# Patient Record
Sex: FEMALE | Race: WHITE | HISPANIC OR LATINO | Employment: OTHER | URBAN - METROPOLITAN AREA
[De-identification: names, ages, dates, MRNs, and addresses within clinical notes are randomized per-mention and may not be internally consistent; named-entity substitution may affect disease eponyms.]

---

## 2017-02-02 ENCOUNTER — TELEPHONE (OUTPATIENT)
Dept: GASTROENTEROLOGY | Facility: CLINIC | Age: 72
End: 2017-02-02

## 2017-02-02 NOTE — TELEPHONE ENCOUNTER
----- Message from Sarika Michelle sent at 2/2/2017  3:27 PM CST -----  Contact: Self- [phone]  Sofia- pt called to reschedule her upcoming appt with Dr. Black originally for tomorrow 2/3/17 at 4pm- pt is unable to make it- please call pt back at [phone]

## 2017-02-09 RX ORDER — IMIPRAMINE HYDROCHLORIDE 25 MG/1
TABLET, FILM COATED ORAL
Qty: 90 TABLET | Refills: 0 | Status: SHIPPED | OUTPATIENT
Start: 2017-02-09 | End: 2017-02-20 | Stop reason: SDUPTHER

## 2017-02-20 ENCOUNTER — OFFICE VISIT (OUTPATIENT)
Dept: FAMILY MEDICINE | Facility: CLINIC | Age: 72
End: 2017-02-20
Payer: MEDICARE

## 2017-02-20 VITALS
BODY MASS INDEX: 24.92 KG/M2 | WEIGHT: 140.63 LBS | SYSTOLIC BLOOD PRESSURE: 124 MMHG | DIASTOLIC BLOOD PRESSURE: 82 MMHG | HEIGHT: 63 IN | TEMPERATURE: 98 F | HEART RATE: 84 BPM

## 2017-02-20 DIAGNOSIS — Z23 IMMUNIZATION DUE: ICD-10-CM

## 2017-02-20 DIAGNOSIS — I73.9 PVD (PERIPHERAL VASCULAR DISEASE): Primary | ICD-10-CM

## 2017-02-20 DIAGNOSIS — E55.9 VITAMIN D DEFICIENCY: ICD-10-CM

## 2017-02-20 DIAGNOSIS — N39.41 URGE INCONTINENCE OF URINE: ICD-10-CM

## 2017-02-20 PROCEDURE — G0009 ADMIN PNEUMOCOCCAL VACCINE: HCPCS | Mod: S$GLB,,, | Performed by: FAMILY MEDICINE

## 2017-02-20 PROCEDURE — 1157F ADVNC CARE PLAN IN RCRD: CPT | Mod: S$GLB,,, | Performed by: FAMILY MEDICINE

## 2017-02-20 PROCEDURE — 3079F DIAST BP 80-89 MM HG: CPT | Mod: S$GLB,,, | Performed by: FAMILY MEDICINE

## 2017-02-20 PROCEDURE — 99999 PR PBB SHADOW E&M-EST. PATIENT-LVL III: CPT | Mod: PBBFAC,,, | Performed by: FAMILY MEDICINE

## 2017-02-20 PROCEDURE — 99214 OFFICE O/P EST MOD 30 MIN: CPT | Mod: 25,S$GLB,, | Performed by: FAMILY MEDICINE

## 2017-02-20 PROCEDURE — 1159F MED LIST DOCD IN RCRD: CPT | Mod: S$GLB,,, | Performed by: FAMILY MEDICINE

## 2017-02-20 PROCEDURE — 4040F PNEUMOC VAC/ADMIN/RCVD: CPT | Mod: S$GLB,,, | Performed by: FAMILY MEDICINE

## 2017-02-20 PROCEDURE — 99499 UNLISTED E&M SERVICE: CPT | Mod: S$GLB,,, | Performed by: FAMILY MEDICINE

## 2017-02-20 PROCEDURE — 3074F SYST BP LT 130 MM HG: CPT | Mod: S$GLB,,, | Performed by: FAMILY MEDICINE

## 2017-02-20 PROCEDURE — 90670 PCV13 VACCINE IM: CPT | Mod: S$GLB,,, | Performed by: FAMILY MEDICINE

## 2017-02-20 PROCEDURE — 1126F AMNT PAIN NOTED NONE PRSNT: CPT | Mod: S$GLB,,, | Performed by: FAMILY MEDICINE

## 2017-02-20 RX ORDER — IMIPRAMINE HYDROCHLORIDE 25 MG/1
25 TABLET, FILM COATED ORAL NIGHTLY
Qty: 90 TABLET | Refills: 3 | Status: SHIPPED | OUTPATIENT
Start: 2017-02-20 | End: 2017-08-22 | Stop reason: SDUPTHER

## 2017-02-20 NOTE — PROGRESS NOTES
Prevnar administered to right deltoid as per MD orders, pt tolerated well, advise patient to wait 15min after shot is given for any adverse reaction.

## 2017-02-20 NOTE — MR AVS SNAPSHOT
[facility]  [address] [facility], [address]  [facility] [address]  Phone: [phone]  Fax: [phone]                  Shakira Pearl   2017 1:40 PM   Office Visit    Description:  Female : 1945   Provider:  Ariana Astudillo MD   Department:  [facility]           Reason for Visit     Follow-up           Diagnoses this Visit        Comments    PVD (peripheral vascular disease)    -  Primary     Urge incontinence of urine         Immunization due         Vitamin D deficiency                To Do List           Future Appointments        Provider Department Dept Phone    2017 3:00 PM Yfn Black MD [facility] - Gastroenterology [phone]      Goals (5 Years of Data)     None       These Medications        Disp Refills Start End    imipramine (TOFRANIL) 25 MG tablet 90 tablet 3 2017     Take 1 tablet (25 mg total) by mouth every evening. - Oral    Pharmacy: [facility] - [place] [id number] [facility] #: [phone]         [facility]     [facility] Care [facility] -  Assistance  Registered nurses in the [facility] provide clinical advisement, health education, appointment booking, and other advisory services.  Call for this free service at [phone].             Medications           Message regarding Medications     Verify the changes and/or additions to your medication regime listed below are the same as discussed with your clinician today.  If any of these changes or additions are incorrect, please notify your healthcare provider.        CHANGE how you are taking these medications     Start Taking Instead of    imipramine (TOFRANIL) 25 MG tablet imipramine (TOFRANIL) 25 MG tablet    Dosage:  Take 1 tablet (25 mg total) by mouth every evening. Dosage:  TAKE 1 TABLET EVERY EVENING    Reason for Change:  Reorder            Verify that the below list of medications is an  "accurate representation of the medications you are currently taking.  If none reported, the list may be blank. If incorrect, please contact your healthcare provider. Carry this list with you in case of emergency.           Current Medications     ergocalciferol (ERGOCALCIFEROL) 50,000 unit Cap Take 1 capsule (50,000 Units total) by mouth every 7 days.    estradiol valerate (DELESTROGEN) 40 mg/mL injection Inject 0.5 mLs (20 mg total) into the muscle every 28 days. Inject into the muscle.    imipramine (TOFRANIL) 25 MG tablet Take 1 tablet (25 mg total) by mouth every evening.           Clinical Reference Information           Your Vitals Were     BP Pulse Temp Height Weight BMI    124/82 (BP Location: Right arm) 84 98.4 °F (36.9 °C) 5' 3" (1.6 m) 63.8 kg (140 lb 10.5 oz) 24.92 kg/m2      Blood Pressure          Most Recent Value    BP  124/82      Allergies as of 2/20/2017     Papaya    Sulfa (Sulfonamide Antibiotics)    Sulfur      Immunizations Administered on Date of Encounter - 2/20/2017     Name Date Dose VIS Date Route    Pneumococcal Conjugate - 13 Valent  Incomplete 0.5 mL 11/5/2015 Intramuscular      Orders Placed During Today's Visit      Normal Orders This Visit    Ambulatory consult to Vascular Medicine     Pneumococcal Conjugate Vaccine (13 Valent) (IM)     Future Labs/Procedures Expected by Expires    Calcitriol  2/20/2017 4/21/2018    Cardiology Lab Segmental Pressures Low Ext W Stress  As directed 2/20/2018      Language Assistance Services     ATTENTION: Language assistance services are available, free of charge. Please call [phone].      ATENCIÓN: Si habla español, tiene a solis disposición servicios gratuitos de asistencia lingüística. Llame al [phone].     CHÚ Ý: N?u b?n nói Ti?ng Vi?t, có các d?ch v? h? tr? ngôn ng? mi?n phí dành cho b?n. G?i s? [phone].         [facility] complies with applicable Federal civil rights laws and does not discriminate on the basis " of race, color, national origin, age, disability, or sex.

## 2017-02-21 NOTE — PROGRESS NOTES
Subjective:       Patient ID: Shakira Pearl is a 71 y.o. female.    Chief Complaint: Follow-up (6 month)  pt needs immunization update, refill of incontinence, pt due for vitamin D level.  HPI see above  Review of Systems   Constitutional: Negative.    HENT: Negative.    Eyes: Negative.    Respiratory: Negative.    Cardiovascular: Positive for leg swelling.        Numbness and coldness to lower extremities periodically   Gastrointestinal: Negative.    Endocrine: Negative.    Genitourinary: Negative.    Musculoskeletal: Negative.    Skin: Positive for color change.   Allergic/Immunologic: Negative.    Neurological: Negative.    Hematological: Negative.    Psychiatric/Behavioral: Negative.        Objective:      Physical Exam   Constitutional: She is oriented to person, place, and time. She appears well-developed and well-nourished. No distress.   HENT:   Head: Normocephalic and atraumatic.   Right Ear: External ear normal.   Left Ear: External ear normal.   Nose: Nose normal.   Mouth/Throat: Oropharynx is clear and moist.   Eyes: Conjunctivae and EOM are normal. Pupils are equal, round, and reactive to light. Right eye exhibits no discharge. Left eye exhibits no discharge. No scleral icterus.   Neck: Normal range of motion. Neck supple. No JVD present. No tracheal deviation present. No thyromegaly present.   Cardiovascular: Normal rate, regular rhythm, normal heart sounds and intact distal pulses.    No murmur heard.  Pulmonary/Chest: Effort normal and breath sounds normal. No respiratory distress. She has no wheezes. She has no rales. She exhibits no tenderness.   Abdominal: Soft. Bowel sounds are normal. She exhibits no distension. There is no tenderness.   Lymphadenopathy:     She has no cervical adenopathy.   Neurological: She is alert and oriented to person, place, and time. She has normal reflexes. She displays normal reflexes. No cranial nerve deficit. She exhibits normal muscle tone. Coordination normal.    Skin: Skin is warm and dry. No rash noted. She is not diaphoretic. No erythema. No pallor.   Psychiatric: She has a normal mood and affect. Her behavior is normal. Judgment and thought content normal.   Nursing note and vitals reviewed.      Assessment:       1. PVD (peripheral vascular disease)    2. Urge incontinence of urine    3. Immunization due    4. Vitamin D deficiency        Plan:     will obtain calcitriol level today, patient will receive Prevnar immunization ×1 today  We'll obtain ABIs with exercise and refer patient to vascular medicine.  Refer to the med card dated 2/20/2017 for refill medications will contact the patient results of testing available.

## 2017-03-07 ENCOUNTER — TELEPHONE (OUTPATIENT)
Dept: FAMILY MEDICINE | Facility: CLINIC | Age: 72
End: 2017-03-07

## 2017-03-07 DIAGNOSIS — E55.9 VITAMIN D DEFICIENCY: ICD-10-CM

## 2017-03-07 DIAGNOSIS — E78.5 HYPERLIPIDEMIA, UNSPECIFIED HYPERLIPIDEMIA TYPE: ICD-10-CM

## 2017-03-07 DIAGNOSIS — Z00.00 ROUTINE GENERAL MEDICAL EXAMINATION AT A HEALTH CARE FACILITY: Primary | ICD-10-CM

## 2017-03-07 DIAGNOSIS — E78.5 ELEVATED LIPIDS: ICD-10-CM

## 2017-03-07 NOTE — TELEPHONE ENCOUNTER
----- Message from Alen Davis sent at 3/7/2017  9:42 AM CST -----  Contact: Self [phone]  Doctor appointment and lab have been scheduled.  Please link lab orders to the lab appointment.  Date of doctor appointment:    Physical or EP: 07/ 12  Date of lab appointment:07/06    Comments:

## 2017-03-07 NOTE — TELEPHONE ENCOUNTER
Pre patient insurance they will not cover a routine TSH and lipid panel patient has medicare do you have a DX's that can cover the non covered labs

## 2017-03-08 PROBLEM — E78.5 ELEVATED LIPIDS: Status: ACTIVE | Noted: 2017-03-08

## 2017-03-14 ENCOUNTER — CLINICAL SUPPORT (OUTPATIENT)
Dept: CARDIOLOGY | Facility: CLINIC | Age: 72
End: 2017-03-14
Payer: MEDICARE

## 2017-03-14 ENCOUNTER — LAB VISIT (OUTPATIENT)
Dept: LAB | Facility: HOSPITAL | Age: 72
End: 2017-03-14
Attending: FAMILY MEDICINE
Payer: MEDICARE

## 2017-03-14 DIAGNOSIS — I73.9 PVD (PERIPHERAL VASCULAR DISEASE): ICD-10-CM

## 2017-03-14 DIAGNOSIS — E55.9 VITAMIN D DEFICIENCY: ICD-10-CM

## 2017-03-14 LAB — VASCULAR ANKLE BRACHIAL INDEX (ABI) RIGHT: 1.02 (ref 0.9–1.2)

## 2017-03-14 PROCEDURE — 93924 LWR XTR VASC STDY BILAT: CPT | Mod: S$GLB,,, | Performed by: INTERNAL MEDICINE

## 2017-03-16 ENCOUNTER — TELEPHONE (OUTPATIENT)
Dept: FAMILY MEDICINE | Facility: CLINIC | Age: 72
End: 2017-03-16

## 2017-03-16 NOTE — TELEPHONE ENCOUNTER
Spoke with patient requesting results for the following Cardiology Lab Segmental Pressures Low Ext W Stress, and Calcitriol completed on 3/14/17.

## 2017-03-16 NOTE — TELEPHONE ENCOUNTER
----- Message from Romelia Tucker MA sent at 3/16/2017  9:34 AM CDT -----  Contact: Puvb-[phone]  Type: Test Results    What test was performed? NON FASTING LAB and SEG PRESSURE EXERCISE     Who ordered the test?    When and where were the test performed? 3/14/17    Comments: Please advise and call. Thanks!

## 2017-03-17 ENCOUNTER — CLINICAL SUPPORT (OUTPATIENT)
Dept: CARDIOLOGY | Facility: CLINIC | Age: 72
End: 2017-03-17
Payer: MEDICARE

## 2017-03-17 ENCOUNTER — TELEPHONE (OUTPATIENT)
Dept: FAMILY MEDICINE | Facility: CLINIC | Age: 72
End: 2017-03-17

## 2017-03-17 DIAGNOSIS — I73.9 PAD (PERIPHERAL ARTERY DISEASE): ICD-10-CM

## 2017-03-17 DIAGNOSIS — I73.9 PAD (PERIPHERAL ARTERY DISEASE): Primary | ICD-10-CM

## 2017-03-17 LAB — 1,25(OH)2D3 SERPL-MCNC: 128 PG/ML

## 2017-03-17 PROCEDURE — 93925 LOWER EXTREMITY STUDY: CPT | Mod: S$GLB,,, | Performed by: INTERNAL MEDICINE

## 2017-03-17 NOTE — TELEPHONE ENCOUNTER
----- Message from Zaria Jain sent at 3/17/2017  8:55 AM CDT -----  Contact: [phone]  Patient is returning a phone call.  Who left a message for the patient: laura  Does patient know what this is regarding:  no  Comments:

## 2017-03-17 NOTE — TELEPHONE ENCOUNTER
"Spoke with patient reviewed results of the Cardiology Lab Segmental Pressures Low Ext W Stress per [facility],  noted in the report it states "significant right and left femoral disease."  Patient would like to know what this means since you stated the results are normal.   "

## 2017-03-17 NOTE — TELEPHONE ENCOUNTER
Spoke with patient advised her of Dr. Manzanares's recommendation.  Patient informed that the referral coordinator will contact her to set up appt with vascular med.  Patient verbalizes understanding.

## 2017-03-17 NOTE — TELEPHONE ENCOUNTER
"I looked over pt's report and I don't know how to interpret because the values for her blood flow look normal but this study may be using different cut offs for what is "normal" vs "abnormal".  I would recommend seeing vascular medicine, Dr. August put in a referral.  "

## 2017-03-20 ENCOUNTER — TELEPHONE (OUTPATIENT)
Dept: PHYSICAL MEDICINE AND REHAB | Facility: CLINIC | Age: 72
End: 2017-03-20

## 2017-03-20 ENCOUNTER — TELEPHONE (OUTPATIENT)
Dept: CARDIOLOGY | Facility: CLINIC | Age: 72
End: 2017-03-20

## 2017-03-20 NOTE — TELEPHONE ENCOUNTER
----- Message from Obed Harvey MD sent at 3/20/2017  3:58 PM CDT -----  Arteria doppler is normal.

## 2017-03-21 NOTE — TELEPHONE ENCOUNTER
----- Message from Kristie Dietz MA sent at 3/17/2017  2:16 PM CDT -----  Contact: self @ [phone]      ----- Message -----     From: Liyah Prather     Sent: 3/17/2017   2:09 PM       To: [facility] Staff    Pt says she had a vascular test for PAD she was told she has moral significant disease.  She was advised to contact her pain management dr to discuss it.  pls call asap.  Pt does not want to go the night without speaking with someone.     Called patient about test for PAD, that was normal.

## 2017-04-03 ENCOUNTER — HOSPITAL ENCOUNTER (EMERGENCY)
Facility: OTHER | Age: 72
Discharge: HOME | End: 2017-04-03
Attending: EMERGENCY MEDICINE
Payer: MEDICARE

## 2017-04-03 VITALS
OXYGEN SATURATION: 98 % | HEART RATE: 77 BPM | SYSTOLIC BLOOD PRESSURE: 156 MMHG | WEIGHT: 134 LBS | RESPIRATION RATE: 18 BRPM | DIASTOLIC BLOOD PRESSURE: 81 MMHG | TEMPERATURE: 98 F | BODY MASS INDEX: 22.88 KG/M2 | HEIGHT: 64 IN

## 2017-04-03 DIAGNOSIS — S60.221A CONTUSION OF RIGHT HAND, INITIAL ENCOUNTER: Primary | ICD-10-CM

## 2017-04-03 DIAGNOSIS — S40.011A CONTUSION OF RIGHT SHOULDER, INITIAL ENCOUNTER: ICD-10-CM
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
## 2017-04-03 PROCEDURE — 29125 APPL SHORT ARM SPLINT STATIC: CPT | Mod: RT

## 2017-04-03 PROCEDURE — 99283 EMERGENCY DEPT VISIT LOW MDM: CPT | Mod: 25

## 2017-04-03 RX ORDER — IBUPROFEN 600 MG/1
600 TABLET ORAL EVERY 6 HOURS PRN
Qty: 20 TABLET | Refills: 0 | Status: SHIPPED | OUTPATIENT
Start: 2017-04-03 | End: 2017-07-29

## 2017-04-03 NOTE — DISCHARGE INSTRUCTIONS
Shoulder Contusion  You have a shoulder injury called a contusion. This causes pain, swelling, and sometimes bruising on the skin. You dont have any broken bones. This injury will take from a few days to several weeks to heal, depending on how severe it is. Moderate to severe shoulder contusions are treated with a sling or shoulder immobilizer. Minor contusions can be treated without any special support.  Home care  Follow these tips when caring for yourself at home:  · If you were given a sling to use, leave it in place for the time advised by your healthcare provider. If you arent sure how long to wear it, ask for advice. If the sling becomes loose, adjust it so that your forearm is level with the ground. Your shoulder should feel well supported.  · Put an ice pack on the injured area for 20 minutes every 1 to 2 hours the first day. You can make your own ice pack by putting ice cubes in a plastic bag. Wrap the bag in a thin towel. Continue with ice packs 3 to 4 times a day for the next 2 days. Then use the pack as needed to ease pain and swelling.  · You may use acetaminophen or ibuprofen to control pain, unless another pain medicine was prescribed. If you have chronic liver or kidney disease, talk with your healthcare provider before using these medicines. Also talk with your provider if youve ever had a stomach ulcer or GI bleeding.  · Shoulder and elbow joints become stiff if left in a sling for too long. You should start range of motion exercises about 7 to 10 days after the injury. Talk with your provider to find out what type of exercises to do and how soon to start.  · Unless your provider told you otherwise, you can take the sling off to shower or bathe.  Follow-up care  Follow up with your healthcare provider if you dont start getting better in the next 5 days.  When to seek medical advice  Call your healthcare provider right away if any of these occur:  · Pain or swelling gets worse or continues  for more than a few days  · Large amount of bruising on your shoulder or upper arm  · Your hand or fingers become cold, blue, numb, or tingly  · Difficulty moving your hand or fingers  · Weakness in your hand or fingers  · Your shoulder becomes stiff  · Your shoulder feels like it is popping out  · You arent able to do your daily activities  Date Last Reviewed: 10/1/2016  © 0781-0230 [employer]. [address] [place] [address], [place] [address]. All rights reserved. This information is not intended as a substitute for professional medical care. Always follow your healthcare professional's instructions.          Hand Contusion  You have a contusion. This is also called a bruise. There is swelling and some bleeding under the skin, but no broken bones. This injury generally takes a few days to a few weeks to heal.  During that time, the bruise will typically change in color from reddish, to purple-blue, to greenish-yellow, then to yellow-brown.  Home care  · Elevate the hand to reduce pain and swelling. As much as possible, sit or lie down with the hand raised about the level of your heart. This is especially important during the first 48 hours.  · Ice the hand to help reduce pain and swelling. Wrap a cold source (ice pack or ice cubes in a plastic bag) in a thin towel. Apply to the bruised area for 20 minutes every 1 to 2 hours the first day. Continue this 3 to 4 times a day until the pain and swelling goes away.  · Unless another medication was prescribed, you can take acetaminophen, ibuprofen, or naproxen to control pain. (If you have chronic liver or kidney disease or ever had a stomach ulcer or GI bleeding, talk with your doctor before using these medicines.)  Follow up  Follow up with your health care provider or our staff as advised. Call if you are not improving within 1 to 2 weeks.  When to seek medical advice   Call your health care provider right away if you have any of the following:  · Increased  pain or swelling  · Arm becomes cold, blue, numb or tingly  · Signs of infection: Warmth, drainage, or increased redness or pain around the bruise  · Inability to move the injured hand   · Frequent bruising for unknown reasons  Date Last Reviewed: 4/29/2015  © 1473-4952 [employer]. [address] [place] [address]. All rights reserved. This information is not intended as a substitute for professional medical care. Always follow your healthcare professional's instructions.

## 2017-04-03 NOTE — ED NOTES
Pt reports being at the fitness center and slipped on the mat, landed on right cheek and right outer hand, denies LOC, no bruising noted to right cheek, swelling with ecchymosis noted to right dorsal region, located between 4th and 5th fingers, limited ROM with fingers

## 2017-04-03 NOTE — ED PROVIDER NOTES
Encounter Date: 4/3/2017       History     Chief Complaint   Patient presents with    Hand Injury     pt reports haqving a fall while exercising on workout mat; pt reports slipping falling onto right side hurting right hand; ROM decreased d/t pain     Review of patient's allergies indicates:   Allergen Reactions    Papaya      Illness vomiting    Sulfa (sulfonamide antibiotics) Rash    Sulfur Rash     HPI Comments: Patient is 71 year old female who presents with complaints of right hand and shoulder pain second to mechanical fall x 3 hours PTA. She reports while working out at the gym with her  she attempted to get up from the ground and her mat slipped from under her. She reports immediate onset of right hand pain and shoulder pain. She also reports hitting her right cheek bone on the ground but has not pain to her face. She denies LOC, AMS, dizziness, confusion, other extremity pain. She does report difficulties using her right hand when steering her car and changing gears. She has not taken any medications PTA. She is currently unaccompanied in the ER.     The history is provided by the patient.     Past Medical History:   Diagnosis Date    Allergy sinus    Arthritis back    Degenerative disc disease back    Neuromuscular disorder     Vitamin D deficiency      Past Surgical History:   Procedure Laterality Date    APPENDECTOMY  age 19    HYSTERECTOMY  38     Family History   Problem Relation Age of Onset    Heart disease Mother 67     heart attack    Cancer Father 65     abdominal    Stomach cancer Father     Celiac disease Neg Hx     Colon cancer Neg Hx     Crohn's disease Neg Hx     Esophageal cancer Neg Hx     Inflammatory bowel disease Neg Hx     Liver cancer Neg Hx     Rectal cancer Neg Hx     Ulcerative colitis Neg Hx      Social History   Substance Use Topics    Smoking status: Never Smoker    Smokeless tobacco: Never Used    Alcohol use No     Review of Systems    Constitutional: Negative for chills and fever.   HENT: Negative for sore throat and trouble swallowing.    Eyes: Negative for visual disturbance.   Respiratory: Negative for cough and shortness of breath.    Cardiovascular: Negative for chest pain.   Gastrointestinal: Negative for abdominal pain, constipation, diarrhea, nausea and vomiting.   Genitourinary: Negative for dysuria and flank pain.   Musculoskeletal: Negative for back pain, neck pain and neck stiffness.        Right hand pain  Right shoulder pain     Skin: Negative for rash.   Neurological: Negative for dizziness, syncope, weakness and headaches.   Psychiatric/Behavioral: Negative for confusion.       Physical Exam   Initial Vitals   BP Pulse Resp Temp SpO2   04/03/17 1540 04/03/17 1540 04/03/17 1540 04/03/17 1540 04/03/17 1540   156/81 77 18 98.1 °F (36.7 °C) 98 %     Physical Exam    Nursing note and vitals reviewed.  Constitutional: She appears well-developed and well-nourished. She is not diaphoretic. No distress.   Healthy appearing female who appears younger than stated age who is guarding her right hand and shoulder during interview and exam. She makes good eye contact and ambulates with ease.    HENT:   Head: Normocephalic and atraumatic.   No facial edema, erythema, ecchymosis or asymmetry.   Eyes: Conjunctivae and EOM are normal. Pupils are equal, round, and reactive to light. Right eye exhibits no discharge. Left eye exhibits no discharge. No scleral icterus.   Neck: Normal range of motion.   Cardiovascular: Normal rate, regular rhythm and normal heart sounds. Exam reveals no gallop and no friction rub.    No murmur heard.  Pulmonary/Chest: Breath sounds normal. She has no wheezes. She has no rhonchi. She has no rales.   Abdominal: Soft. Bowel sounds are normal. There is no tenderness. There is no rebound and no guarding.   Musculoskeletal: Normal range of motion. She exhibits edema and tenderness.   Edema ecchymosis and TTP to the medial  aspect of the right (dominant) hand over the dorsum. There is no skin breakdown or bleeding. There is normal sensation to light touch. There is normal strength against resistance. Patient reports decreased ROM second to weakness and pain.     Small area of ecchymosis over the deltoid region of the right shoulder of the size of a quarter.  No range of motion deficits in bony landmark tenderness to the right shoulder.   Lymphadenopathy:     She has no cervical adenopathy.   Neurological: She is alert and oriented to person, place, and time. She has normal strength. No cranial nerve deficit or sensory deficit.   Skin: Skin is warm and dry. No rash and no abscess noted. No erythema.   Psychiatric: She has a normal mood and affect. Her behavior is normal. Thought content normal.         ED Course   Orthopedic Injury  Date/Time: 4/3/2017 5:48 PM  Authorized by: JESSICA MARCANO II   Performed by: BECCA SWANN  Injury location: hand  Location details: right hand  Injury type: soft tissue  Pre-procedure distal perfusion: normal  Pre-procedure neurological function: normal  Pre-procedure neurovascular assessment: neurovascularly intact  Pre-procedure range of motion: normal  Local anesthesia used: yes    Anesthesia:  Local anesthesia used: yes  Anesthesia: see MAR for details  Anesthesia: see MAR for details  Sedation:  Patient sedated: no    Splint type: ulnar gutter  Complications: No  Specimens: No  Implants: No  Post-procedure neurovascular assessment: post-procedure neurovascularly intact  Post-procedure distal perfusion: normal  Post-procedure neurological function: normal  Post-procedure range of motion: normal  Patient tolerance: Patient tolerated the procedure well with no immediate complications        Labs Reviewed - No data to display     Imaging Results         X-Ray Hand 3 view Right (Final result) Result time:  04/03/17 17:39:16    Final result by Delta Pennington MD (04/03/17 17:39:16)    Impression:      No acute fracture identified.      Electronically signed by: Dr. Delta Pennington MD  Date:     04/03/17  Time:    17:39     Narrative:    Comparison: None.    Findings:3 view examination. The alignment is within normal limits.  No displaced fractures identified.  No evidence of lytic or blastic lesions.Soft tissues are unremarkable. Joint spaces are unremarkable.            X-Ray Shoulder Trauma Right (Final result) Result time:  04/03/17 17:36:43    Final result by Delta Pennington MD (04/03/17 17:36:43)    Impression:     No acute fracture identified.      Electronically signed by: Dr. Delat Pennington MD  Date:     04/03/17  Time:    17:36     Narrative:    Comparison: None.    Findings:3 view examination. The alignment is within normal limits.  No displaced fractures identified.  No evidence of lytic or blastic lesions.Soft tissues are unremarkable. Joint spaces are unremarkable.                 Medical Decision Making:   ED Management:  Urgent evaluation of 71-year-old female who presents with complaints concerning for fracture to right hand second to mechanical slip and fall.  Patient is afebrile, nontoxic appearing, hemodynamically stable.  Physical exam outlined above and reveals concern for possible fracture to fifth metacarpal.  X-ray reveals no evidence of fracture however given bruising and pain with movement will splint for support and comfort and have patient follow-up with Dr. Pagan in 1-2 days for symptom re-check. She is amenable to plan. Case discussed with attending MD who agrees with plan.                    ED Course     Clinical Impression:   The primary encounter diagnosis was Contusion of right hand, initial encounter. Diagnoses of Fall and Contusion of right shoulder, initial encounter were also pertinent to this visit.          Haritha Alexander PA-C  04/03/17 [id number]

## 2017-04-03 NOTE — ED AVS SNAPSHOT
[facility]  [address]  [facility] [address]               Shakira Pearl   4/3/2017  4:01 PM   ED    Description:  Female : 1945   Department:  [facility]           Your Care was Coordinated By:     Provider Role From To    Chilango Roach II, MD Attending Provider 17 1034 --    Haritha Alexander PA-C Physician Assistant 17 3337 --      Reason for Visit     Hand Injury           Diagnoses this Visit        Comments    Contusion of right hand, initial encounter    -  Primary     Fall         Contusion of right shoulder, initial encounter           ED Disposition     None           To Do List           Follow-up Information     Follow up with Claude S. Williams Iv, MD In 1 day.    Specialty:  Orthopedic Surgery    Why:  For symptom re-check.     Contact information:    [address]  [place] [address]  [phone]         These Medications        Disp Refills Start End    ibuprofen (ADVIL,MOTRIN) 600 MG tablet 20 tablet 0 4/3/2017     Take 1 tablet (600 mg total) by mouth every 6 (six) hours as needed for Pain. - Oral    Pharmacy: [facility] - [address] Ph #: [phone]         [facility] On Call     [facility] Care [facility] - 24/ Assistance  Unless otherwise directed by your provider, please contact [facility] On-[facility], our nurse care line that is available for 24/7 assistance.     Registered nurses in the [facility] provide: appointment scheduling, clinical advisement, health education, and other advisory services.  Call: [phone] (toll free)               Medications           Message regarding Medications     Verify the changes and/or additions to your medication regime listed below are the same as discussed with your clinician today.  If any of these changes or additions are incorrect, please notify your healthcare provider.        START taking these  "NEW medications        Refills    ibuprofen (ADVIL,MOTRIN) 600 MG tablet 0    Sig: Take 1 tablet (600 mg total) by mouth every 6 (six) hours as needed for Pain.    Class: Print    Route: Oral           Verify that the below list of medications is an accurate representation of the medications you are currently taking.  If none reported, the list may be blank. If incorrect, please contact your healthcare provider. Carry this list with you in case of emergency.           Current Medications     ergocalciferol (ERGOCALCIFEROL) 50,000 unit Cap Take 1 capsule (50,000 Units total) by mouth every 7 days.    estradiol valerate (DELESTROGEN) 40 mg/mL injection Inject 0.5 mLs (20 mg total) into the muscle every 28 days. Inject into the muscle.    imipramine (TOFRANIL) 25 MG tablet Take 1 tablet (25 mg total) by mouth every evening.    ibuprofen (ADVIL,MOTRIN) 600 MG tablet Take 1 tablet (600 mg total) by mouth every 6 (six) hours as needed for Pain.           Clinical Reference Information           Your Vitals Were     BP Pulse Temp Resp Height Weight    156/81 (BP Location: Left arm, Patient Position: Sitting) 77 98.1 °F (36.7 °C) (Oral) 18 5' 3.5" (1.613 m) 60.8 kg (134 lb)    SpO2 BMI             98% 23.36 kg/m2         Allergies as of 4/3/2017        Reactions    Papaya     Illness vomiting    Sulfa (Sulfonamide Antibiotics) Rash    Sulfur Rash      Immunizations Administered on Date of Encounter - 4/3/2017     None      ED Micro, Lab, POCT     None      ED Imaging Orders     Start Ordered       Status Ordering Provider    04/03/17 1620 04/03/17 1619  X-Ray Shoulder Trauma Right  1 time imaging      Final result     04/03/17 1619 04/03/17 1619  X-Ray Hand 3 view Right  1 time imaging      Final result         Discharge Instructions         Shoulder Contusion  You have a shoulder injury called a contusion. This causes pain, swelling, and sometimes bruising on the skin. You dont have any broken bones. This injury will take " from a few days to several weeks to heal, depending on how severe it is. Moderate to severe shoulder contusions are treated with a sling or shoulder immobilizer. Minor contusions can be treated without any special support.  Home care  Follow these tips when caring for yourself at home:  · If you were given a sling to use, leave it in place for the time advised by your healthcare provider. If you arent sure how long to wear it, ask for advice. If the sling becomes loose, adjust it so that your forearm is level with the ground. Your shoulder should feel well supported.  · Put an ice pack on the injured area for 20 minutes every 1 to 2 hours the first day. You can make your own ice pack by putting ice cubes in a plastic bag. Wrap the bag in a thin towel. Continue with ice packs 3 to 4 times a day for the next 2 days. Then use the pack as needed to ease pain and swelling.  · You may use acetaminophen or ibuprofen to control pain, unless another pain medicine was prescribed. If you have chronic liver or kidney disease, talk with your healthcare provider before using these medicines. Also talk with your provider if youve ever had a stomach ulcer or GI bleeding.  · Shoulder and elbow joints become stiff if left in a sling for too long. You should start range of motion exercises about 7 to 10 days after the injury. Talk with your provider to find out what type of exercises to do and how soon to start.  · Unless your provider told you otherwise, you can take the sling off to shower or bathe.  Follow-up care  Follow up with your healthcare provider if you dont start getting better in the next 5 days.  When to seek medical advice  Call your healthcare provider right away if any of these occur:  · Pain or swelling gets worse or continues for more than a few days  · Large amount of bruising on your shoulder or upper arm  · Your hand or fingers become cold, blue, numb, or tingly  · Difficulty moving your hand or  fingers  · Weakness in your hand or fingers  · Your shoulder becomes stiff  · Your shoulder feels like it is popping out  · You arent able to do your daily activities  Date Last Reviewed: 10/1/2016  © 7112-2251 [employer]. [address] [place] [address], [place] [address]. All rights reserved. This information is not intended as a substitute for professional medical care. Always follow your healthcare professional's instructions.          Hand Contusion  You have a contusion. This is also called a bruise. There is swelling and some bleeding under the skin, but no broken bones. This injury generally takes a few days to a few weeks to heal.  During that time, the bruise will typically change in color from reddish, to purple-blue, to greenish-yellow, then to yellow-brown.  Home care  · Elevate the hand to reduce pain and swelling. As much as possible, sit or lie down with the hand raised about the level of your heart. This is especially important during the first 48 hours.  · Ice the hand to help reduce pain and swelling. Wrap a cold source (ice pack or ice cubes in a plastic bag) in a thin towel. Apply to the bruised area for 20 minutes every 1 to 2 hours the first day. Continue this 3 to 4 times a day until the pain and swelling goes away.  · Unless another medication was prescribed, you can take acetaminophen, ibuprofen, or naproxen to control pain. (If you have chronic liver or kidney disease or ever had a stomach ulcer or GI bleeding, talk with your doctor before using these medicines.)  Follow up  Follow up with your health care provider or our staff as advised. Call if you are not improving within 1 to 2 weeks.  When to seek medical advice   Call your health care provider right away if you have any of the following:  · Increased pain or swelling  · Arm becomes cold, blue, numb or tingly  · Signs of infection: Warmth, drainage, or increased redness or pain around the bruise  · Inability to move the  injured hand   · Frequent bruising for unknown reasons  Date Last Reviewed: 4/29/2015  © 1773-4668 [employer]. [address] [place] [address], [place] [address]. All rights reserved. This information is not intended as a substitute for professional medical care. Always follow your healthcare professional's instructions.          Your Scheduled Appointments     Apr 17, 2017  3:00 PM CDT   GASTROENTEROLOGY ESTABLISHED PATIENT with MD Will Gaytan mayra - Gastroenterology ([facility] )    [address]  [place] [id number] [phone]            Jul 06, 2017  9:00 AM CDT   Fasting Lab with LAB, STANLEY Ace - Laboratory ([facility])    [address] [facility] [address] [phone]            Jul 12, 2017  2:00 PM CDT   Physical with Ariana Astudillo MD   [facility] ([facility])    [address] [facility], [address]  [facility] [address] [phone]               [facility] complies with applicable Federal civil rights laws and does not discriminate on the basis of race, color, national origin, age, disability, or sex.        Language Assistance Services     ATTENTION: Language assistance services are available, free of charge. Please call [phone].      ATENCIÓN: Si habla español, tiene a solis disposición servicios gratuitos de asistencia lingüística. Llame al [phone].     CHÚ Ý: N?u b?n nói Ti?ng Vi?t, có các d?ch v? h? tr? ngôn ng? mi?n phí dành cho b?n. G?i s? [phone].

## 2017-04-05 ENCOUNTER — TELEPHONE (OUTPATIENT)
Dept: GASTROENTEROLOGY | Facility: CLINIC | Age: 72
End: 2017-04-05
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
## 2017-05-10 ENCOUNTER — TELEPHONE (OUTPATIENT)
Dept: GASTROENTEROLOGY | Facility: HOSPITAL | Age: 72
End: 2017-05-10

## 2017-05-10 NOTE — TELEPHONE ENCOUNTER
GI on-call Note      Called by patient.  She had food poisoning couple days ago and forced herself to throwup.   Now she is having stomach pains after eating. She is feeling weak and tired.  Denies CP or SOB. No abdominal distention or rigidity.     She is asking what to take to soothe her stomach.  Advised pepto bismol, alk seltzer or tums can be taken for imeediate relief.  She can restart her PPI pantoprazole if she desires.  Tylenol is preferred over ibuprofen.    Advised that if she cannot keep food down, should go to local ER if symptoms persist / worsen

## 2017-06-29 ENCOUNTER — TELEPHONE (OUTPATIENT)
Dept: GASTROENTEROLOGY | Facility: CLINIC | Age: 72
End: 2017-06-29

## 2017-06-29 NOTE — TELEPHONE ENCOUNTER
----- Message from Camila England MA sent at 6/29/2017 12:22 PM CDT -----  Contact: self  [phone]  States she needs to reschedule the appointment on Friday, 6-30-17.  States she is in a meeting and if you miss her she will call you back.  States her meeting starts in 5 minutes.

## 2017-07-29 ENCOUNTER — HOSPITAL ENCOUNTER (EMERGENCY)
Facility: OTHER | Age: 72
Discharge: HOME | End: 2017-07-29
Attending: EMERGENCY MEDICINE
Payer: MEDICARE

## 2017-07-29 VITALS
OXYGEN SATURATION: 98 % | DIASTOLIC BLOOD PRESSURE: 66 MMHG | TEMPERATURE: 98 F | WEIGHT: 135 LBS | SYSTOLIC BLOOD PRESSURE: 136 MMHG | HEIGHT: 63 IN | RESPIRATION RATE: 18 BRPM | BODY MASS INDEX: 23.92 KG/M2 | HEART RATE: 67 BPM
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
## 2017-07-29 PROCEDURE — 99283 EMERGENCY DEPT VISIT LOW MDM: CPT | Mod: 25

## 2017-07-29 PROCEDURE — 63600175 PHARM REV CODE 636 W HCPCS: Performed by: PHYSICIAN ASSISTANT

## 2017-07-29 PROCEDURE — 96372 THER/PROPH/DIAG INJ SC/IM: CPT

## 2017-07-29 RX ORDER — ORPHENADRINE CITRATE 30 MG/ML
30 INJECTION INTRAMUSCULAR; INTRAVENOUS
Status: COMPLETED | OUTPATIENT
Start: 2017-07-29 | End: 2017-07-29

## 2017-07-29 RX ORDER — METHOCARBAMOL 500 MG/1
1000 TABLET, FILM COATED ORAL 3 TIMES DAILY
Qty: 30 TABLET | Refills: 0 | Status: SHIPPED | OUTPATIENT
Start: 2017-07-29 | End: 2017-08-03

## 2017-07-29 RX ORDER — KETOROLAC TROMETHAMINE 30 MG/ML
15 INJECTION, SOLUTION INTRAMUSCULAR; INTRAVENOUS
Status: COMPLETED | OUTPATIENT
Start: 2017-07-29 | End: 2017-07-29

## 2017-07-29 RX ADMIN — KETOROLAC TROMETHAMINE 15 MG: 30 INJECTION, SOLUTION INTRAMUSCULAR at 05:07

## 2017-07-29 RX ADMIN — ORPHENADRINE CITRATE 30 MG: 30 INJECTION INTRAMUSCULAR; INTRAVENOUS at 05:07
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
## 2017-07-29 NOTE — PROVIDER PROGRESS NOTES - EMERGENCY DEPT.
Encounter Date: 7/29/2017    ED Physician Progress Notes        Physician Note:   I evaluated the patient in triage and performed medical screening exam. Patient stable at this time and awaiting bed. Chief complaint and vital signs reviewed.

## 2017-08-22 ENCOUNTER — OFFICE VISIT (OUTPATIENT)
Dept: FAMILY MEDICINE | Facility: CLINIC | Age: 72
End: 2017-08-22
Payer: MEDICARE

## 2017-08-22 VITALS
HEIGHT: 63 IN | TEMPERATURE: 98 F | SYSTOLIC BLOOD PRESSURE: 122 MMHG | DIASTOLIC BLOOD PRESSURE: 84 MMHG | WEIGHT: 143.31 LBS | BODY MASS INDEX: 25.39 KG/M2

## 2017-08-22 DIAGNOSIS — Z86.39 HISTORY OF VITAMIN D DEFICIENCY: ICD-10-CM

## 2017-08-22 DIAGNOSIS — Z78.9 OTHER SPECIFIED HEALTH STATUS: ICD-10-CM

## 2017-08-22 DIAGNOSIS — E08.3213 DIABETES MELLITUS DUE TO UNDERLYING CONDITION WITH BOTH EYES AFFECTED BY MILD NONPROLIFERATIVE RETINOPATHY AND MACULAR EDEMA: ICD-10-CM

## 2017-08-22 DIAGNOSIS — E11.9 TYPE 2 DIABETES MELLITUS WITHOUT COMPLICATION, WITHOUT LONG-TERM CURRENT USE OF INSULIN: ICD-10-CM

## 2017-08-22 DIAGNOSIS — N39.41 URGE INCONTINENCE OF URINE: ICD-10-CM

## 2017-08-22 DIAGNOSIS — R07.9 CHEST PAIN, UNSPECIFIED TYPE: Primary | ICD-10-CM

## 2017-08-22 PROCEDURE — 99999 PR PBB SHADOW E&M-EST. PATIENT-LVL III: CPT | Mod: PBBFAC,,, | Performed by: FAMILY MEDICINE

## 2017-08-22 PROCEDURE — 99499 UNLISTED E&M SERVICE: CPT | Mod: S$GLB,,, | Performed by: FAMILY MEDICINE

## 2017-08-22 PROCEDURE — 99397 PER PM REEVAL EST PAT 65+ YR: CPT | Mod: S$GLB,,, | Performed by: FAMILY MEDICINE

## 2017-08-22 RX ORDER — ERGOCALCIFEROL 1.25 MG/1
50000 CAPSULE ORAL
Qty: 12 CAPSULE | Refills: 3 | Status: SHIPPED | OUTPATIENT
Start: 2017-08-22 | End: 2017-08-22 | Stop reason: SDUPTHER

## 2017-08-22 RX ORDER — IMIPRAMINE HYDROCHLORIDE 25 MG/1
25 TABLET, FILM COATED ORAL NIGHTLY
Qty: 90 TABLET | Refills: 3 | Status: SHIPPED | OUTPATIENT
Start: 2017-08-22 | End: 2018-04-05 | Stop reason: SDUPTHER

## 2017-08-22 RX ORDER — ERGOCALCIFEROL 1.25 MG/1
50000 CAPSULE ORAL
Qty: 12 CAPSULE | Refills: 3 | Status: SHIPPED | OUTPATIENT
Start: 2017-08-22 | End: 2018-05-15

## 2017-08-22 RX ORDER — IMIPRAMINE HYDROCHLORIDE 25 MG/1
25 TABLET, FILM COATED ORAL NIGHTLY
Qty: 90 TABLET | Refills: 3 | Status: SHIPPED | OUTPATIENT
Start: 2017-08-22 | End: 2017-08-22 | Stop reason: SDUPTHER

## 2017-08-22 NOTE — PROGRESS NOTES
Shakira Pearl is a 72 y.o. female   Routine physical  Source of history: Patient  Past Medical History:   Diagnosis Date    Allergy sinus    Arthritis back    Degenerative disc disease back    Neuromuscular disorder     Vitamin D deficiency      Patient  reports that she has never smoked. She has never used smokeless tobacco. She reports that she does not drink alcohol or use drugs.  Family History   Problem Relation Age of Onset    Heart disease Mother 67     heart attack    Cancer Father 65     abdominal    Stomach cancer Father     Celiac disease Neg Hx     Colon cancer Neg Hx     Crohn's disease Neg Hx     Esophageal cancer Neg Hx     Inflammatory bowel disease Neg Hx     Liver cancer Neg Hx     Rectal cancer Neg Hx     Ulcerative colitis Neg Hx      ROS:  GENERAL: No fever, chills, fatigability or weight loss.  SKIN: No rashes, itching or changes in color or texture of skin.  HEAD: No headaches or recent head trauma.  EYES: Visual acuity fine. No photophobia, ocular pain or diplopia.  EARS: Denies ear pain, discharge or vertigo.  NOSE: No loss of smell, no epistaxis or postnasal drip.  MOUTH & THROAT: No hoarseness or change in voice. No excessive gum bleeding.  NODES: Denies swollen glands.  CHEST: Denies SHETTY, cyanosis, wheezing, cough and sputum production.  CARDIOVASCULAR: Denies chest pain, PND, orthopnea or reduced exercise tolerance.  ABDOMEN: Appetite fine. No weight loss. Denies diarrhea, abdominal pain, hematemesis or blood in stool.  URINARY: No flank pain, dysuria or hematuria.  PERIPHERAL VASCULAR: No claudication or cyanosis.  MUSCULOSKELETAL: No joint stiffness or swelling. Denies back pain.  NEUROLOGIC: No history of seizures, paralysis, alteration of gait or coordination.    OBJECTIVE:  APPEARANCE: normal  Vitals:    08/22/17 1359   BP: 122/84   Temp: 98 °F (36.7 °C)     SKIN: Normal skin turgor, no lesions.  HEENT: Both external auditory canals clear. Both tympanic membranes  intact. PERRL. EOMI.   Disk margins sharp. No tonsillar enlargement. No pharyngeal erythema or exudate. No stridor.  NECK: No bruits. No cervical spine tenderness. No cervical lymphadenopathy. No thyromegaly.  CHEST: Breath sounds clear bilaterally. Lungs clear to auscultation & percussion. Good air movement. No rales. No retractions. No rhonchi. No stridor. No wheezes.  CARDIOVASCULAR: Normal S1, S2. No murmurs. No edema.  BREASTS: no masses palpated in either breast or axillary area, symmetry noted.  ABDOMEN: Bowel sounds normal. No palpable aortic enlargement. No CVA tenderness. No pulsatile mass. No rebound tenderne  PERIPHERAL VASCULAR: Femoral pulses present and symmetrical. No edema.  MUSCULOSKELETAL: Degenerative changes of both ankles, foot, knee, wrist and hand.  BACK: No CVA tenderness. There is no spasm, tenderness or radiculopathy noted with palpation and there is full range of motion.   NEUROLOGIC:   Cranial Nerves: II-XII grossly intact.  Motor: 5/5 strength major flexors/extensors. No tremor.  DTR's: Knees, Ankles 2+ and equal bilaterally; downgoing toes.  Sensory: Intact to light touch distally.  Gait & Posture: Normal gait and fine motion. No cerebellar signs.  MENTAL STATUS: Alert. Oriented x 3. Language skills normal. Memory intact. Well kept appearance.    ASSESSMENT/PLAN:   Shakira was seen today for annual exam.    Diagnoses and all orders for this visit:    Chest pain, unspecified type  -     Hemoglobin A1c; Future    History of vitamin D deficiency  -     Discontinue: ergocalciferol (ERGOCALCIFEROL) 50,000 unit Cap; Take 1 capsule (50,000 Units total) by mouth every 7 days.  -     ergocalciferol (ERGOCALCIFEROL) 50,000 unit Cap; Take 1 capsule (50,000 Units total) by mouth every 7 days.    Urge incontinence of urine  -     Discontinue: imipramine (TOFRANIL) 25 MG tablet; Take 1 tablet (25 mg total) by mouth every evening.  -     imipramine (TOFRANIL) 25 MG tablet; Take 1 tablet (25 mg total)  by mouth every evening.    Type 2 diabetes mellitus without complication, without long-term current use of insulin  -     High sensitivity CRP (Cardiac CRP); Future    Other specified health status   -     High sensitivity CRP (Cardiac CRP); Future  Reviewed pt's outside labs from her outside endocrinologist  F/u in one year with labs

## 2017-08-24 ENCOUNTER — OFFICE VISIT (OUTPATIENT)
Dept: GASTROENTEROLOGY | Facility: CLINIC | Age: 72
End: 2017-08-24
Payer: MEDICARE

## 2017-08-24 VITALS
BODY MASS INDEX: 25.35 KG/M2 | HEIGHT: 63 IN | DIASTOLIC BLOOD PRESSURE: 76 MMHG | SYSTOLIC BLOOD PRESSURE: 132 MMHG | HEART RATE: 80 BPM | WEIGHT: 143.06 LBS

## 2017-08-24 DIAGNOSIS — R10.13 EPIGASTRIC PAIN: Primary | ICD-10-CM

## 2017-08-24 PROCEDURE — 1159F MED LIST DOCD IN RCRD: CPT | Mod: GC,S$GLB,, | Performed by: INTERNAL MEDICINE

## 2017-08-24 PROCEDURE — 3078F DIAST BP <80 MM HG: CPT | Mod: GC,S$GLB,, | Performed by: INTERNAL MEDICINE

## 2017-08-24 PROCEDURE — 99999 PR PBB SHADOW E&M-EST. PATIENT-LVL III: CPT | Mod: PBBFAC,GC,, | Performed by: STUDENT IN AN ORGANIZED HEALTH CARE EDUCATION/TRAINING PROGRAM

## 2017-08-24 PROCEDURE — 99213 OFFICE O/P EST LOW 20 MIN: CPT | Mod: GC,S$GLB,, | Performed by: INTERNAL MEDICINE

## 2017-08-24 PROCEDURE — 1126F AMNT PAIN NOTED NONE PRSNT: CPT | Mod: GC,S$GLB,, | Performed by: INTERNAL MEDICINE

## 2017-08-24 PROCEDURE — 3075F SYST BP GE 130 - 139MM HG: CPT | Mod: GC,S$GLB,, | Performed by: INTERNAL MEDICINE

## 2017-08-24 PROCEDURE — 3008F BODY MASS INDEX DOCD: CPT | Mod: GC,S$GLB,, | Performed by: INTERNAL MEDICINE

## 2017-08-24 NOTE — PROGRESS NOTES
I was present with Natanael Hawley MD the fellow during the above evaluation, including history and exam.  I discussed the case with the fellow and agree with the findings and plan as documented in the fellow's note.

## 2017-08-24 NOTE — PATIENT INSTRUCTIONS
-Please obtain labs on 9/1/17 (lipase level)    -Obtain a CT of your abdomen    -Obtain an EGD     -Follow up in 1 year or sooner if needed    Natanael Hawley M.D.  Gastroenterology Fellow, PGY-IV  Pager: [phone]  [facility]

## 2017-08-24 NOTE — PROGRESS NOTES
Gastroenterology Clinic    Reason for visit: The encounter diagnosis was Epigastric pain.  Referring provider/PCP: Ariana Astudillo MD    HPI:  70-year-old white female with multiple abdominal surgeries/adhesion with a history of multiple colon polyps some being advance adenomatous polyps. She presents to clinic today for a follow up.    Patient has been doing relatively well but does complain of 3 episodes of epigastric pain after eating which subsides after about 2 hours. The pain is not associated with nausea, emesis, or changes in bowel habits. She also denies any melena or weight change. She is concerned though as her father passed away from stomach cancer.     Outside labs done on 8/10/17 as below:  Cholesterol 217  TG-327  LDL-104  HLD-48    WBC-5.2  Hgb-12.5  Hct-39  Platelets-233    BUN/Cr-15/.8  ALT-21  AST-18  ALK Phos-52    PEndoHx:  EGD 4/2015  - Normal examined duodenum.  - Erythematous mucosa in the gastric body, antrum and prepyloric region of the stomach. Biopsied.  - 1 cm hiatus hernia. C0M1. Biopsied.    Colonoscopy 4/2015  - Non-bleeding internal hemorrhoids.  - Diverticulosis in the recto-sigmoid colon, in the sigmoid colon, in the descending colon, in the transverse colon and in the ascending colon.  - One 1 mm polyp in the cecum. Resected and  retrieved.    Review of Systems:  Constitutional: no fever, chills or change in weight   Eyes: no visual changes   ENT: no sore throat or dysphagia  Respiratory: no cough or shortness of breath   Cardiovascular: no chest pain or palpitations   Gastrointestinal: as per HPI  Hematologic/Lymphatic: no easy bruising or lymphadenopathy   Musculoskeletal: no arthralgias or myalgias   Neurological: no change in mental status  Behavioral/Psych: no change in mood      Past Medical History:   Diagnosis Date    Allergy sinus    Arthritis back    Degenerative disc disease back    Neuromuscular disorder     Vitamin D deficiency        Past  Surgical History:   Procedure Laterality Date    APPENDECTOMY  age 19    HYSTERECTOMY  38       Family History   Problem Relation Age of Onset    Heart disease Mother 67     heart attack    Cancer Father 65     abdominal    Stomach cancer Father     Celiac disease Neg Hx     Colon cancer Neg Hx     Crohn's disease Neg Hx     Esophageal cancer Neg Hx     Inflammatory bowel disease Neg Hx     Liver cancer Neg Hx     Rectal cancer Neg Hx     Ulcerative colitis Neg Hx        Social History     Social History    Marital status:      Spouse name: N/A    Number of children: N/A    Years of education: N/A     Occupational History    Not on file.     Social History Main Topics    Smoking status: Never Smoker    Smokeless tobacco: Never Used    Alcohol use No    Drug use: No    Sexual activity: Not Currently     Other Topics Concern    Not on file     Social History Narrative    No narrative on file       Current Outpatient Prescriptions on File Prior to Visit   Medication Sig Dispense Refill    ergocalciferol (ERGOCALCIFEROL) 50,000 unit Cap Take 1 capsule (50,000 Units total) by mouth every 7 days. 12 capsule 3    estradiol valerate (DELESTROGEN) 40 mg/mL injection Inject 0.5 mLs (20 mg total) into the muscle every 28 days. Inject into the muscle. 5 mL 12    imipramine (TOFRANIL) 25 MG tablet Take 1 tablet (25 mg total) by mouth every evening. 90 tablet 3     No current facility-administered medications on file prior to visit.        Review of patient's allergies indicates:   Allergen Reactions    Papaya      Illness vomiting    Sulfa (sulfonamide antibiotics) Rash    Sulfur Rash       Physical Exam:  General appearance: alert, appears stated age and cooperative  Head: Normocephalic, without obvious abnormality, atraumatic  Eyes: conjunctivae/corneas clear. PERRL, EOM's intact. Fundi benign.  Ears: normal TM's and external ear canals both ears  Nose: Nares normal. Septum midline. Mucosa  normal. No drainage or sinus tenderness.  Throat: lips, mucosa, and tongue normal; teeth and gums normal  Lungs: clear to auscultation bilaterally  Chest wall: no tenderness  Heart: regular rate and rhythm, S1, S2 normal, no murmur, click, rub or gallop  Abdomen: BS present, abdomen soft but mildly tender to palpation  Extremities: extremities normal, atraumatic, no cyanosis or edema  Pulses: 2+ and symmetric    Laboratory:  Lab Results   Component Value Date    WBC 6.17 06/25/2016    HGB 12.6 06/25/2016    HCT 38.7 06/25/2016    MCV 90 06/25/2016     06/25/2016     CMP  Sodium   Date Value Ref Range Status   06/25/2016 139 136 - 145 mmol/L Final     Potassium   Date Value Ref Range Status   06/25/2016 4.1 3.5 - 5.1 mmol/L Final     Chloride   Date Value Ref Range Status   06/25/2016 104 95 - 110 mmol/L Final     CO2   Date Value Ref Range Status   06/25/2016 25 23 - 29 mmol/L Final     Glucose   Date Value Ref Range Status   06/25/2016 101 70 - 110 mg/dL Final     BUN, Bld   Date Value Ref Range Status   06/25/2016 15 8 - 23 mg/dL Final     Creatinine   Date Value Ref Range Status   06/25/2016 0.8 0.5 - 1.4 mg/dL Final     Calcium   Date Value Ref Range Status   06/25/2016 8.7 8.7 - 10.5 mg/dL Final     Total Protein   Date Value Ref Range Status   06/25/2016 6.6 6.0 - 8.4 g/dL Final     Albumin   Date Value Ref Range Status   06/25/2016 3.4 (L) 3.5 - 5.2 g/dL Final     Total Bilirubin   Date Value Ref Range Status   06/25/2016 0.3 0.1 - 1.0 mg/dL Final     Comment:     For infants and newborns, interpretation of results should be based  on gestational age, weight and in agreement with clinical  observations.  Premature Infant recommended reference ranges:  Up to 24 hours.............<8.0 mg/dL  Up to 48 hours............<12.0 mg/dL  3-5 days..................<15.0 mg/dL  6-29 days.................<15.0 mg/dL       Alkaline Phosphatase   Date Value Ref Range Status   06/25/2016 49 (L) 55 - 135 U/L Final      AST   Date Value Ref Range Status   06/25/2016 19 10 - 40 U/L Final     ALT   Date Value Ref Range Status   06/25/2016 14 10 - 44 U/L Final     Anion Gap   Date Value Ref Range Status   06/25/2016 10 8 - 16 mmol/L Final     eGFR if    Date Value Ref Range Status   06/25/2016 >60.0 >60 mL/min/1.73 m^2 Final     eGFR if non    Date Value Ref Range Status   06/25/2016 >60.0 >60 mL/min/1.73 m^2 Final     Comment:     Calculation used to obtain the estimated glomerular filtration  rate (eGFR) is the CKD-EPI equation. Since race is unknown   in our information system, the eGFR values for   -American and Non--American patients are given   for each creatinine result.       Assessment:  70-year-old white female with multiple abdominal surgeries/adhesion with a history of multiple colon polyps some being advance adenomatous polyps. She was seen in clinic today for a follow up and complained of 3 episodes of epigastric pain. Although no alarm symptoms he expressed concern as her father passed away from gastric cancer. In proceed with a CT as well as EGD but will also add a lipase and Celiac Serology. Labs added to rule out any pancreatitis or celiac disease.     Plan:  -Obtain Lipase and Celiac Serology  -Obtain CT abdomen/pelvis with pancreas protocol  -Obtain EGD  -Follow up in 1 year or sooner if needed    Orders Placed This Encounter   Procedures    CT Abdomen Pelvis W Wo Contrast    Lipase    TISSUE TRANSGLUTAMINASE (TTG), IGA    IGA     Natanael Hawley M.D.  Gastroenterology Fellow, PGY-IV  Pager: [phone]  [facility]

## 2017-08-29 ENCOUNTER — TELEPHONE (OUTPATIENT)
Dept: GASTROENTEROLOGY | Facility: CLINIC | Age: 72
End: 2017-08-29

## 2017-08-29 NOTE — TELEPHONE ENCOUNTER
08/29/2017  9:53 AM    Called patient in order to see how she was doing in regards to her abdominal pain.     In addition wanted to make her aware that we wanted to see her back in 8 weeks (appointment for 10/19) as well as follow up with her labs/ct/egd.    Unable to speak to patient but did leave a message with my contact information.    Natanael Hawley M.D.  Gastroenterology Fellow, PGY-IV  Pager: [phone]  [facility]

## 2017-08-30 ENCOUNTER — LAB VISIT (OUTPATIENT)
Dept: LAB | Facility: HOSPITAL | Age: 72
End: 2017-08-30
Attending: FAMILY MEDICINE
Payer: MEDICARE

## 2017-08-30 DIAGNOSIS — R10.13 EPIGASTRIC PAIN: ICD-10-CM

## 2017-08-30 DIAGNOSIS — E08.3213 DIABETES MELLITUS DUE TO UNDERLYING CONDITION WITH BOTH EYES AFFECTED BY MILD NONPROLIFERATIVE RETINOPATHY AND MACULAR EDEMA: ICD-10-CM

## 2017-08-30 DIAGNOSIS — Z78.9 OTHER SPECIFIED HEALTH STATUS: ICD-10-CM

## 2017-08-30 DIAGNOSIS — E11.9 TYPE 2 DIABETES MELLITUS WITHOUT COMPLICATION, WITHOUT LONG-TERM CURRENT USE OF INSULIN: ICD-10-CM

## 2017-08-30 DIAGNOSIS — R07.9 CHEST PAIN, UNSPECIFIED TYPE: ICD-10-CM

## 2017-08-30 DIAGNOSIS — E78.5 HYPERLIPIDEMIA, UNSPECIFIED HYPERLIPIDEMIA TYPE: ICD-10-CM

## 2017-08-30 DIAGNOSIS — Z00.00 ROUTINE GENERAL MEDICAL EXAMINATION AT A HEALTH CARE FACILITY: ICD-10-CM

## 2017-08-30 DIAGNOSIS — E55.9 VITAMIN D DEFICIENCY: ICD-10-CM

## 2017-08-30 LAB
BASOPHILS # BLD AUTO: 0.01 K/UL
BASOPHILS NFR BLD: 0.1 %
DIFFERENTIAL METHOD: NORMAL
EOSINOPHIL # BLD AUTO: 0.1 K/UL
EOSINOPHIL NFR BLD: 1 %
ERYTHROCYTE [DISTWIDTH] IN BLOOD BY AUTOMATED COUNT: 13.1 %
HCT VFR BLD AUTO: 37.4 %
HGB BLD-MCNC: 12.9 G/DL
LYMPHOCYTES # BLD AUTO: 2.1 K/UL
LYMPHOCYTES NFR BLD: 29.4 %
MCH RBC QN AUTO: 29.1 PG
MCHC RBC AUTO-ENTMCNC: 34.5 G/DL
MCV RBC AUTO: 84 FL
MONOCYTES # BLD AUTO: 0.4 K/UL
MONOCYTES NFR BLD: 5.3 %
NEUTROPHILS # BLD AUTO: 4.6 K/UL
NEUTROPHILS NFR BLD: 64.1 %
PLATELET # BLD AUTO: 242 K/UL
PMV BLD AUTO: 11.2 FL
RBC # BLD AUTO: 4.43 M/UL
WBC # BLD AUTO: 7.11 K/UL

## 2017-08-30 PROCEDURE — 83690 ASSAY OF LIPASE: CPT

## 2017-08-30 PROCEDURE — 83036 HEMOGLOBIN GLYCOSYLATED A1C: CPT

## 2017-08-30 PROCEDURE — 83516 IMMUNOASSAY NONANTIBODY: CPT

## 2017-08-30 PROCEDURE — 86141 C-REACTIVE PROTEIN HS: CPT

## 2017-08-30 PROCEDURE — 82784 ASSAY IGA/IGD/IGG/IGM EACH: CPT

## 2017-08-30 PROCEDURE — 80053 COMPREHEN METABOLIC PANEL: CPT

## 2017-08-30 PROCEDURE — 36415 COLL VENOUS BLD VENIPUNCTURE: CPT | Mod: PO

## 2017-08-30 PROCEDURE — 80061 LIPID PANEL: CPT

## 2017-08-30 PROCEDURE — 84443 ASSAY THYROID STIM HORMONE: CPT

## 2017-08-30 PROCEDURE — 85025 COMPLETE CBC W/AUTO DIFF WBC: CPT

## 2017-08-31 LAB
ALBUMIN SERPL BCP-MCNC: 3.4 G/DL
ALP SERPL-CCNC: 57 U/L
ALT SERPL W/O P-5'-P-CCNC: 17 U/L
ANION GAP SERPL CALC-SCNC: 8 MMOL/L
AST SERPL-CCNC: 22 U/L
BILIRUB SERPL-MCNC: 0.4 MG/DL
BUN SERPL-MCNC: 13 MG/DL
CALCIUM SERPL-MCNC: 8.8 MG/DL
CHLORIDE SERPL-SCNC: 103 MMOL/L
CHOLEST SERPL-MCNC: 191 MG/DL
CHOLEST/HDLC SERPL: 3.5 {RATIO}
CO2 SERPL-SCNC: 26 MMOL/L
CREAT SERPL-MCNC: 0.9 MG/DL
CRP SERPL-MCNC: 3.82 MG/L
EST. GFR  (AFRICAN AMERICAN): >60 ML/MIN/1.73 M^2
EST. GFR  (NON AFRICAN AMERICAN): >60 ML/MIN/1.73 M^2
ESTIMATED AVG GLUCOSE: 103 MG/DL
GLUCOSE SERPL-MCNC: 94 MG/DL
HBA1C MFR BLD HPLC: 5.2 %
HDLC SERPL-MCNC: 54 MG/DL
HDLC SERPL: 28.3 %
IGA SERPL-MCNC: 277 MG/DL
LDLC SERPL CALC-MCNC: 96.8 MG/DL
LIPASE SERPL-CCNC: 16 U/L
NONHDLC SERPL-MCNC: 137 MG/DL
POTASSIUM SERPL-SCNC: 4.2 MMOL/L
PROT SERPL-MCNC: 6.9 G/DL
SODIUM SERPL-SCNC: 137 MMOL/L
TRIGL SERPL-MCNC: 201 MG/DL
TSH SERPL DL<=0.005 MIU/L-ACNC: 1.52 UIU/ML

## 2017-09-05 LAB — TTG IGA SER IA-ACNC: 6 UNITS

## 2017-09-12 ENCOUNTER — TELEPHONE (OUTPATIENT)
Dept: GASTROENTEROLOGY | Facility: CLINIC | Age: 72
End: 2017-09-12

## 2017-09-12 NOTE — TELEPHONE ENCOUNTER
----- Message from Camila England MA sent at 9/12/2017  4:29 PM CDT -----  Contact: self  [phone]  States she needs to change an appointment and is calling for her blood test results.

## 2017-10-24 ENCOUNTER — IMMUNIZATION (OUTPATIENT)
Dept: INTERNAL MEDICINE | Facility: CLINIC | Age: 72
End: 2017-10-24
Payer: MEDICARE

## 2017-10-24 PROCEDURE — G0008 ADMIN INFLUENZA VIRUS VAC: HCPCS | Mod: S$GLB,,, | Performed by: INTERNAL MEDICINE

## 2017-10-24 PROCEDURE — 90662 IIV NO PRSV INCREASED AG IM: CPT | Mod: S$GLB,,, | Performed by: INTERNAL MEDICINE

## 2017-10-26 ENCOUNTER — TELEPHONE (OUTPATIENT)
Dept: GASTROENTEROLOGY | Facility: CLINIC | Age: 72
End: 2017-10-26

## 2017-10-26 NOTE — TELEPHONE ENCOUNTER
----- Message from Gloria Victor sent at 10/26/2017  8:01 AM CDT -----  Contact: self  Patient states want to reschedule doctor appointment  Need to see Dr Black.   Please call pt at [phone]

## 2017-11-14 ENCOUNTER — HOSPITAL ENCOUNTER (OUTPATIENT)
Dept: RADIOLOGY | Facility: HOSPITAL | Age: 72
Discharge: HOME | End: 2017-11-14
Attending: STUDENT IN AN ORGANIZED HEALTH CARE EDUCATION/TRAINING PROGRAM
Payer: MEDICARE

## 2017-11-14 DIAGNOSIS — R10.13 EPIGASTRIC PAIN: ICD-10-CM

## 2017-11-14 LAB
CREAT SERPL-MCNC: 0.7 MG/DL (ref 0.5–1.4)
SAMPLE: NORMAL

## 2017-11-14 PROCEDURE — 74177 CT ABD & PELVIS W/CONTRAST: CPT | Mod: TC

## 2017-11-14 PROCEDURE — 74177 CT ABD & PELVIS W/CONTRAST: CPT | Mod: 26,,, | Performed by: RADIOLOGY

## 2017-11-14 PROCEDURE — 25500020 PHARM REV CODE 255: Performed by: STUDENT IN AN ORGANIZED HEALTH CARE EDUCATION/TRAINING PROGRAM

## 2017-11-14 RX ADMIN — IOHEXOL 75 ML: 350 INJECTION, SOLUTION INTRAVENOUS at 01:11

## 2017-11-22 DIAGNOSIS — D35.00 ADRENAL ADENOMA, UNSPECIFIED LATERALITY: Primary | ICD-10-CM

## 2017-11-24 ENCOUNTER — TELEPHONE (OUTPATIENT)
Dept: GASTROENTEROLOGY | Facility: CLINIC | Age: 72
End: 2017-11-24

## 2017-11-24 NOTE — TELEPHONE ENCOUNTER
----- Message from Yfn Black MD sent at 11/22/2017  7:03 PM CST -----  Michelle - I spoke with irma on the phone about her CT scan results below that was order by Dr. Hawley and please refer to Endocrinology for evaluation of her adrenal adenoma.  Please remind her to keep her follow up GI clinic apt to further discuss her CT scan results.    No pancreatic mass or peripancreatic stranding.    Stable flash filling hepatic hemangiomas.    Stable 1.4 cm left adrenal nodule, previously described as an adenoma.    Small hiatal hernia.    Left renal cyst.

## 2017-11-30 ENCOUNTER — OFFICE VISIT (OUTPATIENT)
Dept: GASTROENTEROLOGY | Facility: CLINIC | Age: 72
End: 2017-11-30
Payer: MEDICARE

## 2017-11-30 VITALS
HEART RATE: 83 BPM | BODY MASS INDEX: 24.96 KG/M2 | WEIGHT: 146.19 LBS | SYSTOLIC BLOOD PRESSURE: 135 MMHG | DIASTOLIC BLOOD PRESSURE: 76 MMHG | HEIGHT: 64 IN

## 2017-11-30 DIAGNOSIS — D18.03 HEPATIC HEMANGIOMA: ICD-10-CM

## 2017-11-30 DIAGNOSIS — Z86.010 HISTORY OF ADENOMATOUS POLYP OF COLON: ICD-10-CM

## 2017-11-30 DIAGNOSIS — D35.02 ADENOMA OF LEFT ADRENAL GLAND: ICD-10-CM

## 2017-11-30 DIAGNOSIS — R10.13 EPIGASTRIC PAIN: Primary | ICD-10-CM

## 2017-11-30 PROCEDURE — 99999 PR PBB SHADOW E&M-EST. PATIENT-LVL III: CPT | Mod: PBBFAC,GC,, | Performed by: STUDENT IN AN ORGANIZED HEALTH CARE EDUCATION/TRAINING PROGRAM

## 2017-11-30 PROCEDURE — 99213 OFFICE O/P EST LOW 20 MIN: CPT | Mod: GC,S$GLB,, | Performed by: STUDENT IN AN ORGANIZED HEALTH CARE EDUCATION/TRAINING PROGRAM

## 2017-11-30 NOTE — PROGRESS NOTES
Gastroenterology Clinic    Reason for visit: There were no encounter diagnoses.  Referring provider/PCP: Ariana Astudillo MD    HPI:  72-year-old white female with multiple abdominal surgeries/adhesion with a history of multiple colon polyps some being advance adenomatous polyps. She presents to clinic today for a follow up.     Patient was seen in August of this year as a follow up at which point she complained of intermittent epigastric pain. A CT scan was obtained which was remarkable for a left adrenal adenoma (stable, present in 2011). She has had additional episodes but she says they are infrequent and now that her CT scan didn't show any true abdominal pathology she doesn't even worry about it.  She denies any chest pain, shortness of breath, nausea, emesis, abdominal pain, or bladder/bowel discomfort.    PEndoHx:  EGD 4/2015  - Normal examined duodenum.  - Erythematous mucosa in the gastric body, antrum and prepyloric region of the stomach. Biopsied.  - 1 cm hiatus hernia. C0M1. Biopsied.     Colonoscopy 4/2015  - Non-bleeding internal hemorrhoids.  - Diverticulosis in the recto-sigmoid colon, in the sigmoid colon, in the descending colon, in the transverse colon and in the ascending colon.  - One 1 mm polyp in the cecum. Resected and  retrieved.    Review of Systems:  Constitutional: no fever, chills or change in weight   Eyes: no visual changes   ENT: no sore throat or dysphagia  Respiratory: no cough or shortness of breath   Cardiovascular: no chest pain or palpitations   Gastrointestinal: as per HPI  Hematologic/Lymphatic: no easy bruising or lymphadenopathy   Musculoskeletal: no arthralgias or myalgias   Neurological: no change in mental status  Behavioral/Psych: no change in mood      Past Medical History:   Diagnosis Date    Allergy sinus    Arthritis back    Degenerative disc disease back    Neuromuscular disorder     Vitamin D deficiency        Past Surgical History:   Procedure  "Laterality Date    APPENDECTOMY  age 19    HYSTERECTOMY  38       Family History   Problem Relation Age of Onset    Heart disease Mother 67     heart attack    Cancer Father 65     abdominal    Stomach cancer Father     Celiac disease Neg Hx     Colon cancer Neg Hx     Crohn's disease Neg Hx     Esophageal cancer Neg Hx     Inflammatory bowel disease Neg Hx     Liver cancer Neg Hx     Rectal cancer Neg Hx     Ulcerative colitis Neg Hx        Social History     Social History    Marital status:      Spouse name: N/A    Number of children: N/A    Years of education: N/A     Occupational History    Not on file.     Social History Main Topics    Smoking status: Never Smoker    Smokeless tobacco: Never Used    Alcohol use No    Drug use: No    Sexual activity: Not Currently     Other Topics Concern    Not on file     Social History Narrative    No narrative on file       Current Outpatient Prescriptions on File Prior to Visit   Medication Sig Dispense Refill    ergocalciferol (ERGOCALCIFEROL) 50,000 unit Cap Take 1 capsule (50,000 Units total) by mouth every 7 days. 12 capsule 3    estradiol valerate (DELESTROGEN) 40 mg/mL injection Inject 0.5 mLs (20 mg total) into the muscle every 28 days. Inject into the muscle. 5 mL 12    imipramine (TOFRANIL) 25 MG tablet Take 1 tablet (25 mg total) by mouth every evening. 90 tablet 3     No current facility-administered medications on file prior to visit.        Review of patient's allergies indicates:   Allergen Reactions    Papaya      Illness vomiting    Sulfa (sulfonamide antibiotics) Rash    Sulfur Rash       Physical Exam:  /76   Pulse 83   Ht 5' 3.5" (1.613 m)   Wt 66.3 kg (146 lb 2.6 oz)   BMI 25.49 kg/m²   General appearance: alert, appears stated age and cooperative  Lungs: clear to auscultation bilaterally  Heart: regular rate and rhythm, S1, S2 normal, no murmur, click, rub or gallop  Abdomen: soft, non-tender; bowel sounds " normal; no masses,  no organomegaly  Extremities: extremities normal, atraumatic, no cyanosis or edema  Pulses: 2+ and symmetric    Laboratory:  Lab Results   Component Value Date    WBC 7.11 08/30/2017    HGB 12.9 08/30/2017    HCT 37.4 08/30/2017    MCV 84 08/30/2017     08/30/2017     CMP  Sodium   Date Value Ref Range Status   08/30/2017 137 136 - 145 mmol/L Final     Potassium   Date Value Ref Range Status   08/30/2017 4.2 3.5 - 5.1 mmol/L Final     Chloride   Date Value Ref Range Status   08/30/2017 103 95 - 110 mmol/L Final     CO2   Date Value Ref Range Status   08/30/2017 26 23 - 29 mmol/L Final     Glucose   Date Value Ref Range Status   08/30/2017 94 70 - 110 mg/dL Final     BUN, Bld   Date Value Ref Range Status   08/30/2017 13 8 - 23 mg/dL Final     Creatinine   Date Value Ref Range Status   08/30/2017 0.9 0.5 - 1.4 mg/dL Final     Calcium   Date Value Ref Range Status   08/30/2017 8.8 8.7 - 10.5 mg/dL Final     Total Protein   Date Value Ref Range Status   08/30/2017 6.9 6.0 - 8.4 g/dL Final     Albumin   Date Value Ref Range Status   08/30/2017 3.4 (L) 3.5 - 5.2 g/dL Final     Total Bilirubin   Date Value Ref Range Status   08/30/2017 0.4 0.1 - 1.0 mg/dL Final     Comment:     For infants and newborns, interpretation of results should be based  on gestational age, weight and in agreement with clinical  observations.  Premature Infant recommended reference ranges:  Up to 24 hours.............<8.0 mg/dL  Up to 48 hours............<12.0 mg/dL  3-5 days..................<15.0 mg/dL  6-29 days.................<15.0 mg/dL       Alkaline Phosphatase   Date Value Ref Range Status   08/30/2017 57 55 - 135 U/L Final     AST   Date Value Ref Range Status   08/30/2017 22 10 - 40 U/L Final     ALT   Date Value Ref Range Status   08/30/2017 17 10 - 44 U/L Final     Anion Gap   Date Value Ref Range Status   08/30/2017 8 8 - 16 mmol/L Final     eGFR if    Date Value Ref Range Status    08/30/2017 >60.0 >60 mL/min/1.73 m^2 Final     eGFR if non    Date Value Ref Range Status   08/30/2017 >60.0 >60 mL/min/1.73 m^2 Final     Comment:     Calculation used to obtain the estimated glomerular filtration  rate (eGFR) is the CKD-EPI equation. Since race is unknown   in our information system, the eGFR values for   -American and Non--American patients are given   for each creatinine result.         Imaging:  CT abdomen (11/2017):  No pancreatic mass or peripancreatic stranding.  Stable flash filling hepatic hemangiomas.  Stable 1.4 cm left adrenal nodule, previously described as an adenoma.  Small hiatal hernia.  Left renal cyst.    Assessment/Plan:  72-year-old white female with multiple abdominal surgeries/adhesion with a history of multiple colon polyps some being advance adenomatous polyps. She presents to clinic today for a follow up.    Intermittent epigastric pain which is pretty non-specific in the setting of a CT scan unremarkable for acute abdominal pathology  -EGD to evaluate upper GI tract and make sure there isn't a component of esophagitis/gastritis/PUD which is contributing to intermittent epigastric pain  -No need for US as patient is s/p cholecystitis    History of adenomatous polyps  -Repeat colonoscopy in 2020    Stable hepatic hemangioma compared with images from 2011 and stable  -Continue to observe at this time    Stable left adrenal adenoma which appears stable when compared to CT in 2011  -Patient follows with outpatient Endocrinologist Dr. Crouch, she was given a copy of the CT result and states that she will follow up with him in regards to the left adrenal adenoma    Return to clinic in 1 year    No orders of the defined types were placed in this encounter.    Natanael Hawley M.D.  Gastroenterology Fellow, PGY-IV  Pager: [phone]  [facility]

## 2017-12-11 ENCOUNTER — TELEPHONE (OUTPATIENT)
Dept: PODIATRY | Facility: CLINIC | Age: 72
End: 2017-12-11

## 2017-12-11 NOTE — TELEPHONE ENCOUNTER
Called pat. Back and left message, explained to her while Dr. Antoine don't want to see new patients on his last day at [facility], because he can't go on with treatment. Told her if she has any problems to call me back

## 2017-12-11 NOTE — TELEPHONE ENCOUNTER
----- Message from Payal Castro sent at 12/11/2017 11:33 AM CST -----  Contact: self@home number  Pt called in stating that she got a call advising that Dr. Antoine is leaving and is confused about whether she can still see him on Friday, 12/15. If not, she is asking if she can see him before he leaves. Please call Pt back and advise     Thank you

## 2017-12-13 ENCOUNTER — NURSE TRIAGE (OUTPATIENT)
Dept: ADMINISTRATIVE | Facility: CLINIC | Age: 72
End: 2017-12-13

## 2017-12-13 ENCOUNTER — TELEPHONE (OUTPATIENT)
Dept: FAMILY MEDICINE | Facility: CLINIC | Age: 72
End: 2017-12-13

## 2017-12-13 NOTE — TELEPHONE ENCOUNTER
----- Message from Lilliam Conway sent at 12/13/2017  8:30 AM CST -----  Contact: call pt at [phone]  Patient would like to get medical advice.  Symptoms (please be specific):  Sore throat, tired and weak  How long has patient had these symptoms:  Yesterday   Pharmacy name and phone #:  [facility]   Any drug allergies:    Comments:

## 2017-12-13 NOTE — TELEPHONE ENCOUNTER
Reason for Disposition   Caller has medication question about med not prescribed by PCP and triager unable to answer question (e.g., compatibility with other med, storage)    Protocols used: ST MEDICATION QUESTION CALL-A-    Requesting recommendation for OTC medication for sore throat.  Will speak with her pharmacist.  Please contact caller directly with any further care advice.

## 2017-12-13 NOTE — TELEPHONE ENCOUNTER
Spoke with patient reports the following symptoms cough, sore throat, fatigue.   Advised patient that she would need to schedule an appt.  Patient is aware that you're out the clinic until Monday 12/18 would like to see you only.  Patient states she will try something otc first and contact our office back to schedule with Dr. August.

## 2017-12-15 ENCOUNTER — OFFICE VISIT (OUTPATIENT)
Dept: PODIATRY | Facility: CLINIC | Age: 72
End: 2017-12-15
Payer: MEDICARE

## 2017-12-15 VITALS
HEIGHT: 64 IN | SYSTOLIC BLOOD PRESSURE: 142 MMHG | HEART RATE: 88 BPM | DIASTOLIC BLOOD PRESSURE: 82 MMHG | BODY MASS INDEX: 25.2 KG/M2 | WEIGHT: 147.63 LBS

## 2017-12-15 DIAGNOSIS — M20.11 HALLUX VALGUS OF RIGHT FOOT: ICD-10-CM

## 2017-12-15 DIAGNOSIS — M79.671 RIGHT FOOT PAIN: Primary | ICD-10-CM

## 2017-12-15 DIAGNOSIS — M77.8 EXTENSOR TENDONITIS OF FOOT: ICD-10-CM

## 2017-12-15 PROCEDURE — 99999 PR PBB SHADOW E&M-EST. PATIENT-LVL III: CPT | Mod: PBBFAC,,, | Performed by: PODIATRIST

## 2017-12-15 PROCEDURE — 99203 OFFICE O/P NEW LOW 30 MIN: CPT | Mod: S$GLB,,, | Performed by: PODIATRIST

## 2017-12-15 RX ORDER — METHYLPREDNISOLONE 4 MG/1
TABLET ORAL
Qty: 1 PACKAGE | Refills: 0 | Status: SHIPPED | OUTPATIENT
Start: 2017-12-15 | End: 2018-05-15

## 2017-12-15 RX ORDER — DICLOFENAC SODIUM 10 MG/G
2 GEL TOPICAL 2 TIMES DAILY
Qty: 100 G | Refills: 1 | Status: SHIPPED | OUTPATIENT
Start: 2017-12-15 | End: 2018-05-31

## 2017-12-15 NOTE — PATIENT INSTRUCTIONS
Supportive shoes with stiff or rocker sole, arch support, wide or soft toe box as needed (JONATAN CAMACHO)    ([employer], [employer], [place] [employer], [employer]))    ICE after exercise    Consider custom orthotics, rest     Consider Shoe  (example: ProCare Evenup)   [email or web address]

## 2017-12-15 NOTE — PROGRESS NOTES
Subjective:      Patient ID: Shakira Pearl is a 72 y.o. female.    Chief Complaint: Foot Problem (right ft./ pain on top) and Foot Pain    Shakira is a 72 y.o. female who presents to the podiatry clinic  with complaint of  right foot pain. Onset of the symptoms was several months ago. Precipitating event: increased activity. Current symptoms include: dorsal foot pain, ability to bear weight, but with some pain and worsening symptoms after a period of activity. Aggravating factors: walking on treadmill. She is very motivated tread hernandez?, likes interval training, changing elevation workouts. Symptoms have progressed to a point and plateaued. Patient has had no prior foot problems. Evaluation to date: plain films: normal according to patient. She was told no fracture or arthritis. Treatment to date: corticosteroid injection which was effective, OTC analgesics which are not very effective and rest. Patients rates pain 8/10 on pain scale.        Review of Systems   Constitution: Negative for chills, fever, weakness, malaise/fatigue and night sweats.   Cardiovascular: Negative for chest pain, leg swelling, orthopnea and palpitations.   Respiratory: Negative for cough, shortness of breath and wheezing.    Skin: Negative for color change, itching, poor wound healing and rash.   Musculoskeletal: Positive for joint pain and myalgias. Negative for arthritis, gout, joint swelling and muscle weakness.   Gastrointestinal: Negative for abdominal pain, constipation and nausea.   Neurological: Negative for disturbances in coordination, dizziness, focal weakness, numbness and tremors.           Objective:      Physical Exam   Constitutional: She is oriented to person, place, and time. Vital signs are normal. She appears well-developed. She is cooperative. No distress.   Cardiovascular: Intact distal pulses.    Pulses:       Dorsalis pedis pulses are 2+ on the right side, and 2+ on the left side.        Posterior tibial pulses are 2+  on the right side, and 2+ on the left side.   Musculoskeletal:        Right ankle: Normal.        Left ankle: Normal.        Right foot: There is tenderness. There is normal range of motion, no bony tenderness, normal capillary refill, no crepitus and no deformity.        Left foot: There is normal range of motion, no bony tenderness, normal capillary refill, no crepitus and no deformity.   Mild tenderness at distal TA tendon and 1st TMT joint. No pain or instability with TMT ROM.  Nonpainful, mild hallux valgus, flexible.  No tenderness with active or passive ankle, STJ, MTP ROM.  High arch in stance.       Neurological: She is alert and oriented to person, place, and time. She has normal strength. No sensory deficit. She exhibits normal muscle tone. Gait normal.   Reflex Scores:       Achilles reflexes are 2+ on the right side and 2+ on the left side.  Negative Tinels sign and Radha's click, bilat.   Skin: Skin is intact. Capillary refill takes 2 to 3 seconds. No abrasion, no ecchymosis, no lesion and no rash noted. No erythema. Nails show no clubbing.                  Assessment:       Encounter Diagnoses   Name Primary?    Right foot pain Yes    Extensor tendonitis of foot     Hallux valgus of right foot          Plan:       Shakira was seen today for foot problem and foot pain.    Diagnoses and all orders for this visit:    Right foot pain    Extensor tendonitis of foot  -     diclofenac sodium (VOLTAREN) 1 % Gel; Apply 2 g topically 2 (two) times daily.  -     methylPREDNISolone (MEDROL DOSEPACK) 4 mg tablet; Use as instructed on dose pack    Hallux valgus of right foot      I counseled the patient on her conditions, their implications and medical management.    Immobilize ankle with CAM boot for 2 weeks.    Transition into supportive shoe gear. Consider HOKA or similar rocker shoe with lots of arch support.    RICE.    Avoid treadmill for 1 month. Cross train with bike or swimming.     .

## 2018-01-22 ENCOUNTER — OFFICE VISIT (OUTPATIENT)
Dept: OPHTHALMOLOGY | Facility: CLINIC | Age: 73
End: 2018-01-22
Payer: MEDICARE

## 2018-01-22 DIAGNOSIS — H25.12 NUCLEAR SCLEROTIC CATARACT OF LEFT EYE: ICD-10-CM

## 2018-01-22 DIAGNOSIS — H25.11 NUCLEAR SCLEROTIC CATARACT OF RIGHT EYE: Primary | ICD-10-CM

## 2018-01-22 DIAGNOSIS — H52.223 REGULAR ASTIGMATISM OF BOTH EYES: ICD-10-CM

## 2018-01-22 PROCEDURE — 92136 OPHTHALMIC BIOMETRY: CPT | Mod: RT,S$GLB,, | Performed by: OPHTHALMOLOGY

## 2018-01-22 PROCEDURE — 92004 COMPRE OPH EXAM NEW PT 1/>: CPT | Mod: S$GLB,,, | Performed by: OPHTHALMOLOGY

## 2018-01-22 PROCEDURE — 99999 PR PBB SHADOW E&M-EST. PATIENT-LVL II: CPT | Mod: PBBFAC,,, | Performed by: OPHTHALMOLOGY

## 2018-01-22 PROCEDURE — 92025 CPTRIZED CORNEAL TOPOGRAPHY: CPT | Mod: S$GLB,,, | Performed by: OPHTHALMOLOGY

## 2018-01-22 RX ORDER — FLUTICASONE PROPIONATE 50 MCG
1 SPRAY, SUSPENSION (ML) NASAL
COMMUNITY
Start: 2018-01-18 | End: 2019-01-31

## 2018-01-22 RX ORDER — PREDNISONE 20 MG/1
TABLET ORAL
COMMUNITY
Start: 2018-01-18 | End: 2018-01-22

## 2018-01-22 NOTE — Clinical Note
Sammy - ernestine call to schedule cat sx, thanks MICHELLE ROBERTSON. Thanks. I did not call in drops yet cause pt still florentin iffy -- adonisme know when she wants me to call in drops. Thanks.

## 2018-01-22 NOTE — PROGRESS NOTES
HPI     New pt/Self referral     is a new patient to the Ophthalmology dept. Here at [facility]. Pt   reports cataracts from 10 yrs ago from her optometrist. At the time   patient was not interested in having the surgery. Pt last eye exam was 2   yrs ago by another optometrist who report that she should have them   removed. Pt complaints of sight decrease in vision and occasional RUL   pain. No flashes or floaters. Not on any gtts.     Last edited by Ivana oMore MD on 1/22/2018  3:31 PM. (History)            Assessment /Plan     For exam results, see Encounter Report.    Nuclear sclerotic cataract of right eye  -     IOL Master - OD - Right Eye  -     Computerized corneal topography    Nuclear sclerotic cataract of left eye    Regular astigmatism of both eyes  -     IOL Master - OD - Right Eye  -     Computerized corneal topography      Visually significant nuclear sclerotic cataract   - Interfering with activities of daily living.  Pt desires cataract surgery for [facility] rehabilitation.   - R/B/A discussed and pt agrees to proceed with surgery.   - IOL options discussed according to patient's goals and concomitant ocular pathology; and pt content with monofocal lens.    - Target: plano.    OD first    TORIC: [id number] 18.5 @ 6' OD    (REPEAT STACEY OS)    Extensive discussion about monofocal with Rx post sx  Vs. TORIC with readers vs. Symphony -- pt wants TORIC OD first.    Also wants to consider Rx --> glasses dispensed for distance and 2nd pair for near. NO drops called in yet.

## 2018-01-31 ENCOUNTER — TELEPHONE (OUTPATIENT)
Dept: OPHTHALMOLOGY | Facility: CLINIC | Age: 73
End: 2018-01-31

## 2018-01-31 NOTE — TELEPHONE ENCOUNTER
Called pt to schedule cataract surgery.  Pt would like to wait for about another year before scheduling.  Pt will call back when she is ready to schedule an appt

## 2018-04-05 DIAGNOSIS — N39.41 URGE INCONTINENCE OF URINE: ICD-10-CM

## 2018-04-05 RX ORDER — IMIPRAMINE HYDROCHLORIDE 25 MG/1
25 TABLET, FILM COATED ORAL NIGHTLY
Qty: 90 TABLET | Refills: 3 | Status: SHIPPED | OUTPATIENT
Start: 2018-04-05 | End: 2019-02-20 | Stop reason: SDUPTHER

## 2018-04-05 NOTE — TELEPHONE ENCOUNTER
"----- Message from Haritha Warren sent at 4/5/2018  8:15 AM CDT -----  Contact: Pt [phone]  RX request - refill or new RX.  Is this a refill or new RX:  Refill  RX name and strength: imipramine (TOFRANIL) 25 MG tablet  Directions:   Is this a 30 day or 90 day RX:  90 day  Pharmacy name and phone # (DON'T enter "on file" or "in chart"): [facility] ([place]) - [address] [phone] (Phone)  [phone] (Fax)  Comments:        "

## 2018-04-24 ENCOUNTER — TELEPHONE (OUTPATIENT)
Dept: PAIN MEDICINE | Facility: CLINIC | Age: 73
End: 2018-04-24

## 2018-04-24 ENCOUNTER — TELEPHONE (OUTPATIENT)
Dept: FAMILY MEDICINE | Facility: CLINIC | Age: 73
End: 2018-04-24

## 2018-04-24 DIAGNOSIS — E78.5 HYPERLIPIDEMIA, UNSPECIFIED HYPERLIPIDEMIA TYPE: ICD-10-CM

## 2018-04-24 DIAGNOSIS — Z00.00 ROUTINE GENERAL MEDICAL EXAMINATION AT A HEALTH CARE FACILITY: Primary | ICD-10-CM

## 2018-04-24 DIAGNOSIS — Z86.39 HISTORY OF VITAMIN D DEFICIENCY: ICD-10-CM

## 2018-04-24 NOTE — TELEPHONE ENCOUNTER
Staff contacted Ms. Pearl regarding her message received. No answer, left voicemail asking for a return call to discuss further.

## 2018-04-24 NOTE — TELEPHONE ENCOUNTER
----- Message from Leodan BAL Jayy sent at 4/24/2018  8:29 AM CDT -----  Contact: Self @ [phone]  Pt states she spoke w/ someone in Dr. Coates's clinic a while ago about scheduling an epidural, per an order from Dr. Paris, however I do not see the order in the system. Pt insisted it was there and is asking to speak w/ someone on Dr. Coates's staff. Pls call.

## 2018-04-24 NOTE — TELEPHONE ENCOUNTER
----- Message from Lilliam Conway sent at 4/24/2018  8:24 AM CDT -----  Doctor appointment and lab have been scheduled.  Please link lab orders to the lab appointment.  Date of doctor appointment:  08/28/18  Physical or EP:  epp  Date of lab appointment:  08/14/2018  Comments:

## 2018-04-25 ENCOUNTER — TELEPHONE (OUTPATIENT)
Dept: PAIN MEDICINE | Facility: CLINIC | Age: 73
End: 2018-04-25

## 2018-04-25 NOTE — TELEPHONE ENCOUNTER
----- Message from Navarro Kinney sent at 4/24/2018  1:12 PM CDT -----  Contact: Patient            Name of Who is Calling: Shakira      What is the request in detail: Pt missed your call is requesting a call back      Can the clinic reply by [facility]: no      What Number to Call Back if not in [facility]: [phone]

## 2018-04-25 NOTE — TELEPHONE ENCOUNTER
Contacted and spoke with pt regarding injection. Pt stated the injection order was from one month ago. Pt was informed it would be in her best interest to be re-evaluated before scheduling an injection. Pt stated she would rather see a pain management physician instead of being a direct referral for an injection. Pt was offered an in office visit with Dr. Coates.     Pt verbalized understanding

## 2018-05-15 ENCOUNTER — OFFICE VISIT (OUTPATIENT)
Dept: INTERNAL MEDICINE | Facility: CLINIC | Age: 73
End: 2018-05-15
Payer: MEDICARE

## 2018-05-15 VITALS
HEART RATE: 80 BPM | WEIGHT: 143.31 LBS | HEIGHT: 63 IN | RESPIRATION RATE: 16 BRPM | TEMPERATURE: 98 F | DIASTOLIC BLOOD PRESSURE: 80 MMHG | SYSTOLIC BLOOD PRESSURE: 145 MMHG | BODY MASS INDEX: 25.39 KG/M2

## 2018-05-15 DIAGNOSIS — I10 ESSENTIAL HYPERTENSION: Primary | ICD-10-CM

## 2018-05-15 DIAGNOSIS — M47.816 LUMBAR FACET ARTHROPATHY: ICD-10-CM

## 2018-05-15 PROCEDURE — 99499 UNLISTED E&M SERVICE: CPT | Mod: S$PBB,,, | Performed by: INTERNAL MEDICINE

## 2018-05-15 PROCEDURE — 3079F DIAST BP 80-89 MM HG: CPT | Mod: CPTII,S$GLB,, | Performed by: INTERNAL MEDICINE

## 2018-05-15 PROCEDURE — 3077F SYST BP >= 140 MM HG: CPT | Mod: CPTII,S$GLB,, | Performed by: INTERNAL MEDICINE

## 2018-05-15 PROCEDURE — 99214 OFFICE O/P EST MOD 30 MIN: CPT | Mod: S$GLB,,, | Performed by: INTERNAL MEDICINE

## 2018-05-15 PROCEDURE — 99999 PR PBB SHADOW E&M-EST. PATIENT-LVL III: CPT | Mod: PBBFAC,,, | Performed by: INTERNAL MEDICINE

## 2018-05-15 RX ORDER — LOSARTAN POTASSIUM 50 MG/1
50 TABLET ORAL DAILY
Qty: 30 TABLET | Refills: 0 | Status: SHIPPED | OUTPATIENT
Start: 2018-05-15 | End: 2018-05-31 | Stop reason: SDUPTHER

## 2018-05-15 RX ORDER — ACETAMINOPHEN 500 MG
1 TABLET ORAL DAILY
COMMUNITY
End: 2019-02-11 | Stop reason: SDUPTHER

## 2018-05-15 NOTE — PROGRESS NOTES
Subjective:       Patient ID: Shakira Pearl is a 72 y.o. female.    Chief Complaint: Hypertension (elev pressure at endocrine dr.  he wanted to send to er.  190/88, 166/88 when leaving.  after exercise yesterday 157/83. ) and head pressure    HPI   Pt with Lumbar arthropathy is here for evaluation of elevated BP readings over the last 10 days. She denies any hx of HTN. Her BP is currently mildly elevated.   Review of Systems   Constitutional: Negative for activity change, appetite change, chills, diaphoresis, fatigue, fever and unexpected weight change.   HENT: Negative for postnasal drip, rhinorrhea, sinus pressure, sneezing, sore throat, trouble swallowing and voice change.    Respiratory: Negative for cough, shortness of breath and wheezing.    Cardiovascular: Negative for chest pain, palpitations and leg swelling.   Gastrointestinal: Negative for abdominal pain, blood in stool, constipation, diarrhea, nausea and vomiting.   Genitourinary: Negative for dysuria.   Musculoskeletal: Negative for arthralgias and myalgias.   Skin: Negative for rash and wound.   Allergic/Immunologic: Negative for environmental allergies and food allergies.   Hematological: Negative for adenopathy. Does not bruise/bleed easily.       Objective:      Physical Exam   Constitutional: She is oriented to person, place, and time. She appears well-developed and well-nourished. No distress.   HENT:   Head: Normocephalic and atraumatic.   Eyes: Conjunctivae and EOM are normal. Pupils are equal, round, and reactive to light. Right eye exhibits no discharge. Left eye exhibits no discharge. No scleral icterus.   Neck: Neck supple. No JVD present.   Cardiovascular: Normal rate, regular rhythm, normal heart sounds and intact distal pulses.    Pulmonary/Chest: Effort normal and breath sounds normal. No respiratory distress. She has no wheezes. She has no rales.   Musculoskeletal: She exhibits no edema.   Lymphadenopathy:     She has no cervical  adenopathy.   Neurological: She is alert and oriented to person, place, and time.   Skin: Skin is warm and dry. No rash noted. She is not diaphoretic. No pallor.       Assessment:       1. Essential hypertension    2. Lumbar facet arthropathy        Plan:    1. Rx Losartan 50 mg daily       Document BP BID   2. Stable, continue to monitor    3. F/u in 2 weeks for HTN

## 2018-05-21 ENCOUNTER — PES CALL (OUTPATIENT)
Dept: ADMINISTRATIVE | Facility: CLINIC | Age: 73
End: 2018-05-21

## 2018-05-31 ENCOUNTER — OFFICE VISIT (OUTPATIENT)
Dept: INTERNAL MEDICINE | Facility: CLINIC | Age: 73
End: 2018-05-31
Payer: MEDICARE

## 2018-05-31 VITALS
TEMPERATURE: 99 F | WEIGHT: 145.5 LBS | BODY MASS INDEX: 25.78 KG/M2 | SYSTOLIC BLOOD PRESSURE: 134 MMHG | RESPIRATION RATE: 16 BRPM | HEIGHT: 63 IN | DIASTOLIC BLOOD PRESSURE: 86 MMHG | HEART RATE: 76 BPM

## 2018-05-31 DIAGNOSIS — I10 ESSENTIAL HYPERTENSION: Primary | ICD-10-CM

## 2018-05-31 PROCEDURE — 3079F DIAST BP 80-89 MM HG: CPT | Mod: CPTII,S$GLB,, | Performed by: INTERNAL MEDICINE

## 2018-05-31 PROCEDURE — 99499 UNLISTED E&M SERVICE: CPT | Mod: S$PBB,,, | Performed by: INTERNAL MEDICINE

## 2018-05-31 PROCEDURE — 3075F SYST BP GE 130 - 139MM HG: CPT | Mod: CPTII,S$GLB,, | Performed by: INTERNAL MEDICINE

## 2018-05-31 PROCEDURE — 99213 OFFICE O/P EST LOW 20 MIN: CPT | Mod: S$GLB,,, | Performed by: INTERNAL MEDICINE

## 2018-05-31 PROCEDURE — 99999 PR PBB SHADOW E&M-EST. PATIENT-LVL III: CPT | Mod: PBBFAC,,, | Performed by: INTERNAL MEDICINE

## 2018-05-31 RX ORDER — LOSARTAN POTASSIUM 50 MG/1
50 TABLET ORAL DAILY
Qty: 90 TABLET | Refills: 1 | Status: SHIPPED | OUTPATIENT
Start: 2018-05-31 | End: 2019-01-08 | Stop reason: SDUPTHER

## 2018-05-31 NOTE — PROGRESS NOTES
Subjective:       Patient ID: Shakira Pearl is a 72 y.o. female.    Chief Complaint: Hypertension (2 wk fol up/ has readings.  )    HPI   Pt here for 2 week f/u regarding HTN. She was started on Losartan at last visit. Her BP readings at home have been running close to normal.   Review of Systems   Constitutional: Negative for activity change, appetite change, chills, diaphoresis, fatigue, fever and unexpected weight change.   HENT: Negative for postnasal drip, rhinorrhea, sinus pressure, sneezing, sore throat, trouble swallowing and voice change.    Respiratory: Negative for cough, shortness of breath and wheezing.    Cardiovascular: Negative for chest pain, palpitations and leg swelling.   Gastrointestinal: Negative for abdominal pain, blood in stool, constipation, diarrhea, nausea and vomiting.   Genitourinary: Negative for dysuria.   Musculoskeletal: Negative for arthralgias and myalgias.   Skin: Negative for rash and wound.   Allergic/Immunologic: Negative for environmental allergies and food allergies.   Hematological: Negative for adenopathy. Does not bruise/bleed easily.       Objective:      Physical Exam   Constitutional: She is oriented to person, place, and time. She appears well-developed and well-nourished. No distress.   HENT:   Head: Normocephalic and atraumatic.   Eyes: Conjunctivae and EOM are normal. Pupils are equal, round, and reactive to light. Right eye exhibits no discharge. Left eye exhibits no discharge. No scleral icterus.   Neck: Neck supple. No JVD present.   Cardiovascular: Normal rate, regular rhythm, normal heart sounds and intact distal pulses.    Pulmonary/Chest: Effort normal and breath sounds normal. No respiratory distress. She has no wheezes. She has no rales.   Musculoskeletal: She exhibits no edema.   Lymphadenopathy:     She has no cervical adenopathy.   Neurological: She is alert and oriented to person, place, and time.   Skin: Skin is warm and dry. No rash noted. She is not  diaphoretic. No pallor.       Assessment:       1. Essential hypertension        Plan:    1. Stable, refill Losartan 50 mg daily

## 2018-07-19 ENCOUNTER — TELEPHONE (OUTPATIENT)
Dept: GASTROENTEROLOGY | Facility: CLINIC | Age: 73
End: 2018-07-19

## 2018-07-19 NOTE — TELEPHONE ENCOUNTER
----- Message from Sarika Michelle sent at 7/19/2018  3:37 PM CDT -----  Contact: Self- [phone]  Sofia- pt called to schedule her f/u- received a notice in the mail- please contact pt at [phone]

## 2018-07-31 ENCOUNTER — TELEPHONE (OUTPATIENT)
Dept: SPINE | Facility: CLINIC | Age: 73
End: 2018-07-31

## 2018-07-31 DIAGNOSIS — M54.50 LUMBAR PAIN: Primary | ICD-10-CM

## 2018-09-17 ENCOUNTER — PES CALL (OUTPATIENT)
Dept: ADMINISTRATIVE | Facility: CLINIC | Age: 73
End: 2018-09-17

## 2018-09-24 ENCOUNTER — LAB VISIT (OUTPATIENT)
Dept: LAB | Facility: HOSPITAL | Age: 73
End: 2018-09-24
Attending: FAMILY MEDICINE
Payer: MEDICARE

## 2018-09-24 DIAGNOSIS — E78.5 HYPERLIPIDEMIA, UNSPECIFIED HYPERLIPIDEMIA TYPE: ICD-10-CM

## 2018-09-24 DIAGNOSIS — Z86.39 HISTORY OF VITAMIN D DEFICIENCY: ICD-10-CM

## 2018-09-24 DIAGNOSIS — Z00.00 ROUTINE GENERAL MEDICAL EXAMINATION AT A HEALTH CARE FACILITY: ICD-10-CM

## 2018-09-24 LAB
ALBUMIN SERPL BCP-MCNC: 3.5 G/DL
ALP SERPL-CCNC: 61 U/L
ALT SERPL W/O P-5'-P-CCNC: 21 U/L
ANION GAP SERPL CALC-SCNC: 6 MMOL/L
AST SERPL-CCNC: 25 U/L
BASOPHILS # BLD AUTO: 0.02 K/UL
BASOPHILS NFR BLD: 0.3 %
BILIRUB SERPL-MCNC: 0.4 MG/DL
BUN SERPL-MCNC: 12 MG/DL
CALCIUM SERPL-MCNC: 8.6 MG/DL
CHLORIDE SERPL-SCNC: 103 MMOL/L
CHOLEST SERPL-MCNC: 184 MG/DL
CHOLEST/HDLC SERPL: 3.3 {RATIO}
CO2 SERPL-SCNC: 27 MMOL/L
CREAT SERPL-MCNC: 0.8 MG/DL
DIFFERENTIAL METHOD: NORMAL
EOSINOPHIL # BLD AUTO: 0.1 K/UL
EOSINOPHIL NFR BLD: 1.7 %
ERYTHROCYTE [DISTWIDTH] IN BLOOD BY AUTOMATED COUNT: 12.9 %
EST. GFR  (AFRICAN AMERICAN): >60 ML/MIN/1.73 M^2
EST. GFR  (NON AFRICAN AMERICAN): >60 ML/MIN/1.73 M^2
GLUCOSE SERPL-MCNC: 104 MG/DL
HCT VFR BLD AUTO: 37.8 %
HDLC SERPL-MCNC: 56 MG/DL
HDLC SERPL: 30.4 %
HGB BLD-MCNC: 12.3 G/DL
IMM GRANULOCYTES # BLD AUTO: 0.02 K/UL
IMM GRANULOCYTES NFR BLD AUTO: 0.3 %
LDLC SERPL CALC-MCNC: 86.2 MG/DL
LYMPHOCYTES # BLD AUTO: 1.5 K/UL
LYMPHOCYTES NFR BLD: 22.8 %
MCH RBC QN AUTO: 29.4 PG
MCHC RBC AUTO-ENTMCNC: 32.5 G/DL
MCV RBC AUTO: 90 FL
MONOCYTES # BLD AUTO: 0.4 K/UL
MONOCYTES NFR BLD: 6.6 %
NEUTROPHILS # BLD AUTO: 4.4 K/UL
NEUTROPHILS NFR BLD: 68.3 %
NONHDLC SERPL-MCNC: 128 MG/DL
NRBC BLD-RTO: 0 /100 WBC
PLATELET # BLD AUTO: 260 K/UL
PMV BLD AUTO: 11.6 FL
POTASSIUM SERPL-SCNC: 4.1 MMOL/L
PROT SERPL-MCNC: 6.8 G/DL
RBC # BLD AUTO: 4.19 M/UL
SODIUM SERPL-SCNC: 136 MMOL/L
TRIGL SERPL-MCNC: 209 MG/DL
TSH SERPL DL<=0.005 MIU/L-ACNC: 1.51 UIU/ML
WBC # BLD AUTO: 6.39 K/UL

## 2018-09-24 PROCEDURE — 36415 COLL VENOUS BLD VENIPUNCTURE: CPT | Mod: PO

## 2018-09-24 PROCEDURE — 84443 ASSAY THYROID STIM HORMONE: CPT

## 2018-09-24 PROCEDURE — 80061 LIPID PANEL: CPT

## 2018-09-24 PROCEDURE — 85025 COMPLETE CBC W/AUTO DIFF WBC: CPT

## 2018-09-24 PROCEDURE — 80053 COMPREHEN METABOLIC PANEL: CPT

## 2018-10-08 ENCOUNTER — OFFICE VISIT (OUTPATIENT)
Dept: INTERNAL MEDICINE | Facility: CLINIC | Age: 73
End: 2018-10-08
Payer: MEDICARE

## 2018-10-08 VITALS
HEIGHT: 63 IN | TEMPERATURE: 98 F | BODY MASS INDEX: 25.5 KG/M2 | WEIGHT: 143.94 LBS | HEART RATE: 81 BPM | SYSTOLIC BLOOD PRESSURE: 134 MMHG | OXYGEN SATURATION: 98 % | DIASTOLIC BLOOD PRESSURE: 78 MMHG

## 2018-10-08 DIAGNOSIS — Z78.0 ASYMPTOMATIC MENOPAUSAL STATE: ICD-10-CM

## 2018-10-08 DIAGNOSIS — Z23 NEED FOR INFLUENZA VACCINATION: ICD-10-CM

## 2018-10-08 DIAGNOSIS — I10 HYPERTENSION, UNSPECIFIED TYPE: ICD-10-CM

## 2018-10-08 DIAGNOSIS — Z00.00 ROUTINE GENERAL MEDICAL EXAMINATION AT A HEALTH CARE FACILITY: Primary | ICD-10-CM

## 2018-10-08 PROCEDURE — 99213 OFFICE O/P EST LOW 20 MIN: CPT | Mod: PBBFAC,PO | Performed by: INTERNAL MEDICINE

## 2018-10-08 PROCEDURE — 3078F DIAST BP <80 MM HG: CPT | Mod: CPTII,,, | Performed by: INTERNAL MEDICINE

## 2018-10-08 PROCEDURE — 3075F SYST BP GE 130 - 139MM HG: CPT | Mod: CPTII,,, | Performed by: INTERNAL MEDICINE

## 2018-10-08 PROCEDURE — 90662 IIV NO PRSV INCREASED AG IM: CPT | Mod: PBBFAC,PO

## 2018-10-08 PROCEDURE — 99999 PR PBB SHADOW E&M-EST. PATIENT-LVL III: CPT | Mod: PBBFAC,,, | Performed by: INTERNAL MEDICINE

## 2018-10-08 PROCEDURE — 99397 PER PM REEVAL EST PAT 65+ YR: CPT | Mod: 25,S$PBB,, | Performed by: INTERNAL MEDICINE

## 2018-10-08 NOTE — PROGRESS NOTES
"Subjective:      Patient ID: Shakira Pearl is a 73 y.o. female.    Chief Complaint: Annual Exam    HPI   72 yo with   Patient Active Problem List   Diagnosis    Chronic back pain    Lumbar radiculopathy    Back muscle spasm    Lumbar facet arthropathy    Shoulder pain, left    Pain in thoracic spine    History of adenomatous polyp of colon    Esophageal reflux    Dyspepsia    Family history of stomach cancer    Cervical radiculopathy    Chest pain    Gastroesophageal reflux disease    Hiatal hernia    Memory loss or impairment    Liver lesion    Fatigue    Poor sleep hygiene    Immunization due    Elevated lipids     Past Medical History:   Diagnosis Date    Allergy sinus    Arthritis back    Degenerative disc disease back    Neuromuscular disorder     Vitamin D deficiency        Here today for annual prev exam.  Compliant with meds without significant side effects. Energy and appetite are good. Feeling well and in his usual state of health. Never smoked  Review of Systems   Constitutional: Negative for chills and fever.   HENT: Negative for ear pain and sore throat.    Respiratory: Negative for cough.    Cardiovascular: Negative for chest pain.   Gastrointestinal: Negative for abdominal pain and blood in stool.   Genitourinary: Negative for dysuria and hematuria.   Neurological: Negative for seizures and syncope.     Objective:   /78 (BP Location: Right arm, Patient Position: Sitting)   Pulse 81   Temp 98 °F (36.7 °C) (Tympanic)   Ht 5' 3" (1.6 m)   Wt 65.3 kg (143 lb 15.4 oz)   SpO2 98%   BMI 25.50 kg/m²     Physical Exam   Constitutional: She is oriented to person, place, and time. She appears well-developed and well-nourished. No distress.   HENT:   Head: Normocephalic and atraumatic.   Mouth/Throat: Oropharynx is clear and moist.   Eyes: EOM are normal. Pupils are equal, round, and reactive to light.   Neck: Neck supple. No thyromegaly present.   Cardiovascular: Normal rate " and regular rhythm.   Pulmonary/Chest: Breath sounds normal. She has no wheezes. She has no rales.   Abdominal: Soft. Bowel sounds are normal. There is no tenderness.   Lymphadenopathy:     She has no cervical adenopathy.   Neurological: She is alert and oriented to person, place, and time.   Skin: Skin is warm and dry.   Psychiatric: She has a normal mood and affect. Her behavior is normal.       Assessment:     1. Routine general medical examination at a health care facility    2. Hypertension, unspecified type    3. Need for influenza vaccination    4. Asymptomatic menopausal state      Plan:   Routine general medical examination at a health care facility  Heart healthy diet and reg exercise   reviewed    Hypertension, unspecified type  Comments:  controlled    Need for influenza vaccination  -     Influenza - High Dose (65+) (PF) (IM)    Asymptomatic menopausal state  -     DXA Bone Density Spine And Hip; Future; Expected date: 10/08/2018      Check with your pharmacy regarding new shingles vaccine.     Lab Frequency Next Occurrence   X-Ray Lumbar Spine Ap Lateral w/Flex Ext Once 07/31/2018   DXA Bone Density Spine And Hip Once 10/08/2018       Problem List Items Addressed This Visit     None      Visit Diagnoses     Routine general medical examination at a health care facility    -  Primary    Hypertension, unspecified type        controlled    Need for influenza vaccination        Relevant Orders    Influenza - High Dose (65+) (PF) (IM) (Completed)    Asymptomatic menopausal state        Relevant Orders    DXA Bone Density Spine And Hip          Follow-up if symptoms worsen or fail to improve.

## 2018-10-17 ENCOUNTER — TELEPHONE (OUTPATIENT)
Dept: GASTROENTEROLOGY | Facility: CLINIC | Age: 73
End: 2018-10-17

## 2018-10-17 NOTE — TELEPHONE ENCOUNTER
----- Message from Sarika Michelle sent at 10/17/2018  9:05 AM CDT -----  Contact: Self- [phone]  Sofia- pt called to speak with Michelle in regards to rescheduling her appt- originally for 10/18 at 11am- pt states she spoke with Michelle last week in regards to rescheduling this appt and she's still being notified- please contact pt at [phone]

## 2018-10-17 NOTE — TELEPHONE ENCOUNTER
----- Message from Sarika Michelle sent at 10/17/2018  9:05 AM CDT -----  Contact: Self- [phone]  Sofia- pt called to speak with Michelle in regards to rescheduling her appt- originally for 10/18 at 11am- pt states she spoke with Michelle last week in regards to rescheduling this appt and she's still being notified- please contact pt at [phone]

## 2018-11-06 ENCOUNTER — TELEPHONE (OUTPATIENT)
Dept: PAIN MEDICINE | Facility: CLINIC | Age: 73
End: 2018-11-06

## 2018-11-06 NOTE — TELEPHONE ENCOUNTER
----- Message from Flora Jam sent at 11/6/2018 11:51 AM CST -----  Contact: CHELSY GONZALEZ [[id number]]            Name of Who is Calling: CHELSY GONZALEZ [[id number]]      What is the request in detail: Patient stated that someone from the office called her and she would like a call back.     Can the clinic reply by [facility]: no      What Number to Call Back if not in [facility]: [phone]

## 2018-11-06 NOTE — TELEPHONE ENCOUNTER
No staff member from Pain management contacted patient. There is no message received from her other than this one and no email or telephone liane documented.

## 2019-01-08 RX ORDER — LOSARTAN POTASSIUM 50 MG/1
TABLET ORAL
Qty: 90 TABLET | Refills: 2 | Status: SHIPPED | OUTPATIENT
Start: 2019-01-08 | End: 2019-11-13 | Stop reason: SDUPTHER

## 2019-01-16 ENCOUNTER — TELEPHONE (OUTPATIENT)
Dept: PAIN MEDICINE | Facility: CLINIC | Age: 74
End: 2019-01-16

## 2019-01-16 NOTE — TELEPHONE ENCOUNTER
"Staff contacted the patient to offer her a sooner appointment date and time with another provider whom has sooner available appointments.    Patient stating, " Can I call you back I am on the other line?"    Staff stated, "Yes you can contact our pain management office at [phone]."    Patient verbalized understanding and expressed thanks.   "

## 2019-01-31 ENCOUNTER — TELEPHONE (OUTPATIENT)
Dept: ENDOSCOPY | Facility: HOSPITAL | Age: 74
End: 2019-01-31

## 2019-01-31 ENCOUNTER — OFFICE VISIT (OUTPATIENT)
Dept: GASTROENTEROLOGY | Facility: CLINIC | Age: 74
End: 2019-01-31
Payer: MEDICARE

## 2019-01-31 ENCOUNTER — LAB VISIT (OUTPATIENT)
Dept: LAB | Facility: HOSPITAL | Age: 74
End: 2019-01-31
Attending: INTERNAL MEDICINE
Payer: MEDICARE

## 2019-01-31 VITALS
WEIGHT: 148.81 LBS | HEART RATE: 75 BPM | DIASTOLIC BLOOD PRESSURE: 76 MMHG | HEIGHT: 63 IN | BODY MASS INDEX: 26.37 KG/M2 | SYSTOLIC BLOOD PRESSURE: 145 MMHG

## 2019-01-31 DIAGNOSIS — K21.9 GASTROESOPHAGEAL REFLUX DISEASE, ESOPHAGITIS PRESENCE NOT SPECIFIED: ICD-10-CM

## 2019-01-31 DIAGNOSIS — Z51.81 ENCOUNTER FOR MONITORING LONG-TERM PROTON PUMP INHIBITOR THERAPY: ICD-10-CM

## 2019-01-31 DIAGNOSIS — Z86.018 HISTORY OF ADRENAL ADENOMA: Primary | ICD-10-CM

## 2019-01-31 DIAGNOSIS — Z79.899 ENCOUNTER FOR MONITORING LONG-TERM PROTON PUMP INHIBITOR THERAPY: ICD-10-CM

## 2019-01-31 DIAGNOSIS — Z86.018 HISTORY OF ADRENAL ADENOMA: ICD-10-CM

## 2019-01-31 LAB
25(OH)D3+25(OH)D2 SERPL-MCNC: 29 NG/ML
MAGNESIUM SERPL-MCNC: 1.9 MG/DL
VIT B12 SERPL-MCNC: 240 PG/ML

## 2019-01-31 PROCEDURE — 1101F PT FALLS ASSESS-DOCD LE1/YR: CPT | Mod: HCNC,CPTII,S$GLB, | Performed by: INTERNAL MEDICINE

## 2019-01-31 PROCEDURE — 99999 PR PBB SHADOW E&M-EST. PATIENT-LVL III: ICD-10-PCS | Mod: PBBFAC,HCNC,, | Performed by: INTERNAL MEDICINE

## 2019-01-31 PROCEDURE — 3078F DIAST BP <80 MM HG: CPT | Mod: HCNC,CPTII,S$GLB, | Performed by: INTERNAL MEDICINE

## 2019-01-31 PROCEDURE — 1101F PR PT FALLS ASSESS DOC 0-1 FALLS W/OUT INJ PAST YR: ICD-10-PCS | Mod: HCNC,CPTII,S$GLB, | Performed by: INTERNAL MEDICINE

## 2019-01-31 PROCEDURE — 99214 OFFICE O/P EST MOD 30 MIN: CPT | Mod: HCNC,S$GLB,, | Performed by: INTERNAL MEDICINE

## 2019-01-31 PROCEDURE — 3078F PR MOST RECENT DIASTOLIC BLOOD PRESSURE < 80 MM HG: ICD-10-PCS | Mod: HCNC,CPTII,S$GLB, | Performed by: INTERNAL MEDICINE

## 2019-01-31 PROCEDURE — 99214 PR OFFICE/OUTPT VISIT, EST, LEVL IV, 30-39 MIN: ICD-10-PCS | Mod: HCNC,S$GLB,, | Performed by: INTERNAL MEDICINE

## 2019-01-31 PROCEDURE — 3077F SYST BP >= 140 MM HG: CPT | Mod: HCNC,CPTII,S$GLB, | Performed by: INTERNAL MEDICINE

## 2019-01-31 PROCEDURE — 82306 VITAMIN D 25 HYDROXY: CPT | Mod: HCNC

## 2019-01-31 PROCEDURE — 99999 PR PBB SHADOW E&M-EST. PATIENT-LVL III: CPT | Mod: PBBFAC,HCNC,, | Performed by: INTERNAL MEDICINE

## 2019-01-31 PROCEDURE — 82607 VITAMIN B-12: CPT | Mod: HCNC

## 2019-01-31 PROCEDURE — 36415 COLL VENOUS BLD VENIPUNCTURE: CPT | Mod: HCNC

## 2019-01-31 PROCEDURE — 83735 ASSAY OF MAGNESIUM: CPT | Mod: HCNC

## 2019-01-31 PROCEDURE — 3077F PR MOST RECENT SYSTOLIC BLOOD PRESSURE >= 140 MM HG: ICD-10-PCS | Mod: HCNC,CPTII,S$GLB, | Performed by: INTERNAL MEDICINE

## 2019-01-31 RX ORDER — PANTOPRAZOLE SODIUM 40 MG/1
40 TABLET, DELAYED RELEASE ORAL
Qty: 90 TABLET | Refills: 3 | Status: SHIPPED | OUTPATIENT
Start: 2019-01-31 | End: 2019-02-01 | Stop reason: SDUPTHER
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
## 2019-02-01 ENCOUNTER — TELEPHONE (OUTPATIENT)
Dept: GASTROENTEROLOGY | Facility: CLINIC | Age: 74
End: 2019-02-01

## 2019-02-01 DIAGNOSIS — K21.9 GASTROESOPHAGEAL REFLUX DISEASE, ESOPHAGITIS PRESENCE NOT SPECIFIED: Primary | ICD-10-CM

## 2019-02-01 RX ORDER — PANTOPRAZOLE SODIUM 40 MG/1
40 TABLET, DELAYED RELEASE ORAL
Qty: 90 TABLET | Refills: 3 | Status: SHIPPED | OUTPATIENT
Start: 2019-02-01 | End: 2019-11-13 | Stop reason: SDUPTHER

## 2019-02-01 NOTE — TELEPHONE ENCOUNTER
----- Message from Sarika Miguel Angel sent at 2/1/2019  4:07 PM CST -----  Contact: Self- [phone]  Sofia- pt called to have rx pantoprazole (PROTONIX) 40 MG tablet called into the [facility] listed on file instead of [facility]- please contact pt at [phone]

## 2019-02-03 DIAGNOSIS — E55.9 VITAMIN D INSUFFICIENCY: Primary | ICD-10-CM

## 2019-02-03 RX ORDER — ERGOCALCIFEROL 1.25 MG/1
50000 CAPSULE ORAL
Qty: 8 CAPSULE | Refills: 0 | Status: SHIPPED | OUTPATIENT
Start: 2019-02-03 | End: 2019-03-25

## 2019-02-03 RX ORDER — ACETAMINOPHEN 500 MG
1 TABLET ORAL DAILY
Qty: 90 CAPSULE | Refills: 3 | COMMUNITY
Start: 2019-02-03 | End: 2023-02-28 | Stop reason: SDUPTHER

## 2019-02-04 ENCOUNTER — TELEPHONE (OUTPATIENT)
Dept: GASTROENTEROLOGY | Facility: CLINIC | Age: 74
End: 2019-02-04

## 2019-02-04 NOTE — TELEPHONE ENCOUNTER
----- Message from Yfn Black MD sent at 2/3/2019  2:17 PM CST -----  Michelle - Please tell patient that their Vitamin D level is low and recommend that they take Vitamin D 50,000 units once a week for 8 weeks and then start Vitamin D3 (2,000 units) over-the-counter once daily.     Michelle - please recheck their vitamin D level in 6 months - Orders placed.

## 2019-02-05 ENCOUNTER — PATIENT MESSAGE (OUTPATIENT)
Dept: GASTROENTEROLOGY | Facility: CLINIC | Age: 74
End: 2019-02-05

## 2019-02-06 ENCOUNTER — OFFICE VISIT (OUTPATIENT)
Dept: PAIN MEDICINE | Facility: CLINIC | Age: 74
End: 2019-02-06
Attending: ANESTHESIOLOGY
Payer: MEDICARE

## 2019-02-06 VITALS
WEIGHT: 145.5 LBS | SYSTOLIC BLOOD PRESSURE: 136 MMHG | TEMPERATURE: 98 F | BODY MASS INDEX: 25.78 KG/M2 | HEIGHT: 63 IN | HEART RATE: 82 BPM | RESPIRATION RATE: 18 BRPM | DIASTOLIC BLOOD PRESSURE: 75 MMHG

## 2019-02-06 DIAGNOSIS — M53.3 SACROILIAC DYSFUNCTION: Primary | ICD-10-CM

## 2019-02-06 DIAGNOSIS — M43.10 SPONDYLOLISTHESIS, UNSPECIFIED SPINAL REGION: ICD-10-CM

## 2019-02-06 DIAGNOSIS — M47.816 SPONDYLOSIS OF LUMBAR REGION WITHOUT MYELOPATHY OR RADICULOPATHY: ICD-10-CM

## 2019-02-06 DIAGNOSIS — M47.816 LUMBAR FACET ARTHROPATHY: ICD-10-CM

## 2019-02-06 PROCEDURE — 3075F SYST BP GE 130 - 139MM HG: CPT | Mod: HCNC,CPTII,S$GLB, | Performed by: ANESTHESIOLOGY

## 2019-02-06 PROCEDURE — 3075F PR MOST RECENT SYSTOLIC BLOOD PRESS GE 130-139MM HG: ICD-10-PCS | Mod: HCNC,CPTII,S$GLB, | Performed by: ANESTHESIOLOGY

## 2019-02-06 PROCEDURE — 1101F PT FALLS ASSESS-DOCD LE1/YR: CPT | Mod: HCNC,CPTII,S$GLB, | Performed by: ANESTHESIOLOGY

## 2019-02-06 PROCEDURE — 3078F DIAST BP <80 MM HG: CPT | Mod: HCNC,CPTII,S$GLB, | Performed by: ANESTHESIOLOGY

## 2019-02-06 PROCEDURE — 3078F PR MOST RECENT DIASTOLIC BLOOD PRESSURE < 80 MM HG: ICD-10-PCS | Mod: HCNC,CPTII,S$GLB, | Performed by: ANESTHESIOLOGY

## 2019-02-06 PROCEDURE — 99999 PR PBB SHADOW E&M-EST. PATIENT-LVL III: ICD-10-PCS | Mod: PBBFAC,HCNC,, | Performed by: ANESTHESIOLOGY

## 2019-02-06 PROCEDURE — 99205 PR OFFICE/OUTPT VISIT, NEW, LEVL V, 60-74 MIN: ICD-10-PCS | Mod: HCNC,S$GLB,, | Performed by: ANESTHESIOLOGY

## 2019-02-06 PROCEDURE — 99999 PR PBB SHADOW E&M-EST. PATIENT-LVL III: CPT | Mod: PBBFAC,HCNC,, | Performed by: ANESTHESIOLOGY

## 2019-02-06 PROCEDURE — 1101F PR PT FALLS ASSESS DOC 0-1 FALLS W/OUT INJ PAST YR: ICD-10-PCS | Mod: HCNC,CPTII,S$GLB, | Performed by: ANESTHESIOLOGY

## 2019-02-06 PROCEDURE — 99205 OFFICE O/P NEW HI 60 MIN: CPT | Mod: HCNC,S$GLB,, | Performed by: ANESTHESIOLOGY

## 2019-02-06 NOTE — PROGRESS NOTES
Subjective:      Patient ID: Shakira Pearl is a 73 y.o. female.    Chief Complaint: No chief complaint on file.    Referred by: Self, Aaareferral     Pain Scales  Best: 7/10  Worst: 7/10  Usually: 7/10  Today: 7/10    Low-back Pain   Pertinent negatives include no chest pain, fever, headaches or weight loss.     HPI  73yF here today self referral for LBP. Seen in the past for similar complaints with successful treatment with interventional therapy. Has chronic LBP- achy, worse with position changes and activity. States pain will flare up at unknown times and be sharp as well with activity. Sitting and lying down make it better. Takes Aleve which helps but had to stop d/t stomach pain. Today pt denies recent fevers/illness/infection, unintentional weight loss, abx use, B/B changes, CP, SOB, N/V/D, abd pain, HA, dizziness, weakness, gait changes, tripping, decreased coordination.    Interventional Pain History  2010 R L4 and L5 TFESI  2010 BL SIJ    Past Medical History:   Diagnosis Date    Allergy sinus    Arthritis back    Degenerative disc disease back    Neuromuscular disorder     Vitamin D deficiency        Past Surgical History:   Procedure Laterality Date    APPENDECTOMY  age 19    CHOLECYSTECTOMY      age of 25    COLONOSCOPY N/A 4/29/2015    Performed by [facility] (2ND FLR)    ESOPHAGOGASTRODUODENOSCOPY (EGD) N/A 4/29/2015    [facility] at [facility] ([facility])    HYSTERECTOMY  38       Review of patient's allergies indicates:   Allergen Reactions    Demerol [meperidine] Nausea And Vomiting     Other reaction(s): Nausea    Papaya      Illness vomiting    Sulfa (sulfonamide antibiotics) Rash    Sulfur Rash       Current Outpatient Medications   Medication Sig Dispense Refill    cholecalciferol, vitamin D3, (VITAMIN D3) 2,000 unit Cap Take 1 capsule by mouth once daily.      estradiol valerate (DELESTROGEN) 40 mg/mL injection Inject 0.5 mLs (20 mg  total) into the muscle every 28 days. Inject into the muscle. 5 mL 12    imipramine (TOFRANIL) 25 MG tablet Take 1 tablet (25 mg total) by mouth every evening. 90 tablet 3    losartan (COZAAR) 50 MG tablet TAKE ONE DAILY 90 tablet 2    cholecalciferol, vitamin D3, (VITAMIN D3) 2,000 unit Cap Take 1 capsule (2,000 Units total) by mouth once daily. 90 capsule 3    ergocalciferol (ERGOCALCIFEROL) 50,000 unit Cap Take 1 capsule (50,000 Units total) by mouth every 7 days. for 8 doses 8 capsule 0    pantoprazole (PROTONIX) 40 MG tablet Take 1 tablet (40 mg total) by mouth before breakfast. Take one pill every morning 45 minutes before breakfast in the morning. 90 tablet 3    varicella-zoster gE-AS01B, PF, (SHINGRIX, PF,) 50 mcg/0.5 mL injection Inject into the muscle. 0.5 mL 1     No current facility-administered medications for this visit.        Family History   Problem Relation Age of Onset    Heart disease Mother 67        heart attack    Cancer Father 65        abdominal    Stomach cancer Father     No Known Problems Sister     No Known Problems Brother     No Known Problems Son     No Known Problems Maternal Grandmother     No Known Problems Maternal Grandfather     No Known Problems Paternal Grandmother     No Known Problems Paternal Grandfather     No Known Problems Maternal Aunt     No Known Problems Maternal Uncle     No Known Problems Paternal Aunt     No Known Problems Paternal Uncle     Celiac disease Neg Hx     Colon cancer Neg Hx     Crohn's disease Neg Hx     Esophageal cancer Neg Hx     Inflammatory bowel disease Neg Hx     Liver cancer Neg Hx     Rectal cancer Neg Hx     Ulcerative colitis Neg Hx     Amblyopia Neg Hx     Blindness Neg Hx     Cataracts Neg Hx     Diabetes Neg Hx     Glaucoma Neg Hx     Hypertension Neg Hx     Macular degeneration Neg Hx     Retinal detachment Neg Hx     Strabismus Neg Hx     Stroke Neg Hx     Thyroid disease Neg Hx        Social  History     Socioeconomic History    Marital status:      Spouse name: Not on file    Number of children: Not on file    Years of education: Not on file    Highest education level: Not on file   Social Needs    Financial resource strain: Not on file    Food insecurity - worry: Not on file    Food insecurity - inability: Not on file    Transportation needs - medical: Not on file    Transportation needs - non-medical: Not on file   Occupational History    Not on file   Tobacco Use    Smoking status: Never Smoker    Smokeless tobacco: Never Used   Substance and Sexual Activity    Alcohol use: No    Drug use: No    Sexual activity: Not Currently   Other Topics Concern    Not on file   Social History Narrative    Not on file           Review of Systems   Constitution: Negative for chills, fever, malaise/fatigue, weight gain and weight loss.   HENT: Negative for ear pain and hoarse voice.    Eyes: Negative for blurred vision, pain and visual disturbance.   Cardiovascular: Negative for chest pain, dyspnea on exertion and irregular heartbeat.   Respiratory: Negative for cough, shortness of breath and wheezing.    Endocrine: Negative for cold intolerance and heat intolerance.   Hematologic/Lymphatic: Negative for adenopathy and bleeding problem. Does not bruise/bleed easily.   Skin: Negative for color change, itching and rash.   Musculoskeletal: Positive for back pain. Negative for neck pain.   Gastrointestinal: Negative for change in bowel habit, diarrhea, hematemesis, hematochezia, melena and vomiting.   Genitourinary: Negative for flank pain, frequency, hematuria and urgency.   Neurological: Negative for difficulty with concentration, dizziness, headaches, loss of balance and seizures.   Psychiatric/Behavioral: Negative for altered mental status, depression and suicidal ideas. The patient is not nervous/anxious.    Allergic/Immunologic: Negative for HIV exposure.           Objective:   /75    "Pulse 82   Temp 98.1 °F (36.7 °C) (Oral)   Resp 18   Ht 5' 3" (1.6 m)   Wt 66 kg (145 lb 8 oz)   BMI 25.77 kg/m²   Pain Disability Index Review:  Last 3 PDI Scores 2/6/2019   Pain Disability Index (PDI) 17     Normocephalic.  Atraumatic.  Affect appropriate.  Breathing unlabored.  Extra ocular muscles intact.    /75   Pulse 82   Temp 98.1 °F (36.7 °C) (Oral)   Resp 18   Ht 5' 3" (1.6 m)   Wt 66 kg (145 lb 8 oz)   BMI 25.77 kg/m²     PHYSICAL EXAMINATION:  GEN:  Well developed, well nourished.  No acute distress. Normal pain behavior.  HEENT:  No trauma.  Mucous membranes moist.  Nares patent bilaterally.  PSYCH: Normal affect. Thought content appropriate.  CHEST:  Breathing symmetric.  No audible wheezing.  ABD: Soft, non-tender, non-distended.  SKIN:  Warm, pink, dry.  No rash on exposed areas.    EXT:  No cyanosis, clubbing, or edema.  No color change or changes in nail or hair growth.    NEURO/MUSCULOSKELETAL:  Fully alert, oriented, and appropriate. Speech normal renetta. No cranial nerve deficits.   Gait: Normal.   Strength: 5/5 motor strength throughout bilateral lower extremities.   Sensory: No sensory deficit noted.   Reflexes:  2+ and symmetric throughout.  Negative Babinski's bilaterally.  No clonus or spasticity.    L-Spine:  Normal ROM without pain. Minimal + facet loading L>R.  - SLR bilaterally.  - femoral stretch bilaterally. - Milgram's test. - Well-SLR. - Slump test.  Minimal TTP over lower lumbar paraspinals    SI Joint/Hip: + ELI bilaterally. - Gaenslen's bilaterally.  - FADIR bilaterally.  Minimal TTP over bilateral SI joints. No TTP of hips, piriformis muscles, or GTB.        Ortho/SPM Exam      Assessment:       Encounter Diagnoses   Name Primary?    Sacroiliac dysfunction Yes    Spondylosis of lumbar region without myelopathy or radiculopathy     Lumbar facet arthropathy     Spondylolisthesis, unspecified spinal region          Plan:   We discussed with the patient the " assessment and recommendations. The following is the plan we agreed on:        Diagnoses and all orders for this visit:    Sacroiliac dysfunction    Spondylosis of lumbar region without myelopathy or radiculopathy    Lumbar facet arthropathy    Spondylolisthesis, unspecified spinal region       We discussed with the patient the assessment and recommendations. The following is the plan we agreed on:    - Schedule for BL SIJ injections as she last received great relief from this years ago for similar pain.   - Continue diet, regular exercise  - RTC 3 weeks. Consider repeat imaging including flex/ext/side beding films with prior scoliosis and lateral-listhesis. May need formal PT but would like to continue HEP.     Napoleon Guerrero, DO  [employer] [facility] PGYIII    I have personally taken the history and examined this patient and agree with the resident's note as stated above.

## 2019-02-11 ENCOUNTER — LAB VISIT (OUTPATIENT)
Dept: LAB | Facility: HOSPITAL | Age: 74
End: 2019-02-11
Attending: INTERNAL MEDICINE
Payer: MEDICARE

## 2019-02-11 ENCOUNTER — OFFICE VISIT (OUTPATIENT)
Dept: INTERNAL MEDICINE | Facility: CLINIC | Age: 74
End: 2019-02-11
Payer: MEDICARE

## 2019-02-11 VITALS
RESPIRATION RATE: 18 BRPM | BODY MASS INDEX: 26.57 KG/M2 | HEART RATE: 75 BPM | DIASTOLIC BLOOD PRESSURE: 74 MMHG | TEMPERATURE: 98 F | HEIGHT: 63 IN | WEIGHT: 149.94 LBS | SYSTOLIC BLOOD PRESSURE: 124 MMHG

## 2019-02-11 DIAGNOSIS — Z01.818 PRE-OP EXAM: Primary | ICD-10-CM

## 2019-02-11 DIAGNOSIS — H26.9 CATARACT, UNSPECIFIED CATARACT TYPE, UNSPECIFIED LATERALITY: ICD-10-CM

## 2019-02-11 DIAGNOSIS — Z01.818 PRE-OP EXAM: ICD-10-CM

## 2019-02-11 LAB
ANION GAP SERPL CALC-SCNC: 6 MMOL/L
BASOPHILS # BLD AUTO: 0.03 K/UL
BASOPHILS NFR BLD: 0.4 %
BUN SERPL-MCNC: 14 MG/DL
CALCIUM SERPL-MCNC: 9 MG/DL
CHLORIDE SERPL-SCNC: 102 MMOL/L
CO2 SERPL-SCNC: 31 MMOL/L
CREAT SERPL-MCNC: 0.8 MG/DL
DIFFERENTIAL METHOD: ABNORMAL
EOSINOPHIL # BLD AUTO: 0.1 K/UL
EOSINOPHIL NFR BLD: 2 %
ERYTHROCYTE [DISTWIDTH] IN BLOOD BY AUTOMATED COUNT: 13 %
EST. GFR  (AFRICAN AMERICAN): >60 ML/MIN/1.73 M^2
EST. GFR  (NON AFRICAN AMERICAN): >60 ML/MIN/1.73 M^2
GLUCOSE SERPL-MCNC: 89 MG/DL
HCT VFR BLD AUTO: 35.5 %
HGB BLD-MCNC: 11.4 G/DL
IMM GRANULOCYTES # BLD AUTO: 0.02 K/UL
IMM GRANULOCYTES NFR BLD AUTO: 0.3 %
LYMPHOCYTES # BLD AUTO: 2.1 K/UL
LYMPHOCYTES NFR BLD: 30 %
MCH RBC QN AUTO: 29.1 PG
MCHC RBC AUTO-ENTMCNC: 32.1 G/DL
MCV RBC AUTO: 91 FL
MONOCYTES # BLD AUTO: 0.5 K/UL
MONOCYTES NFR BLD: 6.7 %
NEUTROPHILS # BLD AUTO: 4.3 K/UL
NEUTROPHILS NFR BLD: 60.6 %
NRBC BLD-RTO: 0 /100 WBC
PLATELET # BLD AUTO: 254 K/UL
PMV BLD AUTO: 12.2 FL
POTASSIUM SERPL-SCNC: 4.2 MMOL/L
RBC # BLD AUTO: 3.92 M/UL
SODIUM SERPL-SCNC: 139 MMOL/L
WBC # BLD AUTO: 7.01 K/UL

## 2019-02-11 PROCEDURE — 80048 BASIC METABOLIC PNL TOTAL CA: CPT | Mod: HCNC

## 2019-02-11 PROCEDURE — 3078F DIAST BP <80 MM HG: CPT | Mod: HCNC,CPTII,S$GLB, | Performed by: INTERNAL MEDICINE

## 2019-02-11 PROCEDURE — 1101F PR PT FALLS ASSESS DOC 0-1 FALLS W/OUT INJ PAST YR: ICD-10-PCS | Mod: HCNC,CPTII,S$GLB, | Performed by: INTERNAL MEDICINE

## 2019-02-11 PROCEDURE — 1101F PT FALLS ASSESS-DOCD LE1/YR: CPT | Mod: HCNC,CPTII,S$GLB, | Performed by: INTERNAL MEDICINE

## 2019-02-11 PROCEDURE — 93000 EKG 12-LEAD: ICD-10-PCS | Mod: HCNC,S$GLB,, | Performed by: INTERNAL MEDICINE

## 2019-02-11 PROCEDURE — 36415 COLL VENOUS BLD VENIPUNCTURE: CPT | Mod: HCNC,PO

## 2019-02-11 PROCEDURE — 99213 OFFICE O/P EST LOW 20 MIN: CPT | Mod: HCNC,S$GLB,, | Performed by: INTERNAL MEDICINE

## 2019-02-11 PROCEDURE — 3078F PR MOST RECENT DIASTOLIC BLOOD PRESSURE < 80 MM HG: ICD-10-PCS | Mod: HCNC,CPTII,S$GLB, | Performed by: INTERNAL MEDICINE

## 2019-02-11 PROCEDURE — 93000 ELECTROCARDIOGRAM COMPLETE: CPT | Mod: HCNC,S$GLB,, | Performed by: INTERNAL MEDICINE

## 2019-02-11 PROCEDURE — 3074F PR MOST RECENT SYSTOLIC BLOOD PRESSURE < 130 MM HG: ICD-10-PCS | Mod: HCNC,CPTII,S$GLB, | Performed by: INTERNAL MEDICINE

## 2019-02-11 PROCEDURE — 99213 PR OFFICE/OUTPT VISIT, EST, LEVL III, 20-29 MIN: ICD-10-PCS | Mod: HCNC,S$GLB,, | Performed by: INTERNAL MEDICINE

## 2019-02-11 PROCEDURE — 85025 COMPLETE CBC W/AUTO DIFF WBC: CPT | Mod: HCNC

## 2019-02-11 PROCEDURE — 3074F SYST BP LT 130 MM HG: CPT | Mod: HCNC,CPTII,S$GLB, | Performed by: INTERNAL MEDICINE

## 2019-02-11 PROCEDURE — 99999 PR PBB SHADOW E&M-EST. PATIENT-LVL III: ICD-10-PCS | Mod: PBBFAC,HCNC,, | Performed by: INTERNAL MEDICINE

## 2019-02-11 PROCEDURE — 99999 PR PBB SHADOW E&M-EST. PATIENT-LVL III: CPT | Mod: PBBFAC,HCNC,, | Performed by: INTERNAL MEDICINE

## 2019-02-11 NOTE — PROGRESS NOTES
cbcSubjective:       Patient ID: Shakira Pearl is a 73 y.o. female.    Chief Complaint: Blood Pressure Check    HPI   Pt here for pre-op evaluation for cataract surgery scheduled for 3/13/19. Pt currently denies any cardiopulmonary complaints and is doing well.   Review of Systems   Constitutional: Negative for activity change, appetite change, chills, diaphoresis, fatigue, fever and unexpected weight change.   HENT: Negative for congestion, mouth sores, postnasal drip, rhinorrhea, sinus pressure, sneezing, sore throat, trouble swallowing and voice change.    Eyes: Negative for pain, discharge and visual disturbance.   Respiratory: Negative for cough, shortness of breath and wheezing.    Cardiovascular: Negative for chest pain, palpitations and leg swelling.   Gastrointestinal: Negative for abdominal pain, blood in stool, constipation, diarrhea, nausea and vomiting.   Endocrine: Negative for cold intolerance and heat intolerance.   Genitourinary: Negative for difficulty urinating, dysuria, frequency, hematuria and urgency.   Musculoskeletal: Negative for arthralgias and myalgias.   Skin: Negative for rash and wound.   Allergic/Immunologic: Negative for environmental allergies and food allergies.   Neurological: Negative for dizziness, tremors, seizures, syncope, weakness, light-headedness and headaches.   Hematological: Negative for adenopathy. Does not bruise/bleed easily.   Psychiatric/Behavioral: Negative for confusion and sleep disturbance. The patient is not nervous/anxious.        Objective:      Physical Exam   Constitutional: She is oriented to person, place, and time. She appears well-developed and well-nourished. No distress.   HENT:   Head: Normocephalic and atraumatic.   Right Ear: External ear normal.   Left Ear: External ear normal.   Nose: Nose normal.   Mouth/Throat: Oropharynx is clear and moist. No oropharyngeal exudate.   Eyes: Conjunctivae and EOM are normal. Pupils are equal, round, and reactive  to light. Right eye exhibits no discharge. Left eye exhibits no discharge. No scleral icterus.   Neck: Neck supple. No JVD present. No thyromegaly present.   Cardiovascular: Normal rate, regular rhythm, normal heart sounds and intact distal pulses.   No murmur heard.  Pulmonary/Chest: Effort normal and breath sounds normal. No respiratory distress. She has no wheezes. She has no rales. She exhibits no tenderness.   Abdominal: Soft. Bowel sounds are normal. She exhibits no distension. There is no tenderness. There is no guarding.   Musculoskeletal: She exhibits no edema.   Lymphadenopathy:     She has no cervical adenopathy.   Neurological: She is alert and oriented to person, place, and time. No cranial nerve deficit. Coordination normal.   Skin: Skin is warm and dry. No rash noted. She is not diaphoretic. No pallor.   Psychiatric: She has a normal mood and affect. Judgment normal.   Nursing note and vitals reviewed.      Assessment:       1. Pre-op exam    2. Cataract, unspecified cataract type, unspecified laterality        Plan:    1. CBC/BMP/EKG   2. Surgery scheduled for 3/13/19      Pt has no clinical predictors. She is going for a low risk procedure. Has good   functional Capacity at > 4 mets. At this time there are no contraindications to cataract surgery.

## 2019-02-15 ENCOUNTER — TELEPHONE (OUTPATIENT)
Dept: GASTROENTEROLOGY | Facility: CLINIC | Age: 74
End: 2019-02-15

## 2019-02-15 NOTE — TELEPHONE ENCOUNTER
----- Message from Leigh Engel sent at 2/15/2019 12:14 PM CST -----  Contact: cameron Pham [phone]  fran    Needs Advice    Reason for call: received the rx for protonix - states patient says there is supposed to be a second rx        Communication Preference: cameron Pham [phone]    Additional Information:

## 2019-02-18 ENCOUNTER — HOSPITAL ENCOUNTER (OUTPATIENT)
Facility: OTHER | Age: 74
Discharge: HOME | End: 2019-02-18
Attending: ANESTHESIOLOGY | Admitting: ANESTHESIOLOGY
Payer: MEDICARE

## 2019-02-18 VITALS
HEART RATE: 72 BPM | SYSTOLIC BLOOD PRESSURE: 179 MMHG | DIASTOLIC BLOOD PRESSURE: 82 MMHG | OXYGEN SATURATION: 97 % | RESPIRATION RATE: 18 BRPM

## 2019-02-18 DIAGNOSIS — M47.816 LUMBAR FACET ARTHROPATHY: Primary | ICD-10-CM

## 2019-02-18 DIAGNOSIS — M53.3 SACROILIAC JOINT DYSFUNCTION: ICD-10-CM

## 2019-02-18 PROCEDURE — 27096 PR INJECTION,SACROILIAC JOINT: ICD-10-PCS | Mod: 50,HCNC,, | Performed by: ANESTHESIOLOGY

## 2019-02-18 PROCEDURE — 25000003 PHARM REV CODE 250: Mod: HCNC | Performed by: ANESTHESIOLOGY

## 2019-02-18 PROCEDURE — 27096 INJECT SACROILIAC JOINT: CPT | Mod: 50,HCNC,, | Performed by: ANESTHESIOLOGY

## 2019-02-18 PROCEDURE — 27096 INJECT SACROILIAC JOINT: CPT | Mod: 50,HCNC | Performed by: ANESTHESIOLOGY

## 2019-02-18 PROCEDURE — 63600175 PHARM REV CODE 636 W HCPCS: Mod: HCNC | Performed by: ANESTHESIOLOGY

## 2019-02-18 PROCEDURE — 25500020 PHARM REV CODE 255: Mod: HCNC | Performed by: ANESTHESIOLOGY

## 2019-02-18 RX ORDER — BUPIVACAINE HYDROCHLORIDE 2.5 MG/ML
INJECTION, SOLUTION EPIDURAL; INFILTRATION; INTRACAUDAL
Status: DISCONTINUED | OUTPATIENT
Start: 2019-02-18 | End: 2019-02-18 | Stop reason: HOSPADM

## 2019-02-18 RX ORDER — SODIUM CHLORIDE 9 MG/ML
500 INJECTION, SOLUTION INTRAVENOUS CONTINUOUS
Status: DISCONTINUED | OUTPATIENT
Start: 2019-02-18 | End: 2019-02-18 | Stop reason: HOSPADM

## 2019-02-18 RX ORDER — TRIAMCINOLONE ACETONIDE 40 MG/ML
INJECTION, SUSPENSION INTRA-ARTICULAR; INTRAMUSCULAR
Status: DISCONTINUED | OUTPATIENT
Start: 2019-02-18 | End: 2019-02-18 | Stop reason: HOSPADM

## 2019-02-18 RX ORDER — LIDOCAINE HYDROCHLORIDE 10 MG/ML
INJECTION INFILTRATION; PERINEURAL
Status: DISCONTINUED | OUTPATIENT
Start: 2019-02-18 | End: 2019-02-18 | Stop reason: HOSPADM

## 2019-02-18 NOTE — DISCHARGE INSTRUCTIONS
Thank you for allowing us to care for you today. You may receive a survey about the care we provided. Your feedback is valuable and helps us provide excellent care throughout the community.     Home Care Instructions for Pain Management:    1. DIET:   You may resume your normal diet today.   2. BATHING:   You may shower with luke warm water. No tub baths or anything that will soak injection sites under water for the next 24 hours.  3. DRESSING:   You may remove your bandage today.   4. ACTIVITY LEVEL:   You may resume your normal activities 24 hrs after your procedure. Nothing strenuous today.  5. MEDICATIONS:   You may resume your normal medications today. To restart blood thinners, ask your doctor.  6. DRIVING    If you have received any sedatives by mouth today, you may not drive for 12 hours.    If you have received any sedation through your IV, you may not drive for 24 hrs.   7. SPECIAL INSTRUCTIONS:   No heat to the injection site for 24 hrs including, hot bath or shower, heating pad, moist heat, or hot tubs.    Use ice pack to injection site for any pain or discomfort.  Apply ice packs for 20 minute intervals as needed.    IF you have diabetes, be sure to monitor your blood sugar more closely. IF your injection contained steroids your blood sugar levels may become higher than normal.    If you are still having pain upon discharge:  Your pain may improve over the next 48 hours. The anesthetic (numbing medication) works immediately to 48 hours. IF your injection contained a steroid (anti-inflammatory medication), it takes approximately 3 days to start feeling relief and 7-10 days to see your greatest results from the medication. It is possible you may need subsequent injections. This would be discussed at your follow up appointment with pain management or your referring doctor.      PLEASE CALL YOUR DOCTOR IF:  1. Redness or swelling around the injection site.  2. Fever of 101 degrees or more  3. Drainage  (pus) from the injection site.  4. For any continuous bleeding (some dried blood over the incision is normal.)    FOR EMERGENCIES:   If any unusual problems or difficulties occur during clinic hours, call [phone] or [phone].

## 2019-02-18 NOTE — OP NOTE
Sacroiliac Joint Injection under Fluoroscopy  Time-out taken to identify patient and procedure side prior to starting the procedure.   I attest that I have reviewed the patient's home medications prior to the procedure and no contraindication have been identified. I  re-evaluated the patient after the patient was positioned for the procedure in the procedure room immediately before the procedural time-out. The vital signs are current and represent the current state of the patient which has not significantly changed since the preprocedure assessment.               Date of Service: 02/18/2019    PCP: Ariana Astudillo MD    Referring Physician:                                                   PROCEDURE:  bilateralsacroiliac joint injection under fluoroscopy.    REASON FOR PROCEDURE: bilateral sacroiliac joint Bilateral sacroiliitis [M46.1]  1. Lumbar facet arthropathy    2. Sacroiliac joint dysfunction      POSTPROCEDURE DIAGNOSIS:   Bilateral sacroiliitis [M46.1]    1. Lumbar facet arthropathy    2. Sacroiliac joint dysfunction           PHYSICIAN: Mert Coates MD  ASSISTANTS: Napoleon Guerrero DO  Resident    Niall Estevez MD  fellow    MEDICATIONS INJECTED:  Kenalog 20mg and Bupivacaine 0.25% (1.5ml of bupivicaine per side).    LOCAL ANESTHETIC USED: Xylocaine 1% 9ml with Sodium Bicarbonate 1ml. 3ml per site.  SEDATION MEDICATIONS: None    ESTIMATED BLOOD LOSS:  None.    COMPLICATIONS:  None.    TECHNIQUE:   Laying in the prone position, the patient was prepped and draped in the usual sterile fashion using ChloraPrep and fenestrated drape.  The area was determined under fluoroscopy.  Local Xylocaine was injected by raising a wheel and going down to the periosteum using a 27-gauge hypodermic needle.  The 3.5 inch 22-gauge spinal needle was introduce into the bilateral sacroiliac joint.  Negative pressure applied to confirm no intravascular placement.  Omnipaque was injected to confirm placement and to  confirm that there was no vascular runoff.  The medication was then injected slowly.  The patient tolerated the procedure well.    PAIN BEFORE THE PROCEDURE:  0/10.    PAIN AFTER THE PROCEDURE: 0/10.    The patient was monitored for approximately 30 minutes after the procedure.  Patient was given post procedure and discharge instructions to follow at home.  We will see the patient back in two weeks or the patient may call to inform of status. The patient was discharged in a stable condition

## 2019-02-19 ENCOUNTER — TELEPHONE (OUTPATIENT)
Dept: FAMILY MEDICINE | Facility: CLINIC | Age: 74
End: 2019-02-19

## 2019-02-19 ENCOUNTER — TELEPHONE (OUTPATIENT)
Dept: INTERNAL MEDICINE | Facility: CLINIC | Age: 74
End: 2019-02-19

## 2019-02-19 DIAGNOSIS — R53.83 FATIGUE, UNSPECIFIED TYPE: ICD-10-CM

## 2019-02-19 DIAGNOSIS — R79.9 ABNORMAL FINDING OF BLOOD CHEMISTRY: ICD-10-CM

## 2019-02-19 DIAGNOSIS — E55.9 VITAMIN D DEFICIENCY: ICD-10-CM

## 2019-02-19 DIAGNOSIS — Z00.00 ANNUAL PHYSICAL EXAM: Primary | ICD-10-CM

## 2019-02-19 DIAGNOSIS — N39.41 URGE INCONTINENCE OF URINE: ICD-10-CM

## 2019-02-19 DIAGNOSIS — Z79.899 HIGH RISK MEDICATION USE: ICD-10-CM

## 2019-02-19 NOTE — TELEPHONE ENCOUNTER
----- Message from Warren Hargrove sent at 2/19/2019 11:08 AM CST -----  Contact: Patient [phone]  Patient would like to get test results.  Name of test (lab, mammo, etc.):  Non Fasting [facility]  Date of test:  02/11/19  Ordering provider: Dr Bello  Where was the test performed:  [facility]    Comments: Patient also call regarding the Pre Op exam for Cataracts being taking out, would like a call back for update on the forms being sent out to the facility.     Please call an advise  Thank you

## 2019-02-19 NOTE — TELEPHONE ENCOUNTER
This pt has not been seen since 8-2017 she needs epp and labs schedule with a vitamin D level. See orders.

## 2019-02-20 RX ORDER — IMIPRAMINE HYDROCHLORIDE 25 MG/1
25 TABLET, FILM COATED ORAL NIGHTLY
Qty: 90 TABLET | Refills: 0 | Status: SHIPPED | OUTPATIENT
Start: 2019-02-20 | End: 2019-04-10 | Stop reason: SDUPTHER

## 2019-02-28 ENCOUNTER — TELEPHONE (OUTPATIENT)
Dept: GASTROENTEROLOGY | Facility: CLINIC | Age: 74
End: 2019-02-28

## 2019-02-28 NOTE — TELEPHONE ENCOUNTER
----- Message from Candy Lo sent at 2/28/2019  9:54 AM CST -----  Contact: pt#[phone]  Needs Advice    Reason for call:Pt states that she wants to speak with you about her vitamin D      Communication Preference:call    Additional Information:

## 2019-03-12 ENCOUNTER — TELEPHONE (OUTPATIENT)
Dept: INTERNAL MEDICINE | Facility: CLINIC | Age: 74
End: 2019-03-12

## 2019-03-12 NOTE — TELEPHONE ENCOUNTER
Surgery tomorrow at eye [facility].  I assured her we faxed form on 2/13 and I found in cc's faxed box.  I am refaxing clearance now and confirmed fax number.    Put copy back in cc's faxed box.

## 2019-04-10 ENCOUNTER — PES CALL (OUTPATIENT)
Dept: ADMINISTRATIVE | Facility: CLINIC | Age: 74
End: 2019-04-10

## 2019-04-10 ENCOUNTER — TELEPHONE (OUTPATIENT)
Dept: INTERNAL MEDICINE | Facility: CLINIC | Age: 74
End: 2019-04-10

## 2019-04-10 DIAGNOSIS — N39.41 URGE INCONTINENCE OF URINE: ICD-10-CM

## 2019-04-10 RX ORDER — IMIPRAMINE HYDROCHLORIDE 25 MG/1
25 TABLET, FILM COATED ORAL NIGHTLY
Qty: 90 TABLET | Refills: 3 | Status: SHIPPED | OUTPATIENT
Start: 2019-04-10 | End: 2020-05-05 | Stop reason: CLARIF

## 2019-04-10 NOTE — TELEPHONE ENCOUNTER
----- Message from Lexie Pink sent at 4/10/2019  8:31 AM CDT -----  Contact: Self [phone]  Pt was calling to follow up on imipramine (TOFRANIL) 25 MG tablet, pt states that the pharmacy never  it script.

## 2019-05-21 ENCOUNTER — TELEPHONE (OUTPATIENT)
Dept: GASTROENTEROLOGY | Facility: CLINIC | Age: 74
End: 2019-05-21

## 2019-05-21 NOTE — TELEPHONE ENCOUNTER
Returned patient's call. Patient informed next colonoscopy is due April 2020.    She verbalized understanding.

## 2019-05-21 NOTE — TELEPHONE ENCOUNTER
----- Message from Sarika Michelle sent at 5/21/2019 11:17 AM CDT -----  Contact: Self- [phone]  Sofia- called to determine when c-scope will be- already has EGD scheduled 7/1- please contact pt at [phone]

## 2019-05-22 ENCOUNTER — TELEPHONE (OUTPATIENT)
Dept: INTERNAL MEDICINE | Facility: CLINIC | Age: 74
End: 2019-05-22

## 2019-05-22 NOTE — TELEPHONE ENCOUNTER
----- Message from Trish Kinney sent at 5/22/2019  3:12 PM CDT -----  Contact: self/ [phone]  Please call patient back again.

## 2019-05-22 NOTE — TELEPHONE ENCOUNTER
----- Message from Lexie Pink sent at 5/22/2019 11:40 AM CDT -----  Contact: Self [phone]  Patient is calling to speak the nurse in regards to wrong code on the bill, pt states she was seen Dr. Bello 2/11/19, not Dr. Lilli Silvestre and was charge for the visit

## 2019-05-22 NOTE — TELEPHONE ENCOUNTER
Unable to help with billing she was charged for EKG reading 35.00 and reports that she has never had to pay before. She gave the number to [facility] billing to [facility] and they keep referring this problem to Dr. Bello

## 2019-08-05 ENCOUNTER — LAB VISIT (OUTPATIENT)
Dept: LAB | Facility: HOSPITAL | Age: 74
End: 2019-08-05
Attending: INTERNAL MEDICINE
Payer: MEDICARE

## 2019-08-05 DIAGNOSIS — E55.9 VITAMIN D INSUFFICIENCY: ICD-10-CM

## 2019-08-05 PROCEDURE — 82306 VITAMIN D 25 HYDROXY: CPT | Mod: HCNC

## 2019-08-05 PROCEDURE — 36415 COLL VENOUS BLD VENIPUNCTURE: CPT | Mod: HCNC,PO

## 2019-08-06 LAB — 25(OH)D3+25(OH)D2 SERPL-MCNC: 30 NG/ML (ref 30–96)

## 2019-08-12 ENCOUNTER — TELEPHONE (OUTPATIENT)
Dept: GASTROENTEROLOGY | Facility: CLINIC | Age: 74
End: 2019-08-12

## 2019-08-12 DIAGNOSIS — Z86.010 HISTORY OF COLON POLYPS: ICD-10-CM

## 2019-08-12 DIAGNOSIS — K21.9 GASTROESOPHAGEAL REFLUX DISEASE, ESOPHAGITIS PRESENCE NOT SPECIFIED: Primary | ICD-10-CM

## 2019-08-12 NOTE — TELEPHONE ENCOUNTER
----- Message from Chana Valle MA sent at 8/12/2019 10:40 AM CDT -----  Contact: [phone]  Test Results    Type of Test: Lab work    Date of Test: 8/5/19    Communication Preference: [phone]    Additional Information: please call

## 2019-08-12 NOTE — TELEPHONE ENCOUNTER
Notified of lab results per Dr Black. Patient would like to know if she can have egd when she has colonoscopy in April. She reports she was supposed to have egd but she decided to wait and do both at one time in April. Will notify Dr Black.

## 2019-08-13 NOTE — TELEPHONE ENCOUNTER
August 13, 2019      8:32 AM   You routed this conversation to Yfn Black MD   Me           8:18 AM   Note      Dr Black-  After reviewing chart, I see no reason why the patient cannot be done on the 4th floor.  I do not know the reason she was done on the 2nd floor in the past.  We only schedule procedure 3 months out. The patient can call after January, or she will be contacted closer to April to schedule both procedures.     Stacey               August 12, 2019   Yfn Balck MD   to Me            4:55 PM   Stacey - she was done on the 2nd floor last time but I can't figure out why I placed the orders for the 4th floor but there may be something you can discover that she needs to be on 2nd she like an EGD and colonoscopy at the same time thank you   Janis Bain, RN   to Yfn Black MD          4:50 PM   Patient due for colonoscopy in April, she would like to do egd at same time.

## 2019-08-13 NOTE — TELEPHONE ENCOUNTER
Dr Black-  After reviewing chart, I see no reason why the patient cannot be done on the 4th floor.  I do not know the reason she was done on the 2nd floor in the past.  We only schedule procedure 3 months out. The patient can call after January, or she will be contacted closer to April to schedule both procedures.    Stacey

## 2019-08-16 ENCOUNTER — APPOINTMENT (OUTPATIENT)
Dept: RADIOLOGY | Facility: CLINIC | Age: 74
End: 2019-08-16
Attending: INTERNAL MEDICINE
Payer: MEDICARE

## 2019-08-16 DIAGNOSIS — Z78.0 ASYMPTOMATIC MENOPAUSAL STATE: ICD-10-CM

## 2019-08-16 PROCEDURE — 77080 DXA BONE DENSITY AXIAL: CPT | Mod: 26,HCNC,, | Performed by: INTERNAL MEDICINE

## 2019-08-16 PROCEDURE — 77080 DEXA BONE DENSITY SPINE HIP: ICD-10-PCS | Mod: 26,HCNC,, | Performed by: INTERNAL MEDICINE

## 2019-08-16 PROCEDURE — 77080 DXA BONE DENSITY AXIAL: CPT | Mod: TC,HCNC,PO

## 2019-08-22 ENCOUNTER — PES CALL (OUTPATIENT)
Dept: ADMINISTRATIVE | Facility: CLINIC | Age: 74
End: 2019-08-22

## 2019-09-05 ENCOUNTER — TELEPHONE (OUTPATIENT)
Dept: PHYSICAL MEDICINE AND REHAB | Facility: CLINIC | Age: 74
End: 2019-09-05

## 2019-09-05 NOTE — TELEPHONE ENCOUNTER
----- Message from Liyah Prather sent at 9/5/2019  2:19 PM CDT -----  Contact: self @ [phone]  Pt is calling to schedule an appt with Dr Paris for her knee.  Pt has not been seen since 6-16-16 and is now considered a new pt.  I was advised that she is not taking any new pts at this time.  Pt insists she is not a new pt and wants to see Dr Paris.  Pls call.

## 2019-09-05 NOTE — TELEPHONE ENCOUNTER
LM on patient's Vm letting her that she is considered as a new patient because she has not been seen for three years.  I told the patient that someone will give her a call to make an appointment

## 2019-09-10 ENCOUNTER — PATIENT OUTREACH (OUTPATIENT)
Dept: ADMINISTRATIVE | Facility: OTHER | Age: 74
End: 2019-09-10

## 2019-09-11 ENCOUNTER — OFFICE VISIT (OUTPATIENT)
Dept: PAIN MEDICINE | Facility: CLINIC | Age: 74
End: 2019-09-11
Attending: ANESTHESIOLOGY
Payer: MEDICARE

## 2019-09-11 ENCOUNTER — HOSPITAL ENCOUNTER (OUTPATIENT)
Dept: RADIOLOGY | Facility: OTHER | Age: 74
Discharge: HOME | End: 2019-09-11
Attending: STUDENT IN AN ORGANIZED HEALTH CARE EDUCATION/TRAINING PROGRAM
Payer: MEDICARE

## 2019-09-11 VITALS
DIASTOLIC BLOOD PRESSURE: 89 MMHG | WEIGHT: 149.25 LBS | HEIGHT: 63 IN | TEMPERATURE: 98 F | SYSTOLIC BLOOD PRESSURE: 134 MMHG | HEART RATE: 88 BPM | BODY MASS INDEX: 26.45 KG/M2

## 2019-09-11 DIAGNOSIS — M43.06 SPONDYLOLYSIS OF LUMBAR REGION: ICD-10-CM

## 2019-09-11 DIAGNOSIS — M25.562 ACUTE PAIN OF LEFT KNEE: ICD-10-CM

## 2019-09-11 DIAGNOSIS — M43.10 SPONDYLOLISTHESIS, UNSPECIFIED SPINAL REGION: ICD-10-CM

## 2019-09-11 DIAGNOSIS — M53.3 SACROILIAC JOINT PAIN: ICD-10-CM

## 2019-09-11 DIAGNOSIS — M53.3 SACROILIAC JOINT PAIN: Primary | ICD-10-CM

## 2019-09-11 PROCEDURE — 1101F PT FALLS ASSESS-DOCD LE1/YR: CPT | Mod: HCNC,CPTII,S$GLB, | Performed by: ANESTHESIOLOGY

## 2019-09-11 PROCEDURE — 99214 OFFICE O/P EST MOD 30 MIN: CPT | Mod: HCNC,S$GLB,, | Performed by: ANESTHESIOLOGY

## 2019-09-11 PROCEDURE — 72110 X-RAY EXAM L-2 SPINE 4/>VWS: CPT | Mod: 26,HCNC,, | Performed by: RADIOLOGY

## 2019-09-11 PROCEDURE — 73562 XR KNEE 3 VIEW LEFT: ICD-10-PCS | Mod: 26,HCNC,LT, | Performed by: RADIOLOGY

## 2019-09-11 PROCEDURE — 73562 X-RAY EXAM OF KNEE 3: CPT | Mod: 26,HCNC,LT, | Performed by: RADIOLOGY

## 2019-09-11 PROCEDURE — 99999 PR PBB SHADOW E&M-EST. PATIENT-LVL III: ICD-10-PCS | Mod: PBBFAC,HCNC,, | Performed by: ANESTHESIOLOGY

## 2019-09-11 PROCEDURE — 73562 X-RAY EXAM OF KNEE 3: CPT | Mod: TC,HCNC,FY,LT

## 2019-09-11 PROCEDURE — 3079F PR MOST RECENT DIASTOLIC BLOOD PRESSURE 80-89 MM HG: ICD-10-PCS | Mod: HCNC,CPTII,S$GLB, | Performed by: ANESTHESIOLOGY

## 2019-09-11 PROCEDURE — 3075F SYST BP GE 130 - 139MM HG: CPT | Mod: HCNC,CPTII,S$GLB, | Performed by: ANESTHESIOLOGY

## 2019-09-11 PROCEDURE — 3079F DIAST BP 80-89 MM HG: CPT | Mod: HCNC,CPTII,S$GLB, | Performed by: ANESTHESIOLOGY

## 2019-09-11 PROCEDURE — 72110 X-RAY EXAM L-2 SPINE 4/>VWS: CPT | Mod: TC,HCNC,FY

## 2019-09-11 PROCEDURE — 72110 XR LUMBAR SPINE 5 VIEW WITH FLEX AND EXT: ICD-10-PCS | Mod: 26,HCNC,, | Performed by: RADIOLOGY

## 2019-09-11 PROCEDURE — 1101F PR PT FALLS ASSESS DOC 0-1 FALLS W/OUT INJ PAST YR: ICD-10-PCS | Mod: HCNC,CPTII,S$GLB, | Performed by: ANESTHESIOLOGY

## 2019-09-11 PROCEDURE — 99999 PR PBB SHADOW E&M-EST. PATIENT-LVL III: CPT | Mod: PBBFAC,HCNC,, | Performed by: ANESTHESIOLOGY

## 2019-09-11 PROCEDURE — 99214 PR OFFICE/OUTPT VISIT, EST, LEVL IV, 30-39 MIN: ICD-10-PCS | Mod: HCNC,S$GLB,, | Performed by: ANESTHESIOLOGY

## 2019-09-11 PROCEDURE — 3075F PR MOST RECENT SYSTOLIC BLOOD PRESS GE 130-139MM HG: ICD-10-PCS | Mod: HCNC,CPTII,S$GLB, | Performed by: ANESTHESIOLOGY

## 2019-09-11 NOTE — PROGRESS NOTES
Subjective:      Patient ID: Shakira Pearl is a 74 y.o. female.    Chief Complaint: Back Pain (had it for years ) and Knee Pain (swelling. )    Referred by: No ref. provider found     Low-back Pain   Patient presents for follow-up of LBP. She is s/p SI Joint injections from 2/18/19. She had good pain relief for about 2 months following the procedure. Pain remains located bilaterally across lower back and into the upper buttock area. Pain is dull and aching. She takes alleve sometimes but tries not to due to stomach problems.        Interventional Pain History  2/18/19 BL SI Joint  2010 R L4 and L5 TFESI  2010 BL SIJ    Pertinent negatives include no chest pain, fever, headaches or weight loss.      HPI  73yF here today self referral for LBP. Seen in the past for similar complaints with successful treatment with interventional therapy. Has chronic LBP- achy, worse with position changes and activity. States pain will flare up at unknown times and be sharp as well with activity. Sitting and lying down make it better. Takes Aleve which helps but had to stop d/t stomach pain. Today pt denies recent fevers/illness/infection, unintentional weight loss, abx use, B/B changes, CP, SOB, N/V/D, abd pain, HA, dizziness, weakness, gait changes, tripping, decreased coordination.     Interventional Pain History  2010 R L4 and L5 TFESI  2010 BL SIJ  Past Medical History:   Diagnosis Date    Allergy sinus    Arthritis back    Degenerative disc disease back    Neuromuscular disorder     Vitamin D deficiency        Past Surgical History:   Procedure Laterality Date    APPENDECTOMY  age 19    CHOLECYSTECTOMY      age of 25    COLONOSCOPY N/A 4/29/2015    [facility] ([facility]ND FLR)    ESOPHAGOGASTRODUODENOSCOPY (EGD) N/A 4/29/2015    Performed by [facility] at [facility] (2ND FLR)    HYSTERECTOMY  40    INJECTION, JOINT BILATERAL SI Bilateral 2/18/2019    Performed by Mert Coates MD at  [facility]       Review of patient's allergies indicates:   Allergen Reactions    Demerol [meperidine] Nausea And Vomiting     Other reaction(s): Nausea    Papaya      Illness vomiting    Sulfa (sulfonamide antibiotics) Rash    Sulfur Rash       Current Outpatient Medications   Medication Sig Dispense Refill    cholecalciferol, vitamin D3, (VITAMIN D3) 2,000 unit Cap Take 1 capsule (2,000 Units total) by mouth once daily. 90 capsule 3    estradiol valerate (DELESTROGEN) 40 mg/mL injection Inject 0.5 mLs (20 mg total) into the muscle every 28 days. Inject into the muscle. 5 mL 12    imipramine (TOFRANIL) 25 MG tablet Take 1 tablet (25 mg total) by mouth every evening. 90 tablet 3    losartan (COZAAR) 50 MG tablet TAKE ONE DAILY 90 tablet 2    pantoprazole (PROTONIX) 40 MG tablet Take 1 tablet (40 mg total) by mouth before breakfast. Take one pill every morning 45 minutes before breakfast in the morning. 90 tablet 3    varicella-zoster gE-AS01B, PF, (SHINGRIX, PF,) 50 mcg/0.5 mL injection Inject into the muscle. 0.5 mL 1     No current facility-administered medications for this visit.        Family History   Problem Relation Age of Onset    Heart disease Mother 67        heart attack    Cancer Father 65        abdominal    Stomach cancer Father     No Known Problems Sister     No Known Problems Brother     No Known Problems Son     No Known Problems Maternal Grandmother     No Known Problems Maternal Grandfather     No Known Problems Paternal Grandmother     No Known Problems Paternal Grandfather     No Known Problems Maternal Aunt     No Known Problems Maternal Uncle     No Known Problems Paternal Aunt     No Known Problems Paternal Uncle     Celiac disease Neg Hx     Colon cancer Neg Hx     Crohn's disease Neg Hx     Esophageal cancer Neg Hx     Inflammatory bowel disease Neg Hx     Liver cancer Neg Hx     Rectal cancer Neg Hx     Ulcerative colitis Neg Hx     Amblyopia Neg Hx      Blindness Neg Hx     Cataracts Neg Hx     Diabetes Neg Hx     Glaucoma Neg Hx     Hypertension Neg Hx     Macular degeneration Neg Hx     Retinal detachment Neg Hx     Strabismus Neg Hx     Stroke Neg Hx     Thyroid disease Neg Hx        Social History     Socioeconomic History    Marital status:      Spouse name: Not on file    Number of children: Not on file    Years of education: Not on file    Highest education level: Not on file   Occupational History    Not on file   Social Needs    Financial resource strain: Not on file    Food insecurity:     Worry: Not on file     Inability: Not on file    Transportation needs:     Medical: Not on file     Non-medical: Not on file   Tobacco Use    Smoking status: Never Smoker    Smokeless tobacco: Never Used   Substance and Sexual Activity    Alcohol use: No    Drug use: No    Sexual activity: Not Currently   Lifestyle    Physical activity:     Days per week: Not on file     Minutes per session: Not on file    Stress: Not on file   Relationships    Social connections:     Talks on phone: Not on file     Gets together: Not on file     Attends Hindu service: Not on file     Active member of club or organization: Not on file     Attends meetings of clubs or organizations: Not on file     Relationship status: Not on file   Other Topics Concern    Not on file   Social History Narrative    Not on file           Review of Systems   Constitution: Negative for chills and fever.   HENT: Negative for sore throat.    Eyes: Negative for blurred vision.   Cardiovascular: Negative for chest pain.   Respiratory: Negative for shortness of breath.    Hematologic/Lymphatic: Does not bruise/bleed easily.   Skin: Negative for rash.   Gastrointestinal: Negative for abdominal pain, diarrhea and nausea.   Genitourinary: Negative for bladder incontinence.   Neurological: Negative for light-headedness and tremors.   Psychiatric/Behavioral: Negative for  "hallucinations.           Objective:   /89 (BP Location: Right arm, Patient Position: Sitting)   Pulse 88   Temp 97.7 °F (36.5 °C)   Ht 5' 3" (1.6 m)   Wt 67.7 kg (149 lb 4 oz)   BMI 26.44 kg/m²   Pain Disability Index Review:  Last 3 PDI Scores 2019   Pain Disability Index (PDI) 17     Normocephalic.  Atraumatic.  Affect appropriate.  Breathing unlabored.  Extra ocular muscles intact.               General Musculoskeletal Exam   Gait: normal     Right Ankle/Foot Exam     Tests   Heel Walk: able to perform  Tiptoe Walk: able to perform    Left Ankle/Foot Exam     Tests   Heel Walk: able to perform  Tiptoe Walk: able to perform    Right Knee Exam   Right knee exam is normal.    Left Knee Exam     Inspection   Swelling: present    Tenderness   The patient tender to palpation of the medial joint line.    Tests   Meniscus   Lore:  Medial - positive   Stability Lachman: normal (-1 to 2mm) PCL-Posterior Drawer: normal (0 to 2mm)  MCL - Valgus: normal (0 to 2mm)    Right Hip Exam     Tenderness   The patient tender to palpation of the SI joint.    Range of Motion   External rotation: normal   Internal rotation: normal     Tests   Pain w/ forced internal rotation (ELI): absent  Joie: negative    Other   Sensation: normal  Left Hip Exam     Tenderness   The patient tender to palpation of the SI joint.    Range of Motion   External rotation: normal   Internal rotation: normal     Tests   Pain w/ forced internal rotation (ELI): absent  Joie: negative    Other   Sensation: normal      Back (L-Spine & T-Spine) / Neck (C-Spine) Exam     Back (L-Spine & T-Spine) Range of Motion   Extension: normal   Flexion: normal     Back (L-Spine & T-Spine) Tests   Right Side Tests  Femoral Stretch: negative  Left Side Tests  Femoral Stretch: negative    Comments:  Knee exam + Divya's        Muscle Strength   Right Lower Extremity   Hip Flexion:    Quadriceps:     Hamstrin/5   Gastrocsoleus:    EHL:  " 5/5  Left Lower Extremity   Hip Flexion: 5/5   Quadriceps:  5/5   Hamstrin/5   Gastrocsoleus:  5/5/5  EHL:  5/5    Reflexes     Left Side  Quadriceps:  2+  Achilles:  2+  Babinski Sign:  absent  Ankle Clonus:  absent    Right Side   Quadriceps:  2+  Achilles:  2+  Babinski Sign:  absent  Ankle Clonus:  absent    Vascular Exam     Right Pulses  Dorsalis Pedis:      2+          Left Pulses  Dorsalis Pedis:      2+          Capillary Refill  Right Hand: normal capillary refill  Left Hand: normal capillary refill    Edema  Right Upper Leg: absent  Left Upper Leg: absent        Assessment:       Encounter Diagnoses   Name Primary?    Sacroiliac joint pain Yes    Spondylolisthesis, unspecified spinal region     Spondylolysis of lumbar region     Acute pain of left knee          Plan:   We discussed with the patient the assessment and recommendations. The following is the plan we agreed on:    - Schedule for BL SI Joint Injections    - Recommend patient start Glucosamine 1500mg and Chondroitin 1200mg    - Referral to Physical Therapy    - Referral to Orthopedics for suspected left meniscal     - Ordered XR Knee, Left    - Ordered XR Lumbar Spine      - RTC 2 Weeks post-procedure    Anjel Lancaster MD  [facility]    Shakira was seen today for back pain and knee pain.    Diagnoses and all orders for this visit:    Sacroiliac joint pain    Spondylolisthesis, unspecified spinal region    Spondylolysis of lumbar region    Acute pain of left knee         I have personally taken the history and examined this patient and agree with the fellow's note as stated above.

## 2019-09-16 ENCOUNTER — HOSPITAL ENCOUNTER (OUTPATIENT)
Dept: RADIOLOGY | Facility: HOSPITAL | Age: 74
Discharge: HOME | End: 2019-09-16
Attending: ORTHOPAEDIC SURGERY
Payer: MEDICARE

## 2019-09-16 ENCOUNTER — OFFICE VISIT (OUTPATIENT)
Dept: SPORTS MEDICINE | Facility: CLINIC | Age: 74
End: 2019-09-16
Payer: MEDICARE

## 2019-09-16 VITALS — HEART RATE: 81 BPM | SYSTOLIC BLOOD PRESSURE: 130 MMHG | DIASTOLIC BLOOD PRESSURE: 81 MMHG

## 2019-09-16 DIAGNOSIS — M25.562 PAIN IN BOTH KNEES, UNSPECIFIED CHRONICITY: Primary | ICD-10-CM

## 2019-09-16 DIAGNOSIS — M25.561 PAIN IN BOTH KNEES, UNSPECIFIED CHRONICITY: ICD-10-CM

## 2019-09-16 DIAGNOSIS — M23.92 INTERNAL DERANGEMENT OF LEFT KNEE: ICD-10-CM

## 2019-09-16 DIAGNOSIS — M25.561 PAIN IN BOTH KNEES, UNSPECIFIED CHRONICITY: Primary | ICD-10-CM

## 2019-09-16 DIAGNOSIS — M25.562 PAIN IN BOTH KNEES, UNSPECIFIED CHRONICITY: ICD-10-CM

## 2019-09-16 DIAGNOSIS — M25.562 ACUTE PAIN OF LEFT KNEE: ICD-10-CM

## 2019-09-16 PROCEDURE — 73564 X-RAY EXAM KNEE 4 OR MORE: CPT | Mod: 26,50,HCNC, | Performed by: RADIOLOGY

## 2019-09-16 PROCEDURE — 3075F PR MOST RECENT SYSTOLIC BLOOD PRESS GE 130-139MM HG: ICD-10-PCS | Mod: HCNC,CPTII,S$GLB, | Performed by: ORTHOPAEDIC SURGERY

## 2019-09-16 PROCEDURE — 1101F PR PT FALLS ASSESS DOC 0-1 FALLS W/OUT INJ PAST YR: ICD-10-PCS | Mod: HCNC,CPTII,S$GLB, | Performed by: ORTHOPAEDIC SURGERY

## 2019-09-16 PROCEDURE — 99203 OFFICE O/P NEW LOW 30 MIN: CPT | Mod: HCNC,S$GLB,, | Performed by: ORTHOPAEDIC SURGERY

## 2019-09-16 PROCEDURE — 73564 XR KNEE ORTHO BILAT WITH FLEXION: ICD-10-PCS | Mod: 26,50,HCNC, | Performed by: RADIOLOGY

## 2019-09-16 PROCEDURE — 3079F PR MOST RECENT DIASTOLIC BLOOD PRESSURE 80-89 MM HG: ICD-10-PCS | Mod: HCNC,CPTII,S$GLB, | Performed by: ORTHOPAEDIC SURGERY

## 2019-09-16 PROCEDURE — 1101F PT FALLS ASSESS-DOCD LE1/YR: CPT | Mod: HCNC,CPTII,S$GLB, | Performed by: ORTHOPAEDIC SURGERY

## 2019-09-16 PROCEDURE — 73564 X-RAY EXAM KNEE 4 OR MORE: CPT | Mod: TC,50,HCNC,FY,PO

## 2019-09-16 PROCEDURE — 99999 PR PBB SHADOW E&M-EST. PATIENT-LVL III: CPT | Mod: PBBFAC,HCNC,, | Performed by: ORTHOPAEDIC SURGERY

## 2019-09-16 PROCEDURE — 99999 PR PBB SHADOW E&M-EST. PATIENT-LVL III: ICD-10-PCS | Mod: PBBFAC,HCNC,, | Performed by: ORTHOPAEDIC SURGERY

## 2019-09-16 PROCEDURE — 3075F SYST BP GE 130 - 139MM HG: CPT | Mod: HCNC,CPTII,S$GLB, | Performed by: ORTHOPAEDIC SURGERY

## 2019-09-16 PROCEDURE — 3079F DIAST BP 80-89 MM HG: CPT | Mod: HCNC,CPTII,S$GLB, | Performed by: ORTHOPAEDIC SURGERY

## 2019-09-16 PROCEDURE — 99203 PR OFFICE/OUTPT VISIT, NEW, LEVL III, 30-44 MIN: ICD-10-PCS | Mod: HCNC,S$GLB,, | Performed by: ORTHOPAEDIC SURGERY

## 2019-09-16 NOTE — PROGRESS NOTES
CC: Left knee pain    74 y.o. Female with a history of Left pain of approximately 2 weeks with no known inciting event or trauma.  States that she woke up and had acute pain in the medial aspect of her knee, described as sharp and localized to the medial joint line.  Unable to deep squat.  Complains of start up pain in addition to mechanical symptoms.  No previous treatment prior to presentation.     who She states that the pain is severe and not responding to any conservative care.      + mechanical symptoms, no instability    Is affecting ADLs.      Review of Systems   Constitution: Negative. Negative for chills, fever and night sweats.   HENT: Negative for congestion and headaches.    Eyes: Negative for blurred vision, left vision loss and right vision loss.   Cardiovascular: Negative for chest pain and syncope.   Respiratory: Negative for cough and shortness of breath.    Endocrine: Negative for polydipsia, polyphagia and polyuria.   Hematologic/Lymphatic: Negative for bleeding problem. Does not bruise/bleed easily.   Skin: Negative for dry skin, itching and rash.   Musculoskeletal: Negative for falls. Positive for knee pain and muscle weakness.   Gastrointestinal: Negative for abdominal pain and bowel incontinence.   Genitourinary: Negative for bladder incontinence and nocturia.   Neurological: Negative for disturbances in coordination, loss of balance and seizures.   Psychiatric/Behavioral: Negative for depression. The patient does not have insomnia.    Allergic/Immunologic: Negative for hives and persistent infections.     PAST MEDICAL HISTORY:   Past Medical History:   Diagnosis Date    Allergy sinus    Arthritis back    Degenerative disc disease back    Neuromuscular disorder     Vitamin D deficiency      PAST SURGICAL HISTORY:   Past Surgical History:   Procedure Laterality Date    APPENDECTOMY  age 19    CHOLECYSTECTOMY      age of 25    COLONOSCOPY N/A 4/29/2015    Performed by Yfn Black,  MD at [facility] ENDO (2ND FLR)    ESOPHAGOGASTRODUODENOSCOPY (EGD) N/A 4/29/2015    Performed by [facility]fn [facility] MD at [facility] ENDO (2ND FLR)    HYSTERECTOMY  38    INJECTION, JOINT BILATERAL SI Bilateral 2/18/2019    [facility]     FAMILY HISTORY:   Family History   Problem Relation Age of Onset    Heart disease Mother 67        heart attack    Cancer Father 65        abdominal    Stomach cancer Father     No Known Problems Sister     No Known Problems Brother     No Known Problems Son     No Known Problems Maternal Grandmother     No Known Problems Maternal Grandfather     No Known Problems Paternal Grandmother     No Known Problems Paternal Grandfather     No Known Problems Maternal Aunt     No Known Problems Maternal Uncle     No Known Problems Paternal Aunt     No Known Problems Paternal Uncle     Celiac disease Neg Hx     Colon cancer Neg Hx     Crohn's disease Neg Hx     Esophageal cancer Neg Hx     Inflammatory bowel disease Neg Hx     Liver cancer Neg Hx     Rectal cancer Neg Hx     Ulcerative colitis Neg Hx     Amblyopia Neg Hx     Blindness Neg Hx     Cataracts Neg Hx     Diabetes Neg Hx     Glaucoma Neg Hx     Hypertension Neg Hx     Macular degeneration Neg Hx     Retinal detachment Neg Hx     Strabismus Neg Hx     Stroke Neg Hx     Thyroid disease Neg Hx      SOCIAL HISTORY:   Social History     Socioeconomic History    Marital status:      Spouse name: Not on file    Number of children: Not on file    Years of education: Not on file    Highest education level: Not on file   Occupational History    Not on file   Social Needs    Financial resource strain: Not on file    Food insecurity:     Worry: Not on file     Inability: Not on file    Transportation needs:     Medical: Not on file     Non-medical: Not on file   Tobacco Use    Smoking status: Never Smoker    Smokeless tobacco: Never Used   Substance and Sexual Activity     Alcohol use: No    Drug use: No    Sexual activity: Not Currently   Lifestyle    Physical activity:     Days per week: Not on file     Minutes per session: Not on file    Stress: Not on file   Relationships    Social connections:     Talks on phone: Not on file     Gets together: Not on file     Attends Tenriism service: Not on file     Active member of club or organization: Not on file     Attends meetings of clubs or organizations: Not on file     Relationship status: Not on file   Other Topics Concern    Not on file   Social History Narrative    Not on file       MEDICATIONS:   Current Outpatient Medications:     cholecalciferol, vitamin D3, (VITAMIN D3) 2,000 unit Cap, Take 1 capsule (2,000 Units total) by mouth once daily., Disp: 90 capsule, Rfl: 3    estradiol valerate (DELESTROGEN) 40 mg/mL injection, Inject 0.5 mLs (20 mg total) into the muscle every 28 days. Inject into the muscle., Disp: 5 mL, Rfl: 12    imipramine (TOFRANIL) 25 MG tablet, Take 1 tablet (25 mg total) by mouth every evening., Disp: 90 tablet, Rfl: 3    losartan (COZAAR) 50 MG tablet, TAKE ONE DAILY, Disp: 90 tablet, Rfl: 2    pantoprazole (PROTONIX) 40 MG tablet, Take 1 tablet (40 mg total) by mouth before breakfast. Take one pill every morning 45 minutes before breakfast in the morning., Disp: 90 tablet, Rfl: 3    varicella-zoster gE-AS01B, PF, (SHINGRIX, PF,) 50 mcg/0.5 mL injection, Inject into the muscle., Disp: 0.5 mL, Rfl: 1  ALLERGIES:   Review of patient's allergies indicates:   Allergen Reactions    Demerol [meperidine] Nausea And Vomiting     Other reaction(s): Nausea    Papaya      Illness vomiting    Sulfa (sulfonamide antibiotics) Rash    Sulfur Rash       VITAL SIGNS: /81   Pulse 81      PHYSICAL EXAMINATION  VITAL SIGNS: /81   Pulse 81    General:  The patient is alert and oriented x 3.  Mood is pleasant.  Observation of ears, eyes and nose reveal no gross abnormalities.  HEENT: NCAT,  sclera nonicteric  Lungs: Respirations are equal and unlabored.    Left KNEE EXAMINATION     OBSERVATION / INSPECTION   Gait:   Antalgic   Alignment:  Neutral   Scars:   None   Muscle atrophy: Mild  Effusion:  Mild  Warmth:  None   Discoloration:   none     TENDERNESS / CREPITUS (T / C):          T / C      T / C   Patella   - / +  Lateral joint line   - / -    Peripatellar medial  -  Medial joint line    + / -    Peripatellar lateral -  Medial plica   - / -    Patellar tendon -   Popliteal fossa  - / -    Quad tendon   -   Gastrocnemius   -   Prepatellar Bursa - / -   Quadricep   -   Tibial tubercle  -  Thigh/hamstring  -   Pes anserine/HS -  Fibula    -   ITB   - / -  Tibia     -   Tib/fib joint  - / -  LCL    -     MFC   - / -   MCL: Proximal  -    LFC   - / -    Distal   -          ROM: (* = pain)  PASSIVE   ACTIVE    Left :   0-130*    0-100*   Right :    5 / 0 / 145   5 / 0 / 145    PATELLOFEMORAL EXAMINATION:  See above noted areas of tenderness.   Patella position    Subluxation / dislocation: Centered           Sup. / Inf;   Normal   Crepitus (PF):    Present   Patellar Mobility:       Medial-lateral:   Normal    Superior-inferior:  Normal    Inferior tilt   Normal    Patellar tendon:  Normal   Lateral tilt:    Normal   J-sign:     None   Patellofemoral grind:   No pain       MENISCAL SIGNS:     Pain on terminal extension:  +  Pain on terminal flexion:  +  Lores maneuver:  + for pain  Squat     + posterior joint pain    LIGAMENT EXAMINATION:  ACL / Lachman:  normal (-1 to 2mm)    PCL-Post.  drawer: normal 0 to 2mm  MCL- Valgus:  normal 0 to 2mm  LCL- Varus:  normal 0 to 2mm  Pivot shift: normal (Equal)   Dial Test: difference c/w other side   At 30° flexion: normal (< 5°)    At 90° flexion: normal (< 5°)   Reverse Pivot Shift:   normal (Equal)     STRENGTH: (* = with pain) PAINFUL SIDE   Quadricep   5/5   Hamstrin/5    EXTREMITY NEURO-VASCULAR EXAMINATION:   Sensation:  Grossly intact to  light touch all dermatomal regions.   Motor Function:  Fully intact motor function at hip, knee, foot and ankle    DTRs;  quadriceps and  achilles 2+.  No clonus and downgoing Babinski.    Vascular status:  DP and PT pulses 2+, brisk capillary refill, symmetric.     Other Findings:       X-rays:  including standing, weight bearing AP and flexion bilateral knees, lateral and merchant views ordered and images reviewed by me show:  No fracture, dislocation.  Well maintained joint space and alignment     ASSESSMENT:    Left Knee:  Probable Meniscus tear  medial       PLAN:   MRI Left knee  Activity modification; may continue home exercise program as knee tolerates without pain  All questions were answered, pt will contact us for questions or concerns in the interim.

## 2019-09-16 NOTE — LETTER
September 16, 2019      Anjel Lancaster MD  [address]  [facility] [address]           [facility]  [address]  [place] [address]  Phone: [phone]          Patient: Shakira Pearl   MR Number: [id number]   YOB: 1945   Date of Visit: 9/16/2019       Dear Dr. Anjel Lancaster:    Thank you for referring Shakira Pearl to me for evaluation. Attached you will find relevant portions of my assessment and plan of care.    If you have questions, please do not hesitate to call me. I look forward to following Shakira Pearl along with you.    Sincerely,    Nixon Alvarez MA    Enclosure  CC:  No Recipients    If you would like to receive this communication electronically, please contact [email or web address] or [phone] to request more information on [employer] Link access.    For providers and/or their staff who would like to refer a patient to [facility], please contact us through our one-stop-shop provider referral line, [facility], at [phone].    If you feel you have received this communication in error or would no longer like to receive these types of communications, please e-mail [email or web address]

## 2019-09-17 ENCOUNTER — TELEPHONE (OUTPATIENT)
Dept: PAIN MEDICINE | Facility: CLINIC | Age: 74
End: 2019-09-17

## 2019-09-17 ENCOUNTER — TELEPHONE (OUTPATIENT)
Dept: PRIMARY CARE CLINIC | Facility: CLINIC | Age: 74
End: 2019-09-17

## 2019-09-17 DIAGNOSIS — M54.50 LOW BACK PAIN, NON-SPECIFIC: ICD-10-CM

## 2019-09-17 NOTE — TELEPHONE ENCOUNTER
----- Message from Jessica Hillman sent at 9/17/2019 10:41 AM CDT -----  Contact: [phone]  Caller is requesting a sooner appointment. Caller declined first available appointment listed below. Caller will not accept being placed on the wait list and is requesting a message be sent to the provider.    When is the next available appointment: January    Did you offer to schedule the next available appt and put the patient on the wait list?:yes      What visit type: epp  Symptoms:  Physical   Patient preference of timeframe to be scheduled:asap     What is the reason the patient is requesting a sooner appointment? (insurance terminating, changing jobs):    Would the patient rather a call back or a response via [facility]?:  Call back  Comments:  Please advise, thanks

## 2019-09-17 NOTE — TELEPHONE ENCOUNTER
----- Message from Brenda Rodriguez sent at 9/17/2019 10:57 AM CDT -----  Contact: CHELSY GONZALEZ   Name of Who is Calling: CHELSY GONZALEZ       What is the request in detail: Patient is requesting a call back concerning her mri and also her visit she had on yesterday with the doctor that  referred her to      Can the clinic reply by [facility]: no    What Number to Call Back if not in [facility]: [phone]

## 2019-09-17 NOTE — TELEPHONE ENCOUNTER
Spoke with pt and advised provider next available was in January. Pt declined the appointment and stated she will schedule with another provider.

## 2019-09-17 NOTE — TELEPHONE ENCOUNTER
Staff contacted and spoke with patient in regards to her message.    Patient stated she wanted to know if provider wanted her to still have an MRI of her back and if orders can be place to have done on the same day as her knee Mri 9/19/19

## 2019-09-19 ENCOUNTER — HOSPITAL ENCOUNTER (OUTPATIENT)
Dept: RADIOLOGY | Facility: HOSPITAL | Age: 74
Discharge: HOME | End: 2019-09-19
Attending: ORTHOPAEDIC SURGERY
Payer: MEDICARE

## 2019-09-19 ENCOUNTER — TELEPHONE (OUTPATIENT)
Dept: PAIN MEDICINE | Facility: CLINIC | Age: 74
End: 2019-09-19

## 2019-09-19 ENCOUNTER — TELEPHONE (OUTPATIENT)
Dept: SPORTS MEDICINE | Facility: CLINIC | Age: 74
End: 2019-09-19

## 2019-09-19 ENCOUNTER — HOSPITAL ENCOUNTER (OUTPATIENT)
Dept: RADIOLOGY | Facility: HOSPITAL | Age: 74
Discharge: HOME | End: 2019-09-19
Attending: ANESTHESIOLOGY
Payer: MEDICARE

## 2019-09-19 DIAGNOSIS — M25.562 ACUTE PAIN OF LEFT KNEE: ICD-10-CM

## 2019-09-19 DIAGNOSIS — M54.50 LOW BACK PAIN, NON-SPECIFIC: ICD-10-CM

## 2019-09-19 PROCEDURE — 73721 MRI KNEE WITHOUT CONTRAST LEFT: ICD-10-PCS | Mod: 26,HCNC,LT, | Performed by: RADIOLOGY

## 2019-09-19 PROCEDURE — 73721 MRI JNT OF LWR EXTRE W/O DYE: CPT | Mod: 26,HCNC,LT, | Performed by: RADIOLOGY

## 2019-09-19 PROCEDURE — 73721 MRI JNT OF LWR EXTRE W/O DYE: CPT | Mod: TC,HCNC,LT

## 2019-09-19 PROCEDURE — 72148 MRI LUMBAR SPINE WITHOUT CONTRAST: ICD-10-PCS | Mod: 26,HCNC,, | Performed by: RADIOLOGY

## 2019-09-19 PROCEDURE — 72148 MRI LUMBAR SPINE W/O DYE: CPT | Mod: TC,HCNC

## 2019-09-19 PROCEDURE — 72148 MRI LUMBAR SPINE W/O DYE: CPT | Mod: 26,HCNC,, | Performed by: RADIOLOGY

## 2019-09-19 NOTE — TELEPHONE ENCOUNTER
----- Message from Jaydon Patel, Patient Care Assistant sent at 9/19/2019 11:49 AM CDT -----  Contact: CHELSY GONZALEZ [[id number]]  Name of Who is Calling: CHELSY GONZALEZ [[id number]]    What is the request in detail: Patient requesting a call back in regards to knee surgery.  Please contact to further discuss and advise      Can the clinic reply by [facility]: no    [facility]:   [phone]

## 2019-09-19 NOTE — TELEPHONE ENCOUNTER
----- Message from Kaylen Patiño MD sent at 9/19/2019  9:54 AM CDT -----  Please review and call patient

## 2019-09-19 NOTE — TELEPHONE ENCOUNTER
MRI reviewed personally by me:  Shows Left knee medial meniscal tear, chondromalacia.     ASSESSMENT:    Left Knee Meniscus tear.      she would benefit from knee arthroscopy, possible plica excision, possible chondroplasty with possible meniscectomy given the above.     PLAN: We have discussed the surgery and recovery of arthroscopic knee surgery. she understands that there may be limited weightbearing up to several weeks after surgery depending on procedures that are performed at the time of surgery.    The spectrum of treatment options were discussed with the patient, including nonoperative and operative options.  After thorough discussion, the patient has elected to undergo surgical treatment to include:  left   a. Knee arthroscopic medial meniscectomy      b. Knee arthroscopic possible plica excision   c. Knee arthroscopic possible chondroplasty    The details of the surgical procedure were explained, including the location of probable incisions and a description of likely hardware and/or grafts to be used.  The patient understands the likely convalescence after surgery.  Also, we have thoroughly discussed the risks, benefits and alternatives to surgery, including, but not limited to, the risk of infection, joint stiffness, blood clot (including DVT and/or pulmonary embolus), neurologic and vascular injury.  It was explained that, if tissue has been repaired or reconstructed, there is a chance of failure, which may require further management.        Patient will require PCP clearance

## 2019-09-19 NOTE — TELEPHONE ENCOUNTER
----- Message from Katja Rolle sent at 9/19/2019 11:34 AM CDT -----  Contact: Self   Pt is calling to set up a surgery date.     [phone]

## 2019-09-19 NOTE — TELEPHONE ENCOUNTER
----- Message from Sarika Mcneal sent at 9/19/2019  1:59 PM CDT -----  Contact: CHELSY GONZALEZ [[id number]]  Name of Who is Calling: CHELSY GONZALEZ [[id number]]     What is the request in detail: Patient requesting a call back in regards to knee surgery.  Please contact to further discuss and advise       Can the clinic reply by [facility]: no     [facility]:   [phone]

## 2019-09-20 ENCOUNTER — TELEPHONE (OUTPATIENT)
Dept: PAIN MEDICINE | Facility: CLINIC | Age: 74
End: 2019-09-20

## 2019-09-20 NOTE — TELEPHONE ENCOUNTER
----- Message from Ivone Alexander sent at 9/20/2019  9:03 AM CDT -----  Contact: Pt [phone]  Pt called inquiring the results of MRI of the knee being posted in patient portal. Please call [phone]

## 2019-09-20 NOTE — TELEPHONE ENCOUNTER
Staff contacted and spoke with patient in regards to her message.    Staff informed patient that provider his not in clinic on Friday's and haven't had the chance to review her results from her MRI. Staff also informed patient that the schedulers was presented a message on yesterday to cancel her procedure with provider.    Pt verbalized understanding

## 2019-09-23 ENCOUNTER — OFFICE VISIT (OUTPATIENT)
Dept: INTERNAL MEDICINE | Facility: CLINIC | Age: 74
End: 2019-09-23
Payer: MEDICARE

## 2019-09-23 ENCOUNTER — HOSPITAL ENCOUNTER (OUTPATIENT)
Dept: RADIOLOGY | Facility: HOSPITAL | Age: 74
Discharge: HOME | End: 2019-09-23
Attending: INTERNAL MEDICINE
Payer: MEDICARE

## 2019-09-23 VITALS
HEIGHT: 63 IN | DIASTOLIC BLOOD PRESSURE: 64 MMHG | BODY MASS INDEX: 26.72 KG/M2 | TEMPERATURE: 99 F | WEIGHT: 150.81 LBS | HEART RATE: 76 BPM | RESPIRATION RATE: 18 BRPM | SYSTOLIC BLOOD PRESSURE: 124 MMHG

## 2019-09-23 DIAGNOSIS — S83.242A ACUTE MEDIAL MENISCUS TEAR OF LEFT KNEE, INITIAL ENCOUNTER: ICD-10-CM

## 2019-09-23 DIAGNOSIS — Z23 NEED FOR TETANUS BOOSTER: ICD-10-CM

## 2019-09-23 DIAGNOSIS — Z01.818 PRE-OP EXAM: ICD-10-CM

## 2019-09-23 DIAGNOSIS — Z01.818 PRE-OP EXAM: Primary | ICD-10-CM

## 2019-09-23 PROCEDURE — 93010 ELECTROCARDIOGRAM REPORT: CPT | Mod: HCNC,S$GLB,, | Performed by: INTERNAL MEDICINE

## 2019-09-23 PROCEDURE — 93005 EKG 12-LEAD: ICD-10-PCS | Mod: HCNC,S$GLB,, | Performed by: INTERNAL MEDICINE

## 2019-09-23 PROCEDURE — 3078F PR MOST RECENT DIASTOLIC BLOOD PRESSURE < 80 MM HG: ICD-10-PCS | Mod: HCNC,CPTII,S$GLB, | Performed by: INTERNAL MEDICINE

## 2019-09-23 PROCEDURE — 90471 IMMUNIZATION ADMIN: CPT | Mod: HCNC,S$GLB,, | Performed by: INTERNAL MEDICINE

## 2019-09-23 PROCEDURE — 99214 OFFICE O/P EST MOD 30 MIN: CPT | Mod: 25,HCNC,S$GLB, | Performed by: INTERNAL MEDICINE

## 2019-09-23 PROCEDURE — 99999 PR PBB SHADOW E&M-EST. PATIENT-LVL III: CPT | Mod: PBBFAC,HCNC,, | Performed by: INTERNAL MEDICINE

## 2019-09-23 PROCEDURE — 93005 ELECTROCARDIOGRAM TRACING: CPT | Mod: HCNC,S$GLB,, | Performed by: INTERNAL MEDICINE

## 2019-09-23 PROCEDURE — 71046 X-RAY EXAM CHEST 2 VIEWS: CPT | Mod: 26,HCNC,, | Performed by: RADIOLOGY

## 2019-09-23 PROCEDURE — 3074F PR MOST RECENT SYSTOLIC BLOOD PRESSURE < 130 MM HG: ICD-10-PCS | Mod: HCNC,CPTII,S$GLB, | Performed by: INTERNAL MEDICINE

## 2019-09-23 PROCEDURE — 3074F SYST BP LT 130 MM HG: CPT | Mod: HCNC,CPTII,S$GLB, | Performed by: INTERNAL MEDICINE

## 2019-09-23 PROCEDURE — 90714 TD VACCINE GREATER THAN OR EQUAL TO 7YO PRESERVATIVE FREE IM: ICD-10-PCS | Mod: HCNC,S$GLB,, | Performed by: INTERNAL MEDICINE

## 2019-09-23 PROCEDURE — G0008 FLU VACCINE - HIGH DOSE (65+) PRESERVATIVE FREE IM: ICD-10-PCS | Mod: 59,HCNC,S$GLB, | Performed by: INTERNAL MEDICINE

## 2019-09-23 PROCEDURE — 3078F DIAST BP <80 MM HG: CPT | Mod: HCNC,CPTII,S$GLB, | Performed by: INTERNAL MEDICINE

## 2019-09-23 PROCEDURE — 90662 IIV NO PRSV INCREASED AG IM: CPT | Mod: HCNC,S$GLB,, | Performed by: INTERNAL MEDICINE

## 2019-09-23 PROCEDURE — G0008 ADMIN INFLUENZA VIRUS VAC: HCPCS | Mod: 59,HCNC,S$GLB, | Performed by: INTERNAL MEDICINE

## 2019-09-23 PROCEDURE — 1101F PR PT FALLS ASSESS DOC 0-1 FALLS W/OUT INJ PAST YR: ICD-10-PCS | Mod: HCNC,CPTII,S$GLB, | Performed by: INTERNAL MEDICINE

## 2019-09-23 PROCEDURE — 90662 FLU VACCINE - HIGH DOSE (65+) PRESERVATIVE FREE IM: ICD-10-PCS | Mod: HCNC,S$GLB,, | Performed by: INTERNAL MEDICINE

## 2019-09-23 PROCEDURE — 90714 TD VACC NO PRESV 7 YRS+ IM: CPT | Mod: HCNC,S$GLB,, | Performed by: INTERNAL MEDICINE

## 2019-09-23 PROCEDURE — 90471 TD VACCINE GREATER THAN OR EQUAL TO 7YO PRESERVATIVE FREE IM: ICD-10-PCS | Mod: HCNC,S$GLB,, | Performed by: INTERNAL MEDICINE

## 2019-09-23 PROCEDURE — 71046 X-RAY EXAM CHEST 2 VIEWS: CPT | Mod: TC,HCNC,PO

## 2019-09-23 PROCEDURE — 71046 XR CHEST PA AND LATERAL: ICD-10-PCS | Mod: 26,HCNC,, | Performed by: RADIOLOGY

## 2019-09-23 PROCEDURE — 99214 PR OFFICE/OUTPT VISIT, EST, LEVL IV, 30-39 MIN: ICD-10-PCS | Mod: 25,HCNC,S$GLB, | Performed by: INTERNAL MEDICINE

## 2019-09-23 PROCEDURE — 99999 PR PBB SHADOW E&M-EST. PATIENT-LVL III: ICD-10-PCS | Mod: PBBFAC,HCNC,, | Performed by: INTERNAL MEDICINE

## 2019-09-23 PROCEDURE — 93010 EKG 12-LEAD: ICD-10-PCS | Mod: HCNC,S$GLB,, | Performed by: INTERNAL MEDICINE

## 2019-09-23 PROCEDURE — 1101F PT FALLS ASSESS-DOCD LE1/YR: CPT | Mod: HCNC,CPTII,S$GLB, | Performed by: INTERNAL MEDICINE

## 2019-09-23 NOTE — PROGRESS NOTES
Subjective:       Patient ID: Shakira Pearl is a 74 y.o. female.    Chief Complaint: Knee Pain (left)    HPI   Pt with left medial mensicus tear is here for pre-op evaluation for repair scheduled for 10/3/19 by Dr. Patiño. She denies any hx of CAD/MI/CVA, reactions to general anesthesia, difficulty with intubation or any acute cardiopulmonary complaints.   Review of Systems   Constitutional: Negative for activity change, appetite change, chills, diaphoresis, fatigue, fever and unexpected weight change.   HENT: Negative for congestion, mouth sores, postnasal drip, rhinorrhea, sinus pressure, sneezing, sore throat, trouble swallowing and voice change.    Eyes: Negative for pain, discharge and visual disturbance.   Respiratory: Negative for cough, shortness of breath and wheezing.    Cardiovascular: Negative for chest pain, palpitations and leg swelling.   Gastrointestinal: Negative for abdominal pain, blood in stool, constipation, diarrhea, nausea and vomiting.   Endocrine: Negative for cold intolerance and heat intolerance.   Genitourinary: Negative for difficulty urinating, dysuria, frequency, hematuria and urgency.   Musculoskeletal: Negative for arthralgias and myalgias.   Skin: Negative for rash and wound.   Allergic/Immunologic: Negative for environmental allergies and food allergies.   Neurological: Negative for dizziness, tremors, seizures, syncope, weakness, light-headedness and headaches.   Hematological: Negative for adenopathy. Does not bruise/bleed easily.   Psychiatric/Behavioral: Negative for confusion and sleep disturbance. The patient is not nervous/anxious.        Objective:      Physical Exam   Constitutional: She is oriented to person, place, and time. She appears well-developed and well-nourished. No distress.   HENT:   Head: Normocephalic and atraumatic.   Right Ear: External ear normal.   Left Ear: External ear normal.   Nose: Nose normal.   Mouth/Throat: Oropharynx is clear and moist. No  oropharyngeal exudate.   Eyes: Pupils are equal, round, and reactive to light. Conjunctivae and EOM are normal. Right eye exhibits no discharge. Left eye exhibits no discharge. No scleral icterus.   Neck: Neck supple. No JVD present. No thyromegaly present.   Cardiovascular: Normal rate, regular rhythm, normal heart sounds and intact distal pulses.   No murmur heard.  Pulmonary/Chest: Effort normal and breath sounds normal. No respiratory distress. She has no wheezes. She has no rales. She exhibits no tenderness.   Abdominal: Soft. Bowel sounds are normal. She exhibits no distension. There is no tenderness. There is no guarding.   Musculoskeletal: She exhibits no edema.        Left knee: Tenderness found.   Lymphadenopathy:     She has no cervical adenopathy.   Neurological: She is alert and oriented to person, place, and time. No cranial nerve deficit. Coordination normal.   Skin: Skin is warm and dry. No rash noted. She is not diaphoretic. No pallor.   Psychiatric: She has a normal mood and affect. Judgment normal.   Nursing note and vitals reviewed.      Assessment:       1. Pre-op exam    2. Acute medial meniscus tear of left knee, initial encounter    3. Need for tetanus booster        Plan:    1. CBC/BMP, EKG/CXR   2. Surgery scheduled for 10/3/19 by Dr. Patiño   3. Td given     Pt has no clinical predictors. She is going for a low risk procedure. Has good   functional Capacity at 4 mets. At this time there are no contraindications to knee surgery.

## 2019-09-24 DIAGNOSIS — M94.262 CHONDROMALACIA OF LEFT KNEE: ICD-10-CM

## 2019-09-24 DIAGNOSIS — S83.242A ACUTE MEDIAL MENISCUS TEAR OF LEFT KNEE, INITIAL ENCOUNTER: Primary | ICD-10-CM

## 2019-09-24 DIAGNOSIS — M67.50 PLICA SYNDROME: ICD-10-CM

## 2019-09-27 ENCOUNTER — OFFICE VISIT (OUTPATIENT)
Dept: SPORTS MEDICINE | Facility: CLINIC | Age: 74
End: 2019-09-27
Payer: MEDICARE

## 2019-09-27 VITALS
BODY MASS INDEX: 26.05 KG/M2 | HEIGHT: 63 IN | WEIGHT: 147 LBS | HEART RATE: 81 BPM | DIASTOLIC BLOOD PRESSURE: 86 MMHG | SYSTOLIC BLOOD PRESSURE: 135 MMHG

## 2019-09-27 DIAGNOSIS — S83.242A ACUTE MEDIAL MENISCUS TEAR OF LEFT KNEE, INITIAL ENCOUNTER: Primary | ICD-10-CM

## 2019-09-27 DIAGNOSIS — M67.50 PLICA SYNDROME: ICD-10-CM

## 2019-09-27 DIAGNOSIS — M94.262 CHONDROMALACIA OF LEFT KNEE: ICD-10-CM

## 2019-09-27 PROCEDURE — 99499 UNLISTED E&M SERVICE: CPT | Mod: HCNC,S$GLB,, | Performed by: PHYSICIAN ASSISTANT

## 2019-09-27 PROCEDURE — 99499 NO LOS: ICD-10-PCS | Mod: HCNC,S$GLB,, | Performed by: PHYSICIAN ASSISTANT

## 2019-09-27 PROCEDURE — 99999 PR PBB SHADOW E&M-EST. PATIENT-LVL IV: ICD-10-PCS | Mod: PBBFAC,HCNC,, | Performed by: PHYSICIAN ASSISTANT

## 2019-09-27 PROCEDURE — 99999 PR PBB SHADOW E&M-EST. PATIENT-LVL IV: CPT | Mod: PBBFAC,HCNC,, | Performed by: PHYSICIAN ASSISTANT

## 2019-09-27 RX ORDER — TRAMADOL HYDROCHLORIDE 50 MG/1
50-100 TABLET ORAL EVERY 6 HOURS PRN
Qty: 15 TABLET | Refills: 0 | Status: SHIPPED | OUTPATIENT
Start: 2019-09-27 | End: 2019-11-13

## 2019-09-27 RX ORDER — ONDANSETRON 4 MG/1
4 TABLET, FILM COATED ORAL EVERY 8 HOURS PRN
Qty: 12 TABLET | Refills: 0 | Status: SHIPPED | OUTPATIENT
Start: 2019-09-27 | End: 2019-11-13

## 2019-09-27 RX ORDER — ASPIRIN 81 MG/1
81 TABLET ORAL DAILY
Qty: 28 TABLET | Refills: 0 | COMMUNITY
Start: 2019-09-27 | End: 2019-11-13

## 2019-09-29 RX ORDER — SODIUM CHLORIDE 9 MG/ML
INJECTION, SOLUTION INTRAVENOUS CONTINUOUS
Status: CANCELLED | OUTPATIENT
Start: 2019-09-29

## 2019-09-29 NOTE — H&P
Shakira Pearl  is here for a completion of her perioperative paperwork. she  Is scheduled to undergo     left              a. Knee arthroscopic medial meniscectomy                 b. Knee arthroscopic possible plica excision              c. Knee arthroscopic possible chondroplasty on 10/3/19.      She is a healthy individual but does need clearance for this procedure which she has received from Dr. Bello.     PAST MEDICAL HISTORY:   Past Medical History:   Diagnosis Date    Allergy sinus    Arthritis back    Degenerative disc disease back    Neuromuscular disorder     Vitamin D deficiency      PAST SURGICAL HISTORY:   Past Surgical History:   Procedure Laterality Date    APPENDECTOMY  age 19    CHOLECYSTECTOMY      age of 25    HYSTERECTOMY  38    INJECTION OF JOINT Bilateral 2/18/2019    Procedure: INJECTION, JOINT BILATERAL SI;  Surgeon: Mert Coates MD;  [facility]: [facility];  Service: Pain Management;  Laterality: Bilateral;  BILATERAL SI JOINT INJECTION     FAMILY HISTORY:   Family History   Problem Relation Age of Onset    Heart disease Mother 67        heart attack    Cancer Father 65        abdominal    Stomach cancer Father     No Known Problems Sister     No Known Problems Brother     No Known Problems Son     No Known Problems Maternal Grandmother     No Known Problems Maternal Grandfather     No Known Problems Paternal Grandmother     No Known Problems Paternal Grandfather     No Known Problems Maternal Aunt     No Known Problems Maternal Uncle     No Known Problems Paternal Aunt     No Known Problems Paternal Uncle     Celiac disease Neg Hx     Colon cancer Neg Hx     Crohn's disease Neg Hx     Esophageal cancer Neg Hx     Inflammatory bowel disease Neg Hx     Liver cancer Neg Hx     Rectal cancer Neg Hx     Ulcerative colitis Neg Hx     Amblyopia Neg Hx     Blindness Neg Hx     Cataracts Neg Hx     Diabetes Neg Hx     Glaucoma Neg Hx     Hypertension Neg Hx      Macular degeneration Neg Hx     Retinal detachment Neg Hx     Strabismus Neg Hx     Stroke Neg Hx     Thyroid disease Neg Hx      SOCIAL HISTORY:   Social History     Socioeconomic History    Marital status:      Spouse name: Not on file    Number of children: Not on file    Years of education: Not on file    Highest education level: Not on file   Occupational History    Not on file   Social Needs    Financial resource strain: Not on file    Food insecurity:     Worry: Not on file     Inability: Not on file    Transportation needs:     Medical: Not on file     Non-medical: Not on file   Tobacco Use    Smoking status: Never Smoker    Smokeless tobacco: Never Used   Substance and Sexual Activity    Alcohol use: No    Drug use: No    Sexual activity: Not Currently   Lifestyle    Physical activity:     Days per week: Not on file     Minutes per session: Not on file    Stress: Not on file   Relationships    Social connections:     Talks on phone: Not on file     Gets together: Not on file     Attends Druze service: Not on file     Active member of club or organization: Not on file     Attends meetings of clubs or organizations: Not on file     Relationship status: Not on file   Other Topics Concern    Not on file   Social History Narrative    Not on file       MEDICATIONS:   Current Outpatient Medications:     cholecalciferol, vitamin D3, (VITAMIN D3) 2,000 unit Cap, Take 1 capsule (2,000 Units total) by mouth once daily., Disp: 90 capsule, Rfl: 3    estradiol valerate (DELESTROGEN) 40 mg/mL injection, Inject 0.5 mLs (20 mg total) into the muscle every 28 days. Inject into the muscle., Disp: 5 mL, Rfl: 12    imipramine (TOFRANIL) 25 MG tablet, Take 1 tablet (25 mg total) by mouth every evening., Disp: 90 tablet, Rfl: 3    losartan (COZAAR) 50 MG tablet, TAKE ONE DAILY, Disp: 90 tablet, Rfl: 2    pantoprazole (PROTONIX) 40 MG tablet, Take 1 tablet (40 mg total) by mouth before  "breakfast. Take one pill every morning 45 minutes before breakfast in the morning., Disp: 90 tablet, Rfl: 3    varicella-zoster gE-AS01B, PF, (SHINGRIX, PF,) 50 mcg/0.5 mL injection, Inject into the muscle., Disp: 0.5 mL, Rfl: 1    aspirin (ECOTRIN) 81 MG EC tablet, Take 1 tablet (81 mg total) by mouth once daily. For 4 weeks starting the day after surgery., Disp: 28 tablet, Rfl: 0    ondansetron (ZOFRAN) 4 MG tablet, Take 1 tablet (4 mg total) by mouth every 8 (eight) hours as needed for Nausea., Disp: 12 tablet, Rfl: 0    traMADol (ULTRAM) 50 mg tablet, Take 1-2 tablets ( mg total) by mouth every 6 (six) hours as needed., Disp: 15 tablet, Rfl: 0  ALLERGIES:   Review of patient's allergies indicates:   Allergen Reactions    Demerol [meperidine] Nausea And Vomiting     Other reaction(s): Nausea    Papaya      Illness vomiting    Sulfa (sulfonamide antibiotics) Rash    Sulfur Rash       VITAL SIGNS: /86   Pulse 81   Ht 5' 3" (1.6 m)   Wt 66.7 kg (147 lb)   BMI 26.04 kg/m²      Risks, indications and benefits of the surgical procedure were discussed with the patient. All questions with regard to surgery, rehab, expected return to functional activities, activities of daily living and recreational endeavors were answered to her satisfaction.    It was explained to the patient that there may be an increase in surgical risks if the patient has certain co-morbidities such as but not limited to: Obesity, Cardiovascular issues (CHF, CAD, Arrhythmias), chronic pulmonary issues, previous or current neurovascular/neurological issues, previous strokes, diabetes mellitus, previous wound healing issues, previous wound or skin infections, PVD, clotting disorders, if the patient uses chronic steroids, if the patient takes or has immune compromising medications or diseases, or has previously or currently used tobacco products.     The patient verbalized that he/she does not have any additional clotting, " bleeding, or blood disorders, other than what is list in her chart on today's review.     Then a brief history and physical exam were performed.    Review of Systems   Constitution: Negative. Negative for chills, fever and night sweats.   HENT: Negative for congestion and headaches.    Eyes: Negative for blurred vision, left vision loss and right vision loss.   Cardiovascular: Negative for chest pain and syncope.   Respiratory: Negative for cough and shortness of breath.    Endocrine: Negative for polydipsia, polyphagia and polyuria.   Hematologic/Lymphatic: Negative for bleeding problem. Does not bruise/bleed easily.   Skin: Negative for dry skin, itching and rash.   Musculoskeletal: Negative for falls and muscle weakness.   Gastrointestinal: Negative for abdominal pain and bowel incontinence.   Genitourinary: Negative for bladder incontinence and nocturia.   Neurological: Negative for disturbances in coordination, loss of balance and seizures.   Psychiatric/Behavioral: Negative for depression. The patient does not have insomnia.    Allergic/Immunologic: Negative for hives and persistent infections.     PHYSICAL EXAM:  GEN: A&Ox3, WD WN NAD  HEENT: WNL  CHEST: CTAB, no W/R/R  HEART: RRR, no M/R/G  ABD: Soft, NT ND, BS x4 QUADS  MS; See Epic  NEURO: CN II-XII intact       The surgical consent was then reviewed with the patient, who agreed with all the contents of the consent form and it was signed. she was then given the [facility] surgery packet to bring with her to [facility] for the anesthesia portion of her perioperative paperwork.   For all physicians except for Dr. Cortes, we will email and possibly fax the consent forms and booking sheets to [facility] pre-[facility]mit.    The patient was given the opportunity to ask questions about the surgical plan and consent form, and once no other questions were asked, I proceeded with the pre-op appointment.    PHYSICAL THERAPY:  She was also instructed regarding physical  therapy and will begin on  POD1. She was given a copy of the original prescription to schedule. Another copy of this prescription was also faxed to [facility].    POST OP CARE:instructions were reviewed including care of the wound and dressing after surgery and when she can shower.     PAIN MANAGEMENT: Shakira Pearl was also given her pain management regime, which includes the TENS unit given to her by alisson along with the education required for its use. She was also instructed regarding the Polar ice unit that will be in place after surgery and her postoperative pain medications.     PAIN MEDICATION:  Norco 10/325mg 1 po q 4-6 hours prn pain  Ultram 50 mg Take 1-2 p.o. q.6 hours p.r.n. breakthrough pain,   Zofran 4mg. 1 tablet po q8h prn nausea    DVT prophylaxis was discussed with the patient today including risk factors for developing DVTs and history of DVTs. The patient was asked if any specific recommendations were given from the doctor/s that did pre-operative surgical clearance. The patient was then given an education sheet about DVTs and PE with warning signs and symptoms of both and steps to take if they suspect either of these.    This along with the Modified Caprini risk assessment model for VTE in general surgical patients was used to determine the patient's DVT risk.     From: Roland MK, Constantin DA, Marimar SM, et al. Prevention of VTE in nonorthopedic surgical patients: antithrombotic therapy and prevention of thrombosis, 9th ed: American College of Chest [employer]icians evidence-based clinical practical guidelines. Chest 2012; 141:e227S. Copyright © 2012. Reproduced with permission from the American College of Chest Physicians.    The below listed DVT prophylaxis regimen along with bilateral MAITE compression stockings will be used post-op. Length of treatment has been determined to be 10-42 days post-op by the above noted Caprini assessment model.     The patient was instructed to buy and take:  Aspirin  81mg QD x 4 weeks for DVT prophylaxis starting on the morning after surgery.  Patient will also use bilateral TEDs on lower extremities, SCDs during surgery, and early ambulation post-op. If the patient was previously taking 81mg baby aspirin, they were told to not take it starting 5 days prior to surgery and to restart the 81mg aspirin after surgery.       Patient was also told to buy over the counter Prilosec medication if needed and take it once daily for GI protection as long as they are taking NSAIDs or Aspirin.    Patient denies history of seizures.     The patient was told that narcotic pain medications may make them drowsy and instructions were given to not sign legal documents, drive or operate heavy machinery, cars, or equipment while under the influence of narcotic medications. The patient was told and understands that narcotic pain medications should only be used as needed to control pain and that other options of pain control include TENs unit and ice packs/unit.     As there were no other questions to be asked, she was given my business card along with Kaylen Patiño MD business card if she has any questions or concerns prior to surgery or in the postop period.

## 2019-09-29 NOTE — H&P (VIEW-ONLY)
Shakira Pearl  is here for a completion of her perioperative paperwork. she  Is scheduled to undergo     left              a. Knee arthroscopic medial meniscectomy                 b. Knee arthroscopic possible plica excision              c. Knee arthroscopic possible chondroplasty on 10/3/19.      She is a healthy individual but does need clearance for this procedure which she has received from Dr. Bello.     PAST MEDICAL HISTORY:   Past Medical History:   Diagnosis Date    Allergy sinus    Arthritis back    Degenerative disc disease back    Neuromuscular disorder     Vitamin D deficiency      PAST SURGICAL HISTORY:   Past Surgical History:   Procedure Laterality Date    APPENDECTOMY  age 19    CHOLECYSTECTOMY      age of 25    HYSTERECTOMY  38    INJECTION OF JOINT Bilateral 2/18/2019    Procedure: INJECTION, JOINT BILATERAL SI;  Surgeon: Mert Coates MD;  Location: [facility];  Service: Pain Management;  Laterality: Bilateral;  BILATERAL SI JOINT INJECTION     FAMILY HISTORY:   Family History   Problem Relation Age of Onset    Heart disease Mother 67        heart attack    Cancer Father 65        abdominal    Stomach cancer Father     No Known Problems Sister     No Known Problems Brother     No Known Problems Son     No Known Problems Maternal Grandmother     No Known Problems Maternal Grandfather     No Known Problems Paternal Grandmother     No Known Problems Paternal Grandfather     No Known Problems Maternal Aunt     No Known Problems Maternal Uncle     No Known Problems Paternal Aunt     No Known Problems Paternal Uncle     Celiac disease Neg Hx     Colon cancer Neg Hx     Crohn's disease Neg Hx     Esophageal cancer Neg Hx     Inflammatory bowel disease Neg Hx     Liver cancer Neg Hx     Rectal cancer Neg Hx     Ulcerative colitis Neg Hx     Amblyopia Neg Hx     Blindness Neg Hx     Cataracts Neg Hx     Diabetes Neg Hx     Glaucoma Neg Hx     Hypertension Neg Hx      Macular degeneration Neg Hx     Retinal detachment Neg Hx     Strabismus Neg Hx     Stroke Neg Hx     Thyroid disease Neg Hx      SOCIAL HISTORY:   Social History     Socioeconomic History    Marital status:      Spouse name: Not on file    Number of children: Not on file    Years of education: Not on file    Highest education level: Not on file   Occupational History    Not on file   Social Needs    Financial resource strain: Not on file    Food insecurity:     Worry: Not on file     Inability: Not on file    Transportation needs:     Medical: Not on file     Non-medical: Not on file   Tobacco Use    Smoking status: Never Smoker    Smokeless tobacco: Never Used   Substance and Sexual Activity    Alcohol use: No    Drug use: No    Sexual activity: Not Currently   Lifestyle    Physical activity:     Days per week: Not on file     Minutes per session: Not on file    Stress: Not on file   Relationships    Social connections:     Talks on phone: Not on file     Gets together: Not on file     Attends [employer] service: Not on file     Active member of club or organization: Not on file     Attends meetings of clubs or organizations: Not on file     Relationship status: Not on file   Other Topics Concern    Not on file   Social History Narrative    Not on file       MEDICATIONS:   Current Outpatient Medications:     cholecalciferol, vitamin D3, (VITAMIN D3) 2,000 unit Cap, Take 1 capsule (2,000 Units total) by mouth once daily., Disp: 90 capsule, Rfl: 3    estradiol valerate (DELESTROGEN) 40 mg/mL injection, Inject 0.5 mLs (20 mg total) into the muscle every 28 days. Inject into the muscle., Disp: 5 mL, Rfl: 12    imipramine (TOFRANIL) 25 MG tablet, Take 1 tablet (25 mg total) by mouth every evening., Disp: 90 tablet, Rfl: 3    losartan (COZAAR) 50 MG tablet, TAKE ONE DAILY, Disp: 90 tablet, Rfl: 2    pantoprazole (PROTONIX) 40 MG tablet, Take 1 tablet (40 mg total) by mouth before  "breakfast. Take one pill every morning 45 minutes before breakfast in the morning., Disp: 90 tablet, Rfl: 3    varicella-zoster gE-AS01B, PF, (SHINGRIX, PF,) 50 mcg/0.5 mL injection, Inject into the muscle., Disp: 0.5 mL, Rfl: 1    aspirin (ECOTRIN) 81 MG EC tablet, Take 1 tablet (81 mg total) by mouth once daily. For 4 weeks starting the day after surgery., Disp: 28 tablet, Rfl: 0    ondansetron (ZOFRAN) 4 MG tablet, Take 1 tablet (4 mg total) by mouth every 8 (eight) hours as needed for Nausea., Disp: 12 tablet, Rfl: 0    traMADol (ULTRAM) 50 mg tablet, Take 1-2 tablets ( mg total) by mouth every 6 (six) hours as needed., Disp: 15 tablet, Rfl: 0  ALLERGIES:   Review of patient's allergies indicates:   Allergen Reactions    Demerol [meperidine] Nausea And Vomiting     Other reaction(s): Nausea    Papaya      Illness vomiting    Sulfa (sulfonamide antibiotics) Rash    Sulfur Rash       VITAL SIGNS: /86   Pulse 81   Ht 5' 3" (1.6 m)   Wt 66.7 kg (147 lb)   BMI 26.04 kg/m²      Risks, indications and benefits of the surgical procedure were discussed with the patient. All questions with regard to surgery, rehab, expected return to functional activities, activities of daily living and recreational endeavors were answered to her satisfaction.    It was explained to the patient that there may be an increase in surgical risks if the patient has certain co-morbidities such as but not limited to: Obesity, Cardiovascular issues (CHF, CAD, Arrhythmias), chronic pulmonary issues, previous or current neurovascular/neurological issues, previous strokes, diabetes mellitus, previous wound healing issues, previous wound or skin infections, PVD, clotting disorders, if the patient uses chronic steroids, if the patient takes or has immune compromising medications or diseases, or has previously or currently used tobacco products.     The patient verbalized that he/she does not have any additional clotting, " bleeding, or blood disorders, other than what is list in her chart on today's review.     Then a brief history and physical exam were performed.    Review of Systems   Constitution: Negative. Negative for chills, fever and night sweats.   HENT: Negative for congestion and headaches.    Eyes: Negative for blurred vision, left vision loss and right vision loss.   Cardiovascular: Negative for chest pain and syncope.   Respiratory: Negative for cough and shortness of breath.    Endocrine: Negative for polydipsia, polyphagia and polyuria.   Hematologic/Lymphatic: Negative for bleeding problem. Does not bruise/bleed easily.   Skin: Negative for dry skin, itching and rash.   Musculoskeletal: Negative for falls and muscle weakness.   Gastrointestinal: Negative for abdominal pain and bowel incontinence.   Genitourinary: Negative for bladder incontinence and nocturia.   Neurological: Negative for disturbances in coordination, loss of balance and seizures.   Psychiatric/Behavioral: Negative for depression. The patient does not have insomnia.    Allergic/Immunologic: Negative for hives and persistent infections.     PHYSICAL EXAM:  GEN: A&Ox3, WD WN NAD  HEENT: WNL  CHEST: CTAB, no W/R/R  HEART: RRR, no M/R/G  ABD: Soft, NT ND, BS x4 QUADS  MS; See Epic  NEURO: CN II-XII intact       The surgical consent was then reviewed with the patient, who agreed with all the contents of the consent form and it was signed. she was then given the [facility] surgery packet to bring with her to [facility] for the anesthesia portion of her perioperative paperwork.   For all physicians except for Dr. Cortes, we will email and possibly fax the consent forms and booking sheets to [facility] pre-[facility]mit.    The patient was given the opportunity to ask questions about the surgical plan and consent form, and once no other questions were asked, I proceeded with the pre-op appointment.    PHYSICAL THERAPY:  She was also instructed regarding physical  therapy and will begin on  POD1. She was given a copy of the original prescription to schedule. Another copy of this prescription was also faxed to [facility].    POST OP CARE:instructions were reviewed including care of the wound and dressing after surgery and when she can shower.     PAIN MANAGEMENT: Shakira Pearl was also given her pain management regime, which includes the TENS unit given to her by alisson along with the education required for its use. She was also instructed regarding the Polar ice unit that will be in place after surgery and her postoperative pain medications.     PAIN MEDICATION:  Norco 10/325mg 1 po q 4-6 hours prn pain  Ultram 50 mg Take 1-2 p.o. q.6 hours p.r.n. breakthrough pain,   Zofran 4mg. 1 tablet po q8h prn nausea    DVT prophylaxis was discussed with the patient today including risk factors for developing DVTs and history of DVTs. The patient was asked if any specific recommendations were given from the doctor/s that did pre-operative surgical clearance. The patient was then given an education sheet about DVTs and PE with warning signs and symptoms of both and steps to take if they suspect either of these.    This along with the Modified Caprini risk assessment model for VTE in general surgical patients was used to determine the patient's DVT risk.     From: Roland MK, Constantin DA, Marimar SM, et al. Prevention of VTE in nonorthopedic surgical patients: antithrombotic therapy and prevention of thrombosis, 9th ed: American College of Chest Physicians evidence-based clinical practical guidelines. Chest 2012; 141:e227S. Copyright © 2012. Reproduced with permission from the American College of Chest Physicians.    The below listed DVT prophylaxis regimen along with bilateral MAITE compression stockings will be used post-op. Length of treatment has been determined to be 10-42 days post-op by the above noted Caprini assessment model.     The patient was instructed to buy and take:  Aspirin  81mg QD x 4 weeks for DVT prophylaxis starting on the morning after surgery.  Patient will also use bilateral TEDs on lower extremities, SCDs during surgery, and early ambulation post-op. If the patient was previously taking 81mg baby aspirin, they were told to not take it starting 5 days prior to surgery and to restart the 81mg aspirin after surgery.       Patient was also told to buy over the counter Prilosec medication if needed and take it once daily for GI protection as long as they are taking NSAIDs or Aspirin.    Patient denies history of seizures.     The patient was told that narcotic pain medications may make them drowsy and instructions were given to not sign legal documents, drive or operate heavy machinery, cars, or equipment while under the influence of narcotic medications. The patient was told and understands that narcotic pain medications should only be used as needed to control pain and that other options of pain control include TENs unit and ice packs/unit.     As there were no other questions to be asked, she was given my business card along with Kaylen Patiño MD business card if she has any questions or concerns prior to surgery or in the postop period.

## 2019-10-01 ENCOUNTER — ANESTHESIA EVENT (OUTPATIENT)
Dept: SURGERY | Facility: HOSPITAL | Age: 74
End: 2019-10-01
Payer: MEDICARE

## 2019-10-01 NOTE — PRE ADMISSION SCREENING
Anesthesia Assessment: Preoperative EQUATION    Planned Procedure: Procedure(s) (LRB):  ARTHROSCOPY, KNEE, WITH MENISCECTOMY (Left)  CHONDROPLASTY, KNEE (Left)  SYNOVECTOMY, KNEE (Left)  Requested Anesthesia Type:General  Surgeon: Kaylen Patiño MD  Service: Orthopedics  Known or anticipated Date of Surgery:10/3/2019    Surgeon notes: reviewed    Electronic QUestionnaire Assessment completed via nurse interview with patient.      Triage considerations:     The patient has no apparent active cardiac condition (No unstable coronary Syndrome such as severe unstable angina or recent [<1 month] myocardial infarction, decompensated CHF, severe valvular   disease or significant arrhythmia)    Previous anesthesia records:MAC and No problems     Airway/Jaw/Neck:  Airway Findings: Mouth Opening: Normal Tongue: Normal  General Airway Assessment: Adult  Mallampati: II  TM Distance: Normal, at least 6 cm  Jaw/Neck Findings:  Neck ROM: Normal ROM      Last PCP note: within 1 month , within [facility]   Subspecialty notes: pain management    Other important co-morbidities: HLD,GERD     Tests already available: CBC, CMP EKG 9/23            Instructions given. (See in Nurse's note)    Optimization:  Anesthesia Preop Clinic Assessment   NOT Indicated    Medical Opinion Indicated::                       CLEARED BY IM 9/23             Plan:    Testing:  COMPLETED                  Patient  has previously scheduled Medical Appointment:    Navigation:                            Plans per surgeon and Follow-up per Surgeon

## 2019-10-01 NOTE — PLAN OF CARE
Pt instructions given for surgical and medical guidelines.  NPO status reviewed with patient.  Patient verbalized understanding of both food and fluid intake prior to surgery.  Antibacterial scrub instructions given, per MD recommendations.  All questions answered.  Driving directions given for day of surgery.  Anticoagulants and medications stopped per protocol.  Nurse reviewed history as stated in chart.

## 2019-10-01 NOTE — ANESTHESIA PREPROCEDURE EVALUATION
10/01/2019  Shakira Pearl is a 74 y.o., female.    Anesthesia Evaluation         Review of Systems  Anesthesia Hx:  No problems with previous Anesthesia   Social:  Non-Smoker, No Alcohol Use    Hematology/Oncology:  Hematology Normal   Oncology Normal     EENT/Dental:EENT/Dental Normal   Cardiovascular:   Exercise tolerance: good Hypertension  Functional Capacity good / => 4 METS    Pulmonary:   Denies Shortness of breath.  Denies Recent URI.    Renal/:  Renal/ Normal     Hepatic/GI:   GERD, well controlled  Esophageal / Stomach Disorders Gerd  Hernia, Hiatal Hernia   Musculoskeletal:   Arthritis   Denies Thoracic Spine Disorders  Lumbar Spine Disorders, Lumbar Disc Disease   Neurological:   Neuromuscular Disease,  Denies Neuromuscular Disease   Endocrine:  Endocrine Normal    Dermatological:  Skin Normal    Psych:  Psychiatric Normal           Physical Exam  General:  Well nourished    Airway/Jaw/Neck:  Airway Findings: Mallampati: II TM Distance: Normal, at least 6 cm  Jaw/Neck Findings:  Neck ROM: Normal ROM       Chest/Lungs:  Chest/Lungs Clear    Heart/Vascular:  Heart Findings: Normal       Mental Status:  Mental Status Findings:  Cooperative, Alert and Oriented         Anesthesia Plan  Type of Anesthesia, risks & benefits discussed:  Anesthesia Type:  general  Patient's Preference: GA  Intra-op Monitoring Plan: standard ASA monitors  Intra-op Monitoring Plan Comments:   Post Op Pain Control Plan: multimodal analgesia, IV/PO Opiods PRN and per primary service following discharge from PACU  Post Op Pain Control Plan Comments:   Induction:   IV  Beta Blocker:  Patient is not currently on a Beta-Blocker (No further documentation required).       Informed Consent: Patient understands risks and agrees with Anesthesia plan.  Questions answered. Anesthesia consent signed with patient.  ASA Score: 2     Day  of Surgery Review of History & Physical:    H&P update referred to the surgeon.     Anesthesia Plan Notes: The patient's PMH was reviewed and PE was performed  Plan for GA with LMA        Ready For Surgery From Anesthesia Perspective.

## 2019-10-03 ENCOUNTER — TELEPHONE (OUTPATIENT)
Dept: SPORTS MEDICINE | Facility: CLINIC | Age: 74
End: 2019-10-03

## 2019-10-03 ENCOUNTER — ANESTHESIA (OUTPATIENT)
Dept: SURGERY | Facility: HOSPITAL | Age: 74
End: 2019-10-03
Payer: MEDICARE

## 2019-10-03 ENCOUNTER — HOSPITAL ENCOUNTER (OUTPATIENT)
Facility: HOSPITAL | Age: 74
Discharge: HOME | End: 2019-10-03
Attending: ORTHOPAEDIC SURGERY | Admitting: ORTHOPAEDIC SURGERY
Payer: MEDICARE

## 2019-10-03 ENCOUNTER — TELEPHONE (OUTPATIENT)
Dept: UROLOGY | Facility: CLINIC | Age: 74
End: 2019-10-03

## 2019-10-03 DIAGNOSIS — S83.242D ACUTE MEDIAL MENISCUS TEAR OF LEFT KNEE, SUBSEQUENT ENCOUNTER: Primary | ICD-10-CM

## 2019-10-03 DIAGNOSIS — M67.50 PLICA SYNDROME: ICD-10-CM

## 2019-10-03 DIAGNOSIS — M25.562 ACUTE PAIN OF LEFT KNEE: ICD-10-CM

## 2019-10-03 DIAGNOSIS — S83.242S ACUTE MEDIAL MENISCUS TEAR OF LEFT KNEE, SEQUELA: ICD-10-CM

## 2019-10-03 DIAGNOSIS — M94.262 CHONDROMALACIA OF LEFT KNEE: ICD-10-CM

## 2019-10-03 DIAGNOSIS — S83.242A ACUTE MEDIAL MENISCUS TEAR OF LEFT KNEE, INITIAL ENCOUNTER: ICD-10-CM

## 2019-10-03 DIAGNOSIS — G89.18 POST-OP PAIN: ICD-10-CM

## 2019-10-03 DIAGNOSIS — S83.242A ACUTE MEDIAL MENISCUS TEAR OF LEFT KNEE, INITIAL ENCOUNTER: Primary | ICD-10-CM

## 2019-10-03 PROCEDURE — 36000711: Performed by: ORTHOPAEDIC SURGERY

## 2019-10-03 PROCEDURE — 27201423 OPTIME MED/SURG SUP & DEVICES STERILE SUPPLY: Performed by: ORTHOPAEDIC SURGERY

## 2019-10-03 PROCEDURE — 71000015 HC POSTOP RECOV 1ST HR: Performed by: ORTHOPAEDIC SURGERY

## 2019-10-03 PROCEDURE — 37000009 HC ANESTHESIA EA ADD 15 MINS: Performed by: ORTHOPAEDIC SURGERY

## 2019-10-03 PROCEDURE — 25000003 PHARM REV CODE 250: Performed by: NURSE ANESTHETIST, CERTIFIED REGISTERED

## 2019-10-03 PROCEDURE — 29881 ARTHRS KNE SRG MNISECTMY M/L: CPT | Mod: LT,,, | Performed by: ORTHOPAEDIC SURGERY

## 2019-10-03 PROCEDURE — 29881 PR KNEE SCOPE SINGLE MENISECECTOMY: ICD-10-PCS | Mod: LT,,, | Performed by: ORTHOPAEDIC SURGERY

## 2019-10-03 PROCEDURE — D9220A PRA ANESTHESIA: Mod: CRNA,,, | Performed by: NURSE ANESTHETIST, CERTIFIED REGISTERED

## 2019-10-03 PROCEDURE — 71000016 HC POSTOP RECOV ADDL HR: Performed by: ORTHOPAEDIC SURGERY

## 2019-10-03 PROCEDURE — 71000033 HC RECOVERY, INTIAL HOUR: Performed by: ORTHOPAEDIC SURGERY

## 2019-10-03 PROCEDURE — 63600175 PHARM REV CODE 636 W HCPCS: Performed by: NURSE ANESTHETIST, CERTIFIED REGISTERED

## 2019-10-03 PROCEDURE — 63600175 PHARM REV CODE 636 W HCPCS: Performed by: ORTHOPAEDIC SURGERY

## 2019-10-03 PROCEDURE — 27200651 HC AIRWAY, LMA: Performed by: NURSE ANESTHETIST, CERTIFIED REGISTERED

## 2019-10-03 PROCEDURE — 63600175 PHARM REV CODE 636 W HCPCS: Performed by: ANESTHESIOLOGY

## 2019-10-03 PROCEDURE — 37000008 HC ANESTHESIA 1ST 15 MINUTES: Performed by: ORTHOPAEDIC SURGERY

## 2019-10-03 PROCEDURE — 36000710: Performed by: ORTHOPAEDIC SURGERY

## 2019-10-03 PROCEDURE — 99900035 HC TECH TIME PER 15 MIN (STAT)

## 2019-10-03 PROCEDURE — D9220A PRA ANESTHESIA: ICD-10-PCS | Mod: ANES,,, | Performed by: ANESTHESIOLOGY

## 2019-10-03 PROCEDURE — 25000003 PHARM REV CODE 250: Performed by: PHYSICIAN ASSISTANT

## 2019-10-03 PROCEDURE — D9220A PRA ANESTHESIA: ICD-10-PCS | Mod: CRNA,,, | Performed by: NURSE ANESTHETIST, CERTIFIED REGISTERED

## 2019-10-03 PROCEDURE — 94761 N-INVAS EAR/PLS OXIMETRY MLT: CPT

## 2019-10-03 PROCEDURE — 63600175 PHARM REV CODE 636 W HCPCS: Performed by: PHYSICIAN ASSISTANT

## 2019-10-03 PROCEDURE — D9220A PRA ANESTHESIA: Mod: ANES,,, | Performed by: ANESTHESIOLOGY

## 2019-10-03 RX ORDER — ROPIVACAINE HYDROCHLORIDE 5 MG/ML
INJECTION, SOLUTION EPIDURAL; INFILTRATION; PERINEURAL
Status: DISCONTINUED | OUTPATIENT
Start: 2019-10-03 | End: 2019-10-03 | Stop reason: HOSPADM

## 2019-10-03 RX ORDER — LIDOCAINE HCL/PF 100 MG/5ML
SYRINGE (ML) INTRAVENOUS
Status: DISCONTINUED | OUTPATIENT
Start: 2019-10-03 | End: 2019-10-03

## 2019-10-03 RX ORDER — HYDRALAZINE HYDROCHLORIDE 20 MG/ML
5 INJECTION INTRAMUSCULAR; INTRAVENOUS
Status: DISCONTINUED | OUTPATIENT
Start: 2019-10-03 | End: 2019-10-03 | Stop reason: HOSPADM

## 2019-10-03 RX ORDER — FENTANYL CITRATE 50 UG/ML
INJECTION, SOLUTION INTRAMUSCULAR; INTRAVENOUS
Status: DISCONTINUED | OUTPATIENT
Start: 2019-10-03 | End: 2019-10-03

## 2019-10-03 RX ORDER — MIDAZOLAM HYDROCHLORIDE 1 MG/ML
INJECTION, SOLUTION INTRAMUSCULAR; INTRAVENOUS
Status: DISCONTINUED | OUTPATIENT
Start: 2019-10-03 | End: 2019-10-03

## 2019-10-03 RX ORDER — SODIUM CHLORIDE 0.9 % (FLUSH) 0.9 %
3 SYRINGE (ML) INJECTION EVERY 6 HOURS
Status: DISCONTINUED | OUTPATIENT
Start: 2019-10-03 | End: 2019-10-03 | Stop reason: HOSPADM

## 2019-10-03 RX ORDER — FENTANYL CITRATE 50 UG/ML
25 INJECTION, SOLUTION INTRAMUSCULAR; INTRAVENOUS EVERY 5 MIN PRN
Status: DISCONTINUED | OUTPATIENT
Start: 2019-10-03 | End: 2019-10-03 | Stop reason: HOSPADM

## 2019-10-03 RX ORDER — ONDANSETRON 2 MG/ML
4 INJECTION INTRAMUSCULAR; INTRAVENOUS EVERY 12 HOURS PRN
Status: DISCONTINUED | OUTPATIENT
Start: 2019-10-03 | End: 2019-10-03 | Stop reason: HOSPADM

## 2019-10-03 RX ORDER — PROPOFOL 10 MG/ML
VIAL (ML) INTRAVENOUS
Status: DISCONTINUED | OUTPATIENT
Start: 2019-10-03 | End: 2019-10-03

## 2019-10-03 RX ORDER — CEFAZOLIN SODIUM 1 G/3ML
2 INJECTION, POWDER, FOR SOLUTION INTRAMUSCULAR; INTRAVENOUS
Status: COMPLETED | OUTPATIENT
Start: 2019-10-03 | End: 2019-10-03

## 2019-10-03 RX ORDER — KETAMINE HYDROCHLORIDE 10 MG/ML
INJECTION, SOLUTION INTRAMUSCULAR; INTRAVENOUS
Status: DISCONTINUED | OUTPATIENT
Start: 2019-10-03 | End: 2019-10-03

## 2019-10-03 RX ORDER — SODIUM CHLORIDE 9 MG/ML
INJECTION, SOLUTION INTRAVENOUS CONTINUOUS
Status: DISCONTINUED | OUTPATIENT
Start: 2019-10-03 | End: 2019-10-03 | Stop reason: HOSPADM

## 2019-10-03 RX ORDER — TRAMADOL HYDROCHLORIDE 50 MG/1
100 TABLET ORAL EVERY 6 HOURS PRN
Status: DISCONTINUED | OUTPATIENT
Start: 2019-10-03 | End: 2019-10-03 | Stop reason: HOSPADM

## 2019-10-03 RX ORDER — PROMETHAZINE HYDROCHLORIDE 25 MG/1
25 TABLET ORAL EVERY 6 HOURS PRN
Status: DISCONTINUED | OUTPATIENT
Start: 2019-10-03 | End: 2019-10-03 | Stop reason: HOSPADM

## 2019-10-03 RX ORDER — EPINEPHRINE 1 MG/ML
INJECTION, SOLUTION INTRACARDIAC; INTRAMUSCULAR; INTRAVENOUS; SUBCUTANEOUS
Status: DISCONTINUED | OUTPATIENT
Start: 2019-10-03 | End: 2019-10-03 | Stop reason: HOSPADM

## 2019-10-03 RX ORDER — MIDAZOLAM HYDROCHLORIDE 1 MG/ML
0.5 INJECTION INTRAMUSCULAR; INTRAVENOUS
Status: DISCONTINUED | OUTPATIENT
Start: 2019-10-03 | End: 2019-10-03 | Stop reason: HOSPADM

## 2019-10-03 RX ORDER — HYDROCODONE BITARTRATE AND ACETAMINOPHEN 10; 325 MG/1; MG/1
TABLET ORAL
Qty: 15 TABLET | Refills: 0 | Status: SHIPPED | OUTPATIENT
Start: 2019-10-03 | End: 2019-11-13

## 2019-10-03 RX ORDER — DEXAMETHASONE SODIUM PHOSPHATE 4 MG/ML
INJECTION, SOLUTION INTRA-ARTICULAR; INTRALESIONAL; INTRAMUSCULAR; INTRAVENOUS; SOFT TISSUE
Status: DISCONTINUED | OUTPATIENT
Start: 2019-10-03 | End: 2019-10-03

## 2019-10-03 RX ORDER — OXYCODONE HYDROCHLORIDE 5 MG/1
10 TABLET ORAL EVERY 4 HOURS PRN
Status: DISCONTINUED | OUTPATIENT
Start: 2019-10-03 | End: 2019-10-03 | Stop reason: HOSPADM

## 2019-10-03 RX ORDER — EPHEDRINE SULFATE 50 MG/ML
INJECTION, SOLUTION INTRAVENOUS
Status: DISCONTINUED | OUTPATIENT
Start: 2019-10-03 | End: 2019-10-03

## 2019-10-03 RX ORDER — KETOROLAC TROMETHAMINE 30 MG/ML
INJECTION, SOLUTION INTRAMUSCULAR; INTRAVENOUS
Status: DISCONTINUED | OUTPATIENT
Start: 2019-10-03 | End: 2019-10-03 | Stop reason: HOSPADM

## 2019-10-03 RX ORDER — MORPHINE SULFATE 2 MG/ML
2 INJECTION, SOLUTION INTRAMUSCULAR; INTRAVENOUS EVERY 10 MIN PRN
Status: DISCONTINUED | OUTPATIENT
Start: 2019-10-03 | End: 2019-10-03 | Stop reason: HOSPADM

## 2019-10-03 RX ORDER — KETAMINE HYDROCHLORIDE 50 MG/ML
INJECTION, SOLUTION INTRAMUSCULAR; INTRAVENOUS
Status: DISCONTINUED | OUTPATIENT
Start: 2019-10-03 | End: 2019-10-03 | Stop reason: HOSPADM

## 2019-10-03 RX ADMIN — KETAMINE HYDROCHLORIDE 20 MG: 10 INJECTION, SOLUTION INTRAMUSCULAR; INTRAVENOUS at 07:10

## 2019-10-03 RX ADMIN — PROPOFOL 150 MG: 10 INJECTION, EMULSION INTRAVENOUS at 07:10

## 2019-10-03 RX ADMIN — EPHEDRINE SULFATE 10 MG: 50 INJECTION INTRAVENOUS at 07:10

## 2019-10-03 RX ADMIN — MIDAZOLAM 2 MG: 1 INJECTION INTRAMUSCULAR; INTRAVENOUS at 06:10

## 2019-10-03 RX ADMIN — OXYCODONE HYDROCHLORIDE 10 MG: 5 TABLET ORAL at 08:10

## 2019-10-03 RX ADMIN — DEXAMETHASONE SODIUM PHOSPHATE 8 MG: 4 INJECTION, SOLUTION INTRAMUSCULAR; INTRAVENOUS at 07:10

## 2019-10-03 RX ADMIN — CEFAZOLIN 2 G: 330 INJECTION, POWDER, FOR SOLUTION INTRAMUSCULAR; INTRAVENOUS at 07:10

## 2019-10-03 RX ADMIN — SODIUM CHLORIDE: 0.9 INJECTION, SOLUTION INTRAVENOUS at 06:10

## 2019-10-03 RX ADMIN — FENTANYL CITRATE 100 MCG: 50 INJECTION, SOLUTION INTRAMUSCULAR; INTRAVENOUS at 07:10

## 2019-10-03 RX ADMIN — PROPOFOL 50 MG: 10 INJECTION, EMULSION INTRAVENOUS at 07:10

## 2019-10-03 RX ADMIN — SODIUM CHLORIDE, SODIUM GLUCONATE, SODIUM ACETATE, POTASSIUM CHLORIDE, MAGNESIUM CHLORIDE, SODIUM PHOSPHATE, DIBASIC, AND POTASSIUM PHOSPHATE: .53; .5; .37; .037; .03; .012; .00082 INJECTION, SOLUTION INTRAVENOUS at 08:10

## 2019-10-03 RX ADMIN — LIDOCAINE HYDROCHLORIDE 80 MG: 20 INJECTION, SOLUTION INTRAVENOUS at 07:10

## 2019-10-03 RX ADMIN — HYDRALAZINE HYDROCHLORIDE 5 MG: 20 INJECTION INTRAMUSCULAR; INTRAVENOUS at 09:10

## 2019-10-03 NOTE — TELEPHONE ENCOUNTER
----- Message from Ivone Gastelum MA sent at 10/3/2019  2:28 PM CDT -----  Contact: Patient   Patient has questions about her surgery from today.  ----- Message -----  From: Lilli Mackay  Sent: 10/3/2019   2:19 PM CDT  To: Kaylyn Abdullahi Staff    Need Advice:      Reason For Call: Patient want to go over instructions that was given to her      Communication Preference: [phone]      Additional Information:

## 2019-10-03 NOTE — TRANSFER OF CARE
"Anesthesia Transfer of Care Note    Patient: Shakira Pearl    Procedure(s) Performed: Procedure(s) (LRB):  ARTHROSCOPY, KNEE, WITH MENISCECTOMY (Left)  CHONDROPLASTY, KNEE (Left)  SYNOVECTOMY, KNEE (Left)    Patient location: PACU    Anesthesia Type: general    Transport from OR: Transported from OR on 6-10 L/min O2 by face mask with adequate spontaneous ventilation    Post pain: adequate analgesia    Post assessment: no apparent anesthetic complications and tolerated procedure well    Post vital signs: stable    Level of consciousness: sedated    Nausea/Vomiting: no nausea/vomiting    Complications: none    Transfer of care protocol was followed      Last vitals:   Visit Vitals  BP (!) 176/80   Pulse 71   Temp 36.6 °C (97.9 °F) (Oral)   Resp 18   Ht 5' 3" (1.6 m)   Wt 65.8 kg (145 lb)   SpO2 98%   Breastfeeding? No   BMI 25.69 kg/m²     "

## 2019-10-03 NOTE — TELEPHONE ENCOUNTER
Will call in [place] for patient for post-op pain per request. She did not want me to call this in at her pre-op appointment.

## 2019-10-03 NOTE — DISCHARGE INSTRUCTIONS
Recovery After Procedural Sedation (Adult)  You have been given medicine by vein to make you sleep during your surgery. This may have included both a pain medicine and sleeping medicine. Most of the effects have worn off. But you may still have some drowsiness for the next 6 to 8 hours.  Home care  Follow these guidelines when you get home:  · For the next 8 hours, you should be watched by a responsible adult. This person should make sure your condition is not getting worse.  · Don't drink any alcohol for the next 24 hours.  · Don't drive, operate dangerous machinery, or make important business or personal decisions during the next 24 hours.  Note: Your healthcare provider may tell you not to take any medicine by mouth for pain or sleep in the next 4 hours. These medicines may react with the medicines you were given in the hospital. This could cause a much stronger response than usual.  Follow-up care  Follow up with your healthcare provider if you are not alert and back to your usual level of activity within 12 hours.  When to seek medical advice  Call your healthcare provider right away if any of these occur:  · Drowsiness gets worse  · Weakness or dizziness gets worse  · Repeated vomiting  · You can't be awakened   Date Last Reviewed: 10/18/2016  © 9252-5214 [employer]. [address] [place] [address], [place] [address]. All rights reserved. This information is not intended as a substitute for professional medical care. Always follow your healthcare professional's instructions.

## 2019-10-03 NOTE — TELEPHONE ENCOUNTER
Spoke to pt.  She wanted to make appt for tomorrow and cancel if not needed.  She had knee surgery and had been unable to void.  She will go to ER if unable or see Ivone tomorrow if difficult voiding, or cancel appt if able to void.

## 2019-10-03 NOTE — TELEPHONE ENCOUNTER
----- Message from Db Coulter sent at 10/3/2019  3:47 PM CDT -----  Contact: CHELSY GONZALEZ [[id number]]  Name of Who is Calling:CHELSY GONZALEZ [[id number]]      What is the request in detail:Pt had surgery this mooring and was told if she had problems urinating to call office.Tried to reach office by IM but no one was available.Please contact to further discuss and advise        Can the clinic reply by [facility]:      What Number to Call Back if not in [facility]:[phone]

## 2019-10-03 NOTE — ANESTHESIA POSTPROCEDURE EVALUATION
Anesthesia Post Evaluation    Patient: Shakira Pearl    Procedure(s) Performed: Procedure(s) (LRB):  ARTHROSCOPY, KNEE, WITH MENISCECTOMY (Left)  CHONDROPLASTY, KNEE (Left)  SYNOVECTOMY, KNEE (Left)    Final Anesthesia Type: general  Patient location during evaluation: PACU  Patient participation: Yes- Able to Participate  Level of consciousness: awake and alert  Post-procedure vital signs: reviewed and stable  Pain management: adequate  Airway patency: patent  PONV status at discharge: No PONV  Anesthetic complications: no      Cardiovascular status: blood pressure returned to baseline  Respiratory status: unassisted and spontaneous ventilation  Hydration status: euvolemic  Follow-up not needed.          Vitals Value Taken Time   /79 10/3/2019 10:02 AM   Temp 36.6 °C (97.9 °F) 10/3/2019  8:10 AM   Pulse 80 10/3/2019 10:10 AM   Resp 23 10/3/2019 10:10 AM   SpO2 95 % 10/3/2019 10:09 AM   Vitals shown include unvalidated device data.      Event Time     Out of Recovery 08:40:00          Pain/Martha Score: Pain Rating Prior to Med Admin: 7 (10/3/2019  8:40 AM)  Martha Score: 10 (10/3/2019  8:25 AM)

## 2019-10-03 NOTE — OP NOTE
DATE OF PROCEDURE: 10/03/2019    SURGEON:  Kaylen Patiño M.D    ASSISTANT:  JODIE Jefferson, PGY-6  ASSISTANT: NABOR Davis PA-C      PREOPERATIVE DIAGNOSES:   left  1. knee medial meniscus tear   2. knee plica.   3. knee possible chondromalacia  4. knee synovitis  5. Knee adhesions    POSTOPERATIVE DIAGNOSES:   left  1. knee medial meniscus tear   2. knee plica.   3. knee chondromalacia  4. knee synovitis.   5. Knee adhesions    PROCEDURE PERFORMED:   left  1. knee arthroscopic chondroplasty (CPT 95295)  2. knee arthroscopic medial (CPT 84067) meniscectomy   3. knee arthroscopic partial synovectomy/debridement (CPT 05119).   4. knee arthroscopic plica excision(CPT 67666).    5. Knee arthroscopic lysis of adhesions (CPT 40559)    ANESTHESIA: General with local 0.5% ropivicaine 30ml (5mg/ml), 60 mg ketamine, 60mg toradol (2ml toradol (30mg/ml))    BLOOD LOSS: Minimal.     DRAINS: None.     TOURNIQUET TIME: None.     COMPLICATIONS: None.     CONDITION ON TRANSFER: The patient was extubated and moved to the recovery room in stable condition, with compartments soft and capillary refill less than a   second in all digits.     BRIEF INDICATION OF MEDICAL NECESSITY: The patient is a 74 y.o. year-old female who has history and physical examination findings consistent with the above. Nonoperative versus operative options were discussed. The risks and benefits were discussed with the patient. The patient acknowledged understanding and wished to proceed with operative intervention. Informed consent was obtained prior to the procedure. Details of the surgical procedure were explained, including incisions and probable rehabilitation course. The patient understands the likely length of convalescence after surgery; and we have explained the risks, benefits, and alternatives of surgery. Reasonable expectations and potential complications were discussed and acknowledged, including but not limited to infection, bleeding, blood clots, (DVT  and/or PE), nerve injury, retear, instability, continued pain and stiffness. It was also explained that there was a chance of failure which may require further management. The patient agreed and understood and wished to proceed.     EXAMINATION UNDER ANESTHESIA of the OPERATIVE left KNEE: ROM 0-135 degrees, negative Lachman, negative pivot shift, stable to varus-valgus stress testing, negative effusion.     EXAMINATION UNDER ANESTHESIA of the NON-OPERATED right KNEE: ROM 0-135 degrees, negative Lachman, negative pivot shift, stable to varus-valgus stress testing, negative effusion.     PROCEDURE IN DETAIL: The correct operative site was marked by the operative surgeon.  The patient was then taken to the operating room and placed supine on the operating room table. General anesthesia was administered by the anesthesia team. All pressure points were carefully padded and checked. Preoperative antibiotics were administered. A well-padded tourniquet was placed high on the operative thigh. Examination was begun with the above findings. The non-operative leg was then placed a well-padded well-leg smith, in a comfortable position. The operative leg was placed in an arthroscopic leg smith at the level of the tourniquet. The operative leg was prepped and draped in the usual sterile fashion. After prepping and draping, the appropriate landmarks were noted on the skin.  2cc skin and sub-cutaneous tissue was infilttrated with local anesthetic mixture superolaterally at needle insufflation site. The knee was insufflated supero-laterally with saline. 9cc skin wheal and sub-cutaneous tissue and fat pad was infilttrated with local anesthetic mixture at both portal sites; mid-lateral followed by infero-medial portals were created, and a systematic examination of the joint revealed the following:    There was no evidence of any suprapatellar pouch adhesions or compartmentalization.  There was no evidence of any loose bodies in the  medial or lateral gutters.     In the patellofemoral compartment, there was chondral damage to:  Trochlea 5 x 8 mm grade 2  Chondroplasty was performed using arthroscopic shaver.    There was chondral damage to:  Medial femoral condyle 10 x 20 mm grade 2  Chondroplasty was performed using arthroscopic shaver.    In the medial compartment there was no evidence of meniscal instability.   Posterior horn medial meniscus tear,  complex was debrided with arthroscopic shaver and biter.  66% was debrided over an area of 2 cm.  Root and hoop fibers remained intact.    Attention was then turned to the notch. The ACL and PCL were probed carefully and found to be stable.     There was a hypertrophic infrapatellar plica.  This was debrided using arthroscopic biter and shaver.    There was some scar about the ACL.  This was debrided with thermal device and lysis of adhesions was performed.    In the lateral compartment there was no evidence meniscal or chondral damage or meniscal instability.     Synovitis was debrided in the knee as needed to the areas of concern in medial and lateral compartments.      The knee and incisions were then copiously irrigated and fluid was extravasated using suction.  The arthroscopic portal incisions were closed using inverted 4-0 Monocryl suture.  5cc skin and sub-cutaneous tissue and around portals were infilttrated with local anesthetic mixture at both portal sites.  Steri-Strips were placed with Mastisol. Sterile TENS unit pads were placed which were medically necessary for pain relief. Wounds were dressed with Xeroform, 4x4s, and cast padding. MAITE hose was placed on the operative leg to match that of the MAITE hose placed preoperatively on the non-operative leg. Iceman was secured.  The patient was extubated and moved to the recovery room in stable condition with compartments soft and capillary refill less than a second in all digits.     POSTOPERATIVE PLAN: We will be following the arthroscopic  partial meniscectomy guidelines with emphasis on patellar mobility.  This was discussed this with the patient's family after surgery.

## 2019-10-03 NOTE — DISCHARGE SUMMARY
[facility]  Brief Operative Note     SUMMARY     Surgery Date: 10/3/2019     Surgeon(s) and Role:     * Kaylen Patiño MD - Primary     * Dung Jefferson MD - Fellow    Assisting Surgeon: None    Pre-op Diagnosis:  Acute medial meniscus tear of left knee, initial encounter [S83.242A]  Plica syndrome [M67.50]  Chondromalacia of left knee [M94.262]    Post-op Diagnosis:  Post-Op Diagnosis Codes:     * Acute medial meniscus tear of left knee, initial encounter [S83.242A]     * Plica syndrome [M67.50]     * Chondromalacia of left knee [M94.262]    Procedure(s) (LRB):  ARTHROSCOPY, KNEE, WITH MENISCECTOMY (Left)  CHONDROPLASTY, KNEE (Left)  SYNOVECTOMY, KNEE (Left)    Anesthesia: General    Description of the findings of the procedure: left knee arthroscopy    Findings/Key Components: left knee arthroscopy    Estimated Blood Loss: minimal         Specimens:   Specimen (12h ago, onward)    None          Discharge Note    SUMMARY     Admit Date: 10/3/2019    Discharge Date and Time:  10/03/2019 8:10 AM    Hospital Course (synopsis of major diagnoses, care, treatment, and services provided during the course of the hospital stay): Patient underwent outpatient knee surgery and was transferred to PACU in stable condition.  In PACU, patient received appropriate post-operative care and discharged home with plans for physical therapy and follow-up with the operative surgeon.    Diet: Regular     Final Diagnosis: Post-Op Diagnosis Codes:     * Acute medial meniscus tear of left knee, initial encounter [S83.242A]     * Plica syndrome [M67.50]     * Chondromalacia of left knee [M94.262]    Disposition: Home or Self Care    Follow Up/Patient Instructions:     Medications:  Reconciled Home Medications:      Medication List      CONTINUE taking these medications    aspirin 81 MG EC tablet  Commonly known as:  ECOTRIN  Take 1 tablet (81 mg total) by mouth once daily. For 4 weeks starting the day after surgery.      cholecalciferol (vitamin D3) 2,000 unit Cap  Commonly known as:  VITAMIN D3  Take 1 capsule (2,000 Units total) by mouth once daily.     estradiol valerate 40 mg/mL injection  Commonly known as:  DELESTROGEN  Inject 0.5 mLs (20 mg total) into the muscle every 28 days. Inject into the muscle.     imipramine 25 MG tablet  Commonly known as:  TOFRANIL  Take 1 tablet (25 mg total) by mouth every evening.     losartan 50 MG tablet  Commonly known as:  COZAAR  TAKE ONE DAILY     ondansetron 4 MG tablet  Commonly known as:  ZOFRAN  Take 1 tablet (4 mg total) by mouth every 8 (eight) hours as needed for Nausea.     pantoprazole 40 MG tablet  Commonly known as:  PROTONIX  Take 1 tablet (40 mg total) by mouth before breakfast. Take one pill every morning 45 minutes before breakfast in the morning.     traMADol 50 mg tablet  Commonly known as:  ULTRAM  Take 1-2 tablets ( mg total) by mouth every 6 (six) hours as needed.     varicella-zoster gE-AS01B (PF) 50 mcg/0.5 mL injection  Commonly known as:  SHINGRIX (PF)  Inject into the muscle.          Discharge Procedure Orders   Diet general     Call MD for:  temperature >100.4     Call MD for:  persistent nausea and vomiting     Call MD for:  severe uncontrolled pain     Call MD for:  difficulty breathing, headache or visual disturbances     Call MD for:  redness, tenderness, or signs of infection (pain, swelling, redness, odor or green/yellow discharge around incision site)     Call MD for:  hives     Call MD for:  persistent dizziness or light-headedness     Keep surgical extremity elevated     Ice to affected area     No driving, operating heavy equipment or signing legal documents while taking pain medication     Remove dressing in 72 hours     Shower on day dressing removed (No bath)     Weight bearing as tolerated     Follow-up Information     Kaylen Patiño MD. Go in 2 weeks.    Specialties:  Sports Medicine, Orthopedic Surgery  Why:  For wound re-check  Contact  information:  [address]  [place] [address]  [phone]

## 2019-10-04 ENCOUNTER — TELEPHONE (OUTPATIENT)
Dept: SPORTS MEDICINE | Facility: CLINIC | Age: 74
End: 2019-10-04

## 2019-10-04 VITALS
WEIGHT: 145 LBS | TEMPERATURE: 98 F | DIASTOLIC BLOOD PRESSURE: 79 MMHG | HEIGHT: 63 IN | OXYGEN SATURATION: 95 % | SYSTOLIC BLOOD PRESSURE: 155 MMHG | RESPIRATION RATE: 12 BRPM | BODY MASS INDEX: 25.69 KG/M2 | HEART RATE: 77 BPM

## 2019-10-04 NOTE — TELEPHONE ENCOUNTER
----- Message from Ricky Davis III, PA-C sent at 10/4/2019  1:33 PM CDT -----  Contact: Patient    Please call patient and inform her that this can be normal after surgery due to muscles being tight and weak and due to surgery swelling.     Continue icing the knee and take advil or ibuprofen if needed and let us know if it is still occurring next week.     Serjio    ----- Message -----  From: Thelma Soto MA  Sent: 10/4/2019   1:07 PM CDT  To: Ricky Davis III, PA-C, Kaylyn Abdullahi Staff        ----- Message -----  From: Wendy Bess  Sent: 10/4/2019  12:59 PM CDT  To: Susan García Staff    Patient called in regards to Knee Locking would like to know if that suppose to be happening after surgery        Patient can be reached at [phone]

## 2019-10-07 ENCOUNTER — CLINICAL SUPPORT (OUTPATIENT)
Dept: REHABILITATION | Facility: HOSPITAL | Age: 74
End: 2019-10-07
Payer: MEDICARE

## 2019-10-07 DIAGNOSIS — R26.9 IMPAIRED GAIT: ICD-10-CM

## 2019-10-07 DIAGNOSIS — M25.562 CHRONIC PAIN OF LEFT KNEE: ICD-10-CM

## 2019-10-07 DIAGNOSIS — R60.0 LOCALIZED EDEMA: ICD-10-CM

## 2019-10-07 DIAGNOSIS — G89.29 CHRONIC PAIN OF LEFT KNEE: ICD-10-CM

## 2019-10-07 PROCEDURE — 97162 PT EVAL MOD COMPLEX 30 MIN: CPT | Mod: HCNC | Performed by: PHYSICAL THERAPIST

## 2019-10-07 PROCEDURE — G8978 MOBILITY CURRENT STATUS: HCPCS | Mod: CL,HCNC | Performed by: PHYSICAL THERAPIST

## 2019-10-07 PROCEDURE — G8979 MOBILITY GOAL STATUS: HCPCS | Mod: CK,HCNC | Performed by: PHYSICAL THERAPIST

## 2019-10-07 PROCEDURE — 97110 THERAPEUTIC EXERCISES: CPT | Mod: HCNC | Performed by: PHYSICAL THERAPIST

## 2019-10-07 NOTE — PLAN OF CARE
[facility]  Physical Therapy Initial Evaluation    Name: Shakira Pearl  Clinic Number: [id number]    Therapy Diagnosis:   Encounter Diagnoses   Name Primary?    Chronic pain of left knee     Localized edema     Impaired gait      Physician: Kaylen Patiño MD    Physician Orders: PT Eval and Treat   Medical Diagnosis from Referral:   S83.242A (ICD-10-CM) - Acute medial meniscus tear of left knee, initial encounter   M67.50 (ICD-10-CM) - Plica syndrome   M94.262 (ICD-10-CM) - Chondromalacia of left knee     Evaluation Date: 10/7/2019  Authorization Period Expiration: 12/31/2019  Plan of Care Expiration: 11/11/2019  Visit # / Visits authorized: 1/ 20    Time In: 1400  Time Out: 1500  Total Billable Time: 60 minutes    Precautions: Standard and Fall,     Subjective   Date of onset: 2 1/2 weeks ago  History of current condition - Shakira reports: She reports pain began 2 1/2 weeks ago when she woke up. She reports a catching sensation in the knee when bending. She underwent surgery last Thursday and reports it has been very easy since. Her biggest complain is the medication knocked her out and she did not exercise at all Friday and Saturday. Today, she presents without an assistive device and motivated to return to exercise. She wishes to use the shuttle to strengthen her legs as she previously used it in therapy for her lumbar spine. She also is excited to begin walking on the treadmill.        Past Medical History:   Diagnosis Date    Allergy sinus    Arthritis back    Degenerative disc disease back    Neuromuscular disorder     Vitamin D deficiency      Shakira Pearl  has a past surgical history that includes Appendectomy (age 19); Hysterectomy (38); Cholecystectomy; Injection of joint (Bilateral, 2/18/2019); Knee arthroscopy w/ meniscectomy (Left, 10/3/2019); Chondroplasty of knee (Left, 10/3/2019); and Synovectomy of knee (Left, 10/3/2019).    Shakira has a current medication list which  includes the following prescription(s): aspirin, cholecalciferol (vitamin d3), estradiol valerate, hydrocodone-acetaminophen, imipramine, losartan, ondansetron, pantoprazole, tramadol, and varicella-zoster ge-as01b (pf).    Review of patient's allergies indicates:   Allergen Reactions    Demerol [meperidine] Nausea And Vomiting     Other reaction(s): Nausea    Papaya      Illness vomiting    Sulfa (sulfonamide antibiotics) Rash    Sulfur Rash        Imaging, MRI studies: xray    Prior Therapy: Yes at [facility] for lumbar spine  Social History: She lives alone  Occupation: Retired   Prior Level of Function: Independent- moderate exercise  Current Level of Function: Modified independent    Pain:  Current 0/10, worst 3/10, best 0/10   Location: left knee   Description: Aching and Sharp  Aggravating Factors: Walking  Easing Factors: relaxation, ice and rest    Pts goals: Return to walking on the treadmill    Objective     Observation: Patient presents to clinic with bandage over L knee, bilateral MAITE hose, no assistive device with limited L knee flexion    Posture: Stands with L knee flexed 5-10 degrees weight over the RLE    Functional Tests:  Gait: Limited knee flexion on L in early swing phase, poor heel strike and toe off, increased RLE stance time    Knee Passive Range of Motion:   Right  Left    Flexion 133 125   Extension +5 hyper  +1 hyper     Active flexion: 130 on right,     Quad Set: fair- on L , able to perform SLR without lag but began to lag after 5repetitions     Joint Mobility: Poor inferior patellar glide limited by bandaging     Palpation: Tenderness to medial joint line on L knee    Sensation: Intact    Edema: Minimal    Girth Measurement Joint line 15 cm below 10 cm above   Right 35 cm 33 cm 40 cm   Left 36 cm 33 cm 39.2 cm       CMS Impairment/Limitation/Restriction for FOTO Knee Survey    Therapist reviewed FOTO scores for Shakira Pearl on 10/7/2019.   FOTO documents entered into EPIC - see Media  section.             TREATMENT   Treatment Time In: 1430  Treatment Time Out: 1445  Total Treatment time separate from Evaluation: 15 minutes    Shakira received therapeutic exercises to develop strength for 15 minutes including:  Quad sets with towel 3 second holds  SLR 3 x 10    Home Exercises and Patient Education Provided    Education provided re: Gait mechanics, goals for therapy, role of therapy for care, exercises/HEP    Written Home Exercises Provided: .  Exercises were reviewed and Shakira was able to demonstrate them prior to the end of the session.   Pt received a written copy of exercises to perform at home. Shakira demonstrated good  understanding of the education provided.     See EMR under patient instructions for exercises given.   Assessment   Shakira is a 74 y.o. female referred to outpatient Physical Therapy with a medical diagnosis of L knee meniscectomy. Pt presents with limited knee mobility, strength impairments especially in the quads and gluts, poor ambulation tolerance in community, difficulty completing ADLs and impaired ability to exercise in the gym    Pt prognosis is Good.   Pt will benefit from skilled outpatient Physical Therapy to address the deficits stated above and in the chart below, provide pt/family education, and to maximize pt's level of independence.     Plan of care discussed with patient: Yes  Pt's spiritual, cultural and educational needs considered and patient is agreeable to the plan of care and goals as stated below:     Anticipated Barriers for therapy: Age    Medical Necessity is demonstrated by the following  History  Co-morbidities and personal factors that may impact the plan of care Co-morbidities:   advanced age, education level and level of undertstanding of current condition    Personal Factors:   no deficits     moderate   Examination  Body Structures and Functions, activity limitations and participation restrictions that may impact the plan of care Body Regions:    lower extremities    Body Systems:    ROM  strength  balance  gait  transfers  motor control  motor learning  edema  scar formation    Participation Restrictions:   Transportation    Activity limitations:   Learning and applying knowledge  no deficits    General Tasks and Commands  no deficits    Communication  no deficits    Mobility  walking    Self care  no deficits    Domestic Life  shopping  cooking  doing house work (cleaning house, washing dishes, laundry)    Interactions/Relationships  no deficits    Life Areas  no deficits    Community and Social Life  no deficits         moderate   Clinical Presentation evolving clinical presentation with changing clinical characteristics moderate   Decision Making/ Complexity Score: moderate     GOALS: Short Term Goals:  4 weeks  1.Report decreased L knee pain  < / =  1/10  to increase tolerance for ambulation in community   2. Increase knee ROM to 130 deg in order to be able to perform ADLs without difficulty.  3. Increase strength by 1/3 MMT grade in quads  to increase tolerance for ADL and work activities.  4. Pt to tolerate HEP to improve ROM and independence with ADL's    Long Term Goals: 8 weeks  1.Report decreased L knee pain < / = 0/10  to increase tolerance for return to walking on treadmill  2.Patient goal: Return to the gym without limitation  3.Increase strength to >/= 4+/5 in gluts and quads  to increase tolerance for ADL and work activities.  4. Pt will report at 43% limitation on FOTO knee to demonstrate increase in LE function with every day tasks.     Plan   Plan of care Certification: 10/7/2019 to 11/11/2019.    Outpatient Physical Therapy 2 times weekly for 10 weeks to include the following interventions: Gait Training, Manual Therapy, Moist Heat/ Ice, Neuromuscular Re-ed, Patient Education, Self Care, Therapeutic Activites and Therapeutic Exercise.     Emmanuel Galvez, PT

## 2019-10-09 NOTE — PROGRESS NOTES
"  Physical Therapy Daily Treatment Note     Name: Shakira Pearl  Clinic Number: [id number]  Therapy Diagnosis:        Encounter Diagnoses   Name Primary?   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   M94.262 (ICD-10-CM) - Chondromalacia of left knee      Evaluation Date: 10/7/2019  Authorization Period Expiration: 12/31/2019  Plan of Care Expiration: 11/11/2019  Visit # / Visits authorized: 2/ 20     Time In: 1355  Time Out: 1455  Total Billable Time: 60 minutes     Precautions: Standard and Fall,      Subjective     Pt reports: no pain in L knee  She was compliant with home exercise program.  Response to previous treatment: no problems  Functional change: improved gait no pain    Pain: 0/10  Location: left knee      Objective     Shakira received therapeutic exercises to develop strength, endurance, ROM, flexibility and posture for 40' minutes including:  Stationary bike x 10' for increased ROM, circulation and mm endurance   QS with towel under ankle 3x10/3"  SAQ 3x10/3"  L SLR 3x10/3"  L SL hip abd 3x10/3"  Prone L hip ext   3x10/3"  Prone L knee flexion 3x10/3"    Shakira received the following manual therapy techniques: Joint mobilizations and Soft tissue Mobilization were applied to the: L knee  for 10  minutes, including: joint capsular distractiong, patella mobs, and GSS and HSS     Shakira received cold pack for 10 minutes to to decrease circulation, pain, and swelling.    Home Exercises Provided and Patient Education Provided     Education provided:   Posture awareness     Written Home Exercises Provided: yes.  Exercises were reviewed and Shakira was able to demonstrate them prior to the end of the session.  Shakira demonstrated good  understanding of the " education provided.     Assessment   Pt tolerating tx well with min increased L knee pain along with quad weakness.  min mm fatigue after complete. VC/TC for correcting form/technique with therex. Continue to progress as tolerated.   Shakira is progressing well towards her goals.   Pt prognosis is Good.     Pt will continue to benefit from skilled outpatient physical therapy to address the deficits listed in the problem list box on initial evaluation, provide pt/family education and to maximize pt's level of independence in the home and community environment.     Pt's spiritual, cultural and educational needs considered and pt agreeable to plan of care and goals.    Anticipated barriers to physical therapy: none     Goals: GOALS: Short Term Goals:  4 weeks  1.Report decreased L knee pain  < / =  1/10  to increase tolerance for ambulation in community (not met, progressing)  2. Increase knee ROM to 130 deg in order to be able to perform ADLs without difficulty.(not met, progressing)  3. Increase strength by 1/3 MMT grade in quads  to increase tolerance for ADL and work activities.(not met, progressing)  4. Pt to tolerate HEP to improve ROM and independence with ADL's(not met, progressing)     Long Term Goals: 8 weeks  1.Report decreased L knee pain < / = 0/10  to increase tolerance for return to walking on treadmill(not met, progressing)  2.Patient goal: Return to the gym without limitation(not met, progressing)  3.Increase strength to >/= 4+/5 in gluts and quads  to increase tolerance for ADL and work activities.(not met, progressing)  4. Pt will report at 43% limitation on FOTO knee to demonstrate increase in LE function with every day tasks. (not met, progressing)      Plan     Continue per POC     Jason Canela, PTA, STS

## 2019-10-10 ENCOUNTER — CLINICAL SUPPORT (OUTPATIENT)
Dept: REHABILITATION | Facility: HOSPITAL | Age: 74
End: 2019-10-10
Payer: MEDICARE

## 2019-10-10 DIAGNOSIS — R26.9 IMPAIRED GAIT: ICD-10-CM

## 2019-10-10 DIAGNOSIS — R60.0 LOCALIZED EDEMA: ICD-10-CM

## 2019-10-10 DIAGNOSIS — G89.29 CHRONIC PAIN OF LEFT KNEE: ICD-10-CM

## 2019-10-10 DIAGNOSIS — M25.562 CHRONIC PAIN OF LEFT KNEE: ICD-10-CM

## 2019-10-10 PROCEDURE — 97140 MANUAL THERAPY 1/> REGIONS: CPT | Mod: HCNC

## 2019-10-10 PROCEDURE — 97110 THERAPEUTIC EXERCISES: CPT | Mod: HCNC

## 2019-10-14 ENCOUNTER — TELEPHONE (OUTPATIENT)
Dept: PAIN MEDICINE | Facility: CLINIC | Age: 74
End: 2019-10-14

## 2019-10-14 ENCOUNTER — CLINICAL SUPPORT (OUTPATIENT)
Dept: REHABILITATION | Facility: HOSPITAL | Age: 74
End: 2019-10-14
Payer: MEDICARE

## 2019-10-14 DIAGNOSIS — M25.562 CHRONIC PAIN OF LEFT KNEE: ICD-10-CM

## 2019-10-14 DIAGNOSIS — R26.9 IMPAIRED GAIT: ICD-10-CM

## 2019-10-14 DIAGNOSIS — G89.29 CHRONIC PAIN OF LEFT KNEE: ICD-10-CM

## 2019-10-14 DIAGNOSIS — R60.0 LOCALIZED EDEMA: ICD-10-CM

## 2019-10-14 PROCEDURE — 97140 MANUAL THERAPY 1/> REGIONS: CPT | Mod: HCNC | Performed by: PHYSICAL THERAPIST

## 2019-10-14 PROCEDURE — 97110 THERAPEUTIC EXERCISES: CPT | Mod: HCNC | Performed by: PHYSICAL THERAPIST

## 2019-10-14 NOTE — PROGRESS NOTES
"  Physical Therapy Daily Treatment Note     Name: Shakira Pearl  Clinic Number: [id number]  Therapy Diagnosis:        Encounter Diagnoses   Name Primary?   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 I took the stockings off because they were bothering me, got really tight and making indentations on my legs  She was compliant with home exercise program.  Response to previous treatment: decrease knee pain  Functional change: improved gait no pain    Pain: 0/10  Location: left knee      Objective     Shakira received therapeutic exercises to develop strength, endurance, ROM, flexibility and posture for 40' minutes including:    Stationary bike x 10' Level 4 for increased ROM, circulation and mm endurance   L SLR 3x10/3"  L SL hip abd 3x10/3"  Bridges with low abd cues 3 x 10  Squats behind the mat 3 x 10  Shuttle 50# DL 3 x 15  Shuttle 25# SL 2 x 10    Not today:  QS with towel under ankle 3x10/3"  SAQ 3x10/3"  Prone L hip ext   3x10/3"  Prone L knee flexion 3x10/3"    Shakira received the following manual therapy techniques: Joint mobilizations and Soft tissue Mobilization were applied to the: L knee  for 10  minutes, including: joint capsular distractiong, patella mobs, and GSS and HSS     Shakira received cold pack for 10 minutes to to decrease circulation, pain, and swelling.    Home Exercises " Provided and Patient Education Provided     Education provided:   Posture awareness     Written Home Exercises Provided: yes.  Exercises were reviewed and Shakira was able to demonstrate them prior to the end of the session.  Shakira demonstrated good  understanding of the education provided.     Assessment   Pt tolerating quad and glut strengthening well in clinic. Notes her R quad performing the majority of the work with DL squats but fatigue on L with single leg. She requires cues with bridges for proper muscle activation    Shakira is progressing well towards her goals.   Pt prognosis is Good.     Pt will continue to benefit from skilled outpatient physical therapy to address the deficits listed in the problem list box on initial evaluation, provide pt/family education and to maximize pt's level of independence in the home and community environment.     Pt's spiritual, cultural and educational needs considered and pt agreeable to plan of care and goals.    Anticipated barriers to physical therapy: none     Goals: GOALS: Short Term Goals:  4 weeks  1.Report decreased L knee pain  < / =  1/10  to increase tolerance for ambulation in community (not met, progressing)  2. Increase knee ROM to 130 deg in order to be able to perform ADLs without difficulty.(not met, progressing)  3. Increase strength by 1/3 MMT grade in quads  to increase tolerance for ADL and work activities.(not met, progressing)  4. Pt to tolerate HEP to improve ROM and independence with ADL's(not met, progressing)     Long Term Goals: 8 weeks  1.Report decreased L knee pain < / = 0/10  to increase tolerance for return to walking on treadmill(not met, progressing)  2.Patient goal: Return to the gym without limitation(not met, progressing)  3.Increase strength to >/= 4+/5 in gluts and quads  to increase tolerance for ADL and work activities.(not met, progressing)  4. Pt will report at 43% limitation on FOTO knee to demonstrate increase in LE function  with every day tasks. (not met, progressing)      Plan     Continue with quad and glut strengthening     Emmanuel Galvez, PT, DPT

## 2019-10-16 ENCOUNTER — CLINICAL SUPPORT (OUTPATIENT)
Dept: REHABILITATION | Facility: HOSPITAL | Age: 74
End: 2019-10-16
Payer: MEDICARE

## 2019-10-16 DIAGNOSIS — G89.29 CHRONIC PAIN OF LEFT KNEE: ICD-10-CM

## 2019-10-16 DIAGNOSIS — R26.9 IMPAIRED GAIT: ICD-10-CM

## 2019-10-16 DIAGNOSIS — R60.0 LOCALIZED EDEMA: ICD-10-CM

## 2019-10-16 DIAGNOSIS — M25.562 CHRONIC PAIN OF LEFT KNEE: ICD-10-CM

## 2019-10-16 PROCEDURE — 97140 MANUAL THERAPY 1/> REGIONS: CPT | Mod: HCNC

## 2019-10-16 PROCEDURE — 97110 THERAPEUTIC EXERCISES: CPT | Mod: HCNC

## 2019-10-16 NOTE — PROGRESS NOTES
"  Physical Therapy Daily Treatment Note     Name: Shakira Pearl  Clinic Number: [id number]  Therapy Diagnosis:        Encounter Diagnoses   Name Primary?   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 Patient stating min hip pain "getting up in the morning".  She was compliant with home exercise program.  Response to previous treatment: decrease knee pain  Functional change: improved gait no pain    Pain: 1-2/10  Location: left knee      Objective     Shakira received therapeutic exercises to develop strength, endurance, ROM, flexibility and posture for 40' minutes including:    Stationary bike x 10' Level 4 for increased ROM, circulation and mm endurance  B Leg press  60#  3x15  B SL press 40#  3x10  Mini squats on rocker 3x10  L SLR 3x10/3"  L SL hip abd 3x10/3"  Bridges with low abd cues  10x      Not today:  QS with towel under ankle 3x10/3"  SAQ 3x10/3"  Prone L hip ext   3x10/3"  Prone L knee flexion 3x10/3"    Shakira received the following manual therapy techniques: Joint mobilizations and Soft tissue Mobilization were applied to the: L knee  for 10  minutes, including: joint capsular distractiong, patella mobs, and GSS and HSS     Shakira received cold pack for 10 minutes to to decrease circulation, pain, and swelling.    Home Exercises Provided and Patient Education " Provided     Education provided:   Posture awareness     Written Home Exercises Provided: yes.  Exercises were reviewed and Shakira was able to demonstrate them prior to the end of the session.  Shakira demonstrated good  understanding of the education provided.     Assessment   Pt tolerating tx well with increased quad and glut strengthening. Decreased pain with manual therapy. Mm soreness and fatigue after complete. VC/TC for correcting form/technique with therex.     Shakira is progressing well towards her goals.   Pt prognosis is Good.     Pt will continue to benefit from skilled outpatient physical therapy to address the deficits listed in the problem list box on initial evaluation, provide pt/family education and to maximize pt's level of independence in the home and community environment.     Pt's spiritual, cultural and educational needs considered and pt agreeable to plan of care and goals.    Anticipated barriers to physical therapy: none     Goals: GOALS: Short Term Goals:  4 weeks  1.Report decreased L knee pain  < / =  1/10  to increase tolerance for ambulation in community (not met, progressing)  2. Increase knee ROM to 130 deg in order to be able to perform ADLs without difficulty.(not met, progressing)  3. Increase strength by 1/3 MMT grade in quads  to increase tolerance for ADL and work activities.(not met, progressing)  4. Pt to tolerate HEP to improve ROM and independence with ADL's(not met, progressing)     Long Term Goals: 8 weeks  1.Report decreased L knee pain < / = 0/10  to increase tolerance for return to walking on treadmill(not met, progressing)  2.Patient goal: Return to the gym without limitation(not met, progressing)  3.Increase strength to >/= 4+/5 in gluts and quads  to increase tolerance for ADL and work activities.(not met, progressing)  4. Pt will report at 43% limitation on FOTO knee to demonstrate increase in LE function with every day tasks. (not met, progressing)      Plan      Continue with quad and glut strengthening     Jason Canela, PTA, STS

## 2019-10-18 ENCOUNTER — OFFICE VISIT (OUTPATIENT)
Dept: SPORTS MEDICINE | Facility: CLINIC | Age: 74
End: 2019-10-18
Payer: MEDICARE

## 2019-10-18 VITALS
SYSTOLIC BLOOD PRESSURE: 155 MMHG | DIASTOLIC BLOOD PRESSURE: 91 MMHG | HEART RATE: 75 BPM | WEIGHT: 145 LBS | HEIGHT: 63 IN | BODY MASS INDEX: 25.69 KG/M2

## 2019-10-18 DIAGNOSIS — Z98.890 S/P ARTHROSCOPY OF LEFT KNEE: Primary | ICD-10-CM

## 2019-10-18 PROCEDURE — 99999 PR PBB SHADOW E&M-EST. PATIENT-LVL III: CPT | Mod: PBBFAC,HCNC,, | Performed by: PHYSICIAN ASSISTANT

## 2019-10-18 PROCEDURE — 99024 POSTOP FOLLOW-UP VISIT: CPT | Mod: HCNC,S$GLB,, | Performed by: PHYSICIAN ASSISTANT

## 2019-10-18 PROCEDURE — 99999 PR PBB SHADOW E&M-EST. PATIENT-LVL III: ICD-10-PCS | Mod: PBBFAC,HCNC,, | Performed by: PHYSICIAN ASSISTANT

## 2019-10-18 PROCEDURE — 99024 PR POST-OP FOLLOW-UP VISIT: ICD-10-PCS | Mod: HCNC,S$GLB,, | Performed by: PHYSICIAN ASSISTANT

## 2019-10-18 NOTE — PROGRESS NOTES
HISTORY OF PRESENT ILLNESS:   Pt is here today for first post-operative followup of knee arthroscopy.  she is doing well.  We have reviewed her findings and discussed plan of care and future treatment options.       Patient reports doing great with no knee pain.   She took 1 dose of tramadol which made her not feel well and she never took it again.     She is requesting to do back to normal activities.        DATE OF PROCEDURE: 10/03/2019     SURGEON:  Kaylen Patiño M.D      PROCEDURE PERFORMED:   left  1. knee arthroscopic chondroplasty (CPT 28625)  2. knee arthroscopic medial (CPT 10990) meniscectomy   3. knee arthroscopic partial synovectomy/debridement (CPT 59891).   4. knee arthroscopic plica excision(CPT 03556).    5. Knee arthroscopic lysis of adhesions (CPT 22986)    In the patellofemoral compartment, there was chondral damage to:  Trochlea 5 x 8 mm grade 2  Chondroplasty was performed using arthroscopic shaver.     There was chondral damage to:  Medial femoral condyle 10 x 20 mm grade 2  Chondroplasty was performed using arthroscopic shaver.                                                                              PHYSICAL EXAMINATION:     Incision sites healed well  No evidence of any erythema, infection or induration  Range of motion -2-140 degrees  Minimal effusion  2+ DP pulse  No swelling, no calf tenderness  - Etta's sign  Negative medial joint line tendernes  Moderate quad atrophy                                                                                 ASSESSMENT:                                                                                                                                               1. Status post above, doing well.                                                                                                                               PLAN:                                                                                                                                                      1. Continue with PT. Increase activity as tolerated as discussed on how to do this.   2. Emphasized quad function.  3. I have discussed return to activity in detail.  4.Patient will see us back prn for knee pain                                  5. All questions were answered and patient should contact us if she  has any questions or concerns in the interim.

## 2019-10-21 ENCOUNTER — CLINICAL SUPPORT (OUTPATIENT)
Dept: REHABILITATION | Facility: HOSPITAL | Age: 74
End: 2019-10-21
Payer: MEDICARE

## 2019-10-21 DIAGNOSIS — M25.562 CHRONIC PAIN OF LEFT KNEE: ICD-10-CM

## 2019-10-21 DIAGNOSIS — R60.0 LOCALIZED EDEMA: ICD-10-CM

## 2019-10-21 DIAGNOSIS — G89.29 CHRONIC PAIN OF LEFT KNEE: ICD-10-CM

## 2019-10-21 DIAGNOSIS — R26.9 IMPAIRED GAIT: ICD-10-CM

## 2019-10-21 PROCEDURE — 97140 MANUAL THERAPY 1/> REGIONS: CPT | Mod: HCNC

## 2019-10-21 PROCEDURE — 97110 THERAPEUTIC EXERCISES: CPT | Mod: HCNC

## 2019-10-21 NOTE — PROGRESS NOTES
"  Physical Therapy Daily Treatment Note     Name: Shakira Pearl  Clinic Number: [id number]  Therapy Diagnosis:        Encounter Diagnoses   Name Primary?   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   She was not compliant with home exercise program.- due to upset stomach over the weekend  Response to previous treatment: decrease knee pain  Functional change: improved gait with decreased pain     Pain: 1-2/10  Location: left knee      Objective     Shakira received therapeutic exercises to develop strength, endurance, ROM, flexibility and posture for 40' minutes including:    Stationary bike x 10' Level 4 for increased ROM, circulation and mm endurance  B Leg press  70#  3x15  B SL press 50#  3x10  Mini squats on rocker 3x10  L QS 3x10/3"   L SLR 3x10/3"  L SL hip abd 3x10/3"  Bridges 2x10  Prone L hip ext   3x10/3"    Not today:  QS with towel under ankle 3x10/3"  SAQ 3x10/3"  Prone L knee flexion 3x10/3"    Shakira received the following manual therapy techniques: Joint mobilizations and Soft tissue Mobilization were applied to the: L knee  for 10  minutes, including: joint capsular distractiong, patella mobs, and GSS and HSS     Shakira received cold pack for 10 minutes to to decrease circulation, pain, and swelling.    Home Exercises Provided and Patient Education Provided     Education " provided:   Posture awareness     Written Home Exercises Provided: yes.  Exercises were reviewed and Shakira was able to demonstrate them prior to the end of the session.  Shakira demonstrated good  understanding of the education provided.     Assessment   Pt tolerating tx well with increased wt with several exercise. Continue with quad and glut strengthening. Decreased pain with manual therapy. Mm fatigue after complete. VC/TC for correcting form/technique with therex.     Shakira is progressing well towards her goals.   Pt prognosis is Good.     Pt will continue to benefit from skilled outpatient physical therapy to address the deficits listed in the problem list box on initial evaluation, provide pt/family education and to maximize pt's level of independence in the home and community environment.     Pt's spiritual, cultural and educational needs considered and pt agreeable to plan of care and goals.    Anticipated barriers to physical therapy: none     Goals: GOALS: Short Term Goals:  4 weeks  1.Report decreased L knee pain  < / =  1/10  to increase tolerance for ambulation in community (not met, progressing)  2. Increase knee ROM to 130 deg in order to be able to perform ADLs without difficulty.(not met, progressing)  3. Increase strength by 1/3 MMT grade in quads  to increase tolerance for ADL and work activities.(not met, progressing)  4. Pt to tolerate HEP to improve ROM and independence with ADL's(not met, progressing)     Long Term Goals: 8 weeks  1.Report decreased L knee pain < / = 0/10  to increase tolerance for return to walking on treadmill(not met, progressing)  2.Patient goal: Return to the gym without limitation(not met, progressing)  3.Increase strength to >/= 4+/5 in gluts and quads  to increase tolerance for ADL and work activities.(not met, progressing)  4. Pt will report at 43% limitation on FOTO knee to demonstrate increase in LE function with every day tasks. (not met, progressing)      Plan      Continue with quad and glut strengthening     Jason Canela, PTA, STS

## 2019-10-24 ENCOUNTER — CLINICAL SUPPORT (OUTPATIENT)
Dept: REHABILITATION | Facility: HOSPITAL | Age: 74
End: 2019-10-24
Payer: MEDICARE

## 2019-10-24 DIAGNOSIS — M25.562 CHRONIC PAIN OF LEFT KNEE: ICD-10-CM

## 2019-10-24 DIAGNOSIS — R60.0 LOCALIZED EDEMA: ICD-10-CM

## 2019-10-24 DIAGNOSIS — R26.9 IMPAIRED GAIT: ICD-10-CM

## 2019-10-24 DIAGNOSIS — G89.29 CHRONIC PAIN OF LEFT KNEE: ICD-10-CM

## 2019-10-24 PROCEDURE — 97110 THERAPEUTIC EXERCISES: CPT | Mod: HCNC

## 2019-10-24 PROCEDURE — 97140 MANUAL THERAPY 1/> REGIONS: CPT | Mod: HCNC

## 2019-10-24 NOTE — PROGRESS NOTES
"  Physical Therapy Daily Treatment Note     Name: Shakira Pearl  Clinic Number: [id number]  Therapy Diagnosis:        Encounter Diagnoses   Name Primary?   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   M94.262 (ICD-10-CM) - Chondromalacia of left knee      Evaluation Date: 10/7/2019  Authorization Period Expiration: 12/31/2019  Plan of Care Expiration: 11/11/2019  Visit # / Visits authorized: 6/ 20     Time In: 1400  Time Out: 1500  Total Billable Time: 30 minutes  TX time: 60'     Precautions: Standard and Fall,      Subjective     Pt reports: "its about the same"  She was not compliant with home exercise program.- due to upset stomach over the weekend  Response to previous treatment: decrease knee pain  Functional change: improved gait with decreased pain     Pain: 1-2/10  Location: left knee      Objective     Shakira received therapeutic exercises to develop strength, endurance, ROM, flexibility and posture for 40' minutes including:    Stationary bike x 10' Level 4 for increased ROM, circulation and mm endurance  B Leg press  70#  3x15  B SL press 50#  3x10  B heel raised 50# 3x10  Mini squats on rocker 3x10  L QS 3x10/3"   L SLR 3x10/3"  L SL hip abd 3x10/3"  Bridges 2x10  Prone L hip ext   3x10/3"    Not today:  QS with towel under ankle 3x10/3"  SAQ 3x10/3"  Prone L knee flexion 3x10/3"    Shakira received the following manual therapy techniques: Joint mobilizations and Soft tissue Mobilization were applied to the: L knee  for 10  minutes, including: joint capsular distractiong, patella mobs, and GSS and HSS     Shakira received cold pack for 10 minutes to to decrease circulation, pain, and swelling.    Home Exercises Provided and Patient Education Provided "     Education provided:   Posture awareness     Written Home Exercises Provided: yes.  Exercises were reviewed and Shakira was able to demonstrate them prior to the end of the session.  Shakira demonstrated good  understanding of the education provided.     Assessment   Pt tolerating tx well. Improved quad activation but still weakness with TKE performing SLR. Continue with quad and glut strengthening. Decreased pain with manual therapy. Mm fatigue after complete. VC/TC for correcting form/technique with therex.     Shakira is progressing well towards her goals.   Pt prognosis is Good.     Pt will continue to benefit from skilled outpatient physical therapy to address the deficits listed in the problem list box on initial evaluation, provide pt/family education and to maximize pt's level of independence in the home and community environment.     Pt's spiritual, cultural and educational needs considered and pt agreeable to plan of care and goals.    Anticipated barriers to physical therapy: none     Goals: GOALS: Short Term Goals:  4 weeks  1.Report decreased L knee pain  < / =  1/10  to increase tolerance for ambulation in community (not met, progressing)  2. Increase knee ROM to 130 deg in order to be able to perform ADLs without difficulty.(not met, progressing)  3. Increase strength by 1/3 MMT grade in quads  to increase tolerance for ADL and work activities.(not met, progressing)  4. Pt to tolerate HEP to improve ROM and independence with ADL's(not met, progressing)     Long Term Goals: 8 weeks  1.Report decreased L knee pain < / = 0/10  to increase tolerance for return to walking on treadmill(not met, progressing)  2.Patient goal: Return to the gym without limitation(not met, progressing)  3.Increase strength to >/= 4+/5 in gluts and quads  to increase tolerance for ADL and work activities.(not met, progressing)  4. Pt will report at 43% limitation on FOTO knee to demonstrate increase in LE function with every day  tasks. (not met, progressing)      Plan     Continue with quad and glut strengthening     Jason Canela, PTA, STS

## 2019-10-28 ENCOUNTER — CLINICAL SUPPORT (OUTPATIENT)
Dept: REHABILITATION | Facility: HOSPITAL | Age: 74
End: 2019-10-28
Payer: MEDICARE

## 2019-10-28 DIAGNOSIS — R26.9 IMPAIRED GAIT: ICD-10-CM

## 2019-10-28 DIAGNOSIS — M25.562 CHRONIC PAIN OF LEFT KNEE: ICD-10-CM

## 2019-10-28 DIAGNOSIS — R60.0 LOCALIZED EDEMA: ICD-10-CM

## 2019-10-28 DIAGNOSIS — G89.29 CHRONIC PAIN OF LEFT KNEE: ICD-10-CM

## 2019-10-28 PROCEDURE — 97140 MANUAL THERAPY 1/> REGIONS: CPT | Mod: HCNC | Performed by: PHYSICAL THERAPIST

## 2019-10-28 PROCEDURE — 97110 THERAPEUTIC EXERCISES: CPT | Mod: HCNC | Performed by: PHYSICAL THERAPIST

## 2019-10-28 NOTE — PROGRESS NOTES
"  Physical Therapy Daily Treatment Note     Name: Shakira Pearl  Clinic Number: [id number]  Therapy Diagnosis:        Encounter Diagnoses   Name Primary?   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   She was not compliant with home exercise program.- due to upset stomach over the weekend  Response to previous treatment: decrease knee pain   Functional change: can now squat at home    Pain: 1-2/10  Location: left knee      Objective     Shakira received therapeutic exercises to develop strength, endurance, ROM, flexibility and posture for 45' minutes including:    Recumbent bike x 8' Level 4 for increased ROM, circulation and mm endurance  Single leg bridge 3 x 8 B  B Leg press  80#  3x15  B SL press 60#  3x10  B heel raised 60# 3x10  Kettle bell squats 15# 3 x 12     Not today:  Mini squats on rocker 3x10  L QS 3x10/3"   L SLR 3x10/3"  L SL hip abd 3x10/3"  Bridges 2x10  Prone L hip ext   3x10/3"  QS with towel under ankle 3x10/3"  SAQ 3x10/3"  Prone L knee flexion 3x10/3"    Shakira received the following manual therapy techniques: Joint mobilizations and Soft tissue Mobilization were applied to the: L knee  for 10  minutes, including: joint capsular distractiong, patella mobs, and GSS and HSS     Shakira received cold pack for 10 " minutes to to decrease circulation, pain, and swelling.    Home Exercises Provided and Patient Education Provided     Education provided:   Posture awareness     Written Home Exercises Provided: yes.  Exercises were reviewed and Shakira was able to demonstrate them prior to the end of the session.  Shakira demonstrated good  understanding of the education provided.     Assessment   Pt notes fatigue on recumbent bicycle today, states how tired he quads felt after riding the bike compared to the upright bicycle. SL bridges require cues for proper form. Squats on the leg press with increased knee flexion angle to mimic deep squat off the ground    Shakira is progressing well towards her goals.   Pt prognosis is Good.     Pt will continue to benefit from skilled outpatient physical therapy to address the deficits listed in the problem list box on initial evaluation, provide pt/family education and to maximize pt's level of independence in the home and community environment.     Pt's spiritual, cultural and educational needs considered and pt agreeable to plan of care and goals.    Anticipated barriers to physical therapy: none     Goals: GOALS: Short Term Goals:  4 weeks  1.Report decreased L knee pain  < / =  1/10  to increase tolerance for ambulation in community (not met, progressing)  2. Increase knee ROM to 130 deg in order to be able to perform ADLs without difficulty.(not met, progressing)  3. Increase strength by 1/3 MMT grade in quads  to increase tolerance for ADL and work activities.(not met, progressing)  4. Pt to tolerate HEP to improve ROM and independence with ADL's(not met, progressing)     Long Term Goals: 8 weeks  1.Report decreased L knee pain < / = 0/10  to increase tolerance for return to walking on treadmill(not met, progressing)  2.Patient goal: Return to the gym without limitation(not met, progressing)  3.Increase strength to >/= 4+/5 in gluts and quads  to increase tolerance for ADL and work  activities.(not met, progressing)  4. Pt will report at 43% limitation on FOTO knee to demonstrate increase in LE function with every day tasks. (not met, progressing)      Plan     Continue with quad and glut strengthening, improve upon functional movements     Emmanuel Galvez, PT, DPT

## 2019-10-31 ENCOUNTER — CLINICAL SUPPORT (OUTPATIENT)
Dept: REHABILITATION | Facility: HOSPITAL | Age: 74
End: 2019-10-31
Payer: MEDICARE

## 2019-10-31 DIAGNOSIS — R60.0 LOCALIZED EDEMA: ICD-10-CM

## 2019-10-31 DIAGNOSIS — G89.29 CHRONIC PAIN OF LEFT KNEE: ICD-10-CM

## 2019-10-31 DIAGNOSIS — R26.9 IMPAIRED GAIT: ICD-10-CM

## 2019-10-31 DIAGNOSIS — M25.562 CHRONIC PAIN OF LEFT KNEE: ICD-10-CM

## 2019-10-31 PROCEDURE — 97110 THERAPEUTIC EXERCISES: CPT | Mod: HCNC | Performed by: PHYSICAL THERAPIST

## 2019-10-31 NOTE — PROGRESS NOTES
"  Physical Therapy Daily Treatment Note     Name: Shakira Pearl  Clinic Number: [id number]  Therapy Diagnosis:        Encounter Diagnoses   Name Primary?   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 My knees feel better   She was compliant with home exercise program.-   Response to previous treatment: decrease knee pain   Functional change: hips sore    Pain: 1-2/10  Location: left knee      Objective     Shakira received therapeutic exercises to develop strength, endurance, ROM, flexibility and posture for 40' minutes including:    Recumbent bike x 8' Level 4 for increased ROM, circulation and mm endurance  B SLR 2# 2 x 15  Single leg bridge 3 x 8 B  Kettle bell squats 15# 3 x 12 to low chair  Shuttle 2 1/2 bands 3 x 15  Shuttle 2 bands SL 3 x 15  Shuttle heel raise 2 band 40x  Side step across 4" step 20    Not today:  B Leg press  80#  3x15  B SL press 60#  3x10  B heel raised 60# 3x10  Mini squats on rocker 3x10  L QS 3x10/3"   L SLR 3x10/3"  L SL hip abd 3x10/3"  Bridges 2x10  Prone L hip ext   3x10/3"  QS with towel under ankle 3x10/3"  SAQ 3x10/3"  Prone L knee flexion 3x10/3"    Shakira received the following manual therapy techniques: Joint mobilizations and Soft tissue Mobilization were applied to the: L knee  for 10  minutes, including: joint capsular distractiong, " patella mobs, and GSS and HSS     Shakira received cold pack for 0 minutes to to decrease circulation, pain, and swelling.    Home Exercises Provided and Patient Education Provided     Education provided:   Posture awareness     Written Home Exercises Provided: yes.  Exercises were reviewed and Shakira was able to demonstrate them prior to the end of the session.  Shakira demonstrated good  understanding of the education provided.     Assessment   Pt notes her legs not as tired today after exercises. Low back discomfort in the gluts resolved by end of session. Good tolerance to shuttle without knee valgus present     Shakira is progressing well towards her goals.   Pt prognosis is Good.     Pt will continue to benefit from skilled outpatient physical therapy to address the deficits listed in the problem list box on initial evaluation, provide pt/family education and to maximize pt's level of independence in the home and community environment.     Pt's spiritual, cultural and educational needs considered and pt agreeable to plan of care and goals.    Anticipated barriers to physical therapy: none     Goals: GOALS: Short Term Goals:  4 weeks  1.Report decreased L knee pain  < / =  1/10  to increase tolerance for ambulation in community (not met, progressing)  2. Increase knee ROM to 130 deg in order to be able to perform ADLs without difficulty.(not met, progressing)  3. Increase strength by 1/3 MMT grade in quads  to increase tolerance for ADL and work activities.(not met, progressing)  4. Pt to tolerate HEP to improve ROM and independence with ADL's(not met, progressing)     Long Term Goals: 8 weeks  1.Report decreased L knee pain < / = 0/10  to increase tolerance for return to walking on treadmill(not met, progressing)  2.Patient goal: Return to the gym without limitation(not met, progressing)  3.Increase strength to >/= 4+/5 in gluts and quads  to increase tolerance for ADL and work activities.(not met,  progressing)  4. Pt will report at 43% limitation on FOTO knee to demonstrate increase in LE function with every day tasks. (not met, progressing)      Plan     Continue with quad and glut strengthening, improve upon functional movements     Emmanuel Galvez, PT, DPT

## 2019-11-01 ENCOUNTER — TELEPHONE (OUTPATIENT)
Dept: PAIN MEDICINE | Facility: CLINIC | Age: 74
End: 2019-11-01

## 2019-11-01 NOTE — TELEPHONE ENCOUNTER
----- Message from Jessie Real sent at 11/1/2019  2:04 PM CDT -----  Contact: CHELSY GONZALEZ   Name of Who is Calling: CHELSY GONZALEZ       What is the request in detail: Patient is requesting a call back regarding a procedure she requested to be canceled and the MRI results      Can the clinic reply by [facility]: NO      What Number to Call Back if not in [facility]: [phone]

## 2019-11-01 NOTE — TELEPHONE ENCOUNTER
Patient was contacted as per patient she stated that she would contact the office back she was on the phone with her insurance carrier.

## 2019-11-04 ENCOUNTER — CLINICAL SUPPORT (OUTPATIENT)
Dept: REHABILITATION | Facility: HOSPITAL | Age: 74
End: 2019-11-04
Payer: MEDICARE

## 2019-11-04 DIAGNOSIS — M25.562 CHRONIC PAIN OF LEFT KNEE: ICD-10-CM

## 2019-11-04 DIAGNOSIS — R60.0 LOCALIZED EDEMA: ICD-10-CM

## 2019-11-04 DIAGNOSIS — G89.29 CHRONIC PAIN OF LEFT KNEE: ICD-10-CM

## 2019-11-04 DIAGNOSIS — R26.9 IMPAIRED GAIT: ICD-10-CM

## 2019-11-04 PROCEDURE — 97110 THERAPEUTIC EXERCISES: CPT | Mod: HCNC | Performed by: PHYSICAL THERAPIST

## 2019-11-04 PROCEDURE — 97530 THERAPEUTIC ACTIVITIES: CPT | Mod: HCNC

## 2019-11-04 PROCEDURE — 97110 THERAPEUTIC EXERCISES: CPT | Mod: HCNC

## 2019-11-04 NOTE — PROGRESS NOTES
"  Physical Therapy Daily Treatment Note     Name: Shakira Pearl  Clinic Number: [id number]  Therapy Diagnosis:        Encounter Diagnoses   Name Primary?   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   M94.262 (ICD-10-CM) - Chondromalacia of left knee      Evaluation Date: 10/7/2019  Authorization Period Expiration: 12/31/2019  Plan of Care Expiration: 11/11/2019  Visit # / Visits authorized: 9/ 20     Time In: 0200  Time Out: 0253  Total Billable Time: 53 minutes  TX time: 53     Precautions: Standard and Fall,      Subjective     Pt reports:knee feels better, pain just when I stoop down  She was compliant with home exercise program.-   Response to previous treatment: decrease knee pain   Functional change: hips sore    Pain: 1-2/10  Location: left knee      Objective   Co-tx w/ Emmanuel Falgoust    Shakira received therapeutic exercises to develop strength, endurance, ROM, flexibility and posture for 20 minutes including:    Recumbent bike x 10' Level 4 for increased ROM, circulation and mm endurance  Single leg bridge 3 x 10 B  L Ecc heel raise 3x10  Kettle bell squats 15# 3 x 12 to low chair-NT  Shuttle 2 1/2 bands 3 x 15-NT  Shuttle 2 bands SL 3 x 15 -NT    Therapeutic activity: 33  Fwd/lat step up yellow box 3x10  Lateral lunge 2x10  Lateral walks GTB 1 lap  Monster walks GTB 1 lap    Not today:  B SLR 2# 2 x 15  Shuttle heel raise 2 band 40x  B Leg press  80#  3x15  B SL press 60#  3x10  B heel raised 60# 3x10  Mini squats on rocker 3x10  L QS 3x10/3"   L SLR 3x10/3"  L SL hip abd 3x10/3"  Bridges 2x10  Prone L hip ext   3x10/3"  QS with towel under ankle 3x10/3"  SAQ 3x10/3"  Prone L knee flexion 3x10/3"      Shakira received the following manual therapy " techniques: Joint mobilizations and Soft tissue Mobilization were applied to the: L knee  for 00  minutes, including: joint capsular distractiong, patella mobs, and GSS and HSS     Shakira received cold pack for 0 minutes to to decrease circulation, pain, and swelling.    Home Exercises Provided and Patient Education Provided     Education provided:   Posture awareness     Written Home Exercises Provided: yes.  Exercises were reviewed and Shakira was able to demonstrate them prior to the end of the session.  Shakira demonstrated good  understanding of the education provided.     Assessment   Pt violeta advance ex well w/o adverse reaction. VC and mirror feedback needed for proper form w/ lat lunge. Educated on HEP with lunges, patient pleased to get off the floor with less discomfort     Shakira is progressing well towards her goals.   Pt prognosis is Good.     Pt will continue to benefit from skilled outpatient physical therapy to address the deficits listed in the problem list box on initial evaluation, provide pt/family education and to maximize pt's level of independence in the home and community environment.     Pt's spiritual, cultural and educational needs considered and pt agreeable to plan of care and goals.    Anticipated barriers to physical therapy: none     Goals: GOALS: Short Term Goals:  4 weeks  1.Report decreased L knee pain  < / =  1/10  to increase tolerance for ambulation in community (not met, progressing)  2. Increase knee ROM to 130 deg in order to be able to perform ADLs without difficulty.(not met, progressing)  3. Increase strength by 1/3 MMT grade in quads  to increase tolerance for ADL and work activities.(not met, progressing)  4. Pt to tolerate HEP to improve ROM and independence with ADL's(not met, progressing)     Long Term Goals: 8 weeks  1.Report decreased L knee pain < / = 0/10  to increase tolerance for return to walking on treadmill(not met, progressing)  2.Patient goal: Return to the  gym without limitation(not met, progressing)  3.Increase strength to >/= 4+/5 in gluts and quads  to increase tolerance for ADL and work activities.(not met, progressing)  4. Pt will report at 43% limitation on FOTO knee to demonstrate increase in LE function with every day tasks. (not met, progressing)      Plan     Continue with quad and glut strengthening, improve upon functional movements     Adamaris Arce, PTA,     Emmanuel Galvez, PT DPT

## 2019-11-07 ENCOUNTER — CLINICAL SUPPORT (OUTPATIENT)
Dept: REHABILITATION | Facility: HOSPITAL | Age: 74
End: 2019-11-07
Payer: MEDICARE

## 2019-11-07 DIAGNOSIS — R26.9 IMPAIRED GAIT: ICD-10-CM

## 2019-11-07 DIAGNOSIS — G89.29 CHRONIC PAIN OF LEFT KNEE: ICD-10-CM

## 2019-11-07 DIAGNOSIS — M25.562 CHRONIC PAIN OF LEFT KNEE: ICD-10-CM

## 2019-11-07 DIAGNOSIS — R60.0 LOCALIZED EDEMA: ICD-10-CM

## 2019-11-07 PROCEDURE — 97110 THERAPEUTIC EXERCISES: CPT | Mod: HCNC | Performed by: PHYSICAL THERAPIST

## 2019-11-07 PROCEDURE — 97140 MANUAL THERAPY 1/> REGIONS: CPT | Mod: HCNC | Performed by: PHYSICAL THERAPIST

## 2019-11-07 NOTE — PROGRESS NOTES
"  Physical Therapy Daily Treatment Note     Name: Shakira Pearl  Clinic Number: [id number]  Therapy Diagnosis:        Encounter Diagnoses   Name Primary?   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   M94.262 (ICD-10-CM) - Chondromalacia of left knee      Evaluation Date: 10/7/2019  Authorization Period Expiration: 12/31/2019  Plan of Care Expiration: 11/11/2019  Visit # / Visits authorized: 10/ 20     Time In: 1400  Time Out: 1455  Total Billable Time: 55 minutes  TX time: 55     Precautions: Standard and Fall,      Subjective     Pt reports:my right hip has been aching on the back side  She was compliant with home exercise program.-   Response to previous treatment: decrease knee pain   Functional change: hips sore    Pain: 1-2/10  Location: left knee      Objective       Shakira received therapeutic exercises to develop strength, endurance, ROM, flexibility and posture for 25 minutes including:    SL clams BTB 2 x 20  Sl hip abduction 2 x 15  Bridges with BTB above knees 30x  Recumbent bike x 10' Level 4 for increased ROM, circulation and mm endurance    Single leg bridge 3 x 10 B  L Ecc heel raise 3x10  Kettle bell squats 15# 3 x 12 to low chair-NT  Shuttle 2 1/2 bands 3 x 15-NT  Shuttle 2 bands SL 3 x 15 -NT    Therapeutic activity: 0  Fwd/lat step up yellow box 3x10  Lateral lunge 2x10  Lateral walks GTB 1 lap  Monster walks GTB 1 lap    Not today:  B SLR 2# 2 x 15  Shuttle heel raise 2 band 40x  B Leg press  80#  3x15  B SL press 60#  3x10  B heel raised 60# 3x10  Mini squats on rocker 3x10  L QS 3x10/3"   L SLR 3x10/3"  L SL hip abd 3x10/3"  Bridges 2x10  Prone L hip ext   3x10/3"  QS with towel under ankle 3x10/3"  SAQ 3x10/3"  Prone L knee flexion " "3x10/3"      Shakira received the following manual therapy techniques: Joint mobilizations and Soft tissue Mobilization were applied to the: L knee  for 30  minutes, including: joint capsular distractiong, patella mobs, and GSS and HSS   Long axis distraction to R hip  Scooping mob to R hip inferior and inferior lateral  Sidelying gapping to L4-S1      Shakira received cold pack for 0 minutes to to decrease circulation, pain, and swelling.    Home Exercises Provided and Patient Education Provided     Education provided:   Posture awareness     Written Home Exercises Provided: yes.  Exercises were reviewed and Shakira was able to demonstrate them prior to the end of the session.  Shakira demonstrated good  understanding of the education provided.     Assessment   Patient with decreased hip pain after treatment session. Notes only sleeping 3 hours last night and fatigued so left session a couple minutes early. Given BTB for new HEP with glut strengthening    Shakira is progressing well towards her goals.   Pt prognosis is Good.     Pt will continue to benefit from skilled outpatient physical therapy to address the deficits listed in the problem list box on initial evaluation, provide pt/family education and to maximize pt's level of independence in the home and community environment.     Pt's spiritual, cultural and educational needs considered and pt agreeable to plan of care and goals.    Anticipated barriers to physical therapy: none     Goals: GOALS: Short Term Goals:  4 weeks  1.Report decreased L knee pain  < / =  1/10  to increase tolerance for ambulation in community (not met, progressing)  2. Increase knee ROM to 130 deg in order to be able to perform ADLs without difficulty.(not met, progressing)  3. Increase strength by 1/3 MMT grade in quads  to increase tolerance for ADL and work activities.(not met, progressing)  4. Pt to tolerate HEP to improve ROM and independence with ADL's(not met, progressing)     Long " Term Goals: 8 weeks  1.Report decreased L knee pain < / = 0/10  to increase tolerance for return to walking on treadmill(not met, progressing)  2.Patient goal: Return to the gym without limitation(not met, progressing)  3.Increase strength to >/= 4+/5 in gluts and quads  to increase tolerance for ADL and work activities.(not met, progressing)  4. Pt will report at 43% limitation on FOTO knee to demonstrate increase in LE function with every day tasks. (not met, progressing)      Plan     Continue with quad and glut strengthening, improve upon functional movements     Emmanuel Galvez, PT,

## 2019-11-11 ENCOUNTER — CLINICAL SUPPORT (OUTPATIENT)
Dept: REHABILITATION | Facility: HOSPITAL | Age: 74
End: 2019-11-11
Payer: MEDICARE

## 2019-11-11 DIAGNOSIS — R26.9 IMPAIRED GAIT: ICD-10-CM

## 2019-11-11 DIAGNOSIS — R60.0 LOCALIZED EDEMA: ICD-10-CM

## 2019-11-11 DIAGNOSIS — G89.29 CHRONIC PAIN OF LEFT KNEE: ICD-10-CM

## 2019-11-11 DIAGNOSIS — M25.562 CHRONIC PAIN OF LEFT KNEE: ICD-10-CM

## 2019-11-11 PROCEDURE — 97110 THERAPEUTIC EXERCISES: CPT | Mod: HCNC | Performed by: PHYSICAL THERAPIST

## 2019-11-11 NOTE — PROGRESS NOTES
Physical Therapy Daily Treatment Note     Name: Shakira Pearl  Clinic Number: [id number]  Therapy Diagnosis:        Encounter Diagnoses   Name Primary?   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   M94.262 (ICD-10-CM) - Chondromalacia of left knee      Evaluation Date: 10/7/2019  Authorization Period Expiration: 12/31/2019  Plan of Care Expiration: 11/11/2019  New Plan of Care Expiration: 12/17/2019  Visit # / Visits authorized: 11/ 20     Time In: 1400  Time Out: 1455  Total Billable Time: 55 minutes  TX time: 55     Precautions: Standard and Fall,      Subjective     Pt reports:my hip is doing better but I could not get that band to tie around my knees to do my exercises  She was compliant with home exercise program.-   Response to previous treatment: decrease hip pain  Functional change: hips sore    Pain: 1-2/10  Location: left knee      Objective     11/11/19:  Right knee flexion 137 degrees  Left knee flexion 133 degrees    Strength 4+/5 grossly except glut med 4/5    Shakira received therapeutic exercises to develop strength, endurance, ROM, flexibility and posture for 45 minutes including:    Recumbent bike x 10' Level 4 for increased ROM, circulation and mm endurance  SL clams BTB 2 x 20  Sl hip abduction 2 x 15  Single leg bridge 3 x 10 B  Shuttle 2 1/2 bands 3 x 15  Shuttle 2 bands SL 3 x 15  Palloff press 7# 15x each direction    L Ecc heel raise 3x10-NT  Kettle bell squats 15# 3 x 12 to low chair-NT     Therapeutic activity: 0  Fwd/lat step up yellow box 3x10  Lateral lunge 2x10  Lateral walks GTB 1 lap  Monster walks GTB 1 lap    Not today:  B SLR 2# 2 x 15  Shuttle heel raise 2 band 40x  B Leg press  80#  3x15  B SL press 60#  3x10  B heel raised 60#  "3x10  Mini squats on rocker 3x10  L QS 3x10/3"   L SLR 3x10/3"  L SL hip abd 3x10/3"  Bridges 2x10  Prone L hip ext   3x10/3"  QS with towel under ankle 3x10/3"  SAQ 3x10/3"  Prone L knee flexion 3x10/3"      Shakira received the following manual therapy techniques: Joint mobilizations and Soft tissue Mobilization were applied to the: L knee  for 10  minutes, including: joint capsular distractiong, patella mobs, and GSS and HSS   Long axis distraction to R hip  Scooping mob to R hip inferior and inferior lateral-NT  Sidelying gapping to L4-S1-NT      Shakira received cold pack for 0 minutes to to decrease circulation, pain, and swelling.    Home Exercises Provided and Patient Education Provided     Education provided:   Posture awareness     Written Home Exercises Provided: yes.  Exercises were reviewed and Shakira was able to demonstrate them prior to the end of the session.  Shakira demonstrated good  understanding of the education provided.     Assessment   Patient with decreased hip symptoms today. She has functional ROM in the knee with weakness to the quads causing tightness and some clicking in the A.M. She is progressing with functional exercises such as palloff press to improve her squat form and address low back pain.    Shakira is progressing well towards her goals.   Pt prognosis is Good.     Pt will continue to benefit from skilled outpatient physical therapy to address the deficits listed in the problem list box on initial evaluation, provide pt/family education and to maximize pt's level of independence in the home and community environment.     Pt's spiritual, cultural and educational needs considered and pt agreeable to plan of care and goals.    Anticipated barriers to physical therapy: none     Goals: GOALS: Short Term Goals:  4 weeks  1.Report decreased L knee pain  < / =  1/10  to increase tolerance for ambulation in community (not met, progressing)  2. Increase knee ROM to 130 deg in order to be " able to perform ADLs without difficulty.(met)  3. Increase strength by 1/3 MMT grade in quads  to increase tolerance for ADL and work activities.(met)  4. Pt to tolerate HEP to improve ROM and independence with ADL's(met)     Long Term Goals: 8 weeks  1.Report decreased L knee pain < / = 0/10  to increase tolerance for return to walking on treadmill(not met, progressing)  2.Patient goal: Return to the gym without limitation(not met, progressing)  3.Increase strength to >/= 4+/5 in gluts and quads  to increase tolerance for ADL and work activities.(not met, progressing)  4. Pt will report at 43% limitation on FOTO knee to demonstrate increase in LE function with every day tasks. (met)      Plan     Certification Period: 11/11/19 to 12/17/19  Recommended Treatment Plan: 2 times per week for 5 weeks: Cervical/Lumbar Traction, Gait Training, Manual Therapy, Moist Heat/ Ice, Neuromuscular Re-ed, Patient Education, Self Care, Therapeutic Activites and Therapeutic Exercise  Other Recommendations: modalities prn, ASTYM prn    Therapist: Emmanuel Galvez PT, DPT    I CERTIFY THE NEED FOR THESE SERVICES FURNISHED UNDER THIS PLAN OF TREATMENT AND WHILE UNDER MY CARE    Physician's comments: ________________________________________________________________________________________________________________________________________________      Physician's Name: ___________________________________    Emmanuel Galvez PT,

## 2019-11-11 NOTE — PLAN OF CARE
Physical Therapy Daily Treatment Note     Name: Shakira Pearl  Clinic Number: [id number]  Therapy Diagnosis:        Encounter Diagnoses   Name Primary?   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   M94.262 (ICD-10-CM) - Chondromalacia of left knee      Evaluation Date: 10/7/2019  Authorization Period Expiration: 12/31/2019  Plan of Care Expiration: 11/11/2019  New Plan of Care Expiration: 12/17/2019  Visit # / Visits authorized: 11/ 20     Time In: 1400  Time Out: 1455  Total Billable Time: 55 minutes  TX time: 55     Precautions: Standard and Fall,      Subjective     Pt reports:my hip is doing better but I could not get that band to tie around my knees to do my exercises  She was compliant with home exercise program.-   Response to previous treatment: decrease hip pain  Functional change: hips sore    Pain: 1-2/10  Location: left knee      Objective   11/11/19:  Right knee flexion 137 degrees  Left knee flexion 133 degrees    Strength 4+/5 grossly except glut med 4/5    Shakira received therapeutic exercises to develop strength, endurance, ROM, flexibility and posture for 45 minutes including:    Recumbent bike x 10' Level 4 for increased ROM, circulation and mm endurance  SL clams BTB 2 x 20  Sl hip abduction 2 x 15  Single leg bridge 3 x 10 B  Shuttle 2 1/2 bands 3 x 15  Shuttle 2 bands SL 3 x 15  Palloff press 7# 15x each direction    L Ecc heel raise 3x10-NT  Kettle bell squats 15# 3 x 12 to low chair-NT     Therapeutic activity: 0  Fwd/lat step up yellow box 3x10  Lateral lunge 2x10  Lateral walks GTB 1 lap  Monster walks GTB 1 lap    Not today:  B SLR 2# 2 x 15  Shuttle heel raise 2 band 40x  B Leg press  80#  3x15  B SL press 60#  3x10  B heel raised 60#  "3x10  Mini squats on rocker 3x10  L QS 3x10/3"   L SLR 3x10/3"  L SL hip abd 3x10/3"  Bridges 2x10  Prone L hip ext   3x10/3"  QS with towel under ankle 3x10/3"  SAQ 3x10/3"  Prone L knee flexion 3x10/3"      Shakira received the following manual therapy techniques: Joint mobilizations and Soft tissue Mobilization were applied to the: L knee  for 10  minutes, including: joint capsular distractiong, patella mobs, and GSS and HSS   Long axis distraction to R hip  Scooping mob to R hip inferior and inferior lateral-NT  Sidelying gapping to L4-S1-NT      Shakira received cold pack for 0 minutes to to decrease circulation, pain, and swelling.    Home Exercises Provided and Patient Education Provided     Education provided:   Posture awareness     Written Home Exercises Provided: yes.  Exercises were reviewed and Shakira was able to demonstrate them prior to the end of the session.  Shakira demonstrated good  understanding of the education provided.     Assessment   Patient with decreased hip symptoms today. She has functional ROM in the knee with weakness to the quads causing tightness and some clicking in the A.M. She is progressing with functional exercises such as palloff press to improve her squat form and address low back pain.    Shakira is progressing well towards her goals.   Pt prognosis is Good.     Pt will continue to benefit from skilled outpatient physical therapy to address the deficits listed in the problem list box on initial evaluation, provide pt/family education and to maximize pt's level of independence in the home and community environment.     Pt's spiritual, cultural and educational needs considered and pt agreeable to plan of care and goals.    Anticipated barriers to physical therapy: none     Goals: GOALS: Short Term Goals:  4 weeks  1.Report decreased L knee pain  < / =  1/10  to increase tolerance for ambulation in community (not met, progressing)  2. Increase knee ROM to 130 deg in order to be " able to perform ADLs without difficulty.(met)  3. Increase strength by 1/3 MMT grade in quads  to increase tolerance for ADL and work activities.(met)  4. Pt to tolerate HEP to improve ROM and independence with ADL's(met)     Long Term Goals: 8 weeks  1.Report decreased L knee pain < / = 0/10  to increase tolerance for return to walking on treadmill(not met, progressing)  2.Patient goal: Return to the gym without limitation(not met, progressing)  3.Increase strength to >/= 4+/5 in gluts and quads  to increase tolerance for ADL and work activities.(not met, progressing)  4. Pt will report at 43% limitation on FOTO knee to demonstrate increase in LE function with every day tasks. (met)      Plan     Certification Period: 11/11/19 to 12/17/19  Recommended Treatment Plan: 2 times per week for 5 weeks: Cervical/Lumbar Traction, Gait Training, Manual Therapy, Moist Heat/ Ice, Neuromuscular Re-ed, Patient Education, Self Care, Therapeutic Activites and Therapeutic Exercise  Other Recommendations: modalities prn, ASTYM prn    Therapist: Emmanuel Galvez PT, DPT    I CERTIFY THE NEED FOR THESE SERVICES FURNISHED UNDER THIS PLAN OF TREATMENT AND WHILE UNDER MY CARE    Physician's comments: ________________________________________________________________________________________________________________________________________________      Physician's Name: ___________________________________    Emmanuel Galvez PT,

## 2019-11-13 ENCOUNTER — OFFICE VISIT (OUTPATIENT)
Dept: INTERNAL MEDICINE | Facility: CLINIC | Age: 74
End: 2019-11-13
Payer: MEDICARE

## 2019-11-13 VITALS
HEART RATE: 80 BPM | HEIGHT: 63 IN | WEIGHT: 150.38 LBS | RESPIRATION RATE: 16 BRPM | TEMPERATURE: 99 F | BODY MASS INDEX: 26.64 KG/M2 | SYSTOLIC BLOOD PRESSURE: 138 MMHG | DIASTOLIC BLOOD PRESSURE: 82 MMHG

## 2019-11-13 DIAGNOSIS — K21.9 GASTROESOPHAGEAL REFLUX DISEASE, ESOPHAGITIS PRESENCE NOT SPECIFIED: ICD-10-CM

## 2019-11-13 DIAGNOSIS — M43.06 SPONDYLOLYSIS OF LUMBAR REGION: ICD-10-CM

## 2019-11-13 DIAGNOSIS — M79.673 PAIN OF FOOT, UNSPECIFIED LATERALITY: ICD-10-CM

## 2019-11-13 DIAGNOSIS — I10 ESSENTIAL HYPERTENSION: ICD-10-CM

## 2019-11-13 DIAGNOSIS — Z00.00 ANNUAL PHYSICAL EXAM: Primary | ICD-10-CM

## 2019-11-13 PROBLEM — Z23 IMMUNIZATION DUE: Status: RESOLVED | Noted: 2017-02-20 | Resolved: 2019-11-13

## 2019-11-13 PROCEDURE — 99999 PR PBB SHADOW E&M-EST. PATIENT-LVL IV: ICD-10-PCS | Mod: PBBFAC,HCNC,, | Performed by: INTERNAL MEDICINE

## 2019-11-13 PROCEDURE — 3075F PR MOST RECENT SYSTOLIC BLOOD PRESS GE 130-139MM HG: ICD-10-PCS | Mod: HCNC,CPTII,S$GLB, | Performed by: INTERNAL MEDICINE

## 2019-11-13 PROCEDURE — 99397 PER PM REEVAL EST PAT 65+ YR: CPT | Mod: HCNC,S$GLB,, | Performed by: INTERNAL MEDICINE

## 2019-11-13 PROCEDURE — 99397 PR PREVENTIVE VISIT,EST,65 & OVER: ICD-10-PCS | Mod: HCNC,S$GLB,, | Performed by: INTERNAL MEDICINE

## 2019-11-13 PROCEDURE — 3079F PR MOST RECENT DIASTOLIC BLOOD PRESSURE 80-89 MM HG: ICD-10-PCS | Mod: HCNC,CPTII,S$GLB, | Performed by: INTERNAL MEDICINE

## 2019-11-13 PROCEDURE — 99999 PR PBB SHADOW E&M-EST. PATIENT-LVL IV: CPT | Mod: PBBFAC,HCNC,, | Performed by: INTERNAL MEDICINE

## 2019-11-13 PROCEDURE — 3075F SYST BP GE 130 - 139MM HG: CPT | Mod: HCNC,CPTII,S$GLB, | Performed by: INTERNAL MEDICINE

## 2019-11-13 PROCEDURE — 3079F DIAST BP 80-89 MM HG: CPT | Mod: HCNC,CPTII,S$GLB, | Performed by: INTERNAL MEDICINE

## 2019-11-13 RX ORDER — PANTOPRAZOLE SODIUM 40 MG/1
40 TABLET, DELAYED RELEASE ORAL
Qty: 90 TABLET | Refills: 3 | Status: SHIPPED | OUTPATIENT
Start: 2019-11-13 | End: 2019-11-13 | Stop reason: SDUPTHER

## 2019-11-13 RX ORDER — PANTOPRAZOLE SODIUM 40 MG/1
40 TABLET, DELAYED RELEASE ORAL
Qty: 90 TABLET | Refills: 3 | Status: SHIPPED | OUTPATIENT
Start: 2019-11-13 | End: 2020-12-29

## 2019-11-13 RX ORDER — LOSARTAN POTASSIUM 50 MG/1
50 TABLET ORAL DAILY
Qty: 90 TABLET | Refills: 3 | Status: SHIPPED | OUTPATIENT
Start: 2019-11-13 | End: 2020-10-06

## 2019-11-13 NOTE — PROGRESS NOTES
Subjective:       Patient ID: Shakira Pearl is a 74 y.o. female.    Chief Complaint: Annual Exam; Results; and Medication Refill    HPI   74 y.o. Female here for annual exam.     Vaccines: Influenza (2019); Tetanus (2019); Pneumovax (done); Shingrix (will get)  Eye exam: 2019  Mammogram: 12/18  Gyn exam: declined   Colonoscopy: 4/15- repeat in 5 yrs  DEXA: 8/19(NL)    Exercise: walks  Diet: regular    Past Medical History:  sinus: Allergy  back: Arthritis  back: Degenerative disc disease  No date: Neuromuscular disorder  No date: Vitamin D deficiency  Past Surgical History:  age 19: APPENDECTOMY  No date: CHOLECYSTECTOMY      Comment:  age of 25  10/3/2019: CHONDROPLASTY OF KNEE; Left      Comment:  Procedure: CHONDROPLASTY, KNEE;  Surgeon: Kaylen Patiño MD;  Location: [facility] OR;  Service: Orthopedics;                 Laterality: Left;  38: HYSTERECTOMY  2/18/2019: INJECTION OF JOINT; Bilateral      Comment:  Procedure: INJECTION, JOINT BILATERAL SI;  Surgeon:                Mert Coates MD;  [facility];  Service: Pain               Management;  Laterality: Bilateral;  BILATERAL SI JOINT                INJECTION  10/3/2019: KNEE ARTHROSCOPY W/ MENISCECTOMY; Left      Comment:  Procedure: ARTHROSCOPY, KNEE, WITH MENISCECTOMY;                 Surgeon: Kaylen Patiño MD;  Location: [facility] OR;  Service:                Orthopedics;  Laterality: Left;  10/3/2019: SYNOVECTOMY OF KNEE; Left      Comment:  Procedure: SYNOVECTOMY, KNEE;  Surgeon: Kaylen Patiño MD;                Location: [facility] OR;  Service: Orthopedics;  Laterality:                Left;  Social History    Socioeconomic History      Marital status:       Spouse name: Not on file      Number of children: 1      Years of education: Not on file      Highest education level: Not on file    Occupational History      Occupation: retired    Social Needs      Financial resource strain: Not on file      Food insecurity:        Worry: Not on  file        Inability: Not on file      Transportation needs:        Medical: Not on file        Non-medical: Not on file    Tobacco Use      Smoking status: Never Smoker      Smokeless tobacco: Never Used    Substance and Sexual Activity      Alcohol use: No      Drug use: No      Sexual activity: Not Currently    Lifestyle      Physical activity:        Days per week: Not on file        Minutes per session: Not on file      Stress: Not on file    Relationships      Social connections:        Talks on phone: Not on file        Gets together: Not on file        Attends Anabaptist service: Not on file        Active member of club or organization: Not on file        Attends meetings of clubs or organizations: Not on file        Relationship status: Not on file    Other Topics      Concerns:        Not on file    Social History Narrative      Not on file    Review of patient's allergies indicates:   -- Demerol [meperidine] -- Nausea And Vomiting    --  Other reaction(s): Nausea   -- Papaya     --  Illness vomiting   -- Sulfa (sulfonamide antibiotics) -- Rash   -- Sulfur -- Rash  Shakira Pearl had no medications administered during this visit.    Review of Systems   Constitutional: Negative for activity change, appetite change, chills, diaphoresis, fatigue, fever and unexpected weight change.   HENT: Negative for congestion, mouth sores, postnasal drip, rhinorrhea, sinus pressure, sneezing, sore throat, trouble swallowing and voice change.    Eyes: Negative for pain, discharge and visual disturbance.   Respiratory: Negative for cough, shortness of breath and wheezing.    Cardiovascular: Negative for chest pain, palpitations and leg swelling.   Gastrointestinal: Negative for abdominal pain, blood in stool, constipation, diarrhea, nausea and vomiting.   Endocrine: Negative for cold intolerance and heat intolerance.   Genitourinary: Negative for difficulty urinating, dysuria, frequency, hematuria and urgency.    Musculoskeletal: Positive for back pain. Negative for arthralgias and myalgias.   Skin: Negative for rash and wound.   Allergic/Immunologic: Negative for environmental allergies and food allergies.   Neurological: Negative for dizziness, tremors, seizures, syncope, weakness, light-headedness and headaches.   Hematological: Negative for adenopathy. Does not bruise/bleed easily.   Psychiatric/Behavioral: Negative for confusion and sleep disturbance. The patient is not nervous/anxious.        Objective:      Physical Exam   Constitutional: She is oriented to person, place, and time. She appears well-developed and well-nourished. No distress.   HENT:   Head: Normocephalic and atraumatic.   Right Ear: External ear normal.   Left Ear: External ear normal.   Nose: Nose normal.   Mouth/Throat: Oropharynx is clear and moist. No oropharyngeal exudate.   Eyes: Pupils are equal, round, and reactive to light. Conjunctivae and EOM are normal. Right eye exhibits no discharge. Left eye exhibits no discharge. No scleral icterus.   Neck: Neck supple. No JVD present. No thyromegaly present.   Cardiovascular: Normal rate, regular rhythm, normal heart sounds and intact distal pulses.   No murmur heard.  Pulmonary/Chest: Effort normal and breath sounds normal. No respiratory distress. She has no wheezes. She has no rales. She exhibits no tenderness.   Abdominal: Soft. Bowel sounds are normal. She exhibits no distension. There is no tenderness. There is no guarding.   Musculoskeletal: She exhibits no edema.   Lymphadenopathy:     She has no cervical adenopathy.   Neurological: She is alert and oriented to person, place, and time. No cranial nerve deficit. Coordination normal.   Skin: Skin is warm and dry. No rash noted. She is not diaphoretic. No pallor.   Psychiatric: She has a normal mood and affect. Judgment normal.   Nursing note and vitals reviewed.      Assessment:       1. Annual physical exam    2. Essential hypertension    3.  Gastroesophageal reflux disease, esophagitis presence not specified    4. Pain of foot, unspecified laterality    5. Spondylolysis of lumbar region        Plan:    1. Blood work ordered       Vaccines: Influenza (2019); Tetanus (2019); Pneumovax (done); Shingrix (will get)       Eye exam: 2019       Mammogram: 12/18       Gyn exam: declined        Colonoscopy: 4/15- repeat in 5 yrs       DEXA: 8/19(NL)   2. HTN- stable on Losartan 50 mg daily   3. GERD- stable on Protonix 40 mg daily   4. Referral to Podiatry   5. Lumbar spondylosis- stable    6. F/u in 6 months

## 2019-11-14 ENCOUNTER — PATIENT OUTREACH (OUTPATIENT)
Dept: ADMINISTRATIVE | Facility: HOSPITAL | Age: 74
End: 2019-11-14

## 2019-11-14 NOTE — LETTER
AUTHORIZATION FOR RELEASE OF   CONFIDENTIAL INFORMATION    Dear ASHLEY,    We are seeing Shakira Pearl, date of birth 1945, in the clinic at [facility]. Angel Bello DO is the patient's PCP. Shakira Pearl has an outstanding lab/procedure at the time we reviewed her chart. In order to help keep her health information updated, she has authorized us to request the following medical record(s):        ( x )  MAMMOGRAM                                      (  )  COLONOSCOPY      (  )  PAP SMEAR                                          (  )  OUTSIDE LAB RESULTS     (  )  DEXA SCAN                                          (  )  EYE EXAM            (  )  FOOT EXAM                                          (  )  ENTIRE RECORD     (  )  OUTSIDE IMMUNIZATIONS                 (  )  _______________         Please fax records to [facility], Brian M Helmstetter, DO, [phone]     If you have any questions, please contact DAYANA Joya at [phone]          Patient Name: Shakira Pearl  : 1945  Patient Phone #: [phone]

## 2019-11-15 ENCOUNTER — TELEPHONE (OUTPATIENT)
Dept: PAIN MEDICINE | Facility: CLINIC | Age: 74
End: 2019-11-15

## 2019-11-15 NOTE — TELEPHONE ENCOUNTER
----- Message from Nisha Villegas LPN sent at 11/15/2019  8:24 AM CST -----  Contact: self      ----- Message -----  From: Mert Coates MD  Sent: 11/14/2019   3:02 PM CST  To: Nisha Villegas LPN, Katelyn Ponce, NP    I released the results. Please ask Katelyn if she would call her  ----- Message -----  From: Nisha Villegas LPN  Sent: 11/14/2019   2:49 PM CST  To: Mert Coates MD        ----- Message -----  From: Leonor Mcgrath  Sent: 11/14/2019   2:36 PM CST  To: [employer]    Pt is requesting a call from Dr Coates or his RN only she never received her MRI results. Would like results as soon as possible.

## 2019-11-15 NOTE — TELEPHONE ENCOUNTER
Spoke with patient and discussed MRI results. She has an appointment scheduled for December with Dr. Coates to discuss injection options. She would like to keep this appointment.

## 2019-11-18 ENCOUNTER — CLINICAL SUPPORT (OUTPATIENT)
Dept: REHABILITATION | Facility: HOSPITAL | Age: 74
End: 2019-11-18
Payer: MEDICARE

## 2019-11-18 DIAGNOSIS — G89.29 CHRONIC PAIN OF LEFT KNEE: ICD-10-CM

## 2019-11-18 DIAGNOSIS — R60.0 LOCALIZED EDEMA: ICD-10-CM

## 2019-11-18 DIAGNOSIS — M25.562 CHRONIC PAIN OF LEFT KNEE: ICD-10-CM

## 2019-11-18 DIAGNOSIS — R26.9 IMPAIRED GAIT: ICD-10-CM

## 2019-11-18 PROCEDURE — 97110 THERAPEUTIC EXERCISES: CPT | Mod: HCNC

## 2019-11-18 NOTE — PROGRESS NOTES
"  Physical Therapy Daily Treatment Note     Name: Shakira Pearl  Clinic Number: [id number]  Therapy Diagnosis:        Encounter Diagnoses   Name Primary?   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 Stated "sat around and watched football all weekend".   She was non compliant with home exercise program.  Response to previous treatment: decrease hip pain   Functional change: improved mobility     Pain: 5/10  Location: left knee      Objective     11/11/19:  Right knee flexion 137 degrees  Left knee flexion 133 degrees    Strength 4+/5 grossly except glut med 4/5    Shakira received therapeutic exercises to develop strength, endurance, ROM, flexibility and posture for 45 minutes including:    Recumbent bike x 10' Level 4 for increased ROM, circulation and mm endurance  B SL clams BTB 4x10  B SL  hip abduction 3x10  B Single leg bridge 3x10  Shuttle 2 1/2 bands 3x15  Shuttle 2 bands SL 3x15  Fwd/lat step up yellow box 3x10  Walking lunges 2 laps   Palloff press B BTB - cable being used 3x10 ea     Not today:  B SLR 2# 2 x 15  Shuttle heel raise 2 band 40x  B Leg press  80#  3x15  B SL press 60#  3x10  B heel raised 60# 3x10  Mini squats on rocker 3x10  L QS 3x10/3"   L SLR 3x10/3"  L SL hip abd 3x10/3"  Bridges 2x10  Prone L hip ext   3x10/3"  QS " "with towel under ankle 3x10/3"  SAQ 3x10/3"  Prone L knee flexion 3x10/3"  L Ecc heel raise 3x10-NT  Kettle bell squats 15# 3 x 12 to low chair-NT  Therapeutic activity: 0  Lateral lunge 2x10  Lateral walks GTB 1 lap  Monster walks GTB 1 lap      Shakira received the following manual therapy techniques: Joint mobilizations and Soft tissue Mobilization were applied to the: L knee  for 0  minutes, including: joint capsular distractiong, patella mobs, and GSS and     Shakira received cold pack for 0 minutes to to decrease circulation, pain, and swelling.    Home Exercises Provided and Patient Education Provided     Education provided:   Posture awareness     Written Home Exercises Provided: yes.  Exercises were reviewed and Shakira was able to demonstrate them prior to the end of the session.  Shakira demonstrated good  understanding of the education provided.     Assessment   Patient tolerating tx well with increased glut medius strengthening. VC/TC for correcting form/technique with therex. Continue to progress as tolerated no increased back pain during tx.     Shakira is progressing well towards her goals.   Pt prognosis is Good.     Pt will continue to benefit from skilled outpatient physical therapy to address the deficits listed in the problem list box on initial evaluation, provide pt/family education and to maximize pt's level of independence in the home and community environment.     Pt's spiritual, cultural and educational needs considered and pt agreeable to plan of care and goals.    Anticipated barriers to physical therapy: none     Goals: GOALS: Short Term Goals:  4 weeks  1.Report decreased L knee pain  < / =  1/10  to increase tolerance for ambulation in community (not met, progressing)  2. Increase knee ROM to 130 deg in order to be able to perform ADLs without difficulty.(met)  3. Increase strength by 1/3 MMT grade in quads  to increase tolerance for ADL and work activities.(met)  4. Pt to tolerate HEP to " improve ROM and independence with ADL's(met)     Long Term Goals: 8 weeks  1.Report decreased L knee pain < / = 0/10  to increase tolerance for return to walking on treadmill(not met, progressing)  2.Patient goal: Return to the gym without limitation(not met, progressing)  3.Increase strength to >/= 4+/5 in gluts and quads  to increase tolerance for ADL and work activities.(not met, progressing)  4. Pt will report at 43% limitation on FOTO knee to demonstrate increase in LE function with every day tasks. (met)      Plan     Certification Period: 11/11/19 to 12/17/19  Recommended Treatment Plan: 2 times per week for 5 weeks: Cervical/Lumbar Traction, Gait Training, Manual Therapy, Moist Heat/ Ice, Neuromuscular Re-ed, Patient Education, Self Care, Therapeutic Activites and Therapeutic Exercise  Other Recommendations: modalities prn, ASTYM prn    Therapist: Jason Canela, PTA, STS

## 2019-11-21 ENCOUNTER — CLINICAL SUPPORT (OUTPATIENT)
Dept: REHABILITATION | Facility: HOSPITAL | Age: 74
End: 2019-11-21
Payer: MEDICARE

## 2019-11-21 DIAGNOSIS — R60.0 LOCALIZED EDEMA: ICD-10-CM

## 2019-11-21 DIAGNOSIS — R26.9 IMPAIRED GAIT: ICD-10-CM

## 2019-11-21 DIAGNOSIS — G89.29 CHRONIC PAIN OF LEFT KNEE: ICD-10-CM

## 2019-11-21 DIAGNOSIS — M25.562 CHRONIC PAIN OF LEFT KNEE: ICD-10-CM

## 2019-11-21 PROCEDURE — 97110 THERAPEUTIC EXERCISES: CPT | Mod: HCNC

## 2019-11-21 PROCEDURE — 97140 MANUAL THERAPY 1/> REGIONS: CPT | Mod: HCNC

## 2019-11-21 NOTE — PROGRESS NOTES
"  Physical Therapy Daily Treatment Note     Name: Shakira Pearl  Clinic Number: [id number]  Therapy Diagnosis:        Encounter Diagnoses   Name Primary?   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    She was non compliant with home exercise program.  Response to previous treatment: no adverse effects   Functional change: no change     Pain: 5/10  Location: left knee      Objective       Shakira received therapeutic exercises to develop strength, endurance, ROM, flexibility and posture for 50 minutes (20 min 1 on 1)  including:    Recumbent bike x 10' Level 4 for increased ROM, circulation and mm endurance  GSS strap   Heel slides   Step Ups 6" 30 x   Walking lunges 2 laps   Leg press 80# 3x10 ecc lowering   Leg press 60# SL 3x10   SL  hip abduction 3x10  Single leg bridge 3x10  QS 30 x 5 sec hold     Not today:  SL clams BTB 4x10  Palloff press B BTB - cable being used 3x10 ea   B SLR 2# 2 x 15  Shuttle heel raise 2 band 40x  B Leg press  80#  3x15  B SL press 60#  3x10  B heel raised 60# 3x10  Mini squats on rocker 3x10  L QS 3x10/3"   L SLR 3x10/3"  L SL hip abd 3x10/3"  Bridges 2x10  Prone L hip ext   3x10/3"  QS with towel under ankle 3x10/3"  SAQ 3x10/3"  Prone L knee flexion 3x10/3"  L Ecc heel raise 3x10-NT  Kettle bell squats 15# 3 x " 12 to low chair-NT  Therapeutic activity: 0  Lateral lunge 2x10  Lateral walks GTB 1 lap  Monster walks GTB 1 lap      Shakira received the following manual therapy techniques: Joint mobilizations and Soft tissue Mobilization were applied to the: L knee  for 10  minutes, including: joint capsular distractiong, patella mobs, and GSS and     Shakira received cold pack for 0 minutes to to decrease circulation, pain, and swelling.    Home Exercises Provided and Patient Education Provided     Education provided:   Posture awareness     Written Home Exercises Provided: yes.  Exercises were reviewed and Shakira was able to demonstrate them prior to the end of the session.  Shakira demonstrated good  understanding of the education provided.     Assessment     Pt cont to lack some quad and hip strength.  Pt violeta tx well,  Pn level remained same prior to and after tx session.  Pt showed improved strength and endurance during therex.    Shakira is progressing well towards her goals.   Pt prognosis is Good.     Pt will continue to benefit from skilled outpatient physical therapy to address the deficits listed in the problem list box on initial evaluation, provide pt/family education and to maximize pt's level of independence in the home and community environment.     Pt's spiritual, cultural and educational needs considered and pt agreeable to plan of care and goals.    Anticipated barriers to physical therapy: none     Goals: GOALS: Short Term Goals:  4 weeks  1.Report decreased L knee pain  < / =  1/10  to increase tolerance for ambulation in community (not met, progressing)  2. Increase knee ROM to 130 deg in order to be able to perform ADLs without difficulty.(met)  3. Increase strength by 1/3 MMT grade in quads  to increase tolerance for ADL and work activities.(met)  4. Pt to tolerate HEP to improve ROM and independence with ADL's(met)     Long Term Goals: 8 weeks  1.Report decreased L knee pain < / = 0/10  to increase  tolerance for return to walking on treadmill(not met, progressing)  2.Patient goal: Return to the gym without limitation(not met, progressing)  3.Increase strength to >/= 4+/5 in gluts and quads  to increase tolerance for ADL and work activities.(not met, progressing)  4. Pt will report at 43% limitation on FOTO knee to demonstrate increase in LE function with every day tasks. (met)      Plan     Cont to progress towards goals set by PT.  Work to improve quad and hip strength next visit.        Therapist: Bharat Arndt PTA

## 2019-11-25 ENCOUNTER — CLINICAL SUPPORT (OUTPATIENT)
Dept: REHABILITATION | Facility: HOSPITAL | Age: 74
End: 2019-11-25
Payer: MEDICARE

## 2019-11-25 DIAGNOSIS — R60.0 LOCALIZED EDEMA: ICD-10-CM

## 2019-11-25 DIAGNOSIS — R26.9 IMPAIRED GAIT: ICD-10-CM

## 2019-11-25 DIAGNOSIS — M25.562 CHRONIC PAIN OF LEFT KNEE: ICD-10-CM

## 2019-11-25 DIAGNOSIS — G89.29 CHRONIC PAIN OF LEFT KNEE: ICD-10-CM

## 2019-11-25 PROCEDURE — 97110 THERAPEUTIC EXERCISES: CPT | Mod: HCNC | Performed by: PHYSICAL THERAPIST

## 2019-11-25 PROCEDURE — 97140 MANUAL THERAPY 1/> REGIONS: CPT | Mod: HCNC | Performed by: PHYSICAL THERAPIST

## 2019-11-25 NOTE — PROGRESS NOTES
"  Physical Therapy Daily Treatment Note     Name: Shakira Pearl  Clinic Number: [id number]  Therapy Diagnosis:        Encounter Diagnoses   Name Primary?   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 Her back has been giving her some trouble but its always been that way    She was non compliant with home exercise program.  Response to previous treatment: no adverse effects   Functional change: no change     Pain: 5/10  Location: left knee      Objective       Shakira received therapeutic exercises to develop strength, endurance, ROM, flexibility and posture for 45 minutes including:    Recumbent bike x 10' Level 4 for increased ROM, circulation and mm endurance  GSS at wedge 30 seconds 5x  SLR 2.5# 3 x 10  Leg press 90# 3x10 ecc lowering   Leg press 60# SL 3x10  SLB 10x 10 sec blue pad  Sit to stands red box with airex 3 x 15    Not today:  Heel slides   Step Ups 6" 30 x   Walking lunges 2 laps   SL hip abduction 3x10  Single leg bridge 3x10  SL clams BTB 4x10  Palloff press B BTB - cable being used 3x10 ea   B SLR 2# 2 x 15  Shuttle heel raise 2 band 40x  B Leg press  80#  3x15  B SL press 60#  3x10  B heel raised 60# 3x10  Mini squats on rocker 3x10  L QS 3x10/3"   L SLR 3x10/3"  L SL hip abd 3x10/3"  Bridges 2x10  Prone L hip ext   3x10/3"  QS with towel under ankle 3x10/3"  SAQ " "3x10/3"  Prone L knee flexion 3x10/3"  L Ecc heel raise 3x10-NT  Kettle bell squats 15# 3 x 12 to low chair-NT  Therapeutic activity: 0  Lateral lunge 2x10  Lateral walks GTB 1 lap  Monster walks GTB 1 lap      Shakira received the following manual therapy techniques: Joint mobilizations and Soft tissue Mobilization were applied to the: L knee  for 10  minutes, including:  Patellar mobs all planes  Gr IV extension mobs   Supine hamstring stretch     Shakira received cold pack for 10 minutes to to decrease circulation, pain, and swelling.    Home Exercises Provided and Patient Education Provided     Education provided:   Posture awareness     Written Home Exercises Provided: yes.  Exercises were reviewed and Shakira was able to demonstrate them prior to the end of the session.  Shakira demonstrated good  understanding of the education provided.     Assessment     Pt notes feeling stronger with leg press, Required cues with sit to stands and feels like she strained her neck.  Patient was fatigued by end of session and requested ice. Doing well with progressions and educated on [facility] problem for future back problems    Shakira is progressing well towards her goals.   Pt prognosis is Good.     Pt will continue to benefit from skilled outpatient physical therapy to address the deficits listed in the problem list box on initial evaluation, provide pt/family education and to maximize pt's level of independence in the home and community environment.     Pt's spiritual, cultural and educational needs considered and pt agreeable to plan of care and goals.    Anticipated barriers to physical therapy: none     Goals: GOALS: Short Term Goals:  4 weeks  1.Report decreased L knee pain  < / =  1/10  to increase tolerance for ambulation in community (not met, progressing)  2. Increase knee ROM to 130 deg in order to be able to perform ADLs without difficulty.(met)  3. Increase strength by 1/3 MMT grade in quads  to increase " tolerance for ADL and work activities.(met)  4. Pt to tolerate HEP to improve ROM and independence with ADL's(met)     Long Term Goals: 8 weeks  1.Report decreased L knee pain < / = 0/10  to increase tolerance for return to walking on treadmill(not met, progressing)  2.Patient goal: Return to the gym without limitation(not met, progressing)  3.Increase strength to >/= 4+/5 in gluts and quads  to increase tolerance for ADL and work activities.(not met, progressing)  4. Pt will report at 43% limitation on FOTO knee to demonstrate increase in LE function with every day tasks. (met)      Plan      Work to improve quad and hip strength next visit.        Therapist: Emmanuel Galvez, PT

## 2019-11-27 ENCOUNTER — PATIENT OUTREACH (OUTPATIENT)
Dept: ADMINISTRATIVE | Facility: HOSPITAL | Age: 74
End: 2019-11-27

## 2019-12-02 ENCOUNTER — CLINICAL SUPPORT (OUTPATIENT)
Dept: REHABILITATION | Facility: HOSPITAL | Age: 74
End: 2019-12-02
Payer: MEDICARE

## 2019-12-02 DIAGNOSIS — R60.0 LOCALIZED EDEMA: ICD-10-CM

## 2019-12-02 DIAGNOSIS — R26.9 IMPAIRED GAIT: ICD-10-CM

## 2019-12-02 DIAGNOSIS — G89.29 CHRONIC PAIN OF LEFT KNEE: ICD-10-CM

## 2019-12-02 DIAGNOSIS — M25.562 CHRONIC PAIN OF LEFT KNEE: ICD-10-CM

## 2019-12-02 PROCEDURE — 97110 THERAPEUTIC EXERCISES: CPT | Mod: HCNC | Performed by: PHYSICAL THERAPIST

## 2019-12-02 NOTE — PROGRESS NOTES
"  Physical Therapy Daily Treatment Note     Name: Shakira Pearl  Clinic Number: [id number]  Therapy Diagnosis:        Encounter Diagnoses   Name Primary?   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  Response to previous treatment: no adverse effects   Functional change: no change     Pain: 5/10  Location: left knee      Objective       Shakira received therapeutic exercises to develop strength, endurance, ROM, flexibility and posture for 45 minutes including:    Recumbent bike x 10' Level 4 for increased ROM, circulation and mm endurance  GSS at wedge 30 seconds 5x  LAQ 4# 2 x 20  Shuttle 2 1/2 bands 30x DL  Shuttle 2  bands 30x SL  SLB 10x 10 sec blue pad  Sit to stands red box with airex 3 x 15    Not today:  Heel slides   Step Ups 6" 30 x   Walking lunges 2 laps   SL hip abduction 3x10  Single leg bridge 3x10  SL clams BTB 4x10  Palloff press B BTB - cable being used 3x10 ea   B SLR 2# 2 x 15  Shuttle heel raise 2 band 40x  B Leg press  80#  3x15  B SL press 60#  3x10  B heel raised 60# 3x10  Mini squats on rocker 3x10  L QS 3x10/3"   L SLR 3x10/3"  L SL hip abd 3x10/3"  Bridges 2x10  Prone L hip ext   3x10/3"  QS with towel under ankle 3x10/3"  SAQ 3x10/3"  Prone L knee flexion 3x10/3"  L Ecc heel raise 3x10-NT  Kettle bell squats 15# 3 x 12 to " low chair-NT  Therapeutic activity: 0  Lateral lunge 2x10  Lateral walks GTB 1 lap  Monster walks GTB 1 lap      Shakira received the following manual therapy techniques: Joint mobilizations and Soft tissue Mobilization were applied to the: L knee  for 10  minutes, including:  Patellar mobs all planes  Gr IV extension mobs   Supine hamstring stretch     Shakira received hot pack for 10 minutes to increase circulation and promote tissue healing.    Home Exercises Provided and Patient Education Provided     Education provided:   Posture awareness     Written Home Exercises Provided: yes.  Exercises were reviewed and Shakira was able to demonstrate them prior to the end of the session.  Shakira demonstrated good  understanding of the education provided.     Assessment     Patient progressing with right knee strengthening and notes relief from SL shuttle for hip stabilization. She performed sit to stands without difficulty today and notes her balance is better on affected leg than non affected    Shakira is progressing well towards her goals.   Pt prognosis is Good.     Pt will continue to benefit from skilled outpatient physical therapy to address the deficits listed in the problem list box on initial evaluation, provide pt/family education and to maximize pt's level of independence in the home and community environment.     Pt's spiritual, cultural and educational needs considered and pt agreeable to plan of care and goals.    Anticipated barriers to physical therapy: none     Goals: GOALS: Short Term Goals:  4 weeks  1.Report decreased L knee pain  < / =  1/10  to increase tolerance for ambulation in community (not met, progressing)  2. Increase knee ROM to 130 deg in order to be able to perform ADLs without difficulty.(met)  3. Increase strength by 1/3 MMT grade in quads  to increase tolerance for ADL and work activities.(met)  4. Pt to tolerate HEP to improve ROM and independence with ADL's(met)     Long Term Goals: 8  weeks  1.Report decreased L knee pain < / = 0/10  to increase tolerance for return to walking on treadmill(not met, progressing)  2.Patient goal: Return to the gym without limitation(not met, progressing)  3.Increase strength to >/= 4+/5 in gluts and quads  to increase tolerance for ADL and work activities.(not met, progressing)  4. Pt will report at 43% limitation on FOTO knee to demonstrate increase in LE function with every day tasks. (met)      Plan      Work to improve quad and hip strength next visit.        Therapist: Emmanuel Galvez, PT

## 2019-12-03 ENCOUNTER — LAB VISIT (OUTPATIENT)
Dept: LAB | Facility: HOSPITAL | Age: 74
End: 2019-12-03
Attending: INTERNAL MEDICINE
Payer: MEDICARE

## 2019-12-03 DIAGNOSIS — Z00.00 ANNUAL PHYSICAL EXAM: ICD-10-CM

## 2019-12-03 DIAGNOSIS — I10 ESSENTIAL HYPERTENSION: ICD-10-CM

## 2019-12-03 DIAGNOSIS — K21.9 GASTROESOPHAGEAL REFLUX DISEASE, ESOPHAGITIS PRESENCE NOT SPECIFIED: ICD-10-CM

## 2019-12-03 DIAGNOSIS — M79.673 PAIN OF FOOT, UNSPECIFIED LATERALITY: ICD-10-CM

## 2019-12-03 LAB
ALBUMIN SERPL BCP-MCNC: 3.3 G/DL (ref 3.5–5.2)
ALP SERPL-CCNC: 54 U/L (ref 55–135)
ALT SERPL W/O P-5'-P-CCNC: 14 U/L (ref 10–44)
ANION GAP SERPL CALC-SCNC: 8 MMOL/L (ref 8–16)
AST SERPL-CCNC: 16 U/L (ref 10–40)
BASOPHILS # BLD AUTO: 0.04 K/UL (ref 0–0.2)
BASOPHILS NFR BLD: 0.6 % (ref 0–1.9)
BILIRUB SERPL-MCNC: 0.2 MG/DL (ref 0.1–1)
BUN SERPL-MCNC: 15 MG/DL (ref 8–23)
CALCIUM SERPL-MCNC: 8.5 MG/DL (ref 8.7–10.5)
CHLORIDE SERPL-SCNC: 104 MMOL/L (ref 95–110)
CHOLEST SERPL-MCNC: 177 MG/DL (ref 120–199)
CHOLEST/HDLC SERPL: 4 {RATIO} (ref 2–5)
CO2 SERPL-SCNC: 26 MMOL/L (ref 23–29)
CREAT SERPL-MCNC: 0.7 MG/DL (ref 0.5–1.4)
DIFFERENTIAL METHOD: ABNORMAL
EOSINOPHIL # BLD AUTO: 0.2 K/UL (ref 0–0.5)
EOSINOPHIL NFR BLD: 2.7 % (ref 0–8)
ERYTHROCYTE [DISTWIDTH] IN BLOOD BY AUTOMATED COUNT: 13 % (ref 11.5–14.5)
EST. GFR  (AFRICAN AMERICAN): >60 ML/MIN/1.73 M^2
EST. GFR  (NON AFRICAN AMERICAN): >60 ML/MIN/1.73 M^2
GLUCOSE SERPL-MCNC: 102 MG/DL (ref 70–110)
HCT VFR BLD AUTO: 37.5 % (ref 37–48.5)
HDLC SERPL-MCNC: 44 MG/DL (ref 40–75)
HDLC SERPL: 24.9 % (ref 20–50)
HGB BLD-MCNC: 11.7 G/DL (ref 12–16)
IMM GRANULOCYTES # BLD AUTO: 0.04 K/UL (ref 0–0.04)
IMM GRANULOCYTES NFR BLD AUTO: 0.6 % (ref 0–0.5)
LDLC SERPL CALC-MCNC: 65.2 MG/DL (ref 63–159)
LYMPHOCYTES # BLD AUTO: 1.7 K/UL (ref 1–4.8)
LYMPHOCYTES NFR BLD: 23.7 % (ref 18–48)
MCH RBC QN AUTO: 28.8 PG (ref 27–31)
MCHC RBC AUTO-ENTMCNC: 31.2 G/DL (ref 32–36)
MCV RBC AUTO: 92 FL (ref 82–98)
MONOCYTES # BLD AUTO: 0.3 K/UL (ref 0.3–1)
MONOCYTES NFR BLD: 4.6 % (ref 4–15)
NEUTROPHILS # BLD AUTO: 4.8 K/UL (ref 1.8–7.7)
NEUTROPHILS NFR BLD: 67.8 % (ref 38–73)
NONHDLC SERPL-MCNC: 133 MG/DL
NRBC BLD-RTO: 0 /100 WBC
PLATELET # BLD AUTO: 225 K/UL (ref 150–350)
PMV BLD AUTO: 11.7 FL (ref 9.2–12.9)
POTASSIUM SERPL-SCNC: 4.3 MMOL/L (ref 3.5–5.1)
PROT SERPL-MCNC: 6.7 G/DL (ref 6–8.4)
RBC # BLD AUTO: 4.06 M/UL (ref 4–5.4)
SODIUM SERPL-SCNC: 138 MMOL/L (ref 136–145)
TRIGL SERPL-MCNC: 339 MG/DL (ref 30–150)
TSH SERPL DL<=0.005 MIU/L-ACNC: 2.17 UIU/ML (ref 0.4–4)
WBC # BLD AUTO: 7.1 K/UL (ref 3.9–12.7)

## 2019-12-03 PROCEDURE — 36415 COLL VENOUS BLD VENIPUNCTURE: CPT | Mod: HCNC,PO

## 2019-12-03 PROCEDURE — 84443 ASSAY THYROID STIM HORMONE: CPT | Mod: HCNC

## 2019-12-03 PROCEDURE — 80053 COMPREHEN METABOLIC PANEL: CPT | Mod: HCNC

## 2019-12-03 PROCEDURE — 85025 COMPLETE CBC W/AUTO DIFF WBC: CPT | Mod: HCNC

## 2019-12-03 PROCEDURE — 80061 LIPID PANEL: CPT | Mod: HCNC

## 2019-12-05 ENCOUNTER — TELEPHONE (OUTPATIENT)
Dept: PAIN MEDICINE | Facility: CLINIC | Age: 74
End: 2019-12-05

## 2019-12-05 ENCOUNTER — CLINICAL SUPPORT (OUTPATIENT)
Dept: REHABILITATION | Facility: HOSPITAL | Age: 74
End: 2019-12-05
Payer: MEDICARE

## 2019-12-05 DIAGNOSIS — M25.562 CHRONIC PAIN OF LEFT KNEE: ICD-10-CM

## 2019-12-05 DIAGNOSIS — R60.0 LOCALIZED EDEMA: ICD-10-CM

## 2019-12-05 DIAGNOSIS — R26.9 IMPAIRED GAIT: ICD-10-CM

## 2019-12-05 DIAGNOSIS — G89.29 CHRONIC PAIN OF LEFT KNEE: ICD-10-CM

## 2019-12-05 PROCEDURE — 97140 MANUAL THERAPY 1/> REGIONS: CPT | Mod: HCNC | Performed by: PHYSICAL THERAPIST

## 2019-12-05 PROCEDURE — 97110 THERAPEUTIC EXERCISES: CPT | Mod: HCNC | Performed by: PHYSICAL THERAPIST

## 2019-12-05 NOTE — TELEPHONE ENCOUNTER
Staff contacted and spoke with about getting a sooner appt with provider.    Staff informed patient that provider is only in clinic on Tuesdays and Wednesdays(patient is already schedule to see provider on 12/11/9 at 2:45p)

## 2019-12-05 NOTE — PROGRESS NOTES
"  Physical Therapy Daily Treatment Note     Name: Shakira Pearl  Clinic Number: [id number]  Therapy Diagnosis:        Encounter Diagnoses   Name Primary?   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  Response to previous treatment: no adverse effects   Functional change: no change     Pain: 5/10  Location: left knee      Objective       Shakira received therapeutic exercises to develop strength, endurance, ROM, flexibility and posture for 50 minutes including:    Recumbent bike x 10' Level 4 for increased ROM, circulation and mm endurance  SLR 2.5# 3 x 15  LAQ 4# 2 x 20  SLB 10x 10 sec blue pad  GSS at wedge 30 seconds 5x  Shuttle 2 1/2 bands 30x DL  Shuttle 2  bands 30x SL    Not today:  Sit to stands red box with airex 3 x 15  Heel slides   Step Ups 6" 30 x   Walking lunges 2 laps   SL hip abduction 3x10  Single leg bridge 3x10  SL clams BTB 4x10  Palloff press B BTB - cable being used 3x10 ea   B SLR 2# 2 x 15  Shuttle heel raise 2 band 40x  B Leg press  80#  3x15  B SL press 60#  3x10  B heel raised 60# 3x10  Mini squats on rocker 3x10  L QS 3x10/3"   L SLR 3x10/3"  L SL hip abd 3x10/3"  Bridges 2x10  Prone L hip ext   3x10/3"  QS with towel under ankle 3x10/3"  SAQ " "3x10/3"  Prone L knee flexion 3x10/3"  L Ecc heel raise 3x10-NT  Kettle bell squats 15# 3 x 12 to low chair-NT  Therapeutic activity: 0  Lateral lunge 2x10  Lateral walks GTB 1 lap  Monster walks GTB 1 lap      Shakira received the following manual therapy techniques: Joint mobilizations and Soft tissue Mobilization were applied to the: L knee  for 10  minutes, including:  Patellar mobs all planes  Gr IV extension mobs   Supine hamstring stretch     Shakira received hot pack for 0 minutes to increase circulation and promote tissue healing.    Home Exercises Provided and Patient Education Provided     Education provided:   Posture awareness     Written Home Exercises Provided: yes.  Exercises were reviewed and Shakira was able to demonstrate them prior to the end of the session.  Shakira demonstrated good  understanding of the education provided.     Assessment     Patient complains of radicular symptoms down the right arm. Lacked terminal knee extension restored with mobs. Patient to discharge with all her goals met and no pain in knee. Functional strength and motion bilaterally.     Shakira is progressing well towards her goals.   Pt prognosis is Good.     Pt will continue to benefit from skilled outpatient physical therapy to address the deficits listed in the problem list box on initial evaluation, provide pt/family education and to maximize pt's level of independence in the home and community environment.     Pt's spiritual, cultural and educational needs considered and pt agreeable to plan of care and goals.    Anticipated barriers to physical therapy: none     Goals: GOALS: Short Term Goals:  4 weeks  1.Report decreased L knee pain  < / =  1/10  to increase tolerance for ambulation in community (met, )  2. Increase knee ROM to 130 deg in order to be able to perform ADLs without difficulty.(met)  3. Increase strength by 1/3 MMT grade in quads  to increase tolerance for ADL and work activities.(met)  4. Pt to " tolerate HEP to improve ROM and independence with ADL's(met)     Long Term Goals: 8 weeks  1.Report decreased L knee pain < / = 0/10  to increase tolerance for return to walking on treadmill(met )  2.Patient goal: Return to the gym without limitation(met, )  3.Increase strength to >/= 4+/5 in gluts and quads  to increase tolerance for ADL and work activities.(met)  4. Pt will report at 43% limitation on FOTO knee to demonstrate increase in LE function with every day tasks. (met)      Plan     Discharge with goals met and independent HEP      Therapist: Emmanuel Galvez, PT

## 2019-12-05 NOTE — TELEPHONE ENCOUNTER
----- Message from Adamaris Delaney sent at 12/5/2019  3:06 PM CST -----  Contact: CHELSY GONZALEZ [[id number]]  Name of Who is Calling : CHELSY GONZALEZ [[id number]]    Patient is requesting a call from staff in regards to being seen sooner then what is available  .....Please contact to further discuss and advise.    Can the clinic reply by [facility] : No    What Number to Call Back :  [phone]

## 2019-12-07 ENCOUNTER — TELEPHONE (OUTPATIENT)
Dept: INTERNAL MEDICINE | Facility: CLINIC | Age: 74
End: 2019-12-07

## 2019-12-07 DIAGNOSIS — E78.5 HYPERLIPIDEMIA, UNSPECIFIED HYPERLIPIDEMIA TYPE: Primary | ICD-10-CM

## 2019-12-10 ENCOUNTER — TELEPHONE (OUTPATIENT)
Dept: PAIN MEDICINE | Facility: CLINIC | Age: 74
End: 2019-12-10

## 2019-12-10 NOTE — TELEPHONE ENCOUNTER
I reached her.  She hasn't looked on portal yet    I gave her dr's comments and we set up appts for next year.

## 2019-12-10 NOTE — TELEPHONE ENCOUNTER
My name is Staff, I am contacting you from [facility] regarding your appointment scheduled for 12.11.19, with Provider Dr. Coates, just confirming you will be able to make it.    If you feel you need to reschedule or canceled please give our office a call so we can better assist you.      Staff requesting patient to arrive 15 mins ahead of schedule appointment time.    Staff left a voicemail reminding pt of their schedule appt

## 2019-12-11 ENCOUNTER — OFFICE VISIT (OUTPATIENT)
Dept: PAIN MEDICINE | Facility: CLINIC | Age: 74
End: 2019-12-11
Attending: ANESTHESIOLOGY
Payer: MEDICARE

## 2019-12-11 VITALS
HEART RATE: 85 BPM | SYSTOLIC BLOOD PRESSURE: 125 MMHG | WEIGHT: 149.94 LBS | DIASTOLIC BLOOD PRESSURE: 77 MMHG | HEIGHT: 63 IN | OXYGEN SATURATION: 100 % | RESPIRATION RATE: 18 BRPM | TEMPERATURE: 97 F | BODY MASS INDEX: 26.57 KG/M2

## 2019-12-11 DIAGNOSIS — M47.816 FACET ARTHRITIS, DEGENERATIVE, LUMBAR SPINE: Primary | ICD-10-CM

## 2019-12-11 DIAGNOSIS — M53.3 SACROILIAC DYSFUNCTION: ICD-10-CM

## 2019-12-11 DIAGNOSIS — M54.12 CERVICAL RADICULOPATHY: ICD-10-CM

## 2019-12-11 DIAGNOSIS — M43.06 SPONDYLOLYSIS OF LUMBAR REGION: ICD-10-CM

## 2019-12-11 PROCEDURE — 3074F PR MOST RECENT SYSTOLIC BLOOD PRESSURE < 130 MM HG: ICD-10-PCS | Mod: HCNC,CPTII,S$GLB, | Performed by: ANESTHESIOLOGY

## 2019-12-11 PROCEDURE — 1100F PTFALLS ASSESS-DOCD GE2>/YR: CPT | Mod: HCNC,CPTII,S$GLB, | Performed by: ANESTHESIOLOGY

## 2019-12-11 PROCEDURE — 1100F PR PT FALLS ASSESS DOC 2+ FALLS/FALL W/INJURY/YR: ICD-10-PCS | Mod: HCNC,CPTII,S$GLB, | Performed by: ANESTHESIOLOGY

## 2019-12-11 PROCEDURE — 99999 PR PBB SHADOW E&M-EST. PATIENT-LVL IV: CPT | Mod: PBBFAC,HCNC,, | Performed by: ANESTHESIOLOGY

## 2019-12-11 PROCEDURE — 99213 PR OFFICE/OUTPT VISIT, EST, LEVL III, 20-29 MIN: ICD-10-PCS | Mod: HCNC,S$GLB,, | Performed by: ANESTHESIOLOGY

## 2019-12-11 PROCEDURE — 3288F PR FALLS RISK ASSESSMENT DOCUMENTED: ICD-10-PCS | Mod: HCNC,CPTII,S$GLB, | Performed by: ANESTHESIOLOGY

## 2019-12-11 PROCEDURE — 99999 PR PBB SHADOW E&M-EST. PATIENT-LVL IV: ICD-10-PCS | Mod: PBBFAC,HCNC,, | Performed by: ANESTHESIOLOGY

## 2019-12-11 PROCEDURE — 1125F AMNT PAIN NOTED PAIN PRSNT: CPT | Mod: HCNC,S$GLB,, | Performed by: ANESTHESIOLOGY

## 2019-12-11 PROCEDURE — 3078F DIAST BP <80 MM HG: CPT | Mod: HCNC,CPTII,S$GLB, | Performed by: ANESTHESIOLOGY

## 2019-12-11 PROCEDURE — 1159F MED LIST DOCD IN RCRD: CPT | Mod: HCNC,S$GLB,, | Performed by: ANESTHESIOLOGY

## 2019-12-11 PROCEDURE — 99213 OFFICE O/P EST LOW 20 MIN: CPT | Mod: HCNC,S$GLB,, | Performed by: ANESTHESIOLOGY

## 2019-12-11 PROCEDURE — 3288F FALL RISK ASSESSMENT DOCD: CPT | Mod: HCNC,CPTII,S$GLB, | Performed by: ANESTHESIOLOGY

## 2019-12-11 PROCEDURE — 1125F PR PAIN SEVERITY QUANTIFIED, PAIN PRESENT: ICD-10-PCS | Mod: HCNC,S$GLB,, | Performed by: ANESTHESIOLOGY

## 2019-12-11 PROCEDURE — 1159F PR MEDICATION LIST DOCUMENTED IN MEDICAL RECORD: ICD-10-PCS | Mod: HCNC,S$GLB,, | Performed by: ANESTHESIOLOGY

## 2019-12-11 PROCEDURE — 3078F PR MOST RECENT DIASTOLIC BLOOD PRESSURE < 80 MM HG: ICD-10-PCS | Mod: HCNC,CPTII,S$GLB, | Performed by: ANESTHESIOLOGY

## 2019-12-11 PROCEDURE — 3074F SYST BP LT 130 MM HG: CPT | Mod: HCNC,CPTII,S$GLB, | Performed by: ANESTHESIOLOGY

## 2019-12-11 NOTE — PROGRESS NOTES
Subjective:      Patient ID: Shakira Pearl is a 74 y.o. female.    Chief Complaint: No chief complaint on file.    Referred by: No ref. provider found     HPI  She returns for follow-up.  She is status post min knee meniscectomy and she is doing fine.  No pain in the knee.  She still has the lower back pain.  The pain was manageable with sacroiliac joint injections.  About a week ago, she started having neck pain with pain a shooting in to the right shoulder.  The pain resolved but she still have tingling in the area without numbness or weakness.  No bowel or bladder changes.  No other new symptomatology.  She has not done therapy for the neck as of yet.  She had therapy for her knee recently.      Past Medical History:   Diagnosis Date    Allergy sinus    Arthritis back    Degenerative disc disease back    Neuromuscular disorder     Vitamin D deficiency        Past Surgical History:   Procedure Laterality Date    APPENDECTOMY  age 19    CHOLECYSTECTOMY      age of 25    CHONDROPLASTY OF KNEE Left 10/3/2019    Procedure: CHONDROPLASTY, KNEE;  Surgeon: Kaylen Patiño MD;  Location: [facility] OR;  Service: Orthopedics;  Laterality: Left;    HYSTERECTOMY  38    INJECTION OF JOINT Bilateral 2/18/2019    Procedure: INJECTION, JOINT BILATERAL SI;  Surgeon: Mert Coates MD;  [facility];  Service: Pain Management;  Laterality: Bilateral;  BILATERAL SI JOINT INJECTION    KNEE ARTHROSCOPY W/ MENISCECTOMY Left 10/3/2019    Procedure: ARTHROSCOPY, KNEE, WITH MENISCECTOMY;  Surgeon: Kaylen Patiño MD;  Location: [facility] OR;  Service: Orthopedics;  Laterality: Left;    SYNOVECTOMY OF KNEE Left 10/3/2019    Procedure: SYNOVECTOMY, KNEE;  Surgeon: Kaylen Patiño MD;  Location: [facility] OR;  Service: Orthopedics;  Laterality: Left;       Review of patient's allergies indicates:   Allergen Reactions    Demerol [meperidine] Nausea And Vomiting     Other reaction(s): Nausea    Papaya      Illness vomiting    Sulfa (sulfonamide  antibiotics) Rash    Sulfur Rash       Current Outpatient Medications   Medication Sig Dispense Refill    cholecalciferol, vitamin D3, (VITAMIN D3) 2,000 unit Cap Take 1 capsule (2,000 Units total) by mouth once daily. 90 capsule 3    estradiol valerate (DELESTROGEN) 40 mg/mL injection Inject 0.5 mLs (20 mg total) into the muscle every 28 days. Inject into the muscle. 5 mL 12    imipramine (TOFRANIL) 25 MG tablet Take 1 tablet (25 mg total) by mouth every evening. 90 tablet 3    losartan (COZAAR) 50 MG tablet Take 1 tablet (50 mg total) by mouth once daily. 90 tablet 3    pantoprazole (PROTONIX) 40 MG tablet Take 1 tablet (40 mg total) by mouth before breakfast. Take one pill every morning 45 minutes before breakfast in the morning. 90 tablet 3     No current facility-administered medications for this visit.        Family History   Problem Relation Age of Onset    Heart disease Mother 67        heart attack    Cancer Father 65        abdominal    Stomach cancer Father     No Known Problems Sister     No Known Problems Brother     No Known Problems Son     No Known Problems Maternal Grandmother     No Known Problems Maternal Grandfather     No Known Problems Paternal Grandmother     No Known Problems Paternal Grandfather     No Known Problems Maternal Aunt     No Known Problems Maternal Uncle     No Known Problems Paternal Aunt     No Known Problems Paternal Uncle     Celiac disease Neg Hx     Colon cancer Neg Hx     Crohn's disease Neg Hx     Esophageal cancer Neg Hx     Inflammatory bowel disease Neg Hx     Liver cancer Neg Hx     Rectal cancer Neg Hx     Ulcerative colitis Neg Hx     Amblyopia Neg Hx     Blindness Neg Hx     Cataracts Neg Hx     Diabetes Neg Hx     Glaucoma Neg Hx     Hypertension Neg Hx     Macular degeneration Neg Hx     Retinal detachment Neg Hx     Strabismus Neg Hx     Stroke Neg Hx     Thyroid disease Neg Hx        Social History     Socioeconomic  "History    Marital status:      Spouse name: Not on file    Number of children: 1    Years of education: Not on file    Highest education level: Not on file   Occupational History    Occupation: retired   Social Needs    Financial resource strain: Not on file    Food insecurity:     Worry: Not on file     Inability: Not on file    Transportation needs:     Medical: Not on file     Non-medical: Not on file   Tobacco Use    Smoking status: Never Smoker    Smokeless tobacco: Never Used   Substance and Sexual Activity    Alcohol use: No    Drug use: No    Sexual activity: Not Currently   Lifestyle    Physical activity:     Days per week: Not on file     Minutes per session: Not on file    Stress: Not on file   Relationships    Social connections:     Talks on phone: Not on file     Gets together: Not on file     Attends Protestant service: Not on file     Active member of club or organization: Not on file     Attends meetings of clubs or organizations: Not on file     Relationship status: Not on file   Other Topics Concern    Not on file   Social History Narrative    Not on file           ROS        Objective:   /77   Pulse 85   Temp 97.1 °F (36.2 °C)   Resp 18   Ht 5' 3" (1.6 m)   Wt 68 kg (149 lb 14.6 oz)   SpO2 100%   BMI 26.56 kg/m²   Pain Disability Index Review:  Last 3 PDI Scores 12/11/2019 2/6/2019   Pain Disability Index (PDI) 29 17     Normocephalic.  Atraumatic.  Affect appropriate.  Breathing unlabored.  Extra ocular muscles intact.           Ortho/SPM Exam    positive Spurling's to the right side.  Shoulder exam with range of motion unremarkable.  Muscle strength within functional limits.  No upper tract signs.  Assessment:       Encounter Diagnoses   Name Primary?    Facet arthritis, degenerative, lumbar spine Yes    Spondylolysis of lumbar region     Sacroiliac dysfunction     Cervical radiculopathy          Plan:   We discussed with the patient the assessment and " recommendations. The following is the plan we agreed on:  1.  Physical therapy for the neck and lower back.  2.  Return as needed, otherwise follow up in 6 weeks.  Should her symptomatology persists consider imaging of the cervical spine, injection in the cervical spine and/or injection lower back/sacroiliac joint      Diagnoses and all orders for this visit:    Facet arthritis, degenerative, lumbar spine  -     Ambulatory consult to Physical Therapy    Spondylolysis of lumbar region  -     Ambulatory consult to Physical Therapy    Sacroiliac dysfunction  -     Ambulatory consult to Physical Therapy    Cervical radiculopathy  -     Ambulatory consult to Physical Therapy

## 2019-12-17 ENCOUNTER — TELEPHONE (OUTPATIENT)
Dept: PAIN MEDICINE | Facility: CLINIC | Age: 74
End: 2019-12-17

## 2019-12-17 NOTE — TELEPHONE ENCOUNTER
----- Message from Jaydon Patel, Patient Care Assistant sent at 12/17/2019 12:39 PM CST -----  Contact: CHELSY GONZALEZ [[id number]]  Name of Who is Calling: CHELSY GONZALEZ [[id number]]    What is the request in detail: Requesting a call back in regards of follow up visit..Please contact to further discuss and advise      Can the clinic reply by [facility]: No    [facility]:   [phone]

## 2019-12-17 NOTE — TELEPHONE ENCOUNTER
----- Message from Shelby Shen sent at 12/17/2019  8:41 AM CST -----  Contact: CHELSY GONZALEZ [[id number]]  Contact: CHELSY GONZALEZ [[id number]]    What is the request in detail:   Requesting a call in regards to her referral for [facility] she states she hasn't heard anything and wants to speak with the nurse for scheduling     Can the clinic reply by [facility]:  No      What Number to Call Back if not in [facility]:  [phone]

## 2019-12-17 NOTE — TELEPHONE ENCOUNTER
Staff contacted and spoke with patient in regards to her message.    Patient stated to staff that she is speaking with a staff member on the other line to be schedule with [facility]

## 2019-12-17 NOTE — TELEPHONE ENCOUNTER
Patient was contacted as per patient she stated she spoke with someone in [facility] about the therapy and gym. Patient stated that she had questions that no one had the answers to and would like to speak with someone who knows if the gym on the second floor will be covered by her insurance she stated that she was informed that once she does the 20 visits on the 4th floor then she will go to the gym. Staff informed patient that the office would contact [facility] on this matter. Staff contacted [facility] and spoke with Neida she stated that she spoke with the patient and explained very thoroughly how the [facility] program is operated. Neida explained that the gym on the second floor is a continuation of the therapy with [facility] but its not covered by her insurance she would have to pay out of pocket but if her insurance carrier offers a fel[employer] spending account she would be reimbursed. Staff informed Neida that this information would be relayed to the patient again.

## 2020-01-06 ENCOUNTER — CLINICAL SUPPORT (OUTPATIENT)
Dept: REHABILITATION | Facility: OTHER | Age: 75
End: 2020-01-06
Attending: ANESTHESIOLOGY
Payer: MEDICARE

## 2020-01-06 DIAGNOSIS — M47.816 FACET ARTHRITIS, DEGENERATIVE, LUMBAR SPINE: ICD-10-CM

## 2020-01-06 DIAGNOSIS — R29.898 WEAKNESS OF BACK: ICD-10-CM

## 2020-01-06 DIAGNOSIS — M54.12 CERVICAL RADICULOPATHY: ICD-10-CM

## 2020-01-06 DIAGNOSIS — M53.3 SACROILIAC DYSFUNCTION: ICD-10-CM

## 2020-01-06 DIAGNOSIS — M25.69 DECREASED RANGE OF MOTION OF TRUNK AND BACK: ICD-10-CM

## 2020-01-06 DIAGNOSIS — M43.06 SPONDYLOLYSIS OF LUMBAR REGION: ICD-10-CM

## 2020-01-06 PROCEDURE — 97110 THERAPEUTIC EXERCISES: CPT | Mod: HCNC

## 2020-01-06 PROCEDURE — 97162 PT EVAL MOD COMPLEX 30 MIN: CPT | Mod: HCNC

## 2020-01-06 NOTE — PATIENT INSTRUCTIONS
Top 10 tips for back and neck pain    The spine is the pillar of the body, providing the foundation for the upper and lower extremities to attach.  Our spines withstand significant forces all day long.  There are many ways in which we can take care of our backs.  Here are a couple tips to help you keep your back in action.    1. Watch your posture in sitting.     Sit in chairs with back supports, and use a lumbar roll to maintain the normal curve of your low back. Ensure the height of your chair is such that your feet rest flat on the floor with your knees and hips level.  The average American sits 9 hours a day.  Do not sit longer than 1 hour without getting up to stretch or move.     2. Watch your posture in standing.   Maintain the normal curves of your spine in standing.   When standing tall, you should be able to draw a line down through your ear, shoulder, hip, and ankle.  Wear good shoes and consider using a standing desk mat if you stand a lot during work.  Take breaks from standing.    3. Lift correctly   Lift objects by using the strong muscles in your legs.  Get close to the object, bend your knees and your hips, and maintain the normal curve of your low back. Do not twist when lifting or carrying items. Think about the tasks you perform daily at work or home, and minimize lifting and carrying objects.  Use rolling carts or other strategies to reduce back strain.    4. Exercise regularly  Individuals who exercise regularly generally experience better health, reduced back pain, and less stress.  A good exercise program has a stretching component, a strengthening component, and an aerobic component.   Maintain the mobility of your spine by stretching daily. Strengthen your core and extremities several times a week.   Get regular cardiovascular exercise, 3-5/week.  Choose activities you like such as walking, swimming, dancing, or riding a bike.     5. Quit Smoking  Smoking increases the likelihood of back  pain.  It is thought that smoking reduces the blood supply to the discs between the vertebrae and this may lead to degeneration of these discs.  Talk to your Physician about quitting.  There are many smoking cessation options that may work for you.    6. Keep moving even when you have pain  Motion is lotion.   The majority of back pain is mechanical in nature, and will likely reduce with gentle movements, stretching, and walking.  As tempting as it may be to stay in bed when you are hurting, remember that you will likely feel better by getting up and gently moving and walking.  Limit bed rest.      7. Maintain a healthy diet  Try to maintain a healthy diet and weight.        8. Stay hydrated  The average adult is approximately 60 % water.  Staying hydrated is beneficial for all aspects of health.  In general, an adult should drink half of their body weight in ounces.  For example, if you weight 180 lbs, you should drink 90 ounces of water daily.     9. Get regular sleep   Ensure that you get a good nights rest on a regular basis. The discs in your spine hydrate when you lie down to sleep. Your spine needs the rest too.     10. See Your Physician    Make an appointment to see your Physician for back pain that is progressively worsening, and for back pain that is no better or worse with changing positions and activities.        Z LIE POSITION  Z Lie is a position that you can use to unload your back and assist with pain reduction.  Lie on your back and rest your calves on the seat on a chair or a bench.  Viewed from the side, you should resemble a Z.  Your therapist may suggest sliding the chair closer to you, so your knees are over your stomach.   Your therapist may also suggest a pillow under your buttock if needed.  Follow the directions from your therapist.  The goal of this position is to reduce your symptoms.    Z lie can be done in a variety of ways.  It can be done on a bed resting your legs on a light  and easy to lift chair

## 2020-01-07 PROBLEM — R29.898 WEAKNESS OF BACK: Status: ACTIVE | Noted: 2020-01-07

## 2020-01-07 PROBLEM — M25.69 DECREASED RANGE OF MOTION OF TRUNK AND BACK: Status: ACTIVE | Noted: 2020-01-07
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
## 2020-01-08 ENCOUNTER — CLINICAL SUPPORT (OUTPATIENT)
Dept: REHABILITATION | Facility: OTHER | Age: 75
End: 2020-01-08
Attending: ANESTHESIOLOGY
Payer: MEDICARE

## 2020-01-08 DIAGNOSIS — M25.69 DECREASED RANGE OF MOTION OF TRUNK AND BACK: ICD-10-CM

## 2020-01-08 DIAGNOSIS — R29.898 WEAKNESS OF BACK: ICD-10-CM

## 2020-01-08 PROCEDURE — 97110 THERAPEUTIC EXERCISES: CPT | Mod: HCNC,CQ

## 2020-01-08 NOTE — PROGRESS NOTES
[facility]      Name: Shakira Pearl  Clinic Number: [id number]    Therapy Diagnosis:   Encounter Diagnoses   Name Primary?    Weakness of back     Decreased range of motion of trunk and back      Physician: Mert Coates MD    Visit Date: 2020    Physician Orders: PT Eval and Treat   Medical Diagnosis from Referral:   M47.816 (ICD-10-CM) - Facet arthritis, degenerative, lumbar spine   M43.06 (ICD-10-CM) - Spondylolysis of lumbar region   M53.3 (ICD-10-CM) - Sacroiliac dysfunction   M54.12 (ICD-10-CM) - Cervical radiculopathy   Evaluation Date: 2020  Authorization Period Expiration: 2021   Plan of Care Expiration: 2020  Reassessment Due: 2020  Visit # / Visits authorized:      Time In: 1:20  Time Out:2:20  Total Billable Time: 50 minutes     Precautions: Standard; L knee menisectomy      Pattern of pain determined: 1 PEN        Subjective   Shakira reports having minimal L LB pain today.  She usually gets LBP in the am.     Patient reports tolerating previous visit well with no soreness.  Patient reports their pain to be 2/10 on a 0-10 scale with 0 being no pain and 10 being the worst pain imaginable.  Pain Location: B LB     Occupation: Retired   Leisure: Walking       Pts goals: Build up strength and core stability       Objective     Baseline Isometric Testing on Med X equipment: Testing administered by PT  Date of testin2020  ROM 0-48 deg   Max Peak Torque 105    Min Peak Torque 39    Flex/Ext Ratio 2.69:1    % below normative data -14%          Treatment    Pt was instructed in and performed the following:     Shakira received therapeutic exercises to develop/improved posture, cardiovascular endurance, muscular endurance, lumbar/cervical ROM, strength and muscular endurance for 60 minutes including the following exercises:       [facility] 2020   Visit Number 2   VAS Pain Rating 2   Treadmill Time (in min.) 10   Lumbar Weight 55    Repetitions 20   Rating of Perceived Exertion 3   Ice - Z Lie (in min.) 10       Piriformis stretch hold 20 secs 3x  Double knee to chest 10x  Extension on elbow 10x  Extension in standing 10x    Peripheral muscle strengthening which included 1 set of 15-20 repetitions at a slow, controlled 10-13 second per rep pace focused on strengthening supporting musculature for improved body mechanics and functional mobility.  Pt and therapist focused on proper form during treatment to ensure optimal strengthening of each targeted muscle group.  Machines were utilized including torso rotation, leg extension, leg curl, chest press, upright row. Tricep extension, bicep curl, leg press, and hip abduction added visit 3    Shakira received the following manual therapy techniques: n/a were applied to the: n/a for n/a minutes.         Home Exercises Provided and Patient Education Provided     Education provided:   - Educated pt on the importance of daily stretch to increase the benefit of program and positive results.     Written Home Exercises Provided: Patient instructed to cont prior HEP.  Exercises were reviewed and Shakira was able to demonstrate them prior to the end of the session.  Shakira demonstrated good  understanding of the education provided.     See EMR under Patient Instructions for exercises provided prior visit.          Assessment   Pt with minimal  LBP that did decrease a little during and post session. Reviewed HEP with mod vc for tech. Introduced pt to EIS when cooking and cleaning due to this gives them pain. Pt demo well. Pt tolerated lumbar medx machine with starting weight 50% of pts max peak torque with no c/o pain. Pt tolerated the medx machines well to the upright row with no c/o increased LB pain or any limb pain.        Patient is making good progress towards established goals.  Pt will continue to benefit from skilled outpatient physical therapy to address the deficits stated in the impairment chart,  provide pt/family education and to maximize pt's level of independence in the home and community environment.     Anticipated Barriers for therapy: none  Pt's spiritual, cultural and educational needs considered and pt agreeable to plan of care and goals as stated below:           GOALS: Pt is in agreement with the following goals.     Short term goals:  6 weeks or 10 visits   1.  Pt will demonstrate increased lumbar ROM by at least 6 degrees from the initial ROM value with improvements noted in functional ROM and ability to perform ADLs.  2.  Pt will demonstrate increased maximum isometric torque value by 20% when compared to the initial value resulting in improved ability to perform bending, lifting, and carrying activities safely, confidently.     3.  Patient report a reduction in worst pain score by 1-2 points for improved tolerance for activity and participation.  4.  Pt able to perform HEP correctly with minimal cueing or supervision from therapist to encourage independent management of symptoms.         Long term goals: 10 weeks or 20 visits   1. Pt will demonstrate increased lumbar ROM by at least 12 degrees from initial ROM value, resulting in improved ability to perform functional fwd bending while standing and sitting.   2. Pt will demonstrate increased maximum isometric torque value by 28% when compared to the initial value resulting in improved ability to perform bending, lifting, and carrying activities safely, confidently.  3. Pt to demonstrate ability to independently control and reduce their pain through posture positioning and mechanical movements throughout a typical day.  4.  Pt will demonstrate reduced pain and improved functional outcomes as reported on the FOTO by reaching a score of CI = at least 1% but < 20% impaired, limited or restricted or less in order to demonstrate subjective improvement in pt's condition.    5. Pt will demonstrate independence with the HEP at discharge  6.  Pt will  present with subjective reports of increased core strength and stability.                 Plan   Continue with established Plan of Care towards established PT goals.

## 2020-01-13 ENCOUNTER — CLINICAL SUPPORT (OUTPATIENT)
Dept: REHABILITATION | Facility: OTHER | Age: 75
End: 2020-01-13
Attending: ANESTHESIOLOGY
Payer: MEDICARE

## 2020-01-13 DIAGNOSIS — R29.898 WEAKNESS OF BACK: ICD-10-CM

## 2020-01-13 DIAGNOSIS — M25.69 DECREASED RANGE OF MOTION OF TRUNK AND BACK: ICD-10-CM

## 2020-01-13 PROCEDURE — 97110 THERAPEUTIC EXERCISES: CPT | Mod: HCNC,CQ

## 2020-01-13 NOTE — PROGRESS NOTES
[facility]      Name: Shakira Pearl  Clinic Number: [id number]    Therapy Diagnosis:   Encounter Diagnoses   Name Primary?    Weakness of back     Decreased range of motion of trunk and back      Physician: Mert Coates MD    Visit Date: 2020    Physician Orders: PT Eval and Treat   Medical Diagnosis from Referral:   M47.816 (ICD-10-CM) - Facet arthritis, degenerative, lumbar spine   M43.06 (ICD-10-CM) - Spondylolysis of lumbar region   M53.3 (ICD-10-CM) - Sacroiliac dysfunction   M54.12 (ICD-10-CM) - Cervical radiculopathy   Evaluation Date: 2020  Authorization Period Expiration: 2021   Plan of Care Expiration: 2020  Reassessment Due: 2020  Visit # / Visits authorized: 3/ 20     Time In: 11:00  Time Out:12:00  Total Billable Time: 50 minutes     Precautions: Standard; L knee menisectomy      Pattern of pain determined: 1 PEN        Subjective   Shakira reports having minimal L LB pain today.  She usually gets LBP in the am.     Patient reports tolerating previous visit well with no soreness.  Patient reports their pain to be 4/10 on a 0-10 scale with 0 being no pain and 10 being the worst pain imaginable.  Pain Location: B LB     Occupation: Retired   Leisure: Walking       Pts goals: Build up strength and core stability       Objective     Baseline Isometric Testing on Med X equipment: Testing administered by PT  Date of testin2020  ROM 0-48 deg   Max Peak Torque 105    Min Peak Torque 39    Flex/Ext Ratio 2.69:1    % below normative data -14%          Treatment    Pt was instructed in and performed the following:     Shakira received therapeutic exercises to develop/improved posture, cardiovascular endurance, muscular endurance, lumbar/cervical ROM, strength and muscular endurance for 60 minutes including the following exercises:         [facility] 2020   Visit Number 3   VAS Pain Rating 5   Treadmill Time (in min.) 10   Lumbar Weight  58   Repetitions 16   Rating of Perceived Exertion 3   Ice - Z Lie (in min.) 10       Piriformis stretch hold 20 secs 3x  Double knee to chest 10x  Extension on elbow 10x  Extension in standing 10x    Peripheral muscle strengthening which included 1 set of 15-20 repetitions at a slow, controlled 10-13 second per rep pace focused on strengthening supporting musculature for improved body mechanics and functional mobility.  Pt and therapist focused on proper form during treatment to ensure optimal strengthening of each targeted muscle group.  Machines were utilized including torso rotation, leg extension, leg curl, chest press, upright row. Tricep extension, bicep curl, leg press, and hip abduction added visit 3    Shakira received the following manual therapy techniques: n/a were applied to the: n/a for n/a minutes.         Home Exercises Provided and Patient Education Provided     Education provided:   - Educated pt on the importance of daily stretch to increase the benefit of program and positive results.     Written Home Exercises Provided: Patient instructed to cont prior HEP.  Exercises were reviewed and Shakira was able to demonstrate them prior to the end of the session.  Shakira demonstrated good  understanding of the education provided.     See EMR under Patient Instructions for exercises provided prior visit.          Assessment   Pt with minimal  LBP that did decrease a little during and post session. Reviewed HEP with min vc for tech. Pt demo well. Pt tolerated lumbar medx machine with a weight increase and no c/o pain. Pt tolerated the medx machines well to the upright row with no c/o increased LB pain or any limb pain.        Patient is making good progress towards established goals.  Pt will continue to benefit from skilled outpatient physical therapy to address the deficits stated in the impairment chart, provide pt/family education and to maximize pt's level of independence in the home and community  environment.     Anticipated Barriers for therapy: none  Pt's spiritual, cultural and educational needs considered and pt agreeable to plan of care and goals as stated below:           GOALS: Pt is in agreement with the following goals.     Short term goals:  6 weeks or 10 visits   1.  Pt will demonstrate increased lumbar ROM by at least 6 degrees from the initial ROM value with improvements noted in functional ROM and ability to perform ADLs.  2.  Pt will demonstrate increased maximum isometric torque value by 20% when compared to the initial value resulting in improved ability to perform bending, lifting, and carrying activities safely, confidently.     3.  Patient report a reduction in worst pain score by 1-2 points for improved tolerance for activity and participation.  4.  Pt able to perform HEP correctly with minimal cueing or supervision from therapist to encourage independent management of symptoms.         Long term goals: 10 weeks or 20 visits   1. Pt will demonstrate increased lumbar ROM by at least 12 degrees from initial ROM value, resulting in improved ability to perform functional fwd bending while standing and sitting.   2. Pt will demonstrate increased maximum isometric torque value by 28% when compared to the initial value resulting in improved ability to perform bending, lifting, and carrying activities safely, confidently.  3. Pt to demonstrate ability to independently control and reduce their pain through posture positioning and mechanical movements throughout a typical day.  4.  Pt will demonstrate reduced pain and improved functional outcomes as reported on the FOTO by reaching a score of CI = at least 1% but < 20% impaired, limited or restricted or less in order to demonstrate subjective improvement in pt's condition.    5. Pt will demonstrate independence with the HEP at discharge  6.  Pt will present with subjective reports of increased core strength and stability.                 Plan    Continue with established Plan of Care towards established PT goals.

## 2020-01-14 ENCOUNTER — OFFICE VISIT (OUTPATIENT)
Dept: UROLOGY | Facility: CLINIC | Age: 75
End: 2020-01-14
Payer: MEDICARE

## 2020-01-14 VITALS
WEIGHT: 149.94 LBS | DIASTOLIC BLOOD PRESSURE: 77 MMHG | BODY MASS INDEX: 26.57 KG/M2 | SYSTOLIC BLOOD PRESSURE: 133 MMHG | HEIGHT: 63 IN | HEART RATE: 79 BPM

## 2020-01-14 DIAGNOSIS — N39.46 MIXED URINARY INCONTINENCE DUE TO FEMALE GENITAL PROLAPSE: Primary | ICD-10-CM

## 2020-01-14 DIAGNOSIS — N81.9 MIXED URINARY INCONTINENCE DUE TO FEMALE GENITAL PROLAPSE: Primary | ICD-10-CM

## 2020-01-14 PROCEDURE — 3078F DIAST BP <80 MM HG: CPT | Mod: HCNC,CPTII,S$GLB, | Performed by: UROLOGY

## 2020-01-14 PROCEDURE — 1159F MED LIST DOCD IN RCRD: CPT | Mod: HCNC,S$GLB,, | Performed by: UROLOGY

## 2020-01-14 PROCEDURE — 99204 OFFICE O/P NEW MOD 45 MIN: CPT | Mod: HCNC,S$GLB,, | Performed by: UROLOGY

## 2020-01-14 PROCEDURE — 1159F PR MEDICATION LIST DOCUMENTED IN MEDICAL RECORD: ICD-10-PCS | Mod: HCNC,S$GLB,, | Performed by: UROLOGY

## 2020-01-14 PROCEDURE — 99999 PR PBB SHADOW E&M-EST. PATIENT-LVL III: ICD-10-PCS | Mod: PBBFAC,HCNC,, | Performed by: UROLOGY

## 2020-01-14 PROCEDURE — 3078F PR MOST RECENT DIASTOLIC BLOOD PRESSURE < 80 MM HG: ICD-10-PCS | Mod: HCNC,CPTII,S$GLB, | Performed by: UROLOGY

## 2020-01-14 PROCEDURE — 99999 PR PBB SHADOW E&M-EST. PATIENT-LVL III: CPT | Mod: PBBFAC,HCNC,, | Performed by: UROLOGY

## 2020-01-14 PROCEDURE — 99204 PR OFFICE/OUTPT VISIT, NEW, LEVL IV, 45-59 MIN: ICD-10-PCS | Mod: HCNC,S$GLB,, | Performed by: UROLOGY

## 2020-01-14 PROCEDURE — 3075F PR MOST RECENT SYSTOLIC BLOOD PRESS GE 130-139MM HG: ICD-10-PCS | Mod: HCNC,CPTII,S$GLB, | Performed by: UROLOGY

## 2020-01-14 PROCEDURE — 1101F PT FALLS ASSESS-DOCD LE1/YR: CPT | Mod: HCNC,CPTII,S$GLB, | Performed by: UROLOGY

## 2020-01-14 PROCEDURE — 3075F SYST BP GE 130 - 139MM HG: CPT | Mod: HCNC,CPTII,S$GLB, | Performed by: UROLOGY

## 2020-01-14 PROCEDURE — 1125F PR PAIN SEVERITY QUANTIFIED, PAIN PRESENT: ICD-10-PCS | Mod: HCNC,S$GLB,, | Performed by: UROLOGY

## 2020-01-14 PROCEDURE — 1125F AMNT PAIN NOTED PAIN PRSNT: CPT | Mod: HCNC,S$GLB,, | Performed by: UROLOGY

## 2020-01-14 PROCEDURE — 1101F PR PT FALLS ASSESS DOC 0-1 FALLS W/OUT INJ PAST YR: ICD-10-PCS | Mod: HCNC,CPTII,S$GLB, | Performed by: UROLOGY

## 2020-01-14 RX ORDER — LIDOCAINE HYDROCHLORIDE 20 MG/ML
JELLY TOPICAL ONCE
Status: CANCELLED | OUTPATIENT
Start: 2020-01-14 | End: 2020-01-14

## 2020-01-14 RX ORDER — DULOXETIN HYDROCHLORIDE 30 MG/1
30 CAPSULE, DELAYED RELEASE ORAL DAILY
Qty: 30 CAPSULE | Refills: 3 | Status: SHIPPED | OUTPATIENT
Start: 2020-01-14 | End: 2020-02-13

## 2020-01-14 RX ORDER — DOXYCYCLINE HYCLATE 100 MG
100 TABLET ORAL ONCE
Status: CANCELLED | OUTPATIENT
Start: 2020-01-14 | End: 2020-01-14

## 2020-01-14 RX ORDER — ESTRADIOL 0.1 MG/G
1 CREAM VAGINAL EVERY OTHER DAY
Qty: 42.5 G | Refills: 3 | Status: SHIPPED | OUTPATIENT
Start: 2020-01-14 | End: 2020-05-05 | Stop reason: CLARIF

## 2020-01-14 NOTE — PROGRESS NOTES
CC: mixed urinary incontinence    Shakira Pearl is a 74 y.o. woman who is here for the evaluation of Urinary Incontinence    A new pt referred by her PCP, Angel Bello, DO   C/o urine leakage all day and night.  C/o urine leakage with coughing, laughing and activities.  Also experiences large urine leakage at night with a full bladder.  Uses many pads all day long.    PMHx of uterine prolapse treated with uterine suspension at age 16.  She has a 10 year Hx of mixed urinary incontinence. Treated with imipramine but she states that the urinary symptoms are not better.  Still taking it because she feels like it helps with the depression symptoms.  Dr. Kimbrough had evaluated her and she had Macroplastiq injections which did not help.  Pt denies any irritative or obstructive urinary Sx. She states that symptoms have been deteriorating over time.  She saw Dr. Duran about 3 years ago, he had done urodynamics.  She then saw Dr. Murguia about 1 1/2 years ago and he recommended a sling after urodynamics.  She wanted to know if there were any other options.  Symptoms continue to worsen.     , uterine suspension, rarely sexually active, bowels normal. Hysterectomy at age 38.     Dr. Gordon told her back in  that she has urethral hypermobility and empties relatively well but she would like to either review UD's or do my own to see what other options there might be.  She has already tried urethral bulking agent and it did not help her symptoms.       Past Medical History:   Diagnosis Date    Allergy sinus    Arthritis back    Degenerative disc disease back    Neuromuscular disorder     Vitamin D deficiency      Past Surgical History:   Procedure Laterality Date    APPENDECTOMY  age 19    CHOLECYSTECTOMY      age of 25    CHONDROPLASTY OF KNEE Left 10/3/2019    Procedure: CHONDROPLASTY, KNEE;  Surgeon: Kaylen Patiño MD;  Location: [facility];  Service: Orthopedics;  Laterality: Left;    HYSTERECTOMY  38     INJECTION OF JOINT Bilateral 2/18/2019    Procedure: INJECTION, JOINT BILATERAL SI;  Surgeon: Mert Coates MD;  Location[facility];  Service: Pain Management;  Laterality: Bilateral;  BILATERAL SI JOINT INJECTION    KNEE ARTHROSCOPY W/ MENISCECTOMY Left 10/3/2019    Procedure: ARTHROSCOPY, KNEE, WITH MENISCECTOMY;  Surgeon: Kaylen Patiño MD;  Location: [facility] OR;  Service: Orthopedics;  Laterality: Left;    SYNOVECTOMY OF KNEE Left 10/3/2019    Procedure: SYNOVECTOMY, KNEE;  Surgeon: Kaylen Patiño MD;  Location: [facility] OR;  Service: Orthopedics;  Laterality: Left;     Social History     Tobacco Use    Smoking status: Never Smoker    Smokeless tobacco: Never Used   Substance Use Topics    Alcohol use: No    Drug use: No     Family History   Problem Relation Age of Onset    Heart disease Mother 67        heart attack    Cancer Father 65        abdominal    Stomach cancer Father     No Known Problems Sister     No Known Problems Brother     No Known Problems Son     No Known Problems Maternal Grandmother     No Known Problems Maternal Grandfather     No Known Problems Paternal Grandmother     No Known Problems Paternal Grandfather     No Known Problems Maternal Aunt     No Known Problems Maternal Uncle     No Known Problems Paternal Aunt     No Known Problems Paternal Uncle     Celiac disease Neg Hx     Colon cancer Neg Hx     Crohn's disease Neg Hx     Esophageal cancer Neg Hx     Inflammatory bowel disease Neg Hx     Liver cancer Neg Hx     Rectal cancer Neg Hx     Ulcerative colitis Neg Hx     Amblyopia Neg Hx     Blindness Neg Hx     Cataracts Neg Hx     Diabetes Neg Hx     Glaucoma Neg Hx     Hypertension Neg Hx     Macular degeneration Neg Hx     Retinal detachment Neg Hx     Strabismus Neg Hx     Stroke Neg Hx     Thyroid disease Neg Hx      Allergy:  Review of patient's allergies indicates:   Allergen Reactions    Demerol [meperidine] Nausea And Vomiting     Other  reaction(s): Nausea    Papaya      Illness vomiting    Sulfa (sulfonamide antibiotics) Rash    Sulfur Rash     Outpatient Encounter Medications as of 1/14/2020   Medication Sig Dispense Refill    cholecalciferol, vitamin D3, (VITAMIN D3) 2,000 unit Cap Take 1 capsule (2,000 Units total) by mouth once daily. 90 capsule 3    DULoxetine (CYMBALTA) 30 MG capsule Take 1 capsule (30 mg total) by mouth once daily. 30 capsule 3    estradiol (ESTRACE) 0.01 % (0.1 mg/gram) vaginal cream Place 1 g vaginally every other day. 42.5 g 3    estradiol valerate (DELESTROGEN) 40 mg/mL injection Inject 0.5 mLs (20 mg total) into the muscle every 28 days. Inject into the muscle. 5 mL 12    imipramine (TOFRANIL) 25 MG tablet Take 1 tablet (25 mg total) by mouth every evening. 90 tablet 3    losartan (COZAAR) 50 MG tablet Take 1 tablet (50 mg total) by mouth once daily. 90 tablet 3    pantoprazole (PROTONIX) 40 MG tablet Take 1 tablet (40 mg total) by mouth before breakfast. Take one pill every morning 45 minutes before breakfast in the morning. 90 tablet 3     No facility-administered encounter medications on file as of 1/14/2020.      Review of Systems   ROS  Physical Exam     Vitals:    01/14/20 1344   BP: 133/77   Pulse: 79     Physical Exam   Constitutional: She is oriented to person, place, and time. She appears well-developed and well-nourished. No distress.   HENT:   Head: Normocephalic and atraumatic.   Right Ear: External ear normal.   Left Ear: External ear normal.   Nose: Nose normal.   Eyes: Conjunctivae and EOM are normal. Pupils are equal, round, and reactive to light. No scleral icterus.   Neck: Normal range of motion. Neck supple. No JVD present. No tracheal deviation present. No thyromegaly present.   Cardiovascular: Normal rate, regular rhythm, normal heart sounds and intact distal pulses.  Exam reveals no gallop and no friction rub.    No murmur heard.  Pulmonary/Chest: Effort normal and breath sounds normal.  No respiratory distress. She has no wheezes.   Abdominal: Soft. Bowel sounds are normal. She exhibits no distension and no mass. There is no tenderness. There is no rebound and no guarding.   Genitourinary: No vaginal discharge found.   Genitourinary Comments: Atrophic vagina.   Musculoskeletal: Normal range of motion. She exhibits no edema, tenderness or deformity.   Lymphadenopathy:     She has no cervical adenopathy.   Neurological: She is alert and oriented to person, place, and time.   Skin: Skin is warm and dry. She is not diaphoretic.     Psychiatric: She has a normal mood and affect. Her behavior is normal. Thought content normal.         LABS:  Lab Results   Component Value Date    CREATININE 0.7 12/03/2019    CREATININE 0.7 09/23/2019    CREATININE 0.8 02/11/2019     Urine Culture, Routine   Date Value Ref Range Status   10/16/2015 No growth  Final   02/24/2015 No growth  Final     Hemoglobin A1C   Date Value Ref Range Status   08/30/2017 5.2 4.0 - 5.6 % Final     Comment:     According to ADA guidelines, hemoglobin A1c <7.0% represents  optimal control in non-pregnant diabetic patients. Different  metrics may apply to specific patient populations.   Standards of Medical Care in Diabetes-2016.  For the purpose of screening for the presence of diabetes:  <5.7%     Consistent with the absence of diabetes  5.7-6.4%  Consistent with increasing risk for diabetes   (prediabetes)  >or=6.5%  Consistent with diabetes  Currently, no consensus exists for use of hemoglobin A1c  for diagnosis of diabetes for children.  This Hemoglobin A1c assay has significant interference with fetal   hemoglobin   (HbF). The results are invalid for patients with abnormal amounts of   HbF,   including those with known Hereditary Persistence   of Fetal Hemoglobin. Heterozygous hemoglobin variants (HbAS, HbAC,   HbAD, HbAE, HbA2) do not significantly interfere with this assay;   however, presence of multiple variants in a sample may  impact the %   interference.     12/13/2013 5.7 4.5 - 6.2 % Final     Radiology:    Assessment and Plan:  Shakira was seen today for urinary incontinence.    Diagnoses and all orders for this visit:    Mixed urinary incontinence due to female genital prolapse  -     Simple Urodynamics w/ Cysto; Future  -     Urine Culture High Risk; Standing  -     Simple Urodynamics w/ Cysto; Future  -     POCT Urinalysis; Standing  -     Urine Culture High Risk  -     DULoxetine (CYMBALTA) 30 MG capsule; Take 1 capsule (30 mg total) by mouth once daily.  -     estradiol (ESTRACE) 0.01 % (0.1 mg/gram) vaginal cream; Place 1 g vaginally every other day.    Other orders  -     lidocaine HCl 2% urojet  -     doxycycline tablet 100 mg    I explained the nature of her urologic problems,   Will further evaluate her with suds cysto.  Start cymbalta 30 mg in the morning and continue tofranil 25 mg q hs.  Use estrace cream to the urethra as directed.    Follow-up:  Follow up voiding diary for 3 days.

## 2020-01-15 ENCOUNTER — CLINICAL SUPPORT (OUTPATIENT)
Dept: REHABILITATION | Facility: OTHER | Age: 75
End: 2020-01-15
Attending: ANESTHESIOLOGY
Payer: MEDICARE

## 2020-01-15 DIAGNOSIS — R29.898 WEAKNESS OF BACK: ICD-10-CM

## 2020-01-15 DIAGNOSIS — M25.69 DECREASED RANGE OF MOTION OF TRUNK AND BACK: ICD-10-CM

## 2020-01-15 PROCEDURE — 97110 THERAPEUTIC EXERCISES: CPT | Mod: HCNC

## 2020-01-15 NOTE — PROGRESS NOTES
[facility]      Name: Shakira Pearl  Clinic Number: [id number]    Therapy Diagnosis:   Encounter Diagnoses   Name Primary?    Weakness of back     Decreased range of motion of trunk and back      Physician: Mert Coates MD    Visit Date: 1/15/2020    Physician Orders: PT Eval and Treat   Medical Diagnosis from Referral:   M47.816 (ICD-10-CM) - Facet arthritis, degenerative, lumbar spine   M43.06 (ICD-10-CM) - Spondylolysis of lumbar region   M53.3 (ICD-10-CM) - Sacroiliac dysfunction   M54.12 (ICD-10-CM) - Cervical radiculopathy   Evaluation Date: 2020  Authorization Period Expiration: 2021   Plan of Care Expiration: 2020  Reassessment Due: 2020  Visit # / Visits authorized:      Time In: 2:00  Time Out: 3:00  Total Billable Time: 50 minutes     Precautions: Standard; L knee menisectomy      Pattern of pain determined: 1 PEN        Subjective   Shakira reports having minimal L LB pain today.  She usually gets LBP in the am.     Patient reports tolerating previous visit well with no soreness.  Patient reports their pain to be 4/10 on a 0-10 scale with 0 being no pain and 10 being the worst pain imaginable.  Pain Location: B LB     Occupation: Retired   Leisure: Walking       Pts goals: Build up strength and core stability       Objective     Baseline Isometric Testing on Med X equipment: Testing administered by PT  Date of testin2020  ROM 0-48 deg   Max Peak Torque 105    Min Peak Torque 39    Flex/Ext Ratio 2.69:1    % below normative data -14%          Treatment    Pt was instructed in and performed the following:     Shakira received therapeutic exercises to develop/improved posture, cardiovascular endurance, muscular endurance, lumbar/cervical ROM, strength and muscular endurance for 60 minutes including the following exercises:         [facility] 1/15/2020   Visit Number 4   VAS Pain Rating 3   Treadmill Time (in min.) 10   Speed 3.5    Lumbar Weight 58   Repetitions 18   Rating of Perceived Exertion 2   Ice - Z Lie (in min.) 10         Piriformis stretch hold 20 secs 3x  Double knee to chest 10x  Extension on elbow 10x  Extension in standing 10x    Peripheral muscle strengthening which included 1 set of 15-20 repetitions at a slow, controlled 10-13 second per rep pace focused on strengthening supporting musculature for improved body mechanics and functional mobility.  Pt and therapist focused on proper form during treatment to ensure optimal strengthening of each targeted muscle group.  Machines were utilized including torso rotation, leg extension, leg curl, chest press, upright row. Tricep extension, bicep curl, leg press, and hip abduction added visit 3    Shakira received the following manual therapy techniques: n/a were applied to the: n/a for n/a minutes.         Home Exercises Provided and Patient Education Provided     Education provided:   - Educated pt on the importance of daily stretch to increase the benefit of program and positive results.     Written Home Exercises Provided: Patient instructed to cont prior HEP.  Exercises were reviewed and Shakira was able to demonstrate them prior to the end of the session.  Shakira demonstrated good  understanding of the education provided.     See EMR under Patient Instructions for exercises provided prior visit.          Assessment   Pt with minimal  LBP that did decrease a little during and post session. Was able to complete exercises this visit with minimal verbal cues. Maintained exercise weight on the lumbar medx and able to increase repetitions to 18 and will increase again next visit to 20 as tolerated.    Patient is making good progress towards established goals.  Pt will continue to benefit from skilled outpatient physical therapy to address the deficits stated in the impairment chart, provide pt/family education and to maximize pt's level of independence in the home and community  environment.     Anticipated Barriers for therapy: none  Pt's spiritual, cultural and educational needs considered and pt agreeable to plan of care and goals as stated below:           GOALS: Pt is in agreement with the following goals.     Short term goals:  6 weeks or 10 visits   1.  Pt will demonstrate increased lumbar ROM by at least 6 degrees from the initial ROM value with improvements noted in functional ROM and ability to perform ADLs.  2.  Pt will demonstrate increased maximum isometric torque value by 20% when compared to the initial value resulting in improved ability to perform bending, lifting, and carrying activities safely, confidently.     3.  Patient report a reduction in worst pain score by 1-2 points for improved tolerance for activity and participation.  4.  Pt able to perform HEP correctly with minimal cueing or supervision from therapist to encourage independent management of symptoms.         Long term goals: 10 weeks or 20 visits   1. Pt will demonstrate increased lumbar ROM by at least 12 degrees from initial ROM value, resulting in improved ability to perform functional fwd bending while standing and sitting.   2. Pt will demonstrate increased maximum isometric torque value by 28% when compared to the initial value resulting in improved ability to perform bending, lifting, and carrying activities safely, confidently.  3. Pt to demonstrate ability to independently control and reduce their pain through posture positioning and mechanical movements throughout a typical day.  4.  Pt will demonstrate reduced pain and improved functional outcomes as reported on the FOTO by reaching a score of CI = at least 1% but < 20% impaired, limited or restricted or less in order to demonstrate subjective improvement in pt's condition.    5. Pt will demonstrate independence with the HEP at discharge  6.  Pt will present with subjective reports of increased core strength and stability.                 Plan    Continue with established Plan of Care towards established PT goals.

## 2020-01-20 ENCOUNTER — CLINICAL SUPPORT (OUTPATIENT)
Dept: REHABILITATION | Facility: OTHER | Age: 75
End: 2020-01-20
Attending: ANESTHESIOLOGY
Payer: MEDICARE

## 2020-01-20 ENCOUNTER — DOCUMENTATION ONLY (OUTPATIENT)
Dept: REHABILITATION | Facility: OTHER | Age: 75
End: 2020-01-20

## 2020-01-20 DIAGNOSIS — R29.898 WEAKNESS OF BACK: ICD-10-CM

## 2020-01-20 DIAGNOSIS — M25.69 DECREASED RANGE OF MOTION OF TRUNK AND BACK: ICD-10-CM

## 2020-01-20 PROCEDURE — 97110 THERAPEUTIC EXERCISES: CPT | Mod: HCNC

## 2020-01-20 NOTE — PROGRESS NOTES
[facility]      Name: Shakira Pearl  Clinic Number: [id number]    Therapy Diagnosis:   Encounter Diagnoses   Name Primary?    Weakness of back     Decreased range of motion of trunk and back      Physician: Mert Coates MD    Visit Date: 2020    Physician Orders: PT Eval and Treat   Medical Diagnosis from Referral:   M47.816 (ICD-10-CM) - Facet arthritis, degenerative, lumbar spine   M43.06 (ICD-10-CM) - Spondylolysis of lumbar region   M53.3 (ICD-10-CM) - Sacroiliac dysfunction   M54.12 (ICD-10-CM) - Cervical radiculopathy   Evaluation Date: 2020  Authorization Period Expiration: 2021   Plan of Care Expiration: 2020  Reassessment Due: 2020  Visit # / Visits authorized:      Time In: 1250  Time Out: 200  Total Billable Time: 55 minutes     Precautions: Standard; L knee menisectomy      Pattern of pain determined: 1 PEN        Subjective   Shakira reports she is not having any LBP today. States she took aleve two days ago and has not had any back pain since.   Patient reports tolerating previous visit well with no soreness.  Patient reports their pain to be 4/10 on a 0-10 scale with 0 being no pain and 10 being the worst pain imaginable.  Pain Location: B LB     Occupation: Retired   Leisure: Walking       Pts goals: Build up strength and core stability       Objective     Baseline Isometric Testing on Med X equipment: Testing administered by PT  Date of testin2020  ROM 0-48 deg   Max Peak Torque 105    Min Peak Torque 39    Flex/Ext Ratio 2.69:1    % below normative data -14%          Treatment    Pt was instructed in and performed the following:     Shakira received therapeutic exercises to develop/improved posture, cardiovascular endurance, muscular endurance, lumbar/cervical ROM, strength and muscular endurance for 60 minutes including the following exercises:  [facility] 2020   Visit Number 5   VAS Pain Rating 0   Treadmill Time  (in min.) 10   Speed -   Lumbar Stretches - Slouch Overcorrection -   Extension in Lying -   Extension in Standing -   Flexion in Lying -   Flexion in Sitting -   Lumbar Extension Seat Pad -   Femur Restraint -   Top Dead Center -   Counterweight -   Lumbar Flexion -   Lumbar Extension -   Lumbar Peak Torque -   Min Torque -   Test Percent Below Normative Data -   Lumbar Weight 58   Repetitions 20   Rating of Perceived Exertion 3   Ice - Z Lie (in min.) 10       Exercises completed this session:  Piriformis stretch hold 20 secs 3x  Double knee to chest 10x  Extension on elbow 10x  Extension in standing 10x    Peripheral muscle strengthening which included 1 set of 15-20 repetitions at a slow, controlled 10-13 second per rep pace focused on strengthening supporting musculature for improved body mechanics and functional mobility.  Pt and therapist focused on proper form during treatment to ensure optimal strengthening of each targeted muscle group.  Machines were utilized including torso rotation, leg extension, leg curl, chest press, upright row. Tricep extension, bicep curl, leg press, and hip abduction added visit 3    Shakira received the following manual therapy techniques: n/a were applied to the: n/a for n/a minutes.     Home Exercises Provided and Patient Education Provided     Education provided:   - Educated pt on the importance of daily stretch to increase the benefit of program and positive results.     Written Home Exercises Provided: Patient instructed to cont prior HEP.  Exercises were reviewed and Shakira was able to demonstrate them prior to the end of the session.  Shakira demonstrated good  understanding of the education provided.     See EMR under Patient Instructions for exercises provided prior visit.    Assessment   Pt able to complete all components of session with no adverse effects. Increased repetitions today with appropriate RPE. Will increased resistance next session. Pt requires frequent  verbal cueing with therex to ensure appropriate pacing and completion. Pt also requires frequent verbal cueing to ensure appropriate pace on medx machine. Patient is making good progress towards established goals. Pt will continue to benefit from skilled outpatient physical therapy to address the deficits stated in the impairment chart, provide pt/family education and to maximize pt's level of independence in the home and community environment.     Anticipated Barriers for therapy: none  Pt's spiritual, cultural and educational needs considered and pt agreeable to plan of care and goals as stated below:     GOALS: Pt is in agreement with the following goals.     Short term goals:  6 weeks or 10 visits   1.  Pt will demonstrate increased lumbar ROM by at least 6 degrees from the initial ROM value with improvements noted in functional ROM and ability to perform ADLs.  2.  Pt will demonstrate increased maximum isometric torque value by 20% when compared to the initial value resulting in improved ability to perform bending, lifting, and carrying activities safely, confidently.     3.  Patient report a reduction in worst pain score by 1-2 points for improved tolerance for activity and participation.  4.  Pt able to perform HEP correctly with minimal cueing or supervision from therapist to encourage independent management of symptoms.         Long term goals: 10 weeks or 20 visits   1. Pt will demonstrate increased lumbar ROM by at least 12 degrees from initial ROM value, resulting in improved ability to perform functional fwd bending while standing and sitting.   2. Pt will demonstrate increased maximum isometric torque value by 28% when compared to the initial value resulting in improved ability to perform bending, lifting, and carrying activities safely, confidently.  3. Pt to demonstrate ability to independently control and reduce their pain through posture positioning and mechanical movements throughout a typical  day.  4.  Pt will demonstrate reduced pain and improved functional outcomes as reported on the FOTO by reaching a score of CI = at least 1% but < 20% impaired, limited or restricted or less in order to demonstrate subjective improvement in pt's condition.    5. Pt will demonstrate independence with the HEP at discharge  6.  Pt will present with subjective reports of increased core strength and stability.     Plan   Continue with established Plan of Care towards established PT goals.

## 2020-01-20 NOTE — PROGRESS NOTES
HC talked to Pt about health coaching and wellness program; she is interested in both.  I went over the details of wellness program, including [employer] pricing.  I let her know that we offer 2 free visits that will allow her to see the gym and meet the rest of the coaches.  We will transition her to wellness on her discharge day.

## 2020-01-22 ENCOUNTER — CLINICAL SUPPORT (OUTPATIENT)
Dept: REHABILITATION | Facility: OTHER | Age: 75
End: 2020-01-22
Attending: ANESTHESIOLOGY
Payer: MEDICARE

## 2020-01-22 DIAGNOSIS — R29.898 WEAKNESS OF BACK: ICD-10-CM

## 2020-01-22 DIAGNOSIS — M25.69 DECREASED RANGE OF MOTION OF TRUNK AND BACK: ICD-10-CM

## 2020-01-22 PROCEDURE — 97110 THERAPEUTIC EXERCISES: CPT | Mod: HCNC

## 2020-01-22 NOTE — PROGRESS NOTES
[facility]      Name: Shakira Pearl  Clinic Number: [id number]    Therapy Diagnosis:   Encounter Diagnoses   Name Primary?    Weakness of back     Decreased range of motion of trunk and back      Physician: Mert Coates MD    Visit Date: 2020    Physician Orders: PT Eval and Treat   Medical Diagnosis from Referral:   M47.816 (ICD-10-CM) - Facet arthritis, degenerative, lumbar spine   M43.06 (ICD-10-CM) - Spondylolysis of lumbar region   M53.3 (ICD-10-CM) - Sacroiliac dysfunction   M54.12 (ICD-10-CM) - Cervical radiculopathy   Evaluation Date: 2020  Authorization Period Expiration: 2021   Plan of Care Expiration: 2020  Reassessment Due: 2020  Visit # / Visits authorized:      Time In: 1255  Time Out: 208  Total Billable Time: 55 minutes     Precautions: Standard; L knee menisectomy      Pattern of pain determined: 1 PEN        Subjective   Shakira reports back pain is greatly decreased recently with PT and taking one or two aleve every three days.   Patient reports tolerating previous visit well with no soreness.  Patient reports their pain to be 2/10 on a 0-10 scale with 0 being no pain and 10 being the worst pain imaginable.  Pain Location: B LB     Occupation: Retired   Leisure: Walking       Pts goals: Build up strength and core stability       Objective     Baseline Isometric Testing on Med X equipment: Testing administered by PT  Date of testin2020  ROM 0-48 deg   Max Peak Torque 105    Min Peak Torque 39    Flex/Ext Ratio 2.69:1    % below normative data -14%          Treatment    Pt was instructed in and performed the following:     Shakira received therapeutic exercises to develop/improved posture, cardiovascular endurance, muscular endurance, lumbar/cervical ROM, strength and muscular endurance for 60 minutes including the following exercises:  [facility] 2020   Visit Number 6   VAS Pain Rating 2   Treadmill Time (in min.)  10   Speed -   Lumbar Stretches - Slouch Overcorrection -   Extension in Lying -   Extension in Standing -   Flexion in Lying -   Flexion in Sitting -   Lumbar Extension Seat Pad -   Femur Restraint -   Top Dead Center -   Counterweight -   Lumbar Flexion -   Lumbar Extension -   Lumbar Peak Torque -   Min Torque -   Test Percent Below Normative Data -   Lumbar Weight 61   Repetitions 17   Rating of Perceived Exertion 3   Ice - Z Lie (in min.) 10         Exercises completed this session:  Piriformis stretch hold 20 secs 3x  Double knee to chest 10x  Extension on elbow 10x  Extension in standing 10x    Peripheral muscle strengthening which included 1 set of 15-20 repetitions at a slow, controlled 10-13 second per rep pace focused on strengthening supporting musculature for improved body mechanics and functional mobility.  Pt and therapist focused on proper form during treatment to ensure optimal strengthening of each targeted muscle group.  Machines were utilized including torso rotation, leg extension, leg curl, chest press, upright row. Tricep extension, bicep curl, leg press, and hip abduction added visit 3    Shakira received the following manual therapy techniques: n/a were applied to the: n/a for n/a minutes.     Home Exercises Provided and Patient Education Provided     Education provided:   - Educated pt on the importance of daily stretch to increase the benefit of program and positive results.     Written Home Exercises Provided: Patient instructed to cont prior HEP.  Exercises were reviewed and Shakira was able to demonstrate them prior to the end of the session.  Shakira demonstrated good  understanding of the education provided.     See EMR under Patient Instructions for exercises provided prior visit.    Assessment   Pt able to complete all components of session with no adverse effects. Increased resistance on medx machine today. Able to complete 17 repetitions with appropriate RPE. Patient is making good  progress towards established goals. Pt will continue to benefit from skilled outpatient physical therapy to address the deficits stated in the impairment chart, provide pt/family education and to maximize pt's level of independence in the home and community environment.     Anticipated Barriers for therapy: none  Pt's spiritual, cultural and educational needs considered and pt agreeable to plan of care and goals as stated below:     GOALS: Pt is in agreement with the following goals.     Short term goals:  6 weeks or 10 visits   1.  Pt will demonstrate increased lumbar ROM by at least 6 degrees from the initial ROM value with improvements noted in functional ROM and ability to perform ADLs.  2.  Pt will demonstrate increased maximum isometric torque value by 20% when compared to the initial value resulting in improved ability to perform bending, lifting, and carrying activities safely, confidently.     3.  Patient report a reduction in worst pain score by 1-2 points for improved tolerance for activity and participation.  4.  Pt able to perform HEP correctly with minimal cueing or supervision from therapist to encourage independent management of symptoms.         Long term goals: 10 weeks or 20 visits   1. Pt will demonstrate increased lumbar ROM by at least 12 degrees from initial ROM value, resulting in improved ability to perform functional fwd bending while standing and sitting.   2. Pt will demonstrate increased maximum isometric torque value by 28% when compared to the initial value resulting in improved ability to perform bending, lifting, and carrying activities safely, confidently.  3. Pt to demonstrate ability to independently control and reduce their pain through posture positioning and mechanical movements throughout a typical day.  4.  Pt will demonstrate reduced pain and improved functional outcomes as reported on the FOTO by reaching a score of CI = at least 1% but < 20% impaired, limited or restricted  or less in order to demonstrate subjective improvement in pt's condition.    5. Pt will demonstrate independence with the HEP at discharge  6.  Pt will present with subjective reports of increased core strength and stability.     Plan   Continue with established Plan of Care towards established PT goals.

## 2020-01-27 ENCOUNTER — DOCUMENTATION ONLY (OUTPATIENT)
Dept: REHABILITATION | Facility: OTHER | Age: 75
End: 2020-01-27
Attending: ANESTHESIOLOGY
Payer: MEDICARE

## 2020-01-27 DIAGNOSIS — R29.898 WEAKNESS OF BACK: ICD-10-CM

## 2020-01-27 DIAGNOSIS — M25.69 DECREASED RANGE OF MOTION OF TRUNK AND BACK: ICD-10-CM

## 2020-01-27 PROCEDURE — 97110 THERAPEUTIC EXERCISES: CPT | Mod: HCNC,CQ

## 2020-01-27 NOTE — PROGRESS NOTES
Health  Consult Note    Name: Shakira Pearl  Clinic Number: [id number]  Physician: Mert Coates MD  Past Medical History:   Diagnosis Date    Allergy sinus    Arthritis back    Degenerative disc disease back    Neuromuscular disorder     Vitamin D deficiency      Time In: 2:05 PM  Time Out: 2:45 PM    Coaching performed: In person    Subjective:   Patient reports that she is not motivated to exercise and wants to loose 10 lbs; she realizes that she has to start moving.  Pt gained 10 lbs in the last year.  She has not been consistent with exercise in the last year and a half since her best friend passed away.  She moved in with her best friend to help take care of her then dealt with moving out of her 5-bedroom house and then moving her son into his new place.  Pt is also scared to make lower back pain worse.  We discussed her eating habits; she eats healthy most day but finds that she eats too much.  We will talk about portion control on our next session.  Pt prefers to eat beef and pork but also eats fish and chicken.  She knows that she has to cut down on some of the fattier meats.  Handout with all food groups and servings sizes were given to the patient today.      Objective:  Shakira was instructed to start paying attention to portion whenever she makes herself a plate.      Assessment:   Patient weighed 69.9 Kg/153 lbs today, 1/27/2020.      Plan:  Patient goals for next consult include: to walk on the treadmill once this week, for 20 minutes.  She has a gym membership at [facility] in [facility].       Health : Nathaly Macias  1/27/2020

## 2020-01-27 NOTE — PROGRESS NOTES
[facility]      Name: Shakira Pearl  Clinic Number: [id number]    Therapy Diagnosis:   Encounter Diagnoses   Name Primary?    Weakness of back     Decreased range of motion of trunk and back      Physician: Mert Coates MD    Visit Date: 2020    Physician Orders: PT Eval and Treat   Medical Diagnosis from Referral:   M47.816 (ICD-10-CM) - Facet arthritis, degenerative, lumbar spine   M43.06 (ICD-10-CM) - Spondylolysis of lumbar region   M53.3 (ICD-10-CM) - Sacroiliac dysfunction   M54.12 (ICD-10-CM) - Cervical radiculopathy   Evaluation Date: 2020  Authorization Period Expiration: 2021   Plan of Care Expiration: 2020  Reassessment Due: 2020  Visit # / Visits authorized:      Time In: 1255  Time Out: 208  Total Billable Time: 55 minutes     Precautions: Standard; L knee menisectomy      Pattern of pain determined: 1 PEN        Subjective   Shakira reports LBP this morning, but no pain with session.More scapular pain today.     Patient reports tolerating previous visit well with no soreness.  Patient reports their pain to be 2/10 on a 0-10 scale with 0 being no pain and 10 being the worst pain imaginable.  Pain Location: B LB     Occupation: Retired   Leisure: Walking       Pts goals: Build up strength and core stability       Objective     Baseline Isometric Testing on Med X equipment: Testing administered by PT  Date of testin2020  ROM 0-48 deg   Max Peak Torque 105    Min Peak Torque 39    Flex/Ext Ratio 2.69:1    % below normative data -14%          Treatment    Pt was instructed in and performed the following:     Shakira received therapeutic exercises to develop/improved posture, cardiovascular endurance, muscular endurance, lumbar/cervical ROM, strength and muscular endurance for 60 minutes including the following exercises:      [facility] 2020   Visit Number 7   VAS Pain Rating 2   Treadmill Time (in min.) 10   Lumbar Weight  61   Repetitions 20   Rating of Perceived Exertion 3   Ice - Z Lie (in min.) 10       Exercises completed this session:  Piriformis stretch hold 20 secs 3x  Double knee to chest 10x  Extension on elbow 10x  Extension in standing 10x    Peripheral muscle strengthening which included 1 set of 15-20 repetitions at a slow, controlled 10-13 second per rep pace focused on strengthening supporting musculature for improved body mechanics and functional mobility.  Pt and therapist focused on proper form during treatment to ensure optimal strengthening of each targeted muscle group.  Machines were utilized including torso rotation, leg extension, leg curl, chest press, upright row. Tricep extension, bicep curl, leg press, and hip abduction added visit 3    Shakira received the following manual therapy techniques: n/a were applied to the: n/a for n/a minutes.     Home Exercises Provided and Patient Education Provided     Education provided:   - Educated pt on the importance of daily stretch to increase the benefit of program and positive results.     Written Home Exercises Provided: Patient instructed to cont prior HEP.  Exercises were reviewed and Shakira was able to demonstrate them prior to the end of the session.  Shakira demonstrated good  understanding of the education provided.     See EMR under Patient Instructions for exercises provided prior visit.    Assessment   Pt able to complete session with no LBP.  Same resistance on medx machine today with  no c/o LBP. Able to complete 20 repetitions with appropriate RPE. Patient is making good progress towards established goals. Pt will continue to benefit from skilled outpatient physical therapy to address the deficits stated in the impairment chart, provide pt/family education and to maximize pt's level of independence in the home and community environment.     Anticipated Barriers for therapy: none  Pt's spiritual, cultural and educational needs considered and pt agreeable  to plan of care and goals as stated below:     GOALS: Pt is in agreement with the following goals.     Short term goals:  6 weeks or 10 visits   1.  Pt will demonstrate increased lumbar ROM by at least 6 degrees from the initial ROM value with improvements noted in functional ROM and ability to perform ADLs.  2.  Pt will demonstrate increased maximum isometric torque value by 20% when compared to the initial value resulting in improved ability to perform bending, lifting, and carrying activities safely, confidently.     3.  Patient report a reduction in worst pain score by 1-2 points for improved tolerance for activity and participation.  4.  Pt able to perform HEP correctly with minimal cueing or supervision from therapist to encourage independent management of symptoms.         Long term goals: 10 weeks or 20 visits   1. Pt will demonstrate increased lumbar ROM by at least 12 degrees from initial ROM value, resulting in improved ability to perform functional fwd bending while standing and sitting.   2. Pt will demonstrate increased maximum isometric torque value by 28% when compared to the initial value resulting in improved ability to perform bending, lifting, and carrying activities safely, confidently.  3. Pt to demonstrate ability to independently control and reduce their pain through posture positioning and mechanical movements throughout a typical day.  4.  Pt will demonstrate reduced pain and improved functional outcomes as reported on the FOTO by reaching a score of CI = at least 1% but < 20% impaired, limited or restricted or less in order to demonstrate subjective improvement in pt's condition.    5. Pt will demonstrate independence with the HEP at discharge  6.  Pt will present with subjective reports of increased core strength and stability.     Plan   Continue with established Plan of Care towards established PT goals.

## 2020-02-03 ENCOUNTER — TELEPHONE (OUTPATIENT)
Dept: UROLOGY | Facility: CLINIC | Age: 75
End: 2020-02-03

## 2020-02-03 ENCOUNTER — DOCUMENTATION ONLY (OUTPATIENT)
Dept: REHABILITATION | Facility: OTHER | Age: 75
End: 2020-02-03
Attending: ANESTHESIOLOGY
Payer: MEDICARE

## 2020-02-03 DIAGNOSIS — M25.69 DECREASED RANGE OF MOTION OF TRUNK AND BACK: ICD-10-CM

## 2020-02-03 DIAGNOSIS — R29.898 WEAKNESS OF BACK: ICD-10-CM

## 2020-02-03 PROCEDURE — 97110 THERAPEUTIC EXERCISES: CPT | Mod: HCNC

## 2020-02-03 NOTE — PROGRESS NOTES
[facility]      Name: Shakira Pearl  Clinic Number: [id number]    Therapy Diagnosis:   Encounter Diagnoses   Name Primary?    Weakness of back     Decreased range of motion of trunk and back      Physician: Mert Coates MD    Visit Date: 2/3/2020    Physician Orders: PT Eval and Treat   Medical Diagnosis from Referral:   M47.816 (ICD-10-CM) - Facet arthritis, degenerative, lumbar spine   M43.06 (ICD-10-CM) - Spondylolysis of lumbar region   M53.3 (ICD-10-CM) - Sacroiliac dysfunction   M54.12 (ICD-10-CM) - Cervical radiculopathy   Evaluation Date: 2020  Authorization Period Expiration: 2021   Plan of Care Expiration: 2020  Reassessment Due: 2020  Visit # / Visits authorized:      Time In: 100  Time Out: 200  Total Billable Time: 30 minutes     Precautions: Standard; L knee menisectomy      Pattern of pain determined: 1 PEN        Subjective   Shakira reports LBP is minimal today   Patient reports tolerating previous visit well with no soreness.  Patient reports their pain to be 2/10 on a 0-10 scale with 0 being no pain and 10 being the worst pain imaginable.  Pain Location: B LB     Occupation: Retired   Leisure: Walking       Pts goals: Build up strength and core stability       Objective     Baseline Isometric Testing on Med X equipment: Testing administered by PT  Date of testin2020  ROM 0-48 deg   Max Peak Torque 105    Min Peak Torque 39    Flex/Ext Ratio 2.69:1    % below normative data -14%          Treatment    Pt was instructed in and performed the following:     Shakira received therapeutic exercises to develop/improved posture, cardiovascular endurance, muscular endurance, lumbar/cervical ROM, strength and muscular endurance for 60 minutes including the following exercises:  [facility] 2/3/2020   Visit Number 8   VAS Pain Rating 2   Treadmill Time (in min.) 10   Speed -   Lumbar Stretches - Slouch Overcorrection -   Extension in  Lying -   Extension in Standing -   Flexion in Lying -   Flexion in Sitting -   Lumbar Extension Seat Pad -   Femur Restraint -   Top Dead Center -   Counterweight -   Lumbar Flexion -   Lumbar Extension -   Lumbar Peak Torque -   Min Torque -   Test Percent Below Normative Data -   Lumbar Weight 65   Repetitions 15   Rating of Perceived Exertion 3   Ice - Z Lie (in min.) 10       Exercises completed this session:  Piriformis stretch hold 20 secs 3x  Double knee to chest 10x  Extension on elbow 10x  Extension in standing 10x  Bridges 10x  PPT 10x    Peripheral muscle strengthening which included 1 set of 15-20 repetitions at a slow, controlled 10-13 second per rep pace focused on strengthening supporting musculature for improved body mechanics and functional mobility.  Pt and therapist focused on proper form during treatment to ensure optimal strengthening of each targeted muscle group.  Machines were utilized including torso rotation, leg extension, leg curl, chest press, upright row. Tricep extension, bicep curl, leg press, and hip abduction added visit 3    Shakira received the following manual therapy techniques: n/a were applied to the: n/a for n/a minutes.     Home Exercises Provided and Patient Education Provided     Education provided:   - Educated pt on the importance of daily stretch to increase the benefit of program and positive results.     Written Home Exercises Provided: Patient instructed to cont prior HEP.  Exercises were reviewed and Shakira was able to demonstrate them prior to the end of the session.  Shakira demonstrated good  understanding of the education provided.     See EMR under Patient Instructions for exercises provided prior visit.    Assessment   Pt able to complete all components of session with no adverse effects. Increased repetitions on medx with appropriate RPE. PPT and bridging to implement stabilization exercises. Patient is making good progress towards established goals. Pt will  continue to benefit from skilled outpatient physical therapy to address the deficits stated in the impairment chart, provide pt/family education and to maximize pt's level of independence in the home and community environment.     Anticipated Barriers for therapy: none  Pt's spiritual, cultural and educational needs considered and pt agreeable to plan of care and goals as stated below:     GOALS: Pt is in agreement with the following goals.     Short term goals:  6 weeks or 10 visits   1.  Pt will demonstrate increased lumbar ROM by at least 6 degrees from the initial ROM value with improvements noted in functional ROM and ability to perform ADLs.  2.  Pt will demonstrate increased maximum isometric torque value by 20% when compared to the initial value resulting in improved ability to perform bending, lifting, and carrying activities safely, confidently.     3.  Patient report a reduction in worst pain score by 1-2 points for improved tolerance for activity and participation.  4.  Pt able to perform HEP correctly with minimal cueing or supervision from therapist to encourage independent management of symptoms.         Long term goals: 10 weeks or 20 visits   1. Pt will demonstrate increased lumbar ROM by at least 12 degrees from initial ROM value, resulting in improved ability to perform functional fwd bending while standing and sitting.   2. Pt will demonstrate increased maximum isometric torque value by 28% when compared to the initial value resulting in improved ability to perform bending, lifting, and carrying activities safely, confidently.  3. Pt to demonstrate ability to independently control and reduce their pain through posture positioning and mechanical movements throughout a typical day.  4.  Pt will demonstrate reduced pain and improved functional outcomes as reported on the FOTO by reaching a score of CI = at least 1% but < 20% impaired, limited or restricted or less in order to demonstrate subjective  improvement in pt's condition.    5. Pt will demonstrate independence with the HEP at discharge  6.  Pt will present with subjective reports of increased core strength and stability.     Plan   Continue with established Plan of Care towards established PT goals.

## 2020-02-03 NOTE — TELEPHONE ENCOUNTER
----- Message from Renee Narayan sent at 2/3/2020  9:34 AM CST -----  Contact: pt  Pt would like to be called back regarding  reschedule appt  Pt can be reached at [phone]

## 2020-02-03 NOTE — PROGRESS NOTES
Health  Consult Note    Name: Shakira Pearl  Clinic Number: [id number]  Physician: Mert Coates MD  Past Medical History:   Diagnosis Date    Allergy sinus    Arthritis back    Degenerative disc disease back    Neuromuscular disorder     Vitamin D deficiency      Time In: 2:00 PM  Time Out: 2:47 PM    Coaching performed: In person    Subjective:   Patient reports that she was not able to make it to the gym but did go for a 30-minute walk at the [facility].  She felt great at the end of her walk and did not have any lower back pain.      Objective:  Shakira was instructed to start paying attention to portion whenever she makes herself a plate.      Assessment:   Patient weighed 69.9 Kg/153 lbs today, 1/27/2020.      Plan:  Patient goals for next consult include: to walk at least 2 times this week. Her cardio session could be indoors or outdoors.  She has a gym membership at [facility] in [facility].       Health : Nathaly Macias  2/3/2020

## 2020-02-04 ENCOUNTER — OFFICE VISIT (OUTPATIENT)
Dept: OPTOMETRY | Facility: CLINIC | Age: 75
End: 2020-02-04
Payer: COMMERCIAL

## 2020-02-04 DIAGNOSIS — H52.7 REFRACTIVE ERROR: Primary | ICD-10-CM

## 2020-02-04 DIAGNOSIS — Z96.1 PSEUDOPHAKIA OF BOTH EYES: ICD-10-CM

## 2020-02-04 PROCEDURE — 92015 PR REFRACTION: ICD-10-PCS | Mod: S$GLB,,, | Performed by: OPTOMETRIST

## 2020-02-04 PROCEDURE — 92004 COMPRE OPH EXAM NEW PT 1/>: CPT | Mod: S$GLB,,, | Performed by: OPTOMETRIST

## 2020-02-04 PROCEDURE — 99999 PR PBB SHADOW E&M-EST. PATIENT-LVL II: CPT | Mod: PBBFAC,,, | Performed by: OPTOMETRIST

## 2020-02-04 PROCEDURE — 92015 DETERMINE REFRACTIVE STATE: CPT | Mod: S$GLB,,, | Performed by: OPTOMETRIST

## 2020-02-04 PROCEDURE — 99999 PR PBB SHADOW E&M-EST. PATIENT-LVL II: ICD-10-PCS | Mod: PBBFAC,,, | Performed by: OPTOMETRIST

## 2020-02-04 PROCEDURE — 92004 PR EYE EXAM, NEW PATIENT,COMPREHESV: ICD-10-PCS | Mod: S$GLB,,, | Performed by: OPTOMETRIST

## 2020-02-04 NOTE — PROGRESS NOTES
HPI     Pt self referral.  Last eye exam X 1 year ago.  S/p PCIOL OU 2019  Pt states she feels since cataract surgery vision has not improved. Dist   and reading vision not clear.  Pt deny pain-  Diplopia-  Blurry+  Photophobia-  Flashes-  Floaters-  Pain+ comes and goes.    Last edited by Kelvin Mejia on 2/4/2020  1:44 PM. (History)            Assessment /Plan     For exam results, see Encounter Report.    Refractive error    Pseudophakia of both eyes      1. New Spec Rx given. Different lens options discussed with patient. RTC 1 year full exam.  2. Monitor condition. Patient to report any changes. RTC 1 year recheck.

## 2020-02-04 NOTE — LETTER
February 4, 2020      Angel Bello,   [address]  [place] [address]           [place] [facility]  [address]  [place] [address]  Phone: [phone]  Fax: [phone]          Patient: Shakira Pearl   MR Number: [id number]   YOB: 1945   Date of Visit: 2/4/2020       Dear Dr. Angel Bello:    Thank you for referring Shakira Pearl to me for evaluation. Attached you will find relevant portions of my assessment and plan of care.    If you have questions, please do not hesitate to call me. I look forward to following Shakira Pearl along with you.    Sincerely,    Hank Rosenbaum, OD    Enclosure  CC:  No Recipients    If you would like to receive this communication electronically, please contact [email or web address] or [phone] to request more information on [employer] Link access.    For providers and/or their staff who would like to refer a patient to [facility], please contact us through our one-stop-shop provider referral line, [facility], at [phone].    If you feel you have received this communication in error or would no longer like to receive these types of communications, please e-mail [email or web address]

## 2020-02-05 ENCOUNTER — CLINICAL SUPPORT (OUTPATIENT)
Dept: REHABILITATION | Facility: OTHER | Age: 75
End: 2020-02-05
Attending: ANESTHESIOLOGY
Payer: MEDICARE

## 2020-02-05 ENCOUNTER — TELEPHONE (OUTPATIENT)
Dept: GASTROENTEROLOGY | Facility: CLINIC | Age: 75
End: 2020-02-05

## 2020-02-05 ENCOUNTER — TELEPHONE (OUTPATIENT)
Dept: ENDOSCOPY | Facility: HOSPITAL | Age: 75
End: 2020-02-05

## 2020-02-05 DIAGNOSIS — R29.898 WEAKNESS OF BACK: ICD-10-CM

## 2020-02-05 DIAGNOSIS — M25.69 DECREASED RANGE OF MOTION OF TRUNK AND BACK: ICD-10-CM

## 2020-02-05 DIAGNOSIS — K21.9 GASTROESOPHAGEAL REFLUX DISEASE, ESOPHAGITIS PRESENCE NOT SPECIFIED: ICD-10-CM

## 2020-02-05 DIAGNOSIS — D37.4 VILLOUS ADENOMA OF RIGHT COLON: Primary | ICD-10-CM

## 2020-02-05 PROCEDURE — 97110 THERAPEUTIC EXERCISES: CPT | Mod: HCNC

## 2020-02-05 NOTE — PROGRESS NOTES
[facility]      Name: Shakira Pearl  Clinic Number: [id number]    Therapy Diagnosis:   Encounter Diagnoses   Name Primary?    Weakness of back     Decreased range of motion of trunk and back      Physician: Mert Coates MD    Visit Date: 2020    Physician Orders: PT Eval and Treat   Medical Diagnosis from Referral:   M47.816 (ICD-10-CM) - Facet arthritis, degenerative, lumbar spine   M43.06 (ICD-10-CM) - Spondylolysis of lumbar region   M53.3 (ICD-10-CM) - Sacroiliac dysfunction   M54.12 (ICD-10-CM) - Cervical radiculopathy   Evaluation Date: 2020  Authorization Period Expiration: 2021   Plan of Care Expiration: 2020  Reassessment Due: 2020  Visit # / Visits authorized:      Time In: 1340  Time Out: 1445  Total Billable Time: 30 minutes     Precautions: Standard; L knee menisectomy      Pattern of pain determined: 1 PEN        Subjective   Shakira reports LBP is minimal today, she is having more neck pain than back pain.  Patient reports tolerating previous visit well with no soreness.  Patient reports their pain to be 2/10 on a 0-10 scale with 0 being no pain and 10 being the worst pain imaginable.  Pain Location: B LB     Occupation: Retired   Leisure: Walking       Pts goals: Build up strength and core stability       Objective     Baseline Isometric Testing on Med X equipment: Testing administered by PT  Date of testin2020  ROM 0-48 deg   Max Peak Torque 105    Min Peak Torque 39    Flex/Ext Ratio 2.69:1    % below normative data -14%          Treatment    Pt was instructed in and performed the following:     Shakira received therapeutic exercises to develop/improved posture, cardiovascular endurance, muscular endurance, lumbar/cervical ROM, strength and muscular endurance for 60 minutes including the following exercises:    [facility] 2020   Visit Number 9   VAS Pain Rating 2   Treadmill Time (in min.) 10   Lumbar Weight 65    Repetitions 18   Rating of Perceived Exertion 3   Ice - Z Lie (in min.) 10         Exercises completed this session:  Piriformis stretch hold 20 secs 3x  Double knee to chest (no ball) 10x  Extension on elbow 10x  Extension in standing 10x  Bridges 10x  Posterior pelvic tilt 10x      Peripheral muscle strengthening which included 1 set of 15-20 repetitions at a slow, controlled 10-13 second per rep pace focused on strengthening supporting musculature for improved body mechanics and functional mobility.  Pt and therapist focused on proper form during treatment to ensure optimal strengthening of each targeted muscle group.  Machines were utilized including torso rotation, leg extension, leg curl, chest press, upright row. Tricep extension, bicep curl, leg press, and hip abduction added visit 3    Shakira received the following manual therapy techniques: n/a were applied to the: n/a for n/a minutes.     Home Exercises Provided and Patient Education Provided     Education provided:   - Educated pt on the importance of daily stretch to increase the benefit of program and positive results.     Written Home Exercises Provided: Patient instructed to cont prior HEP.  Exercises were reviewed and Shakira was able to demonstrate them prior to the end of the session.  Shakira demonstrated good  understanding of the education provided.     See EMR under Patient Instructions for exercises provided prior visit.    Assessment   Pt able to complete all components of session with no adverse effects.No change in exercises this visit, she required moderate cues for proper pelvic tilt and exercise progression. Maintained resistance on the lumbar medx and completed 18 repetitions, plan to increase repetitions next visit. Pt given some simple neck exercises with pictures. Patient is making good progress towards established goals. Pt will continue to benefit from skilled outpatient physical therapy to address the deficits stated in the  impairment chart, provide pt/family education and to maximize pt's level of independence in the home and community environment.     Anticipated Barriers for therapy: none  Pt's spiritual, cultural and educational needs considered and pt agreeable to plan of care and goals as stated below:     GOALS: Pt is in agreement with the following goals.     Short term goals:  6 weeks or 10 visits   1.  Pt will demonstrate increased lumbar ROM by at least 6 degrees from the initial ROM value with improvements noted in functional ROM and ability to perform ADLs. (Progressing)  2.  Pt will demonstrate increased maximum isometric torque value by 20% when compared to the initial value resulting in improved ability to perform bending, lifting, and carrying activities safely, confidently.  (Progressing)     3.  Patient report a reduction in worst pain score by 1-2 points for improved tolerance for activity and participation.  (Progressing)  4.  Pt able to perform HEP correctly with minimal cueing or supervision from therapist to encourage independent management of symptoms.  (Progressing)        Long term goals: 10 weeks or 20 visits   1. Pt will demonstrate increased lumbar ROM by at least 12 degrees from initial ROM value, resulting in improved ability to perform functional fwd bending while standing and sitting.  (Progressing)  2. Pt will demonstrate increased maximum isometric torque value by 28% when compared to the initial value resulting in improved ability to perform bending, lifting, and carrying activities safely, confidently.  (Progressing)  3. Pt to demonstrate ability to independently control and reduce their pain through posture positioning and mechanical movements throughout a typical day.  (Progressing)  4.  Pt will demonstrate reduced pain and improved functional outcomes as reported on the FOTO by reaching a score of CI = at least 1% but < 20% impaired, limited or restricted or less in order to demonstrate  subjective improvement in pt's condition.   (Progressing)  5. Pt will demonstrate independence with the HEP at discharge  (Progressing)  6.  Pt will present with subjective reports of increased core strength and stability. (Progressing)     Plan   Continue with established Plan of Care towards established PT goals.

## 2020-02-05 NOTE — TELEPHONE ENCOUNTER
Called and spoke to pt.  Pt says she was due for EGD but held off so she can have both EGD and Colon completed at the same time.  After reviewing pt's chart, notified pt she was due for EGD 04/2016 and Colon 04/2020.   Pt says she would like to schedule once orders are placed.  Pt appreciated the call.

## 2020-02-05 NOTE — TELEPHONE ENCOUNTER
----- Message from Chilango Huntley sent at 2/5/2020 10:14 AM CST -----  Contact: pt: [phone]  Pt would like to speak with staff re a colonoscopy       Please contact pt: [phone]

## 2020-02-06 NOTE — TELEPHONE ENCOUNTER
Suds rescheduled to 2/27 per her request. She states she will cancel her other appt with podiatry for the same day

## 2020-02-06 NOTE — TELEPHONE ENCOUNTER
Called and spoke to pt and relayed message.  Pt provided number for endo schedulers and appreciated the call.

## 2020-02-07 ENCOUNTER — TELEPHONE (OUTPATIENT)
Dept: GASTROENTEROLOGY | Facility: CLINIC | Age: 75
End: 2020-02-07

## 2020-02-07 ENCOUNTER — TELEPHONE (OUTPATIENT)
Dept: ENDOSCOPY | Facility: HOSPITAL | Age: 75
End: 2020-02-07

## 2020-02-07 DIAGNOSIS — Z12.11 SPECIAL SCREENING FOR MALIGNANT NEOPLASMS, COLON: Primary | ICD-10-CM

## 2020-02-07 RX ORDER — POLYETHYLENE GLYCOL 3350, SODIUM SULFATE ANHYDROUS, SODIUM BICARBONATE, SODIUM CHLORIDE, POTASSIUM CHLORIDE 236; 22.74; 6.74; 5.86; 2.97 G/4L; G/4L; G/4L; G/4L; G/4L
4 POWDER, FOR SOLUTION ORAL ONCE
Qty: 4000 ML | Refills: 0 | Status: SHIPPED | OUTPATIENT
Start: 2020-02-07 | End: 2020-02-07

## 2020-02-07 NOTE — TELEPHONE ENCOUNTER
----- Message from Chilango Huntley sent at 2/6/2020  4:51 PM CST -----  Contact: pt: [phone]  Genna- pt would like to speak with you again, state she forgot to ask you a question      Please contact pt: [phone]

## 2020-02-10 ENCOUNTER — CLINICAL SUPPORT (OUTPATIENT)
Dept: REHABILITATION | Facility: OTHER | Age: 75
End: 2020-02-10
Attending: ANESTHESIOLOGY
Payer: MEDICARE

## 2020-02-10 DIAGNOSIS — R29.898 WEAKNESS OF BACK: ICD-10-CM

## 2020-02-10 DIAGNOSIS — M25.69 DECREASED RANGE OF MOTION OF TRUNK AND BACK: ICD-10-CM

## 2020-02-10 PROCEDURE — 97110 THERAPEUTIC EXERCISES: CPT | Mod: HCNC

## 2020-02-10 PROCEDURE — 97750 PHYSICAL PERFORMANCE TEST: CPT | Mod: HCNC

## 2020-02-10 NOTE — PROGRESS NOTES
Health  Consult Note    Name: Shakira Pearl  Clinic Number: [id number]  Physician: Mert Coates MD  Past Medical History:   Diagnosis Date    Allergy sinus    Arthritis back    Degenerative disc disease back    Neuromuscular disorder     Vitamin D deficiency      Time In: 2:09 PM  Time Out: 2:54 PM    Coaching performed: In person    Subjective:   Patient reports that she did a 20-minute walk around her neighborhood on Friday; she felt great but experienced mid back spasms on Saturday morning.  She does not think that it had anything to do with the 20-minute walk.  She is stretching at least once per day.  I advised her to stretch at least two times per day and ice at the end of the day.  Pt has been drinking at least 64 oz of water daily, she averages about 80 oz per day.  She has healthy eating habits an never eats processed foods.     Objective:  Shakira was instructed to start paying attention to portion whenever she makes herself a plate.      Assessment:   Patient weighed 69.9 Kg/153 lbs today, 1/27/2020.      Plan:  Patient goals for next consult include: to walk at least 2 times this week. Pt will probably join a gym near her house, she may check out [employer] in [place].       Health : Nathaly Macias  2/10/2020

## 2020-02-10 NOTE — PROGRESS NOTES
[facility]      Name: Shakira Pearl  Clinic Number: [id number]    Therapy Diagnosis:   Encounter Diagnoses   Name Primary?    Weakness of back     Decreased range of motion of trunk and back      Physician: Mert Coates MD    Visit Date: 2/10/2020    Physician Orders: PT Eval and Treat   Medical Diagnosis from Referral:   M47.816 (ICD-10-CM) - Facet arthritis, degenerative, lumbar spine   M43.06 (ICD-10-CM) - Spondylolysis of lumbar region   M53.3 (ICD-10-CM) - Sacroiliac dysfunction   M54.12 (ICD-10-CM) - Cervical radiculopathy   Evaluation Date: 2020  Authorization Period Expiration: 2021   Plan of Care Expiration: 2020  Reassessment Due: 3/10/2020   Visit # / Visits authorized: 10/ 20     Time In: 1305  Time Out: 1400  Total Billable Time: 55 minutes     Precautions: Standard; L knee menisectomy      Pattern of pain determined: 1 PEN        Subjective   Shakira arrives to PT and reports having a bad spasm on Saturday waking up from sleep, in the am. It is better now but still there at the mid back. Currently LBP is at 0/10. She took pain medicine (OTC) [id number].      Patient reports tolerating previous visit well with no soreness.    Patient reports their pain to be 4/10 on a 0-10 scale with 0 being no pain and 10 being the worst pain imaginable.    Pain Location: B LB; and mid back     Occupation: Retired   Leisure: Walking         Pts goals: Build up strength and core stability       Objective     Baseline Isometric Testing on Med X equipment: Testing administered by PT  Date of testin2020  ROM 0-48 deg   Max Peak Torque 105    Min Peak Torque 39    Flex/Ext Ratio 2.69:1    % below normative data -14%      Midpoint Isometric Testing on Med X equipment: Testing administered by PT  Date of testin/10/2020  ROM 0-48 deg   Max Peak Torque 112   Min Peak Torque 62   Flex/Ext Ratio    % below normative data 2     Percent gain in strength from baseline:  "15%    Treatment    Pt was instructed in and performed the following:     Shakira received therapeutic exercises to develop/improved posture, cardiovascular endurance, muscular endurance, lumbar/cervical ROM, strength and muscular endurance for 50 minutes including the following exercises:  [facility] 2/10/2020   Visit Number 10   VAS Pain Rating 0   Treadmill Time (in min.) 10   Speed -   Lumbar Stretches - Slouch Overcorrection -   Extension in Lying 10   Extension in Standing 10   Flexion in Lying 10   Flexion in Sitting -   Manual Therapy 5   Lumbar Extension Seat Pad -   Femur Restraint -   Top Dead Center -   Counterweight -   Lumbar Flexion 48   Lumbar Extension 0   Lumbar Peak Torque 112   Min Torque 62   Test Percent Below Normative Data 2   Test Percent Gain in Strength from Initial  15   Lumbar Weight 45   Repetitions -   Rating of Perceived Exertion -   Ice - Z Lie (in min.) 10       Exercises completed this session:  Piriformis stretch hold 20 secs 2-3x  Double knee to chest (no ball) 10x with 5" holds  Extension on elbow 10x  Extension in standing 10x  Bridges 10x  Posterior pelvic tilt 10x      Peripheral muscle strengthening which included 1 set of 15-20 repetitions at a slow, controlled 10-13 second per rep pace focused on strengthening supporting musculature for improved body mechanics and functional mobility.  Pt and therapist focused on proper form during treatment to ensure optimal strengthening of each targeted muscle group.  Machines were utilized including torso rotation, leg extension, leg curl, chest press, upright row. Tricep extension, bicep curl, leg press, and hip abduction added visit 3    Shakira received the following manual therapy techniques: 5 minutes.   Gentle thoracic PA and STM through clothing in prone ==>Improved pain from 4/0 to 2/10 at mid back.    Home Exercises Provided and Patient Education Provided     Education provided:   - Educated pt on the importance of " daily stretch to increase the benefit of program and positive results.     Written Home Exercises Provided: Patient instructed to cont prior HEP.  Exercises were reviewed and Shakira was able to demonstrate them prior to the end of the session.  Shakira demonstrated good  understanding of the education provided.     See EMR under Patient Instructions for exercises provided prior visit.    Assessment     Manual therapy improved patient's pain level to 2/10 today. She received a Midpoint Lumbar extensors IM testing today on medx. Results revealed strength at 2% below normative data, with a 15% IM strength increase overall (average). Patient is making good progress towards established goals. Pt will continue to benefit from skilled outpatient physical therapy to address the deficits stated in the impairment chart, provide pt/family education and to maximize pt's level of independence in the home and community environment.     Anticipated Barriers for therapy: none  Pt's spiritual, cultural and educational needs considered and pt agreeable to plan of care and goals as stated below:     GOALS: Pt is in agreement with the following goals.     Short term goals:  6 weeks or 10 visits   1.  Pt will demonstrate increased lumbar ROM by at least 6 degrees from the initial ROM value with improvements noted in functional ROM and ability to perform ADLs. (Progressing)  2.  Pt will demonstrate increased maximum isometric torque value by 20% when compared to the initial value resulting in improved ability to perform bending, lifting, and carrying activities safely, confidently.  (Progressing; not met at 7% only)     3.  Patient report a reduction in worst pain score by 1-2 points for improved tolerance for activity and participation.  (MET)  4.  Pt able to perform HEP correctly with minimal cueing or supervision from therapist to encourage independent management of symptoms.  (MET)        Long term goals: 10 weeks or 20 visits   1. Pt  will demonstrate increased lumbar ROM by at least 12 degrees from initial ROM value, resulting in improved ability to perform functional fwd bending while standing and sitting.  (Progressing)  2. Pt will demonstrate increased maximum isometric torque value by 28% when compared to the initial value resulting in improved ability to perform bending, lifting, and carrying activities safely, confidently.  (Progressing)  3. Pt to demonstrate ability to independently control and reduce their pain through posture positioning and mechanical movements throughout a typical day.  (Progressing)  4.  Pt will demonstrate reduced pain and improved functional outcomes as reported on the FOTO by reaching a score of CI = at least 1% but < 20% impaired, limited or restricted or less in order to demonstrate subjective improvement in pt's condition.   (Progressing)  5. Pt will demonstrate independence with the HEP at discharge  (Progressing)  6.  Pt will present with subjective reports of increased core strength and stability. (Progressing)     Plan   Continue with established Plan of Care towards established PT goals.

## 2020-02-12 ENCOUNTER — CLINICAL SUPPORT (OUTPATIENT)
Dept: REHABILITATION | Facility: OTHER | Age: 75
End: 2020-02-12
Attending: ANESTHESIOLOGY
Payer: MEDICARE

## 2020-02-12 DIAGNOSIS — M25.69 DECREASED RANGE OF MOTION OF TRUNK AND BACK: ICD-10-CM

## 2020-02-12 DIAGNOSIS — R29.898 WEAKNESS OF BACK: ICD-10-CM

## 2020-02-12 PROCEDURE — 97110 THERAPEUTIC EXERCISES: CPT | Mod: HCNC,CQ

## 2020-02-12 NOTE — PROGRESS NOTES
[facility]      Name: Shakira Pearl  Clinic Number: [id number]    Therapy Diagnosis:   Encounter Diagnoses   Name Primary?    Weakness of back     Decreased range of motion of trunk and back      Physician: Mert Coates MD    Visit Date: 2020    Physician Orders: PT Eval and Treat   Medical Diagnosis from Referral:   M47.816 (ICD-10-CM) - Facet arthritis, degenerative, lumbar spine   M43.06 (ICD-10-CM) - Spondylolysis of lumbar region   M53.3 (ICD-10-CM) - Sacroiliac dysfunction   M54.12 (ICD-10-CM) - Cervical radiculopathy   Evaluation Date: 2020  Authorization Period Expiration: 2021   Plan of Care Expiration: 2020  Reassessment Due: 3/10/2020   Visit # / Visits authorized:      Time In: 1:05  Time Out: 2:10  Total Billable Time: 55 minutes     Precautions: Standard; L knee menisectomy      Pattern of pain determined: 1 PEN        Subjective   Shakira arrives to PT today with no LBP, just neck pain. Pt has not taken aleve in 3 days which she is happy about.       Patient reports tolerating previous visit well with no soreness.    Patient reports their pain to be 4/10 on a 0-10 scale with 0 being no pain and 10 being the worst pain imaginable.    Pain Location: B LB; and mid back     Occupation: Retired   Leisure: Walking         Pts goals: Build up strength and core stability       Objective     Baseline Isometric Testing on Med X equipment: Testing administered by PT  Date of testin2020  ROM 0-48 deg   Max Peak Torque 105    Min Peak Torque 39    Flex/Ext Ratio 2.69:1    % below normative data -14%      Midpoint Isometric Testing on Med X equipment: Testing administered by PT  Date of testin/10/2020  ROM 0-48 deg   Max Peak Torque 112   Min Peak Torque 62   Flex/Ext Ratio    % below normative data 2     Percent gain in strength from baseline: 15%    Treatment    Pt was instructed in and performed the following:     Shakira received  "therapeutic exercises to develop/improved posture, cardiovascular endurance, muscular endurance, lumbar/cervical ROM, strength and muscular endurance for 65 minutes including the following exercises:    [facility] 2/12/2020   Visit Number 11   VAS Pain Rating 0   Treadmill Time (in min.) 10   Lumbar Weight 65   Repetitions 20   Rating of Perceived Exertion 3   Ice - Z Lie (in min.) 10       Exercises completed this session:  Piriformis stretch hold 20 secs 2-3x  Double knee to chest (no ball) 10x with 5" holds  Extension on elbow 10x  Extension in standing 10x  Bridges 10x  Posterior pelvic tilt 10x      Peripheral muscle strengthening which included 1 set of 15-20 repetitions at a slow, controlled 10-13 second per rep pace focused on strengthening supporting musculature for improved body mechanics and functional mobility.  Pt and therapist focused on proper form during treatment to ensure optimal strengthening of each targeted muscle group.  Machines were utilized including torso rotation, leg extension, leg curl, chest press, upright row. Tricep extension, bicep curl, leg press, and hip abduction added visit 3    Shakira received the following manual therapy techniques: 0 minutes.   Gentle thoracic PA and STM through clothing in prone ==>Improved pain from 4/0 to 2/10 at mid back.    Home Exercises Provided and Patient Education Provided     Education provided:   - Educated pt on the importance of daily stretch to increase the benefit of program and positive results.     Written Home Exercises Provided: Patient instructed to cont prior HEP.  Exercises were reviewed and Shakira was able to demonstrate them prior to the end of the session.  Shakira demonstrated good  understanding of the education provided.     See EMR under Patient Instructions for exercises provided prior visit.    Assessment   Pt with no LBP during and post session. Reviewed HEP with mod vc for tech. Pt with the same weight on the lumbar " machine with 20 reps.Pt progressing well due to her LB is feeling better and she      Anticipated Barriers for therapy: none  Pt's spiritual, cultural and educational needs considered and pt agreeable to plan of care and goals as stated below:     GOALS: Pt is in agreement with the following goals.     Short term goals:  6 weeks or 10 visits   1.  Pt will demonstrate increased lumbar ROM by at least 6 degrees from the initial ROM value with improvements noted in functional ROM and ability to perform ADLs. (Progressing)  2.  Pt will demonstrate increased maximum isometric torque value by 20% when compared to the initial value resulting in improved ability to perform bending, lifting, and carrying activities safely, confidently.  (Progressing; not met at 7% only)     3.  Patient report a reduction in worst pain score by 1-2 points for improved tolerance for activity and participation.  (MET)  4.  Pt able to perform HEP correctly with minimal cueing or supervision from therapist to encourage independent management of symptoms.  (MET)        Long term goals: 10 weeks or 20 visits   1. Pt will demonstrate increased lumbar ROM by at least 12 degrees from initial ROM value, resulting in improved ability to perform functional fwd bending while standing and sitting.  (Progressing)  2. Pt will demonstrate increased maximum isometric torque value by 28% when compared to the initial value resulting in improved ability to perform bending, lifting, and carrying activities safely, confidently.  (Progressing)  3. Pt to demonstrate ability to independently control and reduce their pain through posture positioning and mechanical movements throughout a typical day.  (Progressing)  4.  Pt will demonstrate reduced pain and improved functional outcomes as reported on the FOTO by reaching a score of CI = at least 1% but < 20% impaired, limited or restricted or less in order to demonstrate subjective improvement in pt's condition.    (Progressing)  5. Pt will demonstrate independence with the HEP at discharge  (Progressing)  6.  Pt will present with subjective reports of increased core strength and stability. (Progressing)     Plan   Continue with established Plan of Care towards established PT goals.

## 2020-02-17 ENCOUNTER — CLINICAL SUPPORT (OUTPATIENT)
Dept: REHABILITATION | Facility: OTHER | Age: 75
End: 2020-02-17
Attending: ANESTHESIOLOGY
Payer: MEDICARE

## 2020-02-17 DIAGNOSIS — M25.69 DECREASED RANGE OF MOTION OF TRUNK AND BACK: ICD-10-CM

## 2020-02-17 DIAGNOSIS — R29.898 WEAKNESS OF BACK: ICD-10-CM

## 2020-02-17 PROCEDURE — 97110 THERAPEUTIC EXERCISES: CPT | Mod: HCNC

## 2020-02-17 NOTE — PROGRESS NOTES
[facility]      Name: Shakira Pearl  Clinic Number: [id number]    Therapy Diagnosis:   Encounter Diagnoses   Name Primary?    Weakness of back     Decreased range of motion of trunk and back      Physician: Mert Coates MD    Visit Date: 2020    Physician Orders: PT Eval and Treat     Medical Diagnosis from Referral:   M47.816 (ICD-10-CM) - Facet arthritis, degenerative, lumbar spine   M43.06 (ICD-10-CM) - Spondylolysis of lumbar region   M53.3 (ICD-10-CM) - Sacroiliac dysfunction   M54.12 (ICD-10-CM) - Cervical radiculopathy     Evaluation Date: 2020  Authorization Period Expiration: 2021   Plan of Care Expiration: 2020  Reassessment Due: 3/10/2020   Visit # / Visits authorized:      Time In: 1400  Time Out: 1500  Total Billable Time: 55 minutes     Precautions: Standard; L knee menisectomy      Pattern of pain determined: 1 PEN        Subjective   Shakira reports feeling irritated at the back today with a 3-4/10 pain at this moment. The treadmill helps with LBP.  She reports her R scap muscle are bothering her.       Patient reports tolerating previous visit well with no soreness.    Patient reports their pain to be 3-4/10 on a 0-10 scale with 0 being no pain and 10 being the worst pain imaginable.    Pain Location: B LB; and mid back     Occupation: Retired   Leisure: Walking         Pts goals: Build up strength and core stability       Objective     Baseline Isometric Testing on Med X equipment: Testing administered by PT  Date of testin2020  ROM 0-48 deg   Max Peak Torque 105    Min Peak Torque 39    Flex/Ext Ratio 2.69:1    % below normative data -14%      Midpoint Isometric Testing on Med X equipment: Testing administered by PT  Date of testin/10/2020  ROM 0-48 deg   Max Peak Torque 112   Min Peak Torque 62   Flex/Ext Ratio    % below normative data 2     Percent gain in strength from baseline: 15%    Palpation: R medial and lateral  "scap region muscle tenderness.     Treatment    Pt was instructed in and performed the following:     Shakira received therapeutic exercises to develop/improved posture, cardiovascular endurance, muscular endurance, lumbar/cervical ROM, strength and muscular endurance for 60 minutes including the following exercises:    [facility] 2/17/2020   Visit Number 12   VAS Pain Rating 4   Treadmill Time (in min.) 10   Speed -   Lumbar Stretches - Slouch Overcorrection -   Extension in Lying 10   Extension in Standing 10   Flexion in Lying 10   Flexion in Sitting -   Manual Therapy -   Lumbar Extension Seat Pad -   Femur Restraint -   Top Dead Center -   Counterweight -   Lumbar Flexion -   Lumbar Extension -   Lumbar Peak Torque -   Min Torque -   Test Percent Below Normative Data -   Test Percent Gain in Strength from Initial  -   Lumbar Weight 68   Repetitions 17   Rating of Perceived Exertion 3   Ice - Z Lie (in min.) 10       Exercises completed this session:   Piriformis stretch hold 20 secs 2-3x  Double knee to chest (no ball) 10x with 5" holds  Extension on elbow 10x  Extension in standing 10x  Bridges 10x  Posterior pelvic tilt 10x      Peripheral muscle strengthening which included 1 set of 15-20 repetitions at a slow, controlled 10-13 second per rep pace focused on strengthening supporting musculature for improved body mechanics and functional mobility.  Pt and therapist focused on proper form during treatment to ensure optimal strengthening of each targeted muscle group.  Machines were utilized including torso rotation, leg extension, leg curl, chest press, upright row. Tricep extension, bicep curl, leg press, and hip abduction added visit 3    Shakira received the following manual therapy techniques: 0 minutes.   Gentle thoracic PA and STM through clothing in prone ==>Improved pain from 4/0 to 2/10 at mid back.    Home Exercises Provided and Patient Education Provided     Education provided:   - Educated " pt on the importance of daily stretch to increase the benefit of program and positive results.     Written Home Exercises Provided: Patient instructed to cont prior HEP.  Exercises were reviewed and Shakira was able to demonstrate them prior to the end of the session.  Shakira demonstrated good  understanding of the education provided.     See EMR under Patient Instructions for exercises provided prior visit.    Assessment     Shakira reported R shoulder pain, that has been bothering her. Palpation revealed R side teres region and medial border of scap tenderness. She was advised to let PCP know and to ice if problematic with pain. Patient's Medx lumbar weight was progressed by 5% to 68# today. She proceeded to complete 17 reps at an RPE of 3 today. She had no new complaints post therex. Continue to progress as tolerated.     Anticipated Barriers for therapy: none  Pt's spiritual, cultural and educational needs considered and pt agreeable to plan of care and goals as stated below:     GOALS: Pt is in agreement with the following goals.     Short term goals:  6 weeks or 10 visits   1.  Pt will demonstrate increased lumbar ROM by at least 6 degrees from the initial ROM value with improvements noted in functional ROM and ability to perform ADLs. (Progressing)  2.  Pt will demonstrate increased maximum isometric torque value by 20% when compared to the initial value resulting in improved ability to perform bending, lifting, and carrying activities safely, confidently.  (Progressing; not met at 7% only)     3.  Patient report a reduction in worst pain score by 1-2 points for improved tolerance for activity and participation.  (MET)  4.  Pt able to perform HEP correctly with minimal cueing or supervision from therapist to encourage independent management of symptoms.  (MET)       Long term goals: 10 weeks or 20 visits   1. Pt will demonstrate increased lumbar ROM by at least 12 degrees from initial ROM value, resulting in  improved ability to perform functional fwd bending while standing and sitting.  (Progressing)  2. Pt will demonstrate increased maximum isometric torque value by 28% when compared to the initial value resulting in improved ability to perform bending, lifting, and carrying activities safely, confidently.  (Progressing)  3. Pt to demonstrate ability to independently control and reduce their pain through posture positioning and mechanical movements throughout a typical day.  (Progressing)  4.  Pt will demonstrate reduced pain and improved functional outcomes as reported on the FOTO by reaching a score of CI = at least 1% but < 20% impaired, limited or restricted or less in order to demonstrate subjective improvement in pt's condition.   (Progressing)  5. Pt will demonstrate independence with the HEP at discharge  (Progressing)  6.  Pt will present with subjective reports of increased core strength and stability. (Progressing)     Plan   Continue with established Plan of Care towards established PT goals.

## 2020-02-19 ENCOUNTER — CLINICAL SUPPORT (OUTPATIENT)
Dept: REHABILITATION | Facility: OTHER | Age: 75
End: 2020-02-19
Attending: ANESTHESIOLOGY
Payer: MEDICARE

## 2020-02-19 DIAGNOSIS — M25.69 DECREASED RANGE OF MOTION OF TRUNK AND BACK: ICD-10-CM

## 2020-02-19 DIAGNOSIS — R29.898 WEAKNESS OF BACK: ICD-10-CM

## 2020-02-19 PROCEDURE — 97130 THER IVNTJ EA ADDL 15 MIN: CPT | Mod: HCNC

## 2020-02-19 NOTE — PROGRESS NOTES
[facility]      Name: Shakira Pearl  Clinic Number: [id number]    Therapy Diagnosis:   Encounter Diagnoses   Name Primary?    Weakness of back     Decreased range of motion of trunk and back      Physician: Mert Coates MD    Visit Date: 2020    Physician Orders: PT Eval and Treat     Medical Diagnosis from Referral:   M47.816 (ICD-10-CM) - Facet arthritis, degenerative, lumbar spine   M43.06 (ICD-10-CM) - Spondylolysis of lumbar region   M53.3 (ICD-10-CM) - Sacroiliac dysfunction   M54.12 (ICD-10-CM) - Cervical radiculopathy     Evaluation Date: 2020  Authorization Period Expiration: 2021   Plan of Care Expiration: 2020  Reassessment Due: 3/10/2020   Visit # / Visits authorized:      Time In: 1255  Time Out: 1300  Total Billable Time: 50 minutes     Precautions: Standard; L knee menisectomy      Pattern of pain determined: 1 PEN        Subjective   Shakira's R shoulder continues to bother her, and her pain is more muscular. Her LBP is about a 2/10, she took pain medication due to shoulder pain. She reports groin tightness and ache On the R side. Patient has  A lot of questions upon arrival with shoulder pain.       Patient reports tolerating previous visit well with no soreness.    Patient reports their pain to be 2/10 on a 0-10 scale with 0 being no pain and 10 being the worst pain imaginable.    Pain Location: B LB; and mid back     Occupation: Retired   Leisure: Walking         Pts goals: Build up strength and core stability       Objective     Baseline Isometric Testing on Med X equipment: Testing administered by PT  Date of testin2020  ROM 0-48 deg   Max Peak Torque 105    Min Peak Torque 39    Flex/Ext Ratio 2.69:1    % below normative data -14%      Midpoint Isometric Testing on Med X equipment: Testing administered by PT  Date of testin/10/2020  ROM 0-48 deg   Max Peak Torque 112   Min Peak Torque 62   Flex/Ext Ratio    % below  "normative data 2     Percent gain in strength from baseline: 15%    Palpation: R medial and lateral scap region muscle tenderness.     Treatment    Pt was instructed in and performed the following:     Shakira received therapeutic exercises to develop/improved posture, cardiovascular endurance, muscular endurance, lumbar/cervical ROM, strength and muscular endurance for 50 minutes including the following exercises:    [facility] 2/19/2020   Visit Number 13   VAS Pain Rating 2   Treadmill Time (in min.) -   Speed -   Lumbar Stretches - Slouch Overcorrection -   Extension in Lying 10   Extension in Standing 10   Flexion in Lying 10   Flexion in Sitting -   Manual Therapy 6   Lumbar Extension Seat Pad -   Femur Restraint -   Top Dead Center -   Counterweight -   Lumbar Flexion -   Lumbar Extension -   Lumbar Peak Torque -   Min Torque -   Test Percent Below Normative Data -   Test Percent Gain in Strength from Initial  -   Lumbar Weight 68   Repetitions 20   Rating of Perceived Exertion 2   Ice - Z Lie (in min.) 10       Exercises completed this session:   Piriformis stretch hold 20 secs 2-3x  Double knee to chest (no ball) 10x with 5" holds  Extension on elbow 10x   Extension in standing 10x  Bridges 10x  Posterior pelvic tilt 10x (NT)  Modified Khris test to R side 2x 20''      Peripheral muscle strengthening which included 1 set of 15-20 repetitions at a slow, controlled 10-13 second per rep pace focused on strengthening supporting musculature for improved body mechanics and functional mobility.  Pt and therapist focused on proper form during treatment to ensure optimal strengthening of each targeted muscle group.  Machines were utilized including torso rotation, leg extension, leg curl, chest press, upright row. Tricep extension, bicep curl, leg press, and hip abduction added visit 3    Shakira received the following manual therapy techniques: 6 minutes.   STM/Xfibers massage with kaur to R " shoulder/scap/RC muscles in sitting==>Felt better     Gentle thoracic PA and STM through clothing in prone     Home Exercises Provided and Patient Education Provided     Education provided:   - Educated pt on the importance of daily stretch to increase the benefit of program and positive results.     Written Home Exercises Provided: Patient instructed to cont prior HEP.  Exercises were reviewed and Shakira was able to demonstrate them prior to the end of the session.  Shakira demonstrated good  understanding of the education provided.     See EMR under Patient Instructions for exercises provided prior visit.    Assessment     Patient received manual therapy on R shoulder due to c/o of pain, and to prevent difficulty with today's peripheral exercises. Mod TTP at medial and lateral scap borders, and at supraspinatus, improved following tissue mob with Jamarcus. Groin stretch with Khris was provided w/o issues. Patient resumed lumbar weight of 68# on medx, completing 20 reps at an RPE of 2 today, reporting feeling easy. Continue to progress exercises and weight as tolerated ( 5% weight increase only at next visit; encourage max reps if able).       Anticipated Barriers for therapy: none  Pt's spiritual, cultural and educational needs considered and pt agreeable to plan of care and goals as stated below:     GOALS: Pt is in agreement with the following goals.     Short term goals:  6 weeks or 10 visits   1.  Pt will demonstrate increased lumbar ROM by at least 6 degrees from the initial ROM value with improvements noted in functional ROM and ability to perform ADLs. (Progressing)  2.  Pt will demonstrate increased maximum isometric torque value by 20% when compared to the initial value resulting in improved ability to perform bending, lifting, and carrying activities safely, confidently.  (Progressing; not met at 7% only)     3.  Patient report a reduction in worst pain score by 1-2 points for improved tolerance for  activity and participation.  (MET)  4.  Pt able to perform HEP correctly with minimal cueing or supervision from therapist to encourage independent management of symptoms.  (MET)       Long term goals: 10 weeks or 20 visits   1. Pt will demonstrate increased lumbar ROM by at least 12 degrees from initial ROM value, resulting in improved ability to perform functional fwd bending while standing and sitting.  (Progressing)  2. Pt will demonstrate increased maximum isometric torque value by 28% when compared to the initial value resulting in improved ability to perform bending, lifting, and carrying activities safely, confidently.  (Progressing)  3. Pt to demonstrate ability to independently control and reduce their pain through posture positioning and mechanical movements throughout a typical day.  (Progressing)  4.  Pt will demonstrate reduced pain and improved functional outcomes as reported on the FOTO by reaching a score of CI = at least 1% but < 20% impaired, limited or restricted or less in order to demonstrate subjective improvement in pt's condition.   (Progressing)  5. Pt will demonstrate independence with the HEP at discharge  (Progressing)  6.  Pt will present with subjective reports of increased core strength and stability. (Progressing)     Plan   Continue with established Plan of Care towards established PT goals.

## 2020-02-26 ENCOUNTER — CLINICAL SUPPORT (OUTPATIENT)
Dept: REHABILITATION | Facility: OTHER | Age: 75
End: 2020-02-26
Attending: ANESTHESIOLOGY
Payer: MEDICARE

## 2020-02-26 DIAGNOSIS — R29.898 WEAKNESS OF BACK: ICD-10-CM

## 2020-02-26 DIAGNOSIS — M25.69 DECREASED RANGE OF MOTION OF TRUNK AND BACK: ICD-10-CM

## 2020-02-26 PROCEDURE — 97110 THERAPEUTIC EXERCISES: CPT | Mod: HCNC | Performed by: PHYSICAL MEDICINE & REHABILITATION

## 2020-02-26 NOTE — PROGRESS NOTES
[facility]      Name: Shakira Pearl  Clinic Number: [id number]    Therapy Diagnosis:   Encounter Diagnoses   Name Primary?    Weakness of back     Decreased range of motion of trunk and back      Physician: Mert Coates MD    Visit Date: 2020    Physician Orders: PT Eval and Treat     Medical Diagnosis from Referral:   M47.816 (ICD-10-CM) - Facet arthritis, degenerative, lumbar spine   M43.06 (ICD-10-CM) - Spondylolysis of lumbar region   M53.3 (ICD-10-CM) - Sacroiliac dysfunction   M54.12 (ICD-10-CM) - Cervical radiculopathy     Evaluation Date: 2020  Authorization Period Expiration: 2021   Plan of Care Expiration: 2020  Reassessment Due: 3/10/2020   Visit # / Visits authorized:      Time In: 1255  Time Out: 1300  Total Billable Time: 50 minutes     Precautions: Standard; L knee menisectomy      Pattern of pain determined: 1 PEN        Subjective   Shakira reports her back is better since starting here, but she has right neck and shoulder pain.  She reports she always feels better after therapy.   Her neck and shoulder pain are not activated or worsened by anything she does here.     She sits to read and holds iPad and this irritates her neck.  She also watches TV lying on couch with head propped up.     She doesn't use lumbar roll.   She has joined a gym and plans to go 3/week.  She has gone a few times and did the treadmill.       Patient reports tolerating previous visit well with no soreness.    Patient reports their pain to be 0/10 on a 0-10 scale with 0 being no pain and 10 being the worst pain imaginable.    Pain Location: B LB; and mid back     Occupation: Retired   Leisure: Walking         Pts goals: Build up strength and core stability       Objective     Baseline Isometric Testing on Med X equipment: Testing administered by PT  Date of testin2020  ROM 0-48 deg   Max Peak Torque 105    Min Peak Torque 39    Flex/Ext Ratio 2.69:1    %  "below normative data -14%      Midpoint Isometric Testing on Med X equipment: Testing administered by PT  Date of testin/10/2020  ROM 0-48 deg   Max Peak Torque 112   Min Peak Torque 62   Flex/Ext Ratio    % below normative data 2     Percent gain in strength from baseline: 15%    Palpation: R medial and lateral scap region muscle tenderness.     Treatment    Pt was instructed in and performed the following:     Shakira received therapeutic exercises to develop/improved posture, cardiovascular endurance, muscular endurance, lumbar/cervical ROM, strength and muscular endurance for 50 minutes including the following exercises:        Exercises completed this session:   Piriformis stretch hold 20 secs 2-3x  Double knee to chest (no ball) 10x with 5" holds  Extension on elbow 10x  Progressed to ext in lie today  Extension in standing 10x  Bridges 10x  Posterior pelvic tilt 10x (NT)  Modified Khris test to R side 2x 20''    Neck irritation right side with reduced right neck side bend and right rotation  Neck retractions with increasing range noted into retraction as well as neck right side bend and rotation        [facility] 2020   Visit Number 14   VAS Pain Rating 0   Treadmill Time (in min.) 10   Speed 3.6   Retraction in Sitting 10   Lumbar Stretches - Slouch Overcorrection -   Extension in Lying 10   Extension in Standing 10   Flexion in Lying 10   Flexion in Sitting -   Manual Therapy -   Lumbar Extension Seat Pad -   Femur Restraint -   Top Dead Center -   Counterweight -   Lumbar Flexion -   Lumbar Extension -   Lumbar Peak Torque -   Min Torque -   Test Percent Below Normative Data -   Test Percent Gain in Strength from Initial  -   Lumbar Weight 71   Repetitions 15   Rating of Perceived Exertion 3   Ice - Z Lie (in min.) 10     Peripheral muscle strengthening which included 1 set of 15-20 repetitions at a slow, controlled 10-13 second per rep pace focused on strengthening supporting " "musculature for improved body mechanics and functional mobility.  Pt and therapist focused on proper form during treatment to ensure optimal strengthening of each targeted muscle group.  Machines were utilized including torso rotation, leg extension, leg curl, chest press, upright row. Tricep extension, bicep curl, leg press, and hip abduction added visit 3    Home Exercises Provided and Patient Education Provided   Walking program  Use of lumbar roll reviewed  Positioning in sleeping and chair  Neck retractions  Piriformis stretch hold 20 secs 2-3x  Double knee to chest (no ball) 10x with 5" holds  Extension on elbow 10x  Progressed to ext in lie today  Extension in standing 10x  Bridges 10x  Posterior pelvic tilt 10x (NT)  Modified Khris test to R side 2x 20''      Education provided:   - Educated pt on the importance of daily stretch to increase the benefit of program and positive results.     Written Home Exercises Provided: Patient instructed to cont prior HEP.  Exercises were reviewed and Shakira was able to demonstrate them prior to the end of the session.  Shakira demonstrated good  understanding of the education provided.     See EMR under Patient Instructions for exercises provided 2/26/20    Assessment     Patient attended with no back pain and mild neck and right shoulder pain which improved with neck retractions and watching posture.  Encouraged lumbar roll and neck retractions.  Progressed prone on elbows to ext in lie and tolerated well.    Reviewed need for lumbar roll.  She joined gym and encouraged walking 4-5/week.   She tolerated 71 ft lbs on lumbar med x and we did a warm up at 45 ft lbs first for 1 minute.  Continue to progress exercises and weight as tolerated         Anticipated Barriers for therapy: none  Pt's spiritual, cultural and educational needs considered and pt agreeable to plan of care and goals as stated below:     GOALS: Pt is in agreement with the following goals.     Short term " goals:  6 weeks or 10 visits   1.  Pt will demonstrate increased lumbar ROM by at least 6 degrees from the initial ROM value with improvements noted in functional ROM and ability to perform ADLs. (Progressing)  2.  Pt will demonstrate increased maximum isometric torque value by 20% when compared to the initial value resulting in improved ability to perform bending, lifting, and carrying activities safely, confidently.  (Progressing; not met at 7% only)     3.  Patient report a reduction in worst pain score by 1-2 points for improved tolerance for activity and participation.  (MET)  4.  Pt able to perform HEP correctly with minimal cueing or supervision from therapist to encourage independent management of symptoms.  (MET)       Long term goals: 10 weeks or 20 visits   1. Pt will demonstrate increased lumbar ROM by at least 12 degrees from initial ROM value, resulting in improved ability to perform functional fwd bending while standing and sitting.  (Progressing)  2. Pt will demonstrate increased maximum isometric torque value by 28% when compared to the initial value resulting in improved ability to perform bending, lifting, and carrying activities safely, confidently.  (Progressing)  3. Pt to demonstrate ability to independently control and reduce their pain through posture positioning and mechanical movements throughout a typical day.  (Progressing)  4.  Pt will demonstrate reduced pain and improved functional outcomes as reported on the FOTO by reaching a score of CI = at least 1% but < 20% impaired, limited or restricted or less in order to demonstrate subjective improvement in pt's condition.   (Progressing)  5. Pt will demonstrate independence with the HEP at discharge  (Progressing)  6.  Pt will present with subjective reports of increased core strength and stability. (Progressing)     Plan   Continue with established Plan of Care towards established PT goals.

## 2020-02-26 NOTE — PATIENT INSTRUCTIONS
Sleeping on Back        Place pillow under knees. A pillow with cervical support and a roll around waist are also helpful.      Copyright © I. All rights reserved.        Sleeping on Side        Place pillow between knees. Use cervical support under neck and a roll around waist as needed.      Copyright © I. All rights reserved.        Posture - Standing        Good posture is important. Avoid slouching and forward head thrust. Maintain curve in low back and align ears over shoul- ders, hips over ankles.      Copyright © I. All rights reserved.        Posture - Sitting        Sit upright, head facing forward. Try using a roll to support lower back. Keep shoulders relaxed, and avoid rounded back. Keep hips level with knees. Avoid crossing legs for long periods.      Copyright © I. All rights reserved.        Reading   MAKE SURE TO REST ARMS AND BOOK ON PILLOWS                    Work Positioning        Position self close to work, whether standing or sitting. Avoid straining forward at neck or waist.      Copyright © I. All rights reserved.       Work Height and Reach        Ideal work height is no more than 2 to 4 inches below elbow level when standing, and at elbow level when sitting. Reaching should be limited to arm's length, with elbows slightly bent.      Copyright © I. All rights reserved.               CERVICAL SPINE AROM              RETRACTION / CHIN TUCK    Slowly draw your head back so that your ears line up with your shoulders.  Keep your eyes looking straight ahead.   Hold for   5-10 secs. This exercise is the opposite of bad posture!            CERVICAL EXTENSION    Do your retraction first.   Then tilt your head upwards, then return back to looking straight ahead.                CERVICAL SIDE BEND    Tilt your head towards the side, dropping your ear towards your shoulder.  Then return back to looking straight ahead. Repeat to opposite side                                                                                           An apple a day keeps the Doctor away, could be replaced with, A walk a day keeps the Doctor away!  What is not to love about one of the simplest things you can do for your health?   Walking, as a form of exercise, has been shown to have numerous positive benefits.   Walking is also free, can be done anytime and anywhere, needs no special skill or equipment, and can be done at whatever level of fitness you are currently at.      Research has shown that the benefits of walking for at least 30 minutes a day may be effective to:   Improve blood pressure and blood sugar levels   Improve blood lipid profile   Improve memory and concentration   Improve sleep habits   Enhance mental wellbeing, improving mood and reducing depression   Reduce the risk of coronary heart disease ( the number 1 killer in the [place])   Reduce the risk of osteoporosis   Reduce the risk of breast and colon cancer   Reduce the risk of non-insulin dependent (type 2) diabetes   Reduce body weight and lower the risk of obesity  You need a few essentials before you hit the road.     Walking shoes.   Ensure you have lightweight, breathable, and supportive footwear.    Walkers land on their heels and roll over to the front of the foot to push off their toes.  Ensure your shoes are cushioned at the heel.   Try your shoes on with the socks you plan to wear.   Clothing.  Dress for comfort and the weather.  Wear synthetic wicking fabrics that draw sweat away from your body. Wear layers when it is colder.  Remember sunscreen.   Water.  Take frequent sips of water when while you walk.  Ensure you drink before and after your walk.    Set a goal of drinking 8 cups of water a day.                          You already know how to walk, so you just need some tips on how to get started.    Walk out the door!   Start out slow and easy.   For most people this means head out the door, walk for 10 minutes, and then  walk back. That's it? Yes, that's it. If this was hard for you, walk for 5-7 minutes, and then walk back.  If this was easy for you, walk 12-15 min, and then walk back.  Do this every day for a week.   Try to add five minutes to your walks every week. Keep adding 5 minutes until you are walking as long as desired.  30-45 minutes, 4- 5/week are good goals.   Once you are comfortable walking a certain time, try to  your pace a little.  Incorporate a warm up, cool down and stretches into your routine. Start your walk at a slow warm up pace, stop and do a few warm up / flexibilty drills. Then walk for the desired length of time at a slightly quicker speed. You should walk fast enough to feel some exertion, but you should not be gasping for air.  Aim for a talking pace.  Talking pace means you have elevated breathing, but you can still carry a conversation. End your walk with the slower cool down pace and stretch well after your walk. Stretching will make you feel great and assist in injury prevention.   Watch your posture. Walk tall. Think of elongating your body. Hold your head up and eyes forward. Your shoulders should be down, back and relaxed. Tighten your abdominal muscles and buttocks and fall into a natural stride.   Remember to drink plenty of water before, during, and after walking   The most challenging part when starting a fitness program is developing the habit.   There are several tips you can utilize to help stay true to your program.   Find a friend to walk with.  Plan paths that are convenient for you, or map routes youve never been before for an adventure.   Wear a pedometer.   Research has shown that people who wear pedometers, are found to increase their daily steps considerably!   Keep a diary or a walking journal.   Establish a routine.  Walk first thing in the morning if you are a morning person, or in the evening if that suits your schedule better.      REMEMBER:   7 DAYS WITHOUT  EXERCISE MAKES ONE WEAK!                                    WEEK MON TUES WED THUR FRI SAT SUN   Week 1  Date:______  Goal:______          Week 2  Date:______  Goal:______          Week 3  Date:______  Goal:______          Week 4  Date:______  Goal:______          Week 5  Date:______  Goal:______          Week 6  Date:______  Goal:______                WEEK MON TUES WED THUR FRI SAT SUN   Week 7  Date:______  Goal:______          Week 8  Date:______  Goal:______          Week 9  Date:______  Goal:______          Week 10  Date:______  Goal:______          Week 11  Date:______  Goal:______          Week 12  Date:______  Goal:______

## 2020-02-27 ENCOUNTER — PROCEDURE VISIT (OUTPATIENT)
Dept: UROLOGY | Facility: CLINIC | Age: 75
End: 2020-02-27
Payer: MEDICARE

## 2020-02-27 VITALS
HEIGHT: 63 IN | BODY MASS INDEX: 24.8 KG/M2 | HEART RATE: 84 BPM | TEMPERATURE: 98 F | DIASTOLIC BLOOD PRESSURE: 66 MMHG | WEIGHT: 140 LBS | SYSTOLIC BLOOD PRESSURE: 138 MMHG

## 2020-02-27 DIAGNOSIS — N39.46 MIXED URINARY INCONTINENCE DUE TO FEMALE GENITAL PROLAPSE: ICD-10-CM

## 2020-02-27 DIAGNOSIS — N81.9 MIXED URINARY INCONTINENCE DUE TO FEMALE GENITAL PROLAPSE: ICD-10-CM

## 2020-02-27 PROCEDURE — 51784 ANAL/URINARY MUSCLE STUDY: CPT | Mod: 26,HCNC,S$GLB, | Performed by: UROLOGY

## 2020-02-27 PROCEDURE — 51728 PR COMPLEX CYSTOMETROGRAM VOIDING PRESSURE STUDIES: ICD-10-PCS | Mod: 26,HCNC,S$GLB, | Performed by: UROLOGY

## 2020-02-27 PROCEDURE — 51741 PR UROFLOWMETRY, COMPLEX: ICD-10-PCS | Mod: 26,HCNC,S$GLB, | Performed by: UROLOGY

## 2020-02-27 PROCEDURE — 52000 CYSTOURETHROSCOPY: CPT | Mod: 51,HCNC,S$GLB, | Performed by: UROLOGY

## 2020-02-27 PROCEDURE — 51728 CYSTOMETROGRAM W/VP: CPT | Mod: 26,HCNC,S$GLB, | Performed by: UROLOGY

## 2020-02-27 PROCEDURE — 51741 ELECTRO-UROFLOWMETRY FIRST: CPT | Mod: 26,HCNC,S$GLB, | Performed by: UROLOGY

## 2020-02-27 PROCEDURE — 51784 PR ANAL/URINARY MUSCLE STUDY: ICD-10-PCS | Mod: 26,HCNC,S$GLB, | Performed by: UROLOGY

## 2020-02-27 PROCEDURE — 52000 PR CYSTOURETHROSCOPY: ICD-10-PCS | Mod: 51,HCNC,S$GLB, | Performed by: UROLOGY

## 2020-02-27 RX ORDER — DULOXETIN HYDROCHLORIDE 60 MG/1
60 CAPSULE, DELAYED RELEASE ORAL DAILY
Qty: 30 CAPSULE | Refills: 11 | Status: SHIPPED | OUTPATIENT
Start: 2020-02-27 | End: 2021-03-30

## 2020-02-27 RX ORDER — LIDOCAINE HYDROCHLORIDE 20 MG/ML
JELLY TOPICAL ONCE
Status: COMPLETED | OUTPATIENT
Start: 2020-02-27 | End: 2020-02-27

## 2020-02-27 RX ORDER — DOXYCYCLINE HYCLATE 100 MG
100 TABLET ORAL ONCE
Status: COMPLETED | OUTPATIENT
Start: 2020-02-27 | End: 2020-02-27

## 2020-02-27 RX ADMIN — LIDOCAINE HYDROCHLORIDE: 20 JELLY TOPICAL at 03:02

## 2020-02-27 RX ADMIN — Medication 100 MG: at 03:02

## 2020-02-27 NOTE — PATIENT INSTRUCTIONS
SIMPLE URODYNAMIC STUDY (SUDS) & CYSTOSCOPY  UROLOGY CLINIC DISCHARGE INSTRUCTIONS    You have had a procedure that will require time to properly heal. Follow the instructions you have been given on how to care for yourself once you are home. Below is additional information to help in your recovery.    ACTIVITY  · There are no restrictions in activity. Start doing again the things you did before the procedure.  · You may experience a slight burning sensation. You may notice a small amount of blood in your urine. This will clear up within a day. Call the clinic if this continues beyond 48 hours.    DIET  · Continue your normal diet. You may eat the same foods you ate before your procedure.  · Drink plenty of fluids during the first 24-48 hours following your procedure.    MEDICATIONS  · Resume all other previous medications from your prescribing physician.  · Continue any pre=procedure antibiotics until they are all gone.    SIGNS AND SYMPTOMS TO REPORT TO THE DOCTOR  · Chills or fever greater than 101° F within 24 hours of procedure.  · Changes in urination, such as increased bleeding, foul smell, cloudy urine, or painful urination.  · Call your doctor with any questions or concerns.    For any emergency situation, call [phone] immediately or go to your nearest emergency room.    [facility]  [phone]    _                                                                                                                                                                                             If any problems after hours or weekends, you may call [phone] and ask for the urology resident on call.

## 2020-02-27 NOTE — PROCEDURES
Simple Urodynamics w/ Cysto  Date/Time: 2/27/2020 2:15 PM  Performed by: Jac Duran MD  Authorized by: Jac Duran MD   Comments: Procedure Date:  02/27/2020    Procedure:   Diagnostic Cystourethroscopy   Complex Cystometrogram   Voiding / Pressure Study with Intrarectal Balloon   Complex Uroflow   Electromyogram of Anal Sphincter.     Pre-OP Diagnosis:   Mixed urinary incontinence, s/p uterine prolapse repair, s/p hysterectomy, nocturia and nocturnal enuresis   Post-OP Diagnosis:   Same, cystocele, rectocele   Anesthesia:   Anesthesia Administered:   Intraurethral instillation of 10 mL 2% lidocaine (Xylocaine) jelly.   Findings:   Uroflow: voided 280 ml at Qmax 28 ml /sec.  PVR 10 ml  --- Bladder ---   CYSTOMETROGRAM ( Filling Phase ):   Cystometric Numeric Data:   - First Desire (Sensation): 316 mL at 1cm of water.   - Normal Desire: 502mL at 12 cm of water.   - Strong Desire: 504 mL at 13 cm of water.   - Urgency (Imminent Void) : 551 mL at 12 cm of water.   - Maximum Cystometric Capacity: 587 mL.   Compliance:   - normla.   Leak Point Pressure:   - Valsalva ( Abdominal ) Leak Point Pressure: 200 ml with [id number] cm water pressure.   300 ml with [id number] water pressure.    UROFLOW:   - Voided Volume: 557 mL.   - Residual Urine: 75 mL.   - Maximum Flow Rate: 12 mL/sec.   - Flow Pattern: low amplitude   VOIDING PRESSURE STUDY ( Voiding Phase ):   Detrusor Pressure:   - Maximum Detrusor Pressure: 34cm of water.   - Detrusor Pressure at Maximum Flow: 21 cm of water.   - Detrusor Contraction Characteristics: low but Sustained contraction(s).   ELECTROMYOGRAM:   - normal.     ---Diagnostic Cystourethroscopy ---   Normal urethra with hypermobility  Width of Bladder Neck Opening: Approximately 18 Fr.   Normal bladder.   Normal ureteral orifices bilaterally.       Description of Procedure:   Informed Consent:   - Risks, benefits and alternatives of procedure discussed with   patient and informed consent obtained.    Patient Position:   - Supine.   --- Bladder ---   Prep and Drape:   - Patient prepped and draped in usual sterile fashion using povidone   iodine (Betadine).   --- Diagnostic Cystourethroscopy ---   Instruments:   - 16 Fr flexible cystoscope with 0 degree lens.   Procedure Details:   - Cystoscope passed under vision into bladder.   - Bladder and urethra examined in their entirety with findings as   above.   --- Urodynamic Studies ---   Procedure Details:   Cystometrogram:   - Catheter(s) passed into the bladder.   - Rectal balloon inserted.   - Catheter(s) connected to infusion medium and to pressure recording   device.   - Infusion Rate: 30 mL / min.   Electromyogram:   - Perineal electromyogram pad placed and connected to electromyogram   recording device.   Equipment:   - Catheters: Double lumen catheter.   - Medium: Liquid.   - Pressure Recording Device: Calibrated electronic equipment.   Complications:   No immediate complications.    CONCLUSIONS:   1. Mixed urinary incontinence  2. Cystocele and rectocele.    Responded well to cymbalta 30 mg.. Will increase to 60 mg daily to control her CIERA.  Alternatively may consider urethral bulking injection over sling surgery due to her bladder function.    For her bladder function ( weak bladder contraction) with urge incontinence, recommend InterStim Therapy.  Will plan stage I interstim Therapy to see how she does.  She uses 4 pads a day for incontinence now.    Post-OP Plan:   Patient was discharged home in a stable condition.  Medications: noone  Follow up:   Stage I interStim Therapy     Mixed urinary incontinence due to female genital prolapse  Simple Urodynamics w/ Cysto  DULoxetine (CYMBALTA) 60 MG capsule; Take 1 capsule (60 mg total) by mouth once daily.  Dispense: 30 capsule; Refill: 11     Other orders  doxycycline tablet 100 mg  lidocaine HCl 2% urojet

## 2020-02-28 ENCOUNTER — TELEPHONE (OUTPATIENT)
Dept: UROLOGY | Facility: CLINIC | Age: 75
End: 2020-02-28

## 2020-02-28 ENCOUNTER — CLINICAL SUPPORT (OUTPATIENT)
Dept: REHABILITATION | Facility: OTHER | Age: 75
End: 2020-02-28
Attending: ANESTHESIOLOGY
Payer: MEDICARE

## 2020-02-28 DIAGNOSIS — M25.69 DECREASED RANGE OF MOTION OF TRUNK AND BACK: ICD-10-CM

## 2020-02-28 DIAGNOSIS — N39.46 MIXED URINARY INCONTINENCE DUE TO FEMALE GENITAL PROLAPSE: Primary | ICD-10-CM

## 2020-02-28 DIAGNOSIS — N81.9 MIXED URINARY INCONTINENCE DUE TO FEMALE GENITAL PROLAPSE: Primary | ICD-10-CM

## 2020-02-28 DIAGNOSIS — R29.898 WEAKNESS OF BACK: ICD-10-CM

## 2020-02-28 PROCEDURE — 97110 THERAPEUTIC EXERCISES: CPT | Mod: HCNC

## 2020-02-28 NOTE — PROGRESS NOTES
[facility]      Name: Shakira Pearl  Clinic Number: [id number]    Therapy Diagnosis:   Encounter Diagnoses   Name Primary?    Weakness of back     Decreased range of motion of trunk and back      Physician: Mert Coates MD    Visit Date: 2020    Physician Orders: PT Eval and Treat     Medical Diagnosis from Referral:   M47.816 (ICD-10-CM) - Facet arthritis, degenerative, lumbar spine   M43.06 (ICD-10-CM) - Spondylolysis of lumbar region   M53.3 (ICD-10-CM) - Sacroiliac dysfunction   M54.12 (ICD-10-CM) - Cervical radiculopathy     Evaluation Date: 2020  Authorization Period Expiration: 2021   Plan of Care Expiration: 2020  Reassessment Due: 3/10/2020   Visit # / Visits authorized:     Time In: 1255  Time Out: 1300  Total Billable Time: 50 minutes     Precautions: Standard; L knee menisectomy      Pattern of pain determined: 1 PEN        Subjective   Shakira arrives to PT today and reports feeling nauseated. She had a shingle shot earlier this morning, and she feels nausea since then, and her L arm is hurting at the shot site. She reports a 3/10 LBP currently.     Patient reports tolerating previous visit well with no soreness.    Patient reports their pain to be 3/10 on a 0-10 scale with 0 being no pain and 10 being the worst pain imaginable.    Pain Location: B LB; and mid back     Occupation: Retired   Leisure: Walking         Pts goals: Build up strength and core stability       Objective     Baseline Isometric Testing on Med X equipment: Testing administered by PT  Date of testin2020  ROM 0-48 deg   Max Peak Torque 105    Min Peak Torque 39    Flex/Ext Ratio 2.69:1    % below normative data -14%      Midpoint Isometric Testing on Med X equipment: Testing administered by PT  Date of testin/10/2020  ROM 0-48 deg   Max Peak Torque 112   Min Peak Torque 62   Flex/Ext Ratio    % below normative data 2     Percent gain in strength from baseline:  "15%    Palpation: R medial and lateral scap region muscle tenderness.     Treatment    Pt was instructed in and performed the following:     Shakira received therapeutic exercises to develop/improved posture, cardiovascular endurance, muscular endurance, lumbar/cervical ROM, strength and muscular endurance for 55 minutes including the following exercises:  [facility] 2/28/2020   Visit Number 15   VAS Pain Rating 3   Treadmill Time (in min.) -   Speed -   Retraction in Sitting -   Lumbar Stretches - Slouch Overcorrection -   Extension in Lying 10   Extension in Standing 10   Flexion in Lying 10   Flexion in Sitting -   Manual Therapy -   Lumbar Extension Seat Pad -   Femur Restraint -   Top Dead Center -   Counterweight -   Lumbar Flexion -   Lumbar Extension -   Lumbar Peak Torque -   Min Torque -   Test Percent Below Normative Data -   Test Percent Gain in Strength from Initial  -   Lumbar Weight 71   Repetitions 19   Rating of Perceived Exertion 3   Ice - Z Lie (in min.) 10       Exercises completed this session:   Piriformis stretch hold 20 secs 2-3x  Double knee to chest (no ball) 10x with 5" holds  Extension on elbow 10x  Progressed to ext in lie today  Extension in standing 10x  Bridges 10x  Posterior pelvic tilt 10x (NT)  Modified Khris test to R side 2x 20''    Neck irritation right side with reduced right neck side bend and right rotation  Neck retractions with increasing range noted into retraction as well as neck right side bend and rotation        Peripheral muscle strengthening which included 1 set of 15-20 repetitions at a slow, controlled 10-13 second per rep pace focused on strengthening supporting musculature for improved body mechanics and functional mobility.  Pt and therapist focused on proper form during treatment to ensure optimal strengthening of each targeted muscle group.  Machines were utilized including torso rotation, leg extension, leg curl, chest press, upright row. Tricep " "extension, bicep curl, leg press, and hip abduction added visit 3    Home Exercises Provided and Patient Education Provided   Walking program  Use of lumbar roll reviewed  Positioning in sleeping and chair  Neck retractions  Piriformis stretch hold 20 secs 2-3x  Double knee to chest (no ball) 10x with 5" holds  Extension on elbow 10x  Progressed to ext in lie today  Extension in standing 10x  Bridges 10x  Posterior pelvic tilt 10x (NT)  Modified Khris test to R side 2x 20''      Education provided:   - Educated pt on the importance of daily stretch to increase the benefit of program and positive results.     Written Home Exercises Provided: Patient instructed to cont prior HEP.  Exercises were reviewed and Shakira was able to demonstrate them prior to the end of the session.  Shakira demonstrated good  understanding of the education provided.     See EMR under Patient Instructions for exercises provided 2/26/20    Assessment     EIL was reviewed again today w/o issues. Patient progress reps on medx, completing 19 reps at an RPE of 3. Complete 20 reps at next visit, with a possible flexion ROM increase attempt.     Anticipated Barriers for therapy: none  Pt's spiritual, cultural and educational needs considered and pt agreeable to plan of care and goals as stated below:     GOALS: Pt is in agreement with the following goals.     Short term goals:  6 weeks or 10 visits   1.  Pt will demonstrate increased lumbar ROM by at least 6 degrees from the initial ROM value with improvements noted in functional ROM and ability to perform ADLs. (Progressing)  2.  Pt will demonstrate increased maximum isometric torque value by 20% when compared to the initial value resulting in improved ability to perform bending, lifting, and carrying activities safely, confidently.  (Progressing; not met at 7% only)     3.  Patient report a reduction in worst pain score by 1-2 points for improved tolerance for activity and participation.  " (MET)  4.  Pt able to perform HEP correctly with minimal cueing or supervision from therapist to encourage independent management of symptoms.  (MET)       Long term goals: 10 weeks or 20 visits   1. Pt will demonstrate increased lumbar ROM by at least 12 degrees from initial ROM value, resulting in improved ability to perform functional fwd bending while standing and sitting.  (Progressing)  2. Pt will demonstrate increased maximum isometric torque value by 28% when compared to the initial value resulting in improved ability to perform bending, lifting, and carrying activities safely, confidently.  (Progressing)  3. Pt to demonstrate ability to independently control and reduce their pain through posture positioning and mechanical movements throughout a typical day.  (Progressing)  4.  Pt will demonstrate reduced pain and improved functional outcomes as reported on the FOTO by reaching a score of CI = at least 1% but < 20% impaired, limited or restricted or less in order to demonstrate subjective improvement in pt's condition.   (Progressing)  5. Pt will demonstrate independence with the HEP at discharge  (Progressing)  6.  Pt will present with subjective reports of increased core strength and stability. (Progressing)     Plan   Continue with established Plan of Care towards established PT goals.

## 2020-03-02 ENCOUNTER — CLINICAL SUPPORT (OUTPATIENT)
Dept: REHABILITATION | Facility: OTHER | Age: 75
End: 2020-03-02
Attending: ANESTHESIOLOGY
Payer: MEDICARE

## 2020-03-02 DIAGNOSIS — R29.898 WEAKNESS OF BACK: ICD-10-CM

## 2020-03-02 DIAGNOSIS — M25.69 DECREASED RANGE OF MOTION OF TRUNK AND BACK: ICD-10-CM

## 2020-03-02 PROCEDURE — 97110 THERAPEUTIC EXERCISES: CPT | Mod: HCNC

## 2020-03-02 NOTE — PROGRESS NOTES
[facility]      Name: Shakira Pearl  Clinic Number: [id number]    Therapy Diagnosis:   Encounter Diagnoses   Name Primary?    Weakness of back     Decreased range of motion of trunk and back      Physician: Mert Coates MD    Visit Date: 3/2/2020    Physician Orders: PT Eval and Treat     Medical Diagnosis from Referral:   M47.816 (ICD-10-CM) - Facet arthritis, degenerative, lumbar spine   M43.06 (ICD-10-CM) - Spondylolysis of lumbar region   M53.3 (ICD-10-CM) - Sacroiliac dysfunction   M54.12 (ICD-10-CM) - Cervical radiculopathy     Evaluation Date: 2020  Authorization Period Expiration: 2021   Plan of Care Expiration: 2020  Reassessment Due: 3/10/2020   Visit # / Visits authorized: 15/ 20    Time In: 1500  Time Out: 1600  Total Billable Time: 60  minutes     Precautions: Standard; L knee menisectomy      Pattern of pain determined: 1 PEN        Subjective   Shakira arrives to PT and reports 3/10 across LBP.     Patient reports tolerating previous visit well with no soreness.    Patient reports their pain to be 3/10 on a 0-10 scale with 0 being no pain and 10 being the worst pain imaginable.    Pain Location: B LB; and mid back     Occupation: Retired   Leisure: Walking         Pts goals: Build up strength and core stability       Objective     Baseline Isometric Testing on Med X equipment: Testing administered by PT  Date of testin2020  ROM 0-48 deg   Max Peak Torque 105    Min Peak Torque 39    Flex/Ext Ratio 2.69:1    % below normative data -14%      Midpoint Isometric Testing on Med X equipment: Testing administered by PT  Date of testin/10/2020  ROM 0-48 deg   Max Peak Torque 112   Min Peak Torque 62   Flex/Ext Ratio    % below normative data 2     Percent gain in strength from baseline: 15%    Palpation: R medial and lateral scap region muscle tenderness.     Treatment    Pt was instructed in and performed the following:     Shakira desi  "therapeutic exercises to develop/improved posture, cardiovascular endurance, muscular endurance, lumbar/cervical ROM, strength and muscular endurance for 60 minutes including the following exercises:    [facility] 3/2/2020   Visit Number 16   VAS Pain Rating 3   Treadmill Time (in min.) 10   Speed 3   Retraction in Sitting -   Lumbar Stretches - Slouch Overcorrection -   Extension in Lying 10   Extension in Standing 10   Flexion in Lying 10   Flexion in Sitting -   Manual Therapy -   Lumbar Extension Seat Pad -   Femur Restraint -   Top Dead Center -   Counterweight -   Lumbar Flexion -   Lumbar Extension -   Lumbar Peak Torque -   Min Torque -   Test Percent Below Normative Data -   Test Percent Gain in Strength from Initial  -   Lumbar Weight 71   Repetitions 20   Rating of Perceived Exertion 3   Ice - Z Lie (in min.) 10       Exercises completed this session:   Piriformis stretch hold 20 secs 2-3x  Double knee to chest (no ball) 10x with 5" holds  Progressed to ext in lie today  Extension in standing 10x  Bridges 10x  Posterior pelvic tilt 10x (NT)  Modified Khris test to R side 2x 20''    Neck irritation right side with reduced right neck side bend and right rotation  Neck retractions with increasing range noted into retraction as well as neck right side bend and rotation      Peripheral muscle strengthening which included 1 set of 15-20 repetitions at a slow, controlled 10-13 second per rep pace focused on strengthening supporting musculature for improved body mechanics and functional mobility.  Pt and therapist focused on proper form during treatment to ensure optimal strengthening of each targeted muscle group.  Machines were utilized including torso rotation, leg extension, leg curl, chest press, upright row. Tricep extension, bicep curl, leg press, and hip abduction added visit 3    Home Exercises Provided and Patient Education Provided   Walking program  Use of lumbar roll reviewed  Positioning " "in sleeping and chair  Neck retractions  Piriformis stretch hold 20 secs 2-3x  Double knee to chest (no ball) 10x with 5" holds  Extension on elbow 10x  Progressed to ext in lie today  Extension in standing 10x  Bridges 10x  Posterior pelvic tilt 10x (NT)  Modified Khris test to R side 2x 20''      Education provided:   - Educated pt on the importance of daily stretch to increase the benefit of program and positive results.     Written Home Exercises Provided: Patient instructed to cont prior HEP.  Exercises were reviewed and Shakira was able to demonstrate them prior to the end of the session.  Shakira demonstrated good  understanding of the education provided.     See EMR under Patient Instructions for exercises provided 2/26/20    Assessment     Patient completed 20 reps at 71# today, with an RPE of 3 reporting feeling easier today. Increase ROM flexion on medx at next visit and maintain lumbar weight if 71#, for max reps if able. Patient is making good progress with established goals.     Anticipated Barriers for therapy: none  Pt's spiritual, cultural and educational needs considered and pt agreeable to plan of care and goals as stated below:     GOALS: Pt is in agreement with the following goals.     Short term goals:  6 weeks or 10 visits   1.  Pt will demonstrate increased lumbar ROM by at least 6 degrees from the initial ROM value with improvements noted in functional ROM and ability to perform ADLs. (Progressing)  2.  Pt will demonstrate increased maximum isometric torque value by 20% when compared to the initial value resulting in improved ability to perform bending, lifting, and carrying activities safely, confidently.  (Progressing; not met at 7% only)     3.  Patient report a reduction in worst pain score by 1-2 points for improved tolerance for activity and participation.  (MET)  4.  Pt able to perform HEP correctly with minimal cueing or supervision from therapist to encourage independent management " of symptoms.  (MET)       Long term goals: 10 weeks or 20 visits   1. Pt will demonstrate increased lumbar ROM by at least 12 degrees from initial ROM value, resulting in improved ability to perform functional fwd bending while standing and sitting.  (Progressing)  2. Pt will demonstrate increased maximum isometric torque value by 28% when compared to the initial value resulting in improved ability to perform bending, lifting, and carrying activities safely, confidently.  (Progressing)  3. Pt to demonstrate ability to independently control and reduce their pain through posture positioning and mechanical movements throughout a typical day.  (Progressing)  4.  Pt will demonstrate reduced pain and improved functional outcomes as reported on the FOTO by reaching a score of CI = at least 1% but < 20% impaired, limited or restricted or less in order to demonstrate subjective improvement in pt's condition.   (Progressing)  5. Pt will demonstrate independence with the HEP at discharge  (Progressing)  6.  Pt will present with subjective reports of increased core strength and stability. (Progressing)     Plan   Continue with established Plan of Care towards established PT goals.

## 2020-03-02 NOTE — TELEPHONE ENCOUNTER
Surgery with dr dillon    Mixed urinary incontinence due to female genital prolapse  -     Case Request Operating Room: INSERTION, NEUROSTIMULATOR, TEMPORARY, SACRAL

## 2020-03-04 ENCOUNTER — CLINICAL SUPPORT (OUTPATIENT)
Dept: REHABILITATION | Facility: OTHER | Age: 75
End: 2020-03-04
Attending: ANESTHESIOLOGY
Payer: MEDICARE

## 2020-03-04 DIAGNOSIS — M25.69 DECREASED RANGE OF MOTION OF TRUNK AND BACK: ICD-10-CM

## 2020-03-04 DIAGNOSIS — R29.898 WEAKNESS OF BACK: ICD-10-CM

## 2020-03-04 PROCEDURE — 97110 THERAPEUTIC EXERCISES: CPT | Mod: HCNC

## 2020-03-04 NOTE — PROGRESS NOTES
[facility]      Name: Shakira Pearl  Clinic Number: [id number]    Therapy Diagnosis:   Encounter Diagnoses   Name Primary?    Weakness of back     Decreased range of motion of trunk and back      Physician: Mert Coates MD    Visit Date: 3/4/2020    Physician Orders: PT Eval and Treat     Medical Diagnosis from Referral:   M47.816 (ICD-10-CM) - Facet arthritis, degenerative, lumbar spine   M43.06 (ICD-10-CM) - Spondylolysis of lumbar region   M53.3 (ICD-10-CM) - Sacroiliac dysfunction   M54.12 (ICD-10-CM) - Cervical radiculopathy     Evaluation Date: 2020  Authorization Period Expiration: 2021   Plan of Care Expiration: 2020  Reassessment Due: 3/10/2020   Visit # / Visits authorized:     Time In: 1330  Time Out: 1430  Total Billable Time: 60  minutes     Precautions: Standard; L knee menisectomy      Pattern of pain determined: 1 PEN        Subjective   Shakira reports more neck pain at this time than low back pain. The low back was bothering her a little and she thinks its from the weight on the leg press    Patient reports tolerating previous visit well with no soreness.    Patient reports their pain to be 3/10 on a 0-10 scale with 0 being no pain and 10 being the worst pain imaginable.    Pain Location: B LB; and mid back     Occupation: Retired   Leisure: Walking         Pts goals: Build up strength and core stability       Objective     Baseline Isometric Testing on Med X equipment: Testing administered by PT  Date of testin2020  ROM 0-48 deg   Max Peak Torque 105    Min Peak Torque 39    Flex/Ext Ratio 2.69:1    % below normative data -14%      Midpoint Isometric Testing on Med X equipment: Testing administered by PT  Date of testin/10/2020  ROM 0-48 deg   Max Peak Torque 112   Min Peak Torque 62   Flex/Ext Ratio    % below normative data 2     Percent gain in strength from baseline: 15%    Palpation: R medial and lateral scap region muscle  "tenderness.     Treatment    Pt was instructed in and performed the following:     Shakira received therapeutic exercises to develop/improved posture, cardiovascular endurance, muscular endurance, lumbar/cervical ROM, strength and muscular endurance for 60 minutes including the following exercises:    [facility] 3/4/2020   Visit Number 17   VAS Pain Rating 2   Treadmill Time (in min.) 10   Speed -   Retraction in Sitting -   Lumbar Stretches - Slouch Overcorrection -   Extension in Lying 10   Extension in Standing 10   Flexion in Lying 10   Flexion in Sitting -   Test Percent Gain in Strength from Initial  -   Lumbar Weight 75   Repetitions 15   Rating of Perceived Exertion 3   Ice - Z Lie (in min.) 10           Exercises completed this session:   Piriformis stretch hold 20 secs 2-3x  Double knee to chest (no ball) 10x with 5" holds  Progressed to ext in lie today  Extension in standing 10x  Bridges 10x  Posterior pelvic tilt 10x (NT)  Modified Khris test to R side 2x 20''    Neck irritation right side with reduced right neck side bend and right rotation  Neck retractions with increasing range noted into retraction as well as neck right side bend and rotation      Peripheral muscle strengthening which included 1 set of 15-20 repetitions at a slow, controlled 10-13 second per rep pace focused on strengthening supporting musculature for improved body mechanics and functional mobility.  Pt and therapist focused on proper form during treatment to ensure optimal strengthening of each targeted muscle group.  Machines were utilized including torso rotation, leg extension, leg curl, chest press, upright row. Tricep extension, bicep curl, leg press, and hip abduction added visit 3    Home Exercises Provided and Patient Education Provided   Walking program  Use of lumbar roll reviewed  Positioning in sleeping and chair  Neck retractions  Piriformis stretch hold 20 secs 2-3x  Double knee to chest (no ball) 10x with 5" " holds  Extension on elbow 10x  Progressed to ext in lie today  Extension in standing 10x  Bridges 10x  Posterior pelvic tilt 10x (NT)  Modified Khris test to R side 2x 20''      Education provided:   - Educated pt on the importance of daily stretch to increase the benefit of program and positive results.     Written Home Exercises Provided: Patient instructed to cont prior HEP.  Exercises were reviewed and Shakira was able to demonstrate them prior to the end of the session.  Shakira demonstrated good  understanding of the education provided.     See EMR under Patient Instructions for exercises provided 2/26/20    Assessment     Patient was able to complete exercisers this visit with minimal cues for exercises progression. On the lumbar medx pt requires vues for pacing and to try and keep the movment smooth without jerking. Decreased resistance on the leg press this visit, will increase again as tolerated. Pt continued to report some neck pain post therapy. Increased resistance on the lumbar medx by 5% and she was able to complete 15 repetitions.  Patient is making good progress with established goals.     Anticipated Barriers for therapy: none  Pt's spiritual, cultural and educational needs considered and pt agreeable to plan of care and goals as stated below:     GOALS: Pt is in agreement with the following goals.     Short term goals:  6 weeks or 10 visits   1.  Pt will demonstrate increased lumbar ROM by at least 6 degrees from the initial ROM value with improvements noted in functional ROM and ability to perform ADLs. (Progressing)  2.  Pt will demonstrate increased maximum isometric torque value by 20% when compared to the initial value resulting in improved ability to perform bending, lifting, and carrying activities safely, confidently.  (Progressing; not met at 7% only)  3.  Patient report a reduction in worst pain score by 1-2 points for improved tolerance for activity and participation.  (MET)  4.  Pt able  to perform HEP correctly with minimal cueing or supervision from therapist to encourage independent management of symptoms.  (MET)       Long term goals: 10 weeks or 20 visits   1. Pt will demonstrate increased lumbar ROM by at least 12 degrees from initial ROM value, resulting in improved ability to perform functional fwd bending while standing and sitting.  (Progressing)  2. Pt will demonstrate increased maximum isometric torque value by 28% when compared to the initial value resulting in improved ability to perform bending, lifting, and carrying activities safely, confidently.  (Progressing)  3. Pt to demonstrate ability to independently control and reduce their pain through posture positioning and mechanical movements throughout a typical day.  (Progressing)  4.  Pt will demonstrate reduced pain and improved functional outcomes as reported on the FOTO by reaching a score of CI = at least 1% but < 20% impaired, limited or restricted or less in order to demonstrate subjective improvement in pt's condition.   (Progressing)  5. Pt will demonstrate independence with the HEP at discharge  (Progressing)  6.  Pt will present with subjective reports of increased core strength and stability. (Progressing)     Plan   Continue with established Plan of Care towards established PT goals.

## 2020-03-09 ENCOUNTER — CLINICAL SUPPORT (OUTPATIENT)
Dept: REHABILITATION | Facility: OTHER | Age: 75
End: 2020-03-09
Attending: ANESTHESIOLOGY
Payer: MEDICARE

## 2020-03-09 DIAGNOSIS — M25.69 DECREASED RANGE OF MOTION OF TRUNK AND BACK: ICD-10-CM

## 2020-03-09 DIAGNOSIS — R29.898 WEAKNESS OF BACK: ICD-10-CM

## 2020-03-09 PROCEDURE — 97110 THERAPEUTIC EXERCISES: CPT | Mod: HCNC,CQ

## 2020-03-09 NOTE — PROGRESS NOTES
[facility]      Name: Shakira Pearl  Clinic Number: [id number]    Therapy Diagnosis:   Encounter Diagnoses   Name Primary?    Weakness of back     Decreased range of motion of trunk and back      Physician: Mert Coates MD    Visit Date: 3/9/2020    Physician Orders: PT Eval and Treat     Medical Diagnosis from Referral:   M47.816 (ICD-10-CM) - Facet arthritis, degenerative, lumbar spine   M43.06 (ICD-10-CM) - Spondylolysis of lumbar region   M53.3 (ICD-10-CM) - Sacroiliac dysfunction   M54.12 (ICD-10-CM) - Cervical radiculopathy     Evaluation Date: 2020  Authorization Period Expiration: 2021   Plan of Care Expiration: 2020  Reassessment Due: 3/10/2020   Visit # / Visits authorized:     Time In: 1:10  Time Out: 2:30  Total Billable Time: 60  minutes     Precautions: Standard; L knee menisectomy      Pattern of pain determined: 1 PEN        Subjective   Shakira reports more neck pain at this time, less LBP.     Patient reports tolerating previous visit well with no soreness.    Patient reports their pain to be 3/10 on a 0-10 scale with 0 being no pain and 10 being the worst pain imaginable.    Pain Location: B LB; and mid back     Occupation: Retired   Leisure: Walking         Pts goals: Build up strength and core stability       Objective     Baseline Isometric Testing on Med X equipment: Testing administered by PT  Date of testin2020  ROM 0-48 deg   Max Peak Torque 105    Min Peak Torque 39    Flex/Ext Ratio 2.69:1    % below normative data -14%      Midpoint Isometric Testing on Med X equipment: Testing administered by PT  Date of testin/10/2020  ROM 0-48 deg   Max Peak Torque 112   Min Peak Torque 62   Flex/Ext Ratio    % below normative data 2     Percent gain in strength from baseline: 15%    Palpation: R medial and lateral scap region muscle tenderness.     Treatment    Pt was instructed in and performed the following:     Shakira desi  "therapeutic exercises to develop/improved posture, cardiovascular endurance, muscular endurance, lumbar/cervical ROM, strength and muscular endurance for 60 minutes including the following exercises:        [facility] 3/9/2020   Visit Number 18   VAS Pain Rating 2   Treadmill Time (in min.) 10   Lumbar Weight 75   Repetitions 18   Rating of Perceived Exertion 3   Ice - Z Lie (in min.) 10         Exercises completed this session:   Piriformis stretch hold 20 secs 2-3x  Double knee to chest (no ball) 10x with 5" holds  Progressed to ext in lie today  Extension in standing 10x  Bridges 15x  Posterior pelvic tilt 10x   Modified Khris test to R side 2x 20''    Neck irritation right side with reduced right neck side bend and right rotation  Neck retractions with increasing range noted into retraction as well as neck right side bend and rotation      Peripheral muscle strengthening which included 1 set of 15-20 repetitions at a slow, controlled 10-13 second per rep pace focused on strengthening supporting musculature for improved body mechanics and functional mobility.  Pt and therapist focused on proper form during treatment to ensure optimal strengthening of each targeted muscle group.  Machines were utilized including torso rotation, leg extension, leg curl, chest press, upright row. Tricep extension, bicep curl, leg press, and hip abduction added visit 3    Home Exercises Provided and Patient Education Provided   Walking program  Use of lumbar roll reviewed  Positioning in sleeping and chair  Neck retractions  Piriformis stretch hold 20 secs 2-3x  Double knee to chest (no ball) 10x with 5" holds  Extension on elbow 10x  Progressed to ext in lie today  Extension in standing 10x  Bridges 10x  Posterior pelvic tilt 10x (NT)  Modified Khris test to R side 2x 20''      Education provided:   - Educated pt on the importance of daily stretch to increase the benefit of program and positive results.   Educated pt to " ice neck daily to decreased pain.   Written Home Exercises Provided: Patient instructed to cont prior HEP.  Exercises were reviewed and Shakira was able to demonstrate them prior to the end of the session.  Shakira demonstrated good  understanding of the education provided.     See EMR under Patient Instructions for exercises provided 2/26/20    Assessment     Patient was able to complete exercises this visit with minimal cues for HEP.  Pt continued to report neck pain.Educated pt to ice neck daily to decreased pain.  Same resistance on the lumbar medx and she was able to complete 18 repetitions. Pt does want to participate in wellness.   Patient is making good progress with established goals.     Anticipated Barriers for therapy: none  Pt's spiritual, cultural and educational needs considered and pt agreeable to plan of care and goals as stated below:     GOALS: Pt is in agreement with the following goals.     Short term goals:  6 weeks or 10 visits   1.  Pt will demonstrate increased lumbar ROM by at least 6 degrees from the initial ROM value with improvements noted in functional ROM and ability to perform ADLs. (Progressing)  2.  Pt will demonstrate increased maximum isometric torque value by 20% when compared to the initial value resulting in improved ability to perform bending, lifting, and carrying activities safely, confidently.  (Progressing; not met at 7% only)  3.  Patient report a reduction in worst pain score by 1-2 points for improved tolerance for activity and participation.  (MET)  4.  Pt able to perform HEP correctly with minimal cueing or supervision from therapist to encourage independent management of symptoms.  (MET)       Long term goals: 10 weeks or 20 visits   1. Pt will demonstrate increased lumbar ROM by at least 12 degrees from initial ROM value, resulting in improved ability to perform functional fwd bending while standing and sitting.  (Progressing)  2. Pt will demonstrate increased maximum  isometric torque value by 28% when compared to the initial value resulting in improved ability to perform bending, lifting, and carrying activities safely, confidently.  (Progressing)  3. Pt to demonstrate ability to independently control and reduce their pain through posture positioning and mechanical movements throughout a typical day.  (Progressing)  4.  Pt will demonstrate reduced pain and improved functional outcomes as reported on the FOTO by reaching a score of CI = at least 1% but < 20% impaired, limited or restricted or less in order to demonstrate subjective improvement in pt's condition.   (Progressing)  5. Pt will demonstrate independence with the HEP at discharge  (Progressing)  6.  Pt will present with subjective reports of increased core strength and stability. (Progressing)     Plan   Continue with established Plan of Care towards established PT goals.

## 2020-03-11 ENCOUNTER — CLINICAL SUPPORT (OUTPATIENT)
Dept: REHABILITATION | Facility: OTHER | Age: 75
End: 2020-03-11
Attending: ANESTHESIOLOGY
Payer: MEDICARE

## 2020-03-11 DIAGNOSIS — M25.69 DECREASED RANGE OF MOTION OF TRUNK AND BACK: ICD-10-CM

## 2020-03-11 DIAGNOSIS — R29.898 WEAKNESS OF BACK: ICD-10-CM

## 2020-03-11 PROCEDURE — 97110 THERAPEUTIC EXERCISES: CPT | Mod: HCNC

## 2020-03-11 NOTE — PROGRESS NOTES
[facility]      Name: Shakira Pearl  Clinic Number: [id number]    Therapy Diagnosis:   Encounter Diagnoses   Name Primary?    Weakness of back     Decreased range of motion of trunk and back      Physician: Mert Coates MD    Visit Date: 3/11/2020    Physician Orders: PT Eval and Treat     Medical Diagnosis from Referral:   M47.816 (ICD-10-CM) - Facet arthritis, degenerative, lumbar spine   M43.06 (ICD-10-CM) - Spondylolysis of lumbar region   M53.3 (ICD-10-CM) - Sacroiliac dysfunction   M54.12 (ICD-10-CM) - Cervical radiculopathy     Evaluation Date: 2020  Authorization Period Expiration: 2021   Plan of Care Expiration: 2020  Reassessment Due: 3/10/2020   Visit # / Visits authorized:     Time In: 1:00  Time Out: 2:15  Total Billable Time: 60  minutes     Precautions: Standard; L knee menisectomy      Pattern of pain determined: 1 PEN        Subjective   Shakira reports she is not having much pain at all today     Patient reports tolerating previous visit well with no soreness.    Patient reports their pain to be 2/10 on a 0-10 scale with 0 being no pain and 10 being the worst pain imaginable.    Pain Location: B LB; and mid back     Occupation: Retired   Leisure: Walking         Pts goals: Build up strength and core stability       Objective     Baseline Isometric Testing on Med X equipment: Testing administered by PT  Date of testin2020  ROM 0-48 deg   Max Peak Torque 105    Min Peak Torque 39    Flex/Ext Ratio 2.69:1    % below normative data -14%      Midpoint Isometric Testing on Med X equipment: Testing administered by PT  Date of testin/10/2020  ROM 0-48 deg   Max Peak Torque 112   Min Peak Torque 62   Flex/Ext Ratio    % below normative data 2     Percent gain in strength from baseline: 15%    Palpation: R medial and lateral scap region muscle tenderness.     Treatment    Pt was instructed in and performed the following:     Shakira received  "therapeutic exercises to develop/improved posture, cardiovascular endurance, muscular endurance, lumbar/cervical ROM, strength and muscular endurance for 60 minutes including the following exercises:  [facility] 3/11/2020   Visit Number 19   VAS Pain Rating 2   Treadmill Time (in min.) 10   Speed -   Retraction in Sitting -   Lumbar Stretches - Slouch Overcorrection -   Extension in Lying -   Extension in Standing -   Flexion in Lying -   Flexion in Sitting -   Manual Therapy -   Lumbar Extension Seat Pad -   Femur Restraint -   Top Dead Center -   Counterweight -   Lumbar Flexion -   Lumbar Extension -   Lumbar Peak Torque -   Min Torque -   Test Percent Below Normative Data -   Test Percent Gain in Strength from Initial  -   Lumbar Weight 75   Repetitions 20   Rating of Perceived Exertion 3   Ice - Z Lie (in min.) 10         Exercises completed this session:   Piriformis stretch hold 20 secs 2-3x  Double knee to chest (no ball) 10x with 5" holds  Progressed to ext in lie today  Extension in standing 10x  Bridges 15x  Posterior pelvic tilt 10x   Modified Khris test to R side 2x 20''    Neck irritation right side with reduced right neck side bend and right rotation  Neck retractions with increasing range noted into retraction as well as neck right side bend and rotation      Peripheral muscle strengthening which included 1 set of 15-20 repetitions at a slow, controlled 10-13 second per rep pace focused on strengthening supporting musculature for improved body mechanics and functional mobility.  Pt and therapist focused on proper form during treatment to ensure optimal strengthening of each targeted muscle group.  Machines were utilized including torso rotation, leg extension, leg curl, chest press, upright row. Tricep extension, bicep curl, leg press, and hip abduction added visit 3    Home Exercises Provided and Patient Education Provided   Walking program  Use of lumbar roll reviewed  Positioning in " "sleeping and chair  Neck retractions  Piriformis stretch hold 20 secs 2-3x  Double knee to chest (no ball) 10x with 5" holds  Extension on elbow 10x  Progressed to ext in lie today  Extension in standing 10x  Bridges 10x  Posterior pelvic tilt 10x (NT)  Modified Khris test to R side 2x 20''      Education provided:   - Educated pt on the importance of daily stretch to increase the benefit of program and positive results.   Educated pt to ice neck daily to decreased pain.   Written Home Exercises Provided: Patient instructed to cont prior HEP.  Exercises were reviewed and Shakira was able to demonstrate them prior to the end of the session.  Shakira demonstrated good  understanding of the education provided.     See EMR under Patient Instructions for exercises provided 2/26/20    Assessment     Pt able to complete all components of session with no adverse effects. Increased repetitions on medx today. Patient is making good progress with established goals. Pt is interested in wellness program. Will DC next session.     Anticipated Barriers for therapy: none  Pt's spiritual, cultural and educational needs considered and pt agreeable to plan of care and goals as stated below:     GOALS: Pt is in agreement with the following goals.     Short term goals:  6 weeks or 10 visits   1.  Pt will demonstrate increased lumbar ROM by at least 6 degrees from the initial ROM value with improvements noted in functional ROM and ability to perform ADLs. (Progressing)  2.  Pt will demonstrate increased maximum isometric torque value by 20% when compared to the initial value resulting in improved ability to perform bending, lifting, and carrying activities safely, confidently.  (Progressing; not met at 7% only)  3.  Patient report a reduction in worst pain score by 1-2 points for improved tolerance for activity and participation.  (MET)  4.  Pt able to perform HEP correctly with minimal cueing or supervision from therapist to encourage " independent management of symptoms.  (MET)       Long term goals: 10 weeks or 20 visits   1. Pt will demonstrate increased lumbar ROM by at least 12 degrees from initial ROM value, resulting in improved ability to perform functional fwd bending while standing and sitting.  (Progressing)  2. Pt will demonstrate increased maximum isometric torque value by 28% when compared to the initial value resulting in improved ability to perform bending, lifting, and carrying activities safely, confidently.  (Progressing)  3. Pt to demonstrate ability to independently control and reduce their pain through posture positioning and mechanical movements throughout a typical day.  (Progressing)  4.  Pt will demonstrate reduced pain and improved functional outcomes as reported on the FOTO by reaching a score of CI = at least 1% but < 20% impaired, limited or restricted or less in order to demonstrate subjective improvement in pt's condition.   (Progressing)  5. Pt will demonstrate independence with the HEP at discharge  (Progressing)  6.  Pt will present with subjective reports of increased core strength and stability. (Progressing)     Plan   Continue with established Plan of Care towards established PT goals.

## 2020-03-12 ENCOUNTER — TELEPHONE (OUTPATIENT)
Dept: UROLOGY | Facility: CLINIC | Age: 75
End: 2020-03-12

## 2020-03-12 NOTE — TELEPHONE ENCOUNTER
----- Message from Yasmin Zhong LPN sent at 3/12/2020  2:55 PM CDT -----  Regarding: FW: Concern about surgery      ----- Message -----  From: Tori Mahoney MA  Sent: 3/12/2020   2:48 PM CDT  To: [facility] Staff  Subject: Concern about surgery                            Ms Pearl called to say she saw on the news that if elderly patients have a surgery coming up to postpone it due to the virus. She would like to speak to the doctor. Her interstim procedure is scheduled on 4-15. Can you give her a call please.  Thanks   patient communication:    Called Carmen Wang to schedule an appointment with Ortho for wrist/hand fracture.  I was unable to reach the patient, I left patient a voicemail to return my call.    Fidencio Hernandez   Sports Medicine Assistant   [facility]       ----- Message from Annamarie Starr sent at 9/12/2019  1:32 PM CDT -----  Contact: self @ [phone]  Pt says she fell this morning and went to the ED.  Pt was advised she has a broken wrist in 2 place. She has been placed in a temporary cast and was advised to f/u with her Orthopedic asap.  There is nothing available until 9-24-19.  Pls call pt with an appt.

## 2020-03-16 ENCOUNTER — TELEPHONE (OUTPATIENT)
Dept: INTERNAL MEDICINE | Facility: CLINIC | Age: 75
End: 2020-03-16

## 2020-03-16 NOTE — TELEPHONE ENCOUNTER
Spoke to patient   reported runny nose, no c/o sore throat. Instructed okay to take Zyrtec, Flonase and mucinex if a cough begins; Instructed to gargle with salty water for sore throat. Needs to be seen if Fever 100.5 or above. Stated she understood to schedule if she worsens

## 2020-03-16 NOTE — TELEPHONE ENCOUNTER
----- Message from Yanira Issa sent at 3/16/2020  2:39 PM CDT -----  Contact: Shakira [phone]  Needs Advice    Reason for call:        Runny nose since yesterday, a little sore throat, no pain, no fever.    Communication Preference: Shakira [phone]    Additional Information:    Mrs. Rosenberg is requesting a call back to advise

## 2020-03-24 ENCOUNTER — TELEPHONE (OUTPATIENT)
Dept: UROLOGY | Facility: CLINIC | Age: 75
End: 2020-03-24

## 2020-03-26 ENCOUNTER — PATIENT MESSAGE (OUTPATIENT)
Dept: UROLOGY | Facility: CLINIC | Age: 75
End: 2020-03-26

## 2020-04-03 ENCOUNTER — DOCUMENTATION ONLY (OUTPATIENT)
Dept: GASTROENTEROLOGY | Facility: CLINIC | Age: 75
End: 2020-04-03

## 2020-04-03 NOTE — PROGRESS NOTES
Case reviewed for endoscopy appropriateness in regards to the most recent directive from the [facility] regarding outpatient medical and surgical procedures during the COVID-19 pandemic.  This patient's procedure may be postponed until more a appropriate time, to be determined.      Priority: Low

## 2020-05-04 DIAGNOSIS — E55.9 VITAMIN D DEFICIENCY: ICD-10-CM

## 2020-05-04 DIAGNOSIS — Z01.818 PREOP TESTING: ICD-10-CM

## 2020-05-04 DIAGNOSIS — K76.9 LIVER LESION: Primary | ICD-10-CM

## 2020-05-06 ENCOUNTER — LAB VISIT (OUTPATIENT)
Dept: LAB | Facility: HOSPITAL | Age: 75
End: 2020-05-06
Attending: INTERNAL MEDICINE
Payer: MEDICARE

## 2020-05-06 DIAGNOSIS — K76.9 LIVER LESION: ICD-10-CM

## 2020-05-06 DIAGNOSIS — Z01.818 PREOP TESTING: ICD-10-CM

## 2020-05-06 DIAGNOSIS — E78.5 HYPERLIPIDEMIA, UNSPECIFIED HYPERLIPIDEMIA TYPE: ICD-10-CM

## 2020-05-06 LAB
ANION GAP SERPL CALC-SCNC: 9 MMOL/L (ref 8–16)
APTT BLDCRRT: 24.6 SEC (ref 21–32)
BUN SERPL-MCNC: 14 MG/DL (ref 8–23)
CALCIUM SERPL-MCNC: 8.4 MG/DL (ref 8.7–10.5)
CHLORIDE SERPL-SCNC: 100 MMOL/L (ref 95–110)
CHOLEST SERPL-MCNC: 164 MG/DL (ref 120–199)
CHOLEST/HDLC SERPL: 3.3 {RATIO} (ref 2–5)
CO2 SERPL-SCNC: 26 MMOL/L (ref 23–29)
CREAT SERPL-MCNC: 0.9 MG/DL (ref 0.5–1.4)
ERYTHROCYTE [DISTWIDTH] IN BLOOD BY AUTOMATED COUNT: 12.8 % (ref 11.5–14.5)
EST. GFR  (AFRICAN AMERICAN): >60 ML/MIN/1.73 M^2
EST. GFR  (NON AFRICAN AMERICAN): >60 ML/MIN/1.73 M^2
GLUCOSE SERPL-MCNC: 108 MG/DL (ref 70–110)
HCT VFR BLD AUTO: 40.5 % (ref 37–48.5)
HDLC SERPL-MCNC: 50 MG/DL (ref 40–75)
HDLC SERPL: 30.5 % (ref 20–50)
HGB BLD-MCNC: 12.4 G/DL (ref 12–16)
INR PPP: 0.9 (ref 0.8–1.2)
LDLC SERPL CALC-MCNC: 69 MG/DL (ref 63–159)
MCH RBC QN AUTO: 28.4 PG (ref 27–31)
MCHC RBC AUTO-ENTMCNC: 30.6 G/DL (ref 32–36)
MCV RBC AUTO: 93 FL (ref 82–98)
NONHDLC SERPL-MCNC: 114 MG/DL
PLATELET # BLD AUTO: 288 K/UL (ref 150–350)
PMV BLD AUTO: 11 FL (ref 9.2–12.9)
POTASSIUM SERPL-SCNC: 3.8 MMOL/L (ref 3.5–5.1)
PROTHROMBIN TIME: 9.8 SEC (ref 9–12.5)
RBC # BLD AUTO: 4.37 M/UL (ref 4–5.4)
SODIUM SERPL-SCNC: 135 MMOL/L (ref 136–145)
TRIGL SERPL-MCNC: 225 MG/DL (ref 30–150)
WBC # BLD AUTO: 9.02 K/UL (ref 3.9–12.7)

## 2020-05-06 PROCEDURE — 85610 PROTHROMBIN TIME: CPT | Mod: HCNC

## 2020-05-06 PROCEDURE — 80061 LIPID PANEL: CPT | Mod: HCNC

## 2020-05-06 PROCEDURE — 36415 COLL VENOUS BLD VENIPUNCTURE: CPT | Mod: HCNC,PO

## 2020-05-06 PROCEDURE — 85027 COMPLETE CBC AUTOMATED: CPT | Mod: HCNC

## 2020-05-06 PROCEDURE — 80048 BASIC METABOLIC PNL TOTAL CA: CPT | Mod: HCNC

## 2020-05-06 PROCEDURE — 85730 THROMBOPLASTIN TIME PARTIAL: CPT | Mod: HCNC

## 2020-05-10 ENCOUNTER — TELEPHONE (OUTPATIENT)
Dept: INTERNAL MEDICINE | Facility: CLINIC | Age: 75
End: 2020-05-10

## 2020-05-10 NOTE — TELEPHONE ENCOUNTER
LMOR for pt re: needs to be rescheduled. Dr. ozuna will not be in clinic on Wednesday.  Trying  to move her to Monday

## 2020-05-11 ENCOUNTER — OFFICE VISIT (OUTPATIENT)
Dept: INTERNAL MEDICINE | Facility: CLINIC | Age: 75
End: 2020-05-11
Payer: MEDICARE

## 2020-05-11 VITALS
HEIGHT: 64 IN | SYSTOLIC BLOOD PRESSURE: 130 MMHG | RESPIRATION RATE: 16 BRPM | BODY MASS INDEX: 25.56 KG/M2 | TEMPERATURE: 98 F | DIASTOLIC BLOOD PRESSURE: 80 MMHG | OXYGEN SATURATION: 98 % | WEIGHT: 149.69 LBS | HEART RATE: 83 BPM

## 2020-05-11 DIAGNOSIS — I10 ESSENTIAL HYPERTENSION: Primary | ICD-10-CM

## 2020-05-11 DIAGNOSIS — M47.816 LUMBAR FACET ARTHROPATHY: ICD-10-CM

## 2020-05-11 DIAGNOSIS — E78.5 ELEVATED LIPIDS: ICD-10-CM

## 2020-05-11 DIAGNOSIS — K21.9 GASTROESOPHAGEAL REFLUX DISEASE WITHOUT ESOPHAGITIS: ICD-10-CM

## 2020-05-11 PROCEDURE — 1159F MED LIST DOCD IN RCRD: CPT | Mod: HCNC,S$GLB,, | Performed by: INTERNAL MEDICINE

## 2020-05-11 PROCEDURE — 99214 OFFICE O/P EST MOD 30 MIN: CPT | Mod: HCNC,S$GLB,, | Performed by: INTERNAL MEDICINE

## 2020-05-11 PROCEDURE — 1126F AMNT PAIN NOTED NONE PRSNT: CPT | Mod: HCNC,S$GLB,, | Performed by: INTERNAL MEDICINE

## 2020-05-11 PROCEDURE — 1159F PR MEDICATION LIST DOCUMENTED IN MEDICAL RECORD: ICD-10-PCS | Mod: HCNC,S$GLB,, | Performed by: INTERNAL MEDICINE

## 2020-05-11 PROCEDURE — 1101F PR PT FALLS ASSESS DOC 0-1 FALLS W/OUT INJ PAST YR: ICD-10-PCS | Mod: HCNC,CPTII,S$GLB, | Performed by: INTERNAL MEDICINE

## 2020-05-11 PROCEDURE — 1126F PR PAIN SEVERITY QUANTIFIED, NO PAIN PRESENT: ICD-10-PCS | Mod: HCNC,S$GLB,, | Performed by: INTERNAL MEDICINE

## 2020-05-11 PROCEDURE — 3075F PR MOST RECENT SYSTOLIC BLOOD PRESS GE 130-139MM HG: ICD-10-PCS | Mod: HCNC,CPTII,S$GLB, | Performed by: INTERNAL MEDICINE

## 2020-05-11 PROCEDURE — 3075F SYST BP GE 130 - 139MM HG: CPT | Mod: HCNC,CPTII,S$GLB, | Performed by: INTERNAL MEDICINE

## 2020-05-11 PROCEDURE — 1101F PT FALLS ASSESS-DOCD LE1/YR: CPT | Mod: HCNC,CPTII,S$GLB, | Performed by: INTERNAL MEDICINE

## 2020-05-11 PROCEDURE — 99999 PR PBB SHADOW E&M-EST. PATIENT-LVL III: ICD-10-PCS | Mod: PBBFAC,HCNC,, | Performed by: INTERNAL MEDICINE

## 2020-05-11 PROCEDURE — 99999 PR PBB SHADOW E&M-EST. PATIENT-LVL III: CPT | Mod: PBBFAC,HCNC,, | Performed by: INTERNAL MEDICINE

## 2020-05-11 PROCEDURE — 3079F DIAST BP 80-89 MM HG: CPT | Mod: HCNC,CPTII,S$GLB, | Performed by: INTERNAL MEDICINE

## 2020-05-11 PROCEDURE — 99214 PR OFFICE/OUTPT VISIT, EST, LEVL IV, 30-39 MIN: ICD-10-PCS | Mod: HCNC,S$GLB,, | Performed by: INTERNAL MEDICINE

## 2020-05-11 PROCEDURE — 3079F PR MOST RECENT DIASTOLIC BLOOD PRESSURE 80-89 MM HG: ICD-10-PCS | Mod: HCNC,CPTII,S$GLB, | Performed by: INTERNAL MEDICINE

## 2020-05-11 RX ORDER — OMEGA-3-ACID ETHYL ESTERS 1 G/1
2 CAPSULE, LIQUID FILLED ORAL 2 TIMES DAILY
Qty: 360 CAPSULE | Refills: 3 | Status: SHIPPED | OUTPATIENT
Start: 2020-05-11 | End: 2021-03-04

## 2020-05-11 NOTE — PROGRESS NOTES
Subjective:       Patient ID: Shakira Pearl is a 74 y.o. female.    Chief Complaint: Follow-up    HPI   Pt with HTN, GERD, Lumbar spondylosis is here for 6 month f/u. No acute complaints today.   Review of Systems   Constitutional: Negative for activity change, appetite change, chills, diaphoresis, fatigue, fever and unexpected weight change.   HENT: Negative for postnasal drip, rhinorrhea, sinus pressure, sneezing, sore throat, trouble swallowing and voice change.    Respiratory: Negative for cough, shortness of breath and wheezing.    Cardiovascular: Negative for chest pain, palpitations and leg swelling.   Gastrointestinal: Negative for abdominal pain, blood in stool, constipation, diarrhea, nausea and vomiting.   Genitourinary: Negative for dysuria.   Musculoskeletal: Positive for back pain. Negative for arthralgias and myalgias.   Skin: Negative for rash and wound.   Allergic/Immunologic: Negative for environmental allergies and food allergies.   Hematological: Negative for adenopathy. Does not bruise/bleed easily.       Objective:      Physical Exam   Constitutional: She is oriented to person, place, and time. She appears well-developed and well-nourished. No distress.   HENT:   Head: Normocephalic and atraumatic.   Eyes: Pupils are equal, round, and reactive to light. Conjunctivae and EOM are normal. Right eye exhibits no discharge. Left eye exhibits no discharge. No scleral icterus.   Neck: Neck supple. No JVD present.   Cardiovascular: Normal rate, regular rhythm, normal heart sounds and intact distal pulses.   Pulmonary/Chest: Effort normal and breath sounds normal. No respiratory distress. She has no wheezes. She has no rales.   Musculoskeletal: She exhibits no edema.   Lymphadenopathy:     She has no cervical adenopathy.   Neurological: She is alert and oriented to person, place, and time.   Skin: Skin is warm and dry. No rash noted. She is not diaphoretic. No pallor.       Assessment:       1. Essential  hypertension    2. Gastroesophageal reflux disease without esophagitis    3. Lumbar facet arthropathy    4. Elevated lipids        Plan:    1. HTN- stable on Losartan 50 mg daily   2. GERD- stable on Protonix 40 mg daily   3. Lumbar spondylosis- stable    4. HLD- Rx Lovaza BID       Lipids in 3 months     5. F/u in 6 months for annual

## 2020-05-13 ENCOUNTER — TELEPHONE (OUTPATIENT)
Dept: INTERNAL MEDICINE | Facility: CLINIC | Age: 75
End: 2020-05-13

## 2020-05-13 NOTE — TELEPHONE ENCOUNTER
"----- Message from Aissatou Singh RN sent at 5/13/2020 11:27 AM CDT -----  Regarding: FW: "Clearance" request  Patient was seen by  on 5/11/20, for a F/U visit. Requested a "Clearance" from PCP, at time of visit. No "Clearance" statement was found  in recent visit note. Requesting "Clearance", prior to patient surgery date of 5/20/20, with Dr. Duran. Await your reply. Thank you. Sincerely, Aissatou Singh RN  [facility] ext. [phone]  ----- Message -----  From: Miriam Rivera LPN  Sent: 5/5/2020   7:27 AM CDT  To: Aissatou Singh RN  Subject: FW: "Clearance" request                          I marked her appt as a pre-op  ----- Message -----  From: Aissatou Singh RN  Sent: 5/4/2020   5:07 PM CDT  To: Aissatou Singh RN, #  Subject: "Clearance" request                              Patient is having an  Insertion, Neurostimulator, Temporary, Sacral on 5/20/20, with Dr. ALTAGRACIA Duran. Patient has an appointment scheduled with you on 5/13/20. Requesting a "Clearance" from you, prior to the patients' surgery date. Await your reply. Thank you. Sincerely, Aissatou Singh RN  [facility]   ext. [phone]      "

## 2020-05-13 NOTE — TELEPHONE ENCOUNTER
Ms. Pearl was seen on 5-11-20 for a 6 month follow up appt.\  No pre-op was preformed, no CXR no EKG, by Ace, but Dr. Leopold did an EKG 5-4-20    She may have had a pre-op with someone else??    Dr. Bello is out for the next 3 weeks, she needs to schedule with a staff dr. muro

## 2020-06-05 ENCOUNTER — PATIENT OUTREACH (OUTPATIENT)
Dept: ADMINISTRATIVE | Facility: OTHER | Age: 75
End: 2020-06-05

## 2020-06-06 NOTE — PROGRESS NOTES
Chart reviewed.   Immunizations: Triggered Imm Registry     Orders placed: n/a  Upcoming appts to satisfy [facility] topics: n/a

## 2020-06-10 DIAGNOSIS — Z01.818 PRE-OP TESTING: ICD-10-CM

## 2020-07-13 ENCOUNTER — TELEPHONE (OUTPATIENT)
Dept: PEDIATRICS | Facility: CLINIC | Age: 75
End: 2020-07-13

## 2020-07-13 NOTE — TELEPHONE ENCOUNTER
Message incorrectly sent to Edelmira White's nurse pool, Pediatrics, Left a message at the number that was left to call us back.

## 2020-07-13 NOTE — TELEPHONE ENCOUNTER
----- Message from Rosa Peres sent at 7/13/2020 12:56 PM CDT -----  Regarding: Rash FU  Contact: PT Mother  PT mother called in and has some questions about the child's rash that she says isn't getting any better.     CBN#

## 2020-07-22 ENCOUNTER — TELEPHONE (OUTPATIENT)
Dept: GASTROENTEROLOGY | Facility: CLINIC | Age: 75
End: 2020-07-22

## 2020-07-22 NOTE — TELEPHONE ENCOUNTER
Called and spoke to pt.  Pt states she spoke with the financial dept which states she is being charged $200 for her scopes.  She says if she has to pay, she doesn't want to get the scopes being she is currently not experiencing any GI symptoms.  Pt says she spoke with her insurance and the are needing codes to be put in as screening and not diagnostic.  Pt asking for orders to be changed before her scheduled EGD and Colon on 07/29/2020.  Pt appreciated the call.

## 2020-07-22 NOTE — TELEPHONE ENCOUNTER
----- Message from Mone Mendoza MA sent at 7/22/2020  1:41 PM CDT -----  This is Dr Black pt  ----- Message -----  From: Candy Lo  Sent: 7/22/2020  12:05 PM CDT  To: Mason Green Staff    Pt #[phone]  Pt wants to speak with you regarding coding for egd

## 2020-07-23 NOTE — TELEPHONE ENCOUNTER
Called and spoke to pt.  Explained to pt Dr. Black's message, pt verbalized understanding.  Provided number for endo schedulers to pt, pt appreciated the call.

## 2020-07-31 ENCOUNTER — TELEPHONE (OUTPATIENT)
Dept: PULMONOLOGY | Facility: CLINIC | Age: 75
End: 2020-07-31

## 2020-07-31 DIAGNOSIS — R06.09 DOE (DYSPNEA ON EXERTION): Primary | ICD-10-CM

## 2020-07-31 NOTE — TELEPHONE ENCOUNTER
I called Mrs Pearl about a appointment with Dr Razo for SOB. She saw Dr Razo 7-8 years ago and wants to come again. I told her about the Covid swab test prior to Pulmonary function tests and she was agreeable to this. She will have the swab test on Monday 8-3-20 and see Dr Razo on 8-5-20 with a chest xray and Pfts. Ruth Ann Cason LPN

## 2020-08-03 ENCOUNTER — LAB VISIT (OUTPATIENT)
Dept: SURGERY | Facility: CLINIC | Age: 75
End: 2020-08-03
Payer: MEDICARE

## 2020-08-03 DIAGNOSIS — R06.09 DOE (DYSPNEA ON EXERTION): ICD-10-CM

## 2020-08-03 LAB — SARS-COV-2 RNA RESP QL NAA+PROBE: NOT DETECTED

## 2020-08-03 PROCEDURE — U0003 INFECTIOUS AGENT DETECTION BY NUCLEIC ACID (DNA OR RNA); SEVERE ACUTE RESPIRATORY SYNDROME CORONAVIRUS 2 (SARS-COV-2) (CORONAVIRUS DISEASE [COVID-19]), AMPLIFIED PROBE TECHNIQUE, MAKING USE OF HIGH THROUGHPUT TECHNOLOGIES AS DESCRIBED BY CMS-2020-01-R: HCPCS | Mod: HCNC

## 2020-08-04 ENCOUNTER — HOSPITAL ENCOUNTER (OUTPATIENT)
Dept: PULMONOLOGY | Facility: CLINIC | Age: 75
Discharge: HOME | End: 2020-08-04
Payer: MEDICARE

## 2020-08-04 ENCOUNTER — HOSPITAL ENCOUNTER (OUTPATIENT)
Dept: CARDIOLOGY | Facility: CLINIC | Age: 75
Discharge: HOME | End: 2020-08-04
Payer: MEDICARE

## 2020-08-04 ENCOUNTER — OFFICE VISIT (OUTPATIENT)
Dept: PULMONOLOGY | Facility: CLINIC | Age: 75
End: 2020-08-04
Payer: MEDICARE

## 2020-08-04 VITALS — HEIGHT: 63 IN | BODY MASS INDEX: 27.29 KG/M2 | WEIGHT: 154 LBS | OXYGEN SATURATION: 98 % | HEART RATE: 90 BPM

## 2020-08-04 DIAGNOSIS — R00.0 TACHYCARDIA: ICD-10-CM

## 2020-08-04 DIAGNOSIS — R06.09 DOE (DYSPNEA ON EXERTION): ICD-10-CM

## 2020-08-04 DIAGNOSIS — R06.09 DOE (DYSPNEA ON EXERTION): Primary | ICD-10-CM

## 2020-08-04 DIAGNOSIS — R00.0 TACHYCARDIA: Primary | ICD-10-CM

## 2020-08-04 LAB
DLCO ADJ PRE: 13.94 ML/(MIN*MMHG) (ref 13.9–25.37)
DLCO SINGLE BREATH LLN: 13.9
DLCO SINGLE BREATH PRE REF: 71 %
DLCO SINGLE BREATH REF: 19.63
DLCOC SBVA LLN: 2.64
DLCOC SBVA PRE REF: 90 %
DLCOC SBVA REF: 4.12
DLCOC SINGLE BREATH LLN: 13.9
DLCOC SINGLE BREATH PRE REF: 71 %
DLCOC SINGLE BREATH REF: 19.63
DLCOCSBVAULN: 5.59
DLCOCSINGLEBREATHULN: 25.37
DLCOSINGLEBREATHULN: 25.37
DLCOVA LLN: 2.64
DLCOVA PRE REF: 90 %
DLCOVA PRE: 3.7 ML/(MIN*MMHG*L) (ref 2.64–5.59)
DLCOVA REF: 4.12
DLCOVAULN: 5.59
DLVAADJ PRE: 3.7 ML/(MIN*MMHG*L) (ref 2.64–5.59)
FEF 25 75 LLN: 0.69
FEF 25 75 PRE REF: 76.6 %
FEF 25 75 REF: 1.6
FEV05 LLN: 0.7
FEV05 REF: 1.56
FEV1 FVC LLN: 63
FEV1 FVC PRE REF: 98.9 %
FEV1 FVC REF: 77
FEV1 LLN: 1.38
FEV1 PRE REF: 75.2 %
FEV1 REF: 1.94
FVC LLN: 1.8
FVC PRE REF: 75.2 %
FVC REF: 2.53
IVC PRE: 1.84 L (ref 1.8–3.27)
IVC SINGLE BREATH LLN: 1.8
IVC SINGLE BREATH PRE REF: 72.5 %
IVC SINGLE BREATH REF: 2.53
IVCSINGLEBREATHULN: 3.27
PEF LLN: 3.26
PEF PRE REF: 88.2 %
PEF REF: 4.92
PHYSICIAN COMMENT: ABNORMAL
PRE DLCO: 13.94 ML/(MIN*MMHG) (ref 13.9–25.37)
PRE FEF 25 75: 1.23 L/S (ref 0.69–2.51)
PRE FET 100: 7.44 SEC
PRE FEV05 REF: 78.4 %
PRE FEV1 FVC: 76.56 % (ref 63.17–91.71)
PRE FEV1: 1.46 L (ref 1.38–2.51)
PRE FEV5: 1.22 L (ref 0.7–2.41)
PRE FVC: 1.91 L (ref 1.8–3.27)
PRE PEF: 4.34 L/S (ref 3.26–6.57)
VA PRE: 3.77 L (ref 4.62–4.62)
VA SINGLE BREATH LLN: 4.62
VA SINGLE BREATH PRE REF: 81.5 %
VA SINGLE BREATH REF: 4.62
VASINGLEBREATHULN: 4.62

## 2020-08-04 PROCEDURE — 94729 DIFFUSING CAPACITY: CPT | Mod: HCNC,S$GLB,, | Performed by: INTERNAL MEDICINE

## 2020-08-04 PROCEDURE — 93010 EKG 12-LEAD: ICD-10-PCS | Mod: HCNC,S$GLB,, | Performed by: INTERNAL MEDICINE

## 2020-08-04 PROCEDURE — 1125F PR PAIN SEVERITY QUANTIFIED, PAIN PRESENT: ICD-10-PCS | Mod: HCNC,S$GLB,, | Performed by: INTERNAL MEDICINE

## 2020-08-04 PROCEDURE — 93010 ELECTROCARDIOGRAM REPORT: CPT | Mod: HCNC,S$GLB,, | Performed by: INTERNAL MEDICINE

## 2020-08-04 PROCEDURE — 99499 UNLISTED E&M SERVICE: CPT | Mod: HCNC,S$GLB,, | Performed by: INTERNAL MEDICINE

## 2020-08-04 PROCEDURE — 99204 OFFICE O/P NEW MOD 45 MIN: CPT | Mod: HCNC,S$GLB,, | Performed by: INTERNAL MEDICINE

## 2020-08-04 PROCEDURE — 99999 PR PBB SHADOW E&M-EST. PATIENT-LVL III: CPT | Mod: PBBFAC,HCNC,, | Performed by: INTERNAL MEDICINE

## 2020-08-04 PROCEDURE — 94729 PR C02/MEMBANE DIFFUSE CAPACITY: ICD-10-PCS | Mod: HCNC,S$GLB,, | Performed by: INTERNAL MEDICINE

## 2020-08-04 PROCEDURE — 99999 PR PBB SHADOW E&M-EST. PATIENT-LVL III: ICD-10-PCS | Mod: PBBFAC,HCNC,, | Performed by: INTERNAL MEDICINE

## 2020-08-04 PROCEDURE — 94010 BREATHING CAPACITY TEST: CPT | Mod: HCNC,S$GLB,, | Performed by: INTERNAL MEDICINE

## 2020-08-04 PROCEDURE — 93005 ELECTROCARDIOGRAM TRACING: CPT | Mod: HCNC,S$GLB,, | Performed by: INTERNAL MEDICINE

## 2020-08-04 PROCEDURE — 93005 EKG 12-LEAD: ICD-10-PCS | Mod: HCNC,S$GLB,, | Performed by: INTERNAL MEDICINE

## 2020-08-04 PROCEDURE — 94010 BREATHING CAPACITY TEST: ICD-10-PCS | Mod: HCNC,S$GLB,, | Performed by: INTERNAL MEDICINE

## 2020-08-04 PROCEDURE — 99204 PR OFFICE/OUTPT VISIT, NEW, LEVL IV, 45-59 MIN: ICD-10-PCS | Mod: HCNC,S$GLB,, | Performed by: INTERNAL MEDICINE

## 2020-08-04 PROCEDURE — 1101F PR PT FALLS ASSESS DOC 0-1 FALLS W/OUT INJ PAST YR: ICD-10-PCS | Mod: HCNC,CPTII,S$GLB, | Performed by: INTERNAL MEDICINE

## 2020-08-04 PROCEDURE — 1159F PR MEDICATION LIST DOCUMENTED IN MEDICAL RECORD: ICD-10-PCS | Mod: HCNC,S$GLB,, | Performed by: INTERNAL MEDICINE

## 2020-08-04 PROCEDURE — 1101F PT FALLS ASSESS-DOCD LE1/YR: CPT | Mod: HCNC,CPTII,S$GLB, | Performed by: INTERNAL MEDICINE

## 2020-08-04 PROCEDURE — 1125F AMNT PAIN NOTED PAIN PRSNT: CPT | Mod: HCNC,S$GLB,, | Performed by: INTERNAL MEDICINE

## 2020-08-04 PROCEDURE — 99499 RISK ADDL DX/OHS AUDIT: ICD-10-PCS | Mod: HCNC,S$GLB,, | Performed by: INTERNAL MEDICINE

## 2020-08-04 PROCEDURE — 1159F MED LIST DOCD IN RCRD: CPT | Mod: HCNC,S$GLB,, | Performed by: INTERNAL MEDICINE

## 2020-08-04 NOTE — PROGRESS NOTES
"Subjective:      Patient ID: Shakira Pearl is a 75 y.o. female.    Chief Complaint: No chief complaint on file.    HPI  74 yo female is referred by Dr. Sam for assessment of exertional dyspnea. She is a non smoker, is under extreme stress for the past month, She is sitting with a friend who is a patient in ICU, prior to that was hospitalized at [facility]."He  Won't let anyone else be with him" Very demanding.  She states that when she walks from the parking area near the ER to the inside elevator she is gasping for air and has to take deep breaths.  Review of Systems   Constitutional: Negative.    HENT: Negative.    Eyes: Negative.    Respiratory: Positive for dyspnea on extertion.    Cardiovascular: Negative.    Genitourinary: Negative.    Musculoskeletal: Positive for back pain.        DLD   Skin: Negative.    Gastrointestinal: Negative.         GERD's and dyspepsia    Internal hemorrhoids   Neurological: Negative.    Psychiatric/Behavioral: The patient is nervous/anxious.      Objective:     Physical Exam  Vitals signs and nursing note reviewed.   Constitutional:       General: She is not in acute distress.     Appearance: She is well-developed.   HENT:      Head: Normocephalic and atraumatic.      Right Ear: External ear normal.      Left Ear: External ear normal.      Nose: Nose normal.   Eyes:      Conjunctiva/sclera: Conjunctivae normal.      Pupils: Pupils are equal, round, and reactive to light.   Neck:      Musculoskeletal: Normal range of motion and neck supple.      Thyroid: No thyromegaly.      Vascular: No JVD.   Cardiovascular:      Rate and Rhythm: Normal rate and regular rhythm.      Heart sounds: Normal heart sounds. No murmur. No gallop.       Comments: EKG: normal  Pulmonary:      Effort: No respiratory distress.      Breath sounds: Normal breath sounds. No stridor. No wheezing or rales.      Comments: Clear to A& P     Recent chest x-ray is normal    Resting Sa02: 97% and after " walking the length of the cohen and back it was 96%    PFT's minimally reduced FVC  Normal flow rates.  Essentially normal.   Chest:      Chest wall: No tenderness.   Abdominal:      General: Bowel sounds are normal. There is no distension.      Palpations: Abdomen is soft. There is no mass.      Tenderness: There is no abdominal tenderness. There is no guarding or rebound.   Musculoskeletal: Normal range of motion.      Comments: Very tense right trapezius muscle consistent with fibromyositis   Lymphadenopathy:      Cervical: No cervical adenopathy.   Skin:     General: Skin is warm and dry.      Findings: No rash.   Neurological:      Mental Status: She is alert and oriented to person, place, and time.      Cranial Nerves: No cranial nerve deficit.      Deep Tendon Reflexes: Reflexes are normal and symmetric. Reflexes normal.   Psychiatric:         Behavior: Behavior normal.         Thought Content: Thought content normal.         Judgment: Judgment normal.         Assessment:     1. SHETTY (dyspnea on exertion)      Outpatient Encounter Medications as of 8/4/2020   Medication Sig Dispense Refill    DULoxetine (CYMBALTA) 60 MG capsule Take 1 capsule (60 mg total) by mouth once daily. 30 capsule 11    estradiol valerate (DELESTROGEN) 40 mg/mL injection Inject 0.5 mLs (20 mg total) into the muscle every 28 days. Inject into the muscle. 5 mL 12    losartan (COZAAR) 50 MG tablet Take 1 tablet (50 mg total) by mouth once daily. 90 tablet 3    omega-3 acid ethyl esters (LOVAZA) 1 gram capsule Take 2 capsules (2 g total) by mouth 2 (two) times daily. 360 capsule 3    pantoprazole (PROTONIX) 40 MG tablet Take 1 tablet (40 mg total) by mouth before breakfast. Take one pill every morning 45 minutes before breakfast in the morning. 90 tablet 3     No facility-administered encounter medications on file as of 8/4/2020.      No orders of the defined types were placed in this encounter.    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  Told alleve and hot shower on the affected muscle bundle.  Problem List Items Addressed This Visit     SHETTY (dyspnea on exertion) - Primary

## 2020-08-05 ENCOUNTER — OFFICE VISIT (OUTPATIENT)
Dept: SPORTS MEDICINE | Facility: CLINIC | Age: 75
End: 2020-08-05
Payer: MEDICARE

## 2020-08-05 ENCOUNTER — HOSPITAL ENCOUNTER (OUTPATIENT)
Dept: RADIOLOGY | Facility: HOSPITAL | Age: 75
Discharge: HOME | End: 2020-08-05
Attending: ORTHOPAEDIC SURGERY
Payer: MEDICARE

## 2020-08-05 VITALS
SYSTOLIC BLOOD PRESSURE: 137 MMHG | HEART RATE: 93 BPM | BODY MASS INDEX: 27.29 KG/M2 | WEIGHT: 154 LBS | HEIGHT: 63 IN | DIASTOLIC BLOOD PRESSURE: 86 MMHG

## 2020-08-05 DIAGNOSIS — M25.552 LEFT HIP PAIN: Primary | ICD-10-CM

## 2020-08-05 DIAGNOSIS — M25.552 LEFT HIP PAIN: ICD-10-CM

## 2020-08-05 PROCEDURE — 1159F PR MEDICATION LIST DOCUMENTED IN MEDICAL RECORD: ICD-10-PCS | Mod: HCNC,S$GLB,, | Performed by: ORTHOPAEDIC SURGERY

## 2020-08-05 PROCEDURE — 99999 PR PBB SHADOW E&M-EST. PATIENT-LVL III: CPT | Mod: PBBFAC,HCNC,, | Performed by: ORTHOPAEDIC SURGERY

## 2020-08-05 PROCEDURE — 99999 PR PBB SHADOW E&M-EST. PATIENT-LVL III: ICD-10-PCS | Mod: PBBFAC,HCNC,, | Performed by: ORTHOPAEDIC SURGERY

## 2020-08-05 PROCEDURE — 3079F DIAST BP 80-89 MM HG: CPT | Mod: HCNC,CPTII,S$GLB, | Performed by: ORTHOPAEDIC SURGERY

## 2020-08-05 PROCEDURE — 1125F AMNT PAIN NOTED PAIN PRSNT: CPT | Mod: HCNC,S$GLB,, | Performed by: ORTHOPAEDIC SURGERY

## 2020-08-05 PROCEDURE — 73502 XR HIP 2 VIEW LEFT: ICD-10-PCS | Mod: 26,HCNC,LT, | Performed by: RADIOLOGY

## 2020-08-05 PROCEDURE — 73502 X-RAY EXAM HIP UNI 2-3 VIEWS: CPT | Mod: TC,HCNC,LT

## 2020-08-05 PROCEDURE — 1159F MED LIST DOCD IN RCRD: CPT | Mod: HCNC,S$GLB,, | Performed by: ORTHOPAEDIC SURGERY

## 2020-08-05 PROCEDURE — 3075F SYST BP GE 130 - 139MM HG: CPT | Mod: HCNC,CPTII,S$GLB, | Performed by: ORTHOPAEDIC SURGERY

## 2020-08-05 PROCEDURE — 1101F PT FALLS ASSESS-DOCD LE1/YR: CPT | Mod: HCNC,CPTII,S$GLB, | Performed by: ORTHOPAEDIC SURGERY

## 2020-08-05 PROCEDURE — 3075F PR MOST RECENT SYSTOLIC BLOOD PRESS GE 130-139MM HG: ICD-10-PCS | Mod: HCNC,CPTII,S$GLB, | Performed by: ORTHOPAEDIC SURGERY

## 2020-08-05 PROCEDURE — 1101F PR PT FALLS ASSESS DOC 0-1 FALLS W/OUT INJ PAST YR: ICD-10-PCS | Mod: HCNC,CPTII,S$GLB, | Performed by: ORTHOPAEDIC SURGERY

## 2020-08-05 PROCEDURE — 3079F PR MOST RECENT DIASTOLIC BLOOD PRESSURE 80-89 MM HG: ICD-10-PCS | Mod: HCNC,CPTII,S$GLB, | Performed by: ORTHOPAEDIC SURGERY

## 2020-08-05 PROCEDURE — 99214 OFFICE O/P EST MOD 30 MIN: CPT | Mod: HCNC,S$GLB,, | Performed by: ORTHOPAEDIC SURGERY

## 2020-08-05 PROCEDURE — 99214 PR OFFICE/OUTPT VISIT, EST, LEVL IV, 30-39 MIN: ICD-10-PCS | Mod: HCNC,S$GLB,, | Performed by: ORTHOPAEDIC SURGERY

## 2020-08-05 PROCEDURE — 73502 X-RAY EXAM HIP UNI 2-3 VIEWS: CPT | Mod: 26,HCNC,LT, | Performed by: RADIOLOGY

## 2020-08-05 PROCEDURE — 1125F PR PAIN SEVERITY QUANTIFIED, PAIN PRESENT: ICD-10-PCS | Mod: HCNC,S$GLB,, | Performed by: ORTHOPAEDIC SURGERY

## 2020-08-05 NOTE — PROGRESS NOTES
CC: left hip pain    Pt previously had let knee arthroscopy done by dr. Patiño no issues.     HPI:     Shakira Pearl is a pleasant 75 y.o. patient who reports to clinic with left hip pain x 3 months. No trauma, no mech sxs/instabilty. Notes some pain when lying in bed.  Patient notes a history of SI joint pain has done well with a SI joint injection in the past has tried Aleve this time without minimal relief denies a paresthesia as weakness or additional acute trauma or injury and notes no issues walking around typically.     Affecting ADLs and exercising        Review of Systems   Constitution: Negative. Negative for chills, fever and night sweats.   HENT: Negative for congestion and headaches.    Eyes: Negative for blurred vision, left vision loss and right vision loss.   Cardiovascular: Negative for chest pain and syncope.   Respiratory: Negative for cough and shortness of breath.    Endocrine: Negative for polydipsia, polyphagia and polyuria.   Hematologic/Lymphatic: Negative for bleeding problem. Does not bruise/bleed easily.   Skin: Negative for dry skin, itching and rash.   Musculoskeletal: Negative for falls and muscle weakness.   Gastrointestinal: Negative for abdominal pain and bowel incontinence.   Genitourinary: Negative for bladder incontinence and nocturia.   Neurological: Negative for disturbances in coordination, loss of balance and seizures.   Psychiatric/Behavioral: Negative for depression. The patient does not have insomnia.    Allergic/Immunologic: Negative for hives and persistent infections.     PAST MEDICAL HISTORY:   Past Medical History:   Diagnosis Date    Allergy sinus    Arthritis back    Colon polyps     Degenerative disc disease back    Diverticulosis of colon     Dyspepsia     GERD (gastroesophageal reflux disease)     Heartburn     Hemorrhoids, internal     Hiatal hernia     Neuromuscular disorder     Vitamin D deficiency      PAST SURGICAL HISTORY:   Past Surgical History:    Procedure Laterality Date    APPENDECTOMY  age 19    CHOLECYSTECTOMY      age of 25    CHONDROPLASTY OF KNEE Left 10/3/2019    Procedure: CHONDROPLASTY, KNEE;  Surgeon: Kaylen Patiño MD;  Location: [facility] OR;  Service: Orthopedics;  Laterality: Left;    HYSTERECTOMY  38    INJECTION OF JOINT Bilateral 2/18/2019    Procedure: INJECTION, JOINT BILATERAL SI;  Surgeon: Mert Coates MD;  Location: [facility];  Service: Pain Management;  Laterality: Bilateral;  BILATERAL SI JOINT INJECTION    KNEE ARTHROSCOPY W/ MENISCECTOMY Left 10/3/2019    Procedure: ARTHROSCOPY, KNEE, WITH MENISCECTOMY;  Surgeon: Kaylen Patiño MD;  Location: [facility] OR;  Service: Orthopedics;  Laterality: Left;    SYNOVECTOMY OF KNEE Left 10/3/2019    Procedure: SYNOVECTOMY, KNEE;  Surgeon: Kaylen Patiño MD;  Location: [facility];  Service: Orthopedics;  Laterality: Left;     FAMILY HISTORY:   Family History   Problem Relation Age of Onset    Heart disease Mother 67        heart attack    Cancer Father 65        abdominal    Stomach cancer Father     No Known Problems Sister     No Known Problems Brother     No Known Problems Son     No Known Problems Maternal Grandmother     No Known Problems Maternal Grandfather     No Known Problems Paternal Grandmother     No Known Problems Paternal Grandfather     No Known Problems Maternal Aunt     No Known Problems Maternal Uncle     No Known Problems Paternal Aunt     No Known Problems Paternal Uncle     Celiac disease Neg Hx     Colon cancer Neg Hx     Crohn's disease Neg Hx     Esophageal cancer Neg Hx     Inflammatory bowel disease Neg Hx     Liver cancer Neg Hx     Rectal cancer Neg Hx     Ulcerative colitis Neg Hx     Amblyopia Neg Hx     Blindness Neg Hx     Cataracts Neg Hx     Diabetes Neg Hx     Glaucoma Neg Hx     Hypertension Neg Hx     Macular degeneration Neg Hx     Retinal detachment Neg Hx     Strabismus Neg Hx     Stroke Neg Hx     Thyroid disease Neg Hx      Anesthesia problems Neg Hx      SOCIAL HISTORY:   Social History     Socioeconomic History    Marital status:      Spouse name: Not on file    Number of children: 1    Years of education: Not on file    Highest education level: Not on file   Occupational History    Occupation: retired   Social Needs    Financial resource strain: Not on file    Food insecurity     Worry: Not on file     Inability: Not on file    Transportation needs     Medical: Not on file     Non-medical: Not on file   Tobacco Use    Smoking status: Never Smoker    Smokeless tobacco: Never Used   Substance and Sexual Activity    Alcohol use: No    Drug use: No    Sexual activity: Not Currently   Lifestyle    Physical activity     Days per week: Not on file     Minutes per session: Not on file    Stress: Not on file   Relationships    Social connections     Talks on phone: Not on file     Gets together: Not on file     Attends Restorationism service: Not on file     Active member of club or organization: Not on file     Attends meetings of clubs or organizations: Not on file     Relationship status: Not on file   Other Topics Concern    Not on file   Social History Narrative    Not on file       MEDICATIONS:   Current Outpatient Medications:     DULoxetine (CYMBALTA) 60 MG capsule, Take 1 capsule (60 mg total) by mouth once daily., Disp: 30 capsule, Rfl: 11    estradiol valerate (DELESTROGEN) 40 mg/mL injection, Inject 0.5 mLs (20 mg total) into the muscle every 28 days. Inject into the muscle., Disp: 5 mL, Rfl: 12    losartan (COZAAR) 50 MG tablet, Take 1 tablet (50 mg total) by mouth once daily., Disp: 90 tablet, Rfl: 3    omega-3 acid ethyl esters (LOVAZA) 1 gram capsule, Take 2 capsules (2 g total) by mouth 2 (two) times daily., Disp: 360 capsule, Rfl: 3    pantoprazole (PROTONIX) 40 MG tablet, Take 1 tablet (40 mg total) by mouth before breakfast. Take one pill every morning 45 minutes before breakfast in the morning.,  "Disp: 90 tablet, Rfl: 3  ALLERGIES:   Review of patient's allergies indicates:   Allergen Reactions    Demerol [meperidine] Nausea And Vomiting     Other reaction(s): Nausea    Papaya      Illness vomiting    Sulfa (sulfonamide antibiotics) Rash    Sulfur Rash       VITAL SIGNS: /86   Pulse 93   Ht 5' 3" (1.6 m)   Wt 69.9 kg (154 lb)   BMI 27.28 kg/m²        PHYSICAL EXAM /  HIP  PHYSICAL EXAMINATION  General:  The patient is alert and oriented x 3.  Mood is pleasant.  Observation of ears, eyes and nose reveal no gross abnormalities.  HEENT: NCAT, sclera nonicteric  Lungs: Respirations are equal and unlabored..    left HIP EXAMINATION     OBSERVATION / INSPECTION  Gait:   Nonantalgic   Alignment:  Neutral   Scars:   None   Muscle atrophy: None   Effusion:  None   Warmth:  None   Discoloration:   None   Leg lengths:   Equal   Pelvis:   Level     TENDERNESS / CREPITUS (T/C):      T / C  Trochanteric bursa   - / -  Piriformis    - / -  SI joint    + on the left  / -  Psoas tendon   - / -  Rectus insertion  - / -  Adductor insertion  - / -  Pubic symphysis  - / -    ROM: (* = pain)    Flexion:    120 degrees  External rotation: 40 degrees  Internal rotation with axial load: 30 degrees  Internal rotation without axial load: 40 degrees  Abduction:  45 degrees  Adduction:   20 degrees    SPECIAL TESTS:  Pain w/ forced internal rotation (FADIR): -   Pain w/ forced external rotation (ELI): Moderate on the left.   Circumduction test:    -  Stinchfield test:    Negative   Log roll:      Negative   Snapping hip (internal):   Negative   Sit-up pain:     Negative   Resisted sit-up pain:    Negative   Resisted sit-up with adductor contraction pain:  Negative   Step-down test:    +  Trendelenburg test:    Negative  Bridge test     +     EXTREMITY NEURO-VASCULAR EXAMINATION:   Sensation:  Grossly intact to light touch all dermatomal regions.   Motor Function:  Fully intact motor function at hip, knee, foot and " ankle    DTRs;  quadriceps and  achilles 2+.  No clonus and downgoing Babinski.    Vascular status:  DP and PT pulses 2+, brisk capillary refill, symmetric.    Skin:  intact, compartments soft.    OTHER FINDINGS:      XRAYS:    Two views of the hip were reviewed today showing mild-to-moderate DJD specifically in the inferior medial aspect of the hip joint otherwise benign similar bilaterally no fractures  reveiwed by  today.     ASSESSMENT:    1. Left SI joint pain   2. left hip abd/core weakness    PLAN:  1. PT for left hip abd/core strengthing/ si joint oa   2. NSAIDS prn  3. Injection with  - left SI joint.   3. All questions were answered, pt will contact us for questions or concerns in the interim.

## 2020-08-06 ENCOUNTER — TELEPHONE (OUTPATIENT)
Dept: PAIN MEDICINE | Facility: CLINIC | Age: 75
End: 2020-08-06

## 2020-08-06 NOTE — TELEPHONE ENCOUNTER
----- Message from Katelyn Gregory sent at 8/6/2020 10:26 AM CDT -----  Name of Who is Calling: CHELSY GONZALEZ [[id number]]    What is the request in detail: Would like to speak with staff to schedule epidural injection. Please contact to further discuss and advise      Can the clinic reply by [facility]: no    What Number to Call Back if not in [facility]: [phone]

## 2020-08-07 ENCOUNTER — TELEPHONE (OUTPATIENT)
Dept: PAIN MEDICINE | Facility: CLINIC | Age: 75
End: 2020-08-07

## 2020-08-07 NOTE — TELEPHONE ENCOUNTER
Staff left a voicemail for patient.    Patients needs to schedule an In Person, Audio, or Virtual Visit with Dr. Mert Coates or Katelyn Ponce to discuss possible epidural injection(patient last office visit was December 2019)    PLEASE SCHEDULE PATIENT WITH ONE OF THE FOLLOWING PROVIDERS

## 2020-08-07 NOTE — TELEPHONE ENCOUNTER
----- Message from Jaydon Patel, Patient Care Assistant sent at 8/7/2020 12:06 PM CDT -----  Type:  Patient Returning Call    Who Called: CHELSY GONZALEZ [[id number]]    Who Left Message for Patient: Benigno Haddad    Does the patient know what this is regarding?:Yes    Best Call Back Number:[phone]    Additional Information:   None

## 2020-08-10 ENCOUNTER — TELEPHONE (OUTPATIENT)
Dept: SPORTS MEDICINE | Facility: CLINIC | Age: 75
End: 2020-08-10

## 2020-08-10 ENCOUNTER — TELEPHONE (OUTPATIENT)
Dept: PAIN MEDICINE | Facility: CLINIC | Age: 75
End: 2020-08-10

## 2020-08-10 NOTE — TELEPHONE ENCOUNTER
Spoke to patient and let her know I will reach out to Dr Coates staff to find out what is going on with her appointment.

## 2020-08-10 NOTE — TELEPHONE ENCOUNTER
----- Message from Deandra Sosa sent at 8/10/2020  3:20 PM CDT -----  Regarding: Return call    Type:  Patient Returning Call    Who Called: CHELSY GONZALEZ [[id number]]    Who Left Message for Patient:  Kenyetta Ross    Does the patient know what this is regarding?: Y    Can the clinic reply in [facility]: No    Best Call Back Number: [phone]    Additional Information: N/A

## 2020-08-10 NOTE — TELEPHONE ENCOUNTER
----- Message from Thierry Rivera sent at 8/10/2020  9:30 AM CDT -----  Contact: self     Pt would like to speak with nurse concerning her injection     Contact

## 2020-08-12 ENCOUNTER — TELEPHONE (OUTPATIENT)
Dept: PAIN MEDICINE | Facility: CLINIC | Age: 75
End: 2020-08-12

## 2020-08-12 DIAGNOSIS — M25.552 LEFT HIP PAIN: Primary | ICD-10-CM

## 2020-08-13 ENCOUNTER — TELEPHONE (OUTPATIENT)
Dept: PAIN MEDICINE | Facility: CLINIC | Age: 75
End: 2020-08-13

## 2020-08-13 NOTE — TELEPHONE ENCOUNTER
----- Message from Sarika Mcneal sent at 8/13/2020 11:23 AM CDT -----  Contact: CHELSY GONZALEZ [[id number]]  Name of Who is Calling: CHELSY GONZALEZ [[id number]]      What is the request in detail: Patient requesting to speak with Kenyetta regarding procedure. Please call       Can the clinic reply by [facility]: no      [facility]: [phone] (home)

## 2020-08-20 ENCOUNTER — HOSPITAL ENCOUNTER (OUTPATIENT)
Facility: OTHER | Age: 75
Discharge: HOME | End: 2020-08-20
Attending: ANESTHESIOLOGY | Admitting: ANESTHESIOLOGY
Payer: MEDICARE

## 2020-08-20 VITALS
RESPIRATION RATE: 14 BRPM | TEMPERATURE: 99 F | DIASTOLIC BLOOD PRESSURE: 85 MMHG | BODY MASS INDEX: 25.27 KG/M2 | SYSTOLIC BLOOD PRESSURE: 182 MMHG | HEIGHT: 64 IN | OXYGEN SATURATION: 98 % | HEART RATE: 79 BPM | WEIGHT: 148 LBS

## 2020-08-20 DIAGNOSIS — M46.1 SACROILIITIS: Primary | ICD-10-CM

## 2020-08-20 PROCEDURE — 25000003 PHARM REV CODE 250: Mod: HCNC | Performed by: PHYSICAL MEDICINE & REHABILITATION

## 2020-08-20 PROCEDURE — 77002 NEEDLE LOCALIZATION BY XRAY: CPT | Mod: 26,HCNC,, | Performed by: ANESTHESIOLOGY

## 2020-08-20 PROCEDURE — 25000003 PHARM REV CODE 250: Mod: HCNC | Performed by: ANESTHESIOLOGY

## 2020-08-20 PROCEDURE — 77002 NEEDLE LOCALIZATION BY XRAY: CPT | Mod: 26,HCNC | Performed by: ANESTHESIOLOGY

## 2020-08-20 PROCEDURE — 20610 DRAIN/INJ JOINT/BURSA W/O US: CPT | Mod: HCNC,LT,, | Performed by: ANESTHESIOLOGY

## 2020-08-20 PROCEDURE — 20610 DRAIN/INJ JOINT/BURSA W/O US: CPT | Mod: HCNC,LT | Performed by: ANESTHESIOLOGY

## 2020-08-20 PROCEDURE — 63600175 PHARM REV CODE 636 W HCPCS: Mod: HCNC | Performed by: ANESTHESIOLOGY

## 2020-08-20 PROCEDURE — 77002 PR FLUOROSCOPIC GUIDANCE NEEDLE PLACEMENT: ICD-10-PCS | Mod: 26,HCNC,, | Performed by: ANESTHESIOLOGY

## 2020-08-20 PROCEDURE — 20610 PR DRAIN/INJECT LARGE JOINT/BURSA: ICD-10-PCS | Mod: HCNC,LT,, | Performed by: ANESTHESIOLOGY

## 2020-08-20 RX ORDER — ALPRAZOLAM 0.5 MG/1
0.5 TABLET ORAL ONCE
Status: COMPLETED | OUTPATIENT
Start: 2020-08-20 | End: 2020-08-20

## 2020-08-20 RX ORDER — BUPIVACAINE HYDROCHLORIDE 2.5 MG/ML
INJECTION, SOLUTION EPIDURAL; INFILTRATION; INTRACAUDAL
Status: DISCONTINUED | OUTPATIENT
Start: 2020-08-20 | End: 2020-08-20 | Stop reason: HOSPADM

## 2020-08-20 RX ORDER — TRIAMCINOLONE ACETONIDE 40 MG/ML
INJECTION, SUSPENSION INTRA-ARTICULAR; INTRAMUSCULAR
Status: DISCONTINUED | OUTPATIENT
Start: 2020-08-20 | End: 2020-08-20 | Stop reason: HOSPADM

## 2020-08-20 RX ADMIN — ALPRAZOLAM 0.5 MG: 0.5 TABLET ORAL at 11:08

## 2020-08-20 NOTE — H&P
HPI  Patient presenting for Procedure(s) (LRB):  INJECTION, Right Intra-articular hip injection. Patient was scheduled for a Left SI Joint injection, but patient reports minimal pain at the left SI joint pain today, Negative Damien's and Gaenslen's test on the left. Patient had Right anterior groin pain with right hip internal and external rotation  Patient on Anti-coagulation No    No health changes since previous encounter    Past Medical History:   Diagnosis Date    Allergy sinus    Arthritis back    Colon polyps     Degenerative disc disease back    Diverticulosis of colon     Dyspepsia     GERD (gastroesophageal reflux disease)     Heartburn     Hemorrhoids, internal     Hiatal hernia     Neuromuscular disorder     Vitamin D deficiency      Past Surgical History:   Procedure Laterality Date    APPENDECTOMY  age 19    CHOLECYSTECTOMY      age of 25    CHONDROPLASTY OF KNEE Left 10/3/2019    Procedure: CHONDROPLASTY, KNEE;  Surgeon: Kaylen Patiño MD;  Location: [facility] OR;  Service: Orthopedics;  Laterality: Left;    HYSTERECTOMY  38    INJECTION OF JOINT Bilateral 2/18/2019    Procedure: INJECTION, JOINT BILATERAL SI;  Surgeon: Mert Coates MD;  Location: [facility];  Service: Pain Management;  Laterality: Bilateral;  BILATERAL SI JOINT INJECTION    KNEE ARTHROSCOPY W/ MENISCECTOMY Left 10/3/2019    Procedure: ARTHROSCOPY, KNEE, WITH MENISCECTOMY;  Surgeon: Kaylen Patiño MD;  Location: [facility] OR;  Service: Orthopedics;  Laterality: Left;    SYNOVECTOMY OF KNEE Left 10/3/2019    Procedure: SYNOVECTOMY, KNEE;  Surgeon: Kaylen Patiño MD;  Location: [facility] OR;  Service: Orthopedics;  Laterality: Left;     Review of patient's allergies indicates:   Allergen Reactions    Demerol [meperidine] Nausea And Vomiting     Other reaction(s): Nausea    Papaya      Illness vomiting    Sulfa (sulfonamide antibiotics) Rash    Sulfur Rash      Current Facility-Administered Medications   Medication    ALPRAZolam  "tablet 0.5 mg       PMHx, PSHx, Allergies, Medications reviewed in epic    ROS negative except pain complaints in HPI    OBJECTIVE:    BP (!) 146/86 (BP Location: Right arm, Patient Position: Lying)   Pulse 90   Temp 98.7 °F (37.1 °C) (Oral)   Resp 18   Ht 5' 3.5" (1.613 m)   Wt 67.1 kg (148 lb)   SpO2 100%   BMI 25.81 kg/m²     PHYSICAL EXAMINATION:    GENERAL: Well appearing, in no acute distress, alert and oriented x3.  PSYCH:  Mood and affect appropriate.  SKIN: Skin color, texture, turgor normal, no rashes or lesions which will impact the procedure.  CV: RRR with palpation of the radial artery.  PULM: No evidence of respiratory difficulty, symmetric chest rise. Clear to auscultation.  NEURO: Cranial nerves grossly intact.    Plan:    Proceed with procedure as planned Procedure(s) (LRB):  Right Intra-articular hip injection    Mert Coates  08/20/2020      "

## 2020-08-20 NOTE — OP NOTE
27- Hip Injection/Arthrogram  ANTERIOR APPROACH  Time-out taken to identify patient and procedure side prior to starting the procedure.   I attest that I have reviewed the patient's home medications prior to the procedure and no contraindication have been identified. I  re-evaluated the patient after the patient was positioned for the procedure in the procedure room immediately before the procedural time-out. The vital signs are current and represent the current state of the patient which has not significantly changed since the preprocedure assessment.                                                                                                                         Date of Service: 08/20/2020    PCP: Angel Bello DO    Referring Physician:                                                                                             PROCEDURE:  Right hip joint injection under fluoroscopy.    REASON FOR PROCEDURE: Left hip pain [M25.552]     1. Sacroiliitis      POSTOP DIAGNOSIS: Left hip pain [M25.552]  1. Sacroiliitis        PHYSICIAN: Mert Coates MD  ASSISTANTS: Aleksandr Talamantes M.D. [employer] Pain Fellow                                                                                                  ANESTHESIA:  Xylocaine 1% 9ml with Sodium Bicarbonate 1ml. 3ml per site.                                                                                                              MEDICATIONS INJECTED:  Kenalog 20mg, bupivacaine 0.25% 2 mL (per side), and sterile saline 2ml (per side)                                                                                                                                     ESTIMATED BLOOD LOSS:  None.                                                                                                                              COMPLICATIONS:  None.     SEDATION: None                                                                                                                                     TECHNIQUE:  With the patient lying in the supine position the the femoral neck identified under fluoroscopy.  The area was prepped and draped in the usual       sterile fashion.  Local anesthetic was used, given by raising a wheal and    going down to the hub of the 27-gauge needle.  A 3-1/2 inch 22-gauge         needle was introduced under fluoroscopy until the tip reached the lateral aspect of the femoral neck.  When the tip of the needle was thought   to be in appropriate position, contrast was injected to confirm proper placement.  Medication was then injected slowly.  The patient tolerated the procedure well.                                                                                                                     PAIN BEFORE THE PROCEDURE: 8/10.                                                                                                                         PAIN AFTER THE PROCEDURE:  0/10.                                                                                                                          The patient was monitored for 20-30 minutes after the procedure and was      given post procedure and discharge instructions to follow at home.  The      patient is to follow-up with me in two weeks by calling.   The patient was discharged in a stable condtion.

## 2020-08-20 NOTE — PLAN OF CARE
Pt tolerated procedure well. Pt reports 7/10 pain after procedure. Assisted pt up for first time. Steady on feet. All discharge instructions given.

## 2020-08-20 NOTE — H&P
HPI  Patient presenting for Procedure(s) (LRB):  INJECTION, SI JOINT INJECTION LEFT WITH STEROID AND ANESTHETIC DIRECT REFERRAL * DR. COATES ONLY PLEASE* (Left)     Patient on Anti-coagulation No    No health changes since previous encounter    Past Medical History:   Diagnosis Date    Allergy sinus    Arthritis back    Colon polyps     Degenerative disc disease back    Diverticulosis of colon     Dyspepsia     GERD (gastroesophageal reflux disease)     Heartburn     Hemorrhoids, internal     Hiatal hernia     Neuromuscular disorder     Vitamin D deficiency      Past Surgical History:   Procedure Laterality Date    APPENDECTOMY  age 19    CHOLECYSTECTOMY      age of 25    CHONDROPLASTY OF KNEE Left 10/3/2019    Procedure: CHONDROPLASTY, KNEE;  Surgeon: Kaylen Patiño MD;  Location: [facility] OR;  Service: Orthopedics;  Laterality: Left;    HYSTERECTOMY  38    INJECTION OF JOINT Bilateral 2/18/2019    Procedure: INJECTION, JOINT BILATERAL SI;  Surgeon: Mert Coates MD;  Location: [facility];  Service: Pain Management;  Laterality: Bilateral;  BILATERAL SI JOINT INJECTION    KNEE ARTHROSCOPY W/ MENISCECTOMY Left 10/3/2019    Procedure: ARTHROSCOPY, KNEE, WITH MENISCECTOMY;  Surgeon: Kaylen Patiño MD;  Location: [facility] OR;  Service: Orthopedics;  Laterality: Left;    SYNOVECTOMY OF KNEE Left 10/3/2019    Procedure: SYNOVECTOMY, KNEE;  Surgeon: Kaylen Patiño MD;  Location: [facility] OR;  Service: Orthopedics;  Laterality: Left;     Review of patient's allergies indicates:   Allergen Reactions    Demerol [meperidine] Nausea And Vomiting     Other reaction(s): Nausea    Papaya      Illness vomiting    Sulfa (sulfonamide antibiotics) Rash    Sulfur Rash      No current facility-administered medications for this encounter.        PMHx, PSHx, Allergies, Medications reviewed in epic    ROS negative except pain complaints in HPI    OBJECTIVE:    There were no vitals taken for this visit.    PHYSICAL  EXAMINATION:    GENERAL: Well appearing, in no acute distress, alert and oriented x3.  PSYCH:  Mood and affect appropriate.  SKIN: Skin color, texture, turgor normal, no rashes or lesions which will impact the procedure.  CV: RRR with palpation of the radial artery.  PULM: No evidence of respiratory difficulty, symmetric chest rise. Clear to auscultation.  NEURO: Cranial nerves grossly intact.    Plan:    Proceed with procedure as planned Procedure(s) (LRB):  INJECTION, SI JOINT INJECTION LEFT WITH STEROID AND ANESTHETIC DIRECT REFERRAL * DR. PALUMBO ONLY PLEASE* (Left)    Aleksandr Talamantes  08/20/2020

## 2020-08-20 NOTE — DISCHARGE SUMMARY
Discharge Note  Short Stay      SUMMARY     Admit Date: 8/20/2020    Attending Physician: Mert Palumbo      Discharge Physician: Mert Palumbo      Discharge Date: 8/20/2020 12:28 PM    Procedure(s) (LRB):  INJECTION JOINT/ R HIP DIRECT REFERRAL * DR. PALUMBO ONLY PLEASE* (Right)    Final Diagnosis: Left hip pain [M25.552]    Disposition: Home or self care    Patient Instructions:   Current Discharge Medication List      CONTINUE these medications which have NOT CHANGED    Details   DULoxetine (CYMBALTA) 60 MG capsule Take 1 capsule (60 mg total) by mouth once daily.  Qty: 30 capsule, Refills: 11    Associated Diagnoses: Mixed urinary incontinence due to female genital prolapse      estradiol valerate (DELESTROGEN) 40 mg/mL injection Inject 0.5 mLs (20 mg total) into the muscle every 28 days. Inject into the muscle.  Qty: 5 mL, Refills: 12    Associated Diagnoses: Disorder of bone      losartan (COZAAR) 50 MG tablet Take 1 tablet (50 mg total) by mouth once daily.  Qty: 90 tablet, Refills: 3      omega-3 acid ethyl esters (LOVAZA) 1 gram capsule Take 2 capsules (2 g total) by mouth 2 (two) times daily.  Qty: 360 capsule, Refills: 3    Associated Diagnoses: Elevated lipids      pantoprazole (PROTONIX) 40 MG tablet Take 1 tablet (40 mg total) by mouth before breakfast. Take one pill every morning 45 minutes before breakfast in the morning.  Qty: 90 tablet, Refills: 3    Associated Diagnoses: Gastroesophageal reflux disease, esophagitis presence not specified                 Discharge Diagnosis: Left hip pain [M25.552]  Condition on Discharge: Stable with no complications to procedure   Diet on Discharge: Same as before.  Activity: as per instruction sheet.  Discharge to: Home with a responsible adult.  Follow up: 2-4 weeks       Please call my office or pager at [phone] if experienced any weakness or loss of sensation, fever > 101.5, pain uncontrolled with oral medications, persistent nausea/vomiting/or diarrhea,  redness or drainage from the incisions, or any other worrisome concerns. If physician on call was not reached or could not communicate with our office for any reason please go to the nearest emergency department

## 2020-08-20 NOTE — DISCHARGE INSTRUCTIONS

## 2020-08-25 ENCOUNTER — CLINICAL SUPPORT (OUTPATIENT)
Dept: REHABILITATION | Facility: HOSPITAL | Age: 75
End: 2020-08-25
Payer: MEDICARE

## 2020-08-25 DIAGNOSIS — M25.552 LEFT HIP PAIN: ICD-10-CM

## 2020-08-25 DIAGNOSIS — M25.552 PAIN OF LEFT HIP JOINT: ICD-10-CM

## 2020-08-25 PROCEDURE — 97110 THERAPEUTIC EXERCISES: CPT | Mod: HCNC

## 2020-08-25 PROCEDURE — 97161 PT EVAL LOW COMPLEX 20 MIN: CPT | Mod: HCNC

## 2020-08-25 NOTE — PLAN OF CARE
[facility]  Physical Therapy Initial Evaluation    Name: Shakira Pearl  Clinic Number: [id number]    Therapy Diagnosis:   Encounter Diagnoses   Name Primary?    Left hip pain     Pain of left hip joint      Physician: Sam Damon MD    Physician Orders: PT Eval and Treat   Medical Diagnosis from Referral: M25.552 (ICD-10-CM) - Left hip pain  Evaluation Date: 8/25/2020  Authorization Period Expiration: 12/31/2020  Plan of Care Expiration: 11/17/2020  Visit # / Visits authorized: 1/ 1    Time In: 1300  Time Out: 1345  Total Billable Time: 45 minutes    Precautions: Standard,     Subjective   Date of onset: 1 month ago  History of current condition - Shakira reports: that she has difficulty and pain with crossing legs and hip flexion.  Started hurting about 1 month ago.  States that during sleep, she turned to the L and then on her stomach.  Had increased pain in R hip.  Went to MD who gave her an injection last Thursday.  Injection didn't seem to help.  Continues to have pain in hip.  States that pain is in R hip, not L hip.         Past Medical History:   Diagnosis Date    Allergy sinus    Arthritis back    Colon polyps     Degenerative disc disease back    Diverticulosis of colon     Dyspepsia     GERD (gastroesophageal reflux disease)     Heartburn     Hemorrhoids, internal     Hiatal hernia     Neuromuscular disorder     Vitamin D deficiency      Shakira Pearl  has a past surgical history that includes Appendectomy (age 19); Hysterectomy (38); Cholecystectomy; Injection of joint (Bilateral, 2/18/2019); Knee arthroscopy w/ meniscectomy (Left, 10/3/2019); Chondroplasty of knee (Left, 10/3/2019); Synovectomy of knee (Left, 10/3/2019); and Injection of joint (Right, 8/20/2020).    Shakira has a current medication list which includes the following prescription(s): duloxetine, estradiol valerate, losartan, omega-3 acid ethyl esters, and pantoprazole.    Review of patient's  allergies indicates:   Allergen Reactions    Demerol [meperidine] Nausea And Vomiting     Other reaction(s): Nausea    Papaya      Illness vomiting    Sulfa (sulfonamide antibiotics) Rash    Sulfur Rash        Imaging, X-ray: There is mild DJD.  No fracture dislocation bone destruction seen.  There may be impingement change.    Prior Therapy: PT for L knee  Social History: 2 story house; has to climb 14 steps lives alone  Occupation: Pt is retired  Prior Level of Function: I with all activities  Current Level of Function: Difficulty/pain with stairs/ crossing legs    Pain:  Current 5/10, worst 7/10, best 0/10   Location: right hip  Description: Deep  Aggravating Factors: hip flexion and crossing legs  Easing Factors: sitting    Pts goals: To return to PLOF - climb stairs and crossing legs.    Objective     Observation: Pt is healthy and in no acute distress.      Hip Range of Motion: WNLs B but pain with increased pain during hip flexion.      Lower Extremity Strength  Right LE  Left LE    Quadriceps: 4-/5* Quadriceps: 4+/5   Hamstrings: 4-/5 Hamstrings: 5/5   Iliopsoas: 3+/5* Iliopsoas: 4+/5   Hip extension:  3/5 Hip extension: 4-/5   PGM: 3/5 PGM: 3+/5   Hip ER: 3+/5* Hip ER: 5/5   Hip IR: 4-/5 Hip IR: 5/5   *= pain    Functional Tests:  Bridge Test: pos B  DL Squat: weight shift to the R with decreased posterior glut activation  SLS EO: R: 30 sec, L 20 sec but increased pain in R groin.      Special Tests:   DAVID: neg  FABERs:  Neg - groin stretch   Hip Scour: neg    Flexibility:    Ely's test: R = neg ; L = neg    Hamstrings: R = min tightness ; L = neg     Joint Mobility: min hypomobility    Palpation: TTP along insertion of adductors        CMS Impairment/Limitation/Restriction for FOTO Hip Survey    Therapist reviewed FOTO scores for Shakira Pearl on 8/25/2020.   FOTO documents entered into [employer] - see Media section.    Limitation Score: 51%         TREATMENT   Treatment Time In: 1330  Treatment Time Out:  "1341  Total Treatment time separate from Evaluation: 10 minutes    Shakira received therapeutic exercises to develop strength, endurance, ROM, flexibility and core stabilization for 10 minutes including:  Bridges 8 x 10"   SL clams 5 x 10" B  Hip adductor stretch x 30"     Home Exercises and Patient Education Provided    Education provided re: POC, goals for therapy, role of therapy for care, exercises/HEP    Written Home Exercises Provided: yes.  Exercises were reviewed and Shakira was able to demonstrate them prior to the end of the session.   Pt received a written copy of exercises to perform at home. Shakira demonstrated good  understanding of the education provided.     See EMR under patient instructions for exercises given.   Assessment   Shakira is a 75 y.o. female referred to outpatient Physical Therapy with a medical diagnosis of L hip pain.  Pt's c/c is R hip pain with climbing stairs and crossing legs. Pt presents with decreased core/LE strength, decreased quad/adductor flexibility, and impaired balance/gait.  Pt will benefit from physical therapy, specifically stretching and core/LE strengthening, in order to reduce her c/o pain, improve impairments and functional limitations.    Pt prognosis is Good.   Pt will benefit from skilled outpatient Physical Therapy to address the deficits stated above and in the chart below, provide pt/family education, and to maximize pt's level of independence.     Plan of care discussed with patient: Yes  Pt's spiritual, cultural and educational needs considered and patient is agreeable to the plan of care and goals as stated below:     Anticipated Barriers for therapy: none    Medical Necessity is demonstrated by the following  History  Co-morbidities and personal factors that may impact the plan of care Co-morbidities:   advanced age and hernia    Personal Factors:   no deficits     low   Examination  Body Structures and Functions, activity limitations and participation " restrictions that may impact the plan of care Body Regions:   back  lower extremities    Body Systems:    gross symmetry  ROM  strength  gross coordinated movement  balance  gait  transitions  motor control  motor learning    Participation Restrictions:   stairs    Activity limitations:   Learning and applying knowledge  no deficits    General Tasks and Commands  no deficits    Communication  no deficits    Mobility  no deficits    Self care  no deficits    Domestic Life  shopping  cooking  doing house work (cleaning house, washing dishes, laundry)    Interactions/Relationships  no deficits    Life Areas  no deficits    Community and Social Life  community life  recreation and leisure         high   Clinical Presentation stable and uncomplicated low   Decision Making/ Complexity Score: low     GOALS: Short Term Goals:  6 weeks  1.Report decreased R hip pain  < / =  3/10  to increase tolerance for stairs/crossing legs. Progressing, not met   2. Pt will have no c/o pain with hip flexion ROM in order to perform ADLs without difficulty.  Progressing, not met   3. Increase strength by 1/3 MMT grade in B LE  to increase tolerance for ADL and work activities.  Progressing, not met   4. Pt to tolerate HEP to improve ROM and independence with ADL's  Progressing, not met     Long Term Goals: 12 weeks  1.Report decreased R hip pain < / = 0-1/10  to increase tolerance for stairs/crossing legs.  Progressing, not met   2.Patient goal: To return to PLOF.  Progressing, not met   3.Increase strength to 4+/5 in  B LE  to increase tolerance for ADL and work activities. Progressing, not met   4. Pt will be able to perform step ups without c/o pain.  Progressing, not met     Plan   Plan of care Certification: 8/25/2020 to 11/17/2020.    Outpatient Physical Therapy 2 times weekly for 12 weeks to include the following interventions: Electrical Stimulation IFC, Gait Training, Manual Therapy, Moist Heat/ Ice, Neuromuscular Re-ed, Patient  Education, Therapeutic Activites and Therapeutic Exercise.     Tamia Wilson, PT, DPT, OCS

## 2020-09-06 ENCOUNTER — NURSE TRIAGE (OUTPATIENT)
Dept: ADMINISTRATIVE | Facility: CLINIC | Age: 75
End: 2020-09-06

## 2020-09-06 NOTE — TELEPHONE ENCOUNTER
Pt scheduled to have Endoscopy on 9/9/20. Patient instructed to go to [facility] today to have pre-op covid test, but states that urgent care was too busy and wait was too long. Gave patient number to call in the morning [phone] for testing sites. Also recommended that patient attempt to go to [facility] when they open. Patient states she will do so. Patient not in need of triage    Reason for Disposition   Health Information question, no triage required and triager able to answer question    Protocols used: INFORMATION ONLY CALL - NO TRIAGE-A-

## 2020-09-07 ENCOUNTER — CLINICAL SUPPORT (OUTPATIENT)
Dept: URGENT CARE | Facility: CLINIC | Age: 75
End: 2020-09-07
Payer: MEDICARE

## 2020-09-07 VITALS — OXYGEN SATURATION: 96 % | TEMPERATURE: 97 F | HEART RATE: 77 BPM | RESPIRATION RATE: 16 BRPM

## 2020-09-07 DIAGNOSIS — Z01.818 PRE-OP TESTING: ICD-10-CM

## 2020-09-07 PROCEDURE — U0003 INFECTIOUS AGENT DETECTION BY NUCLEIC ACID (DNA OR RNA); SEVERE ACUTE RESPIRATORY SYNDROME CORONAVIRUS 2 (SARS-COV-2) (CORONAVIRUS DISEASE [COVID-19]), AMPLIFIED PROBE TECHNIQUE, MAKING USE OF HIGH THROUGHPUT TECHNOLOGIES AS DESCRIBED BY CMS-2020-01-R: HCPCS | Mod: HCNC

## 2020-09-08 LAB — SARS-COV-2 RNA RESP QL NAA+PROBE: NOT DETECTED

## 2020-09-09 ENCOUNTER — ANESTHESIA (OUTPATIENT)
Dept: ENDOSCOPY | Facility: HOSPITAL | Age: 75
End: 2020-09-09
Payer: MEDICARE

## 2020-09-09 ENCOUNTER — HOSPITAL ENCOUNTER (OUTPATIENT)
Facility: HOSPITAL | Age: 75
Discharge: HOME | End: 2020-09-09
Attending: INTERNAL MEDICINE | Admitting: INTERNAL MEDICINE
Payer: MEDICARE

## 2020-09-09 ENCOUNTER — ANESTHESIA EVENT (OUTPATIENT)
Dept: ENDOSCOPY | Facility: HOSPITAL | Age: 75
End: 2020-09-09
Payer: MEDICARE

## 2020-09-09 VITALS
HEART RATE: 81 BPM | BODY MASS INDEX: 25.69 KG/M2 | TEMPERATURE: 98 F | WEIGHT: 145 LBS | HEIGHT: 63 IN | DIASTOLIC BLOOD PRESSURE: 78 MMHG | RESPIRATION RATE: 20 BRPM | OXYGEN SATURATION: 98 % | SYSTOLIC BLOOD PRESSURE: 157 MMHG

## 2020-09-09 DIAGNOSIS — Z86.010 HISTORY OF COLON POLYPS: Primary | ICD-10-CM

## 2020-09-09 PROBLEM — Z86.0100 HISTORY OF COLON POLYPS: Status: ACTIVE | Noted: 2020-09-09

## 2020-09-09 PROCEDURE — E9220 PRA ENDO ANESTHESIA: HCPCS | Mod: HCNC,,, | Performed by: NURSE ANESTHETIST, CERTIFIED REGISTERED

## 2020-09-09 PROCEDURE — 37000008 HC ANESTHESIA 1ST 15 MINUTES: Mod: HCNC | Performed by: INTERNAL MEDICINE

## 2020-09-09 PROCEDURE — 43235 EGD DIAGNOSTIC BRUSH WASH: CPT | Mod: 51,HCNC,GC, | Performed by: INTERNAL MEDICINE

## 2020-09-09 PROCEDURE — 45385 PR COLONOSCOPY,REMV LESN,SNARE: ICD-10-PCS | Mod: 22,HCNC,, | Performed by: INTERNAL MEDICINE

## 2020-09-09 PROCEDURE — 88305 TISSUE EXAM BY PATHOLOGIST: CPT | Mod: HCNC | Performed by: PATHOLOGY

## 2020-09-09 PROCEDURE — 45380 PR COLONOSCOPY,BIOPSY: ICD-10-PCS | Mod: 59,HCNC,, | Performed by: INTERNAL MEDICINE

## 2020-09-09 PROCEDURE — 45380 COLONOSCOPY AND BIOPSY: CPT | Mod: HCNC | Performed by: INTERNAL MEDICINE

## 2020-09-09 PROCEDURE — 25000003 PHARM REV CODE 250: Mod: HCNC | Performed by: INTERNAL MEDICINE

## 2020-09-09 PROCEDURE — 27201012 HC FORCEPS, HOT/COLD, DISP: Mod: HCNC | Performed by: INTERNAL MEDICINE

## 2020-09-09 PROCEDURE — 43235 EGD DIAGNOSTIC BRUSH WASH: CPT | Mod: HCNC | Performed by: INTERNAL MEDICINE

## 2020-09-09 PROCEDURE — 37000009 HC ANESTHESIA EA ADD 15 MINS: Mod: HCNC | Performed by: INTERNAL MEDICINE

## 2020-09-09 PROCEDURE — 43235 PR EGD, FLEX, DIAGNOSTIC: ICD-10-PCS | Mod: 51,HCNC,GC, | Performed by: INTERNAL MEDICINE

## 2020-09-09 PROCEDURE — 88305 TISSUE EXAM BY PATHOLOGIST: ICD-10-PCS | Mod: 26,HCNC,, | Performed by: PATHOLOGY

## 2020-09-09 PROCEDURE — 25000003 PHARM REV CODE 250: Mod: HCNC | Performed by: NURSE ANESTHETIST, CERTIFIED REGISTERED

## 2020-09-09 PROCEDURE — 27201089 HC SNARE, DISP (ANY): Mod: HCNC | Performed by: INTERNAL MEDICINE

## 2020-09-09 PROCEDURE — E9220 PRA ENDO ANESTHESIA: ICD-10-PCS | Mod: HCNC,,, | Performed by: NURSE ANESTHETIST, CERTIFIED REGISTERED

## 2020-09-09 PROCEDURE — 45385 COLONOSCOPY W/LESION REMOVAL: CPT | Mod: HCNC | Performed by: INTERNAL MEDICINE

## 2020-09-09 PROCEDURE — 63600175 PHARM REV CODE 636 W HCPCS: Mod: HCNC | Performed by: NURSE ANESTHETIST, CERTIFIED REGISTERED

## 2020-09-09 PROCEDURE — 45380 COLONOSCOPY AND BIOPSY: CPT | Mod: 59,HCNC,, | Performed by: INTERNAL MEDICINE

## 2020-09-09 PROCEDURE — 88305 TISSUE EXAM BY PATHOLOGIST: CPT | Mod: 26,HCNC,, | Performed by: PATHOLOGY

## 2020-09-09 PROCEDURE — 45385 COLONOSCOPY W/LESION REMOVAL: CPT | Mod: 22,HCNC,, | Performed by: INTERNAL MEDICINE

## 2020-09-09 RX ORDER — PROPOFOL 10 MG/ML
VIAL (ML) INTRAVENOUS CONTINUOUS PRN
Status: DISCONTINUED | OUTPATIENT
Start: 2020-09-09 | End: 2020-09-09

## 2020-09-09 RX ORDER — SODIUM CHLORIDE 9 MG/ML
INJECTION, SOLUTION INTRAVENOUS CONTINUOUS
Status: DISCONTINUED | OUTPATIENT
Start: 2020-09-09 | End: 2020-09-09 | Stop reason: HOSPADM

## 2020-09-09 RX ORDER — LIDOCAINE HCL/PF 100 MG/5ML
SYRINGE (ML) INTRAVENOUS
Status: DISCONTINUED | OUTPATIENT
Start: 2020-09-09 | End: 2020-09-09

## 2020-09-09 RX ORDER — PROPOFOL 10 MG/ML
VIAL (ML) INTRAVENOUS
Status: DISCONTINUED | OUTPATIENT
Start: 2020-09-09 | End: 2020-09-09

## 2020-09-09 RX ORDER — LABETALOL HYDROCHLORIDE 5 MG/ML
INJECTION, SOLUTION INTRAVENOUS
Status: DISCONTINUED | OUTPATIENT
Start: 2020-09-09 | End: 2020-09-09

## 2020-09-09 RX ADMIN — PROPOFOL 20 MG: 10 INJECTION, EMULSION INTRAVENOUS at 02:09

## 2020-09-09 RX ADMIN — PROPOFOL 100 MCG/KG/MIN: 10 INJECTION, EMULSION INTRAVENOUS at 01:09

## 2020-09-09 RX ADMIN — LABETALOL HYDROCHLORIDE 10 MG: 5 INJECTION, SOLUTION INTRAVENOUS at 02:09

## 2020-09-09 RX ADMIN — PROPOFOL 40 MG: 10 INJECTION, EMULSION INTRAVENOUS at 01:09

## 2020-09-09 RX ADMIN — SODIUM CHLORIDE: 0.9 INJECTION, SOLUTION INTRAVENOUS at 02:09

## 2020-09-09 RX ADMIN — Medication 100 MG: at 01:09

## 2020-09-09 RX ADMIN — PROPOFOL 20 MG: 10 INJECTION, EMULSION INTRAVENOUS at 01:09

## 2020-09-09 RX ADMIN — PROPOFOL 10 MG: 10 INJECTION, EMULSION INTRAVENOUS at 02:09

## 2020-09-09 RX ADMIN — SODIUM CHLORIDE: 0.9 INJECTION, SOLUTION INTRAVENOUS at 01:09

## 2020-09-09 NOTE — H&P
Short Stay Endoscopy History and Physical    PCP - Angel Bello,     Procedure - EGD/Colonoscopy  ASA - per anesthesia  Mallampati - per anesthesia  History of Anesthesia problems - no  Family history Anesthesia problems -  no   Plan of anesthesia - MAC    HPI:  This is a 75 y.o. female here for evaluation of GERD and history of colon polyps.  Prior difficult colonoscopy due to history of abdominal surgery. Having intermittent reflux, 3 times per week.  Uses PPI every other day. Denies dysphagia.       ROS:  Constitutional: No fevers, chills, No weight loss  CV: No chest pain  Pulm: No cough, No shortness of breath  Ophtho: No vision changes  GI: see HPI    Medical History:  has a past medical history of Allergy (sinus), Arthritis (back), Colon polyps, Degenerative disc disease (back), Diverticulosis of colon, Dyspepsia, GERD (gastroesophageal reflux disease), Heartburn, Hemorrhoids, internal, Hiatal hernia, Neuromuscular disorder, and Vitamin D deficiency.    Surgical History:  has a past surgical history that includes Appendectomy (age 19); Hysterectomy (38); Cholecystectomy; Injection of joint (Bilateral, 2/18/2019); Knee arthroscopy w/ meniscectomy (Left, 10/3/2019); Chondroplasty of knee (Left, 10/3/2019); Synovectomy of knee (Left, 10/3/2019); and Injection of joint (Right, 8/20/2020).    Family History: family history includes Cancer (age of onset: 65) in her father; Heart disease (age of onset: 67) in her mother; No Known Problems in her brother, maternal aunt, maternal grandfather, maternal grandmother, maternal uncle, paternal aunt, paternal grandfather, paternal grandmother, paternal uncle, sister, and son; Stomach cancer in her father.. Otherwise no colon cancer, inflammatory bowel disease, or GI malignancies.    Social History:  reports that she has never smoked. She has never used smokeless tobacco. She reports that she does not drink alcohol or use drugs.    Review of patient's allergies  indicates:   Allergen Reactions    Demerol [meperidine] Nausea And Vomiting     Other reaction(s): Nausea    Papaya      Illness vomiting    Sulfa (sulfonamide antibiotics) Rash    Sulfur Rash       Medications:   Medications Prior to Admission   Medication Sig Dispense Refill Last Dose    DULoxetine (CYMBALTA) 60 MG capsule Take 1 capsule (60 mg total) by mouth once daily. 30 capsule 11 9/8/2020 at Unknown time    estradiol valerate (DELESTROGEN) 40 mg/mL injection Inject 0.5 mLs (20 mg total) into the muscle every 28 days. Inject into the muscle. 5 mL 12 Past Month at Unknown time    losartan (COZAAR) 50 MG tablet Take 1 tablet (50 mg total) by mouth once daily. 90 tablet 3 9/8/2020 at Unknown time    omega-3 acid ethyl esters (LOVAZA) 1 gram capsule Take 2 capsules (2 g total) by mouth 2 (two) times daily. 360 capsule 3     pantoprazole (PROTONIX) 40 MG tablet Take 1 tablet (40 mg total) by mouth before breakfast. Take one pill every morning 45 minutes before breakfast in the morning. 90 tablet 3 9/8/2020 at Unknown time       Physical Exam:    Vital Signs:   Vitals:    09/09/20 1238   BP: (!) 193/87   Pulse: 84   Resp: 14   Temp: 98 °F (36.7 °C)       General Appearance: Well appearing in no acute distress  Eyes:    No scleral icterus  ENT: Neck supple, Lips, mucosa, and tongue normal; teeth and gums normal  Lungs: CTA anteriorly  Heart:  Regular rate, S1, S2 normal, no murmurs heard.  Abdomen: Soft, non tender, non distended with normal bowel sounds. No hepatosplenomegaly, ascites, or mass.    Labs:  Lab Results   Component Value Date    WBC 9.02 05/06/2020    HGB 12.4 05/06/2020    HCT 40.5 05/06/2020     05/06/2020    CHOL 164 05/06/2020    TRIG 225 (H) 05/06/2020    HDL 50 05/06/2020    ALT 14 12/03/2019    AST 16 12/03/2019     (L) 05/06/2020    K 3.8 05/06/2020     05/06/2020    CREATININE 0.9 05/06/2020    BUN 14 05/06/2020    CO2 26 05/06/2020    TSH 2.168 12/03/2019    INR  0.9 05/06/2020    HGBA1C 5.2 08/30/2017         Assessment:  75 y.o. female with GERD and personal history of colon polyps.     Plan:  Proceed with EGD and colonoscopy today.  I have explained the risks and benefits of endoscopy procedures to the patient including but not limited to bleeding, perforation, infection, and death.  All questions answered.        Peter Cuevas MD

## 2020-09-09 NOTE — ANESTHESIA PREPROCEDURE EVALUATION
09/09/2020  Shakira Pearl is a 75 y.o., female.    Past Medical History:   Diagnosis Date    Allergy sinus    Arthritis back    Colon polyps     Degenerative disc disease back    Diverticulosis of colon     Dyspepsia     GERD (gastroesophageal reflux disease)     Heartburn     Hemorrhoids, internal     Hiatal hernia     Neuromuscular disorder     Vitamin D deficiency      Past Surgical History:   Procedure Laterality Date    APPENDECTOMY  age 19    CHOLECYSTECTOMY      age of 25    CHONDROPLASTY OF KNEE Left 10/3/2019    Procedure: CHONDROPLASTY, KNEE;  Surgeon: Kaylen Patiño MD;  Location: [facility] OR;  Service: Orthopedics;  Laterality: Left;    HYSTERECTOMY  38    INJECTION OF JOINT Bilateral 2/18/2019    Procedure: INJECTION, JOINT BILATERAL SI;  Surgeon: Mert Palumbo MD;  Location: [facility];  Service: Pain Management;  Laterality: Bilateral;  BILATERAL SI JOINT INJECTION    INJECTION OF JOINT Right 8/20/2020    Procedure: INJECTION JOINT/ R HIP DIRECT REFERRAL * DR. PALUMBO ONLY PLEASE*;  Surgeon: Mert Palumbo MD;  Location: [facility];  Service: Pain Management;  Laterality: Right;  NEED CONSENT    KNEE ARTHROSCOPY W/ MENISCECTOMY Left 10/3/2019    Procedure: ARTHROSCOPY, KNEE, WITH MENISCECTOMY;  Surgeon: Kaylen Patiño MD;  Location: [facility] OR;  Service: Orthopedics;  Laterality: Left;    SYNOVECTOMY OF KNEE Left 10/3/2019    Procedure: SYNOVECTOMY, KNEE;  Surgeon: Kaylen Patiño MD;  Location: [facility] OR;  Service: Orthopedics;  Laterality: Left;     Patient Active Problem List   Diagnosis    Back pain    Lumbar radiculopathy    Back muscle spasm    Lumbar facet arthropathy    Shoulder pain, left    Pain in thoracic spine    History of adenomatous polyp of colon    Esophageal reflux    Dyspepsia    Family history of stomach cancer    Cervical radiculopathy    Chest pain     Gastroesophageal reflux disease    Hiatal hernia    Memory loss or impairment    Liver lesion    Fatigue    Poor sleep hygiene    Elevated lipids    Sacroiliac joint dysfunction    Sacroiliac joint pain    Spondylolisthesis    Spondylolysis of lumbar region    Acute pain of left knee    Acute medial meniscus tear of left knee    Chronic pain of left knee    Localized edema    Impaired gait    Essential hypertension    Weakness of back    Decreased range of motion of trunk and back    SHETTY (dyspnea on exertion)    Sacroiliitis    Pain of left hip joint         Anesthesia Evaluation    I have reviewed the Patient Summary Reports.    I have reviewed the Nursing Notes. I have reviewed the NPO Status.   I have reviewed the Medications.     Review of Systems  Anesthesia Hx:  No problems with previous Anesthesia  Denies Family Hx of Anesthesia complications.   Denies Personal Hx of Anesthesia complications.   Social:  Social Alcohol Use    Hematology/Oncology:  Hematology Normal   Oncology Normal     EENT/Dental:EENT/Dental Normal   Cardiovascular:   Exercise tolerance: good Hypertension SHETTY ECG has been reviewed.    Pulmonary:   Shortness of breath    Renal/:  Renal/ Normal     Hepatic/GI:   Hiatal Hernia, GERD    Musculoskeletal:   Arthritis     Neurological:   Neuromuscular Disease,    Endocrine:  Endocrine Normal    Dermatological:  Skin Normal    Psych:  Psychiatric Normal           Physical Exam  General:  Well nourished    Airway/Jaw/Neck:  Airway Findings: Mouth Opening: Normal Tongue: Normal  General Airway Assessment: Adult  Mallampati: II  TM Distance: Normal, at least 6 cm  Jaw/Neck Findings:     Neck ROM: Normal ROM     Eyes/Ears/Nose:  Eyes/Ears/Nose Findings:    Dental:  Dental Findings: In tact   Chest/Lungs:  Chest/Lungs Findings: Clear to auscultation, Normal Respiratory Rate     Heart/Vascular:  Heart Findings: Rate: Normal  Rhythm: Regular Rhythm  Sounds: Normal      Abdomen:  Abdomen Findings: Normal    Musculoskeletal:  Musculoskeletal Findings: Normal   Skin:  Skin Findings: Normal    Mental Status:  Mental Status Findings:  Cooperative         Anesthesia Plan  Type of Anesthesia, risks & benefits discussed:  Anesthesia Type:  general  Patient's Preference:   Intra-op Monitoring Plan: standard ASA monitors  Intra-op Monitoring Plan Comments:   Post Op Pain Control Plan:   Post Op Pain Control Plan Comments:   Induction:   IV  Beta Blocker:  Patient is not currently on a Beta-Blocker (No further documentation required).       Informed Consent: Patient understands risks and agrees with Anesthesia plan.  Questions answered. Anesthesia consent signed with patient.  ASA Score: 2     Day of Surgery Review of History & Physical: I have interviewed and examined the patient. I have reviewed the patient's H&P dated:  There are no significant changes.  H&P update referred to the provider.         Ready For Surgery From Anesthesia Perspective.

## 2020-09-09 NOTE — ANESTHESIA POSTPROCEDURE EVALUATION
Anesthesia Post Evaluation    Patient: Shakira Pearl    Procedure(s) Performed: Procedure(s) (LRB):  EGD (ESOPHAGOGASTRODUODENOSCOPY) (N/A)  COLONOSCOPY (N/A)    Final Anesthesia Type: general    Patient location during evaluation: GI PACU  Patient participation: Yes- Able to Participate  Level of consciousness: awake and alert and oriented  Post-procedure vital signs: reviewed and stable  Pain management: adequate  Airway patency: patent    PONV status at discharge: No PONV  Anesthetic complications: no      Cardiovascular status: blood pressure returned to baseline  Respiratory status: unassisted, spontaneous ventilation and room air  Hydration status: euvolemic  Follow-up not needed.          Vitals Value Taken Time   /78 09/09/20 1515   Temp 36.5 09/09/20 1540   Pulse 81 09/09/20 1515   Resp 20 09/09/20 1515   SpO2 98 % 09/09/20 1515         Event Time   Out of Recovery 15:39:26         Pain/Martha Score: Martha Score: 9 (9/9/2020  3:15 PM)

## 2020-09-09 NOTE — [employer] PATIENT INSTRUCTIONS
Discharge Summary/Instructions after an Endoscopic Procedure  Patient Name: Shakira Pearl  Patient MRN: [id number]  Patient YOB: 1945 Wednesday, September 9, 2020  Peter Cuevas MD  RESTRICTIONS:  During your procedure today, you received medications for sedation.  These   medications may affect your judgment, balance and coordination.  Therefore,   for 24 hours, you have the following restrictions:   - DO NOT drive a car, operate machinery, make legal/financial decisions,   sign important papers or drink alcohol.    ACTIVITY:  Today: no heavy lifting, straining or running due to procedural   sedation/anesthesia.  The following day: return to full activity including work.  DIET:  Eat and drink normally unless instructed otherwise.     TREATMENT FOR COMMON SIDE EFFECTS:  - Mild abdominal pain, nausea, belching, bloating or excessive gas:  rest,   eat lightly and use a heating pad.  - Sore Throat: treat with throat lozenges and/or gargle with warm salt   water.  - Because air was used during the procedure, expelling large amounts of air   from your rectum or belching is normal.  - If a bowel prep was taken, you may not have a bowel movement for 1-3 days.    This is normal.  SYMPTOMS TO WATCH FOR AND REPORT TO YOUR PHYSICIAN:  1. Abdominal pain or bloating, other than gas cramps.  2. Chest pain.  3. Back pain.  4. Signs of infection such as: chills or fever occurring within 24 hours   after the procedure.  5. Rectal bleeding, which would show as bright red, maroon, or black stools.   (A tablespoon of blood from the rectum is not serious, especially if   hemorrhoids are present.)  6. Vomiting.  7. Weakness or dizziness.  GO DIRECTLY TO THE NEAREST EMERGENCY ROOM IF YOU HAVE ANY OF THE FOLLOWING:      Difficulty breathing              Chills and/or fever over 101 F   Persistent vomiting and/or vomiting blood   Severe abdominal pain   Severe chest pain   Black, tarry stools   Bleeding- more than one  tablespoon   Any other symptom or condition that you feel may need urgent attention  Your doctor recommends these additional instructions:  If any biopsies were taken, your doctors clinic will contact you in 1 to 2   weeks with any results.  - Perform a colonoscopy now.   - See colonoscopy report for further details.  For questions, problems or results please call your physician - Peter Cuevas MD at Work:  [phone].  [facility], EMERGENCY ROOM PHONE NUMBER: [phone]  IF A COMPLICATION OR EMERGENCY SITUATION ARISES AND YOU ARE UNABLE TO REACH   YOUR PHYSICIAN - GO DIRECTLY TO THE EMERGENCY ROOM.  Peter Cuevas MD  9/9/2020 1:44:24 PM  This report has been verified and signed electronically.  [employer]

## 2020-09-09 NOTE — [employer]ATION PATIENT INSTRUCTIONS
Discharge Summary/Instructions after an Endoscopic Procedure  Patient Name: Shakira Pearl  Patient MRN: [id number]  Patient YOB: 1945 Wednesday, September 9, 2020  Peter Cuevas MD  RESTRICTIONS:  During your procedure today, you received medications for sedation.  These   medications may affect your judgment, balance and coordination.  Therefore,   for 24 hours, you have the following restrictions:   - DO NOT drive a car, operate machinery, make legal/financial decisions,   sign important papers or drink alcohol.    ACTIVITY:  Today: no heavy lifting, straining or running due to procedural   sedation/anesthesia.  The following day: return to full activity including work.  DIET:  Eat and drink normally unless instructed otherwise.     TREATMENT FOR COMMON SIDE EFFECTS:  - Mild abdominal pain, nausea, belching, bloating or excessive gas:  rest,   eat lightly and use a heating pad.  - Sore Throat: treat with throat lozenges and/or gargle with warm salt   water.  - Because air was used during the procedure, expelling large amounts of air   from your rectum or belching is normal.  - If a bowel prep was taken, you may not have a bowel movement for 1-3 days.    This is normal.  SYMPTOMS TO WATCH FOR AND REPORT TO YOUR PHYSICIAN:  1. Abdominal pain or bloating, other than gas cramps.  2. Chest pain.  3. Back pain.  4. Signs of infection such as: chills or fever occurring within 24 hours   after the procedure.  5. Rectal bleeding, which would show as bright red, maroon, or black stools.   (A tablespoon of blood from the rectum is not serious, especially if   hemorrhoids are present.)  6. Vomiting.  7. Weakness or dizziness.  GO DIRECTLY TO THE NEAREST EMERGENCY ROOM IF YOU HAVE ANY OF THE FOLLOWING:      Difficulty breathing              Chills and/or fever over 101 F   Persistent vomiting and/or vomiting blood   Severe abdominal pain   Severe chest pain   Black, tarry stools   Bleeding- more than one  tablespoon   Any other symptom or condition that you feel may need urgent attention  Your doctor recommends these additional instructions:  If any biopsies were taken, your doctors clinic will contact you in 1 to 2   weeks with any results.  - Discharge patient to home.   - Patient has a contact number available for emergencies.  The signs and   symptoms of potential delayed complications were discussed with the   patient.  Return to normal activities tomorrow.  Written discharge   instructions were provided to the patient.   - Resume previous diet.   - Continue present medications.   - Await pathology results.   - Telephone GI clinic for pathology results in 1 week.   - Repeat colonoscopy in 3 years for surveillance.   - Return to referring physician.   For questions, problems or results please call your physician - Peter Cuevas MD at Work:  [phone].  [facility], EMERGENCY ROOM PHONE NUMBER: [phone]  IF A COMPLICATION OR EMERGENCY SITUATION ARISES AND YOU ARE UNABLE TO REACH   YOUR PHYSICIAN - GO DIRECTLY TO THE EMERGENCY ROOM.  Peter Cuevas MD  9/9/2020 2:46:31 PM  This report has been verified and signed electronically.  [employer]

## 2020-09-09 NOTE — TRANSFER OF CARE
"Anesthesia Transfer of Care Note    Patient: Shakira Pearl    Procedure(s) Performed: Procedure(s) (LRB):  EGD (ESOPHAGOGASTRODUODENOSCOPY) (N/A)  COLONOSCOPY (N/A)    Patient location: PACU    Anesthesia Type: general    Transport from OR: Transported from OR on 6-10 L/min O2 by face mask with adequate spontaneous ventilation    Post pain: adequate analgesia    Post assessment: tolerated procedure well and no apparent anesthetic complications    Post vital signs: stable    Level of consciousness: awake and alert    Nausea/Vomiting: no nausea/vomiting    Complications: none    Transfer of care protocol was followed      Last vitals:   Visit Vitals  BP (!) 145/78 (BP Location: Left arm, Patient Position: Lying)   Pulse 89   Temp 36.7 °C (98 °F) (Temporal)   Resp 20   Ht 5' 3" (1.6 m)   Wt 65.8 kg (145 lb)   SpO2 97%   Breastfeeding No   BMI 25.69 kg/m²     "

## 2020-09-10 ENCOUNTER — TELEPHONE (OUTPATIENT)
Dept: INTERNAL MEDICINE | Facility: CLINIC | Age: 75
End: 2020-09-10

## 2020-09-10 NOTE — TELEPHONE ENCOUNTER
----- Message from Tracee Killian sent at 9/10/2020  8:28 AM CDT -----  Contact: [phone]  Patient is requesting a call back from the nurse to follow up on her b/p. Please call and advise

## 2020-09-10 NOTE — TELEPHONE ENCOUNTER
Cardiology referral, 200/ 86 during colonoscopy; has another appt will callback to schedule to see you

## 2020-09-11 NOTE — TELEPHONE ENCOUNTER
patient called ,I reported we had sent the request for a Cardiology referral, Instructed she would need to be seen re: HBP

## 2020-09-18 LAB
FINAL PATHOLOGIC DIAGNOSIS: NORMAL
GROSS: NORMAL

## 2020-09-19 NOTE — Clinical Note
Future appt:    Last Appointment with provider:   Visit date not found  Last appointment at [facility]:  Visit date not found  Pt had virtual phone visit: 5/21/20, 4/22/20  Last px:  12/27/19     Oxybutynin refilled on 3/16/20 for 6 month supply    Left May proceed with surgery

## 2020-09-29 ENCOUNTER — PATIENT MESSAGE (OUTPATIENT)
Dept: OTHER | Facility: OTHER | Age: 75
End: 2020-09-29

## 2020-10-05 ENCOUNTER — TELEPHONE (OUTPATIENT)
Dept: INTERNAL MEDICINE | Facility: CLINIC | Age: 75
End: 2020-10-05

## 2020-10-05 NOTE — TELEPHONE ENCOUNTER
Left msg to check online tonite or call before 830 tomorrow and dr will have openings tomorrow that open up last minute.

## 2020-10-05 NOTE — TELEPHONE ENCOUNTER
----- Message from Yanira Issa sent at 10/5/2020  2:17 PM CDT -----  Regarding: appt  Patient Requesting Sooner Appointment.     Reason for sooner appt.: blood pressure     When is the first available appointment? N/a    Communication Preference: [phone]    Additional Information:   Pt is requesting a call back to schedule an appt with the provider for her pressure.

## 2020-10-06 ENCOUNTER — OFFICE VISIT (OUTPATIENT)
Dept: INTERNAL MEDICINE | Facility: CLINIC | Age: 75
End: 2020-10-06
Payer: MEDICARE

## 2020-10-06 VITALS
BODY MASS INDEX: 27.11 KG/M2 | DIASTOLIC BLOOD PRESSURE: 88 MMHG | TEMPERATURE: 98 F | HEART RATE: 84 BPM | WEIGHT: 153 LBS | SYSTOLIC BLOOD PRESSURE: 160 MMHG | RESPIRATION RATE: 16 BRPM | OXYGEN SATURATION: 98 % | HEIGHT: 63 IN

## 2020-10-06 DIAGNOSIS — R60.0 LEG EDEMA, LEFT: ICD-10-CM

## 2020-10-06 DIAGNOSIS — M79.662 PAIN OF LEFT CALF: ICD-10-CM

## 2020-10-06 DIAGNOSIS — I10 ESSENTIAL HYPERTENSION: Primary | ICD-10-CM

## 2020-10-06 PROCEDURE — 3079F DIAST BP 80-89 MM HG: CPT | Mod: HCNC,CPTII,S$GLB, | Performed by: INTERNAL MEDICINE

## 2020-10-06 PROCEDURE — 99999 PR PBB SHADOW E&M-EST. PATIENT-LVL IV: CPT | Mod: PBBFAC,HCNC,, | Performed by: INTERNAL MEDICINE

## 2020-10-06 PROCEDURE — 90694 VACC AIIV4 NO PRSRV 0.5ML IM: CPT | Mod: HCNC,S$GLB,, | Performed by: INTERNAL MEDICINE

## 2020-10-06 PROCEDURE — 1101F PR PT FALLS ASSESS DOC 0-1 FALLS W/OUT INJ PAST YR: ICD-10-PCS | Mod: HCNC,CPTII,S$GLB, | Performed by: INTERNAL MEDICINE

## 2020-10-06 PROCEDURE — 1126F AMNT PAIN NOTED NONE PRSNT: CPT | Mod: HCNC,S$GLB,, | Performed by: INTERNAL MEDICINE

## 2020-10-06 PROCEDURE — G0008 FLU VACCINE - QUADRIVALENT - ADJUVANTED: ICD-10-PCS | Mod: HCNC,S$GLB,, | Performed by: INTERNAL MEDICINE

## 2020-10-06 PROCEDURE — 99999 PR PBB SHADOW E&M-EST. PATIENT-LVL IV: ICD-10-PCS | Mod: PBBFAC,HCNC,, | Performed by: INTERNAL MEDICINE

## 2020-10-06 PROCEDURE — 3077F SYST BP >= 140 MM HG: CPT | Mod: HCNC,CPTII,S$GLB, | Performed by: INTERNAL MEDICINE

## 2020-10-06 PROCEDURE — 3079F PR MOST RECENT DIASTOLIC BLOOD PRESSURE 80-89 MM HG: ICD-10-PCS | Mod: HCNC,CPTII,S$GLB, | Performed by: INTERNAL MEDICINE

## 2020-10-06 PROCEDURE — 1101F PT FALLS ASSESS-DOCD LE1/YR: CPT | Mod: HCNC,CPTII,S$GLB, | Performed by: INTERNAL MEDICINE

## 2020-10-06 PROCEDURE — 99214 OFFICE O/P EST MOD 30 MIN: CPT | Mod: HCNC,25,S$GLB, | Performed by: INTERNAL MEDICINE

## 2020-10-06 PROCEDURE — 1126F PR PAIN SEVERITY QUANTIFIED, NO PAIN PRESENT: ICD-10-PCS | Mod: HCNC,S$GLB,, | Performed by: INTERNAL MEDICINE

## 2020-10-06 PROCEDURE — 1159F MED LIST DOCD IN RCRD: CPT | Mod: HCNC,S$GLB,, | Performed by: INTERNAL MEDICINE

## 2020-10-06 PROCEDURE — 3077F PR MOST RECENT SYSTOLIC BLOOD PRESSURE >= 140 MM HG: ICD-10-PCS | Mod: HCNC,CPTII,S$GLB, | Performed by: INTERNAL MEDICINE

## 2020-10-06 PROCEDURE — G0008 ADMIN INFLUENZA VIRUS VAC: HCPCS | Mod: HCNC,S$GLB,, | Performed by: INTERNAL MEDICINE

## 2020-10-06 PROCEDURE — 90694 FLU VACCINE - QUADRIVALENT - ADJUVANTED: ICD-10-PCS | Mod: HCNC,S$GLB,, | Performed by: INTERNAL MEDICINE

## 2020-10-06 PROCEDURE — 99214 PR OFFICE/OUTPT VISIT, EST, LEVL IV, 30-39 MIN: ICD-10-PCS | Mod: HCNC,25,S$GLB, | Performed by: INTERNAL MEDICINE

## 2020-10-06 PROCEDURE — 1159F PR MEDICATION LIST DOCUMENTED IN MEDICAL RECORD: ICD-10-PCS | Mod: HCNC,S$GLB,, | Performed by: INTERNAL MEDICINE

## 2020-10-06 RX ORDER — LOSARTAN POTASSIUM AND HYDROCHLOROTHIAZIDE 12.5; 1 MG/1; MG/1
1 TABLET ORAL DAILY
Qty: 30 TABLET | Refills: 0 | Status: SHIPPED | OUTPATIENT
Start: 2020-10-06 | End: 2020-11-18

## 2020-10-06 NOTE — PROGRESS NOTES
Subjective:       Patient ID: Shakira Pearl is a 75 y.o. female.    Chief Complaint: Blood Pressure Check and Flu Vaccine    HPI   Pt here for f/u regarding elevated blood pressures over the last month or so. She is currently on Losartan.     Pt also c/o left posterior calf pain with slight swelling which has been getting worse over the last few months. No hx of DVT/PE or CHF.   Review of Systems   Constitutional: Negative for activity change, appetite change, chills, diaphoresis, fatigue, fever and unexpected weight change.   HENT: Negative for postnasal drip, rhinorrhea, sinus pressure/congestion, sneezing, sore throat, trouble swallowing and voice change.    Respiratory: Negative for cough, shortness of breath and wheezing.    Cardiovascular: Positive for leg swelling. Negative for chest pain and palpitations.   Gastrointestinal: Negative for abdominal pain, blood in stool, constipation, diarrhea, nausea and vomiting.   Genitourinary: Negative for dysuria.   Musculoskeletal: Negative for arthralgias and myalgias.   Integumentary:  Negative for rash and wound.   Allergic/Immunologic: Negative for environmental allergies and food allergies.   Hematological: Negative for adenopathy. Does not bruise/bleed easily.         Objective:      Physical Exam  Constitutional:       General: She is not in acute distress.     Appearance: She is well-developed. She is not diaphoretic.   HENT:      Head: Normocephalic and atraumatic.      Right Ear: External ear normal.      Left Ear: External ear normal.      Nose: Nose normal.      Mouth/Throat:      Pharynx: No oropharyngeal exudate.   Eyes:      General: No scleral icterus.        Right eye: No discharge.         Left eye: No discharge.      Conjunctiva/sclera: Conjunctivae normal.      Pupils: Pupils are equal, round, and reactive to light.   Neck:      Musculoskeletal: Neck supple.      Vascular: No JVD.   Cardiovascular:      Rate and Rhythm: Normal rate and regular  rhythm.      Heart sounds: Normal heart sounds.   Pulmonary:      Effort: Pulmonary effort is normal. No respiratory distress.      Breath sounds: Normal breath sounds. No wheezing or rales.   Abdominal:      General: Bowel sounds are normal.      Palpations: Abdomen is soft.   Lymphadenopathy:      Cervical: No cervical adenopathy.   Skin:     General: Skin is warm and dry.      Coloration: Skin is not pale.      Findings: No rash.   Neurological:      Mental Status: She is alert and oriented to person, place, and time.         Assessment:       1. Essential hypertension    2. Leg edema, left    3. Pain of left calf        Plan:    1. Increase Losartan to Hyzaar 100-12.5 mg daily    2/3. STAT LE US to r/o DVT    4. F/u in 2 wks for HTN

## 2020-10-07 ENCOUNTER — CLINICAL SUPPORT (OUTPATIENT)
Dept: CARDIOLOGY | Facility: CLINIC | Age: 75
End: 2020-10-07
Attending: INTERNAL MEDICINE
Payer: MEDICARE

## 2020-10-07 DIAGNOSIS — M79.662 PAIN OF LEFT CALF: ICD-10-CM

## 2020-10-07 DIAGNOSIS — R60.0 LEG EDEMA, LEFT: ICD-10-CM

## 2020-10-07 PROCEDURE — 93971 EXTREMITY STUDY: CPT | Mod: HCNC,LT,S$GLB, | Performed by: INTERNAL MEDICINE

## 2020-10-07 PROCEDURE — 93971 CV US DOPPLER VENOUS LEG LEFT (CUPID ONLY): ICD-10-PCS | Mod: HCNC,LT,S$GLB, | Performed by: INTERNAL MEDICINE

## 2020-10-08 ENCOUNTER — TELEPHONE (OUTPATIENT)
Dept: GASTROENTEROLOGY | Facility: CLINIC | Age: 75
End: 2020-10-08

## 2020-10-08 NOTE — TELEPHONE ENCOUNTER
----- Message from Mone Mendoza MA sent at 9/16/2020  3:54 PM CDT -----  Regarding: FW: appointment  Patient needs a follow up appointment with Dr. Black. Not urgent  ----- Message -----  From: Peter Cuevas MD  Sent: 9/9/2020   2:47 PM CDT  To: [facility] Staff  Subject: appointment                                      This is Dr. Black's patient who I performed an EGD and colonoscopy on today. She needs follow-up in his clinic for history of GERD, next available is fine, not urgent.

## 2020-10-24 ENCOUNTER — TELEPHONE (OUTPATIENT)
Dept: ENDOSCOPY | Facility: HOSPITAL | Age: 75
End: 2020-10-24

## 2020-10-27 DIAGNOSIS — M25.551 RIGHT HIP PAIN: Primary | ICD-10-CM

## 2020-10-28 ENCOUNTER — TELEPHONE (OUTPATIENT)
Dept: ORTHOPEDICS | Facility: CLINIC | Age: 75
End: 2020-10-28

## 2020-10-28 NOTE — TELEPHONE ENCOUNTER
Tried calling patient back no answer. LvM             ----- Message from Thierry Rivera sent at 10/28/2020 10:50 AM CDT -----  Contact: self  Please call pt she can't make the 10:30 appt she's with a pt can she come later     Contact [phone]

## 2020-10-28 NOTE — TELEPHONE ENCOUNTER
Spoke with pt about r/s her appt due to the weather. She states she will call back as soon as she can she was driving.

## 2020-11-02 ENCOUNTER — TELEPHONE (OUTPATIENT)
Dept: ENDOSCOPY | Facility: HOSPITAL | Age: 75
End: 2020-11-02

## 2020-11-10 ENCOUNTER — PATIENT OUTREACH (OUTPATIENT)
Dept: ADMINISTRATIVE | Facility: HOSPITAL | Age: 75
End: 2020-11-10

## 2020-11-16 ENCOUNTER — PATIENT OUTREACH (OUTPATIENT)
Dept: ADMINISTRATIVE | Facility: HOSPITAL | Age: 75
End: 2020-11-16

## 2020-11-16 NOTE — PROGRESS NOTES
Called patient to follow up on blood pressure. Patient states it still has been elevated. Appt scheduled.

## 2020-12-04 DIAGNOSIS — M25.551 RIGHT HIP PAIN: Primary | ICD-10-CM

## 2020-12-11 ENCOUNTER — PATIENT MESSAGE (OUTPATIENT)
Dept: OTHER | Facility: OTHER | Age: 75
End: 2020-12-11

## 2020-12-21 ENCOUNTER — HOSPITAL ENCOUNTER (OUTPATIENT)
Dept: RADIOLOGY | Facility: HOSPITAL | Age: 75
Discharge: HOME | End: 2020-12-21
Attending: ORTHOPAEDIC SURGERY
Payer: MEDICARE

## 2020-12-21 ENCOUNTER — OFFICE VISIT (OUTPATIENT)
Dept: ORTHOPEDICS | Facility: CLINIC | Age: 75
End: 2020-12-21
Payer: MEDICARE

## 2020-12-21 VITALS — HEIGHT: 63 IN | WEIGHT: 154.88 LBS | BODY MASS INDEX: 27.44 KG/M2

## 2020-12-21 DIAGNOSIS — M51.9 LUMBAR DISC DISEASE: Primary | ICD-10-CM

## 2020-12-21 DIAGNOSIS — M25.551 RIGHT HIP PAIN: ICD-10-CM

## 2020-12-21 DIAGNOSIS — M25.551 BILATERAL HIP PAIN: ICD-10-CM

## 2020-12-21 DIAGNOSIS — M25.551 BILATERAL HIP PAIN: Primary | ICD-10-CM

## 2020-12-21 DIAGNOSIS — M25.552 BILATERAL HIP PAIN: ICD-10-CM

## 2020-12-21 DIAGNOSIS — M48.062 SPINAL STENOSIS OF LUMBAR REGION WITH NEUROGENIC CLAUDICATION: ICD-10-CM

## 2020-12-21 DIAGNOSIS — M25.552 BILATERAL HIP PAIN: Primary | ICD-10-CM

## 2020-12-21 PROCEDURE — 1125F PR PAIN SEVERITY QUANTIFIED, PAIN PRESENT: ICD-10-PCS | Mod: HCNC,S$GLB,, | Performed by: ORTHOPAEDIC SURGERY

## 2020-12-21 PROCEDURE — 73521 XR HIPS BILATERAL 2 VIEW INCL AP PELVIS: ICD-10-PCS | Mod: 26,HCNC,, | Performed by: RADIOLOGY

## 2020-12-21 PROCEDURE — 99999 PR PBB SHADOW E&M-EST. PATIENT-LVL III: CPT | Mod: PBBFAC,HCNC,, | Performed by: ORTHOPAEDIC SURGERY

## 2020-12-21 PROCEDURE — 1159F PR MEDICATION LIST DOCUMENTED IN MEDICAL RECORD: ICD-10-PCS | Mod: HCNC,S$GLB,, | Performed by: ORTHOPAEDIC SURGERY

## 2020-12-21 PROCEDURE — 1101F PT FALLS ASSESS-DOCD LE1/YR: CPT | Mod: HCNC,CPTII,S$GLB, | Performed by: ORTHOPAEDIC SURGERY

## 2020-12-21 PROCEDURE — 1159F MED LIST DOCD IN RCRD: CPT | Mod: HCNC,S$GLB,, | Performed by: ORTHOPAEDIC SURGERY

## 2020-12-21 PROCEDURE — 99203 PR OFFICE/OUTPT VISIT, NEW, LEVL III, 30-44 MIN: ICD-10-PCS | Mod: HCNC,S$GLB,, | Performed by: ORTHOPAEDIC SURGERY

## 2020-12-21 PROCEDURE — 3288F PR FALLS RISK ASSESSMENT DOCUMENTED: ICD-10-PCS | Mod: HCNC,CPTII,S$GLB, | Performed by: ORTHOPAEDIC SURGERY

## 2020-12-21 PROCEDURE — 73521 X-RAY EXAM HIPS BI 2 VIEWS: CPT | Mod: TC,HCNC

## 2020-12-21 PROCEDURE — 99999 PR PBB SHADOW E&M-EST. PATIENT-LVL III: ICD-10-PCS | Mod: PBBFAC,HCNC,, | Performed by: ORTHOPAEDIC SURGERY

## 2020-12-21 PROCEDURE — 99203 OFFICE O/P NEW LOW 30 MIN: CPT | Mod: HCNC,S$GLB,, | Performed by: ORTHOPAEDIC SURGERY

## 2020-12-21 PROCEDURE — 1101F PR PT FALLS ASSESS DOC 0-1 FALLS W/OUT INJ PAST YR: ICD-10-PCS | Mod: HCNC,CPTII,S$GLB, | Performed by: ORTHOPAEDIC SURGERY

## 2020-12-21 PROCEDURE — 73521 X-RAY EXAM HIPS BI 2 VIEWS: CPT | Mod: 26,HCNC,, | Performed by: RADIOLOGY

## 2020-12-21 PROCEDURE — 3288F FALL RISK ASSESSMENT DOCD: CPT | Mod: HCNC,CPTII,S$GLB, | Performed by: ORTHOPAEDIC SURGERY

## 2020-12-21 PROCEDURE — 1125F AMNT PAIN NOTED PAIN PRSNT: CPT | Mod: HCNC,S$GLB,, | Performed by: ORTHOPAEDIC SURGERY

## 2020-12-21 NOTE — PROGRESS NOTES
Subjective:      Patient ID: Shakira Pearl is a 75 y.o. female.    Chief Complaint: Pain of the Left Hip and Pain of the Right Hip    HPI  Shakira Pearl is a 75 year old female here with a several month history of bilateral hip pain. Right is worse than left.  The patient is a  retiree. There was not a history of trauma.  The pain is moderate The pain is located in the groin and SI.  There is is radiation.  The pain radiates to the her thighs and legs. She has cramping which is worse when she walks.  The pain is described as achy and a heaviness. The patient has not had prior hip or spinesurgery. It is aggravated by walking.  It  is not alleviated by rest. There is numbness or tingling of the lower extremity.  There is back pain.  She  has tried medications or injections. They have not helped.  She does have difficulty getting in or out of a car, getting dressed, or going up or down stairs.  The patient does not use an assistive device.    Past Medical History:   Diagnosis Date    Allergy sinus    Arthritis back    Colon polyps     Degenerative disc disease back    Diverticulosis of colon     Dyspepsia     GERD (gastroesophageal reflux disease)     Heartburn     Hemorrhoids, internal     Hiatal hernia     Neuromuscular disorder     Vitamin D deficiency      Past Surgical History:   Procedure Laterality Date    APPENDECTOMY  age 19    CHOLECYSTECTOMY      age of 25    CHONDROPLASTY OF KNEE Left 10/3/2019    Procedure: CHONDROPLASTY, KNEE;  Surgeon: Kaylen Patiño MD;  Location: [facility];  Service: Orthopedics;  Laterality: Left;    COLONOSCOPY N/A 9/9/2020    Procedure: COLONOSCOPY;  Surgeon: Peter Cuevas MD;  Location: [address]);  Service: Endoscopy;  Laterality: N/A;  device assisted colonoscopy w/Dr Cuevas.  Difficult colon, multiple adhesions from abd surgeries, Hx adv adenomatous polyp/tubulovillous adenoma of cecum in April 2011.  Dr Black     per Dr Costello for 4th floor - 60  minute slot (90 min w/egd added).  Start with peds col    ESOPHAGOGASTRODUODENOSCOPY N/A 9/9/2020    Procedure: EGD (ESOPHAGOGASTRODUODENOSCOPY);  Surgeon: Peter Cuevas MD;  Location: [address]);  Service: Endoscopy;  Laterality: N/A;  per Dr Black-add EGD to colonoscopy order.  Pt requesting later appt.  4/13/20 - removed from 4/30/20, rescheduled 7/29/20 - pg   covid 9/6-met-urgent care--tb    HYSTERECTOMY  38    INJECTION OF JOINT Bilateral 2/18/2019    Procedure: INJECTION, JOINT BILATERAL SI;  Surgeon: Mert Palumbo MD;  Location: [facility];  Service: Pain Management;  Laterality: Bilateral;  BILATERAL SI JOINT INJECTION    INJECTION OF JOINT Right 8/20/2020    Procedure: INJECTION JOINT/ R HIP DIRECT REFERRAL * DR. PALUMBO ONLY PLEASE*;  Surgeon: Mert Palumbo MD;  Location: [facility];  Service: Pain Management;  Laterality: Right;  NEED CONSENT    KNEE ARTHROSCOPY W/ MENISCECTOMY Left 10/3/2019    Procedure: ARTHROSCOPY, KNEE, WITH MENISCECTOMY;  Surgeon: Kaylen Patiño MD;  Location: [facility] OR;  Service: Orthopedics;  Laterality: Left;    SYNOVECTOMY OF KNEE Left 10/3/2019    Procedure: SYNOVECTOMY, KNEE;  Surgeon: Kaylen Patiño MD;  Location: [facility] OR;  Service: Orthopedics;  Laterality: Left;     Family History   Problem Relation Age of Onset    Heart disease Mother 67        heart attack    Cancer Father 65        abdominal    Stomach cancer Father     No Known Problems Sister     No Known Problems Brother     No Known Problems Son     No Known Problems Maternal Grandmother     No Known Problems Maternal Grandfather     No Known Problems Paternal Grandmother     No Known Problems Paternal Grandfather     No Known Problems Maternal Aunt     No Known Problems Maternal Uncle     No Known Problems Paternal Aunt     No Known Problems Paternal Uncle     Celiac disease Neg Hx     Colon cancer Neg Hx     Crohn's disease Neg Hx     Esophageal cancer Neg Hx     Inflammatory  bowel disease Neg Hx     Liver cancer Neg Hx     Rectal cancer Neg Hx     Ulcerative colitis Neg Hx     Amblyopia Neg Hx     Blindness Neg Hx     Cataracts Neg Hx     Diabetes Neg Hx     Glaucoma Neg Hx     Hypertension Neg Hx     Macular degeneration Neg Hx     Retinal detachment Neg Hx     Strabismus Neg Hx     Stroke Neg Hx     Thyroid disease Neg Hx     Anesthesia problems Neg Hx      Social History     Socioeconomic History    Marital status:      Spouse name: Not on file    Number of children: 1    Years of education: Not on file    Highest education level: Not on file   Occupational History    Occupation: retired   Social Needs    Financial resource strain: Not on file    Food insecurity     Worry: Not on file     Inability: Not on file    Transportation needs     Medical: Not on file     Non-medical: Not on file   Tobacco Use    Smoking status: Never Smoker    Smokeless tobacco: Never Used   Substance and Sexual Activity    Alcohol use: No    Drug use: No    Sexual activity: Not Currently   Lifestyle    Physical activity     Days per week: Not on file     Minutes per session: Not on file    Stress: Not on file   Relationships    Social connections     Talks on phone: Not on file     Gets together: Not on file     Attends Yarsani service: Not on file     Active member of club or organization: Not on file     Attends meetings of clubs or organizations: Not on file     Relationship status: Not on file   Other Topics Concern    Not on file   Social History Narrative    Not on file     Current Outpatient Medications on File Prior to Visit   Medication Sig Dispense Refill    DULoxetine (CYMBALTA) 60 MG capsule Take 1 capsule (60 mg total) by mouth once daily. 30 capsule 11    estradiol valerate (DELESTROGEN) 40 mg/mL injection Inject 0.5 mLs (20 mg total) into the muscle every 28 days. Inject into the muscle. 5 mL 12    losartan-hydrochlorothiazide 100-12.5 mg (HYZAAR)  "100-12.5 mg Tab TAKE ONE TABLET BY MOUTH EVERY DAY 30 tablet 0    omega-3 acid ethyl esters (LOVAZA) 1 gram capsule Take 2 capsules (2 g total) by mouth 2 (two) times daily. 360 capsule 3    pantoprazole (PROTONIX) 40 MG tablet Take 1 tablet (40 mg total) by mouth before breakfast. Take one pill every morning 45 minutes before breakfast in the morning. 90 tablet 3     No current facility-administered medications on file prior to visit.      Review of patient's allergies indicates:   Allergen Reactions    Demerol [meperidine] Nausea And Vomiting     Other reaction(s): Nausea    Papaya      Illness vomiting    Sulfa (sulfonamide antibiotics) Rash    Sulfur Rash       Review of Systems   Constitution: Negative for chills, fever and night sweats.   HENT: Negative for hearing loss.    Eyes: Negative for blurred vision and double vision.   Cardiovascular: Negative for chest pain, claudication and leg swelling.   Respiratory: Negative for shortness of breath.    Endocrine: Negative for polydipsia, polyphagia and polyuria.   Hematologic/Lymphatic: Negative for adenopathy and bleeding problem. Does not bruise/bleed easily.   Skin: Negative for poor wound healing.   Gastrointestinal: Negative for diarrhea and heartburn.   Genitourinary: Negative for bladder incontinence.   Neurological: Negative for focal weakness, headaches, numbness, paresthesias and sensory change.   Psychiatric/Behavioral: The patient is not nervous/anxious.    Allergic/Immunologic: Negative for persistent infections.         Objective:      Body mass index is 27.43 kg/m².  Vitals:    12/21/20 1126   Weight: 70.2 kg (154 lb 14 oz)   Height: 5' 3" (1.6 m)         General    Constitutional: She is oriented to person, place, and time. She appears well-developed and well-nourished.   HENT:   Head: Normocephalic and atraumatic.   Eyes: EOM are normal.   Cardiovascular: Normal rate.    Pulmonary/Chest: Effort normal.   Neurological: She is alert and " oriented to person, place, and time.   Psychiatric: She has a normal mood and affect. Her behavior is normal.     General Musculoskeletal Exam   Gait: normal   Pelvic Obliquity: none      Right Knee Exam     Inspection   Alignment:  normal  Effusion: absent    Left Knee Exam     Inspection   Alignment:  normal  Effusion: absent    Right Hip Exam     Inspection   Scars: absent  Swelling: absent  Bruising: absent  No deformity of hip.  Quadriceps Atrophy:  Negative  Erythema: absent    Range of Motion   Abduction: 25   Adduction: 20   Extension: 0   Flexion: 100   External rotation: 30   Internal rotation: 25     Tests   Pain w/ forced internal rotation (ELI): absent  Stinchfield test: negative    Other   Sensation: normal  Left Hip Exam     Inspection   Scars: absent  Swelling: absent  No deformity of hip.  Quadriceps Atrophy:  negative  Erythema: absent  Bruising: absent    Range of Motion   Abduction: 25   Adduction: 20   Extension: 0   Flexion: 100   External rotation: 30   Internal rotation: 25     Tests   Pain w/ forced internal rotation (ELI): absent  Stinchfield test: negative    Other   Sensation: normal      Back (L-Spine & T-Spine) / Neck (C-Spine) Exam     Back (L-Spine & T-Spine) Range of Motion   The patient has abnormal back ROM.      Muscle Strength   Right Lower Extremity   Hip Abduction: 5/5   Hip Adduction: 5/5   Hip Flexion: 5/5   Ankle Dorsiflexion:  5/5   Left Lower Extremity   Hip Abduction: 5/5   Hip Adduction: 5/5   Hip Flexion: 5/5   Ankle Dorsiflexion:  5/5     Reflexes     Left Side  Quadriceps:  2+    Right Side   Quadriceps:  2+    Vascular Exam     Right Pulses  Dorsalis Pedis:      2+          Left Pulses  Dorsalis Pedis:      2+          Capillary Refill  Right Hand: normal capillary refill  Left Hand: normal capillary refill    Edema  Right Upper Leg: absent  Left Upper Leg: absent      Radiographs taken today and reviewed by me  demonstrate mild arthritic change of the bilateral  hip(s).There  is not bone destruction.  There is not a fracture.  The visualized portion of the lumbar spine demonstrates moderately severe arthritic change.   Prior lumbar MRI demostrated spinal stenosis        Assessment:       Encounter Diagnoses   Name Primary?    Lumbar disc disease Yes    Spinal stenosis of lumbar region with neurogenic claudication           Plan:       Shakira was seen today for pain and pain.    Diagnoses and all orders for this visit:    Lumbar disc disease    Spinal stenosis of lumbar region with neurogenic claudication      Dr. Simpson for further eval/treatment of lumbar stenosis

## 2020-12-29 DIAGNOSIS — K21.9 GASTROESOPHAGEAL REFLUX DISEASE: ICD-10-CM

## 2020-12-29 DIAGNOSIS — I10 ESSENTIAL HYPERTENSION: ICD-10-CM

## 2020-12-29 RX ORDER — LOSARTAN POTASSIUM AND HYDROCHLOROTHIAZIDE 12.5; 1 MG/1; MG/1
TABLET ORAL
Qty: 30 TABLET | Refills: 0 | Status: SHIPPED | OUTPATIENT
Start: 2020-12-29 | End: 2021-02-10

## 2020-12-29 RX ORDER — PANTOPRAZOLE SODIUM 40 MG/1
TABLET, DELAYED RELEASE ORAL
Qty: 90 TABLET | Refills: 3 | Status: SHIPPED | OUTPATIENT
Start: 2020-12-29 | End: 2021-05-07 | Stop reason: SDUPTHER

## 2020-12-29 NOTE — TELEPHONE ENCOUNTER
No new care gaps identified.  Powered by [employer]. Reference number: [id number]. 12/29/2020 4:28:21 PM   CST

## 2021-01-10 ENCOUNTER — IMMUNIZATION (OUTPATIENT)
Dept: INTERNAL MEDICINE | Facility: CLINIC | Age: 76
End: 2021-01-10
Payer: MEDICARE

## 2021-01-10 DIAGNOSIS — Z23 NEED FOR VACCINATION: ICD-10-CM

## 2021-01-10 PROCEDURE — 91300 COVID-19, MRNA, LNP-S, PF, 30 MCG/0.3 ML DOSE VACCINE: CPT | Mod: PBBFAC | Performed by: INTERNAL MEDICINE

## 2021-01-19 ENCOUNTER — TELEPHONE (OUTPATIENT)
Dept: INTERNAL MEDICINE | Facility: CLINIC | Age: 76
End: 2021-01-19

## 2021-01-19 ENCOUNTER — PATIENT OUTREACH (OUTPATIENT)
Dept: ADMINISTRATIVE | Facility: HOSPITAL | Age: 76
End: 2021-01-19

## 2021-01-19 DIAGNOSIS — Z00.00 ANNUAL PHYSICAL EXAM: ICD-10-CM

## 2021-01-19 DIAGNOSIS — E55.9 VITAMIN D DEFICIENCY: ICD-10-CM

## 2021-01-19 DIAGNOSIS — I10 ESSENTIAL HYPERTENSION: Primary | ICD-10-CM

## 2021-01-19 DIAGNOSIS — E78.5 HYPERLIPIDEMIA, UNSPECIFIED HYPERLIPIDEMIA TYPE: ICD-10-CM

## 2021-01-26 ENCOUNTER — LAB VISIT (OUTPATIENT)
Dept: LAB | Facility: HOSPITAL | Age: 76
End: 2021-01-26
Attending: INTERNAL MEDICINE
Payer: MEDICARE

## 2021-01-26 DIAGNOSIS — Z00.00 ANNUAL PHYSICAL EXAM: ICD-10-CM

## 2021-01-26 DIAGNOSIS — E78.5 HYPERLIPIDEMIA, UNSPECIFIED HYPERLIPIDEMIA TYPE: ICD-10-CM

## 2021-01-26 DIAGNOSIS — I10 ESSENTIAL HYPERTENSION: ICD-10-CM

## 2021-01-26 DIAGNOSIS — E55.9 VITAMIN D DEFICIENCY: ICD-10-CM

## 2021-01-26 LAB
25(OH)D3+25(OH)D2 SERPL-MCNC: 32 NG/ML (ref 30–96)
ALBUMIN SERPL BCP-MCNC: 3.4 G/DL (ref 3.5–5.2)
ALP SERPL-CCNC: 57 U/L (ref 55–135)
ALT SERPL W/O P-5'-P-CCNC: 16 U/L (ref 10–44)
ANION GAP SERPL CALC-SCNC: 10 MMOL/L (ref 8–16)
AST SERPL-CCNC: 16 U/L (ref 10–40)
BASOPHILS # BLD AUTO: 0.03 K/UL (ref 0–0.2)
BASOPHILS NFR BLD: 0.4 % (ref 0–1.9)
BILIRUB SERPL-MCNC: 0.4 MG/DL (ref 0.1–1)
BUN SERPL-MCNC: 16 MG/DL (ref 8–23)
CALCIUM SERPL-MCNC: 8.2 MG/DL (ref 8.7–10.5)
CHLORIDE SERPL-SCNC: 100 MMOL/L (ref 95–110)
CHOLEST SERPL-MCNC: 151 MG/DL (ref 120–199)
CHOLEST/HDLC SERPL: 3 {RATIO} (ref 2–5)
CO2 SERPL-SCNC: 29 MMOL/L (ref 23–29)
CREAT SERPL-MCNC: 0.8 MG/DL (ref 0.5–1.4)
DIFFERENTIAL METHOD: ABNORMAL
EOSINOPHIL # BLD AUTO: 0.1 K/UL (ref 0–0.5)
EOSINOPHIL NFR BLD: 1.6 % (ref 0–8)
ERYTHROCYTE [DISTWIDTH] IN BLOOD BY AUTOMATED COUNT: 13.2 % (ref 11.5–14.5)
EST. GFR  (AFRICAN AMERICAN): >60 ML/MIN/1.73 M^2
EST. GFR  (NON AFRICAN AMERICAN): >60 ML/MIN/1.73 M^2
GLUCOSE SERPL-MCNC: 107 MG/DL (ref 70–110)
HCT VFR BLD AUTO: 34.5 % (ref 37–48.5)
HDLC SERPL-MCNC: 50 MG/DL (ref 40–75)
HDLC SERPL: 33.1 % (ref 20–50)
HGB BLD-MCNC: 11.3 G/DL (ref 12–16)
IMM GRANULOCYTES # BLD AUTO: 0.04 K/UL (ref 0–0.04)
IMM GRANULOCYTES NFR BLD AUTO: 0.5 % (ref 0–0.5)
LDLC SERPL CALC-MCNC: 43 MG/DL (ref 63–159)
LYMPHOCYTES # BLD AUTO: 1.7 K/UL (ref 1–4.8)
LYMPHOCYTES NFR BLD: 22.8 % (ref 18–48)
MCH RBC QN AUTO: 29.1 PG (ref 27–31)
MCHC RBC AUTO-ENTMCNC: 32.8 G/DL (ref 32–36)
MCV RBC AUTO: 89 FL (ref 82–98)
MONOCYTES # BLD AUTO: 0.4 K/UL (ref 0.3–1)
MONOCYTES NFR BLD: 5 % (ref 4–15)
NEUTROPHILS # BLD AUTO: 5.3 K/UL (ref 1.8–7.7)
NEUTROPHILS NFR BLD: 69.7 % (ref 38–73)
NONHDLC SERPL-MCNC: 101 MG/DL
NRBC BLD-RTO: 0 /100 WBC
PLATELET # BLD AUTO: 286 K/UL (ref 150–350)
PMV BLD AUTO: 11.2 FL (ref 9.2–12.9)
POTASSIUM SERPL-SCNC: 3.3 MMOL/L (ref 3.5–5.1)
PROT SERPL-MCNC: 6.8 G/DL (ref 6–8.4)
RBC # BLD AUTO: 3.88 M/UL (ref 4–5.4)
SODIUM SERPL-SCNC: 139 MMOL/L (ref 136–145)
TRIGL SERPL-MCNC: 290 MG/DL (ref 30–150)
TSH SERPL DL<=0.005 MIU/L-ACNC: 1.33 UIU/ML (ref 0.4–4)
WBC # BLD AUTO: 7.55 K/UL (ref 3.9–12.7)

## 2021-01-26 PROCEDURE — 80053 COMPREHEN METABOLIC PANEL: CPT

## 2021-01-26 PROCEDURE — 36415 COLL VENOUS BLD VENIPUNCTURE: CPT | Mod: PO

## 2021-01-26 PROCEDURE — 82306 VITAMIN D 25 HYDROXY: CPT

## 2021-01-26 PROCEDURE — 84443 ASSAY THYROID STIM HORMONE: CPT

## 2021-01-26 PROCEDURE — 80061 LIPID PANEL: CPT

## 2021-01-26 PROCEDURE — 85025 COMPLETE CBC W/AUTO DIFF WBC: CPT

## 2021-01-28 ENCOUNTER — OFFICE VISIT (OUTPATIENT)
Dept: CARDIOLOGY | Facility: CLINIC | Age: 76
End: 2021-01-28
Payer: MEDICARE

## 2021-01-28 VITALS
DIASTOLIC BLOOD PRESSURE: 73 MMHG | HEIGHT: 63 IN | BODY MASS INDEX: 27.73 KG/M2 | HEART RATE: 97 BPM | SYSTOLIC BLOOD PRESSURE: 131 MMHG | WEIGHT: 156.5 LBS | OXYGEN SATURATION: 99 %

## 2021-01-28 DIAGNOSIS — R07.9 CHEST PAIN, UNSPECIFIED TYPE: ICD-10-CM

## 2021-01-28 DIAGNOSIS — E78.5 ELEVATED LIPIDS: ICD-10-CM

## 2021-01-28 DIAGNOSIS — I10 ESSENTIAL HYPERTENSION: Primary | ICD-10-CM

## 2021-01-28 PROCEDURE — 1159F MED LIST DOCD IN RCRD: CPT | Mod: S$GLB,,, | Performed by: INTERNAL MEDICINE

## 2021-01-28 PROCEDURE — 99204 PR OFFICE/OUTPT VISIT, NEW, LEVL IV, 45-59 MIN: ICD-10-PCS | Mod: S$GLB,,, | Performed by: INTERNAL MEDICINE

## 2021-01-28 PROCEDURE — 3075F SYST BP GE 130 - 139MM HG: CPT | Mod: CPTII,S$GLB,, | Performed by: INTERNAL MEDICINE

## 2021-01-28 PROCEDURE — 99204 OFFICE O/P NEW MOD 45 MIN: CPT | Mod: S$GLB,,, | Performed by: INTERNAL MEDICINE

## 2021-01-28 PROCEDURE — 3075F PR MOST RECENT SYSTOLIC BLOOD PRESS GE 130-139MM HG: ICD-10-PCS | Mod: CPTII,S$GLB,, | Performed by: INTERNAL MEDICINE

## 2021-01-28 PROCEDURE — 3078F PR MOST RECENT DIASTOLIC BLOOD PRESSURE < 80 MM HG: ICD-10-PCS | Mod: CPTII,S$GLB,, | Performed by: INTERNAL MEDICINE

## 2021-01-28 PROCEDURE — 3078F DIAST BP <80 MM HG: CPT | Mod: CPTII,S$GLB,, | Performed by: INTERNAL MEDICINE

## 2021-01-28 PROCEDURE — 1126F AMNT PAIN NOTED NONE PRSNT: CPT | Mod: S$GLB,,, | Performed by: INTERNAL MEDICINE

## 2021-01-28 PROCEDURE — 1126F PR PAIN SEVERITY QUANTIFIED, NO PAIN PRESENT: ICD-10-PCS | Mod: S$GLB,,, | Performed by: INTERNAL MEDICINE

## 2021-01-28 PROCEDURE — 99999 PR PBB SHADOW E&M-EST. PATIENT-LVL IV: CPT | Mod: PBBFAC,,, | Performed by: INTERNAL MEDICINE

## 2021-01-28 PROCEDURE — 99999 PR PBB SHADOW E&M-EST. PATIENT-LVL IV: ICD-10-PCS | Mod: PBBFAC,,, | Performed by: INTERNAL MEDICINE

## 2021-01-28 PROCEDURE — 1159F PR MEDICATION LIST DOCUMENTED IN MEDICAL RECORD: ICD-10-PCS | Mod: S$GLB,,, | Performed by: INTERNAL MEDICINE

## 2021-01-28 RX ORDER — ICOSAPENT ETHYL 1000 MG/1
2000 CAPSULE ORAL 2 TIMES DAILY
Qty: 160 CAPSULE | Refills: 11 | Status: SHIPPED | OUTPATIENT
Start: 2021-01-28 | End: 2021-03-04

## 2021-01-31 ENCOUNTER — IMMUNIZATION (OUTPATIENT)
Dept: INTERNAL MEDICINE | Facility: CLINIC | Age: 76
End: 2021-01-31
Payer: MEDICARE

## 2021-01-31 DIAGNOSIS — Z23 NEED FOR VACCINATION: Primary | ICD-10-CM

## 2021-01-31 PROCEDURE — 91300 PR SARS-COV- 2 COVID-19 VACCINE, NO PRSV, 30MCG/0.3ML, IM: CPT | Mod: PBBFAC | Performed by: INTERNAL MEDICINE

## 2021-01-31 PROCEDURE — 0002A PR IMMUNIZ ADMIN, SARS-COV-2 COVID-19 VACC, 30MCG/0.3ML, 2ND DOSE: CPT | Mod: PBBFAC | Performed by: INTERNAL MEDICINE

## 2021-01-31 RX ADMIN — RNA INGREDIENT BNT-162B2 0.3 ML: 0.23 INJECTION, SUSPENSION INTRAMUSCULAR at 10:01

## 2021-02-01 RX ORDER — LOSARTAN POTASSIUM 50 MG/1
TABLET ORAL
Qty: 90 TABLET | Refills: 3 | OUTPATIENT
Start: 2021-02-01

## 2021-02-05 ENCOUNTER — CLINICAL SUPPORT (OUTPATIENT)
Dept: AUDIOLOGY | Facility: CLINIC | Age: 76
End: 2021-02-05
Payer: MEDICARE

## 2021-02-05 ENCOUNTER — OFFICE VISIT (OUTPATIENT)
Dept: OTOLARYNGOLOGY | Facility: CLINIC | Age: 76
End: 2021-02-05
Payer: MEDICARE

## 2021-02-05 VITALS
DIASTOLIC BLOOD PRESSURE: 84 MMHG | WEIGHT: 156 LBS | HEART RATE: 85 BPM | SYSTOLIC BLOOD PRESSURE: 149 MMHG | BODY MASS INDEX: 27.63 KG/M2

## 2021-02-05 DIAGNOSIS — H90.3 SENSORINEURAL HEARING LOSS (SNHL) OF BOTH EARS: Primary | ICD-10-CM

## 2021-02-05 PROCEDURE — 92557 COMPREHENSIVE HEARING TEST: CPT | Mod: S$GLB,,, | Performed by: AUDIOLOGIST

## 2021-02-05 PROCEDURE — 99999 PR PBB SHADOW E&M-EST. PATIENT-LVL III: ICD-10-PCS | Mod: PBBFAC,,, | Performed by: OTOLARYNGOLOGY

## 2021-02-05 PROCEDURE — 92504 PR EAR MICROSCOPY EXAMINATION: ICD-10-PCS | Mod: S$GLB,,, | Performed by: OTOLARYNGOLOGY

## 2021-02-05 PROCEDURE — 3079F DIAST BP 80-89 MM HG: CPT | Mod: CPTII,S$GLB,, | Performed by: OTOLARYNGOLOGY

## 2021-02-05 PROCEDURE — 3077F PR MOST RECENT SYSTOLIC BLOOD PRESSURE >= 140 MM HG: ICD-10-PCS | Mod: CPTII,S$GLB,, | Performed by: OTOLARYNGOLOGY

## 2021-02-05 PROCEDURE — 92557 PR COMPREHENSIVE HEARING TEST: ICD-10-PCS | Mod: S$GLB,,, | Performed by: AUDIOLOGIST

## 2021-02-05 PROCEDURE — 3079F PR MOST RECENT DIASTOLIC BLOOD PRESSURE 80-89 MM HG: ICD-10-PCS | Mod: CPTII,S$GLB,, | Performed by: OTOLARYNGOLOGY

## 2021-02-05 PROCEDURE — 1126F PR PAIN SEVERITY QUANTIFIED, NO PAIN PRESENT: ICD-10-PCS | Mod: S$GLB,,, | Performed by: OTOLARYNGOLOGY

## 2021-02-05 PROCEDURE — 99203 PR OFFICE/OUTPT VISIT, NEW, LEVL III, 30-44 MIN: ICD-10-PCS | Mod: S$GLB,,, | Performed by: OTOLARYNGOLOGY

## 2021-02-05 PROCEDURE — 1159F MED LIST DOCD IN RCRD: CPT | Mod: S$GLB,,, | Performed by: OTOLARYNGOLOGY

## 2021-02-05 PROCEDURE — 1126F AMNT PAIN NOTED NONE PRSNT: CPT | Mod: S$GLB,,, | Performed by: OTOLARYNGOLOGY

## 2021-02-05 PROCEDURE — 92567 PR TYMPA2METRY: ICD-10-PCS | Mod: S$GLB,,, | Performed by: AUDIOLOGIST

## 2021-02-05 PROCEDURE — 1159F PR MEDICATION LIST DOCUMENTED IN MEDICAL RECORD: ICD-10-PCS | Mod: S$GLB,,, | Performed by: OTOLARYNGOLOGY

## 2021-02-05 PROCEDURE — 99999 PR PBB SHADOW E&M-EST. PATIENT-LVL III: CPT | Mod: PBBFAC,,, | Performed by: OTOLARYNGOLOGY

## 2021-02-05 PROCEDURE — 92567 TYMPANOMETRY: CPT | Mod: S$GLB,,, | Performed by: AUDIOLOGIST

## 2021-02-05 PROCEDURE — 99203 OFFICE O/P NEW LOW 30 MIN: CPT | Mod: S$GLB,,, | Performed by: OTOLARYNGOLOGY

## 2021-02-05 PROCEDURE — 3077F SYST BP >= 140 MM HG: CPT | Mod: CPTII,S$GLB,, | Performed by: OTOLARYNGOLOGY

## 2021-02-05 PROCEDURE — 92504 EAR MICROSCOPY EXAMINATION: CPT | Mod: S$GLB,,, | Performed by: OTOLARYNGOLOGY

## 2021-02-09 DIAGNOSIS — I10 ESSENTIAL HYPERTENSION: ICD-10-CM

## 2021-02-10 ENCOUNTER — TELEPHONE (OUTPATIENT)
Dept: INTERNAL MEDICINE | Facility: CLINIC | Age: 76
End: 2021-02-10

## 2021-02-10 DIAGNOSIS — I10 ESSENTIAL HYPERTENSION: ICD-10-CM

## 2021-02-11 ENCOUNTER — HOSPITAL ENCOUNTER (OUTPATIENT)
Dept: CARDIOLOGY | Facility: HOSPITAL | Age: 76
Discharge: HOME | End: 2021-02-11
Attending: INTERNAL MEDICINE
Payer: MEDICARE

## 2021-02-11 VITALS — HEIGHT: 63 IN | BODY MASS INDEX: 27.64 KG/M2 | WEIGHT: 156 LBS

## 2021-02-11 DIAGNOSIS — R07.9 CHEST PAIN, UNSPECIFIED TYPE: ICD-10-CM

## 2021-02-11 LAB
ASCENDING AORTA: 3.13 CM
AV INDEX (PROSTH): 0.8
AV MEAN GRADIENT: 3 MMHG
AV PEAK GRADIENT: 5 MMHG
AV VALVE AREA: 3.26 CM2
AV VELOCITY RATIO: 0.77
BSA FOR ECHO PROCEDURE: 1.77 M2
CV ECHO LV RWT: 0.37 CM
CV STRESS BASE HR: 86 BPM
DIASTOLIC BLOOD PRESSURE: 88 MMHG
DOP CALC AO PEAK VEL: 1.17 M/S
DOP CALC AO VTI: 23.88 CM
DOP CALC LVOT AREA: 4.1 CM2
DOP CALC LVOT DIAMETER: 2.28 CM
DOP CALC LVOT PEAK VEL: 0.9 M/S
DOP CALC LVOT STROKE VOLUME: 77.78 CM3
DOP CALCLVOT PEAK VEL VTI: 19.06 CM
E WAVE DECELERATION TIME: 191.49 MSEC
E/A RATIO: 0.62
E/E' RATIO: 16.5 M/S
ECHO LV POSTERIOR WALL: 0.83 CM (ref 0.6–1.1)
FRACTIONAL SHORTENING: 39 % (ref 28–44)
INTERVENTRICULAR SEPTUM: 0.81 CM (ref 0.6–1.1)
IVRT: 125.59 MSEC
LA MAJOR: 4.81 CM
LA MINOR: 4.59 CM
LA WIDTH: 3.64 CM
LEFT ATRIUM SIZE: 3.76 CM
LEFT ATRIUM VOLUME INDEX MOD: 21.9 ML/M2
LEFT ATRIUM VOLUME INDEX: 31.4 ML/M2
LEFT ATRIUM VOLUME MOD: 38.18 CM3
LEFT ATRIUM VOLUME: 54.65 CM3
LEFT INTERNAL DIMENSION IN SYSTOLE: 2.76 CM (ref 2.1–4)
LEFT VENTRICLE DIASTOLIC VOLUME INDEX: 53.52 ML/M2
LEFT VENTRICLE DIASTOLIC VOLUME: 93.13 ML
LEFT VENTRICLE MASS INDEX: 68 G/M2
LEFT VENTRICLE SYSTOLIC VOLUME INDEX: 16.4 ML/M2
LEFT VENTRICLE SYSTOLIC VOLUME: 28.52 ML
LEFT VENTRICULAR INTERNAL DIMENSION IN DIASTOLE: 4.51 CM (ref 3.5–6)
LEFT VENTRICULAR MASS: 117.81 G
LV LATERAL E/E' RATIO: 13.2 M/S
LV SEPTAL E/E' RATIO: 22 M/S
MV PEAK A VEL: 1.07 M/S
MV PEAK E VEL: 0.66 M/S
OHS CV CPX 1 MINUTE RECOVERY HEART RATE: 121 BPM
OHS CV CPX 85 PERCENT MAX PREDICTED HEART RATE MALE: 118
OHS CV CPX ESTIMATED METS: 7
OHS CV CPX MAX PREDICTED HEART RATE: 138
OHS CV CPX PATIENT IS FEMALE: 1
OHS CV CPX PATIENT IS MALE: 0
OHS CV CPX PEAK DIASTOLIC BLOOD PRESSURE: 59 MMHG
OHS CV CPX PEAK HEAR RATE: 137 BPM
OHS CV CPX PEAK RATE PRESSURE PRODUCT: NORMAL
OHS CV CPX PEAK SYSTOLIC BLOOD PRESSURE: 186 MMHG
OHS CV CPX PERCENT MAX PREDICTED HEART RATE ACHIEVED: 99
OHS CV CPX RATE PRESSURE PRODUCT PRESENTING: NORMAL
PISA TR MAX VEL: 2.63 M/S
PULM VEIN S/D RATIO: 1.48
PV PEAK D VEL: 0.31 M/S
PV PEAK S VEL: 0.46 M/S
RA MAJOR: 4.71 CM
RA PRESSURE: 3 MMHG
RA WIDTH: 2.33 CM
RIGHT VENTRICULAR END-DIASTOLIC DIMENSION: 2.51 CM
SINUS: 3.03 CM
STJ: 2.76 CM
STRESS ECHO POST EXERCISE DUR MIN: 10 MINUTES
STRESS ECHO POST EXERCISE DUR SEC: 3 SECONDS
SYSTOLIC BLOOD PRESSURE: 145 MMHG
TDI LATERAL: 0.05 M/S
TDI SEPTAL: 0.03 M/S
TDI: 0.04 M/S
TR MAX PG: 28 MMHG
TRICUSPID ANNULAR PLANE SYSTOLIC EXCURSION: 1.71 CM
TV REST PULMONARY ARTERY PRESSURE: 31 MMHG

## 2021-02-11 PROCEDURE — 93325 STRESS ECHO (CUPID ONLY): ICD-10-PCS | Mod: 26,,, | Performed by: INTERNAL MEDICINE

## 2021-02-11 PROCEDURE — 93320 STRESS ECHO (CUPID ONLY): ICD-10-PCS | Mod: 26,,, | Performed by: INTERNAL MEDICINE

## 2021-02-11 PROCEDURE — 93351 STRESS TTE COMPLETE: CPT | Mod: 26,,, | Performed by: INTERNAL MEDICINE

## 2021-02-11 PROCEDURE — 93351 STRESS ECHO (CUPID ONLY): ICD-10-PCS | Mod: 26,,, | Performed by: INTERNAL MEDICINE

## 2021-02-11 PROCEDURE — 93325 DOPPLER ECHO COLOR FLOW MAPG: CPT | Mod: 26,,, | Performed by: INTERNAL MEDICINE

## 2021-02-11 PROCEDURE — 93320 DOPPLER ECHO COMPLETE: CPT | Mod: 26,,, | Performed by: INTERNAL MEDICINE

## 2021-02-11 PROCEDURE — 93320 DOPPLER ECHO COMPLETE: CPT

## 2021-02-11 RX ORDER — LOSARTAN POTASSIUM AND HYDROCHLOROTHIAZIDE 12.5; 1 MG/1; MG/1
TABLET ORAL
Qty: 90 TABLET | Refills: 1 | Status: SHIPPED | OUTPATIENT
Start: 2021-02-11 | End: 2021-05-11 | Stop reason: SDUPTHER

## 2021-02-11 RX ORDER — LOSARTAN POTASSIUM AND HYDROCHLOROTHIAZIDE 12.5; 1 MG/1; MG/1
1 TABLET ORAL DAILY
Qty: 30 TABLET | Refills: 0 | OUTPATIENT
Start: 2021-02-11

## 2021-02-22 ENCOUNTER — DOCUMENTATION ONLY (OUTPATIENT)
Dept: REHABILITATION | Facility: OTHER | Age: 76
End: 2021-02-22

## 2021-03-04 ENCOUNTER — OFFICE VISIT (OUTPATIENT)
Dept: INTERNAL MEDICINE | Facility: CLINIC | Age: 76
End: 2021-03-04
Payer: MEDICARE

## 2021-03-04 VITALS
HEART RATE: 80 BPM | SYSTOLIC BLOOD PRESSURE: 118 MMHG | WEIGHT: 158.06 LBS | DIASTOLIC BLOOD PRESSURE: 68 MMHG | BODY MASS INDEX: 28 KG/M2 | HEIGHT: 63 IN | TEMPERATURE: 98 F

## 2021-03-04 DIAGNOSIS — N95.1 POST MENOPAUSAL SYNDROME: ICD-10-CM

## 2021-03-04 DIAGNOSIS — I10 ESSENTIAL HYPERTENSION: ICD-10-CM

## 2021-03-04 DIAGNOSIS — E78.5 ELEVATED LIPIDS: ICD-10-CM

## 2021-03-04 DIAGNOSIS — Z00.00 ANNUAL PHYSICAL EXAM: Primary | ICD-10-CM

## 2021-03-04 PROCEDURE — 3074F SYST BP LT 130 MM HG: CPT | Mod: CPTII,S$GLB,, | Performed by: INTERNAL MEDICINE

## 2021-03-04 PROCEDURE — 1101F PR PT FALLS ASSESS DOC 0-1 FALLS W/OUT INJ PAST YR: ICD-10-PCS | Mod: CPTII,S$GLB,, | Performed by: INTERNAL MEDICINE

## 2021-03-04 PROCEDURE — 99397 PR PREVENTIVE VISIT,EST,65 & OVER: ICD-10-PCS | Mod: S$GLB,,, | Performed by: INTERNAL MEDICINE

## 2021-03-04 PROCEDURE — 1101F PT FALLS ASSESS-DOCD LE1/YR: CPT | Mod: CPTII,S$GLB,, | Performed by: INTERNAL MEDICINE

## 2021-03-04 PROCEDURE — 99999 PR PBB SHADOW E&M-EST. PATIENT-LVL III: ICD-10-PCS | Mod: PBBFAC,,, | Performed by: INTERNAL MEDICINE

## 2021-03-04 PROCEDURE — 3074F PR MOST RECENT SYSTOLIC BLOOD PRESSURE < 130 MM HG: ICD-10-PCS | Mod: CPTII,S$GLB,, | Performed by: INTERNAL MEDICINE

## 2021-03-04 PROCEDURE — 3288F FALL RISK ASSESSMENT DOCD: CPT | Mod: CPTII,S$GLB,, | Performed by: INTERNAL MEDICINE

## 2021-03-04 PROCEDURE — 3078F PR MOST RECENT DIASTOLIC BLOOD PRESSURE < 80 MM HG: ICD-10-PCS | Mod: CPTII,S$GLB,, | Performed by: INTERNAL MEDICINE

## 2021-03-04 PROCEDURE — 3288F PR FALLS RISK ASSESSMENT DOCUMENTED: ICD-10-PCS | Mod: CPTII,S$GLB,, | Performed by: INTERNAL MEDICINE

## 2021-03-04 PROCEDURE — 99397 PER PM REEVAL EST PAT 65+ YR: CPT | Mod: S$GLB,,, | Performed by: INTERNAL MEDICINE

## 2021-03-04 PROCEDURE — 1126F PR PAIN SEVERITY QUANTIFIED, NO PAIN PRESENT: ICD-10-PCS | Mod: S$GLB,,, | Performed by: INTERNAL MEDICINE

## 2021-03-04 PROCEDURE — 99999 PR PBB SHADOW E&M-EST. PATIENT-LVL III: CPT | Mod: PBBFAC,,, | Performed by: INTERNAL MEDICINE

## 2021-03-04 PROCEDURE — 1126F AMNT PAIN NOTED NONE PRSNT: CPT | Mod: S$GLB,,, | Performed by: INTERNAL MEDICINE

## 2021-03-04 PROCEDURE — 3078F DIAST BP <80 MM HG: CPT | Mod: CPTII,S$GLB,, | Performed by: INTERNAL MEDICINE

## 2021-03-04 RX ORDER — EZETIMIBE 10 MG/1
10 TABLET ORAL DAILY
Qty: 90 TABLET | Refills: 3 | Status: SHIPPED | OUTPATIENT
Start: 2021-03-04 | End: 2022-04-27

## 2021-03-18 ENCOUNTER — PATIENT MESSAGE (OUTPATIENT)
Dept: RESEARCH | Facility: HOSPITAL | Age: 76
End: 2021-03-18

## 2021-03-26 ENCOUNTER — PATIENT MESSAGE (OUTPATIENT)
Dept: RESEARCH | Facility: HOSPITAL | Age: 76
End: 2021-03-26

## 2021-03-29 DIAGNOSIS — N81.9 MIXED URINARY INCONTINENCE DUE TO FEMALE GENITAL PROLAPSE: ICD-10-CM

## 2021-03-29 DIAGNOSIS — N39.46 MIXED URINARY INCONTINENCE DUE TO FEMALE GENITAL PROLAPSE: ICD-10-CM

## 2021-03-30 RX ORDER — DULOXETIN HYDROCHLORIDE 60 MG/1
CAPSULE, DELAYED RELEASE ORAL
Qty: 30 CAPSULE | Refills: 1 | Status: SHIPPED | OUTPATIENT
Start: 2021-03-30 | End: 2021-05-11 | Stop reason: SDUPTHER

## 2021-04-19 ENCOUNTER — TELEPHONE (OUTPATIENT)
Dept: OPHTHALMOLOGY | Facility: CLINIC | Age: 76
End: 2021-04-19

## 2021-04-19 ENCOUNTER — TELEPHONE (OUTPATIENT)
Dept: INTERNAL MEDICINE | Facility: CLINIC | Age: 76
End: 2021-04-19

## 2021-04-21 ENCOUNTER — OFFICE VISIT (OUTPATIENT)
Dept: OPHTHALMOLOGY | Facility: CLINIC | Age: 76
End: 2021-04-21
Payer: COMMERCIAL

## 2021-04-21 DIAGNOSIS — I10 ESSENTIAL HYPERTENSION: ICD-10-CM

## 2021-04-21 DIAGNOSIS — H52.7 REFRACTIVE ERROR: ICD-10-CM

## 2021-04-21 DIAGNOSIS — Z96.1 PSEUDOPHAKIA: ICD-10-CM

## 2021-04-21 DIAGNOSIS — H26.493 PCO (POSTERIOR CAPSULAR OPACIFICATION), BILATERAL: Primary | ICD-10-CM

## 2021-04-21 PROCEDURE — 99999 PR PBB SHADOW E&M-EST. PATIENT-LVL II: ICD-10-PCS | Mod: PBBFAC,,, | Performed by: OPHTHALMOLOGY

## 2021-04-21 PROCEDURE — 92004 COMPRE OPH EXAM NEW PT 1/>: CPT | Mod: S$GLB,,, | Performed by: OPHTHALMOLOGY

## 2021-04-21 PROCEDURE — 99999 PR PBB SHADOW E&M-EST. PATIENT-LVL II: CPT | Mod: PBBFAC,,, | Performed by: OPHTHALMOLOGY

## 2021-04-21 PROCEDURE — 92004 PR EYE EXAM, NEW PATIENT,COMPREHESV: ICD-10-PCS | Mod: S$GLB,,, | Performed by: OPHTHALMOLOGY

## 2021-05-07 DIAGNOSIS — N81.9 MIXED URINARY INCONTINENCE DUE TO FEMALE GENITAL PROLAPSE: ICD-10-CM

## 2021-05-07 DIAGNOSIS — I10 ESSENTIAL HYPERTENSION: ICD-10-CM

## 2021-05-07 DIAGNOSIS — N39.46 MIXED URINARY INCONTINENCE DUE TO FEMALE GENITAL PROLAPSE: ICD-10-CM

## 2021-05-07 DIAGNOSIS — K21.9 GASTROESOPHAGEAL REFLUX DISEASE: ICD-10-CM

## 2021-05-11 DIAGNOSIS — I10 ESSENTIAL HYPERTENSION: ICD-10-CM

## 2021-05-11 DIAGNOSIS — N81.9 MIXED URINARY INCONTINENCE DUE TO FEMALE GENITAL PROLAPSE: ICD-10-CM

## 2021-05-11 DIAGNOSIS — N39.46 MIXED URINARY INCONTINENCE DUE TO FEMALE GENITAL PROLAPSE: ICD-10-CM

## 2021-05-11 RX ORDER — DULOXETIN HYDROCHLORIDE 60 MG/1
60 CAPSULE, DELAYED RELEASE ORAL DAILY
Qty: 30 CAPSULE | Refills: 0 | Status: SHIPPED | OUTPATIENT
Start: 2021-05-11 | End: 2021-07-02

## 2021-05-11 RX ORDER — DULOXETIN HYDROCHLORIDE 60 MG/1
60 CAPSULE, DELAYED RELEASE ORAL DAILY
Qty: 90 CAPSULE | Refills: 2 | Status: CANCELLED | OUTPATIENT
Start: 2021-05-11

## 2021-05-11 RX ORDER — PANTOPRAZOLE SODIUM 40 MG/1
TABLET, DELAYED RELEASE ORAL
Qty: 90 TABLET | Refills: 3 | Status: SHIPPED | OUTPATIENT
Start: 2021-05-11 | End: 2022-10-21 | Stop reason: SDUPTHER

## 2021-05-11 RX ORDER — LOSARTAN POTASSIUM AND HYDROCHLOROTHIAZIDE 12.5; 1 MG/1; MG/1
1 TABLET ORAL DAILY
Qty: 90 TABLET | Refills: 0 | Status: SHIPPED | OUTPATIENT
Start: 2021-05-11 | End: 2021-07-24

## 2021-05-11 RX ORDER — LOSARTAN POTASSIUM AND HYDROCHLOROTHIAZIDE 12.5; 1 MG/1; MG/1
1 TABLET ORAL DAILY
Qty: 90 TABLET | Refills: 2 | Status: CANCELLED | OUTPATIENT
Start: 2021-05-11

## 2021-05-17 ENCOUNTER — OFFICE VISIT (OUTPATIENT)
Dept: OPHTHALMOLOGY | Facility: CLINIC | Age: 76
End: 2021-05-17
Payer: MEDICARE

## 2021-05-17 ENCOUNTER — TELEPHONE (OUTPATIENT)
Dept: OPHTHALMOLOGY | Facility: CLINIC | Age: 76
End: 2021-05-17

## 2021-05-17 DIAGNOSIS — H26.493 PCO (POSTERIOR CAPSULAR OPACIFICATION), BILATERAL: Primary | ICD-10-CM

## 2021-05-17 DIAGNOSIS — Z96.1 PSEUDOPHAKIA: ICD-10-CM

## 2021-05-17 PROCEDURE — 1126F AMNT PAIN NOTED NONE PRSNT: CPT | Mod: S$GLB,,, | Performed by: OPHTHALMOLOGY

## 2021-05-17 PROCEDURE — 3288F FALL RISK ASSESSMENT DOCD: CPT | Mod: CPTII,S$GLB,, | Performed by: OPHTHALMOLOGY

## 2021-05-17 PROCEDURE — 66821 AFTER CATARACT LASER SURGERY: CPT | Mod: RT,S$GLB,, | Performed by: OPHTHALMOLOGY

## 2021-05-17 PROCEDURE — 66821 PR DISCISSION,2ND CATARACT,LASER: ICD-10-PCS | Mod: RT,S$GLB,, | Performed by: OPHTHALMOLOGY

## 2021-05-17 PROCEDURE — 1126F PR PAIN SEVERITY QUANTIFIED, NO PAIN PRESENT: ICD-10-PCS | Mod: S$GLB,,, | Performed by: OPHTHALMOLOGY

## 2021-05-17 PROCEDURE — 1101F PT FALLS ASSESS-DOCD LE1/YR: CPT | Mod: CPTII,S$GLB,, | Performed by: OPHTHALMOLOGY

## 2021-05-17 PROCEDURE — 3288F PR FALLS RISK ASSESSMENT DOCUMENTED: ICD-10-PCS | Mod: CPTII,S$GLB,, | Performed by: OPHTHALMOLOGY

## 2021-05-17 PROCEDURE — 99999 PR PBB SHADOW E&M-EST. PATIENT-LVL II: CPT | Mod: PBBFAC,,, | Performed by: OPHTHALMOLOGY

## 2021-05-17 PROCEDURE — 1101F PR PT FALLS ASSESS DOC 0-1 FALLS W/OUT INJ PAST YR: ICD-10-PCS | Mod: CPTII,S$GLB,, | Performed by: OPHTHALMOLOGY

## 2021-05-17 PROCEDURE — 99999 PR PBB SHADOW E&M-EST. PATIENT-LVL II: ICD-10-PCS | Mod: PBBFAC,,, | Performed by: OPHTHALMOLOGY

## 2021-05-17 PROCEDURE — 99499 NO LOS: ICD-10-PCS | Mod: S$GLB,,, | Performed by: OPHTHALMOLOGY

## 2021-05-17 PROCEDURE — 99499 UNLISTED E&M SERVICE: CPT | Mod: S$GLB,,, | Performed by: OPHTHALMOLOGY

## 2021-06-14 ENCOUNTER — TELEPHONE (OUTPATIENT)
Dept: INTERNAL MEDICINE | Facility: CLINIC | Age: 76
End: 2021-06-14

## 2021-06-17 ENCOUNTER — OFFICE VISIT (OUTPATIENT)
Dept: INTERNAL MEDICINE | Facility: CLINIC | Age: 76
End: 2021-06-17
Payer: MEDICARE

## 2021-06-17 ENCOUNTER — HOSPITAL ENCOUNTER (OUTPATIENT)
Dept: RADIOLOGY | Facility: HOSPITAL | Age: 76
Discharge: HOME | End: 2021-06-17
Attending: INTERNAL MEDICINE
Payer: MEDICARE

## 2021-06-17 VITALS
SYSTOLIC BLOOD PRESSURE: 130 MMHG | OXYGEN SATURATION: 98 % | DIASTOLIC BLOOD PRESSURE: 88 MMHG | WEIGHT: 154.56 LBS | BODY MASS INDEX: 27.39 KG/M2 | HEART RATE: 98 BPM | TEMPERATURE: 98 F | HEIGHT: 63 IN | RESPIRATION RATE: 16 BRPM

## 2021-06-17 DIAGNOSIS — M54.2 CHRONIC NECK PAIN: Primary | ICD-10-CM

## 2021-06-17 DIAGNOSIS — G89.29 CHRONIC NECK PAIN: ICD-10-CM

## 2021-06-17 DIAGNOSIS — M54.2 CHRONIC NECK PAIN: ICD-10-CM

## 2021-06-17 DIAGNOSIS — G89.29 CHRONIC NECK PAIN: Primary | ICD-10-CM

## 2021-06-17 PROCEDURE — 1100F PTFALLS ASSESS-DOCD GE2>/YR: CPT | Mod: CPTII,S$GLB,, | Performed by: INTERNAL MEDICINE

## 2021-06-17 PROCEDURE — 99999 PR PBB SHADOW E&M-EST. PATIENT-LVL III: ICD-10-PCS | Mod: PBBFAC,,, | Performed by: INTERNAL MEDICINE

## 2021-06-17 PROCEDURE — 99214 PR OFFICE/OUTPT VISIT, EST, LEVL IV, 30-39 MIN: ICD-10-PCS | Mod: S$GLB,,, | Performed by: INTERNAL MEDICINE

## 2021-06-17 PROCEDURE — 72050 XR CERVICAL SPINE COMPLETE 5 VIEW: ICD-10-PCS | Mod: 26,,, | Performed by: RADIOLOGY

## 2021-06-17 PROCEDURE — 1159F PR MEDICATION LIST DOCUMENTED IN MEDICAL RECORD: ICD-10-PCS | Mod: S$GLB,,, | Performed by: INTERNAL MEDICINE

## 2021-06-17 PROCEDURE — 72050 X-RAY EXAM NECK SPINE 4/5VWS: CPT | Mod: TC,PO

## 2021-06-17 PROCEDURE — 3288F FALL RISK ASSESSMENT DOCD: CPT | Mod: CPTII,S$GLB,, | Performed by: INTERNAL MEDICINE

## 2021-06-17 PROCEDURE — 99999 PR PBB SHADOW E&M-EST. PATIENT-LVL III: CPT | Mod: PBBFAC,,, | Performed by: INTERNAL MEDICINE

## 2021-06-17 PROCEDURE — 1159F MED LIST DOCD IN RCRD: CPT | Mod: S$GLB,,, | Performed by: INTERNAL MEDICINE

## 2021-06-17 PROCEDURE — 1100F PR PT FALLS ASSESS DOC 2+ FALLS/FALL W/INJURY/YR: ICD-10-PCS | Mod: CPTII,S$GLB,, | Performed by: INTERNAL MEDICINE

## 2021-06-17 PROCEDURE — 1125F AMNT PAIN NOTED PAIN PRSNT: CPT | Mod: S$GLB,,, | Performed by: INTERNAL MEDICINE

## 2021-06-17 PROCEDURE — 1125F PR PAIN SEVERITY QUANTIFIED, PAIN PRESENT: ICD-10-PCS | Mod: S$GLB,,, | Performed by: INTERNAL MEDICINE

## 2021-06-17 PROCEDURE — 99214 OFFICE O/P EST MOD 30 MIN: CPT | Mod: S$GLB,,, | Performed by: INTERNAL MEDICINE

## 2021-06-17 PROCEDURE — 72050 X-RAY EXAM NECK SPINE 4/5VWS: CPT | Mod: 26,,, | Performed by: RADIOLOGY

## 2021-06-17 PROCEDURE — 3288F PR FALLS RISK ASSESSMENT DOCUMENTED: ICD-10-PCS | Mod: CPTII,S$GLB,, | Performed by: INTERNAL MEDICINE

## 2021-06-17 RX ORDER — NABUMETONE 750 MG/1
750 TABLET, FILM COATED ORAL 2 TIMES DAILY PRN
Qty: 60 TABLET | Refills: 1 | Status: SHIPPED | OUTPATIENT
Start: 2021-06-17 | End: 2021-10-25 | Stop reason: SINTOL

## 2021-06-18 ENCOUNTER — TELEPHONE (OUTPATIENT)
Dept: INTERNAL MEDICINE | Facility: CLINIC | Age: 76
End: 2021-06-18

## 2021-06-18 DIAGNOSIS — E78.5 HYPERLIPIDEMIA, UNSPECIFIED HYPERLIPIDEMIA TYPE: Primary | ICD-10-CM

## 2021-07-21 ENCOUNTER — OFFICE VISIT (OUTPATIENT)
Dept: OPHTHALMOLOGY | Facility: CLINIC | Age: 76
End: 2021-07-21
Payer: MEDICARE

## 2021-07-21 ENCOUNTER — PATIENT OUTREACH (OUTPATIENT)
Dept: ADMINISTRATIVE | Facility: OTHER | Age: 76
End: 2021-07-21

## 2021-07-21 DIAGNOSIS — Z96.1 PSEUDOPHAKIA: ICD-10-CM

## 2021-07-21 DIAGNOSIS — Z98.890 POST-OPERATIVE STATE: Primary | ICD-10-CM

## 2021-07-21 DIAGNOSIS — H26.492 PCO (POSTERIOR CAPSULAR OPACIFICATION), LEFT: ICD-10-CM

## 2021-07-21 PROCEDURE — 3288F PR FALLS RISK ASSESSMENT DOCUMENTED: ICD-10-PCS | Mod: CPTII,S$GLB,, | Performed by: OPHTHALMOLOGY

## 2021-07-21 PROCEDURE — 99024 POSTOP FOLLOW-UP VISIT: CPT | Mod: S$GLB,,, | Performed by: OPHTHALMOLOGY

## 2021-07-21 PROCEDURE — 1126F PR PAIN SEVERITY QUANTIFIED, NO PAIN PRESENT: ICD-10-PCS | Mod: CPTII,S$GLB,, | Performed by: OPHTHALMOLOGY

## 2021-07-21 PROCEDURE — 99999 PR PBB SHADOW E&M-EST. PATIENT-LVL II: CPT | Mod: PBBFAC,,, | Performed by: OPHTHALMOLOGY

## 2021-07-21 PROCEDURE — 99024 PR POST-OP FOLLOW-UP VISIT: ICD-10-PCS | Mod: S$GLB,,, | Performed by: OPHTHALMOLOGY

## 2021-07-21 PROCEDURE — 1101F PR PT FALLS ASSESS DOC 0-1 FALLS W/OUT INJ PAST YR: ICD-10-PCS | Mod: CPTII,S$GLB,, | Performed by: OPHTHALMOLOGY

## 2021-07-21 PROCEDURE — 66821 PR DISCISSION,2ND CATARACT,LASER: ICD-10-PCS | Mod: 79,LT,S$GLB, | Performed by: OPHTHALMOLOGY

## 2021-07-21 PROCEDURE — 1126F AMNT PAIN NOTED NONE PRSNT: CPT | Mod: CPTII,S$GLB,, | Performed by: OPHTHALMOLOGY

## 2021-07-21 PROCEDURE — 99999 PR PBB SHADOW E&M-EST. PATIENT-LVL II: ICD-10-PCS | Mod: PBBFAC,,, | Performed by: OPHTHALMOLOGY

## 2021-07-21 PROCEDURE — 1101F PT FALLS ASSESS-DOCD LE1/YR: CPT | Mod: CPTII,S$GLB,, | Performed by: OPHTHALMOLOGY

## 2021-07-21 PROCEDURE — 3288F FALL RISK ASSESSMENT DOCD: CPT | Mod: CPTII,S$GLB,, | Performed by: OPHTHALMOLOGY

## 2021-07-21 PROCEDURE — 66821 AFTER CATARACT LASER SURGERY: CPT | Mod: 79,LT,S$GLB, | Performed by: OPHTHALMOLOGY

## 2021-07-23 DIAGNOSIS — I10 ESSENTIAL HYPERTENSION: ICD-10-CM

## 2021-07-24 RX ORDER — LOSARTAN POTASSIUM AND HYDROCHLOROTHIAZIDE 12.5; 1 MG/1; MG/1
TABLET ORAL
Qty: 90 TABLET | Refills: 1 | Status: SHIPPED | OUTPATIENT
Start: 2021-07-24 | End: 2022-03-04

## 2021-08-04 ENCOUNTER — PES CALL (OUTPATIENT)
Dept: ADMINISTRATIVE | Facility: CLINIC | Age: 76
End: 2021-08-04

## 2021-08-16 ENCOUNTER — OFFICE VISIT (OUTPATIENT)
Dept: OPHTHALMOLOGY | Facility: CLINIC | Age: 76
End: 2021-08-16
Payer: MEDICARE

## 2021-08-16 DIAGNOSIS — H52.7 REFRACTIVE ERROR: ICD-10-CM

## 2021-08-16 DIAGNOSIS — Z98.890 POST-OPERATIVE STATE: Primary | ICD-10-CM

## 2021-08-16 DIAGNOSIS — H02.403 PTOSIS OF BOTH EYELIDS: ICD-10-CM

## 2021-08-16 DIAGNOSIS — Z96.1 PSEUDOPHAKIA: ICD-10-CM

## 2021-08-16 PROCEDURE — 1126F PR PAIN SEVERITY QUANTIFIED, NO PAIN PRESENT: ICD-10-PCS | Mod: CPTII,S$GLB,, | Performed by: OPHTHALMOLOGY

## 2021-08-16 PROCEDURE — 99999 PR PBB SHADOW E&M-EST. PATIENT-LVL III: CPT | Mod: PBBFAC,,, | Performed by: OPHTHALMOLOGY

## 2021-08-16 PROCEDURE — 1159F MED LIST DOCD IN RCRD: CPT | Mod: CPTII,S$GLB,, | Performed by: OPHTHALMOLOGY

## 2021-08-16 PROCEDURE — 1160F RVW MEDS BY RX/DR IN RCRD: CPT | Mod: CPTII,S$GLB,, | Performed by: OPHTHALMOLOGY

## 2021-08-16 PROCEDURE — 1126F AMNT PAIN NOTED NONE PRSNT: CPT | Mod: CPTII,S$GLB,, | Performed by: OPHTHALMOLOGY

## 2021-08-16 PROCEDURE — 3288F PR FALLS RISK ASSESSMENT DOCUMENTED: ICD-10-PCS | Mod: CPTII,S$GLB,, | Performed by: OPHTHALMOLOGY

## 2021-08-16 PROCEDURE — 1101F PT FALLS ASSESS-DOCD LE1/YR: CPT | Mod: CPTII,S$GLB,, | Performed by: OPHTHALMOLOGY

## 2021-08-16 PROCEDURE — 1101F PR PT FALLS ASSESS DOC 0-1 FALLS W/OUT INJ PAST YR: ICD-10-PCS | Mod: CPTII,S$GLB,, | Performed by: OPHTHALMOLOGY

## 2021-08-16 PROCEDURE — 1159F PR MEDICATION LIST DOCUMENTED IN MEDICAL RECORD: ICD-10-PCS | Mod: CPTII,S$GLB,, | Performed by: OPHTHALMOLOGY

## 2021-08-16 PROCEDURE — 99024 PR POST-OP FOLLOW-UP VISIT: ICD-10-PCS | Mod: S$GLB,,, | Performed by: OPHTHALMOLOGY

## 2021-08-16 PROCEDURE — 99024 POSTOP FOLLOW-UP VISIT: CPT | Mod: S$GLB,,, | Performed by: OPHTHALMOLOGY

## 2021-08-16 PROCEDURE — 99999 PR PBB SHADOW E&M-EST. PATIENT-LVL III: ICD-10-PCS | Mod: PBBFAC,,, | Performed by: OPHTHALMOLOGY

## 2021-08-16 PROCEDURE — 1160F PR REVIEW ALL MEDS BY PRESCRIBER/CLIN PHARMACIST DOCUMENTED: ICD-10-PCS | Mod: CPTII,S$GLB,, | Performed by: OPHTHALMOLOGY

## 2021-08-16 PROCEDURE — 3288F FALL RISK ASSESSMENT DOCD: CPT | Mod: CPTII,S$GLB,, | Performed by: OPHTHALMOLOGY

## 2021-08-17 ENCOUNTER — TELEPHONE (OUTPATIENT)
Dept: OPHTHALMOLOGY | Facility: CLINIC | Age: 76
End: 2021-08-17

## 2021-09-07 ENCOUNTER — TELEPHONE (OUTPATIENT)
Dept: INTERNAL MEDICINE | Facility: CLINIC | Age: 76
End: 2021-09-07

## 2021-09-16 ENCOUNTER — TELEPHONE (OUTPATIENT)
Dept: GASTROENTEROLOGY | Facility: CLINIC | Age: 76
End: 2021-09-16

## 2021-10-01 ENCOUNTER — TELEPHONE (OUTPATIENT)
Dept: OPHTHALMOLOGY | Facility: CLINIC | Age: 76
End: 2021-10-01

## 2021-10-04 ENCOUNTER — TELEPHONE (OUTPATIENT)
Dept: OPHTHALMOLOGY | Facility: CLINIC | Age: 76
End: 2021-10-04

## 2021-10-08 ENCOUNTER — PES CALL (OUTPATIENT)
Dept: ADMINISTRATIVE | Facility: CLINIC | Age: 76
End: 2021-10-08

## 2021-10-11 ENCOUNTER — TELEPHONE (OUTPATIENT)
Dept: PAIN MEDICINE | Facility: CLINIC | Age: 76
End: 2021-10-11

## 2021-10-22 ENCOUNTER — PES CALL (OUTPATIENT)
Dept: ADMINISTRATIVE | Facility: CLINIC | Age: 76
End: 2021-10-22

## 2021-10-25 ENCOUNTER — OFFICE VISIT (OUTPATIENT)
Dept: INTERNAL MEDICINE | Facility: CLINIC | Age: 76
End: 2021-10-25
Payer: MEDICARE

## 2021-10-25 VITALS
SYSTOLIC BLOOD PRESSURE: 122 MMHG | DIASTOLIC BLOOD PRESSURE: 70 MMHG | HEART RATE: 88 BPM | TEMPERATURE: 98 F | BODY MASS INDEX: 27.93 KG/M2 | WEIGHT: 157.63 LBS | RESPIRATION RATE: 18 BRPM | HEIGHT: 63 IN

## 2021-10-25 DIAGNOSIS — Z80.0 FAMILY HISTORY OF STOMACH CANCER: ICD-10-CM

## 2021-10-25 DIAGNOSIS — R10.13 DYSPEPSIA: ICD-10-CM

## 2021-10-25 DIAGNOSIS — M46.1 SACROILIITIS: ICD-10-CM

## 2021-10-25 DIAGNOSIS — K76.9 LIVER LESION: ICD-10-CM

## 2021-10-25 DIAGNOSIS — R41.3 MEMORY LOSS OR IMPAIRMENT: ICD-10-CM

## 2021-10-25 DIAGNOSIS — M54.12 CERVICAL RADICULOPATHY: ICD-10-CM

## 2021-10-25 DIAGNOSIS — M47.816 LUMBAR FACET ARTHROPATHY: ICD-10-CM

## 2021-10-25 DIAGNOSIS — M25.552 PAIN OF LEFT HIP JOINT: ICD-10-CM

## 2021-10-25 DIAGNOSIS — R26.9 IMPAIRED GAIT: ICD-10-CM

## 2021-10-25 DIAGNOSIS — K21.9 GASTROESOPHAGEAL REFLUX DISEASE WITHOUT ESOPHAGITIS: ICD-10-CM

## 2021-10-25 DIAGNOSIS — S19.9XXD: ICD-10-CM

## 2021-10-25 DIAGNOSIS — M53.3 SACROILIAC JOINT DYSFUNCTION: Primary | ICD-10-CM

## 2021-10-25 DIAGNOSIS — R53.83 OTHER FATIGUE: ICD-10-CM

## 2021-10-25 DIAGNOSIS — R29.898 WEAKNESS OF BACK: ICD-10-CM

## 2021-10-25 DIAGNOSIS — G89.29 CHRONIC LEFT SHOULDER PAIN: ICD-10-CM

## 2021-10-25 DIAGNOSIS — Z86.010 HISTORY OF COLON POLYPS: ICD-10-CM

## 2021-10-25 DIAGNOSIS — Z86.010 HISTORY OF ADENOMATOUS POLYP OF COLON: ICD-10-CM

## 2021-10-25 DIAGNOSIS — R60.0 LOCALIZED EDEMA: ICD-10-CM

## 2021-10-25 DIAGNOSIS — M54.6 PAIN IN THORACIC SPINE: ICD-10-CM

## 2021-10-25 DIAGNOSIS — M54.9 BACK PAIN, UNSPECIFIED BACK LOCATION, UNSPECIFIED BACK PAIN LATERALITY, UNSPECIFIED CHRONICITY: ICD-10-CM

## 2021-10-25 DIAGNOSIS — R06.09 DOE (DYSPNEA ON EXERTION): ICD-10-CM

## 2021-10-25 DIAGNOSIS — R07.9 CHEST PAIN, UNSPECIFIED TYPE: ICD-10-CM

## 2021-10-25 DIAGNOSIS — M25.562 CHRONIC PAIN OF LEFT KNEE: ICD-10-CM

## 2021-10-25 DIAGNOSIS — M25.69 DECREASED RANGE OF MOTION OF TRUNK AND BACK: ICD-10-CM

## 2021-10-25 DIAGNOSIS — S83.242S ACUTE MEDIAL MENISCUS TEAR OF LEFT KNEE, SEQUELA: ICD-10-CM

## 2021-10-25 DIAGNOSIS — M25.562 ACUTE PAIN OF LEFT KNEE: ICD-10-CM

## 2021-10-25 DIAGNOSIS — K44.9 HIATAL HERNIA: ICD-10-CM

## 2021-10-25 DIAGNOSIS — G89.29 CHRONIC PAIN OF LEFT KNEE: ICD-10-CM

## 2021-10-25 DIAGNOSIS — M43.10 SPONDYLOLISTHESIS, UNSPECIFIED SPINAL REGION: ICD-10-CM

## 2021-10-25 DIAGNOSIS — Z72.821 POOR SLEEP HYGIENE: ICD-10-CM

## 2021-10-25 DIAGNOSIS — M43.06 SPONDYLOLYSIS OF LUMBAR REGION: ICD-10-CM

## 2021-10-25 DIAGNOSIS — I10 ESSENTIAL HYPERTENSION: ICD-10-CM

## 2021-10-25 DIAGNOSIS — M54.16 LUMBAR RADICULOPATHY: ICD-10-CM

## 2021-10-25 DIAGNOSIS — M53.3 SACROILIAC JOINT PAIN: ICD-10-CM

## 2021-10-25 DIAGNOSIS — M25.512 CHRONIC LEFT SHOULDER PAIN: ICD-10-CM

## 2021-10-25 DIAGNOSIS — Z00.00 ENCOUNTER FOR PREVENTIVE HEALTH EXAMINATION: ICD-10-CM

## 2021-10-25 DIAGNOSIS — K21.9 GASTROESOPHAGEAL REFLUX DISEASE, UNSPECIFIED WHETHER ESOPHAGITIS PRESENT: ICD-10-CM

## 2021-10-25 DIAGNOSIS — M62.830 BACK MUSCLE SPASM: ICD-10-CM

## 2021-10-25 PROCEDURE — 1101F PR PT FALLS ASSESS DOC 0-1 FALLS W/OUT INJ PAST YR: ICD-10-PCS | Mod: HCNC,CPTII,S$GLB, | Performed by: INTERNAL MEDICINE

## 2021-10-25 PROCEDURE — 3074F PR MOST RECENT SYSTOLIC BLOOD PRESSURE < 130 MM HG: ICD-10-PCS | Mod: HCNC,CPTII,S$GLB, | Performed by: INTERNAL MEDICINE

## 2021-10-25 PROCEDURE — 99999 PR PBB SHADOW E&M-EST. PATIENT-LVL IV: ICD-10-PCS | Mod: PBBFAC,HCNC,, | Performed by: INTERNAL MEDICINE

## 2021-10-25 PROCEDURE — 90694 FLU VACCINE - QUADRIVALENT - ADJUVANTED: ICD-10-PCS | Mod: HCNC,S$GLB,, | Performed by: INTERNAL MEDICINE

## 2021-10-25 PROCEDURE — 3078F PR MOST RECENT DIASTOLIC BLOOD PRESSURE < 80 MM HG: ICD-10-PCS | Mod: HCNC,CPTII,S$GLB, | Performed by: INTERNAL MEDICINE

## 2021-10-25 PROCEDURE — 99999 PR PBB SHADOW E&M-EST. PATIENT-LVL IV: CPT | Mod: PBBFAC,HCNC,, | Performed by: INTERNAL MEDICINE

## 2021-10-25 PROCEDURE — 3078F DIAST BP <80 MM HG: CPT | Mod: HCNC,CPTII,S$GLB, | Performed by: INTERNAL MEDICINE

## 2021-10-25 PROCEDURE — G0008 ADMIN INFLUENZA VIRUS VAC: HCPCS | Mod: HCNC,S$GLB,, | Performed by: INTERNAL MEDICINE

## 2021-10-25 PROCEDURE — 1101F PT FALLS ASSESS-DOCD LE1/YR: CPT | Mod: HCNC,CPTII,S$GLB, | Performed by: INTERNAL MEDICINE

## 2021-10-25 PROCEDURE — 3288F PR FALLS RISK ASSESSMENT DOCUMENTED: ICD-10-PCS | Mod: HCNC,CPTII,S$GLB, | Performed by: INTERNAL MEDICINE

## 2021-10-25 PROCEDURE — G0439 PPPS, SUBSEQ VISIT: HCPCS | Mod: HCNC,S$GLB,, | Performed by: INTERNAL MEDICINE

## 2021-10-25 PROCEDURE — 1125F AMNT PAIN NOTED PAIN PRSNT: CPT | Mod: HCNC,CPTII,S$GLB, | Performed by: INTERNAL MEDICINE

## 2021-10-25 PROCEDURE — G0439 PR MEDICARE ANNUAL WELLNESS SUBSEQUENT VISIT: ICD-10-PCS | Mod: HCNC,S$GLB,, | Performed by: INTERNAL MEDICINE

## 2021-10-25 PROCEDURE — G0008 FLU VACCINE - QUADRIVALENT - ADJUVANTED: ICD-10-PCS | Mod: HCNC,S$GLB,, | Performed by: INTERNAL MEDICINE

## 2021-10-25 PROCEDURE — 1125F PR PAIN SEVERITY QUANTIFIED, PAIN PRESENT: ICD-10-PCS | Mod: HCNC,CPTII,S$GLB, | Performed by: INTERNAL MEDICINE

## 2021-10-25 PROCEDURE — 3288F FALL RISK ASSESSMENT DOCD: CPT | Mod: HCNC,CPTII,S$GLB, | Performed by: INTERNAL MEDICINE

## 2021-10-25 PROCEDURE — 3074F SYST BP LT 130 MM HG: CPT | Mod: HCNC,CPTII,S$GLB, | Performed by: INTERNAL MEDICINE

## 2021-10-25 PROCEDURE — 90694 VACC AIIV4 NO PRSRV 0.5ML IM: CPT | Mod: HCNC,S$GLB,, | Performed by: INTERNAL MEDICINE

## 2021-10-25 RX ORDER — ERGOCALCIFEROL 1.25 MG/1
50000 CAPSULE ORAL
Qty: 36 CAPSULE | Refills: 2 | Status: SHIPPED | OUTPATIENT
Start: 2021-10-25 | End: 2023-01-17 | Stop reason: SDUPTHER

## 2021-11-04 ENCOUNTER — LAB VISIT (OUTPATIENT)
Dept: LAB | Facility: HOSPITAL | Age: 76
End: 2021-11-04
Attending: INTERNAL MEDICINE
Payer: MEDICARE

## 2021-11-04 DIAGNOSIS — E78.5 HYPERLIPIDEMIA, UNSPECIFIED HYPERLIPIDEMIA TYPE: ICD-10-CM

## 2021-11-04 LAB
ALT SERPL W/O P-5'-P-CCNC: 25 U/L (ref 10–44)
AST SERPL-CCNC: 19 U/L (ref 10–40)
CHOLEST SERPL-MCNC: 136 MG/DL (ref 120–199)
CHOLEST/HDLC SERPL: 3.5 {RATIO} (ref 2–5)
HDLC SERPL-MCNC: 39 MG/DL (ref 40–75)
HDLC SERPL: 28.7 % (ref 20–50)
LDLC SERPL CALC-MCNC: 41.2 MG/DL (ref 63–159)
NONHDLC SERPL-MCNC: 97 MG/DL
TRIGL SERPL-MCNC: 279 MG/DL (ref 30–150)

## 2021-11-04 PROCEDURE — 84450 TRANSFERASE (AST) (SGOT): CPT | Mod: HCNC | Performed by: INTERNAL MEDICINE

## 2021-11-04 PROCEDURE — 36415 COLL VENOUS BLD VENIPUNCTURE: CPT | Mod: HCNC,PO | Performed by: INTERNAL MEDICINE

## 2021-11-04 PROCEDURE — 84460 ALANINE AMINO (ALT) (SGPT): CPT | Mod: HCNC | Performed by: INTERNAL MEDICINE

## 2021-11-04 PROCEDURE — 80061 LIPID PANEL: CPT | Mod: HCNC | Performed by: INTERNAL MEDICINE

## 2021-11-08 ENCOUNTER — TELEPHONE (OUTPATIENT)
Dept: GASTROENTEROLOGY | Facility: CLINIC | Age: 76
End: 2021-11-08
Payer: MEDICARE

## 2021-11-10 ENCOUNTER — OFFICE VISIT (OUTPATIENT)
Dept: INTERNAL MEDICINE | Facility: CLINIC | Age: 76
End: 2021-11-10
Payer: MEDICARE

## 2021-11-10 ENCOUNTER — LAB VISIT (OUTPATIENT)
Dept: LAB | Facility: HOSPITAL | Age: 76
End: 2021-11-10
Attending: INTERNAL MEDICINE
Payer: MEDICARE

## 2021-11-10 VITALS
TEMPERATURE: 98 F | HEIGHT: 63 IN | OXYGEN SATURATION: 97 % | RESPIRATION RATE: 18 BRPM | WEIGHT: 157.88 LBS | SYSTOLIC BLOOD PRESSURE: 128 MMHG | DIASTOLIC BLOOD PRESSURE: 72 MMHG | BODY MASS INDEX: 27.97 KG/M2 | HEART RATE: 88 BPM

## 2021-11-10 DIAGNOSIS — I10 ESSENTIAL HYPERTENSION: Primary | ICD-10-CM

## 2021-11-10 DIAGNOSIS — E55.9 VITAMIN D DEFICIENCY: ICD-10-CM

## 2021-11-10 DIAGNOSIS — I10 ESSENTIAL HYPERTENSION: ICD-10-CM

## 2021-11-10 DIAGNOSIS — E78.5 ELEVATED LIPIDS: ICD-10-CM

## 2021-11-10 LAB
25(OH)D3+25(OH)D2 SERPL-MCNC: 25 NG/ML (ref 30–96)
ANION GAP SERPL CALC-SCNC: 8 MMOL/L (ref 8–16)
BASOPHILS # BLD AUTO: 0.04 K/UL (ref 0–0.2)
BASOPHILS NFR BLD: 0.4 % (ref 0–1.9)
BUN SERPL-MCNC: 19 MG/DL (ref 8–23)
CALCIUM SERPL-MCNC: 9.5 MG/DL (ref 8.7–10.5)
CHLORIDE SERPL-SCNC: 98 MMOL/L (ref 95–110)
CO2 SERPL-SCNC: 31 MMOL/L (ref 23–29)
CREAT SERPL-MCNC: 1 MG/DL (ref 0.5–1.4)
DIFFERENTIAL METHOD: ABNORMAL
EOSINOPHIL # BLD AUTO: 0.2 K/UL (ref 0–0.5)
EOSINOPHIL NFR BLD: 1.7 % (ref 0–8)
ERYTHROCYTE [DISTWIDTH] IN BLOOD BY AUTOMATED COUNT: 12.8 % (ref 11.5–14.5)
EST. GFR  (AFRICAN AMERICAN): >60 ML/MIN/1.73 M^2
EST. GFR  (NON AFRICAN AMERICAN): 54.1 ML/MIN/1.73 M^2
GLUCOSE SERPL-MCNC: 96 MG/DL (ref 70–110)
HCT VFR BLD AUTO: 34.3 % (ref 37–48.5)
HGB BLD-MCNC: 11.2 G/DL (ref 12–16)
IMM GRANULOCYTES # BLD AUTO: 0.06 K/UL (ref 0–0.04)
IMM GRANULOCYTES NFR BLD AUTO: 0.7 % (ref 0–0.5)
LYMPHOCYTES # BLD AUTO: 2.2 K/UL (ref 1–4.8)
LYMPHOCYTES NFR BLD: 24.2 % (ref 18–48)
MCH RBC QN AUTO: 28.9 PG (ref 27–31)
MCHC RBC AUTO-ENTMCNC: 32.7 G/DL (ref 32–36)
MCV RBC AUTO: 89 FL (ref 82–98)
MONOCYTES # BLD AUTO: 0.6 K/UL (ref 0.3–1)
MONOCYTES NFR BLD: 7 % (ref 4–15)
NEUTROPHILS # BLD AUTO: 6.1 K/UL (ref 1.8–7.7)
NEUTROPHILS NFR BLD: 66 % (ref 38–73)
NRBC BLD-RTO: 0 /100 WBC
PLATELET # BLD AUTO: 242 K/UL (ref 150–450)
PMV BLD AUTO: 10.9 FL (ref 9.2–12.9)
POTASSIUM SERPL-SCNC: 3.9 MMOL/L (ref 3.5–5.1)
RBC # BLD AUTO: 3.87 M/UL (ref 4–5.4)
SODIUM SERPL-SCNC: 137 MMOL/L (ref 136–145)
WBC # BLD AUTO: 9.19 K/UL (ref 3.9–12.7)

## 2021-11-10 PROCEDURE — 82306 VITAMIN D 25 HYDROXY: CPT | Mod: HCNC | Performed by: INTERNAL MEDICINE

## 2021-11-10 PROCEDURE — 99999 PR PBB SHADOW E&M-EST. PATIENT-LVL III: ICD-10-PCS | Mod: PBBFAC,HCNC,, | Performed by: INTERNAL MEDICINE

## 2021-11-10 PROCEDURE — 99999 PR PBB SHADOW E&M-EST. PATIENT-LVL III: CPT | Mod: PBBFAC,HCNC,, | Performed by: INTERNAL MEDICINE

## 2021-11-10 PROCEDURE — 80048 BASIC METABOLIC PNL TOTAL CA: CPT | Mod: HCNC | Performed by: INTERNAL MEDICINE

## 2021-11-10 PROCEDURE — 99214 PR OFFICE/OUTPT VISIT, EST, LEVL IV, 30-39 MIN: ICD-10-PCS | Mod: HCNC,S$GLB,, | Performed by: INTERNAL MEDICINE

## 2021-11-10 PROCEDURE — 99214 OFFICE O/P EST MOD 30 MIN: CPT | Mod: HCNC,S$GLB,, | Performed by: INTERNAL MEDICINE

## 2021-11-10 PROCEDURE — 85025 COMPLETE CBC W/AUTO DIFF WBC: CPT | Mod: HCNC | Performed by: INTERNAL MEDICINE

## 2021-11-10 PROCEDURE — 36415 COLL VENOUS BLD VENIPUNCTURE: CPT | Mod: HCNC,PO | Performed by: INTERNAL MEDICINE

## 2021-11-10 RX ORDER — LOSARTAN POTASSIUM 25 MG/1
TABLET ORAL
COMMUNITY
Start: 2021-08-17 | End: 2021-11-10

## 2021-11-16 ENCOUNTER — TELEPHONE (OUTPATIENT)
Dept: INTERNAL MEDICINE | Facility: CLINIC | Age: 76
End: 2021-11-16
Payer: MEDICARE

## 2021-11-18 ENCOUNTER — PATIENT MESSAGE (OUTPATIENT)
Dept: INTERNAL MEDICINE | Facility: CLINIC | Age: 76
End: 2021-11-18
Payer: MEDICARE

## 2021-11-18 ENCOUNTER — IMMUNIZATION (OUTPATIENT)
Dept: INTERNAL MEDICINE | Facility: CLINIC | Age: 76
End: 2021-11-18
Payer: MEDICARE

## 2021-11-18 DIAGNOSIS — Z23 NEED FOR VACCINATION: Primary | ICD-10-CM

## 2021-11-18 PROCEDURE — 0004A COVID-19, MRNA, LNP-S, PF, 30 MCG/0.3 ML DOSE VACCINE: CPT | Mod: HCNC,CV19,PBBFAC | Performed by: INTERNAL MEDICINE

## 2022-01-05 ENCOUNTER — TELEPHONE (OUTPATIENT)
Dept: GASTROENTEROLOGY | Facility: CLINIC | Age: 77
End: 2022-01-05
Payer: MEDICARE

## 2022-01-05 NOTE — TELEPHONE ENCOUNTER
----- Message from Yang Sanford sent at 1/5/2022  9:37 AM CST -----  Contact: @ [phone]  Patient calling to re-schedule the 1-6th appointment, please call to advise

## 2022-01-05 NOTE — TELEPHONE ENCOUNTER
Return call and spoke with patient. Patient stated that she would like to reschedule appointment due to the corona virus. Patient does not want to come in. MA offered patient a virtual visit patient stated that she does not know how to do virtual visit.     MA inform patient that Dr. Black does not have any appointment at this time.   Patient request a call back she would like a call back in Feb to schedule.     Patient verbalized understanding.

## 2022-03-03 DIAGNOSIS — I10 ESSENTIAL HYPERTENSION: ICD-10-CM

## 2022-03-03 NOTE — TELEPHONE ENCOUNTER
No new care gaps identified.  Powered by [employer]. Reference number: [id number].   3/03/2022 2:22:43 PM CST

## 2022-03-04 ENCOUNTER — OFFICE VISIT (OUTPATIENT)
Dept: INTERNAL MEDICINE | Facility: CLINIC | Age: 77
End: 2022-03-04
Payer: MEDICARE

## 2022-03-04 VITALS
TEMPERATURE: 98 F | HEIGHT: 63 IN | WEIGHT: 154.31 LBS | OXYGEN SATURATION: 97 % | SYSTOLIC BLOOD PRESSURE: 120 MMHG | BODY MASS INDEX: 27.34 KG/M2 | HEART RATE: 67 BPM | DIASTOLIC BLOOD PRESSURE: 78 MMHG

## 2022-03-04 DIAGNOSIS — E78.5 ELEVATED LIPIDS: ICD-10-CM

## 2022-03-04 DIAGNOSIS — N95.1 POSTMENOPAUSAL DISORDER: ICD-10-CM

## 2022-03-04 DIAGNOSIS — E55.9 VITAMIN D DEFICIENCY: ICD-10-CM

## 2022-03-04 DIAGNOSIS — I10 ESSENTIAL HYPERTENSION: ICD-10-CM

## 2022-03-04 DIAGNOSIS — Z00.00 ANNUAL PHYSICAL EXAM: Primary | ICD-10-CM

## 2022-03-04 PROCEDURE — 3288F PR FALLS RISK ASSESSMENT DOCUMENTED: ICD-10-PCS | Mod: HCNC,CPTII,S$GLB, | Performed by: INTERNAL MEDICINE

## 2022-03-04 PROCEDURE — 99999 PR PBB SHADOW E&M-EST. PATIENT-LVL III: ICD-10-PCS | Mod: PBBFAC,HCNC,, | Performed by: INTERNAL MEDICINE

## 2022-03-04 PROCEDURE — 1126F AMNT PAIN NOTED NONE PRSNT: CPT | Mod: HCNC,CPTII,S$GLB, | Performed by: INTERNAL MEDICINE

## 2022-03-04 PROCEDURE — 3074F SYST BP LT 130 MM HG: CPT | Mod: HCNC,CPTII,S$GLB, | Performed by: INTERNAL MEDICINE

## 2022-03-04 PROCEDURE — 1159F MED LIST DOCD IN RCRD: CPT | Mod: HCNC,CPTII,S$GLB, | Performed by: INTERNAL MEDICINE

## 2022-03-04 PROCEDURE — 3078F PR MOST RECENT DIASTOLIC BLOOD PRESSURE < 80 MM HG: ICD-10-PCS | Mod: HCNC,CPTII,S$GLB, | Performed by: INTERNAL MEDICINE

## 2022-03-04 PROCEDURE — 99397 PR PREVENTIVE VISIT,EST,65 & OVER: ICD-10-PCS | Mod: HCNC,S$GLB,, | Performed by: INTERNAL MEDICINE

## 2022-03-04 PROCEDURE — 99999 PR PBB SHADOW E&M-EST. PATIENT-LVL III: CPT | Mod: PBBFAC,HCNC,, | Performed by: INTERNAL MEDICINE

## 2022-03-04 PROCEDURE — 1160F PR REVIEW ALL MEDS BY PRESCRIBER/CLIN PHARMACIST DOCUMENTED: ICD-10-PCS | Mod: HCNC,CPTII,S$GLB, | Performed by: INTERNAL MEDICINE

## 2022-03-04 PROCEDURE — 3074F PR MOST RECENT SYSTOLIC BLOOD PRESSURE < 130 MM HG: ICD-10-PCS | Mod: HCNC,CPTII,S$GLB, | Performed by: INTERNAL MEDICINE

## 2022-03-04 PROCEDURE — 99397 PER PM REEVAL EST PAT 65+ YR: CPT | Mod: HCNC,S$GLB,, | Performed by: INTERNAL MEDICINE

## 2022-03-04 PROCEDURE — 1126F PR PAIN SEVERITY QUANTIFIED, NO PAIN PRESENT: ICD-10-PCS | Mod: HCNC,CPTII,S$GLB, | Performed by: INTERNAL MEDICINE

## 2022-03-04 PROCEDURE — 3288F FALL RISK ASSESSMENT DOCD: CPT | Mod: HCNC,CPTII,S$GLB, | Performed by: INTERNAL MEDICINE

## 2022-03-04 PROCEDURE — 3078F DIAST BP <80 MM HG: CPT | Mod: HCNC,CPTII,S$GLB, | Performed by: INTERNAL MEDICINE

## 2022-03-04 PROCEDURE — 1101F PR PT FALLS ASSESS DOC 0-1 FALLS W/OUT INJ PAST YR: ICD-10-PCS | Mod: HCNC,CPTII,S$GLB, | Performed by: INTERNAL MEDICINE

## 2022-03-04 PROCEDURE — 1101F PT FALLS ASSESS-DOCD LE1/YR: CPT | Mod: HCNC,CPTII,S$GLB, | Performed by: INTERNAL MEDICINE

## 2022-03-04 PROCEDURE — 1160F RVW MEDS BY RX/DR IN RCRD: CPT | Mod: HCNC,CPTII,S$GLB, | Performed by: INTERNAL MEDICINE

## 2022-03-04 PROCEDURE — 1159F PR MEDICATION LIST DOCUMENTED IN MEDICAL RECORD: ICD-10-PCS | Mod: HCNC,CPTII,S$GLB, | Performed by: INTERNAL MEDICINE

## 2022-03-04 RX ORDER — LOSARTAN POTASSIUM AND HYDROCHLOROTHIAZIDE 12.5; 5 MG/1; MG/1
1 TABLET ORAL DAILY
Qty: 90 TABLET | Refills: 3 | Status: SHIPPED | OUTPATIENT
Start: 2022-03-04 | End: 2023-03-17

## 2022-03-04 NOTE — PROGRESS NOTES
Subjective:       Patient ID: Shakira Pearl is a 76 y.o. female.    Chief Complaint: Follow-up, Fall (Pt says she had a fall 6 weeks ), and Cyst (Lt hand has a knot pt says she can't push down on Lt hand when doing sit up painful.)    HPI   76 y.o. Female here for annual exam.      Vaccines: Influenza (2021); Tetanus (2019); Pneumovax (done); Shingrix (will get)  Eye exam: 2019  Mammogram: 3/21  Gyn exam: declined   Colonoscopy: 9/20  DEXA: 8/19(NL)     Exercise: walks  Diet: regular     Past Medical History:  sinus: Allergy  back: Arthritis  back: Degenerative disc disease  No date: Neuromuscular disorder  No date: Vitamin D deficiency  Past Surgical History:  age 19: APPENDECTOMY  No date: CHOLECYSTECTOMY      Comment:  age of 25  10/3/2019: CHONDROPLASTY OF KNEE; Left      Comment:  Procedure: CHONDROPLASTY, KNEE;  Surgeon: Kaylen Patiño MD;  Location: [facility] OR;  Service: Orthopedics;                 Laterality: Left;  38: HYSTERECTOMY  2/18/2019: INJECTION OF JOINT; Bilateral      Comment:  Procedure: INJECTION, JOINT BILATERAL SI;  Surgeon:                Mert Coates MD;  Location: [facility];  Service: Pain               Management;  Laterality: Bilateral;  BILATERAL SI JOINT                INJECTION  10/3/2019: KNEE ARTHROSCOPY W/ MENISCECTOMY; Left      Comment:  Procedure: ARTHROSCOPY, KNEE, WITH MENISCECTOMY;                 Surgeon: Kaylen Patiño MD;  Location: [facility] OR;  Service:                Orthopedics;  Laterality: Left;  10/3/2019: SYNOVECTOMY OF KNEE; Left      Comment:  Procedure: SYNOVECTOMY, KNEE;  Surgeon: Kaylen Patiño MD;                Location: [facility] OR;  Service: Orthopedics;  Laterality:                Left;  Social History    Socioeconomic History      Marital status:       Spouse name: Not on file      Number of children: 1      Years of education: Not on file      Highest education level: Not on file    Occupational History      Occupation: retired    Social  Needs      Financial resource strain: Not on file      Food insecurity:        Worry: Not on file        Inability: Not on file      Transportation needs:        Medical: Not on file        Non-medical: Not on file    Tobacco Use      Smoking status: Never Smoker      Smokeless tobacco: Never Used    Substance and Sexual Activity      Alcohol use: No      Drug use: No      Sexual activity: Not Currently    Lifestyle      Physical activity:        Days per week: Not on file        Minutes per session: Not on file      Stress: Not on file    Relationships      Social connections:        Talks on phone: Not on file        Gets together: Not on file        Attends Sikhism service: Not on file        Active member of club or organization: Not on file        Attends meetings of clubs or organizations: Not on file        Relationship status: Not on file    Other Topics      Concerns:        Not on file    Social History Narrative      Not on file     Review of patient's allergies indicates:   -- Demerol [meperidine] -- Nausea And Vomiting    --  Other reaction(s): Nausea   -- Papaya     --  Illness vomiting   -- Sulfa (sulfonamide antibiotics) -- Rash   -- Sulfur -- Rash  Review of Systems   Constitutional: Negative for activity change, appetite change, chills, diaphoresis, fatigue, fever and unexpected weight change.   HENT: Negative for nasal congestion, mouth sores, postnasal drip, rhinorrhea, sinus pressure/congestion, sneezing, sore throat, trouble swallowing and voice change.    Eyes: Negative for pain, discharge and visual disturbance.   Respiratory: Negative for cough, shortness of breath and wheezing.    Cardiovascular: Negative for chest pain, palpitations and leg swelling.   Gastrointestinal: Negative for abdominal pain, blood in stool, constipation, diarrhea, nausea and vomiting.   Endocrine: Negative for cold intolerance and heat intolerance.   Genitourinary: Negative for difficulty urinating, dysuria,  frequency, hematuria and urgency.   Musculoskeletal: Negative for arthralgias and myalgias.   Integumentary:  Negative for rash and wound.   Allergic/Immunologic: Negative for environmental allergies and food allergies.   Neurological: Negative for dizziness, tremors, seizures, syncope, weakness, light-headedness and headaches.   Hematological: Negative for adenopathy. Does not bruise/bleed easily.   Psychiatric/Behavioral: Negative for confusion and sleep disturbance. The patient is not nervous/anxious.          Objective:      Physical Exam  Vitals and nursing note reviewed.   Constitutional:       General: She is not in acute distress.     Appearance: She is well-developed. She is not diaphoretic.   HENT:      Head: Normocephalic and atraumatic.      Right Ear: External ear normal.      Left Ear: External ear normal.      Nose: Nose normal.      Mouth/Throat:      Pharynx: No oropharyngeal exudate.   Eyes:      General: No scleral icterus.        Right eye: No discharge.         Left eye: No discharge.      Conjunctiva/sclera: Conjunctivae normal.      Pupils: Pupils are equal, round, and reactive to light.   Neck:      Thyroid: No thyromegaly.      Vascular: No JVD.   Cardiovascular:      Rate and Rhythm: Normal rate and regular rhythm.      Heart sounds: Normal heart sounds. No murmur heard.  Pulmonary:      Effort: Pulmonary effort is normal. No respiratory distress.      Breath sounds: Normal breath sounds. No wheezing or rales.   Chest:      Chest wall: No tenderness.   Abdominal:      General: Bowel sounds are normal. There is no distension.      Palpations: Abdomen is soft.      Tenderness: There is no abdominal tenderness. There is no guarding.   Musculoskeletal:      Cervical back: Neck supple.   Lymphadenopathy:      Cervical: No cervical adenopathy.   Skin:     General: Skin is warm and dry.      Coloration: Skin is not pale.      Findings: No rash.   Neurological:      Mental Status: She is alert and  oriented to person, place, and time.   Psychiatric:         Judgment: Judgment normal.         Assessment:       Problem List Items Addressed This Visit        Cardiac/Vascular    Essential hypertension    Relevant Medications    losartan-hydrochlorothiazide 50-12.5 mg (HYZAAR) 50-12.5 mg per tablet    Other Relevant Orders    Hypertension Digital Medicine (HDMP) Enrollment Order (Completed)    Hypertension Digital Medicine (HDMP): Assign Onboarding Questionnaires (Completed)    Elevated lipids      Other Visit Diagnoses     Annual physical exam    -  Primary    Postmenopausal disorder        Relevant Orders    Ambulatory referral/consult to Obstetrics / Gynecology          Plan:    Blood work ordered     HLD- on Zetia       HTN- decrease Hyzaar 2/2 dizziness

## 2022-03-11 RX ORDER — LOSARTAN POTASSIUM AND HYDROCHLOROTHIAZIDE 12.5; 1 MG/1; MG/1
TABLET ORAL
Qty: 90 TABLET | Refills: 1 | OUTPATIENT
Start: 2022-03-11

## 2022-04-04 ENCOUNTER — LAB VISIT (OUTPATIENT)
Dept: LAB | Facility: HOSPITAL | Age: 77
End: 2022-04-04
Attending: INTERNAL MEDICINE
Payer: MEDICARE

## 2022-04-04 ENCOUNTER — OFFICE VISIT (OUTPATIENT)
Dept: GASTROENTEROLOGY | Facility: CLINIC | Age: 77
End: 2022-04-04
Payer: MEDICARE

## 2022-04-04 VITALS
DIASTOLIC BLOOD PRESSURE: 84 MMHG | HEART RATE: 86 BPM | SYSTOLIC BLOOD PRESSURE: 124 MMHG | HEIGHT: 63 IN | BODY MASS INDEX: 27.81 KG/M2 | WEIGHT: 156.94 LBS

## 2022-04-04 DIAGNOSIS — D37.4 VILLOUS ADENOMA OF COLON: Primary | ICD-10-CM

## 2022-04-04 DIAGNOSIS — Z79.899 ENCOUNTER FOR MONITORING LONG-TERM PROTON PUMP INHIBITOR THERAPY: ICD-10-CM

## 2022-04-04 DIAGNOSIS — Z51.81 ENCOUNTER FOR MONITORING LONG-TERM PROTON PUMP INHIBITOR THERAPY: ICD-10-CM

## 2022-04-04 DIAGNOSIS — K44.9 HIATAL HERNIA: ICD-10-CM

## 2022-04-04 DIAGNOSIS — D35.00 ADRENAL ADENOMA, UNSPECIFIED LATERALITY: ICD-10-CM

## 2022-04-04 DIAGNOSIS — D37.4 VILLOUS ADENOMA OF COLON: ICD-10-CM

## 2022-04-04 DIAGNOSIS — K21.9 GASTROESOPHAGEAL REFLUX DISEASE, UNSPECIFIED WHETHER ESOPHAGITIS PRESENT: ICD-10-CM

## 2022-04-04 LAB
25(OH)D3+25(OH)D2 SERPL-MCNC: 31 NG/ML (ref 30–96)
ALBUMIN SERPL BCP-MCNC: 3.5 G/DL (ref 3.5–5.2)
ALP SERPL-CCNC: 55 U/L (ref 55–135)
ALT SERPL W/O P-5'-P-CCNC: 26 U/L (ref 10–44)
ANION GAP SERPL CALC-SCNC: 7 MMOL/L (ref 8–16)
AST SERPL-CCNC: 26 U/L (ref 10–40)
BILIRUB DIRECT SERPL-MCNC: 0.1 MG/DL (ref 0.1–0.3)
BILIRUB SERPL-MCNC: 0.4 MG/DL (ref 0.1–1)
BUN SERPL-MCNC: 19 MG/DL (ref 8–23)
CALCIUM SERPL-MCNC: 9.1 MG/DL (ref 8.7–10.5)
CHLORIDE SERPL-SCNC: 96 MMOL/L (ref 95–110)
CO2 SERPL-SCNC: 31 MMOL/L (ref 23–29)
CREAT SERPL-MCNC: 0.9 MG/DL (ref 0.5–1.4)
EST. GFR  (AFRICAN AMERICAN): >60 ML/MIN/1.73 M^2
EST. GFR  (NON AFRICAN AMERICAN): >60 ML/MIN/1.73 M^2
FERRITIN SERPL-MCNC: 36 NG/ML (ref 20–300)
GLUCOSE SERPL-MCNC: 95 MG/DL (ref 70–110)
HGB BLD-MCNC: 11.5 G/DL (ref 12–16)
IGA SERPL-MCNC: 232 MG/DL (ref 40–350)
IRON SERPL-MCNC: 62 UG/DL (ref 30–160)
MAGNESIUM SERPL-MCNC: 1.6 MG/DL (ref 1.6–2.6)
POTASSIUM SERPL-SCNC: 3.3 MMOL/L (ref 3.5–5.1)
PROT SERPL-MCNC: 7.4 G/DL (ref 6–8.4)
SATURATED IRON: 13 % (ref 20–50)
SODIUM SERPL-SCNC: 134 MMOL/L (ref 136–145)
TOTAL IRON BINDING CAPACITY: 472 UG/DL (ref 250–450)
TRANSFERRIN SERPL-MCNC: 319 MG/DL (ref 200–375)
VIT B12 SERPL-MCNC: 264 PG/ML (ref 210–950)

## 2022-04-04 PROCEDURE — 3074F PR MOST RECENT SYSTOLIC BLOOD PRESSURE < 130 MM HG: ICD-10-PCS | Mod: CPTII,S$GLB,, | Performed by: INTERNAL MEDICINE

## 2022-04-04 PROCEDURE — 82784 ASSAY IGA/IGD/IGG/IGM EACH: CPT | Performed by: INTERNAL MEDICINE

## 2022-04-04 PROCEDURE — 82607 VITAMIN B-12: CPT | Performed by: INTERNAL MEDICINE

## 2022-04-04 PROCEDURE — 1126F PR PAIN SEVERITY QUANTIFIED, NO PAIN PRESENT: ICD-10-PCS | Mod: CPTII,S$GLB,, | Performed by: INTERNAL MEDICINE

## 2022-04-04 PROCEDURE — 99999 PR PBB SHADOW E&M-EST. PATIENT-LVL IV: CPT | Mod: PBBFAC,,, | Performed by: INTERNAL MEDICINE

## 2022-04-04 PROCEDURE — 80048 BASIC METABOLIC PNL TOTAL CA: CPT | Performed by: INTERNAL MEDICINE

## 2022-04-04 PROCEDURE — 99203 OFFICE O/P NEW LOW 30 MIN: CPT | Mod: S$GLB,,, | Performed by: INTERNAL MEDICINE

## 2022-04-04 PROCEDURE — 83735 ASSAY OF MAGNESIUM: CPT | Performed by: INTERNAL MEDICINE

## 2022-04-04 PROCEDURE — 85018 HEMOGLOBIN: CPT | Performed by: INTERNAL MEDICINE

## 2022-04-04 PROCEDURE — 1159F MED LIST DOCD IN RCRD: CPT | Mod: CPTII,S$GLB,, | Performed by: INTERNAL MEDICINE

## 2022-04-04 PROCEDURE — 36415 COLL VENOUS BLD VENIPUNCTURE: CPT | Performed by: INTERNAL MEDICINE

## 2022-04-04 PROCEDURE — 3074F SYST BP LT 130 MM HG: CPT | Mod: CPTII,S$GLB,, | Performed by: INTERNAL MEDICINE

## 2022-04-04 PROCEDURE — 3079F DIAST BP 80-89 MM HG: CPT | Mod: CPTII,S$GLB,, | Performed by: INTERNAL MEDICINE

## 2022-04-04 PROCEDURE — 99999 PR PBB SHADOW E&M-EST. PATIENT-LVL IV: ICD-10-PCS | Mod: PBBFAC,,, | Performed by: INTERNAL MEDICINE

## 2022-04-04 PROCEDURE — 1126F AMNT PAIN NOTED NONE PRSNT: CPT | Mod: CPTII,S$GLB,, | Performed by: INTERNAL MEDICINE

## 2022-04-04 PROCEDURE — 82306 VITAMIN D 25 HYDROXY: CPT | Performed by: INTERNAL MEDICINE

## 2022-04-04 PROCEDURE — 80076 HEPATIC FUNCTION PANEL: CPT | Performed by: INTERNAL MEDICINE

## 2022-04-04 PROCEDURE — 99203 PR OFFICE/OUTPT VISIT, NEW, LEVL III, 30-44 MIN: ICD-10-PCS | Mod: S$GLB,,, | Performed by: INTERNAL MEDICINE

## 2022-04-04 PROCEDURE — 3079F PR MOST RECENT DIASTOLIC BLOOD PRESSURE 80-89 MM HG: ICD-10-PCS | Mod: CPTII,S$GLB,, | Performed by: INTERNAL MEDICINE

## 2022-04-04 PROCEDURE — 1159F PR MEDICATION LIST DOCUMENTED IN MEDICAL RECORD: ICD-10-PCS | Mod: CPTII,S$GLB,, | Performed by: INTERNAL MEDICINE

## 2022-04-04 PROCEDURE — 82728 ASSAY OF FERRITIN: CPT | Performed by: INTERNAL MEDICINE

## 2022-04-04 PROCEDURE — 83516 IMMUNOASSAY NONANTIBODY: CPT | Performed by: INTERNAL MEDICINE

## 2022-04-04 PROCEDURE — 84466 ASSAY OF TRANSFERRIN: CPT | Performed by: INTERNAL MEDICINE

## 2022-04-04 NOTE — PROGRESS NOTES
[facility] Consultation Note    Reason for Consult:  The primary encounter diagnosis was Villous adenoma of colon. Diagnoses of Gastroesophageal reflux disease, unspecified whether esophagitis present, Hiatal hernia, Encounter for monitoring long-term proton pump inhibitor therapy, and Adrenal adenoma, unspecified laterality were also pertinent to this visit.    PCP:   Angel Bello       Referring MD:  No referring provider defined for this encounter.    Initial History of Present Illness (HPI):  This is a 76 y.o. female here for evaluation who has had   multiple abdominal surgeries and had restricted mobility of colon due to   adhesions.  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  She also had another   colonoscopy, which showed a benign colon polyp, no dysplasia.  She recently had   an EGD and colonoscopy in April 2015, which showed no Jean's, no H. pylori   and no dysplasia.  She had a recent colonoscopy 09/09/2020 by Dr. 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 Beni Razo.  She also has a history of adrenal adenoma which will refer her to Endocrinology again for evaluation. Patient states she is doing well now that her blood pressure medicine has been better adjusted she was over overmedicated in having to low overall blood pressure but that has now corrected with a lower dose of anti hypertension meds.  No change in bowel habits no GI bleeding no dysphagia no odynophagia no chest pain no shortness of breath no GI bleeding    Abdominal pain - no  Reflux -as above  Dysphagia - no   Bowel habits - normal  GI bleeding  - none  NSAID usage - none    Interval HPI 04/04/2022:  The patient's last visit with me was on 1/31/2019.      ROS:  Constitutional: No fevers, chills, No weight loss  ENT:  As above heartburn no dysphagia no odynophagia no hoarseness  CV: No chest pain, no palpitation  Pulm: No cough, No shortness of breath, no wheezing  Ophtho: No vision changes  GI: see HPI  Derm: No rash, no itching  Heme: No lymphadenopathy, No easy bruising  MSK:  Some arthritis  : No dysuria, No hematuria  Endo: No hot or cold intolerance  Neuro: No syncope, No seizure, no strokes  Psych: No uncontrolled anxiety, No uncontrolled depression    Medical History:  has a past medical history of Allergy (sinus), Arthritis (back), Cataract, Colon polyps, Degenerative disc disease (back), Diverticulosis of colon, Dyspepsia, GERD (gastroesophageal reflux disease), Heartburn, Hemorrhoids, internal, Hiatal hernia, Neuromuscular disorder, and Vitamin D deficiency.    Surgical History:  has a past surgical history that includes Appendectomy (age 19); Hysterectomy (38); Cholecystectomy; Injection of joint (Bilateral, 2/18/2019); Knee arthroscopy w/ meniscectomy (Left, 10/3/2019); Chondroplasty of knee (Left, 10/3/2019); Synovectomy of knee (Left, 10/3/2019); Injection of joint (Right, 8/20/2020); Esophagogastroduodenoscopy (N/A, 9/9/2020); Colonoscopy (N/A, 9/9/2020); Cataract extraction w/  intraocular lens implant; Cataract extraction w/  intraocular lens implant; and YAG Laser Capsulotomy (Bilateral).    Family History: family history includes Cancer (age of onset: 65) in her father; Heart disease (age of onset: 67) in her mother; No Known Problems in her brother, maternal aunt, maternal grandfather, maternal grandmother, maternal uncle, paternal aunt, paternal grandfather, paternal grandmother, paternal uncle, sister, and son; Stomach cancer in her father; Stroke in her mother..     Social History:  reports that she has never smoked. She has never  "used smokeless tobacco. She reports that she does not drink alcohol and does not use drugs.    Review of patient's allergies indicates:   Allergen Reactions    Demerol [meperidine] Nausea And Vomiting     Other reaction(s): Nausea    Papaya      Illness vomiting    Sulfa (sulfonamide antibiotics) Rash    Sulfur Rash       Medication List with Changes/Refills   Current Medications    DULOXETINE (CYMBALTA) 60 MG CAPSULE    TAKE ONE CAPSULE BY MOUTH EVERY DAY    ERGOCALCIFEROL (ERGOCALCIFEROL) 50,000 UNIT CAP    Take 1 capsule (50,000 Units total) by mouth every 7 days.    ESTRADIOL VALERATE (DELESTROGEN) 40 MG/ML INJECTION    Inject 0.5 mLs (20 mg total) into the muscle every 28 days. Inject into the muscle.    EZETIMIBE (ZETIA) 10 MG TABLET    Take 1 tablet (10 mg total) by mouth once daily.    LOSARTAN-HYDROCHLOROTHIAZIDE 50-12.5 MG (HYZAAR) 50-12.5 MG PER TABLET    Take 1 tablet by mouth once daily.    PANTOPRAZOLE (PROTONIX) 40 MG TABLET    TAKE ONE TABLET BY MOUTH EVERY MORNING 45 MINUTES BEFORE BREAKFAST         Objective Findings:    Vital Signs:  /84 (BP Location: Right leg, Patient Position: Sitting, BP Method: Medium (Automatic))   Pulse 86   Ht 5' 3" (1.6 m)   Wt 71.2 kg (156 lb 15.5 oz)   BMI 27.81 kg/m²   Body mass index is 27.81 kg/m².    Physical Exam:  General Appearance: Well appearing in no acute distress  Eyes:    No scleral icterus  ENT:  No lesions or masses   Lungs: CTA bilaterally, no wheezes, no rhonchi, no rales  Heart:  S1, S2 normal, no murmurs heard  Abdomen:  Non distended, soft, no guarding, no rebound, no tenderness, no appreciated ascites, no bruits, no hepatosplenomegaly,  No CVA tenderness, no appreciated hernias  Musculoskeletal:  No major joint deformities  Skin: No petechiae or rash on exposed skin areas  Neurologic:  Alert and oriented x4  Psychiatric:  Normal speech mentation and affect    Labs:  Lab Results   Component Value Date    WBC 9.19 11/10/2021    HGB 11.2 " (L) 11/10/2021    HCT 34.3 (L) 11/10/2021     11/10/2021    CHOL 136 11/04/2021    TRIG 279 (H) 11/04/2021    HDL 39 (L) 11/04/2021    ALT 25 11/04/2021    AST 19 11/04/2021     11/10/2021    K 3.9 11/10/2021    CL 98 11/10/2021    CREATININE 1.0 11/10/2021    BUN 19 11/10/2021    CO2 31 (H) 11/10/2021    TSH 1.332 01/26/2021    INR 0.9 05/06/2020    HGBA1C 5.2 08/30/2017               Medical Decision Making:  Total time with patient clued room reviewing prior EGD colonoscopy images and path lab work endoscopy reports  CT scan has been 30 minutes with greater than 50% of the time face-to-face in room with patient  Doing history physical exam making recommendations in follow-up  The                         Olympus scope GIF- ([id number]) was introduced                         through the mouth, and advanced to the second part                         of duodenum. The upper GI endoscopy was                         accomplished without difficulty. The patient                         tolerated the procedure well.   Findings:        The lower third of the esophagus was moderately tortuous.        The exam of the esophagus was otherwise normal.        A 2 cm type-I sliding hiatal hernia was found. The proximal extent        of the gastric folds (end of tubular esophagus) was 34 cm from the        incisors. The hiatal narrowing was 36 cm from the incisors. The        Z-line was at the gastroesophageal junction.        The cardia and gastric fundus were normal on retroflexion.        The gastric body and gastric antrum were normal.        The examined duodenum was normal.   Impression:           - Tortuous esophagus.                         - 2 cm type-I sliding hiatal hernia.                         - Normal gastric body and antrum.                         - Normal examined duodenum.                         - No specimens collected.   Recommendation:       - Perform a colonoscopy now.                          - See colonoscopy report for further details.   Attending Participation:        I was present and participated during the entire procedure from        insertion to removal of the endoscope.   Peter Cuevas MD   9/9/2020  The Olympus scope PCF-H190DL ([id number]) was                         introduced through the anus and advanced to the                         cecum, identified by appendiceal orifice and                         ileocecal valve. The colonoscopy was performed with                         difficulty due to restricted mobility of the                         sigmoid colon and a redundant colon. Water exchange                         technique was used to facilitate success of the                         procedure. The patient tolerated the procedure                         well. The quality of the bowel preparation was                         excellent. The ileocecal valve, appendiceal                         orifice, and rectum were photographed. The total                         duration of the procedure was 49 minutes.   Findings:        Skin tags were found on perianal exam.        A 10 mm polyp was found in the mid transverse colon. The polyp was        sessile. The polyp was removed with a cold snare. Resection and        retrieval were complete.        A 2 mm polyp was found in the distal transverse colon. The polyp was        sessile. The polyp was removed with a jumbo cold forceps. Resection        and retrieval were complete.        A 2 mm polyp was found in the rectum. The polyp was sessile. The        polyp was removed with a jumbo cold forceps. Resection and retrieval        were complete.        Two areas of tattoo were seen in the cecum. The tattoo sites and        surrouding mucosa appeared normal.        Scattered small and large-mouthed diverticula were found in the        entire colon.        The exam was otherwise without abnormality.   Impression:           - Perianal skin tags  found on perianal exam.                         - One 10 mm polyp in the mid transverse colon,                         removed with a cold snare. Resected and retrieved.                         - One 2 mm polyp in the distal transverse colon,                         removed with a jumbo cold forceps. Resected and                         retrieved.                         - One 2 mm polyp in the rectum, removed with a                         jumbo cold forceps. Resected and retrieved.                         - Tattoos were seen in the cecum. The tattoo sites                         appeared normal. No signs of residual/recurrent                         polyp.                         - Diverticulosis in the entire examined colon.                         - The examination was otherwise normal.                         - Increased procedural services (modifier .22) for                         increased time (more than 2x standard deviation)                         and complexity as noted above.   Recommendation:       - Discharge patient to home.                         - Patient has a contact number available for                         emergencies. The signs and symptoms of potential                         delayed complications were discussed with the                         patient. Return to normal activities tomorrow.                         Written discharge instructions were provided to the                         patient.                         - Resume previous diet.                         - Continue present medications.                         - Await pathology results.                         - Telephone GI clinic for pathology results in 1                         week.                         - Repeat colonoscopy in 3 years for surveillance.                         - Return to referring physician.   Attending Participation:        I personally performed the entire procedure.   Peter Cuevas MD    9/9/2020  1.  Colon, mid transverse, polyp, biopsy:   - Tubulovillous adenoma.   2.  Colon, distal transverse, polyp, biopsy:   - Tubular adenoma.   3.  Rectum, polyp, biopsy:   - Tubular adenoma.     Comment: Interp By Oksana Albarran MD, Signed on 09/18/2020       Assessment:  1. Villous adenoma of colon    2. Gastroesophageal reflux disease, unspecified whether esophagitis present    3. Hiatal hernia    4. Encounter for monitoring long-term proton pump inhibitor therapy    5. Adrenal adenoma, unspecified laterality         Recommendations:  1. Lab work today  2. Referral to Endocrinology for evaluation of adrenal adenoma  3. History of villous adenoma colon polyp patient has 1 son who is 60 years of age she is up-to-date on his colonoscopy she says.  4. History of advanced colon adenomas polyp next colonoscopy 3 years from her last  with Peter Cuevas MD   In September 2023.  Patient with history of difficult colonoscopy is due to prior abdominal surgeries.  5. History of hiatal hernia and GERD well controlled symptoms on her PPI    Follow up in about 1 year (around 4/4/2023).      Order summary:  Orders Placed This Encounter    Iron and TIBC    Ferritin    Hemoglobin    Basic Metabolic Panel    Vitamin B12    Vitamin D    Magnesium    TISSUE TRANSGLUTAMINASE (TTG), IGA    IgA    Hepatic Function Panel    Ambulatory referral/consult to Endocrinology         Thank you so much for allowing me to participate in the care of Shakira Black MD

## 2022-04-07 LAB — TTG IGA SER-ACNC: 6 UNITS

## 2022-04-10 DIAGNOSIS — D50.9 IRON DEFICIENCY ANEMIA, UNSPECIFIED IRON DEFICIENCY ANEMIA TYPE: ICD-10-CM

## 2022-04-10 DIAGNOSIS — E87.6 LOW SERUM POTASSIUM: Primary | ICD-10-CM

## 2022-04-10 RX ORDER — FERROUS GLUCONATE 324(38)MG
324 TABLET ORAL
Qty: 90 TABLET | Refills: 1 | Status: SHIPPED | OUTPATIENT
Start: 2022-04-10 | End: 2022-06-12 | Stop reason: SDUPTHER

## 2022-04-10 NOTE — PROGRESS NOTES
Manpreet please schedule patient for GI clinic follow-up to discuss possibility of small-bowel video capsule endoscopy in light of her iron deficiency anemia.    Please order basic metabolic panel in 1 week for follow-up of low serum potassium.    Shakira recommend you increase foods in your diet that are higher in potassium in the meantime.    Food Sources of Potassium      Bananas, oranges, cantaloupe, honeydew, apricots, grapefruit (some dried fruits, such as prunes, raisins, and dates, are also high in potassium)  Cooked spinach.  Cooked broccoli.  Potatoes.  Sweet potatoes.  Mushrooms.  Peas.  Cucumbers.    Manpreet - please tell patient that they are iron deficient and anemic and recommend that they take ferrous gluconate one 324mg pill once daily for next 3 months.    Please order repeat fasting Hemoglobin, Iron/TIBC, and Ferritin in 8 weeks - Orders placed.

## 2022-04-11 ENCOUNTER — TELEPHONE (OUTPATIENT)
Dept: GASTROENTEROLOGY | Facility: CLINIC | Age: 77
End: 2022-04-11
Payer: MEDICARE

## 2022-04-11 NOTE — TELEPHONE ENCOUNTER
Return call and spoke with patient. Inform patient Per Dr. Black, please schedule patient for GI clinic follow-up to discuss possibility of small-bowel video capsule endoscopy in light of her iron deficiency anemia.     Please order basic metabolic panel in 1 week for follow-up of low serum potassium.     Shakira recommend you increase foods in your diet that are higher in potassium in the meantime.     Food Sources of Potassium       Bananas, oranges, cantaloupe, honeydew, apricots, grapefruit (some dried fruits, such as prunes, raisins, and dates, are also high in potassium)   Cooked spinach.   Cooked broccoli.   Potatoes.   Sweet potatoes.   Mushrooms.   Peas.   Cucumbers.     Manpreet - please tell patient that they are iron deficient and anemic and recommend that they take ferrous gluconate one 324mg pill once daily for next 3 months.     Please order repeat fasting Hemoglobin, Iron/TIBC, and Ferritin in 8 weeks - Orders plac      Patient verbalized understanding.

## 2022-04-11 NOTE — TELEPHONE ENCOUNTER
----- Message from Ctmirella Roeard sent at 4/11/2022  3:32 PM CDT -----  Contact: Patient  Type: Patient Call Back         Who called: Patient         What is the request in detail: calling regarding Rx that was sent into pharmacy yesterday for ferrous gluconate (FERGON) 324 MG tablet; states she want sot speak with nurse regarding kidney; please advise           Best call back number: [phone]         Additional Information:   [facility] - [place] - [address] [place] [address] [address]  [address] [place] [address]  C[place]di [place] [address]  Phone: [phone] Fax: [phone]             Thank You

## 2022-04-20 ENCOUNTER — LAB VISIT (OUTPATIENT)
Dept: LAB | Facility: HOSPITAL | Age: 77
End: 2022-04-20
Attending: INTERNAL MEDICINE
Payer: MEDICARE

## 2022-04-20 DIAGNOSIS — E87.6 LOW SERUM POTASSIUM: ICD-10-CM

## 2022-04-20 DIAGNOSIS — D50.9 IRON DEFICIENCY ANEMIA, UNSPECIFIED IRON DEFICIENCY ANEMIA TYPE: ICD-10-CM

## 2022-04-20 LAB
ANION GAP SERPL CALC-SCNC: 12 MMOL/L (ref 8–16)
BUN SERPL-MCNC: 10 MG/DL (ref 8–23)
CALCIUM SERPL-MCNC: 9.1 MG/DL (ref 8.7–10.5)
CHLORIDE SERPL-SCNC: 99 MMOL/L (ref 95–110)
CO2 SERPL-SCNC: 25 MMOL/L (ref 23–29)
CREAT SERPL-MCNC: 0.8 MG/DL (ref 0.5–1.4)
EST. GFR  (AFRICAN AMERICAN): >60 ML/MIN/1.73 M^2
EST. GFR  (NON AFRICAN AMERICAN): >60 ML/MIN/1.73 M^2
GLUCOSE SERPL-MCNC: 86 MG/DL (ref 70–110)
POTASSIUM SERPL-SCNC: 3.4 MMOL/L (ref 3.5–5.1)
SODIUM SERPL-SCNC: 136 MMOL/L (ref 136–145)

## 2022-04-20 PROCEDURE — 80048 BASIC METABOLIC PNL TOTAL CA: CPT | Performed by: INTERNAL MEDICINE

## 2022-04-20 PROCEDURE — 36415 COLL VENOUS BLD VENIPUNCTURE: CPT | Mod: PO | Performed by: INTERNAL MEDICINE

## 2022-04-21 DIAGNOSIS — E87.6 LOW SERUM POTASSIUM: Primary | ICD-10-CM

## 2022-04-21 NOTE — PROGRESS NOTES
Manpreet please order follow-up basic metabolic panel in 2 weeks to recheck her slightly low serum potassium    Shakira your serum potassium is likely low secondary to your high blood pressure medicine    Recommend you try to increase the amount of higher potassium containing foods in your diet.    Food Sources of Potassium      Bananas, oranges, cantaloupe, honeydew, apricots, grapefruit (some dried fruits, such as prunes, raisins, and dates, are also high in potassium)  Cooked spinach.  Cooked broccoli.  Potatoes.  Sweet potatoes.  Mushrooms.  Peas.  Cucumbers.

## 2022-04-22 ENCOUNTER — PATIENT MESSAGE (OUTPATIENT)
Dept: GASTROENTEROLOGY | Facility: CLINIC | Age: 77
End: 2022-04-22
Payer: MEDICARE

## 2022-04-22 ENCOUNTER — TELEPHONE (OUTPATIENT)
Dept: GASTROENTEROLOGY | Facility: CLINIC | Age: 77
End: 2022-04-22
Payer: MEDICARE

## 2022-04-22 NOTE — TELEPHONE ENCOUNTER
Contact patient per Manpreet Craft please order follow-up basic metabolic panel in 2 weeks to recheck her slightly low serum potassium     Shakira your serum potassium is likely low secondary to your high blood pressure medicine     Recommend you try to increase the amount of higher potassium containing foods in your diet.     Food Sources of Potassium       Bananas, oranges, cantaloupe, honeydew, apricots, grapefruit (some dried fruits, such as prunes, raisins, and dates, are also high in potassium)   Cooked spinach.   Cooked broccoli.   Potatoes.   Sweet potatoes.   Mushrooms.   Peas.   Cucumbers.     Patient has reviewed and read provider message.   Labs has been schedule  Message has been sent to patient.

## 2022-04-22 NOTE — TELEPHONE ENCOUNTER
----- Message from Yfn Black MD sent at 4/21/2022  5:48 PM CDT -----  Manpreet please order follow-up basic metabolic panel in 2 weeks to recheck her slightly low serum potassium    Shakira your serum potassium is likely low secondary to your high blood pressure medicine    Recommend you try to increase the amount of higher potassium containing foods in your diet.    Food Sources of Potassium      Bananas, oranges, cantaloupe, honeydew, apricots, grapefruit (some dried fruits, such as prunes, raisins, and dates, are also high in potassium)  Cooked spinach.  Cooked broccoli.  Potatoes.  Sweet potatoes.  Mushrooms.  Peas.  Cucumbers.

## 2022-04-26 DIAGNOSIS — E78.5 ELEVATED LIPIDS: ICD-10-CM

## 2022-04-26 NOTE — TELEPHONE ENCOUNTER
No new care gaps identified.  Powered by "[employer] by [employer]. Reference number: [id number].   4/26/2022 4:26:00 PM CDT

## 2022-04-27 RX ORDER — EZETIMIBE 10 MG/1
TABLET ORAL
Qty: 90 TABLET | Refills: 1 | Status: SHIPPED | OUTPATIENT
Start: 2022-04-27 | End: 2023-01-04 | Stop reason: SDUPTHER

## 2022-04-27 NOTE — TELEPHONE ENCOUNTER
Refill Authorization Note   Shakira Pearl  is requesting a refill authorization.  Brief Assessment and Rationale for Refill:  Approve     Medication Therapy Plan:       Medication Reconciliation Completed: No   Comments:     No Care Gaps recommended.     Note composed:4:04 PM 04/27/2022

## 2022-04-29 ENCOUNTER — TELEPHONE (OUTPATIENT)
Dept: INTERNAL MEDICINE | Facility: CLINIC | Age: 77
End: 2022-04-29
Payer: MEDICARE

## 2022-04-29 NOTE — TELEPHONE ENCOUNTER
----- Message from Shira Matute sent at 4/29/2022 12:12 PM CDT -----  Regarding: Pt called to speak to the nurse regarding her lab orders and would like a call back today asap to discuss the labs due to bein scheduled for an upcoming appt with a different provider for labs  Patient Advice:    Pt called to speak to the nurse regarding her lab orders and would like a call back today asap to discuss the labs due to bein scheduled for an upcoming appt with a different provider for labs    Pt can be reached at [phone]

## 2022-05-02 ENCOUNTER — TELEPHONE (OUTPATIENT)
Dept: UROGYNECOLOGY | Facility: CLINIC | Age: 77
End: 2022-05-02
Payer: MEDICARE

## 2022-05-02 NOTE — TELEPHONE ENCOUNTER
----- Message from Mayi Pagan sent at 5/2/2022  4:50 PM CDT -----  Regarding: speak with nurse  Contact: patient  [phone]   please call patient need to schedule appointment waiting on a call back thanks.

## 2022-05-06 ENCOUNTER — LAB VISIT (OUTPATIENT)
Dept: LAB | Facility: HOSPITAL | Age: 77
End: 2022-05-06
Attending: INTERNAL MEDICINE
Payer: MEDICARE

## 2022-05-06 DIAGNOSIS — E87.6 LOW SERUM POTASSIUM: ICD-10-CM

## 2022-05-06 DIAGNOSIS — I10 ESSENTIAL HYPERTENSION: ICD-10-CM

## 2022-05-06 DIAGNOSIS — E55.9 VITAMIN D DEFICIENCY: ICD-10-CM

## 2022-05-06 LAB
25(OH)D3+25(OH)D2 SERPL-MCNC: 28 NG/ML (ref 30–96)
ALBUMIN SERPL BCP-MCNC: 3.3 G/DL (ref 3.5–5.2)
ALP SERPL-CCNC: 51 U/L (ref 55–135)
ALT SERPL W/O P-5'-P-CCNC: 16 U/L (ref 10–44)
ANION GAP SERPL CALC-SCNC: 10 MMOL/L (ref 8–16)
ANION GAP SERPL CALC-SCNC: 10 MMOL/L (ref 8–16)
AST SERPL-CCNC: 17 U/L (ref 10–40)
BASOPHILS # BLD AUTO: 0.02 K/UL (ref 0–0.2)
BASOPHILS NFR BLD: 0.2 % (ref 0–1.9)
BILIRUB SERPL-MCNC: 0.3 MG/DL (ref 0.1–1)
BUN SERPL-MCNC: 17 MG/DL (ref 8–23)
BUN SERPL-MCNC: 17 MG/DL (ref 8–23)
CALCIUM SERPL-MCNC: 8.5 MG/DL (ref 8.7–10.5)
CALCIUM SERPL-MCNC: 8.5 MG/DL (ref 8.7–10.5)
CHLORIDE SERPL-SCNC: 99 MMOL/L (ref 95–110)
CHLORIDE SERPL-SCNC: 99 MMOL/L (ref 95–110)
CHOLEST SERPL-MCNC: 148 MG/DL (ref 120–199)
CHOLEST/HDLC SERPL: 3 {RATIO} (ref 2–5)
CO2 SERPL-SCNC: 26 MMOL/L (ref 23–29)
CO2 SERPL-SCNC: 26 MMOL/L (ref 23–29)
CREAT SERPL-MCNC: 0.8 MG/DL (ref 0.5–1.4)
CREAT SERPL-MCNC: 0.8 MG/DL (ref 0.5–1.4)
DIFFERENTIAL METHOD: ABNORMAL
EOSINOPHIL # BLD AUTO: 0.2 K/UL (ref 0–0.5)
EOSINOPHIL NFR BLD: 1.9 % (ref 0–8)
ERYTHROCYTE [DISTWIDTH] IN BLOOD BY AUTOMATED COUNT: 13 % (ref 11.5–14.5)
EST. GFR  (AFRICAN AMERICAN): >60 ML/MIN/1.73 M^2
EST. GFR  (AFRICAN AMERICAN): >60 ML/MIN/1.73 M^2
EST. GFR  (NON AFRICAN AMERICAN): >60 ML/MIN/1.73 M^2
EST. GFR  (NON AFRICAN AMERICAN): >60 ML/MIN/1.73 M^2
GLUCOSE SERPL-MCNC: 112 MG/DL (ref 70–110)
GLUCOSE SERPL-MCNC: 112 MG/DL (ref 70–110)
HCT VFR BLD AUTO: 33.3 % (ref 37–48.5)
HDLC SERPL-MCNC: 49 MG/DL (ref 40–75)
HDLC SERPL: 33.1 % (ref 20–50)
HGB BLD-MCNC: 11 G/DL (ref 12–16)
IMM GRANULOCYTES # BLD AUTO: 0.04 K/UL (ref 0–0.04)
IMM GRANULOCYTES NFR BLD AUTO: 0.5 % (ref 0–0.5)
LDLC SERPL CALC-MCNC: 44 MG/DL (ref 63–159)
LYMPHOCYTES # BLD AUTO: 2 K/UL (ref 1–4.8)
LYMPHOCYTES NFR BLD: 23.7 % (ref 18–48)
MCH RBC QN AUTO: 29 PG (ref 27–31)
MCHC RBC AUTO-ENTMCNC: 33 G/DL (ref 32–36)
MCV RBC AUTO: 88 FL (ref 82–98)
MONOCYTES # BLD AUTO: 0.4 K/UL (ref 0.3–1)
MONOCYTES NFR BLD: 4.3 % (ref 4–15)
NEUTROPHILS # BLD AUTO: 5.8 K/UL (ref 1.8–7.7)
NEUTROPHILS NFR BLD: 69.4 % (ref 38–73)
NONHDLC SERPL-MCNC: 99 MG/DL
NRBC BLD-RTO: 0 /100 WBC
PLATELET # BLD AUTO: 261 K/UL (ref 150–450)
PMV BLD AUTO: 11 FL (ref 9.2–12.9)
POTASSIUM SERPL-SCNC: 3.4 MMOL/L (ref 3.5–5.1)
POTASSIUM SERPL-SCNC: 3.4 MMOL/L (ref 3.5–5.1)
PROT SERPL-MCNC: 6.7 G/DL (ref 6–8.4)
RBC # BLD AUTO: 3.79 M/UL (ref 4–5.4)
SODIUM SERPL-SCNC: 135 MMOL/L (ref 136–145)
SODIUM SERPL-SCNC: 135 MMOL/L (ref 136–145)
TRIGL SERPL-MCNC: 275 MG/DL (ref 30–150)
TSH SERPL DL<=0.005 MIU/L-ACNC: 2.37 UIU/ML (ref 0.4–4)
WBC # BLD AUTO: 8.37 K/UL (ref 3.9–12.7)

## 2022-05-06 PROCEDURE — 82306 VITAMIN D 25 HYDROXY: CPT | Performed by: INTERNAL MEDICINE

## 2022-05-06 PROCEDURE — 80061 LIPID PANEL: CPT | Performed by: INTERNAL MEDICINE

## 2022-05-06 PROCEDURE — 36415 COLL VENOUS BLD VENIPUNCTURE: CPT | Mod: PO | Performed by: INTERNAL MEDICINE

## 2022-05-06 PROCEDURE — 80053 COMPREHEN METABOLIC PANEL: CPT | Performed by: INTERNAL MEDICINE

## 2022-05-06 PROCEDURE — 85025 COMPLETE CBC W/AUTO DIFF WBC: CPT | Performed by: INTERNAL MEDICINE

## 2022-05-06 PROCEDURE — 84443 ASSAY THYROID STIM HORMONE: CPT | Performed by: INTERNAL MEDICINE

## 2022-05-08 DIAGNOSIS — E87.6 LOW SERUM POTASSIUM: Primary | ICD-10-CM

## 2022-05-08 NOTE — PROGRESS NOTES
Manpreet please schedule Shakira  for a follow-up basic metabolic panel in 2 weeks to recheck her potassium level orders placed    Shakira should you may need to message your primary care doctor as you may need to be on oral potassium pills if you cannot increase your potassium with your diet    Shakira in the mean time Please increase foods in your diet higher in potassium.    Food Sources of Potassium      Bananas, oranges, cantaloupe, honeydew, apricots, grapefruit (some dried fruits, such as prunes, raisins, and dates, are also high in potassium)  Cooked spinach.  Cooked broccoli.  Potatoes.  Sweet potatoes.  Mushrooms.  Peas.  Cucumbers.

## 2022-05-10 ENCOUNTER — LAB VISIT (OUTPATIENT)
Dept: LAB | Facility: HOSPITAL | Age: 77
End: 2022-05-10
Attending: INTERNAL MEDICINE
Payer: MEDICARE

## 2022-05-10 ENCOUNTER — OFFICE VISIT (OUTPATIENT)
Dept: INTERNAL MEDICINE | Facility: CLINIC | Age: 77
End: 2022-05-10
Payer: MEDICARE

## 2022-05-10 VITALS
RESPIRATION RATE: 19 BRPM | BODY MASS INDEX: 27.31 KG/M2 | HEART RATE: 86 BPM | DIASTOLIC BLOOD PRESSURE: 86 MMHG | OXYGEN SATURATION: 95 % | TEMPERATURE: 98 F | HEIGHT: 63 IN | SYSTOLIC BLOOD PRESSURE: 124 MMHG | WEIGHT: 154.13 LBS

## 2022-05-10 DIAGNOSIS — R52 PAIN: Primary | ICD-10-CM

## 2022-05-10 DIAGNOSIS — E78.5 ELEVATED LIPIDS: ICD-10-CM

## 2022-05-10 DIAGNOSIS — I10 ESSENTIAL HYPERTENSION: ICD-10-CM

## 2022-05-10 DIAGNOSIS — M46.1 SACROILIITIS: ICD-10-CM

## 2022-05-10 DIAGNOSIS — G44.229 CHRONIC TENSION-TYPE HEADACHE, NOT INTRACTABLE: ICD-10-CM

## 2022-05-10 DIAGNOSIS — D50.9 IRON DEFICIENCY ANEMIA, UNSPECIFIED IRON DEFICIENCY ANEMIA TYPE: Primary | ICD-10-CM

## 2022-05-10 DIAGNOSIS — M67.40 GANGLION CYST: ICD-10-CM

## 2022-05-10 DIAGNOSIS — L72.0 EPIDERMAL CYST OF FACE: ICD-10-CM

## 2022-05-10 LAB
BILIRUB UR QL STRIP: NEGATIVE
CLARITY UR REFRACT.AUTO: CLEAR
COLOR UR AUTO: NORMAL
GLUCOSE UR QL STRIP: NEGATIVE
HGB UR QL STRIP: NEGATIVE
KETONES UR QL STRIP: NEGATIVE
LEUKOCYTE ESTERASE UR QL STRIP: NEGATIVE
NITRITE UR QL STRIP: NEGATIVE
PH UR STRIP: 6 [PH] (ref 5–8)
PROT UR QL STRIP: NEGATIVE
SP GR UR STRIP: 1.01 (ref 1–1.03)
URN SPEC COLLECT METH UR: NORMAL

## 2022-05-10 PROCEDURE — 1125F AMNT PAIN NOTED PAIN PRSNT: CPT | Mod: CPTII,S$GLB,, | Performed by: INTERNAL MEDICINE

## 2022-05-10 PROCEDURE — 3074F SYST BP LT 130 MM HG: CPT | Mod: CPTII,S$GLB,, | Performed by: INTERNAL MEDICINE

## 2022-05-10 PROCEDURE — 99214 PR OFFICE/OUTPT VISIT, EST, LEVL IV, 30-39 MIN: ICD-10-PCS | Mod: S$GLB,,, | Performed by: INTERNAL MEDICINE

## 2022-05-10 PROCEDURE — 1101F PR PT FALLS ASSESS DOC 0-1 FALLS W/OUT INJ PAST YR: ICD-10-PCS | Mod: CPTII,S$GLB,, | Performed by: INTERNAL MEDICINE

## 2022-05-10 PROCEDURE — 3288F FALL RISK ASSESSMENT DOCD: CPT | Mod: CPTII,S$GLB,, | Performed by: INTERNAL MEDICINE

## 2022-05-10 PROCEDURE — 1125F PR PAIN SEVERITY QUANTIFIED, PAIN PRESENT: ICD-10-PCS | Mod: CPTII,S$GLB,, | Performed by: INTERNAL MEDICINE

## 2022-05-10 PROCEDURE — 99999 PR PBB SHADOW E&M-EST. PATIENT-LVL IV: ICD-10-PCS | Mod: PBBFAC,,, | Performed by: INTERNAL MEDICINE

## 2022-05-10 PROCEDURE — 3288F PR FALLS RISK ASSESSMENT DOCUMENTED: ICD-10-PCS | Mod: CPTII,S$GLB,, | Performed by: INTERNAL MEDICINE

## 2022-05-10 PROCEDURE — 3079F PR MOST RECENT DIASTOLIC BLOOD PRESSURE 80-89 MM HG: ICD-10-PCS | Mod: CPTII,S$GLB,, | Performed by: INTERNAL MEDICINE

## 2022-05-10 PROCEDURE — 3074F PR MOST RECENT SYSTOLIC BLOOD PRESSURE < 130 MM HG: ICD-10-PCS | Mod: CPTII,S$GLB,, | Performed by: INTERNAL MEDICINE

## 2022-05-10 PROCEDURE — 1101F PT FALLS ASSESS-DOCD LE1/YR: CPT | Mod: CPTII,S$GLB,, | Performed by: INTERNAL MEDICINE

## 2022-05-10 PROCEDURE — 99214 OFFICE O/P EST MOD 30 MIN: CPT | Mod: S$GLB,,, | Performed by: INTERNAL MEDICINE

## 2022-05-10 PROCEDURE — 81003 URINALYSIS AUTO W/O SCOPE: CPT | Performed by: INTERNAL MEDICINE

## 2022-05-10 PROCEDURE — 3079F DIAST BP 80-89 MM HG: CPT | Mod: CPTII,S$GLB,, | Performed by: INTERNAL MEDICINE

## 2022-05-10 PROCEDURE — 99999 PR PBB SHADOW E&M-EST. PATIENT-LVL IV: CPT | Mod: PBBFAC,,, | Performed by: INTERNAL MEDICINE

## 2022-05-10 RX ORDER — BUTALBITAL, ACETAMINOPHEN AND CAFFEINE 50; 325; 40 MG/1; MG/1; MG/1
1 TABLET ORAL EVERY 4 HOURS PRN
Qty: 45 TABLET | Refills: 2 | Status: SHIPPED | OUTPATIENT
Start: 2022-05-10 | End: 2022-06-09

## 2022-05-10 NOTE — PROGRESS NOTES
orthoSubjective:       Patient ID: Shakira Pearl is a 76 y.o. female.    Chief Complaint: Annual Exam and Hip Pain    HPI   Pt with suspected LAM, HLD, HTN, Sacroiliitis is here for f/u. C/o intermittent symptoms of tension headaches starting in the neck and radiating around to the sides. It is described as squeezing. No hx of migraines or any vision changes.     Pt also has a cyst of her forehead which she feels is getting larger and pt would like to get it checked out.   Review of Systems   Constitutional: Negative for activity change, appetite change, chills, diaphoresis, fatigue, fever and unexpected weight change.   HENT: Negative for postnasal drip, rhinorrhea, sinus pressure/congestion, sneezing, sore throat, trouble swallowing and voice change.    Respiratory: Negative for cough, shortness of breath and wheezing.    Cardiovascular: Negative for chest pain, palpitations and leg swelling.   Gastrointestinal: Negative for abdominal pain, blood in stool, constipation, diarrhea, nausea and vomiting.   Genitourinary: Negative for dysuria.   Musculoskeletal: Negative for arthralgias and myalgias.   Integumentary:  Negative for rash and wound.   Allergic/Immunologic: Negative for environmental allergies and food allergies.   Neurological: Positive for headaches.   Hematological: Negative for adenopathy. Does not bruise/bleed easily.         Objective:      Physical Exam  Constitutional:       General: She is not in acute distress.     Appearance: She is well-developed. She is not diaphoretic.   HENT:      Head: Normocephalic and atraumatic.        Right Ear: External ear normal.      Left Ear: External ear normal.      Nose: Nose normal.      Mouth/Throat:      Pharynx: No oropharyngeal exudate.   Eyes:      General: No scleral icterus.        Right eye: No discharge.         Left eye: No discharge.      Conjunctiva/sclera: Conjunctivae normal.      Pupils: Pupils are equal, round, and reactive to light.   Neck:       Vascular: No JVD.   Cardiovascular:      Rate and Rhythm: Normal rate and regular rhythm.      Heart sounds: Normal heart sounds.   Pulmonary:      Effort: Pulmonary effort is normal. No respiratory distress.      Breath sounds: Normal breath sounds. No wheezing or rales.   Musculoskeletal:      Cervical back: Neck supple.   Lymphadenopathy:      Cervical: No cervical adenopathy.   Skin:     General: Skin is warm and dry.      Coloration: Skin is not pale.      Findings: No rash.   Neurological:      Mental Status: She is alert and oriented to person, place, and time.         Assessment:       Problem List Items Addressed This Visit        Cardiac/Vascular    Essential hypertension    Elevated lipids       Orthopedic    Sacroiliitis      Other Visit Diagnoses     Iron deficiency anemia, unspecified iron deficiency anemia type    -  Primary    Epidermal cyst of face        Relevant Orders    Ambulatory referral/consult to Dermatology    Chronic tension-type headache, not intractable        Relevant Medications    butalbital-acetaminophen-caffeine -40 mg (FIORICET, ESGIC) -40 mg per tablet    Ganglion cyst        Relevant Orders    Ambulatory referral/consult to Orthopedics          Plan:    LAM- followed by GI     HLD- on Zetia       HTN- stable on Hyzaar         Sacroiliitis- stable     Cyst of forehead- referral to Derm     Tension headaches- Rx Fioricet PRN

## 2022-05-11 ENCOUNTER — TELEPHONE (OUTPATIENT)
Dept: INTERNAL MEDICINE | Facility: CLINIC | Age: 77
End: 2022-05-11
Payer: MEDICARE

## 2022-05-11 ENCOUNTER — PATIENT OUTREACH (OUTPATIENT)
Dept: ADMINISTRATIVE | Facility: OTHER | Age: 77
End: 2022-05-11
Payer: MEDICARE

## 2022-05-11 ENCOUNTER — HOSPITAL ENCOUNTER (OUTPATIENT)
Dept: RADIOLOGY | Facility: HOSPITAL | Age: 77
Discharge: HOME | End: 2022-05-11
Attending: ORTHOPAEDIC SURGERY
Payer: MEDICARE

## 2022-05-11 ENCOUNTER — OFFICE VISIT (OUTPATIENT)
Dept: ORTHOPEDICS | Facility: CLINIC | Age: 77
End: 2022-05-11
Payer: MEDICARE

## 2022-05-11 VITALS — BODY MASS INDEX: 27.93 KG/M2 | HEIGHT: 63 IN | WEIGHT: 157.63 LBS

## 2022-05-11 DIAGNOSIS — D23.39 DERMOID CYST OF FOREHEAD: ICD-10-CM

## 2022-05-11 DIAGNOSIS — I10 ESSENTIAL HYPERTENSION: Primary | ICD-10-CM

## 2022-05-11 DIAGNOSIS — M65.832 EXTENSOR TENOSYNOVITIS OF LEFT WRIST: ICD-10-CM

## 2022-05-11 DIAGNOSIS — E78.5 ELEVATED LIPIDS: ICD-10-CM

## 2022-05-11 DIAGNOSIS — R52 PAIN: ICD-10-CM

## 2022-05-11 DIAGNOSIS — G89.29 CHRONIC PAIN OF LEFT WRIST: ICD-10-CM

## 2022-05-11 DIAGNOSIS — M25.532 CHRONIC PAIN OF LEFT WRIST: ICD-10-CM

## 2022-05-11 DIAGNOSIS — M67.40 GANGLION CYST: Primary | ICD-10-CM

## 2022-05-11 DIAGNOSIS — Z00.00 ANNUAL PHYSICAL EXAM: ICD-10-CM

## 2022-05-11 PROCEDURE — 99204 OFFICE O/P NEW MOD 45 MIN: CPT | Mod: 25,S$GLB,, | Performed by: ORTHOPAEDIC SURGERY

## 2022-05-11 PROCEDURE — 1160F RVW MEDS BY RX/DR IN RCRD: CPT | Mod: CPTII,S$GLB,, | Performed by: ORTHOPAEDIC SURGERY

## 2022-05-11 PROCEDURE — 1160F PR REVIEW ALL MEDS BY PRESCRIBER/CLIN PHARMACIST DOCUMENTED: ICD-10-PCS | Mod: CPTII,S$GLB,, | Performed by: ORTHOPAEDIC SURGERY

## 2022-05-11 PROCEDURE — 1159F PR MEDICATION LIST DOCUMENTED IN MEDICAL RECORD: ICD-10-PCS | Mod: CPTII,S$GLB,, | Performed by: ORTHOPAEDIC SURGERY

## 2022-05-11 PROCEDURE — 99999 PR PBB SHADOW E&M-EST. PATIENT-LVL III: ICD-10-PCS | Mod: PBBFAC,,, | Performed by: ORTHOPAEDIC SURGERY

## 2022-05-11 PROCEDURE — 99204 PR OFFICE/OUTPT VISIT, NEW, LEVL IV, 45-59 MIN: ICD-10-PCS | Mod: 25,S$GLB,, | Performed by: ORTHOPAEDIC SURGERY

## 2022-05-11 PROCEDURE — 20612 ASPIRATE/INJ GANGLION CYST: CPT | Mod: LT,S$GLB,, | Performed by: ORTHOPAEDIC SURGERY

## 2022-05-11 PROCEDURE — 73130 X-RAY EXAM OF HAND: CPT | Mod: TC,PO,LT

## 2022-05-11 PROCEDURE — 99999 PR PBB SHADOW E&M-EST. PATIENT-LVL III: CPT | Mod: PBBFAC,,, | Performed by: ORTHOPAEDIC SURGERY

## 2022-05-11 PROCEDURE — 1126F AMNT PAIN NOTED NONE PRSNT: CPT | Mod: CPTII,S$GLB,, | Performed by: ORTHOPAEDIC SURGERY

## 2022-05-11 PROCEDURE — 73130 XR HAND COMPLETE 3 VIEW LEFT: ICD-10-PCS | Mod: 26,LT,, | Performed by: RADIOLOGY

## 2022-05-11 PROCEDURE — 1159F MED LIST DOCD IN RCRD: CPT | Mod: CPTII,S$GLB,, | Performed by: ORTHOPAEDIC SURGERY

## 2022-05-11 PROCEDURE — 20612 GANGLION CYST: ICD-10-PCS | Mod: LT,S$GLB,, | Performed by: ORTHOPAEDIC SURGERY

## 2022-05-11 PROCEDURE — 73130 X-RAY EXAM OF HAND: CPT | Mod: 26,LT,, | Performed by: RADIOLOGY

## 2022-05-11 PROCEDURE — 1126F PR PAIN SEVERITY QUANTIFIED, NO PAIN PRESENT: ICD-10-PCS | Mod: CPTII,S$GLB,, | Performed by: ORTHOPAEDIC SURGERY

## 2022-05-11 RX ORDER — POTASSIUM CHLORIDE 750 MG/1
10 TABLET, EXTENDED RELEASE ORAL DAILY
Qty: 90 TABLET | Refills: 3 | Status: SHIPPED | OUTPATIENT
Start: 2022-05-11 | End: 2023-06-05 | Stop reason: SDUPTHER

## 2022-05-11 RX ADMIN — METHYLPREDNISOLONE ACETATE 40 MG: 40 INJECTION, SUSPENSION INTRA-ARTICULAR; INTRALESIONAL; INTRAMUSCULAR; SOFT TISSUE at 01:05

## 2022-05-11 NOTE — PROGRESS NOTES
[facility]  History & Physical  Orthopedics    SUBJECTIVE:      COVID-19 attestation:  This patient was treated during the COVID-19 pandemic.  This was discussed with the patient, they are aware of our current policies and procedures, were given the option of delaying their visit and or switching to a virtual visit, delaying their surgery when applicable, and they elect to proceed.    Chief Complaint: left wrist pain    Referring Provider: Angel Bello, *     History of Present Illness:  Patient is a 76 y.o. right hand dominant female who presents today with complaints of left dorsal fluid collection at her wrist. She states that she had a fall 3 months ago.  She states that the swelling has slowly developed since the fall.  She has pain in the wrist and decreased range of motion in the wrist.    She also complains of a mass on her right forehead which is growing.    Onset of symptoms/DOI was 3 month ago    Symptoms are aggravated by activity.    Symptoms are alleviated by immobilization.    Symptoms consist of pain.    The patient rates their pain as a 5/10.    Attempted treatment(s) and/or interventions include activity modification.     The patient denies any fevers, chills, N/V, D/C and presents for evaluation.       Past Medical History:   Diagnosis Date    Allergy sinus    Arthritis back    Cataract     Colon polyps     Degenerative disc disease back    Diverticulosis of colon     Dyspepsia     GERD (gastroesophageal reflux disease)     Heartburn     Hemorrhoids, internal     Hiatal hernia     Neuromuscular disorder     Vitamin D deficiency      Past Surgical History:   Procedure Laterality Date    APPENDECTOMY  age 19    CATARACT EXTRACTION W/  INTRAOCULAR LENS IMPLANT      CATARACT EXTRACTION W/  INTRAOCULAR LENS IMPLANT      CHOLECYSTECTOMY      age of 25    CHONDROPLASTY OF KNEE Left 10/3/2019    Procedure: CHONDROPLASTY, KNEE;  Surgeon: Kaylen Patiño MD;   Location: [facility] OR;  Service: Orthopedics;  Laterality: Left;    COLONOSCOPY N/A 9/9/2020    Procedure: COLONOSCOPY;  Surgeon: Peter Cuevas MD;  Location: [facility] (4TH FLR);  Service: Endoscopy;  Laterality: N/A;  device assisted colonoscopy w/Dr Cuevas.  Difficult colon, multiple adhesions from abd surgeries, Hx adv adenomatous polyp/tubulovillous adenoma of cecum in April 2011.  Dr Black     per Dr Cuevas-Okay for 4th floor - 60 minute slot (90 min w/egd added).  Start with peds col    ESOPHAGOGASTRODUODENOSCOPY N/A 9/9/2020    Procedure: EGD (ESOPHAGOGASTRODUODENOSCOPY);  Surgeon: Peter Cuevas MD;  Location: [facility] (4TH FLR);  Service: Endoscopy;  Laterality: N/A;  per Dr Black-add EGD to colonoscopy order.  Pt requesting later appt.  4/13/20 - removed from 4/30/20, rescheduled 7/29/20 - pg   covid 9/6-met-urgent care--tb    HYSTERECTOMY  38    INJECTION OF JOINT Bilateral 2/18/2019    Procedure: INJECTION, JOINT BILATERAL SI;  Surgeon: Mert Palumbo MD;  Location: [facility] PAIN MGT;  Service: Pain Management;  Laterality: Bilateral;  BILATERAL SI JOINT INJECTION    INJECTION OF JOINT Right 8/20/2020    Procedure: INJECTION JOINT/ R HIP DIRECT REFERRAL * DR. PALUMBO ONLY PLEASE*;  Surgeon: Mert Palumbo MD;  Location: [facility] PAIN MGT;  Service: Pain Management;  Laterality: Right;  NEED CONSENT    KNEE ARTHROSCOPY W/ MENISCECTOMY Left 10/3/2019    Procedure: ARTHROSCOPY, KNEE, WITH MENISCECTOMY;  Surgeon: Kaylen Patiño MD;  Location: [facility] OR;  Service: Orthopedics;  Laterality: Left;    SYNOVECTOMY OF KNEE Left 10/3/2019    Procedure: SYNOVECTOMY, KNEE;  Surgeon: Kaylen Patiño MD;  Location: [facility] OR;  Service: Orthopedics;  Laterality: Left;    YAG Laser Capsulotomy Bilateral     Dr. Aleman     Review of patient's allergies indicates:   Allergen Reactions    Demerol [meperidine] Nausea And Vomiting     Other reaction(s): Nausea    Papaya      Illness vomiting    Sulfa (sulfonamide antibiotics) Rash     Sulfur Rash     Social History     Social History Narrative    Not on file     Family History   Problem Relation Age of Onset    Heart disease Mother 67        heart attack    Stroke Mother     Cancer Father 65        abdominal    Stomach cancer Father     No Known Problems Sister     No Known Problems Brother     No Known Problems Son     No Known Problems Maternal Grandmother     No Known Problems Maternal Grandfather     No Known Problems Paternal Grandmother     No Known Problems Paternal Grandfather     No Known Problems Maternal Aunt     No Known Problems Maternal Uncle     No Known Problems Paternal Aunt     No Known Problems Paternal Uncle     Celiac disease Neg Hx     Colon cancer Neg Hx     Crohn's disease Neg Hx     Esophageal cancer Neg Hx     Inflammatory bowel disease Neg Hx     Liver cancer Neg Hx     Rectal cancer Neg Hx     Ulcerative colitis Neg Hx     Amblyopia Neg Hx     Blindness Neg Hx     Cataracts Neg Hx     Diabetes Neg Hx     Glaucoma Neg Hx     Hypertension Neg Hx     Macular degeneration Neg Hx     Retinal detachment Neg Hx     Strabismus Neg Hx     Thyroid disease Neg Hx     Anesthesia problems Neg Hx          Current Outpatient Medications:     butalbital-acetaminophen-caffeine -40 mg (FIORICET, ESGIC) -40 mg per tablet, Take 1 tablet by mouth every 4 (four) hours as needed for Headaches., Disp: 45 tablet, Rfl: 2    DULoxetine (CYMBALTA) 60 MG capsule, TAKE ONE CAPSULE BY MOUTH EVERY DAY, Disp: 90 capsule, Rfl: 3    ergocalciferol (ERGOCALCIFEROL) 50,000 unit Cap, Take 1 capsule (50,000 Units total) by mouth every 7 days., Disp: 36 capsule, Rfl: 2    estradiol valerate (DELESTROGEN) 40 mg/mL injection, Inject 0.5 mLs (20 mg total) into the muscle every 28 days. Inject into the muscle., Disp: 5 mL, Rfl: 12    ezetimibe (ZETIA) 10 mg tablet, TAKE ONE TABLET BY MOUTH EVERY DAY, Disp: 90 tablet, Rfl: 1    ferrous gluconate (FERGON)  "324 MG tablet, Take 1 tablet (324 mg total) by mouth daily with breakfast., Disp: 90 tablet, Rfl: 1    losartan-hydrochlorothiazide 50-12.5 mg (HYZAAR) 50-12.5 mg per tablet, Take 1 tablet by mouth once daily., Disp: 90 tablet, Rfl: 3    pantoprazole (PROTONIX) 40 MG tablet, TAKE ONE TABLET BY MOUTH EVERY MORNING 45 MINUTES BEFORE BREAKFAST, Disp: 90 tablet, Rfl: 3    potassium chloride SA (K-DUR,KLOR-CON) 10 MEQ tablet, Take 1 tablet (10 mEq total) by mouth once daily., Disp: 90 tablet, Rfl: 3    ROS    Review of Systems:  Constitutional: no fever or chills  Eyes: no visual changes  ENT: no nasal congestion or sore throat  Respiratory: no cough or shortness of breath  Cardiovascular: no chest pain  Gastrointestinal: no nausea or vomiting, tolerating diet  Musculoskeletal: pain and soreness    OBJECTIVE:      Vital Signs (Most Recent):  Vitals:    05/11/22 1403   Weight: 71.5 kg (157 lb 10.1 oz)   Height: 5' 3" (1.6 m)     Body mass index is 27.92 kg/m².    Physical Exam    Physical Exam:  Constitutional: The patient appears well-developed and well-nourished. No distress.   Head: Normocephalic and atraumatic.   Nose: Nose normal.   Eyes: Conjunctivae and EOM are normal.   Neck: No tracheal deviation present.   Cardiovascular: Normal rate and intact distal pulses.    Pulmonary/Chest: Effort normal. No respiratory distress.   Abdominal: There is no guarding.   Lymphatic: Negative for adenopathy   Neurological: The patient is alert.   Psychiatric: The patient has a normal mood and affect.     Cervical Exam:  ROM full, supple  Negative Spurling's  Negative Álvarez's  Right forehead 1x1 cm firm mass    Bilateral Hand/Wrist Examination:    Observation/Inspection:  Swelling  Left wrist 4th dorsal comparment 2x2 cm fluctuant swelling, no erythema or signs of infection    Deformity  none  Discoloration  none     Scars   none    Atrophy  none    HAND/WRIST EXAMINATION:    Full left wrist finger motion, decreased extremes " of flexion and extension left wrist, nontender to palpation carpal bones, mild tenderness over 4th dorsal extensor compartment  ROM hand/wrist/elbow full    Neurovascular Exam:  Digits WWP, brisk CR < 3s throughout  NVI motor/LTS to M/R/U nerves, radial pulse 2+  2+ biceps and brachioradialis reflexes    Diagnostic Results:       X-rays AP, lateral and oblique of the left hand taken today are independently reviewed by me and shows left wrist dorsal soft tissue swelling, no sign of remote fracture no ligamentous instability, joint spaces well preserved.        ASSESSMENT/PLAN:      76 y.o. yo female with   Encounter Diagnoses   Name Primary?    Ganglion cyst     Dermoid cyst of forehead Yes    Extensor tenosynovitis of left wrist     Chronic pain of left wrist       Plan:  We have discussed the natural history of ganglion cysts including treatment options such as splinting, oral and topical anti-inflammatories, cortisone injections and surgery. I have given her a left wrist brace for comfort.    -I have offered her a selective injection. I have explained the risks, benefits, and alternatives of the procedure in detail.  The patient voices understanding and all questions have been answered. The patient agrees to proceed as planned. So after a sterile prep of the skin in the normal fashion the left dorsal wrist ganglion was aspirated and injected using a 25 gauge needle with a combination of 1cc 1% plain lidocaine and 40 mg of methylprednisolone.  The patient is cautioned and immediate relief of pain is secondary to the local anesthetic and will be temporary.  After the anesthetic wears off there may be a increase in pain that may last for a few hours or a few days and they should use ice to help alleviate this flair up of pain. Patient tolerated the procedure well.    - F/U as needed for any worsening of symptoms  - Call with any questions/concerns in the interim   - referral to plastics for evaluation of forehead  dermoid cyst    The patient's pathophysiology was explained in detail with reference to x-rays, models, other visual aids as appropriate.  Treatment options were discussed in detail.  Questions were invited and answered to the patient's satisfaction. I reviewed Primary care , and other specialty's notes to better coordinate patient's care.          Yee Nagy MD     Please be aware that this note has been generated with the assistance of [employer] voice-to-text.  Please excuse any spelling or grammatical errors.

## 2022-05-11 NOTE — TELEPHONE ENCOUNTER
Advise pt her potassium level seems to always run on the low side when reviewing previous labs  I will start you on daily potassium replacement, Rx sent

## 2022-05-13 RX ORDER — METHYLPREDNISOLONE ACETATE 40 MG/ML
40 INJECTION, SUSPENSION INTRA-ARTICULAR; INTRALESIONAL; INTRAMUSCULAR; SOFT TISSUE
Status: DISCONTINUED | OUTPATIENT
Start: 2022-05-11 | End: 2022-05-13 | Stop reason: HOSPADM

## 2022-05-13 NOTE — PROCEDURES
Ganglion Cyst    Date/Time: 5/11/2022 1:45 PM  Performed by: Yee Nagy MD  Authorized by: Yee Nagy MD     Consent Done?:  Yes (Verbal)  Indications:  Pain  Timeout: prior to procedure the correct patient, procedure, and site was verified    Prep: patient was prepped and draped in usual sterile fashion      Local anesthesia used?: Yes    Anesthesia:  Local infiltration  Local anesthetic:  Lidocaine 1% without epinephrine  Location:  Wrist  Needle size:  25 G  Approach:  Dorsal  Medications:  40 mg methylPREDNISolone acetate 40 mg/mL  Patient tolerance:  Patient tolerated the procedure well with no immediate complications

## 2022-05-19 ENCOUNTER — TELEPHONE (OUTPATIENT)
Dept: GASTROENTEROLOGY | Facility: CLINIC | Age: 77
End: 2022-05-19
Payer: MEDICARE

## 2022-05-19 NOTE — TELEPHONE ENCOUNTER
----- Message from Lio Oliveira sent at 5/19/2022  8:54 AM CDT -----  Contact: @[phone]  Pt requesting a call back regarding a message received on May 8th (according to patient) to set up an appt.  Pt would also like to know if 06/24/22 appt time can change.

## 2022-05-20 ENCOUNTER — TELEPHONE (OUTPATIENT)
Dept: GASTROENTEROLOGY | Facility: CLINIC | Age: 77
End: 2022-05-20
Payer: MEDICARE

## 2022-05-20 NOTE — TELEPHONE ENCOUNTER
----- Message from Hanna Thao MA sent at 5/19/2022  2:59 PM CDT -----    ----- Message -----  From: Silke Mahoney  Sent: 5/19/2022   2:00 PM CDT  To: [facility] Staff    Type:  Patient Returning Call         Who Called:  CHELSY GONZALEZ [[id number]]         Who Left Message for Patient: Manpreet Carr MA         Does the patient know what this is regarding?: no         Would the patient rather a call back or a response via [facility]?  Call back         Best Call Back Number:[phone]         Additional Information:

## 2022-05-20 NOTE — TELEPHONE ENCOUNTER
Return call and spoke with patient. Patient request to link her lab in 2 weeks to the labs already scheduled in June.     Patient request if she can be added to the wait list for a appointment that is later in the day.     Patient verbalized understanding.

## 2022-06-01 ENCOUNTER — OFFICE VISIT (OUTPATIENT)
Dept: ENDOCRINOLOGY | Facility: CLINIC | Age: 77
End: 2022-06-01
Payer: MEDICARE

## 2022-06-01 DIAGNOSIS — D35.02 ADENOMA OF LEFT ADRENAL GLAND: ICD-10-CM

## 2022-06-01 DIAGNOSIS — E66.9 CLASS 1 OBESITY WITH SERIOUS COMORBIDITY AND BODY MASS INDEX (BMI) OF 31.0 TO 31.9 IN ADULT, UNSPECIFIED OBESITY TYPE: ICD-10-CM

## 2022-06-01 DIAGNOSIS — I10 ESSENTIAL HYPERTENSION: ICD-10-CM

## 2022-06-01 PROBLEM — E66.811 CLASS 1 OBESITY WITH SERIOUS COMORBIDITY AND BODY MASS INDEX (BMI) OF 31.0 TO 31.9 IN ADULT: Status: ACTIVE | Noted: 2022-06-01

## 2022-06-01 PROCEDURE — 1126F PR PAIN SEVERITY QUANTIFIED, NO PAIN PRESENT: ICD-10-PCS | Mod: CPTII,S$GLB,, | Performed by: INTERNAL MEDICINE

## 2022-06-01 PROCEDURE — 1160F PR REVIEW ALL MEDS BY PRESCRIBER/CLIN PHARMACIST DOCUMENTED: ICD-10-PCS | Mod: CPTII,S$GLB,, | Performed by: INTERNAL MEDICINE

## 2022-06-01 PROCEDURE — 99499 RISK ADDL DX/OHS AUDIT: ICD-10-PCS | Mod: S$GLB,,, | Performed by: INTERNAL MEDICINE

## 2022-06-01 PROCEDURE — 1101F PT FALLS ASSESS-DOCD LE1/YR: CPT | Mod: CPTII,S$GLB,, | Performed by: INTERNAL MEDICINE

## 2022-06-01 PROCEDURE — 3288F PR FALLS RISK ASSESSMENT DOCUMENTED: ICD-10-PCS | Mod: CPTII,S$GLB,, | Performed by: INTERNAL MEDICINE

## 2022-06-01 PROCEDURE — 3077F SYST BP >= 140 MM HG: CPT | Mod: CPTII,S$GLB,, | Performed by: INTERNAL MEDICINE

## 2022-06-01 PROCEDURE — 1160F RVW MEDS BY RX/DR IN RCRD: CPT | Mod: CPTII,S$GLB,, | Performed by: INTERNAL MEDICINE

## 2022-06-01 PROCEDURE — 3079F DIAST BP 80-89 MM HG: CPT | Mod: CPTII,S$GLB,, | Performed by: INTERNAL MEDICINE

## 2022-06-01 PROCEDURE — 99499 UNLISTED E&M SERVICE: CPT | Mod: S$GLB,,, | Performed by: INTERNAL MEDICINE

## 2022-06-01 PROCEDURE — 99204 OFFICE O/P NEW MOD 45 MIN: CPT | Mod: S$GLB,,, | Performed by: INTERNAL MEDICINE

## 2022-06-01 PROCEDURE — 99204 PR OFFICE/OUTPT VISIT, NEW, LEVL IV, 45-59 MIN: ICD-10-PCS | Mod: S$GLB,,, | Performed by: INTERNAL MEDICINE

## 2022-06-01 PROCEDURE — 1101F PR PT FALLS ASSESS DOC 0-1 FALLS W/OUT INJ PAST YR: ICD-10-PCS | Mod: CPTII,S$GLB,, | Performed by: INTERNAL MEDICINE

## 2022-06-01 PROCEDURE — 1159F PR MEDICATION LIST DOCUMENTED IN MEDICAL RECORD: ICD-10-PCS | Mod: CPTII,S$GLB,, | Performed by: INTERNAL MEDICINE

## 2022-06-01 PROCEDURE — 1126F AMNT PAIN NOTED NONE PRSNT: CPT | Mod: CPTII,S$GLB,, | Performed by: INTERNAL MEDICINE

## 2022-06-01 PROCEDURE — 1159F MED LIST DOCD IN RCRD: CPT | Mod: CPTII,S$GLB,, | Performed by: INTERNAL MEDICINE

## 2022-06-01 PROCEDURE — 3288F FALL RISK ASSESSMENT DOCD: CPT | Mod: CPTII,S$GLB,, | Performed by: INTERNAL MEDICINE

## 2022-06-01 PROCEDURE — 3077F PR MOST RECENT SYSTOLIC BLOOD PRESSURE >= 140 MM HG: ICD-10-PCS | Mod: CPTII,S$GLB,, | Performed by: INTERNAL MEDICINE

## 2022-06-01 PROCEDURE — 3079F PR MOST RECENT DIASTOLIC BLOOD PRESSURE 80-89 MM HG: ICD-10-PCS | Mod: CPTII,S$GLB,, | Performed by: INTERNAL MEDICINE

## 2022-06-01 NOTE — PROGRESS NOTES
Subjective:      Chief Complaint: Adrenal Adenoma    HPI: Shakira Pearl is a 76 y.o. female who is here for an initial evaluation for adrenal    With regards to the adrenal nodule:    Patient was found to have an incidental adrenal nodule on imaging over 10 years ago.    In [facility] documents, had CT 2011. 1.5 cm left adrenal nodule, washout characteristics suggesting benign adenoma per report    Then was seen in endocrinology 2011:            2011: Cortisol <1, ACTH <10. Drawn at 9am. Dex suppression test.      Last imagin/2017, 1.4 cm nodule on the left adrenal gland    Recent labs with another endocrinologist: from May 10th   Aldosterone 27.6, renin 1, ratio 27.  11-deoxycortisol 33.8 (normal under 32)   cortisol 18.9    History includes:  No   Yes  []    [x]  Hypertension  [x]    []  Hx hypertensive emergency  [x]    []  Diabetes    Current Symptoms  No   Yes  [x]    []  Syncope, pallor, or dizziness. Not lately, had some issues on higher BP medication  []    [x]  Palpitations, one episode lately, not often  [x]    []  Abdominal discomfort    [x]    []  Polyuria, polydipsia  [x]    []  Weight loss  []    [x]  Blurred vision  [x]    []  Easy bruises or abnormal stretch marks  [x]    []  Muscle weakness  []    []  Fractures    Weight gain. 20 lbs last couple months      Reviewed past medical, family, social history and updated as appropriate.    Review of Systems  As above    Objective:     Vitals:    22 1010   BP: (!) 152/87   Pulse: 91     BP Readings from Last 5 Encounters:   05/10/22 124/86   22 124/84   22 120/78   11/10/21 128/72   10/25/21 122/70     Physical Exam  Vitals reviewed.   Constitutional:       General: She is not in acute distress.     Appearance: She is obese.   Cardiovascular:      Heart sounds: Normal heart sounds.   Pulmonary:      Effort: Pulmonary effort is normal.          Wt Readings from Last 5 Encounters:   22 1010 80.7 kg (178 lb)   22  1403 71.5 kg (157 lb 10.1 oz)   05/10/22 1353 69.9 kg (154 lb 1.6 oz)   04/04/22 1613 71.2 kg (156 lb 15.5 oz)   03/04/22 1057 70 kg (154 lb 5.2 oz)     Lab Results   Component Value Date    HGBA1C 5.2 08/30/2017    HGBA1C 5.7 12/13/2013    HGBA1C 5.6 10/03/2012    HGBA1C 5.2 12/23/2011     Lab Results   Component Value Date    CHOL 148 05/06/2022    CHOL 136 11/04/2021    HDL 49 05/06/2022    HDL 39 (L) 11/04/2021    LDLCALC 44.0 (L) 05/06/2022    LDLCALC 41.2 (L) 11/04/2021    TRIG 275 (H) 05/06/2022    TRIG 279 (H) 11/04/2021    CHOLHDL 33.1 05/06/2022    CHOLHDL 28.7 11/04/2021     Lab Results   Component Value Date     (L) 05/06/2022     (L) 05/06/2022    K 3.4 (L) 05/06/2022    K 3.4 (L) 05/06/2022    CL 99 05/06/2022    CL 99 05/06/2022    CO2 26 05/06/2022    CO2 26 05/06/2022     (H) 05/06/2022     (H) 05/06/2022    BUN 17 05/06/2022    BUN 17 05/06/2022    CREATININE 0.8 05/06/2022    CREATININE 0.8 05/06/2022    CALCIUM 8.5 (L) 05/06/2022    CALCIUM 8.5 (L) 05/06/2022    PROT 6.7 05/06/2022    ALBUMIN 3.3 (L) 05/06/2022    BILITOT 0.3 05/06/2022    ALKPHOS 51 (L) 05/06/2022    AST 17 05/06/2022    ALT 16 05/06/2022    ANIONGAP 10 05/06/2022    ANIONGAP 10 05/06/2022    ESTGFRAFRICA >60.0 05/06/2022    ESTGFRAFRICA >60.0 05/06/2022    EGFRNONAA >60.0 05/06/2022    EGFRNONAA >60.0 05/06/2022    TSH 2.371 05/06/2022      No results found for: MICALBCREAT    Assessment/Plan:     Adenoma of left adrenal gland  Long hx adrenal nodule, since at least 2011.   had hormone evaluation at that time, negative.    Recently having weight gain, 20 lbs in the last few months, BP higher, had labs with outside endocrinologist showing abnormal hormone levels.    Repeat labs to confirm, check the usual adrenal tests.    Weight and BP symptoms make me worried about cortisol. Check ACTH, DHEA-S, midnight salivary cortisol (pt on estrogen which can impact dex suppression test).    Reviewed that  treatment if hypercortisolism is found from the adrenal gland is normally surgery. Can consider medication if needed. Check other labs for other adrenal hormones to complete evaluation   anticipate repeat scan at some point as well. Though last in 2017, stayed stable since 4632-5525        Essential hypertension  BP high today   was okay last time   continue meds   f/u with PCP, monitor BP    Class 1 obesity with serious comorbidity and body mass index (BMI) of 31.0 to 31.9 in adult  Body mass index is 31.53 kg/m².   complicated by HTN, HLD,    evaluate cortisol as above   monitor weight        Follow up in about 6 months (around 12/1/2022) for further monitoring, lab review.      Kelvin Cohn MD  Endocrinology

## 2022-06-01 NOTE — ASSESSMENT & PLAN NOTE
Long hx adrenal nodule, since at least 2011.   had hormone evaluation at that time, negative.    Recently having weight gain, 20 lbs in the last few months, BP higher, had labs with outside endocrinologist showing abnormal hormone levels.    Repeat labs to confirm, check the usual adrenal tests.    Weight and BP symptoms make me worried about cortisol. Check ACTH, DHEA-S, midnight salivary cortisol (pt on estrogen which can impact dex suppression test).    Reviewed that treatment if hypercortisolism is found from the adrenal gland is normally surgery. Can consider medication if needed. Check other labs for other adrenal hormones to complete evaluation   anticipate repeat scan at some point as well. Though last in 2017, stayed stable since 9198-5999

## 2022-06-01 NOTE — ASSESSMENT & PLAN NOTE
Body mass index is 31.53 kg/m².   complicated by HTN, HLD,    evaluate cortisol as above   monitor weight

## 2022-06-01 NOTE — PROGRESS NOTES
Subjective:   Patient ID:  Shakira Pearl is a 76 y.o. female who presents for evaluation of Establish Care, Follow-up, Dizziness, Chest Pain, and Shortness of Breath    PROBLEM LIST:  HTN  HLD    HPI 1/21:  Presents today for evaluation of chest pains.  She states that for the past 3-4 months she has been getting a tightness or heaviness in the center of her chest.  This can be associated with some wheezing or shortness of breath.  It mainly occurs with exertion.  It is also associated with extreme fatigue.  She thinks it has been getting worse over the past few months.  She was evaluated by Pulmonary and told her lungs were okay.  She also had a endoscopy done in the past 6 months which was normal.    Interval history:  She reports that she has been having periodically postural lightheadedness.  She reports having a significant fall a year ago April.  She was getting out of bed and felt very dizzy when she fell backwards and hit her head.  She did not lose consciousness.  She had another episode this past February again when she was getting up out of bed she felt dizzy and fell down.  She saw her PCP who decreased her losartan from 100 mg day to 50 mg daily which she states did not help very much.  She has not had any subsequent falls since February.  She reports 1 episode of palpitations which occurred about 3 weeks ago.  They lasted about 45 minutes.  She states she took an aspirin and the immediately went away.  She has not had any recurrence.  She also reports some nocturnal coughing.      ECG 11-FEB-2021 13:42:17: Personally reviewed by me shows NSR 86 bpm    VARUN 1/21:  Summary    · The stress echo portion of this study is negative for myocardial ischemia.  · The ECG portion of this study is negative for myocardial ischemia.  · The test was stopped because the patient experienced fatigue.  · The patient's exercise capacity was normal.  · There were no arrhythmias during stress.  · The left ventricle is normal  in size with normal systolic function. The estimated ejection fraction is 55%  · Indeterminate left ventricular diastolic function.  · Normal right ventricular size with normal right ventricular systolic function.  · Mild tricuspid regurgitation.  · The estimated PA systolic pressure is 31 mmHg.  · Normal central venous pressure (3 mmHg).        Past Medical History:   Diagnosis Date    Allergy sinus    Arthritis back    Cataract     Colon polyps     Degenerative disc disease back    Diverticulosis of colon     Dyspepsia     GERD (gastroesophageal reflux disease)     Heartburn     Hemorrhoids, internal     Hiatal hernia     Neuromuscular disorder     Vitamin D deficiency        Past Surgical History:   Procedure Laterality Date    APPENDECTOMY  age 19    CATARACT EXTRACTION W/  INTRAOCULAR LENS IMPLANT      CATARACT EXTRACTION W/  INTRAOCULAR LENS IMPLANT      CHOLECYSTECTOMY      age of 25    CHONDROPLASTY OF KNEE Left 10/3/2019    Procedure: CHONDROPLASTY, KNEE;  Surgeon: Kaylen Patiño MD;  Location: [facility] OR;  Service: Orthopedics;  Laterality: Left;    COLONOSCOPY N/A 9/9/2020    Procedure: COLONOSCOPY;  Surgeon: Peter Cuevas MD;  Location: [address]);  Service: Endoscopy;  Laterality: N/A;  device assisted colonoscopy w/Dr Cuevas.  Difficult colon, multiple adhesions from abd surgeries, Hx adv adenomatous polyp/tubulovillous adenoma of cecum in April 2011.  Dr Black     per Dr Cuevas-[place] for 4th floor - 60 minute slot (90 min w/egd added).  Start with peds col    ESOPHAGOGASTRODUODENOSCOPY N/A 9/9/2020    Procedure: EGD (ESOPHAGOGASTRODUODENOSCOPY);  Surgeon: Peter Cuevas MD;  Location: [address]);  Service: Endoscopy;  Laterality: N/A;  per Dr Black-add EGD to colonoscopy order.  Pt requesting later appt.  4/13/20 - removed from 4/30/20, rescheduled 7/29/20 - pg   covid 9/6-met-urgent care--tb    HYSTERECTOMY  38    INJECTION OF JOINT Bilateral 2/18/2019     Procedure: INJECTION, JOINT BILATERAL SI;  Surgeon: Mert Palumbo MD;  Location: [facility];  Service: Pain Management;  Laterality: Bilateral;  BILATERAL SI JOINT INJECTION    INJECTION OF JOINT Right 8/20/2020    Procedure: INJECTION JOINT/ R HIP DIRECT REFERRAL * DR. PALUMBO ONLY PLEASE*;  Surgeon: Mert Palumbo MD;  Location: [facility];  Service: Pain Management;  Laterality: Right;  NEED CONSENT    KNEE ARTHROSCOPY W/ MENISCECTOMY Left 10/3/2019    Procedure: ARTHROSCOPY, KNEE, WITH MENISCECTOMY;  Surgeon: Kaylen Patiño MD;  Location: [facility];  Service: Orthopedics;  Laterality: Left;    SYNOVECTOMY OF KNEE Left 10/3/2019    Procedure: SYNOVECTOMY, KNEE;  Surgeon: Kaylen Patiño MD;  Location: [facility] OR;  Service: Orthopedics;  Laterality: Left;    YAG Laser Capsulotomy Bilateral     Dr. Aleman       Social History     Socioeconomic History    Marital status:     Number of children: 1   Occupational History    Occupation: retired   Tobacco Use    Smoking status: Never Smoker    Smokeless tobacco: Never Used   Substance and Sexual Activity    Alcohol use: No    Drug use: No    Sexual activity: Not Currently     Social Determinants of Health     Financial Resource Strain: Low Risk     Difficulty of Paying Living Expenses: Not hard at all   Food Insecurity: No Food Insecurity    Worried About Running Out of Food in the Last Year: Never true    Ran Out of Food in the Last Year: Never true   Transportation Needs: No Transportation Needs    Lack of Transportation (Medical): No    Lack of Transportation (Non-Medical): No   Physical Activity: Inactive    Days of Exercise per Week: 0 days    Minutes of Exercise per Session: 0 min   Stress: No Stress Concern Present    Feeling of Stress : Only a little   Social Connections: Socially Isolated    Frequency of Communication with Friends and Family: More than three times a week    Frequency of Social Gatherings with Friends and Family: More than  three times a week    Attends Spiritism Services: Never    Active Member of Clubs or Organizations: No    Attends Club or Organization Meetings: Never    Marital Status:    Housing Stability: Unknown    Unable to Pay for Housing in the Last Year: No    Unstable Housing in the Last Year: No       Family History   Problem Relation Age of Onset    Heart disease Mother 67        heart attack    Stroke Mother     Cancer Father 65        abdominal    Stomach cancer Father     No Known Problems Sister     No Known Problems Brother     No Known Problems Son     No Known Problems Maternal Grandmother     No Known Problems Maternal Grandfather     No Known Problems Paternal Grandmother     No Known Problems Paternal Grandfather     No Known Problems Maternal Aunt     No Known Problems Maternal Uncle     No Known Problems Paternal Aunt     No Known Problems Paternal Uncle     Celiac disease Neg Hx     Colon cancer Neg Hx     Crohn's disease Neg Hx     Esophageal cancer Neg Hx     Inflammatory bowel disease Neg Hx     Liver cancer Neg Hx     Rectal cancer Neg Hx     Ulcerative colitis Neg Hx     Amblyopia Neg Hx     Blindness Neg Hx     Cataracts Neg Hx     Diabetes Neg Hx     Glaucoma Neg Hx     Hypertension Neg Hx     Macular degeneration Neg Hx     Retinal detachment Neg Hx     Strabismus Neg Hx     Thyroid disease Neg Hx     Anesthesia problems Neg Hx        Patient's Medications   New Prescriptions    ICOSAPENT ETHYL (VASCEPA) 1 GRAM CAP    Take 2 capsules (2 g total) by mouth 2 (two) times a day.   Previous Medications    BUTALBITAL-ACETAMINOPHEN-CAFFEINE -40 MG (FIORICET, ESGIC) -40 MG PER TABLET    Take 1 tablet by mouth every 4 (four) hours as needed for Headaches.    DULOXETINE (CYMBALTA) 60 MG CAPSULE    TAKE ONE CAPSULE BY MOUTH EVERY DAY    ERGOCALCIFEROL (ERGOCALCIFEROL) 50,000 UNIT CAP    Take 1 capsule (50,000 Units total) by mouth every 7 days.     ESTRADIOL VALERATE (DELESTROGEN) 40 MG/ML INJECTION    Inject 0.5 mLs (20 mg total) into the muscle every 28 days. Inject into the muscle.    EZETIMIBE (ZETIA) 10 MG TABLET    TAKE ONE TABLET BY MOUTH EVERY DAY    FERROUS GLUCONATE (FERGON) 324 MG TABLET    Take 1 tablet (324 mg total) by mouth daily with breakfast.    LOSARTAN-HYDROCHLOROTHIAZIDE 50-12.5 MG (HYZAAR) 50-12.5 MG PER TABLET    Take 1 tablet by mouth once daily.    PANTOPRAZOLE (PROTONIX) 40 MG TABLET    TAKE ONE TABLET BY MOUTH EVERY MORNING 45 MINUTES BEFORE BREAKFAST    POTASSIUM CHLORIDE SA (K-DUR,KLOR-CON) 10 MEQ TABLET    Take 1 tablet (10 mEq total) by mouth once daily.   Modified Medications    No medications on file   Discontinued Medications    ICOSAPENT ETHYL (VASCEPA) 1 GRAM CAP    Take by mouth.       Review of Systems   Constitutional: Negative for malaise/fatigue and weight gain.   HENT: Negative for hearing loss.    Eyes: Negative for visual disturbance.   Cardiovascular: Negative for claudication, dyspnea on exertion, leg swelling, near-syncope, orthopnea, palpitations, paroxysmal nocturnal dyspnea and syncope.   Respiratory: Negative for cough, shortness of breath, sleep disturbances due to breathing, snoring and wheezing.    Endocrine: Negative for cold intolerance, heat intolerance, polydipsia, polyphagia and polyuria.   Hematologic/Lymphatic: Negative for bleeding problem. Does not bruise/bleed easily.   Skin: Negative for rash and suspicious lesions.   Musculoskeletal: Negative for arthritis, falls, joint pain, muscle weakness and myalgias.   Gastrointestinal: Negative for abdominal pain, change in bowel habit, constipation, diarrhea, heartburn, hematochezia, melena and nausea.   Genitourinary: Negative for hematuria and nocturia.   Neurological: Positive for light-headedness and loss of balance. Negative for excessive daytime sleepiness, dizziness, headaches and weakness.   Psychiatric/Behavioral: Negative for depression. The  "patient is not nervous/anxious.    Allergic/Immunologic: Negative for environmental allergies.       /67 (BP Location: Left arm, Patient Position: Sitting, BP Method: Medium (Automatic))   Pulse 90   Ht 5' 3" (1.6 m)   Wt 71.4 kg (157 lb 6.5 oz)   SpO2 (!) 94%   BMI 27.88 kg/m²     Objective:   Physical Exam  Constitutional:       Appearance: She is well-developed.      Comments:      HENT:      Head: Normocephalic and atraumatic.   Eyes:      General: No scleral icterus.     Conjunctiva/sclera: Conjunctivae normal.      Pupils: Pupils are equal, round, and reactive to light.   Neck:      Thyroid: No thyromegaly.      Vascular: No hepatojugular reflux or JVD.      Trachea: No tracheal deviation.   Cardiovascular:      Rate and Rhythm: Normal rate and regular rhythm.      Chest Wall: PMI is not displaced.      Pulses: Intact distal pulses.           Carotid pulses are 2+ on the right side and 2+ on the left side.       Radial pulses are 2+ on the right side and 2+ on the left side.        Dorsalis pedis pulses are 2+ on the right side and 2+ on the left side.        Posterior tibial pulses are 2+ on the right side and 2+ on the left side.      Heart sounds: Normal heart sounds.   Pulmonary:      Effort: Pulmonary effort is normal.      Breath sounds: Normal breath sounds.   Abdominal:      General: Bowel sounds are normal. There is no distension.      Palpations: Abdomen is soft. There is no mass.      Tenderness: There is no abdominal tenderness.   Musculoskeletal:         General: No tenderness.      Cervical back: Normal range of motion and neck supple.   Lymphadenopathy:      Cervical: No cervical adenopathy.   Skin:     General: Skin is warm and dry.      Findings: No erythema or rash.      Nails: There is no clubbing.   Neurological:      Mental Status: She is alert and oriented to person, place, and time.   Psychiatric:         Speech: Speech normal.         Behavior: Behavior normal.         Lab " Results   Component Value Date     (L) 05/06/2022     (L) 05/06/2022    K 3.4 (L) 05/06/2022    K 3.4 (L) 05/06/2022    CL 99 05/06/2022    CL 99 05/06/2022    CO2 26 05/06/2022    CO2 26 05/06/2022    BUN 17 05/06/2022    BUN 17 05/06/2022    CREATININE 0.8 05/06/2022    CREATININE 0.8 05/06/2022     (H) 05/06/2022     (H) 05/06/2022    HGBA1C 5.2 08/30/2017    MG 1.6 04/04/2022    AST 17 05/06/2022    ALT 16 05/06/2022    ALBUMIN 3.3 (L) 05/06/2022    PROT 6.7 05/06/2022    BILITOT 0.3 05/06/2022    WBC 8.37 05/06/2022    HGB 11.0 (L) 05/06/2022    HCT 33.3 (L) 05/06/2022    MCV 88 05/06/2022     05/06/2022    INR 0.9 05/06/2020    TSH 2.371 05/06/2022    CHOL 148 05/06/2022    HDL 49 05/06/2022    LDLCALC 44.0 (L) 05/06/2022    TRIG 275 (H) 05/06/2022       Assessment:     1. Essential hypertension : Blood pressure is at goal. I have made no changes. Continue current regimen.  She is in the process of on boarding for digital hypertension program.   2. Elevated lipids :  Her LDL is at goal on Zetia.  I have reordered her Vascepa 2000 mg twice daily for elevated triglycerides.The triglycerides are elevated. Recommend limiting sugar, alcohol, and simple carbohydrates in the diet.   3. Lightheadedness:  Her recent blood work was normal.  She was not orthostatic in the office today.   4.      Palpitations:  I have ordered a 30 day event monitor for further evaluation.    Plan:     Shakira was seen today for establish care, follow-up, dizziness, chest pain and shortness of breath.    Diagnoses and all orders for this visit:    Essential hypertension    Elevated lipids    Class 1 obesity due to excess calories with serious comorbidity and body mass index (BMI) of 31.0 to 31.9 in adult    Episodic lightheadedness    Other orders  -     icosapent ethyL (VASCEPA) 1 gram Cap; Take 2 capsules (2 g total) by mouth 2 (two) times a day.        Thank you for allowing me to participate in this  patient's care. Please do not hesitate to contact me with any questions or concerns.

## 2022-06-02 ENCOUNTER — OFFICE VISIT (OUTPATIENT)
Dept: CARDIOLOGY | Facility: CLINIC | Age: 77
End: 2022-06-02
Payer: MEDICARE

## 2022-06-02 VITALS
OXYGEN SATURATION: 94 % | BODY MASS INDEX: 27.89 KG/M2 | WEIGHT: 157.44 LBS | HEIGHT: 63 IN | DIASTOLIC BLOOD PRESSURE: 67 MMHG | SYSTOLIC BLOOD PRESSURE: 129 MMHG | HEART RATE: 90 BPM

## 2022-06-02 DIAGNOSIS — I10 ESSENTIAL HYPERTENSION: Primary | ICD-10-CM

## 2022-06-02 DIAGNOSIS — R42 EPISODIC LIGHTHEADEDNESS: ICD-10-CM

## 2022-06-02 DIAGNOSIS — R00.2 PALPITATIONS: ICD-10-CM

## 2022-06-02 DIAGNOSIS — E78.5 ELEVATED LIPIDS: ICD-10-CM

## 2022-06-02 PROBLEM — E66.811 CLASS 1 OBESITY WITH SERIOUS COMORBIDITY AND BODY MASS INDEX (BMI) OF 31.0 TO 31.9 IN ADULT: Status: RESOLVED | Noted: 2022-06-01 | Resolved: 2022-06-02

## 2022-06-02 PROBLEM — E66.9 CLASS 1 OBESITY WITH SERIOUS COMORBIDITY AND BODY MASS INDEX (BMI) OF 31.0 TO 31.9 IN ADULT: Status: RESOLVED | Noted: 2022-06-01 | Resolved: 2022-06-02

## 2022-06-02 PROCEDURE — 1101F PT FALLS ASSESS-DOCD LE1/YR: CPT | Mod: CPTII,S$GLB,, | Performed by: INTERNAL MEDICINE

## 2022-06-02 PROCEDURE — 3288F PR FALLS RISK ASSESSMENT DOCUMENTED: ICD-10-PCS | Mod: CPTII,S$GLB,, | Performed by: INTERNAL MEDICINE

## 2022-06-02 PROCEDURE — 3078F DIAST BP <80 MM HG: CPT | Mod: CPTII,S$GLB,, | Performed by: INTERNAL MEDICINE

## 2022-06-02 PROCEDURE — 1159F MED LIST DOCD IN RCRD: CPT | Mod: CPTII,S$GLB,, | Performed by: INTERNAL MEDICINE

## 2022-06-02 PROCEDURE — 1159F PR MEDICATION LIST DOCUMENTED IN MEDICAL RECORD: ICD-10-PCS | Mod: CPTII,S$GLB,, | Performed by: INTERNAL MEDICINE

## 2022-06-02 PROCEDURE — 1126F PR PAIN SEVERITY QUANTIFIED, NO PAIN PRESENT: ICD-10-PCS | Mod: CPTII,S$GLB,, | Performed by: INTERNAL MEDICINE

## 2022-06-02 PROCEDURE — 3078F PR MOST RECENT DIASTOLIC BLOOD PRESSURE < 80 MM HG: ICD-10-PCS | Mod: CPTII,S$GLB,, | Performed by: INTERNAL MEDICINE

## 2022-06-02 PROCEDURE — 99999 PR PBB SHADOW E&M-EST. PATIENT-LVL IV: ICD-10-PCS | Mod: PBBFAC,,, | Performed by: INTERNAL MEDICINE

## 2022-06-02 PROCEDURE — 3074F SYST BP LT 130 MM HG: CPT | Mod: CPTII,S$GLB,, | Performed by: INTERNAL MEDICINE

## 2022-06-02 PROCEDURE — 99214 OFFICE O/P EST MOD 30 MIN: CPT | Mod: S$GLB,,, | Performed by: INTERNAL MEDICINE

## 2022-06-02 PROCEDURE — 3074F PR MOST RECENT SYSTOLIC BLOOD PRESSURE < 130 MM HG: ICD-10-PCS | Mod: CPTII,S$GLB,, | Performed by: INTERNAL MEDICINE

## 2022-06-02 PROCEDURE — 1126F AMNT PAIN NOTED NONE PRSNT: CPT | Mod: CPTII,S$GLB,, | Performed by: INTERNAL MEDICINE

## 2022-06-02 PROCEDURE — 1101F PR PT FALLS ASSESS DOC 0-1 FALLS W/OUT INJ PAST YR: ICD-10-PCS | Mod: CPTII,S$GLB,, | Performed by: INTERNAL MEDICINE

## 2022-06-02 PROCEDURE — 99214 PR OFFICE/OUTPT VISIT, EST, LEVL IV, 30-39 MIN: ICD-10-PCS | Mod: S$GLB,,, | Performed by: INTERNAL MEDICINE

## 2022-06-02 PROCEDURE — 3288F FALL RISK ASSESSMENT DOCD: CPT | Mod: CPTII,S$GLB,, | Performed by: INTERNAL MEDICINE

## 2022-06-02 PROCEDURE — 99999 PR PBB SHADOW E&M-EST. PATIENT-LVL IV: CPT | Mod: PBBFAC,,, | Performed by: INTERNAL MEDICINE

## 2022-06-02 RX ORDER — ICOSAPENT ETHYL 1000 MG/1
2 CAPSULE ORAL 2 TIMES DAILY
Qty: 120 CAPSULE | Refills: 11 | Status: SHIPPED | OUTPATIENT
Start: 2022-06-02 | End: 2023-06-23 | Stop reason: SDUPTHER

## 2022-06-02 RX ORDER — ICOSAPENT ETHYL 1000 MG/1
CAPSULE ORAL
COMMUNITY
End: 2022-06-02

## 2022-06-07 ENCOUNTER — CLINICAL SUPPORT (OUTPATIENT)
Dept: CARDIOLOGY | Facility: HOSPITAL | Age: 77
End: 2022-06-07
Attending: INTERNAL MEDICINE
Payer: MEDICARE

## 2022-06-07 DIAGNOSIS — R42 EPISODIC LIGHTHEADEDNESS: ICD-10-CM

## 2022-06-07 DIAGNOSIS — R00.2 PALPITATIONS: ICD-10-CM

## 2022-06-07 PROCEDURE — 93272 ECG/REVIEW INTERPRET ONLY: CPT | Mod: ,,, | Performed by: STUDENT IN AN ORGANIZED HEALTH CARE EDUCATION/TRAINING PROGRAM

## 2022-06-07 PROCEDURE — 93272 CARDIAC EVENT MONITOR (CUPID ONLY): ICD-10-PCS | Mod: ,,, | Performed by: STUDENT IN AN ORGANIZED HEALTH CARE EDUCATION/TRAINING PROGRAM

## 2022-06-07 PROCEDURE — 93271 ECG/MONITORING AND ANALYSIS: CPT

## 2022-06-08 ENCOUNTER — TELEPHONE (OUTPATIENT)
Dept: ENDOCRINOLOGY | Facility: CLINIC | Age: 77
End: 2022-06-08
Payer: MEDICARE

## 2022-06-10 ENCOUNTER — LAB VISIT (OUTPATIENT)
Dept: LAB | Facility: HOSPITAL | Age: 77
End: 2022-06-10
Attending: INTERNAL MEDICINE
Payer: MEDICARE

## 2022-06-10 DIAGNOSIS — D35.02 ADENOMA OF LEFT ADRENAL GLAND: ICD-10-CM

## 2022-06-10 DIAGNOSIS — E87.6 LOW SERUM POTASSIUM: ICD-10-CM

## 2022-06-10 DIAGNOSIS — D50.9 IRON DEFICIENCY ANEMIA, UNSPECIFIED IRON DEFICIENCY ANEMIA TYPE: ICD-10-CM

## 2022-06-10 LAB
ANION GAP SERPL CALC-SCNC: 13 MMOL/L (ref 8–16)
BUN SERPL-MCNC: 20 MG/DL (ref 8–23)
CALCIUM SERPL-MCNC: 9.7 MG/DL (ref 8.7–10.5)
CHLORIDE SERPL-SCNC: 100 MMOL/L (ref 95–110)
CO2 SERPL-SCNC: 26 MMOL/L (ref 23–29)
CORTIS SERPL-MCNC: 12.6 UG/DL (ref 4.3–22.4)
CREAT SERPL-MCNC: 0.8 MG/DL (ref 0.5–1.4)
DHEA-S SERPL-MCNC: 85.7 UG/DL
EST. GFR  (AFRICAN AMERICAN): >60 ML/MIN/1.73 M^2
EST. GFR  (NON AFRICAN AMERICAN): >60 ML/MIN/1.73 M^2
ESTIMATED AVG GLUCOSE: 117 MG/DL (ref 68–131)
FERRITIN SERPL-MCNC: 26 NG/ML (ref 20–300)
GLUCOSE SERPL-MCNC: 112 MG/DL (ref 70–110)
HBA1C MFR BLD: 5.7 % (ref 4–5.6)
HGB BLD-MCNC: 11.5 G/DL (ref 12–16)
IRON SERPL-MCNC: 92 UG/DL (ref 30–160)
POTASSIUM SERPL-SCNC: 3.6 MMOL/L (ref 3.5–5.1)
SATURATED IRON: 19 % (ref 20–50)
SODIUM SERPL-SCNC: 139 MMOL/L (ref 136–145)
TOTAL IRON BINDING CAPACITY: 477 UG/DL (ref 250–450)
TRANSFERRIN SERPL-MCNC: 322 MG/DL (ref 200–375)

## 2022-06-10 PROCEDURE — 85018 HEMOGLOBIN: CPT | Performed by: INTERNAL MEDICINE

## 2022-06-10 PROCEDURE — 82627 DEHYDROEPIANDROSTERONE: CPT | Performed by: INTERNAL MEDICINE

## 2022-06-10 PROCEDURE — 80048 BASIC METABOLIC PNL TOTAL CA: CPT | Performed by: INTERNAL MEDICINE

## 2022-06-10 PROCEDURE — 83036 HEMOGLOBIN GLYCOSYLATED A1C: CPT | Performed by: INTERNAL MEDICINE

## 2022-06-10 PROCEDURE — 82024 ASSAY OF ACTH: CPT | Performed by: INTERNAL MEDICINE

## 2022-06-10 PROCEDURE — 82533 TOTAL CORTISOL: CPT | Performed by: INTERNAL MEDICINE

## 2022-06-10 PROCEDURE — 84244 ASSAY OF RENIN: CPT | Performed by: INTERNAL MEDICINE

## 2022-06-10 PROCEDURE — 82088 ASSAY OF ALDOSTERONE: CPT | Performed by: INTERNAL MEDICINE

## 2022-06-10 PROCEDURE — 83835 ASSAY OF METANEPHRINES: CPT | Performed by: INTERNAL MEDICINE

## 2022-06-10 PROCEDURE — 82533 TOTAL CORTISOL: CPT | Mod: 91 | Performed by: INTERNAL MEDICINE

## 2022-06-10 PROCEDURE — 82728 ASSAY OF FERRITIN: CPT | Performed by: INTERNAL MEDICINE

## 2022-06-10 PROCEDURE — 84466 ASSAY OF TRANSFERRIN: CPT | Performed by: INTERNAL MEDICINE

## 2022-06-12 DIAGNOSIS — D50.9 IRON DEFICIENCY ANEMIA, UNSPECIFIED IRON DEFICIENCY ANEMIA TYPE: Primary | ICD-10-CM

## 2022-06-12 RX ORDER — FERROUS GLUCONATE 324(38)MG
324 TABLET ORAL
Qty: 90 TABLET | Refills: 1 | Status: SHIPPED | OUTPATIENT
Start: 2022-06-12 | End: 2022-08-01 | Stop reason: SDUPTHER

## 2022-06-12 NOTE — PROGRESS NOTES
Manpreet - please tell patient that they are iron deficient and anemic and recommend that they take ferrous gluconate one 324mg pill once daily for next 3 months.    Please order repeat fasting Hemoglobin, Iron/TIBC, and Ferritin in 8 weeks - Orders placed.

## 2022-06-13 ENCOUNTER — PATIENT MESSAGE (OUTPATIENT)
Dept: GASTROENTEROLOGY | Facility: CLINIC | Age: 77
End: 2022-06-13
Payer: MEDICARE

## 2022-06-13 LAB
ALDOST SERPL-MCNC: 13.2 NG/DL
RENIN PLAS-CCNC: 3.5 NG/ML/H

## 2022-06-14 LAB — ACTH PLAS-MCNC: 13 PG/ML (ref 0–46)

## 2022-06-16 VITALS
BODY MASS INDEX: 27.82 KG/M2 | HEIGHT: 63 IN | SYSTOLIC BLOOD PRESSURE: 152 MMHG | HEART RATE: 91 BPM | WEIGHT: 157 LBS | DIASTOLIC BLOOD PRESSURE: 87 MMHG

## 2022-06-17 LAB
METANEPH FREE SERPL-MCNC: 34 PG/ML
METANEPHS SERPL-MCNC: 94 PG/ML
NORMETANEPH FREE SERPL-MCNC: 60 PG/ML

## 2022-06-18 LAB
CORTIS 12 AM SAL-MCNC: <50 NG/DL
CORTIS 8 AM SAL-MCNC: NORMAL NG/ML
CORTIS 8 PM SAL-MCNC: NORMAL NG/ML
CORTIS SAL-MCNC: NORMAL UG/DL

## 2022-06-20 ENCOUNTER — TELEPHONE (OUTPATIENT)
Dept: ENDOCRINOLOGY | Facility: CLINIC | Age: 77
End: 2022-06-20
Payer: MEDICARE

## 2022-06-20 ENCOUNTER — PATIENT MESSAGE (OUTPATIENT)
Dept: ENDOCRINOLOGY | Facility: CLINIC | Age: 77
End: 2022-06-20
Payer: MEDICARE

## 2022-06-20 NOTE — TELEPHONE ENCOUNTER
Please contact pt to let her know:    Labs are mostly normal.    Cortisol, ACTH, DHEA-S, aldosterone, metanephrines (the adrenal gland hormones) were all normal.    Only abnormality from the labs I ordered was the hemoglobin A1C, 5.7 instead of 5.6, which is barely into the prediabetes range. Something to keep an eye on 1-2 times a year with primary care.

## 2022-06-20 NOTE — TELEPHONE ENCOUNTER
----- Message from Kaylene Andres MA sent at 6/16/2022  2:26 PM CDT -----  Regarding: Lab  Patients states she is concerned about her lab result want to discuss them.    Thanks,  Kaylene MODI

## 2022-06-24 ENCOUNTER — HOSPITAL ENCOUNTER (OUTPATIENT)
Dept: RADIOLOGY | Facility: HOSPITAL | Age: 77
Discharge: HOME | End: 2022-06-24
Attending: INTERNAL MEDICINE
Payer: MEDICARE

## 2022-06-24 ENCOUNTER — OFFICE VISIT (OUTPATIENT)
Dept: GASTROENTEROLOGY | Facility: CLINIC | Age: 77
End: 2022-06-24
Payer: MEDICARE

## 2022-06-24 VITALS
DIASTOLIC BLOOD PRESSURE: 78 MMHG | SYSTOLIC BLOOD PRESSURE: 126 MMHG | BODY MASS INDEX: 28.26 KG/M2 | HEIGHT: 63 IN | WEIGHT: 159.5 LBS | HEART RATE: 84 BPM

## 2022-06-24 DIAGNOSIS — D50.9 IRON DEFICIENCY ANEMIA, UNSPECIFIED IRON DEFICIENCY ANEMIA TYPE: Primary | ICD-10-CM

## 2022-06-24 DIAGNOSIS — D50.9 IRON DEFICIENCY ANEMIA, UNSPECIFIED IRON DEFICIENCY ANEMIA TYPE: ICD-10-CM

## 2022-06-24 PROCEDURE — 3288F PR FALLS RISK ASSESSMENT DOCUMENTED: ICD-10-PCS | Mod: CPTII,S$GLB,, | Performed by: INTERNAL MEDICINE

## 2022-06-24 PROCEDURE — 3074F PR MOST RECENT SYSTOLIC BLOOD PRESSURE < 130 MM HG: ICD-10-PCS | Mod: CPTII,S$GLB,, | Performed by: INTERNAL MEDICINE

## 2022-06-24 PROCEDURE — 1126F PR PAIN SEVERITY QUANTIFIED, NO PAIN PRESENT: ICD-10-PCS | Mod: CPTII,S$GLB,, | Performed by: INTERNAL MEDICINE

## 2022-06-24 PROCEDURE — 3078F PR MOST RECENT DIASTOLIC BLOOD PRESSURE < 80 MM HG: ICD-10-PCS | Mod: CPTII,S$GLB,, | Performed by: INTERNAL MEDICINE

## 2022-06-24 PROCEDURE — 1159F MED LIST DOCD IN RCRD: CPT | Mod: CPTII,S$GLB,, | Performed by: INTERNAL MEDICINE

## 2022-06-24 PROCEDURE — 99999 PR PBB SHADOW E&M-EST. PATIENT-LVL III: ICD-10-PCS | Mod: PBBFAC,,, | Performed by: INTERNAL MEDICINE

## 2022-06-24 PROCEDURE — 1101F PR PT FALLS ASSESS DOC 0-1 FALLS W/OUT INJ PAST YR: ICD-10-PCS | Mod: CPTII,S$GLB,, | Performed by: INTERNAL MEDICINE

## 2022-06-24 PROCEDURE — 1101F PT FALLS ASSESS-DOCD LE1/YR: CPT | Mod: CPTII,S$GLB,, | Performed by: INTERNAL MEDICINE

## 2022-06-24 PROCEDURE — 74019 RADEX ABDOMEN 2 VIEWS: CPT | Mod: 26,,, | Performed by: RADIOLOGY

## 2022-06-24 PROCEDURE — 3078F DIAST BP <80 MM HG: CPT | Mod: CPTII,S$GLB,, | Performed by: INTERNAL MEDICINE

## 2022-06-24 PROCEDURE — 74019 RADEX ABDOMEN 2 VIEWS: CPT | Mod: TC

## 2022-06-24 PROCEDURE — 3288F FALL RISK ASSESSMENT DOCD: CPT | Mod: CPTII,S$GLB,, | Performed by: INTERNAL MEDICINE

## 2022-06-24 PROCEDURE — 99213 OFFICE O/P EST LOW 20 MIN: CPT | Mod: S$GLB,,, | Performed by: INTERNAL MEDICINE

## 2022-06-24 PROCEDURE — 1126F AMNT PAIN NOTED NONE PRSNT: CPT | Mod: CPTII,S$GLB,, | Performed by: INTERNAL MEDICINE

## 2022-06-24 PROCEDURE — 99213 PR OFFICE/OUTPT VISIT, EST, LEVL III, 20-29 MIN: ICD-10-PCS | Mod: S$GLB,,, | Performed by: INTERNAL MEDICINE

## 2022-06-24 PROCEDURE — 3074F SYST BP LT 130 MM HG: CPT | Mod: CPTII,S$GLB,, | Performed by: INTERNAL MEDICINE

## 2022-06-24 PROCEDURE — 1159F PR MEDICATION LIST DOCUMENTED IN MEDICAL RECORD: ICD-10-PCS | Mod: CPTII,S$GLB,, | Performed by: INTERNAL MEDICINE

## 2022-06-24 PROCEDURE — 99999 PR PBB SHADOW E&M-EST. PATIENT-LVL III: CPT | Mod: PBBFAC,,, | Performed by: INTERNAL MEDICINE

## 2022-06-24 PROCEDURE — 74019 XR ABDOMEN FLAT AND ERECT: ICD-10-PCS | Mod: 26,,, | Performed by: RADIOLOGY

## 2022-06-24 NOTE — PROGRESS NOTES
[facility] Consultation Note    Reason for Consult:  The encounter diagnosis was Iron deficiency anemia, unspecified iron deficiency anemia type.    PCP:   Angel Bello       Referring MD:  No referring provider defined for this encounter.    Initial History of Present Illness (HPI):  This is a 77 y.o. female here for evaluation of iron deficiency anemia prior EGD colonoscopy is have been unrevealing for iron deficiency anemia she is not taking NSAIDs she is currently taking iron she does not feel like she is constipated occasionally she will have a little bit of stool on the loose side no nausea or vomiting she has had her gallbladder removed her appendix removed ovaries removed and a hysterectomy no small-bowel surgeries no history of bowel obstructions no nausea or vomiting she does not feel like she is constipated she is here to discuss small-bowel video capsule endoscopy for further evaluation of her iron deficiency anemia.    Abdominal pain - no  Reflux - well controlled  Dysphagia - no   Bowel habits - as above  GI bleeding - none  NSAID usage - none    Interval HPI 06/24/2022:  The patient's last visit with me was on 4/4/2022.      ROS:  Constitutional: No fevers, chills, No weight loss  ENT:  Well controlled heartburn no dysphagia no odynophagia no hoarseness  CV: No chest pain, no palpitation  Pulm: No cough, No shortness of breath, no wheezing  Ophtho: No vision changes  GI: see HPI  Derm: No rash, no itching  Heme: No lymphadenopathy, No easy bruising  MSK: No significant arthritis  : No dysuria, No hematuria  Endo: No hot or cold intolerance  Neuro: No syncope, No seizure, no strokes  Psych: No uncontrolled anxiety, No uncontrolled depression    Medical History:  has a past medical history of Allergy (sinus), Arthritis (back), Cataract, Colon polyps, Degenerative disc disease (back), Diverticulosis of colon, Dyspepsia, GERD (gastroesophageal reflux disease), Heartburn,  Hemorrhoids, internal, Hiatal hernia, Neuromuscular disorder, and Vitamin D deficiency.    Surgical History:  has a past surgical history that includes Appendectomy (age 19); Hysterectomy (38); Cholecystectomy; Injection of joint (Bilateral, 2/18/2019); Knee arthroscopy w/ meniscectomy (Left, 10/3/2019); Chondroplasty of knee (Left, 10/3/2019); Synovectomy of knee (Left, 10/3/2019); Injection of joint (Right, 8/20/2020); Esophagogastroduodenoscopy (N/A, 9/9/2020); Colonoscopy (N/A, 9/9/2020); Cataract extraction w/  intraocular lens implant; Cataract extraction w/  intraocular lens implant; and YAG Laser Capsulotomy (Bilateral).    Family History: family history includes Cancer (age of onset: 65) in her father; Heart disease (age of onset: 67) in her mother; No Known Problems in her brother, maternal aunt, maternal grandfather, maternal grandmother, maternal uncle, paternal aunt, paternal grandfather, paternal grandmother, paternal uncle, sister, and son; Stomach cancer in her father; Stroke in her mother..     Social History:  reports that she has never smoked. She has never used smokeless tobacco. She reports that she does not drink alcohol and does not use drugs.    Review of patient's allergies indicates:   Allergen Reactions    Demerol [meperidine] Nausea And Vomiting     Other reaction(s): Nausea    Papaya      Illness vomiting    Sulfa (sulfonamide antibiotics) Rash    Sulfur Rash       Medication List with Changes/Refills   Current Medications    DULOXETINE (CYMBALTA) 60 MG CAPSULE    TAKE ONE CAPSULE BY MOUTH EVERY DAY    ERGOCALCIFEROL (ERGOCALCIFEROL) 50,000 UNIT CAP    Take 1 capsule (50,000 Units total) by mouth every 7 days.    ESTRADIOL VALERATE (DELESTROGEN) 40 MG/ML INJECTION    Inject 0.5 mLs (20 mg total) into the muscle every 28 days. Inject into the muscle.    EZETIMIBE (ZETIA) 10 MG TABLET    TAKE ONE TABLET BY MOUTH EVERY DAY    FERROUS GLUCONATE (FERGON) 324 MG TABLET    Take 1 tablet  "(324 mg total) by mouth daily with breakfast.    ICOSAPENT ETHYL (VASCEPA) 1 GRAM CAP    Take 2 capsules (2 g total) by mouth 2 (two) times a day.    LOSARTAN-HYDROCHLOROTHIAZIDE 50-12.5 MG (HYZAAR) 50-12.5 MG PER TABLET    Take 1 tablet by mouth once daily.    PANTOPRAZOLE (PROTONIX) 40 MG TABLET    TAKE ONE TABLET BY MOUTH EVERY MORNING 45 MINUTES BEFORE BREAKFAST    POTASSIUM CHLORIDE SA (K-DUR,KLOR-CON) 10 MEQ TABLET    Take 1 tablet (10 mEq total) by mouth once daily.         Objective Findings:    Vital Signs:  /78 (BP Location: Left arm, Patient Position: Sitting, BP Method: Medium (Automatic))   Pulse 84   Ht 5' 3" (1.6 m)   Wt 72.3 kg (159 lb 8 oz)   BMI 28.25 kg/m²   Body mass index is 28.25 kg/m².    Physical Exam:  General Appearance: Well appearing in no acute distress  Eyes:    No scleral icterus  ENT:  No lesions or masses   Lungs: CTA bilaterally, no wheezes, no rhonchi, no rales  Heart:  S1, S2 normal, no murmurs heard  Abdomen:  Non distended, soft, no guarding, no rebound, no tenderness, no appreciated ascites, no bruits, no hepatosplenomegaly,  No CVA tenderness, no appreciated hernias, no Monroe sign no McBurney point tenderness  Musculoskeletal:  No major joint deformities  Skin: No petechiae or rash on exposed skin areas  Neurologic:  Alert and oriented x4  Psychiatric:  Normal speech mentation and affect    Labs:  Lab Results   Component Value Date    WBC 8.37 05/06/2022    HGB 11.5 (L) 06/10/2022    HCT 33.3 (L) 05/06/2022     05/06/2022    CHOL 148 05/06/2022    TRIG 275 (H) 05/06/2022    HDL 49 05/06/2022    ALT 16 05/06/2022    AST 17 05/06/2022     06/10/2022    K 3.6 06/10/2022     06/10/2022    CREATININE 0.8 06/10/2022    BUN 20 06/10/2022    CO2 26 06/10/2022    TSH 2.371 05/06/2022    INR 0.9 05/06/2020    HGBA1C 5.7 (H) 06/10/2022               Medical Decision Making:  Total time with patient reviewing prior medical records EGD colonoscopy labs has " been 20 minutes  With greater than 50% of time face-to-face in room with patient discussion her medical condition and recommendation  Lab work reviewed  Prior EGD colonoscopy images and path personally reviewed by myself  Risks benefits alternatives including retained video capsule needing surgical endoscopic removal discussed with patient for further evaluation of iron deficiency anemia patient signed informed written consent to proceed with small-bowel video capsule endoscopy today in clinic.      Assessment:  1. Iron deficiency anemia, unspecified iron deficiency anemia type         Recommendations:  1. Abdominal and pelvis x-ray today to rule out constipation  2. Patient signed informed consent for small-bowel video capsule endoscopy for further evaluation of iron deficiency anemia  3. Return GI clinic 3 months for follow-up sooner if any concerns.    Follow up in about 3 months (around 9/24/2022).      Order summary:  Orders Placed This Encounter    X-Ray Abdomen Flat And Erect    X-Ray Pelvis Routine AP    Capsule Video Endoscopy         Thank you so much for allowing me to participate in the care of Shakira Black MD

## 2022-06-24 NOTE — Clinical Note
Please schedule patient for abdominal pelvic x-ray to rule out constipation orders placed  Video capsule endoscopy orders placed patient signed consent  Return GI clinic 3 months for follow-up video visit

## 2022-07-07 ENCOUNTER — TELEPHONE (OUTPATIENT)
Dept: GASTROENTEROLOGY | Facility: CLINIC | Age: 77
End: 2022-07-07
Payer: MEDICARE

## 2022-07-07 NOTE — TELEPHONE ENCOUNTER
----- Message from Ronald Zarco sent at 7/7/2022  4:23 PM CDT -----  Regarding: call bk from Manpreet about her appt      The Pt states that she needed to speak with Manpreet about the appt she needed to resched    Ph # [phone]

## 2022-07-09 NOTE — PROGRESS NOTES
Shakira your abdominal x-ray suggests constipation.    FINDINGS:  Nonobstructive bowel gas pattern.  No free air.  Scattered gas and fecal matter seen throughout much of the colon, right colon greater than left colon.  Clinical correlation for constipation.  No dilated small bowel or stomach.  No organomegaly or masses.  S-type scoliotic curve to the spine to the right in the lower thoracic and upper lumbar and to the left in the lumbar region.  Degenerative changes about the spine.  No acute fractures.  Pelvic densities felt related to phleboliths.  No obvious narrowing of right or left hip joint spaces with some acetabular roof spurring and preserved femoral head contours.  Injection granulomas superimpose buttocks region.  Partially visualized hardware felt external to patient superimposes lower left hemithorax and upper left abdomen.  Elevated right hemidiaphragm.

## 2022-07-18 ENCOUNTER — TELEPHONE (OUTPATIENT)
Dept: GASTROENTEROLOGY | Facility: CLINIC | Age: 77
End: 2022-07-18
Payer: MEDICARE

## 2022-07-18 NOTE — TELEPHONE ENCOUNTER
----- Message from Tegan Brownlee sent at 7/18/2022  2:02 PM CDT -----  Contact: pt  Pt is requesting a callback to speak with a nurse she has a message from princess          Confirmed contact below:  Contact Name:Shakira Pearl  Phone Number: [phone]

## 2022-07-18 NOTE — TELEPHONE ENCOUNTER
Return call and spoke with patient. Per Patient has spoken with Pre service department and the vce procedure is approved.

## 2022-07-19 ENCOUNTER — TELEPHONE (OUTPATIENT)
Dept: ENDOSCOPY | Facility: HOSPITAL | Age: 77
End: 2022-07-19
Payer: MEDICARE

## 2022-07-19 NOTE — TELEPHONE ENCOUNTER
----- Message from Judy Navarro sent at 7/19/2022 10:28 AM CDT -----  Regarding: call back  Contact: [phone]  Type:  Patient Returning Call    Who Called: PT   Who Left Message for Patient: Nurse   Does the patient know what this is regarding?: Yes   Would the patient rather a call back or a response via [facility]? Call back   Best Call Back Number: [phone]  Additional Information:

## 2022-07-20 ENCOUNTER — TELEPHONE (OUTPATIENT)
Dept: GASTROENTEROLOGY | Facility: CLINIC | Age: 77
End: 2022-07-20
Payer: MEDICARE

## 2022-07-20 ENCOUNTER — CLINICAL SUPPORT (OUTPATIENT)
Dept: GASTROENTEROLOGY | Facility: CLINIC | Age: 77
End: 2022-07-20
Payer: MEDICARE

## 2022-07-20 DIAGNOSIS — D50.9 IRON DEFICIENCY ANEMIA, UNSPECIFIED IRON DEFICIENCY ANEMIA TYPE: ICD-10-CM

## 2022-07-20 PROCEDURE — 91110 PR GI TRACT CAPSULE ENDOSCOPY: ICD-10-PCS | Mod: 26,,, | Performed by: INTERNAL MEDICINE

## 2022-07-20 PROCEDURE — 91110 GI TRC IMG INTRAL ESOPH-ILE: CPT | Mod: 26,,, | Performed by: INTERNAL MEDICINE

## 2022-07-20 NOTE — TELEPHONE ENCOUNTER
instructions reviewed with patient for the day  Patient expressed understanding   All questions answered to patient satisfaction  Patient swallowed capsule without incident at 803am

## 2022-07-22 NOTE — PROGRESS NOTES
Patient arrived at clinic at on 07/20/2022 for 4:30 pm. The VCE was removed off patient successfully. Patient had no complaints. Patient was instructed to resume to normal diet.     Patient verbalized understanding.

## 2022-07-24 ENCOUNTER — TELEPHONE (OUTPATIENT)
Dept: GASTROENTEROLOGY | Facility: CLINIC | Age: 77
End: 2022-07-24
Payer: MEDICARE

## 2022-07-24 ENCOUNTER — PATIENT MESSAGE (OUTPATIENT)
Dept: GASTROENTEROLOGY | Facility: CLINIC | Age: 77
End: 2022-07-24
Payer: MEDICARE

## 2022-07-24 ENCOUNTER — DOCUMENTATION ONLY (OUTPATIENT)
Dept: GASTROENTEROLOGY | Facility: CLINIC | Age: 77
End: 2022-07-24
Payer: MEDICARE

## 2022-07-24 DIAGNOSIS — D50.9 IRON DEFICIENCY ANEMIA, UNSPECIFIED IRON DEFICIENCY ANEMIA TYPE: Primary | ICD-10-CM

## 2022-07-24 NOTE — PROGRESS NOTES
MPORTANT:    Manpreet please call Shakira and let her know that her small-bowel video capsule endoscopy study was incomplete and ask her how she is feeling if she is having any abdominal pain or nausea vomiting she should go to the emergency room for evaluation at the [facility] if she is doing fine let us  her later in the week for an x-ray of her abdomen and pelvis to exclude a retained small-bowel video capsule pill camera.  Orders have been placed    I tried to call her today but got no answer.  Today is Sunday July 24, 2022

## 2022-07-24 NOTE — [employer] PATIENT INSTRUCTIONS
Discharge Summary/Instructions for after Video Capsule Endoscopy  Patient Name: Shakira Pearl  Patient MRN: [id number]  Patient YOB: 1945 Wednesday, July 20, 2022  Yfn Black MD  This is a 77 year old female.  Refer to note in patient chart for   documentation of history and physical.  1.  Do Not eat or drink anything for 1 hour.  Try sips of water first.  If   tolerated, resume your regular diet or one recommended by your physician.  2.  Do not drive, operate machinery, make critical decisions, or do   activities that require coordination or balance for 24 hours.  3.   You may experience a sore throat for 24 to 48 hours.  You may use   throat lozenges or gargle with warm salt water to relieve the discomfort.  4.  Because air was put into your stomach during the procedure, you may   experience some belching.  5.  Do not use any medication containing aspirin for 10 days.  6.  Go directly to the emergency room if you notice any of the following:   Chills and/or fever over 101 F   Persistent vomiting or vomiting with blood/nasal regurgitation   Severe abdominal pain, other than gas cramps   Severe chest pain   Black, tarry stools     Your doctor recommends these additional instructions:  None  If you have any questions or problems, please call your physician.  EMERGENCY PHONE NUMBER: [phone]  LAB RESULTS: [phone]  Yfn Black MD  7/24/2022 4:19:48 PM  This report has been verified and signed electronically.  Dear patient,  As a result of recent federal legislation (The Federal Cures Act), you may   receive lab or pathology results from your procedure in your [facility]   account before your physician is able to contact you. Your physician or   their representative will relay the results to you with their   recommendations at their soonest availability.  Thank you,  [employer]ATION

## 2022-07-26 ENCOUNTER — TELEPHONE (OUTPATIENT)
Dept: GASTROENTEROLOGY | Facility: CLINIC | Age: 77
End: 2022-07-26
Payer: MEDICARE

## 2022-07-26 ENCOUNTER — PATIENT MESSAGE (OUTPATIENT)
Dept: GASTROENTEROLOGY | Facility: CLINIC | Age: 77
End: 2022-07-26
Payer: MEDICARE

## 2022-07-26 ENCOUNTER — HOSPITAL ENCOUNTER (OUTPATIENT)
Dept: RADIOLOGY | Facility: HOSPITAL | Age: 77
Discharge: HOME | End: 2022-07-26
Attending: INTERNAL MEDICINE
Payer: MEDICARE

## 2022-07-26 DIAGNOSIS — Z87.19 HISTORY OF SMALL BOWEL OBSTRUCTION: ICD-10-CM

## 2022-07-26 DIAGNOSIS — D50.9 IRON DEFICIENCY ANEMIA, UNSPECIFIED IRON DEFICIENCY ANEMIA TYPE: ICD-10-CM

## 2022-07-26 DIAGNOSIS — D50.9 IRON DEFICIENCY ANEMIA, UNSPECIFIED IRON DEFICIENCY ANEMIA TYPE: Primary | ICD-10-CM

## 2022-07-26 PROCEDURE — 72170 XR PELVIS ROUTINE AP: ICD-10-PCS | Mod: 26,,, | Performed by: RADIOLOGY

## 2022-07-26 PROCEDURE — 74018 XR ABDOMEN AP 1 VIEW: ICD-10-PCS | Mod: 26,,, | Performed by: RADIOLOGY

## 2022-07-26 PROCEDURE — 72170 X-RAY EXAM OF PELVIS: CPT | Mod: TC,PO

## 2022-07-26 PROCEDURE — 72170 X-RAY EXAM OF PELVIS: CPT | Mod: 26,,, | Performed by: RADIOLOGY

## 2022-07-26 PROCEDURE — 74018 RADEX ABDOMEN 1 VIEW: CPT | Mod: 26,,, | Performed by: RADIOLOGY

## 2022-07-26 PROCEDURE — 74018 RADEX ABDOMEN 1 VIEW: CPT | Mod: TC,PO

## 2022-07-26 NOTE — TELEPHONE ENCOUNTER
Dr. Black please advise. Return call to patient. Per patient stated that she she has gone to have the x-ray done today. Patient stated that after her x-ray her bottom stomach started to cramp. Patient stated that she does not have any vomiting. Patient refuse to go to the ER.

## 2022-07-26 NOTE — TELEPHONE ENCOUNTER
----- Message from Siri Aftab sent at 7/26/2022  3:38 PM CDT -----  Contact: [phone]  Pt is calling confused, she received a message on her voicemail while she was getting her x-ray done telling her to go to the er, and she doesn't understand what is going on and why she needs to go to the er.    Pt would like a urgent call back.    [phone]

## 2022-07-27 ENCOUNTER — TELEPHONE (OUTPATIENT)
Dept: GASTROENTEROLOGY | Facility: CLINIC | Age: 77
End: 2022-07-27
Payer: MEDICARE

## 2022-07-27 NOTE — PROGRESS NOTES
Shakira your Abdominal and pelvis X-rays shows no retained capsule.    FINDINGS:  No definite radiopaque foreign body identified.  No bowel dilatation.  No definite free air in the abdomen.  DJD.     Impression:     See above        Electronically signed by: Jose Eduardo Koch MD  Date:                                            07/26/2022

## 2022-07-27 NOTE — TELEPHONE ENCOUNTER
Contact and spoke with patient. Per Dr. Black, Please let her know that the x-ray showed that the small-bowel video capsule camera has passed throughout her GI tract and is no longer inside of her.  So please tell her not to worry about that let me know tomorrow if she is having any problems that she needs to see her primary care doctor about her come see me.     please schedule patient for basic metabolic panel 1 week prior to her CT enterography orders placed for CT enterography please schedule    Patient verbalized understanding.

## 2022-08-01 ENCOUNTER — LAB VISIT (OUTPATIENT)
Dept: LAB | Facility: HOSPITAL | Age: 77
End: 2022-08-01
Attending: INTERNAL MEDICINE
Payer: MEDICARE

## 2022-08-01 DIAGNOSIS — Z87.19 HISTORY OF SMALL BOWEL OBSTRUCTION: ICD-10-CM

## 2022-08-01 DIAGNOSIS — D50.9 IRON DEFICIENCY ANEMIA, UNSPECIFIED IRON DEFICIENCY ANEMIA TYPE: ICD-10-CM

## 2022-08-01 LAB
ANION GAP SERPL CALC-SCNC: 15 MMOL/L (ref 8–16)
BASOPHILS # BLD AUTO: 0.03 K/UL (ref 0–0.2)
BASOPHILS NFR BLD: 0.4 % (ref 0–1.9)
BUN SERPL-MCNC: 18 MG/DL (ref 8–23)
CALCIUM SERPL-MCNC: 9.7 MG/DL (ref 8.7–10.5)
CHLORIDE SERPL-SCNC: 98 MMOL/L (ref 95–110)
CO2 SERPL-SCNC: 24 MMOL/L (ref 23–29)
CREAT SERPL-MCNC: 0.8 MG/DL (ref 0.5–1.4)
DIFFERENTIAL METHOD: ABNORMAL
EOSINOPHIL # BLD AUTO: 0.1 K/UL (ref 0–0.5)
EOSINOPHIL NFR BLD: 1.4 % (ref 0–8)
ERYTHROCYTE [DISTWIDTH] IN BLOOD BY AUTOMATED COUNT: 12.7 % (ref 11.5–14.5)
EST. GFR  (NO RACE VARIABLE): >60 ML/MIN/1.73 M^2
FERRITIN SERPL-MCNC: 33 NG/ML (ref 20–300)
GLUCOSE SERPL-MCNC: 110 MG/DL (ref 70–110)
HCT VFR BLD AUTO: 35.2 % (ref 37–48.5)
HGB BLD-MCNC: 11.5 G/DL (ref 12–16)
HGB BLD-MCNC: 11.5 G/DL (ref 12–16)
IGA SERPL-MCNC: 241 MG/DL (ref 40–350)
IMM GRANULOCYTES # BLD AUTO: 0.05 K/UL (ref 0–0.04)
IMM GRANULOCYTES NFR BLD AUTO: 0.6 % (ref 0–0.5)
IRON SERPL-MCNC: 81 UG/DL (ref 30–160)
LYMPHOCYTES # BLD AUTO: 2 K/UL (ref 1–4.8)
LYMPHOCYTES NFR BLD: 25.3 % (ref 18–48)
MCH RBC QN AUTO: 28.5 PG (ref 27–31)
MCHC RBC AUTO-ENTMCNC: 32.7 G/DL (ref 32–36)
MCV RBC AUTO: 87 FL (ref 82–98)
MONOCYTES # BLD AUTO: 0.4 K/UL (ref 0.3–1)
MONOCYTES NFR BLD: 4.5 % (ref 4–15)
NEUTROPHILS # BLD AUTO: 5.4 K/UL (ref 1.8–7.7)
NEUTROPHILS NFR BLD: 67.8 % (ref 38–73)
NRBC BLD-RTO: 0 /100 WBC
PLATELET # BLD AUTO: 266 K/UL (ref 150–450)
PMV BLD AUTO: 10.7 FL (ref 9.2–12.9)
POTASSIUM SERPL-SCNC: 3.6 MMOL/L (ref 3.5–5.1)
RBC # BLD AUTO: 4.04 M/UL (ref 4–5.4)
SATURATED IRON: 16 % (ref 20–50)
SODIUM SERPL-SCNC: 137 MMOL/L (ref 136–145)
TOTAL IRON BINDING CAPACITY: 494 UG/DL (ref 250–450)
TRANSFERRIN SERPL-MCNC: 334 MG/DL (ref 200–375)
WBC # BLD AUTO: 7.92 K/UL (ref 3.9–12.7)

## 2022-08-01 PROCEDURE — 83516 IMMUNOASSAY NONANTIBODY: CPT | Performed by: INTERNAL MEDICINE

## 2022-08-01 PROCEDURE — 82784 ASSAY IGA/IGD/IGG/IGM EACH: CPT | Performed by: INTERNAL MEDICINE

## 2022-08-01 PROCEDURE — 84466 ASSAY OF TRANSFERRIN: CPT | Performed by: INTERNAL MEDICINE

## 2022-08-01 PROCEDURE — 82728 ASSAY OF FERRITIN: CPT | Performed by: INTERNAL MEDICINE

## 2022-08-01 PROCEDURE — 80048 BASIC METABOLIC PNL TOTAL CA: CPT | Performed by: INTERNAL MEDICINE

## 2022-08-01 PROCEDURE — 85025 COMPLETE CBC W/AUTO DIFF WBC: CPT | Performed by: INTERNAL MEDICINE

## 2022-08-01 PROCEDURE — 36415 COLL VENOUS BLD VENIPUNCTURE: CPT | Mod: PO | Performed by: INTERNAL MEDICINE

## 2022-08-01 RX ORDER — FERROUS GLUCONATE 324(38)MG
324 TABLET ORAL
Qty: 90 TABLET | Refills: 1 | Status: SHIPPED | OUTPATIENT
Start: 2022-08-01 | End: 2022-11-15 | Stop reason: SDUPTHER

## 2022-08-02 ENCOUNTER — PATIENT MESSAGE (OUTPATIENT)
Dept: GASTROENTEROLOGY | Facility: CLINIC | Age: 77
End: 2022-08-02
Payer: MEDICARE

## 2022-08-02 ENCOUNTER — TELEPHONE (OUTPATIENT)
Dept: GASTROENTEROLOGY | Facility: CLINIC | Age: 77
End: 2022-08-02
Payer: MEDICARE

## 2022-08-02 NOTE — TELEPHONE ENCOUNTER
----- Message from Yfn Black MD sent at 8/1/2022 11:39 PM CDT -----  Manpreet please refer Shakira to Heme-Onc for evaluation for IV iron in light of her iron deficiency anemia not able to be corrected with oral iron alone.  Referral placed      Manpreet - please tell patient that they are iron deficient and anemic and recommend that they take ferrous gluconate one 324mg pill once daily for next 3 months.    Please order repeat fasting Hemoglobin, Iron/TIBC, and Ferritin in 8 weeks - Orders placed.

## 2022-08-02 NOTE — TELEPHONE ENCOUNTER
Per Dr. Black, please refer Shakira to Heme-Onc for evaluation for IV iron in light of her iron deficiency anemia not able to be corrected with oral iron alone.  Referral placed       Manpreet - please tell patient that they are iron deficient and anemic and recommend that they take ferrous gluconate one 324mg pill once daily for next 3 months.     Please order repeat fasting Hemoglobin, Iron/TIBC, and Ferritin in 8 weeks - Orders placed.      Patient has received and read provider message.   Appointment generated.   Message sent to patient portal.

## 2022-08-02 NOTE — PROGRESS NOTES
Manpreet please refer Shakira to Heme-Onc for evaluation for IV iron in light of her iron deficiency anemia not able to be corrected with oral iron alone.  Referral placed      Manpreet - please tell patient that they are iron deficient and anemic and recommend that they take ferrous gluconate one 324mg pill once daily for next 3 months.    Please order repeat fasting Hemoglobin, Iron/TIBC, and Ferritin in 8 weeks - Orders placed.

## 2022-08-04 LAB — TTG IGA SER-ACNC: 8 UNITS

## 2022-08-05 ENCOUNTER — HOSPITAL ENCOUNTER (OUTPATIENT)
Dept: RADIOLOGY | Facility: HOSPITAL | Age: 77
Discharge: HOME | End: 2022-08-05
Attending: INTERNAL MEDICINE
Payer: MEDICARE

## 2022-08-05 DIAGNOSIS — Z87.19 HISTORY OF SMALL BOWEL OBSTRUCTION: ICD-10-CM

## 2022-08-05 DIAGNOSIS — D50.9 IRON DEFICIENCY ANEMIA, UNSPECIFIED IRON DEFICIENCY ANEMIA TYPE: ICD-10-CM

## 2022-08-05 PROCEDURE — 74177 CT ABD & PELVIS W/CONTRAST: CPT | Mod: TC

## 2022-08-05 PROCEDURE — A9698 NON-RAD CONTRAST MATERIALNOC: HCPCS | Performed by: INTERNAL MEDICINE

## 2022-08-05 PROCEDURE — 74177 CT ENTEROGRAPHY ABD_PELVIS WITH CONTRAST: ICD-10-PCS | Mod: 26,,, | Performed by: RADIOLOGY

## 2022-08-05 PROCEDURE — 74177 CT ABD & PELVIS W/CONTRAST: CPT | Mod: 26,,, | Performed by: RADIOLOGY

## 2022-08-05 PROCEDURE — 25500020 PHARM REV CODE 255: Performed by: INTERNAL MEDICINE

## 2022-08-05 RX ADMIN — BARIUM SULFATE 450 ML: 1 SUSPENSION ORAL at 01:08

## 2022-08-05 RX ADMIN — BARIUM SULFATE 225 ML: 1 SUSPENSION ORAL at 01:08

## 2022-08-05 RX ADMIN — IOHEXOL 100 ML: 350 INJECTION, SOLUTION INTRAVENOUS at 01:08

## 2022-08-06 DIAGNOSIS — K76.9 LIVER LESION: Primary | ICD-10-CM

## 2022-08-06 NOTE — PROGRESS NOTES
Manpreet please order liver ultrasound in 3 months which would be November 2022 for follow-up nonspecific small liver hypodensities orders placed.      Shakira your CT scan shows normal small-bowel no evidence of any obstruction some small stable findings below.    Impression:     No small bowel stricture or obstruction.  There is diverticulosis coli.     Hepatomegaly.  Multifocal hyperenhancing lesions throughout the liver, some of which appear similar to 11/14/2017, others of which appear new.  In the absence of known malignancy or underlying liver disease, findings are favored to reflect flash-filling hemangiomas, however overall indeterminate on this single phase examination.     Stable indeterminate left adrenal lesion, unchanged since 2017.     Small fluid-filled hiatal hernia.     Electronically signed by resident: Nickolas Han  Date:                                            08/05/2022

## 2022-08-09 ENCOUNTER — TELEPHONE (OUTPATIENT)
Dept: GASTROENTEROLOGY | Facility: CLINIC | Age: 77
End: 2022-08-09
Payer: MEDICARE

## 2022-08-09 NOTE — TELEPHONE ENCOUNTER
----- Message from Jyoti Jacobson MA sent at 8/9/2022  1:58 PM CDT -----  Contact: @[phone]    ----- Message -----  From: Trevor Rosenbaum  Sent: 8/9/2022   1:11 PM CDT  To: [facility] Staff    Pt is calling in stating she has not received a call to schedule an appt to have her ultrasound which is pending review. Pt also states that she received a letter to schedule an appt in September. Pt states that she called yesterday and left a message.Please call to discuss further.

## 2022-08-09 NOTE — TELEPHONE ENCOUNTER
Return call and  Spoke with patient. Inform patient of test results.     Patient verbalized understanding.

## 2022-08-09 NOTE — TELEPHONE ENCOUNTER
Dr. Black please advise. Inform patient her CT results.     Patient wanted to know if there is something she can do to decrease the enlarge liver?     Patient wanted to know why she has to wait 3 months to have the ultrasound done?     Patient wanted to know what does it mean about the small fluid hiatal hernia.

## 2022-08-09 NOTE — TELEPHONE ENCOUNTER
----- Message from Yfn Black MD sent at 8/6/2022 12:48 AM CDT -----  Manpreet please order liver ultrasound in 3 months which would be November 2022 for follow-up nonspecific small liver hypodensities orders placed.      Shakira your CT scan shows normal small-bowel no evidence of any obstruction some small stable findings below.    Impression:     No small bowel stricture or obstruction.  There is diverticulosis coli.     Hepatomegaly.  Multifocal hyperenhancing lesions throughout the liver, some of which appear similar to 11/14/2017, others of which appear new.  In the absence of known malignancy or underlying liver disease, findings are favored to reflect flash-filling hemangiomas, however overall indeterminate on this single phase examination.     Stable indeterminate left adrenal lesion, unchanged since 2017.     Small fluid-filled hiatal hernia.     Electronically signed by resident: Nickolas Han  Date:                                            08/05/2022

## 2022-08-11 ENCOUNTER — PATIENT MESSAGE (OUTPATIENT)
Dept: HEMATOLOGY/ONCOLOGY | Facility: CLINIC | Age: 77
End: 2022-08-11
Payer: MEDICARE

## 2022-08-12 ENCOUNTER — LAB VISIT (OUTPATIENT)
Dept: LAB | Facility: HOSPITAL | Age: 77
End: 2022-08-12
Attending: INTERNAL MEDICINE
Payer: MEDICARE

## 2022-08-12 ENCOUNTER — OFFICE VISIT (OUTPATIENT)
Dept: HEMATOLOGY/ONCOLOGY | Facility: CLINIC | Age: 77
End: 2022-08-12
Payer: MEDICARE

## 2022-08-12 VITALS
WEIGHT: 158.31 LBS | BODY MASS INDEX: 29.13 KG/M2 | RESPIRATION RATE: 16 BRPM | SYSTOLIC BLOOD PRESSURE: 140 MMHG | TEMPERATURE: 98 F | DIASTOLIC BLOOD PRESSURE: 71 MMHG | HEART RATE: 94 BPM | HEIGHT: 62 IN | OXYGEN SATURATION: 99 %

## 2022-08-12 DIAGNOSIS — I10 ESSENTIAL HYPERTENSION: ICD-10-CM

## 2022-08-12 DIAGNOSIS — D64.9 NORMOCYTIC ANEMIA: Primary | ICD-10-CM

## 2022-08-12 DIAGNOSIS — D50.9 IRON DEFICIENCY ANEMIA, UNSPECIFIED IRON DEFICIENCY ANEMIA TYPE: ICD-10-CM

## 2022-08-12 LAB
FOLATE SERPL-MCNC: 7.6 NG/ML (ref 4–24)
VIT B12 SERPL-MCNC: 242 PG/ML (ref 210–950)

## 2022-08-12 PROCEDURE — 36415 COLL VENOUS BLD VENIPUNCTURE: CPT | Performed by: INTERNAL MEDICINE

## 2022-08-12 PROCEDURE — 3288F PR FALLS RISK ASSESSMENT DOCUMENTED: ICD-10-PCS | Mod: CPTII,S$GLB,, | Performed by: INTERNAL MEDICINE

## 2022-08-12 PROCEDURE — 1101F PT FALLS ASSESS-DOCD LE1/YR: CPT | Mod: CPTII,S$GLB,, | Performed by: INTERNAL MEDICINE

## 2022-08-12 PROCEDURE — 1159F MED LIST DOCD IN RCRD: CPT | Mod: CPTII,S$GLB,, | Performed by: INTERNAL MEDICINE

## 2022-08-12 PROCEDURE — 1101F PR PT FALLS ASSESS DOC 0-1 FALLS W/OUT INJ PAST YR: ICD-10-PCS | Mod: CPTII,S$GLB,, | Performed by: INTERNAL MEDICINE

## 2022-08-12 PROCEDURE — 3288F FALL RISK ASSESSMENT DOCD: CPT | Mod: CPTII,S$GLB,, | Performed by: INTERNAL MEDICINE

## 2022-08-12 PROCEDURE — 82607 VITAMIN B-12: CPT | Mod: GA | Performed by: INTERNAL MEDICINE

## 2022-08-12 PROCEDURE — 3078F DIAST BP <80 MM HG: CPT | Mod: CPTII,S$GLB,, | Performed by: INTERNAL MEDICINE

## 2022-08-12 PROCEDURE — 1159F PR MEDICATION LIST DOCUMENTED IN MEDICAL RECORD: ICD-10-PCS | Mod: CPTII,S$GLB,, | Performed by: INTERNAL MEDICINE

## 2022-08-12 PROCEDURE — 99205 OFFICE O/P NEW HI 60 MIN: CPT | Mod: S$GLB,,, | Performed by: INTERNAL MEDICINE

## 2022-08-12 PROCEDURE — 99205 PR OFFICE/OUTPT VISIT, NEW, LEVL V, 60-74 MIN: ICD-10-PCS | Mod: S$GLB,,, | Performed by: INTERNAL MEDICINE

## 2022-08-12 PROCEDURE — 1125F AMNT PAIN NOTED PAIN PRSNT: CPT | Mod: CPTII,S$GLB,, | Performed by: INTERNAL MEDICINE

## 2022-08-12 PROCEDURE — 82746 ASSAY OF FOLIC ACID SERUM: CPT | Mod: GA | Performed by: INTERNAL MEDICINE

## 2022-08-12 PROCEDURE — 1125F PR PAIN SEVERITY QUANTIFIED, PAIN PRESENT: ICD-10-PCS | Mod: CPTII,S$GLB,, | Performed by: INTERNAL MEDICINE

## 2022-08-12 PROCEDURE — 3078F PR MOST RECENT DIASTOLIC BLOOD PRESSURE < 80 MM HG: ICD-10-PCS | Mod: CPTII,S$GLB,, | Performed by: INTERNAL MEDICINE

## 2022-08-12 PROCEDURE — 82668 ASSAY OF ERYTHROPOIETIN: CPT | Performed by: INTERNAL MEDICINE

## 2022-08-12 PROCEDURE — 99999 PR PBB SHADOW E&M-EST. PATIENT-LVL IV: CPT | Mod: PBBFAC,,, | Performed by: INTERNAL MEDICINE

## 2022-08-12 PROCEDURE — 3077F SYST BP >= 140 MM HG: CPT | Mod: CPTII,S$GLB,, | Performed by: INTERNAL MEDICINE

## 2022-08-12 PROCEDURE — 3077F PR MOST RECENT SYSTOLIC BLOOD PRESSURE >= 140 MM HG: ICD-10-PCS | Mod: CPTII,S$GLB,, | Performed by: INTERNAL MEDICINE

## 2022-08-12 PROCEDURE — 99999 PR PBB SHADOW E&M-EST. PATIENT-LVL IV: ICD-10-PCS | Mod: PBBFAC,,, | Performed by: INTERNAL MEDICINE

## 2022-08-12 NOTE — PROGRESS NOTES
Hematology and Medical Oncology   New Patient Consult     08/12/2022  Referred by:  Dr. Yfn Black    Reason For Referal: Iron Deficiency Anemia    History of Present Ilness:   Shakira Pearl (Shakira) is a pleasant 77 y.o.female who presents to clinic to discuss the possibility of iron deficiency anemia.    On chart review dating back to 2008, both iron and ferritin have been in the lower range of normal. There has also been a normocytic anemia in the 11-12 range during this time.     Colonoscopy and EGD were completed in 2020. Video capsule endoscopy was completed in July 2022. No obvious sources of bleeding were identified.    PAST MEDICAL HISTORY:   Past Medical History:   Diagnosis Date    Allergy sinus    Arthritis back    Cataract     Colon polyps     Degenerative disc disease back    Diverticulosis of colon     Dyspepsia     GERD (gastroesophageal reflux disease)     Heartburn     Hemorrhoids, internal     Hiatal hernia     Neuromuscular disorder     Vitamin D deficiency        PAST SURGICAL HISTORY:   Past Surgical History:   Procedure Laterality Date    APPENDECTOMY  age 19    CATARACT EXTRACTION W/  INTRAOCULAR LENS IMPLANT      CATARACT EXTRACTION W/  INTRAOCULAR LENS IMPLANT      CHOLECYSTECTOMY      age of 25    CHONDROPLASTY OF KNEE Left 10/3/2019    Procedure: CHONDROPLASTY, KNEE;  Surgeon: Kaylen Patiño MD;  Location: [facility];  Service: Orthopedics;  Laterality: Left;    COLONOSCOPY N/A 9/9/2020    Procedure: COLONOSCOPY;  Surgeon: Peter Cuevas MD;  Location: [address]);  Service: Endoscopy;  Laterality: N/A;  device assisted colonoscopy w/Dr Cuevas.  Difficult colon, multiple adhesions from abd surgeries, Hx adv adenomatous polyp/tubulovillous adenoma of cecum in April 2011.  Dr Black     per Dr Costello for 4th floor - 60 minute slot (90 min w/egd added).  Start with peds col    ESOPHAGOGASTRODUODENOSCOPY N/A 9/9/2020    Procedure: EGD  (ESOPHAGOGASTRODUODENOSCOPY);  Surgeon: Peter Cuevas MD;  Location: [facility] ENDO (4TH FLR);  Service: Endoscopy;  Laterality: N/A;  per Dr Black-add EGD to colonoscopy order.  Pt requesting later appt.  4/13/20 - removed from 4/30/20, rescheduled 7/29/20 - pg   covid 9/6-met-urgent care--tb    HYSTERECTOMY  38    INJECTION OF JOINT Bilateral 2/18/2019    Procedure: INJECTION, JOINT BILATERAL SI;  Surgeon: Mert Palumbo MD;  Location: [facility] PAIN T;  Service: Pain Management;  Laterality: Bilateral;  BILATERAL SI JOINT INJECTION    INJECTION OF JOINT Right 8/20/2020    Procedure: INJECTION JOINT/ R HIP DIRECT REFERRAL * DR. PALUMBO ONLY PLEASE*;  Surgeon: Mert Palumbo MD;  [place] [facility] [place] [facility]  Service: Pain Management;  Laterality: Right;  NEED CONSENT    KNEE ARTHROSCOPY W/ MENISCECTOMY Left 10/3/2019    Procedure: ARTHROSCOPY, KNEE, WITH MENISCECTOMY;  Surgeon: Kaylen Patiño MD;  Location: [facility] OR;  Service: Orthopedics;  Laterality: Left;    SYNOVECTOMY OF KNEE Left 10/3/2019    Procedure: SYNOVECTOMY, KNEE;  Surgeon: Kaylen Patiño MD;  Location: [facility] OR;  Service: Orthopedics;  Laterality: Left;    YAG Laser Capsulotomy Bilateral     Dr. Aleman       PAST SOCIAL HISTORY:   reports that she has never smoked. She has never used smokeless tobacco. She reports that she does not drink alcohol and does not use drugs.    FAMILY HISTORY:  Family History   Problem Relation Age of Onset    Heart disease Mother 67        heart attack    Stroke Mother     Cancer Father 65        abdominal    Stomach cancer Father     No Known Problems Sister     No Known Problems Brother     No Known Problems Son     No Known Problems Maternal Grandmother     No Known Problems Maternal Grandfather     No Known Problems Paternal Grandmother     No Known Problems Paternal Grandfather     No Known Problems Maternal Aunt     No Known Problems Maternal Uncle     No Known Problems Paternal Aunt     No Known Problems  Paternal Uncle     Celiac disease Neg Hx     Colon cancer Neg Hx     Crohn's disease Neg Hx     Esophageal cancer Neg Hx     Inflammatory bowel disease Neg Hx     Liver cancer Neg Hx     Rectal cancer Neg Hx     Ulcerative colitis Neg Hx     Amblyopia Neg Hx     Blindness Neg Hx     Cataracts Neg Hx     Diabetes Neg Hx     Glaucoma Neg Hx     Hypertension Neg Hx     Macular degeneration Neg Hx     Retinal detachment Neg Hx     Strabismus Neg Hx     Thyroid disease Neg Hx     Anesthesia problems Neg Hx        CURRENT MEDICATIONS:   Current Outpatient Medications   Medication Sig    DULoxetine (CYMBALTA) 60 MG capsule Take 1 capsule (60 mg total) by mouth once daily.    ergocalciferol (ERGOCALCIFEROL) 50,000 unit Cap Take 1 capsule (50,000 Units total) by mouth every 7 days.    estradiol valerate (DELESTROGEN) 40 mg/mL injection Inject 0.5 mLs (20 mg total) into the muscle every 28 days. Inject into the muscle.    ezetimibe (ZETIA) 10 mg tablet TAKE ONE TABLET BY MOUTH EVERY DAY    ferrous gluconate (FERGON) 324 MG tablet Take 1 tablet (324 mg total) by mouth daily with breakfast.    icosapent ethyL (VASCEPA) 1 gram Cap Take 2 capsules (2 g total) by mouth 2 (two) times a day.    losartan-hydrochlorothiazide 50-12.5 mg (HYZAAR) 50-12.5 mg per tablet Take 1 tablet by mouth once daily.    pantoprazole (PROTONIX) 40 MG tablet TAKE ONE TABLET BY MOUTH EVERY MORNING 45 MINUTES BEFORE BREAKFAST    polyethylene glycol (GOLYTELY) 236-22.74-6.74 -5.86 gram suspension Use as directed day before video capsule procedure, starting at 6pm    potassium chloride SA (K-DUR,KLOR-CON) 10 MEQ tablet Take 1 tablet (10 mEq total) by mouth once daily.     No current facility-administered medications for this visit.     ALLERGIES:   Review of patient's allergies indicates:   Allergen Reactions    Demerol [meperidine] Nausea And Vomiting     Other reaction(s): Nausea    Papaya      Illness vomiting    Sulfa  (sulfonamide antibiotics) Rash    Sulfur Rash         Review of Systems:     Review of Systems   Constitutional: Negative for appetite change, chills, diaphoresis, fatigue, fever and unexpected weight change.   HENT:   Negative for hearing loss, mouth sores, nosebleeds, sore throat, trouble swallowing and voice change.    Eyes: Negative for eye problems and icterus.   Respiratory: Negative for chest tightness, cough, hemoptysis, shortness of breath and wheezing.    Cardiovascular: Negative for chest pain, leg swelling and palpitations.   Gastrointestinal: Negative for abdominal distention, abdominal pain, blood in stool, diarrhea, nausea and vomiting.   Endocrine: Negative for hot flashes.   Genitourinary: Negative for bladder incontinence, difficulty urinating, dysuria and hematuria.    Musculoskeletal: Negative for arthralgias, back pain, flank pain, gait problem, myalgias, neck pain and neck stiffness.   Skin: Negative for itching, rash and wound.   Neurological: Negative for dizziness, extremity weakness, gait problem, headaches, numbness, seizures and speech difficulty.   Hematological: Negative for adenopathy. Does not bruise/bleed easily.   Psychiatric/Behavioral: Negative for confusion, depression and sleep disturbance. The patient is not nervous/anxious.           Physical Exam:     Vitals:    08/12/22 1144   BP: (!) 140/71   Pulse: 94   Resp: 16   Temp: 98 °F (36.7 °C)     Physical Exam  Constitutional:       General: She is not in acute distress.     Appearance: She is well-developed. She is not diaphoretic.   HENT:      Head: Normocephalic and atraumatic.      Mouth/Throat:      Pharynx: No oropharyngeal exudate.   Eyes:      Conjunctiva/sclera: Conjunctivae normal.      Pupils: Pupils are equal, round, and reactive to light.   Neck:      Thyroid: No thyromegaly.      Vascular: No JVD.      Trachea: No tracheal deviation.   Cardiovascular:      Rate and Rhythm: Normal rate and regular rhythm.      Heart  sounds: Normal heart sounds. No murmur heard.    No friction rub.   Pulmonary:      Effort: Pulmonary effort is normal. No respiratory distress.      Breath sounds: Normal breath sounds. No stridor. No wheezing or rales.   Chest:      Chest wall: No tenderness.   Abdominal:      General: Bowel sounds are normal. There is no distension.      Palpations: Abdomen is soft.      Tenderness: There is no abdominal tenderness. There is no guarding or rebound.   Musculoskeletal:         General: No tenderness or deformity. Normal range of motion.      Cervical back: Normal range of motion and neck supple.   Skin:     General: Skin is warm and dry.      Capillary Refill: Capillary refill takes less than 2 seconds.      Coloration: Skin is not pale.      Findings: No erythema or rash.   Neurological:      Mental Status: She is alert and oriented to person, place, and time.      Cranial Nerves: No cranial nerve deficit.      Sensory: No sensory deficit.      Motor: No abnormal muscle tone.      Coordination: Coordination normal.      Deep Tendon Reflexes: Reflexes normal.   Psychiatric:         Behavior: Behavior normal.         Thought Content: Thought content normal.         Judgment: Judgment normal.         ECOG Performance Status: (foot note - ECOG PS provided by Eastern Cooperative Oncology Group) 0 - Asymptomatic    Karnofsky Performance Score:  100%- Normal, No Complaints, No Evidence of Disease    Labs:   Lab Results   Component Value Date    WBC 7.92 08/01/2022    HGB 11.5 (L) 08/01/2022    HGB 11.5 (L) 08/01/2022    HCT 35.2 (L) 08/01/2022     08/01/2022    CHOL 148 05/06/2022    TRIG 275 (H) 05/06/2022    HDL 49 05/06/2022    ALT 16 05/06/2022    AST 17 05/06/2022     08/01/2022    K 3.6 08/01/2022    CL 98 08/01/2022    CREATININE 0.8 08/01/2022    BUN 18 08/01/2022    CO2 24 08/01/2022    TSH 2.371 05/06/2022    INR 0.9 05/06/2020    HGBA1C 5.7 (H) 06/10/2022     Iron: 81  TIBC: 494  Ferritin:  33    Folate: 7.6  Vitamin B-12: 242  Epo: 7.6        Imaging: Previous imaging has been reviewed     Assessment and Plan:     Ms. Pearl is a pleasant 77 year old female with long standing normocytic anemia.    Anemia of the Elderly  --Mild persistent normocytic anemia  --Both iron, b12, folate, and epo are within normal ranges  --At this point supplements would likely be of little benefit  --Plan to trend q 3 month labs to ensure they do not acutely worsen      45 minutes were spent face to face with the patient to discuss the disease, natural history, and possible causes. I have provided the patient with an opportunity to ask questions and have all questions answered to her satisfaction.       she will return to clinic in 3 months, but knows to call in the interim if symptoms change or should a problem arise.        Sarika Faulkner MD  Hematology and Medical Oncology  Bone Marrow Transplant  [facility]        BMT Chart Routing      Follow up with physician 3 months. 1. see me in 3 months with labs   Follow up with STEPHANIE    Infusion scheduling note    Injection scheduling note    Labs CBC and CMP   Lab interval:  Path interpretation   Imaging None      Pharmacy appointment No pharmacy appointment needed      Other referrals No additional referrals needed

## 2022-08-15 LAB — EPO SERPL-ACNC: 7.6 MIU/ML (ref 2.6–18.5)

## 2022-08-17 PROBLEM — D64.9 NORMOCYTIC ANEMIA: Status: ACTIVE | Noted: 2022-08-17

## 2022-08-30 ENCOUNTER — TELEPHONE (OUTPATIENT)
Dept: GASTROENTEROLOGY | Facility: CLINIC | Age: 77
End: 2022-08-30
Payer: MEDICARE

## 2022-08-30 NOTE — TELEPHONE ENCOUNTER
----- Message from Kiah Bowden sent at 8/30/2022 11:56 AM CDT -----  Contact: [phone]  Pt is calling to get rescheduled for appt, on 8/9    Pt would like a call back at your earliest convenience. [phone]

## 2022-08-31 ENCOUNTER — TELEPHONE (OUTPATIENT)
Dept: GASTROENTEROLOGY | Facility: CLINIC | Age: 77
End: 2022-08-31
Payer: MEDICARE

## 2022-08-31 NOTE — TELEPHONE ENCOUNTER
Dr.. Black please advise. Return call to patient. Per patient wanted to know why does she has to wait until November to have the ultrasound done. Patient wanted to know why she can not get the test done sooner.

## 2022-08-31 NOTE — TELEPHONE ENCOUNTER
----- Message from Jyoti Jacobson MA sent at 8/31/2022  4:17 PM CDT -----  Contact: pt    ----- Message -----  From: Robert García  Sent: 8/31/2022   2:52 PM CDT  To: [facility]ORE Staff    Pt requesting call back RE: no details left        Confirmed contact below:  Contact Name:Shakira Dae  Phone Number: [phone]

## 2022-08-31 NOTE — TELEPHONE ENCOUNTER
----- Message from Jyoti Jacobson MA sent at 8/31/2022  4:31 PM CDT -----  Contact: pt    ----- Message -----  From: Robert García  Sent: 8/31/2022   2:52 PM CDT  To: [facility]ORE Staff    Pt requesting call back RE: no details left        Confirmed contact below:  Contact Name:Shakira Dae  Phone Number: [phone]

## 2022-08-31 NOTE — TELEPHONE ENCOUNTER
Dr.. Black please advise. Return call to patient. Per patient wanted to know why does she has to wait until November to have the ultrasound done. Patient wanted to know why she can not get the test done sooner?

## 2022-09-01 ENCOUNTER — TELEPHONE (OUTPATIENT)
Dept: GASTROENTEROLOGY | Facility: CLINIC | Age: 77
End: 2022-09-01
Payer: MEDICARE

## 2022-09-01 NOTE — TELEPHONE ENCOUNTER
Per Dr. Black, you do the ultrasound too soon the lesion probably will be too small will be seen if you do it at 3 months and it has not seen then it is probably not growing     Manpreet please order liver ultrasound in 3 months which would be November 2022 for follow-up nonspecific small liver hypodensities orders placed.         Shakira your CT scan shows normal small-bowel no evidence of any obstruction some small stable findings below.       Impression:       No small bowel stricture or obstruction.  There is diverticulosis coli.       Hepatomegaly.  Multifocal hyperenhancing lesions throughout the liver, some of which appear similar to 11/14/2017, others of which appear new.  In the absence of known malignancy or underlying liver disease, findings are favored to reflect flash-filling hemangiomas, however overall indeterminate on this single phase examination.       Stable indeterminate left adrenal lesion, unchanged since 2017.       Small fluid-filled hiatal hernia.       Electronically signed by resident: Nickolas Han   Date:                                            08/05/2022     Patient verbalized understanding.

## 2022-09-12 ENCOUNTER — TELEPHONE (OUTPATIENT)
Dept: PAIN MEDICINE | Facility: CLINIC | Age: 77
End: 2022-09-12
Payer: MEDICARE

## 2022-09-12 ENCOUNTER — TELEPHONE (OUTPATIENT)
Dept: SPINE | Facility: CLINIC | Age: 77
End: 2022-09-12
Payer: MEDICARE

## 2022-09-12 NOTE — TELEPHONE ENCOUNTER
No answer, LVM for pt to contact [facility] to schedule an appt. Pt does not need to speak with the staff to schedule.     *PLEASE SCHEDULE PT IN NEXT AVAILABLE.

## 2022-09-12 NOTE — TELEPHONE ENCOUNTER
Staff returned call to patient in regards to an appointment for neck pain.      Staff was not able to reach pt and contacted her 2x          If patient return call please have her schedule with Pain Provider Dr. Mert Coates MD at his next available date & time.

## 2022-09-12 NOTE — TELEPHONE ENCOUNTER
----- Message from Jose Escalera sent at 9/12/2022 12:50 PM CDT -----   Name of Who is Calling:     What is the request in detail: patient request call back in reference to having neck pain want to know if dr ross can see her for her neck pain  Please contact to further discuss and advise      Can the clinic reply by [facility]:     What Number to Call Back if not in [facility]:  [phone]

## 2022-09-13 ENCOUNTER — TELEPHONE (OUTPATIENT)
Dept: PAIN MEDICINE | Facility: CLINIC | Age: 77
End: 2022-09-13
Payer: MEDICARE

## 2022-09-13 ENCOUNTER — TELEPHONE (OUTPATIENT)
Dept: SPINE | Facility: CLINIC | Age: 77
End: 2022-09-13
Payer: MEDICARE

## 2022-09-13 NOTE — TELEPHONE ENCOUNTER
----- Message from Benigno Haddad MA sent at 9/13/2022  9:12 AM CDT -----    ----- Message -----  From: Ze Lambert  Sent: 9/13/2022   9:06 AM CDT  To: [facility] Staff    Name of Who is Calling: CHELSY GONZALEZ [[id number]]            What is the request in detail: Patient is requesting call back to speak with nurse              Can the clinic reply by [facility]: no              What Number to Call Back if not in [facility]: [phone]

## 2022-09-13 NOTE — TELEPHONE ENCOUNTER
Pt contacted. Pt asked does Dr. Coates  see Neck Pain. Pt advised that he does see Neck Pain. No further questions or concerns.

## 2022-09-13 NOTE — TELEPHONE ENCOUNTER
Pt call requesting a sooner appointment than what she's schedule for with Pain Provider Dr. Mert Coates (pt did not want to see an NP).    Staff verbalized to patient that provider does not have any sooner available appts at this time but will check

## 2022-09-13 NOTE — TELEPHONE ENCOUNTER
----- Message from Ze Lambert sent at 9/13/2022  9:01 AM CDT -----  Name of Who is Calling: CHELSY GONZALEZ [[id number]]            What is the request in detail: Patient is requesting call back to speak with nurse              Can the clinic reply by [facility]: no              What Number to Call Back if not in [facility]: [phone]

## 2022-09-27 ENCOUNTER — TELEPHONE (OUTPATIENT)
Dept: PAIN MEDICINE | Facility: CLINIC | Age: 77
End: 2022-09-27
Payer: MEDICARE

## 2022-09-27 ENCOUNTER — PES CALL (OUTPATIENT)
Dept: ADMINISTRATIVE | Facility: CLINIC | Age: 77
End: 2022-09-27
Payer: MEDICARE

## 2022-09-27 NOTE — TELEPHONE ENCOUNTER
This message is for patient in regards to his/her appointment 09/28/22 at 2:30p  With  Mert Coates MD.      For the safety of all patients and staff members. We are requesting during this visit that all patients and visitors to wear required face mask at all times here at [facility].     If you have any questions or concerns please contact [phone]       Pt verbalized understanding and has confirmed appt

## 2022-09-28 ENCOUNTER — HOSPITAL ENCOUNTER (OUTPATIENT)
Dept: RADIOLOGY | Facility: OTHER | Age: 77
Discharge: HOME | End: 2022-09-28
Attending: ANESTHESIOLOGY
Payer: MEDICARE

## 2022-09-28 ENCOUNTER — OFFICE VISIT (OUTPATIENT)
Dept: PAIN MEDICINE | Facility: CLINIC | Age: 77
End: 2022-09-28
Attending: ANESTHESIOLOGY
Payer: MEDICARE

## 2022-09-28 VITALS
SYSTOLIC BLOOD PRESSURE: 120 MMHG | DIASTOLIC BLOOD PRESSURE: 65 MMHG | RESPIRATION RATE: 18 BRPM | TEMPERATURE: 97 F | BODY MASS INDEX: 27.26 KG/M2 | HEIGHT: 60 IN | OXYGEN SATURATION: 100 % | WEIGHT: 138.88 LBS | HEART RATE: 85 BPM
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
## 2022-09-28 DIAGNOSIS — M54.81 BILATERAL OCCIPITAL NEURALGIA: Primary | ICD-10-CM

## 2022-09-28 DIAGNOSIS — M54.2 NECK PAIN: ICD-10-CM

## 2022-09-28 DIAGNOSIS — M47.812 SPONDYLOSIS OF CERVICAL REGION WITHOUT MYELOPATHY OR RADICULOPATHY: ICD-10-CM

## 2022-09-28 DIAGNOSIS — M79.18 MYOFASCIAL PAIN: ICD-10-CM

## 2022-09-28 PROCEDURE — 72050 X-RAY EXAM NECK SPINE 4/5VWS: CPT | Mod: TC,FY

## 2022-09-28 PROCEDURE — 64405 NJX AA&/STRD GR OCPL NRV: CPT | Mod: 50,59,GC,S$GLB | Performed by: ANESTHESIOLOGY

## 2022-09-28 PROCEDURE — 99214 OFFICE O/P EST MOD 30 MIN: CPT | Mod: 25,GC,S$GLB, | Performed by: ANESTHESIOLOGY

## 2022-09-28 PROCEDURE — 72050 X-RAY EXAM NECK SPINE 4/5VWS: CPT | Mod: 26,,, | Performed by: RADIOLOGY

## 2022-09-28 PROCEDURE — 20552 PR INJECT TRIGGER POINT, 1 OR 2: ICD-10-PCS | Mod: GC,S$GLB,, | Performed by: ANESTHESIOLOGY

## 2022-09-28 PROCEDURE — 1100F PTFALLS ASSESS-DOCD GE2>/YR: CPT | Mod: CPTII,S$GLB,, | Performed by: ANESTHESIOLOGY

## 2022-09-28 PROCEDURE — 20552 NJX 1/MLT TRIGGER POINT 1/2: CPT | Mod: GC,S$GLB,, | Performed by: ANESTHESIOLOGY

## 2022-09-28 PROCEDURE — 3288F PR FALLS RISK ASSESSMENT DOCUMENTED: ICD-10-PCS | Mod: CPTII,S$GLB,, | Performed by: ANESTHESIOLOGY

## 2022-09-28 PROCEDURE — 99214 PR OFFICE/OUTPT VISIT, EST, LEVL IV, 30-39 MIN: ICD-10-PCS | Mod: 25,GC,S$GLB, | Performed by: ANESTHESIOLOGY

## 2022-09-28 PROCEDURE — 3074F PR MOST RECENT SYSTOLIC BLOOD PRESSURE < 130 MM HG: ICD-10-PCS | Mod: CPTII,S$GLB,, | Performed by: ANESTHESIOLOGY

## 2022-09-28 PROCEDURE — 3288F FALL RISK ASSESSMENT DOCD: CPT | Mod: CPTII,S$GLB,, | Performed by: ANESTHESIOLOGY

## 2022-09-28 PROCEDURE — 64405 PR NERVE BLOCK INJ, ANES/STEROID, OCCIPITAL: ICD-10-PCS | Mod: 50,59,GC,S$GLB | Performed by: ANESTHESIOLOGY

## 2022-09-28 PROCEDURE — 1100F PR PT FALLS ASSESS DOC 2+ FALLS/FALL W/INJURY/YR: ICD-10-PCS | Mod: CPTII,S$GLB,, | Performed by: ANESTHESIOLOGY

## 2022-09-28 PROCEDURE — 99999 PR PBB SHADOW E&M-EST. PATIENT-LVL V: CPT | Mod: PBBFAC,,, | Performed by: ANESTHESIOLOGY

## 2022-09-28 PROCEDURE — 1160F PR REVIEW ALL MEDS BY PRESCRIBER/CLIN PHARMACIST DOCUMENTED: ICD-10-PCS | Mod: CPTII,S$GLB,, | Performed by: ANESTHESIOLOGY

## 2022-09-28 PROCEDURE — 99999 PR PBB SHADOW E&M-EST. PATIENT-LVL V: ICD-10-PCS | Mod: PBBFAC,,, | Performed by: ANESTHESIOLOGY

## 2022-09-28 PROCEDURE — 1160F RVW MEDS BY RX/DR IN RCRD: CPT | Mod: CPTII,S$GLB,, | Performed by: ANESTHESIOLOGY

## 2022-09-28 PROCEDURE — 1125F PR PAIN SEVERITY QUANTIFIED, PAIN PRESENT: ICD-10-PCS | Mod: CPTII,S$GLB,, | Performed by: ANESTHESIOLOGY

## 2022-09-28 PROCEDURE — 72050 XR CERVICAL SPINE COMPLETE 5 VIEW: ICD-10-PCS | Mod: 26,,, | Performed by: RADIOLOGY

## 2022-09-28 PROCEDURE — 3074F SYST BP LT 130 MM HG: CPT | Mod: CPTII,S$GLB,, | Performed by: ANESTHESIOLOGY

## 2022-09-28 PROCEDURE — 1159F MED LIST DOCD IN RCRD: CPT | Mod: CPTII,S$GLB,, | Performed by: ANESTHESIOLOGY

## 2022-09-28 PROCEDURE — 3078F PR MOST RECENT DIASTOLIC BLOOD PRESSURE < 80 MM HG: ICD-10-PCS | Mod: CPTII,S$GLB,, | Performed by: ANESTHESIOLOGY

## 2022-09-28 PROCEDURE — 1159F PR MEDICATION LIST DOCUMENTED IN MEDICAL RECORD: ICD-10-PCS | Mod: CPTII,S$GLB,, | Performed by: ANESTHESIOLOGY

## 2022-09-28 PROCEDURE — 3078F DIAST BP <80 MM HG: CPT | Mod: CPTII,S$GLB,, | Performed by: ANESTHESIOLOGY

## 2022-09-28 PROCEDURE — 1125F AMNT PAIN NOTED PAIN PRSNT: CPT | Mod: CPTII,S$GLB,, | Performed by: ANESTHESIOLOGY

## 2022-09-28 RX ORDER — DEXAMETHASONE SODIUM PHOSPHATE 4 MG/ML
4 INJECTION, SOLUTION INTRA-ARTICULAR; INTRALESIONAL; INTRAMUSCULAR; INTRAVENOUS; SOFT TISSUE
Status: COMPLETED | OUTPATIENT
Start: 2022-09-28 | End: 2022-09-28

## 2022-09-28 RX ADMIN — DEXAMETHASONE SODIUM PHOSPHATE 4 MG: 4 INJECTION, SOLUTION INTRA-ARTICULAR; INTRALESIONAL; INTRAMUSCULAR; INTRAVENOUS; SOFT TISSUE at 04:09
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
## 2022-10-04 ENCOUNTER — TELEPHONE (OUTPATIENT)
Dept: GASTROENTEROLOGY | Facility: CLINIC | Age: 77
End: 2022-10-04
Payer: MEDICARE

## 2022-10-04 NOTE — TELEPHONE ENCOUNTER
----- Message from Yoly Parker sent at 10/4/2022 12:49 PM CDT -----  Regarding: Return call  Contact: [phone]  Please give pt a call back regarding an appoint that was suppose to be scheduled by Manpreet  for September. Please call to discuss further

## 2022-10-04 NOTE — TELEPHONE ENCOUNTER
Return call and spoke with patient. Schedule patient appointment with Dr. Black.     Patient verbalized understanding

## 2022-10-05 ENCOUNTER — TELEPHONE (OUTPATIENT)
Dept: PAIN MEDICINE | Facility: CLINIC | Age: 77
End: 2022-10-05
Payer: MEDICARE

## 2022-10-05 NOTE — TELEPHONE ENCOUNTER
----- Message from Awais Maldonado sent at 10/5/2022 10:51 AM CDT -----  Regarding: CAll BAck  Name of Who is Calling: Serjio with [facility]               What is the request in detail: Serjio requesting a call back states patient referral was fax over to the wrong dept. The correct fax No [phone]. Please assist              Can the clinic reply by [facility]: No              What Number to Call Back if not in [facility]: Serjio ph. [phone] Fax [phone]

## 2022-10-05 NOTE — TELEPHONE ENCOUNTER
Staff tried to reach ray but was not able to get through to discuss which referral he is speaking of.

## 2022-10-17 PROBLEM — M43.10 SPONDYLOLISTHESIS: Status: RESOLVED | Noted: 2019-09-11 | Resolved: 2022-10-17

## 2022-10-17 PROBLEM — G89.29 CHRONIC PAIN OF LEFT KNEE: Status: RESOLVED | Noted: 2019-10-07 | Resolved: 2022-10-17

## 2022-10-17 PROBLEM — R00.2 PALPITATIONS: Status: RESOLVED | Noted: 2022-06-02 | Resolved: 2022-10-17

## 2022-10-17 PROBLEM — R60.0 LOCALIZED EDEMA: Status: RESOLVED | Noted: 2019-10-07 | Resolved: 2022-10-17

## 2022-10-17 PROBLEM — R42 EPISODIC LIGHTHEADEDNESS: Status: RESOLVED | Noted: 2022-06-02 | Resolved: 2022-10-17

## 2022-10-17 PROBLEM — M25.562 CHRONIC PAIN OF LEFT KNEE: Status: RESOLVED | Noted: 2019-10-07 | Resolved: 2022-10-17

## 2022-10-17 PROBLEM — R26.9 IMPAIRED GAIT: Status: RESOLVED | Noted: 2019-10-07 | Resolved: 2022-10-17

## 2022-10-17 PROBLEM — R29.898 WEAKNESS OF BACK: Status: RESOLVED | Noted: 2020-01-07 | Resolved: 2022-10-17

## 2022-10-17 PROBLEM — M25.69 DECREASED RANGE OF MOTION OF TRUNK AND BACK: Status: RESOLVED | Noted: 2020-01-07 | Resolved: 2022-10-17

## 2022-10-17 PROBLEM — S83.242A ACUTE MEDIAL MENISCUS TEAR OF LEFT KNEE: Status: RESOLVED | Noted: 2019-10-03 | Resolved: 2022-10-17

## 2022-10-17 PROBLEM — M25.562 ACUTE PAIN OF LEFT KNEE: Status: RESOLVED | Noted: 2019-09-11 | Resolved: 2022-10-17

## 2022-10-17 PROBLEM — M53.3 SACROILIAC JOINT PAIN: Status: RESOLVED | Noted: 2019-09-11 | Resolved: 2022-10-17

## 2022-10-17 PROBLEM — M25.552 PAIN OF LEFT HIP JOINT: Status: RESOLVED | Noted: 2020-08-25 | Resolved: 2022-10-17

## 2022-10-17 NOTE — PROGRESS NOTES
"Sahkira Pearl presented for a  Medicare AWV and comprehensive Health Risk Assessment today. The following components were reviewed and updated:    Medical history  Family History  Social history  Allergies and Current Medications  Health Risk Assessment  Health Maintenance  Care Team     ** See Completed Assessments for Annual Wellness Visit within the encounter summary.**       The following assessments were completed:  Living Situation  CAGE  Depression Screening  Timed Get Up and Go  Whisper Test  Cognitive Function Screening  Nutrition Screening  ADL Screening  PAQ Screening    Vitals:    10/18/22 1302   BP: 120/80   BP Location: Right arm   Patient Position: Sitting   Pulse: 88   Resp: 14   SpO2: 98%   Weight: 71.2 kg (157 lb 0.3 oz)   Height: 5' 3" (1.6 m)     Body mass index is 27.81 kg/m².  Physical Exam  Constitutional:       Comments: Younger in appearance than age   HENT:      Right Ear: There is no impacted cerumen.      Left Ear: There is no impacted cerumen.   Eyes:      General: No scleral icterus.  Cardiovascular:      Rate and Rhythm: Normal rate and regular rhythm.   Pulmonary:      Effort: Pulmonary effort is normal.      Breath sounds: Normal breath sounds.   Abdominal:      Palpations: Abdomen is soft.   Musculoskeletal:         General: No swelling. Normal range of motion.   Skin:     General: Skin is warm and dry.   Neurological:      Mental Status: She is alert and oriented to person, place, and time.   Psychiatric:         Mood and Affect: Mood normal.         Behavior: Behavior normal.         Thought Content: Thought content normal.         Judgment: Judgment normal.          Opioid screening has been done- patient denies taking opioid medication.  Review for substance use disorder screen-  patient denies using substance medication.      Diagnoses and health risks identified today and associated recommendations/orders:    1. Essential hypertension  Stable followed by cardiology    2. " Spondylolysis of lumbar region  Stable followed by pain mgt,orthopedic    3. Gastroesophageal reflux disease, unspecified whether esophagitis present  Stable followed by GI     4. Sacroiliac joint dysfunction  Stable followed by pain mgt,orthopedic    5. Liver lesion  Stable followed by GI    6. Elevated lipids  Stable followed by cardiology    7. Sacroiliitis  Stable followed by pain mgt,orthopedic    8. Cervical radiculopathy  Stable followed by pain mgt,orthopedic    9. Lumbar facet arthropathy  Stable followed by pain mgt,orthopedic    10. Normocytic anemia  Stable followed by hematology, PCP    11. Memory loss or impairment  Stable followed by PCP-PHQ 2 score=4    12. Breast asymmetry  Stable followed by [facility] & Dr oneill- pt brought [facility] report to clinic today- asymmetry to breast- pt seeking additional evaluation-report sent to medical records w clock drawing to be scanned into epic  - Ambulatory referral/consult to Breast Surgery; Future    13. Decreased hearing of both ears  Stable followed by PCP  - Ambulatory referral/consult to Audiology; Future    14. Assistance needed with transportation  - Ambulatory referral/consult to Outpatient Case Management    15. Screening for osteoporosis  DXA ordered- medicare care gap    16. Loss of height  - DXA Bone Density Spine And Hip; Future    17. Other specified disorders of bone density and structure, multiple sites  - DXA Bone Density Spine And Hip; Future    18. Abnormality of gait and mobility  Stable followed by pain mgt,orthopedic    19. Encounter for preventive health examination  Here for Health Risk Assessment/Annual Wellness Visit.  Health maintenance reviewed and updated. Follow up in one year.        20. Overweight (BMI 25.0-29.9)  Chronic. Followed by PCP.   Centers for Disease Control and Prevention (CDC)  weight recommendations for current BMI & ideal BMI range discussed with patient.  Recommended  gradual weight loss,  mediterranean diet ,  structured  regular exercise  every day.         Provided Shakira with a 5-10 year written screening schedule and personal prevention plan. Recommendations were developed using the USPSTF age appropriate recommendations. Education, counseling, and referrals were provided as needed. After Visit Summary printed and given to patient which includes a list of additional screenings\tests needed. Cognitive function clock drawing was labeled with the patient's identification & forwarded to medical records to be scanned into the patient's epic chart.  Outpt referral- pt states she can not drive anymore due to age, neck & back problems- seeking transportation assistance- referral placed to outpt case mgt. SHe denies needing home health.- Seeking assistance w social determinants. Has been seen by Dr oneill-endocrinology- had mammogram at [facility]- pt seeking additional evaluation of breast asymmetry- referral placed.Starting external outpt physical therapy today for neck & low back pains.    follow-up in 1 yr.ALIZA Tomlin, JERMAINE     I offered to discuss advanced care planning, including how to pick a person who would make decisions for you if you were unable to make them for yourself, called a health care power of , and what kind of decisions you might make such as use of life sustaining treatments such as ventilators and tube feeding when faced with a life limiting illness recorded on a living will that they will need to know. (How you want to be cared for as you near the end of your natural life)     X Patient is interested in learning more about how to make advanced directives.  I provided them paperwork and offered to discuss this with them.

## 2022-10-18 ENCOUNTER — OFFICE VISIT (OUTPATIENT)
Dept: INTERNAL MEDICINE | Facility: CLINIC | Age: 77
End: 2022-10-18
Payer: MEDICARE

## 2022-10-18 VITALS
DIASTOLIC BLOOD PRESSURE: 80 MMHG | HEART RATE: 88 BPM | BODY MASS INDEX: 27.82 KG/M2 | OXYGEN SATURATION: 98 % | WEIGHT: 157 LBS | SYSTOLIC BLOOD PRESSURE: 120 MMHG | RESPIRATION RATE: 14 BRPM | HEIGHT: 63 IN

## 2022-10-18 DIAGNOSIS — I10 ESSENTIAL HYPERTENSION: ICD-10-CM

## 2022-10-18 DIAGNOSIS — Z13.820 SCREENING FOR OSTEOPOROSIS: ICD-10-CM

## 2022-10-18 DIAGNOSIS — M54.12 CERVICAL RADICULOPATHY: ICD-10-CM

## 2022-10-18 DIAGNOSIS — E78.5 ELEVATED LIPIDS: ICD-10-CM

## 2022-10-18 DIAGNOSIS — D64.9 NORMOCYTIC ANEMIA: ICD-10-CM

## 2022-10-18 DIAGNOSIS — N64.89 BREAST ASYMMETRY: ICD-10-CM

## 2022-10-18 DIAGNOSIS — M47.816 LUMBAR FACET ARTHROPATHY: ICD-10-CM

## 2022-10-18 DIAGNOSIS — R29.890 LOSS OF HEIGHT: ICD-10-CM

## 2022-10-18 DIAGNOSIS — M85.89 OTHER SPECIFIED DISORDERS OF BONE DENSITY AND STRUCTURE, MULTIPLE SITES: ICD-10-CM

## 2022-10-18 DIAGNOSIS — Z00.00 ENCOUNTER FOR PREVENTIVE HEALTH EXAMINATION: Primary | ICD-10-CM

## 2022-10-18 DIAGNOSIS — H91.93 DECREASED HEARING OF BOTH EARS: ICD-10-CM

## 2022-10-18 DIAGNOSIS — R41.3 MEMORY LOSS OR IMPAIRMENT: ICD-10-CM

## 2022-10-18 DIAGNOSIS — M53.3 SACROILIAC JOINT DYSFUNCTION: ICD-10-CM

## 2022-10-18 DIAGNOSIS — M43.06 SPONDYLOLYSIS OF LUMBAR REGION: ICD-10-CM

## 2022-10-18 DIAGNOSIS — Z74.8 ASSISTANCE NEEDED WITH TRANSPORTATION: ICD-10-CM

## 2022-10-18 DIAGNOSIS — R26.9 ABNORMALITY OF GAIT AND MOBILITY: ICD-10-CM

## 2022-10-18 DIAGNOSIS — M46.1 SACROILIITIS: ICD-10-CM

## 2022-10-18 DIAGNOSIS — K21.9 GASTROESOPHAGEAL REFLUX DISEASE, UNSPECIFIED WHETHER ESOPHAGITIS PRESENT: ICD-10-CM

## 2022-10-18 DIAGNOSIS — E66.3 OVERWEIGHT (BMI 25.0-29.9): ICD-10-CM

## 2022-10-18 DIAGNOSIS — K76.9 LIVER LESION: ICD-10-CM

## 2022-10-18 PROCEDURE — 3079F DIAST BP 80-89 MM HG: CPT | Mod: CPTII,S$GLB,, | Performed by: NURSE PRACTITIONER

## 2022-10-18 PROCEDURE — G0439 PR MEDICARE ANNUAL WELLNESS SUBSEQUENT VISIT: ICD-10-PCS | Mod: S$GLB,,, | Performed by: NURSE PRACTITIONER

## 2022-10-18 PROCEDURE — 1100F PTFALLS ASSESS-DOCD GE2>/YR: CPT | Mod: CPTII,S$GLB,, | Performed by: NURSE PRACTITIONER

## 2022-10-18 PROCEDURE — G0439 PPPS, SUBSEQ VISIT: HCPCS | Mod: S$GLB,,, | Performed by: NURSE PRACTITIONER

## 2022-10-18 PROCEDURE — 1160F RVW MEDS BY RX/DR IN RCRD: CPT | Mod: CPTII,S$GLB,, | Performed by: NURSE PRACTITIONER

## 2022-10-18 PROCEDURE — 1160F PR REVIEW ALL MEDS BY PRESCRIBER/CLIN PHARMACIST DOCUMENTED: ICD-10-PCS | Mod: CPTII,S$GLB,, | Performed by: NURSE PRACTITIONER

## 2022-10-18 PROCEDURE — 1170F FXNL STATUS ASSESSED: CPT | Mod: CPTII,S$GLB,, | Performed by: NURSE PRACTITIONER

## 2022-10-18 PROCEDURE — 1100F PR PT FALLS ASSESS DOC 2+ FALLS/FALL W/INJURY/YR: ICD-10-PCS | Mod: CPTII,S$GLB,, | Performed by: NURSE PRACTITIONER

## 2022-10-18 PROCEDURE — 1159F MED LIST DOCD IN RCRD: CPT | Mod: CPTII,S$GLB,, | Performed by: NURSE PRACTITIONER

## 2022-10-18 PROCEDURE — 3074F SYST BP LT 130 MM HG: CPT | Mod: CPTII,S$GLB,, | Performed by: NURSE PRACTITIONER

## 2022-10-18 PROCEDURE — 1170F PR FUNCTIONAL STATUS ASSESSED: ICD-10-PCS | Mod: CPTII,S$GLB,, | Performed by: NURSE PRACTITIONER

## 2022-10-18 PROCEDURE — 99499 UNLISTED E&M SERVICE: CPT | Mod: HCNC,S$GLB,, | Performed by: NURSE PRACTITIONER

## 2022-10-18 PROCEDURE — 3079F PR MOST RECENT DIASTOLIC BLOOD PRESSURE 80-89 MM HG: ICD-10-PCS | Mod: CPTII,S$GLB,, | Performed by: NURSE PRACTITIONER

## 2022-10-18 PROCEDURE — 1159F PR MEDICATION LIST DOCUMENTED IN MEDICAL RECORD: ICD-10-PCS | Mod: CPTII,S$GLB,, | Performed by: NURSE PRACTITIONER

## 2022-10-18 PROCEDURE — 99999 PR PBB SHADOW E&M-EST. PATIENT-LVL V: ICD-10-PCS | Mod: PBBFAC,,, | Performed by: NURSE PRACTITIONER

## 2022-10-18 PROCEDURE — 1126F PR PAIN SEVERITY QUANTIFIED, NO PAIN PRESENT: ICD-10-PCS | Mod: CPTII,S$GLB,, | Performed by: NURSE PRACTITIONER

## 2022-10-18 PROCEDURE — 3288F FALL RISK ASSESSMENT DOCD: CPT | Mod: CPTII,S$GLB,, | Performed by: NURSE PRACTITIONER

## 2022-10-18 PROCEDURE — 3288F PR FALLS RISK ASSESSMENT DOCUMENTED: ICD-10-PCS | Mod: CPTII,S$GLB,, | Performed by: NURSE PRACTITIONER

## 2022-10-18 PROCEDURE — 1126F AMNT PAIN NOTED NONE PRSNT: CPT | Mod: CPTII,S$GLB,, | Performed by: NURSE PRACTITIONER

## 2022-10-18 PROCEDURE — 99999 PR PBB SHADOW E&M-EST. PATIENT-LVL V: CPT | Mod: PBBFAC,,, | Performed by: NURSE PRACTITIONER

## 2022-10-18 PROCEDURE — 3074F PR MOST RECENT SYSTOLIC BLOOD PRESSURE < 130 MM HG: ICD-10-PCS | Mod: CPTII,S$GLB,, | Performed by: NURSE PRACTITIONER

## 2022-10-18 NOTE — PATIENT INSTRUCTIONS
Counseling and Referral of Other Preventative  (Italic type indicates deductible and co-insurance are waived)    Patient Name: Shakira Pearl  Today's Date: 10/18/2022    Health Maintenance         Date Due Completion Date    Influenza Vaccine (1) Due   10/25/2021    COVID-19 Vaccine (4 - Booster for Pfizer series) due 11/18/2021    Shingles Vaccine (2 of 2) Took 1st one- wait tile 2023-need to restart again   2/28/2020    Colonoscopy 09/09/2023 9/9/2020    Lipid Panel 05/06/2023 5/6/2022    TETANUS VACCINE    Liver ultrasound    Referral to breast surgery to check breast -get disc from [facility]      DXA 09/23/2029    Has appt    Referral given to breast referral      Due- ordered       9/23/2019          Orders Placed This Encounter   Procedures    DXA Bone Density Spine And Hip    Ambulatory referral/consult to Breast Surgery    Ambulatory referral/consult to Audiology    Ambulatory referral/consult to Outpatient Case Management     The following information is provided to all patients.  This information is to help you find resources for any of the problems found today that may be affecting your health:                Living healthy guide: www.[facility].[place].gov      Understanding Diabetes: www.diabetes.org      Eating healthy: www.cdc.gov/healthyweight      CDC home safety checklist: www.cdc.gov/steadi/patient.html      Agency on Aging: www.goea.[place].gov      Alcoholics anonymous (AA): www.aa.org      Physical Activity: www.pavithra.nih.gov/ml4sxvk      Tobacco use: www.quitwithusla.org

## 2022-10-21 DIAGNOSIS — K21.9 GASTROESOPHAGEAL REFLUX DISEASE: ICD-10-CM

## 2022-10-21 RX ORDER — PANTOPRAZOLE SODIUM 40 MG/1
TABLET, DELAYED RELEASE ORAL
Qty: 90 TABLET | Refills: 3 | Status: SHIPPED | OUTPATIENT
Start: 2022-10-21 | End: 2023-04-06 | Stop reason: SDUPTHER

## 2022-10-21 NOTE — TELEPHONE ENCOUNTER
No new care gaps identified.  [facility] Embedded Care Gaps. Reference number: [id number]. 10/21/2022   3:19:57 PM CDT

## 2022-10-21 NOTE — TELEPHONE ENCOUNTER
----- Message from Trish Kinney sent at 10/21/2022  2:59 PM CDT -----  Contact: [phone]  Requesting an RX refill or new RX.  Is this a refill or new RX: refill  RX name and strength   pantoprazole (PROTONIX) 40 MG tablet 90 tablet   Is this a 30 day or 90 day RX:   Pharmacy name and phone #   [facility] - [address]   Phone:  [phone]  Fax:  [phone]    The doctors have asked that we provide their patients with the following 2 reminders -- prescription refills can take up to 72 hours, and a friendly reminder that in the future you can use your [facility] account to request refills: yes      Patient would also like a flu shot, please call and advise.

## 2022-10-25 ENCOUNTER — TELEPHONE (OUTPATIENT)
Dept: ENDOCRINOLOGY | Facility: CLINIC | Age: 77
End: 2022-10-25
Payer: MEDICARE

## 2022-10-25 NOTE — TELEPHONE ENCOUNTER
----- Message from Linette Mendoza sent at 10/25/2022  9:53 AM CDT -----  Type:  Needs Medical Advice    Who Called:  Pt   Symptoms (please be specific):  calling regarding 4 month f/u appt that was cancel by her please call   How long has patient had these symptoms:    Pharmacy name and phone #:    Would the patient rather a call back or a response via [facility]? call  Best Call Back Number: [phone]  Additional Information:

## 2022-10-27 ENCOUNTER — OUTPATIENT CASE MANAGEMENT (OUTPATIENT)
Dept: ADMINISTRATIVE | Facility: OTHER | Age: 77
End: 2022-10-27
Payer: MEDICARE

## 2022-10-27 NOTE — PROGRESS NOTES
Outpatient Care Management   - Low Risk Patient Assessment    Patient: Shakira Pearl  MRN:  [id number]  Date of Service:  10/27/2022  Completed by:  Tonya Olivarez LCSW  Referral Date: 10/18/2022    Reason for Visit   Patient presents with    Social Work Assessment - Low/Mod Risk    Plan Of Care    Case Closure     Brief Summary: Sw received referral for patient from Zulema Tomlin NP. Sw completed low risk assessment with Pt via telephone. Pt reports living alone and reports being independent with ADLs/IADLs. Pt does not use assistive devices to ambulate. Pt denied having difficulty affording food, housing, medications or medical care. Pt reports driving herself to medical appointments. Pt has not completed ACP forms and expressed interest. She requested Sw mail forms to her. Pt reported recent falls and reported concerns about being able to drive in future and needing more in-home support with household chores. Pt asked about transportation available through [facility]. Pt informed that [facility] does not currently provide transportation for patients. She was informed about para transit provided in the community for eligible seniors and disabled persons. Pt asked questions about benefits offered through her [employer] coverage. Sw provided Pt with brief description of benefits and explained [employer]'s OTC benefit. Pt expressed interest in OTC benefit and Sw offered to send Pt a Catalog with contact information for [employer] transportation; for her to contact if transportation services needed. Pt verbalized agreement with receiving discussed resources and requested for resources to be sent to her home address on file. No other needs identified by Pt.     Patient Summary     OPCM Social Work Assessment (Low/Moderate Risk)    General  Level of Caregiver support: Member independent and does not need caregiver assistance  Have you had to make a decision between paying for any of the following in the last 2 months?:  None  Transportation means: Self  Employment status: Retired and not working  Do you have any of the following?: Caregiver make informed decisions on your behalf  Current symptoms: Sleep disturbances  Assessments  Was the PHQ Depression Screening completed this visit?: No  Was the GREYSON-7 Screening completed this visit?: No         Complex Care Plan     Care plan was discussed and completed today with input from patient and/or caregiver.    Patient Instructions     No follow-ups on file.    Todays OPCM Self-Management Care Plan was developed with the patients/caregivers input and was based on identified barriers from todays assessment.  Goals were written today with the patient/caregiver and the patient has agreed to work towards these goals to improve his/her overall well-being. Patient verbalized understanding of the care plan, goals, and all of today's instructions. Encouraged patient/caregiver to communicate with his/her physician and health care team about health conditions and the treatment plan.  Provided my contact information today and encouraged patient/caregiver to call me with any questions as needed.

## 2022-10-31 ENCOUNTER — IMMUNIZATION (OUTPATIENT)
Dept: INTERNAL MEDICINE | Facility: CLINIC | Age: 77
End: 2022-10-31
Payer: MEDICARE

## 2022-10-31 ENCOUNTER — HOSPITAL ENCOUNTER (OUTPATIENT)
Dept: RADIOLOGY | Facility: HOSPITAL | Age: 77
Discharge: HOME | End: 2022-10-31
Attending: INTERNAL MEDICINE
Payer: MEDICARE

## 2022-10-31 DIAGNOSIS — Z23 NEED FOR VACCINATION: Primary | ICD-10-CM

## 2022-10-31 DIAGNOSIS — K76.9 LIVER LESION: ICD-10-CM

## 2022-10-31 PROCEDURE — 76705 ECHO EXAM OF ABDOMEN: CPT | Mod: TC

## 2022-10-31 PROCEDURE — 76705 US ABDOMEN LIMITED: ICD-10-PCS | Mod: 26,,, | Performed by: INTERNAL MEDICINE

## 2022-10-31 PROCEDURE — 91312 COVID-19, MRNA, LNP-S, BIVALENT BOOSTER, PF, 30 MCG/0.3 ML DOSE: ICD-10-PCS | Mod: S$GLB,,, | Performed by: INTERNAL MEDICINE

## 2022-10-31 PROCEDURE — 76705 ECHO EXAM OF ABDOMEN: CPT | Mod: 26,,, | Performed by: INTERNAL MEDICINE

## 2022-10-31 PROCEDURE — 0124A COVID-19, MRNA, LNP-S, BIVALENT BOOSTER, PF, 30 MCG/0.3 ML DOSE: CPT | Mod: CV19,PBBFAC | Performed by: INTERNAL MEDICINE

## 2022-10-31 PROCEDURE — 91312 COVID-19, MRNA, LNP-S, BIVALENT BOOSTER, PF, 30 MCG/0.3 ML DOSE: CPT | Mod: S$GLB,,, | Performed by: INTERNAL MEDICINE

## 2022-11-03 ENCOUNTER — TELEPHONE (OUTPATIENT)
Dept: HEMATOLOGY/ONCOLOGY | Facility: CLINIC | Age: 77
End: 2022-11-03
Payer: MEDICARE

## 2022-11-03 ENCOUNTER — OFFICE VISIT (OUTPATIENT)
Dept: UROGYNECOLOGY | Facility: CLINIC | Age: 77
End: 2022-11-03
Payer: MEDICARE

## 2022-11-03 ENCOUNTER — TELEPHONE (OUTPATIENT)
Dept: PAIN MEDICINE | Facility: CLINIC | Age: 77
End: 2022-11-03
Payer: MEDICARE

## 2022-11-03 VITALS
WEIGHT: 159.19 LBS | DIASTOLIC BLOOD PRESSURE: 70 MMHG | HEIGHT: 63 IN | SYSTOLIC BLOOD PRESSURE: 130 MMHG | BODY MASS INDEX: 28.21 KG/M2

## 2022-11-03 DIAGNOSIS — K59.09 CHRONIC CONSTIPATION: ICD-10-CM

## 2022-11-03 DIAGNOSIS — N95.2 VAGINAL ATROPHY: ICD-10-CM

## 2022-11-03 DIAGNOSIS — N39.46 URINARY INCONTINENCE, MIXED: Primary | ICD-10-CM

## 2022-11-03 DIAGNOSIS — N81.11 CYSTOCELE, MIDLINE: ICD-10-CM

## 2022-11-03 DIAGNOSIS — N81.6 RECTOCELE, FEMALE: ICD-10-CM

## 2022-11-03 DIAGNOSIS — N95.1 POSTMENOPAUSAL DISORDER: ICD-10-CM

## 2022-11-03 PROCEDURE — 99205 PR OFFICE/OUTPT VISIT, NEW, LEVL V, 60-74 MIN: ICD-10-PCS | Mod: 25,S$GLB,, | Performed by: OBSTETRICS & GYNECOLOGY

## 2022-11-03 PROCEDURE — 1159F MED LIST DOCD IN RCRD: CPT | Mod: CPTII,S$GLB,, | Performed by: OBSTETRICS & GYNECOLOGY

## 2022-11-03 PROCEDURE — 51701 INSERT BLADDER CATHETER: CPT | Mod: S$GLB,,, | Performed by: OBSTETRICS & GYNECOLOGY

## 2022-11-03 PROCEDURE — 3078F DIAST BP <80 MM HG: CPT | Mod: CPTII,S$GLB,, | Performed by: OBSTETRICS & GYNECOLOGY

## 2022-11-03 PROCEDURE — 99999 PR PBB SHADOW E&M-EST. PATIENT-LVL V: CPT | Mod: PBBFAC,,, | Performed by: OBSTETRICS & GYNECOLOGY

## 2022-11-03 PROCEDURE — 3078F PR MOST RECENT DIASTOLIC BLOOD PRESSURE < 80 MM HG: ICD-10-PCS | Mod: CPTII,S$GLB,, | Performed by: OBSTETRICS & GYNECOLOGY

## 2022-11-03 PROCEDURE — 1160F RVW MEDS BY RX/DR IN RCRD: CPT | Mod: CPTII,S$GLB,, | Performed by: OBSTETRICS & GYNECOLOGY

## 2022-11-03 PROCEDURE — 1101F PT FALLS ASSESS-DOCD LE1/YR: CPT | Mod: CPTII,S$GLB,, | Performed by: OBSTETRICS & GYNECOLOGY

## 2022-11-03 PROCEDURE — 1126F AMNT PAIN NOTED NONE PRSNT: CPT | Mod: CPTII,S$GLB,, | Performed by: OBSTETRICS & GYNECOLOGY

## 2022-11-03 PROCEDURE — 3075F SYST BP GE 130 - 139MM HG: CPT | Mod: CPTII,S$GLB,, | Performed by: OBSTETRICS & GYNECOLOGY

## 2022-11-03 PROCEDURE — 1126F PR PAIN SEVERITY QUANTIFIED, NO PAIN PRESENT: ICD-10-PCS | Mod: CPTII,S$GLB,, | Performed by: OBSTETRICS & GYNECOLOGY

## 2022-11-03 PROCEDURE — 87086 URINE CULTURE/COLONY COUNT: CPT | Performed by: OBSTETRICS & GYNECOLOGY

## 2022-11-03 PROCEDURE — 1159F PR MEDICATION LIST DOCUMENTED IN MEDICAL RECORD: ICD-10-PCS | Mod: CPTII,S$GLB,, | Performed by: OBSTETRICS & GYNECOLOGY

## 2022-11-03 PROCEDURE — 3288F PR FALLS RISK ASSESSMENT DOCUMENTED: ICD-10-PCS | Mod: CPTII,S$GLB,, | Performed by: OBSTETRICS & GYNECOLOGY

## 2022-11-03 PROCEDURE — 1101F PR PT FALLS ASSESS DOC 0-1 FALLS W/OUT INJ PAST YR: ICD-10-PCS | Mod: CPTII,S$GLB,, | Performed by: OBSTETRICS & GYNECOLOGY

## 2022-11-03 PROCEDURE — 1160F PR REVIEW ALL MEDS BY PRESCRIBER/CLIN PHARMACIST DOCUMENTED: ICD-10-PCS | Mod: CPTII,S$GLB,, | Performed by: OBSTETRICS & GYNECOLOGY

## 2022-11-03 PROCEDURE — 3288F FALL RISK ASSESSMENT DOCD: CPT | Mod: CPTII,S$GLB,, | Performed by: OBSTETRICS & GYNECOLOGY

## 2022-11-03 PROCEDURE — 99205 OFFICE O/P NEW HI 60 MIN: CPT | Mod: 25,S$GLB,, | Performed by: OBSTETRICS & GYNECOLOGY

## 2022-11-03 PROCEDURE — 99999 PR PBB SHADOW E&M-EST. PATIENT-LVL V: ICD-10-PCS | Mod: PBBFAC,,, | Performed by: OBSTETRICS & GYNECOLOGY

## 2022-11-03 PROCEDURE — 51701 PR INSERTION OF NON-INDWELLING BLADDER CATHETERIZATION FOR RESIDUAL UR: ICD-10-PCS | Mod: S$GLB,,, | Performed by: OBSTETRICS & GYNECOLOGY

## 2022-11-03 PROCEDURE — 3075F PR MOST RECENT SYSTOLIC BLOOD PRESS GE 130-139MM HG: ICD-10-PCS | Mod: CPTII,S$GLB,, | Performed by: OBSTETRICS & GYNECOLOGY

## 2022-11-03 RX ORDER — ESTRADIOL 0.1 MG/G
CREAM VAGINAL
Qty: 42.5 G | Refills: 11 | Status: SHIPPED | OUTPATIENT
Start: 2022-11-03

## 2022-11-03 RX ORDER — ESTRADIOL 0.05 MG/D
FILM, EXTENDED RELEASE TRANSDERMAL
COMMUNITY
Start: 2022-05-24

## 2022-11-03 NOTE — TELEPHONE ENCOUNTER
Patient was contacted she reported that she would like a sooner appt with . Staff offered patient an appt with STEPHANIE she declined she would rather see . staff provided patient with 's next available appt she declined and stated she would rather wait until 11/29/22.

## 2022-11-03 NOTE — TELEPHONE ENCOUNTER
----- Message from Jose Escalera sent at 11/3/2022 12:37 PM CDT -----   Name of Who is Calling:     What is the request in detail:  patient request call back in reference to scheduled appointment Please contact to further discuss and advise      Can the clinic reply by [facility]:     What Number to Call Back if not in [facility]:  [phone]

## 2022-11-03 NOTE — PATIENT INSTRUCTIONS
Bladder Irritants  Certain foods and drinks have been associated with worsening symptoms of urinary frequency, urgency, urge incontinence, or bladder pain. If you suffer from any of these conditions, you may wish to try eliminating one or more of these foods from your diet and see if your symptoms improve. If bladder symptoms are related to dietary factors, strict adherence to a diet thateliminates the food should bring marked relief in 10 days. Once you are feeling better, you can begin to add foods back into your diet, one at a time. If symptoms return, you will be able to identify the irritant. As you add foods back to your diet it is very important that you drink significant amounts of water.    -----------------------------------------------------------------------------------------------  List of Common Bladder Irritants*  Alcoholic beverages  Apples and apple juice  Cantaloupe  Carbonated beverages  Chili and spicy foods  Chocolate  Citrus fruit  Coffee (including decaffeinated)  Cranberries and cranberry juice  Grapes  Guava  Milk Products: milk, cheese, cottage cheese, yogurt, ice cream  Peaches  Pineapple  Plums  Strawberries  Sugar especially artificial sweeteners, saccharin, aspartame, corn sweeteners, honey, fructose, sucrose, lactose  Tea  Tomatoes and tomato juice  Vitamin B complex  Vinegar  *Most people are not sensitive to ALL of these products; your goal is to find the foods that make YOUR symptoms worse.  ---------------------------------------------------------------------------------------------------    Low-acid fruit substitutions include apricots, papaya, pears and watermelon. Coffee drinkers can drink Kava or other lowacid instant drinks. Tea drinkers can substitute non-citrus herbal and sun brewed teas. Calcium carbonate co-buffered with calcium ascorbate can be substituted for Vitamin C. Prelief is a dietary supplement that works as an acid blocker for the bladder.    Where to get more  information:        Overcoming Bladder Disorders by Raine Machuca and Trinity Montes, 1990        You Dont Have to Live with Cystitis! By Jolene Arellano, 1988  http://www.urologymanagement.org/oab  ---------------------------------------------------------------------    What is Sleep Hygiene?  Sleep hygiene is the term used to describe good sleep habits.  Considerable research has gone into developing a set of  guidelines and tips which are designed to enhance good  sleeping, and there is much evidence to suggest that these  strategies can provide long-term solutions to sleep difficulties.  There are many medications which are used to treat insomnia,  but these tend to be only effective in the short-term. Ongoing  use of sleeping pills may lead to dependence and interfere  with developing good sleep habits independent of medication,  thereby prolonging sleep difficulties. Talk to your health  professional about what is right for you, but we recommend  good sleep hygiene as an important part of treating insomnia,  either with other strategies such as medication or cognitive  therapy or alone.  Sleep Hygiene Tips  1) Get regular. One of the best ways to train your body to  sleep well is to go to bed and get up at more or less the  same time every day, even on weekends and days off! This  regular rhythm will make you feel better and will give your  body something to work from.  2) Sleep when sleepy. Only try to sleep when you actually  feel tired or sleepy, rather than spending too much time  awake in bed.  3) Get up & try again. If you havent been able to get to  sleep after about 20 minutes or more, get up and do  something calming or boring until you feel sleepy, then  return to bed and try again. Sit quietly on the couch with  the lights off (bright light will tell your brain that it is time  to wake up), or read something boring like the phone  book. Avoid doing anything that  is too stimulating or  interesting, as this will wake you up even more.  4) Avoid caffeine & nicotine. It is best to avoid consuming  any caffeine (in coffee, tea, cola drinks, chocolate, and  some medications) or nicotine (cigarettes) for at least 4-6  hours before going to bed. These substances act as  stimulants and interfere with the ability to fall asleep  5) Avoid alcohol. It is also best to avoid  alcohol for at least 4-6 hours before going to  bed. Many people believe that alcohol is  relaxing and helps them to get to sleep at  first, but it actually interrupts the quality of  sleep.  6) Bed is for sleeping. Try not to use your bed  for anything other than sleeping and sex, so that your body  comes to associate bed with sleep. If you use bed as a  place to watch TV, eat, read, work on your laptop, pay  bills, and other things, your body will not learn this  connection.  [facility]  [facility]  7) No naps. It is best to avoid taking naps  during the day, to make sure that you  are tired at bedtime. If you cant make it  through the day without a nap, make  sure it is for less than an hour and  before 3pm.  8) Sleep rituals. You can develop your own rituals of things to  remind your body that it is time to sleep - some people find  it useful to do relaxing stretches or breathing exercises for  15 minutes before bed each night, or sit calmly with a cup of  caffeine-free tea.  9) Bathtime. Having a hot bath 1-2 hours before bedtime can  be useful, as it will raise your body temperature, causing you  to feel sleepy as your body temperature drops again.  Research shows that sleepiness is associated with a drop in  body temperature.  10) No clock-watching. Many people who struggle with sleep  tend to watch the clock too much. Frequently checking the  clock during the night can wake you up (especially if you turn  on the light to read the time) and reinforces  negative  thoughts such as Oh no, look how late it is, Ill never get to  sleep or its so early, I have only slept for 5 hours, this is  terrible.  11) Use a sleep diary. This worksheet can be a useful way of  making sure you have the right facts about your sleep, rather  than making assumptions. Because a diary involves watching  the clock (see point 10) it is a good idea to only use it for  two weeks to get an idea of what is going and then  perhaps two months down the track to see how you  are progressing.  12) Exercise. Regular exercise is a good idea to  help with good sleep, but try not to do strenuous  exercise in the 4 hours before bedtime. Morning  walks are a great way to start the day feeling refreshed!  13) Eat right. A healthy, balanced diet will help you to sleep  well, but timing is important. Some people find that a very  empty stomach at bedtime is distracting, so it can be useful  to have a light snack, but a heavy meal soon before bed can  also interrupt sleep. Some people recommend a warm glass  of milk, which contains tryptophan, which acts as a natural  sleep inducer.  14) The right space. It is very important that your bed and  bedroom are quiet and comfortable for sleeping. A cooler  room with enough blankets to stay warm is best, and make  sure you have curtains or an eyemask to block out early  morning light and earplugs if there is noise outside your  room.  15) Keep daytime routine the same. Even if you have a bad  night sleep and are tired it is important that you try to keep  your daytime activities the same as you had planned. That is,  dont avoid activities because you feel tired. This can  reinforce the insomnia.    -----------------------------------------------------  Fiber Information Sheet  Your doctor has recommended that you follow a high fiber diet. The addition of fiber to your diet can make an enormous difference in your bowel control and regularity. Fiber helps people  whether they lose stool or have trouble with constipation. Fiber works by bulking the stool and keeping it formed, yet making the movement soft and easy to pass. Fiber helps keep moisture within the stool so that neither diarrhea nor hard stool occurs. Fiber makes the bowels work more regularly, but it is not a laxative. An additional bonus from eating a high fiber diet is that your risk of cancer is reduced, too.    Most of us eat some high fiber foods already, but nearly all of us do not eat the necessary amount. For example, a slice of whole wheat bread contains only about 10% of the daily recommended amount of fiber. This means if you are relying on only whole wheat bread to meet the recommended fiber requirements, you would need to eat  between 10-18 slices every day! Please note that fiber is NOT in any meat or dairy product. It is only found in grains, vegetables and fruits. The recommended daily fiber intake is 20-25 grams. Foods having high fiber content include:     Fiber One Cereal, ½ cup 13.0 g   Torrez beans, ¾ cup 10.4 g   Wheat bran cereal, 1 oz 10.0 g   Kidney beans, ¾ cup 9.3 g   All Bran Cereal, ½ cup 6.0 g   Oat Bran Cereal, hot, 1 oz 4.0 g   Banana, 1medium 3.8 g   Canned pears, ½ cup 3.7 g   3 prunes or ¼ cup raisins 3.5 g   Whole Wheat Total, 1 cup 3.0 g   Carrots, ½ cup 3.2 g   Apple, small 2.8 g   Broccoli, ½ cup 2.8 g   Cauliflower, ½ cup 2.6 g   Oatmeal, 1 oz 2.5 g   Whole Wheat Toast 2.0 g   Cheerios, 1 1/3 cup 2.0 g   Baked potato with skin 2.0 g   Corn, ½ cup 1.9 g   Popcorn, 3 cups 1.9 g   Orange, medium 1.9 g   Granola bar 1.0 g   Lettuce, ½ cup 0.9 g    If you dont think that you can get enough fiber through your everyday diet, there are many good fiber supplements you can take along with eating your high fiber diet. Some of these are: Metamucil (1 heaping teaspoon or 1-2 wafers), Citrucel (1 tablespoon), Fiberall (1-2 wafers or 1 teaspoon), Perdium (2 rounded teaspoons) and 1-2  teaspoons unprocessed bran (to mix with foods)    You may need to use the fiber supplement up to 3-4 times daily to produce normal elimination. Please follow specific package directions or call us for help in regulating the dose. You may notice some bloating and/or increased gas at first. These symptoms can be relieved by adding fiber to your diet slowly. Once your body gets used to this increased fiber, these symptoms will go away.   ====================================  1)  Mixed urinary incontinence, urge = stress:    --urine C&S  --Empty bladder every 3 hours.  Empty well: wait a minute, lean forward on toilet.    --Avoid dietary irritants (see sheet).  Keep diary x 3-5 days to determine your irritants.  --KEGELS: do 10 in AM and 10 in PM, holding each x 10 seconds.  When you feel urge to go, STOP, KEGEL, and when urge has passed, then go to bathroom.  Start pelvic floor PT. Call to make appt.   [facility]  Chanelle Bee ([address], [address]): (p) [phone].  (f) [phone].  --URGE: Start mirabegron 50 mg daily. Takes 2-4 weeks to see if will have effect.  For dry mouth: get sour, sugar free lozenge or gum.    --STRESS:  Pessary vs. Sling vs periurethral injections.   --UDS 2020 ([place]):  Maximum Cystometric Capacity: 587 mL.  Normal compliance. +CLPP/VLPP: Valsalva ( Abdominal ) Leak Point Pressure: 200 ml with [id number] cm water pressure.   300 ml with [id number] water pressure. Detrusor Contraction Characteristics: low but Sustained contraction(s).     2)  Constipation:  --hydrate well  Controlling constipation may help bladder urgency/leakage and fiber may better control cholesterol and blood glucose.  Start daily fiber.  Take 1 tsp of fiber powder (psyllium or other sugar-free powder).  Mix in 8 oz of water.  Take x 3-5 days.  Then, increase fiber by 1 tsp every 3-5 days until stool is easy to pass, well-formed, less incomplete defecation.  Stop and continue at that dose.   Do not  exceed 6 tsps/day.  May also use over the counter stool softener 1-2 x/day.  AVOID laxatives.    3)  Vaginal atrophy (dryness):  Use 0.5 gram of estrogen cream in vagina nightly x 2 weeks, then twice a week thereafter FOREVER.    --treating dryness can help urinary urgency/frequency    4)  Stage 2 cystocele/rectocele:  --discussed  --asymptomatic  --CTM for now    5)  RTC 4 months.

## 2022-11-03 NOTE — PROGRESS NOTES
[facility] - UROGYNECOLOGY  [address] [place] [id number]    Shakira Pearl  [id number]  1945  November 3, 2022    Consulting Physician: Angel Bello, *   GYN: none  Primary M.D.: Angel Bello DO    Chief Complaint   Patient presents with    Vaginal Prolapse       HPI:     1)  UI:  (+) CIERA = (+) UUI (spontaneous)  X years. Gets funny feeling in fingers when she urinates.  (+) pads:4/day, usually minimum wetness unless forgets to change and 1/night usually moderate wetness.  Daytime frequency: Q 2 hours.  Nocturia: Yes: 2/night.   (--) dysuria,  (--) hematuria,  (--) frequent UTIs.  (--) complete bladder emptying. DV with small 2nd void.   --has not tried other meds for UI; taking cymbalta 60 mg, which helped UI some   --saw Duran in past:  Simple Urodynamics w/ Cysto  Date/Time: 2/27/2020 2:15 PM  Performed by: Jac Duran MD  Authorized by: Jac Duran MD   Comments: Procedure Date:  02/27/2020    Procedure:   Diagnostic Cystourethroscopy   Complex Cystometrogram   Voiding / Pressure Study with Intrarectal Balloon   Complex Uroflow   Electromyogram of Anal Sphincter.     Pre-OP Diagnosis:   Mixed urinary incontinence, s/p uterine prolapse repair, s/p hysterectomy, nocturia and nocturnal enuresis   Post-OP Diagnosis:   Same, cystocele, rectocele   Anesthesia:   Anesthesia Administered:   Intraurethral instillation of 10 mL 2% lidocaine (Xylocaine) jelly.   Findings:   Uroflow: voided 280 ml at Qmax 28 ml /sec.  PVR 10 ml  --- Bladder ---   CYSTOMETROGRAM ( Filling Phase ):   Cystometric Numeric Data:   - First Desire (Sensation): 316 mL at 1cm of water.   - Normal Desire: 502mL at 12 cm of water.   - Strong Desire: 504 mL at 13 cm of water.   - Urgency (Imminent Void) : 551 mL at 12 cm of water.   - Maximum Cystometric Capacity: 587 mL.   Compliance:   - normla.   Leak Point Pressure:   - Valsalva ( Abdominal ) Leak Point Pressure: 200 ml with [id number] cm water pressure.    300 ml with [id number] water pressure.    UROFLOW:   - Voided Volume: 557 mL.   - Residual Urine: 75 mL.   - Maximum Flow Rate: 12 mL/sec.   - Flow Pattern: low amplitude   VOIDING PRESSURE STUDY ( Voiding Phase ):   Detrusor Pressure:   - Maximum Detrusor Pressure: 34cm of water.   - Detrusor Pressure at Maximum Flow: 21 cm of water.   - Detrusor Contraction Characteristics: low but Sustained contraction(s).   ELECTROMYOGRAM:   - normal.     ---Diagnostic Cystourethroscopy ---   Normal urethra with hypermobility  Width of Bladder Neck Opening: Approximately 18 Fr.   Normal bladder.   Normal ureteral orifices bilaterally.     CONCLUSIONS:   1. Mixed urinary incontinence  2. Cystocele and rectocele.    Responded well to cymbalta 30 mg.. Will increase to 60 mg daily to control her CIERA.  Alternatively may consider urethral bulking injection over sling surgery due to her bladder function.    For her bladder function ( weak bladder contraction) with urge incontinence, recommend InterStim Therapy.  Will plan stage I interstim Therapy to see how she does.  She uses 4 pads a day for incontinence now.    Post-OP Plan:   Patient was discharged home in a stable condition.  Medications: noone  Follow up:   Stage I interStim Therapy     Mixed urinary incontinence due to female genital prolapse  Simple Urodynamics w/ Cysto  DULoxetine (CYMBALTA) 60 MG capsule; Take 1 capsule (60 mg total) by mouth once daily.  Dispense: 30 capsule; Refill: 11    2)  POP:  None.   (--) vaginal bleeding. (--) vaginal discharge. (--) sexually active.  (--) h/o dyspareunia.  (--)  Vaginal dryness.  (--) vaginal estrogen use.     3)  BM:  (+) constipation/straining.  Since did GI capsule study 2 months ago.  (--) chronic diarrhea. (--) hematochezia.  (--) fecal incontinence.  (--) fecal smearing/urgency.  (+) complete evacuation.     Past Medical History  Past Medical History:   Diagnosis Date    Allergy sinus    Arthritis back    Cataract     Colon  polyps     Degenerative disc disease back    Diverticulosis of colon     Dyspepsia     GERD (gastroesophageal reflux disease)     Heartburn     Hemorrhoids, internal     Hiatal hernia     Neuromuscular disorder     Vitamin D deficiency    --neuromuscular disorder: has had some recent falls  --chronic neck/back pain: followed by Dr. Coates; has injections PRN     Past Surgical History  Past Surgical History:   Procedure Laterality Date    APPENDECTOMY  age 19    CATARACT EXTRACTION W/  INTRAOCULAR LENS IMPLANT      CATARACT EXTRACTION W/  INTRAOCULAR LENS IMPLANT      CHOLECYSTECTOMY      age of 25    CHONDROPLASTY OF KNEE Left 10/03/2019    Procedure: CHONDROPLASTY, KNEE;  Surgeon: Kaylen Patiño MD;  Location: [facility] OR;  Service: Orthopedics;  Laterality: Left;    COLONOSCOPY N/A 09/09/2020    Procedure: COLONOSCOPY;  Surgeon: Peter Cuevas MD;  Location: [facility] ([address] [place] [address]);  Service: Endoscopy;  Laterality: N/A;  device assisted colonoscopy w/Dr Cuevas.  Difficult colon, multiple adhesions from abd surgeries, Hx adv adenomatous polyp/tubulovillous adenoma of cecum in April 2011.  Dr Black     per Dr Cuevas-Okay for 4th floor - 60 minute slot (90 min w/egd added).  Start with peds col    ESOPHAGOGASTRODUODENOSCOPY N/A 09/09/2020    Procedure: EGD (ESOPHAGOGASTRODUODENOSCOPY);  Surgeon: Peter Cuevas MD;  Location: [address]);  Service: Endoscopy;  Laterality: N/A;  per Dr Black-add EGD to colonoscopy order.  Pt requesting later appt.  4/13/20 - removed from 4/30/20, rescheduled 7/29/20 - pg   covid 9/6-met-urgent care--tb    HYSTERECTOMY  38    HYSTERECTOMY, VAGINAL, WITH UTEROSACRAL LIGAMENT VAULT SUSPENSION      INJECTION OF JOINT Bilateral 02/18/2019    Procedure: INJECTION, JOINT BILATERAL SI;  Surgeon: Mert Coates MD;  Location: [facility] PAIN MGT;  Service: Pain Management;  Laterality: Bilateral;  BILATERAL SI JOINT INJECTION    INJECTION OF JOINT Right 08/20/2020    Procedure: INJECTION JOINT/ R  "HIP DIRECT REFERRAL * DR. PALUMBO ONLY PLEASE*;  Surgeon: Mert Palumbo MD;  Location: [facility] PAIN MGT;  Service: Pain Management;  Laterality: Right;  NEED CONSENT    KNEE ARTHROSCOPY W/ MENISCECTOMY Left 10/03/2019    Procedure: ARTHROSCOPY, KNEE, WITH MENISCECTOMY;  Surgeon: Kaylen Patiño MD;  Location: [facility];  Service: Orthopedics;  Laterality: Left;    SYNOVECTOMY OF KNEE Left 10/03/2019    Procedure: SYNOVECTOMY, KNEE;  Surgeon: Kaylen Patiño MD;  Location: [facility] OR;  Service: Orthopedics;  Laterality: Left;    YAG Laser Capsulotomy Bilateral     Dr. Aleman   --RLQ appy  --"uterine suspension" at 19 yo  --Abd plasty/scar revision of Pfannenstiel   --LSC exploratory for SBO (after eating carrot)--no intervention    Hysterectomy: Yes   Date: 42 yo.  Indication: issues?.    Type: xlap/Pfannenstiel  Cervix present: No  Ovaries present: No  Other procedures at time of hysterectomy:  none    Past Ob History     x 1.  C/s x 0.    Largest infant weight: 8.5#  unknown FAVD. yes episiotomy.      Gynecologic History  LMP: No LMP recorded. Patient has had a hysterectomy.  Age of menarche: 11 yo  Age of menopause: with DENNY  Menstrual history: h/o oligomenorrhea  Pap test: post DENNY.  History of abnormal paps: No.  History of STIs:  No  Mammogram: Date of last:  (DIS in [place]).  Result: Normal per report.   Colonoscopy: Date of last: .  Result:  polyps, diverticulosis.  Repeat due:  .    DEXA:  Date of last: .  Result:  normal.  Repeat due:  per PCP.     Family History  Family History   Problem Relation Age of Onset    Heart disease Mother 67        heart attack    Stroke Mother     Cancer Father 65        abdominal    Stomach cancer Father     No Known Problems Sister     No Known Problems Brother     No Known Problems Son     No Known Problems Maternal Grandmother     No Known Problems Maternal Grandfather     No Known Problems Paternal Grandmother     No Known Problems Paternal Grandfather     No " Known Problems Maternal Aunt     No Known Problems Maternal Uncle     No Known Problems Paternal Aunt     No Known Problems Paternal Uncle     Celiac disease Neg Hx     Colon cancer Neg Hx     Crohn's disease Neg Hx     Esophageal cancer Neg Hx     Inflammatory bowel disease Neg Hx     Liver cancer Neg Hx     Rectal cancer Neg Hx     Ulcerative colitis Neg Hx     Amblyopia Neg Hx     Blindness Neg Hx     Cataracts Neg Hx     Diabetes Neg Hx     Glaucoma Neg Hx     Hypertension Neg Hx     Macular degeneration Neg Hx     Retinal detachment Neg Hx     Strabismus Neg Hx     Thyroid disease Neg Hx     Anesthesia problems Neg Hx       Colon CA: No  Breast CA: No  GYN CA: Yes: ma 1/2 aunt (ovarian)   CA: No    Social History  Social History     Tobacco Use   Smoking Status Never   Smokeless Tobacco Never     Social History     Substance and Sexual Activity   Alcohol Use No   .    Social History     Substance and Sexual Activity   Drug Use No     The patient is .  Resides in [place] [address].  Employment status: retired business (advertising/sales).     Allergies  Review of patient's allergies indicates:   Allergen Reactions    Demerol [meperidine] Nausea And Vomiting     Other reaction(s): Nausea    Papaya      Illness vomiting    Sulfa (sulfonamide antibiotics) Rash    Sulfur Rash       Medications  Current Outpatient Medications on File Prior to Visit   Medication Sig Dispense Refill    DULoxetine (CYMBALTA) 60 MG capsule Take 1 capsule (60 mg total) by mouth once daily. 90 capsule 3    ergocalciferol (ERGOCALCIFEROL) 50,000 unit Cap Take 1 capsule (50,000 Units total) by mouth every 7 days. 36 capsule 2    ezetimibe (ZETIA) 10 mg tablet TAKE ONE TABLET BY MOUTH EVERY DAY 90 tablet 1    ferrous gluconate (FERGON) 324 MG tablet Take 1 tablet (324 mg total) by mouth daily with breakfast. 90 tablet 1    icosapent ethyL (VASCEPA) 1 gram Cap Take 2 capsules (2 g total) by mouth 2 (two) times a day. 120 capsule 11  "   losartan-hydrochlorothiazide 50-12.5 mg (HYZAAR) 50-12.5 mg per tablet Take 1 tablet by mouth once daily. 90 tablet 3    pantoprazole (PROTONIX) 40 MG tablet TAKE ONE TABLET BY MOUTH EVERY MORNING 45 MINUTES BEFORE BREAKFAST 90 tablet 3    potassium chloride SA (K-DUR,KLOR-CON) 10 MEQ tablet Take 1 tablet (10 mEq total) by mouth once daily. 90 tablet 3    estradiol 0.05 mg/24 hr td ptsw (VIVELLE-DOT) 0.05 mg/24 hr       estradiol valerate (DELESTROGEN) 40 mg/mL injection Inject 0.5 mLs (20 mg total) into the muscle every 28 days. Inject into the muscle. (Patient not taking: Reported on 11/3/2022) 5 mL 12    polyethylene glycol (GOLYTELY) 236-22.74-6.74 -5.86 gram suspension Use as directed day before video capsule procedure, starting at 6pm (Patient not taking: Reported on 10/18/2022) 4000 mL 0     No current facility-administered medications on file prior to visit.       Review of Systems A 14 point ROS was reviewed with pertinent positives as noted above in the history of present illness.      Constitutional: negative  Eyes: negative  Endocrine: negative  Gastrointestinal: negative  Cardiovascular: negative  Respiratory: negative  Allergic/Immunologic: negative  Integumentary: negative  Psychiatric: negative  Musculoskeletal: negative   Ear/Nose/Throat: negative  Neurologic: negative  Genitourinary: SEE HPI  Hematologic/Lymphatic: negative   Breast: negative    Urogynecologic Exam  /70 (BP Location: Right arm, Patient Position: Sitting, BP Method: Medium (Automatic))   Ht 5' 3" (1.6 m)   Wt 72.2 kg (159 lb 2.8 oz)   BMI 28.20 kg/m²     GENERAL APPEARANCE:  The patient is well-developed, well-nourished.  Neck:  Supple with no thyromegaly, no carotid bruits.  Heart:  Regular rate and rhythm, no murmurs, rubs or gallops.  Lungs:  Clear.  No CVA tenderness.  Abdomen:  Soft, nontender, nondistended, no hepatosplenomegaly.  Incisions:  Pfannenstiel well-healed    PELVIC:    External genitalia:  Normal " Bartholins, Skenes and labia bilaterally.  R inferior groin lipoma 1 cm--NT.   Urethra:  No caruncle, diverticulum or masses.  (+) hypermobility.    Vagina:  Atrophy (+) , no bladder masses or tender, no discharge.  +L sidewall polyp.   Cervix:  absent  Uterus: uterus absent  Adnexa: Not palpable.    POP-Q:  Aa -1; Ba -1; C -8; Ap 0; Bp 0.  Genital hiatus 3, perineal body, 2 total vaginal length 9-10.    NEUROLOGIC:  Cranial nerves 2 through 12 intact.  Strength 5/5.  DTRs 2+ lower extremities.  S2 through 4 normal.  Sacral reflexes intact.    EXT: IBARRA, 2+ pulses bilaterally, no C/C/E    COUGH STRESS TEST:  negative  KEGEL: 1 /5    RECTAL:    External:  Normal, (--) hemorrhoids, (--) dovetailing.   Internal:   (--) tenderness, (--) masses, Normal resting tone, Normal active tone.    PVR: 20 mL    Impression    1. Urinary incontinence, mixed    2. Postmenopausal disorder    3. Vaginal atrophy    4. Chronic constipation    5. Cystocele, midline    6. Rectocele, female        Initial Plan  The patient was counseled regarding these issues. The patient was given a summary sheet containing each of these issues with possible options for evaluation and management. When appropriate, we also reviewed computer-generated diagrams specific to their diagnoses..  All questions were addressed to the patient's satisfaction.    1)  Mixed urinary incontinence, urge = stress:    --urine C&S  --Empty bladder every 3 hours.  Empty well: wait a minute, lean forward on toilet.    --Avoid dietary irritants (see sheet).  Keep diary x 3-5 days to determine your irritants.  --KEGELS: do 10 in AM and 10 in PM, holding each x 10 seconds.  When you feel urge to go, STOP, KEGEL, and when urge has passed, then go to bathroom.  Start pelvic floor PT. Call to make appt.   [facility]  [facility] ([address], [address]): (p) [phone].  (f) [phone].  --URGE: Start mirabegron 50 mg daily. Takes 2-4 weeks to see if will  have effect.  For dry mouth: get sour, sugar free lozenge or gum.    --STRESS:  Pessary vs. Sling vs periurethral injections.   --UDS 2020 (Duran):  Maximum Cystometric Capacity: 587 mL.  Normal compliance. +CLPP/VLPP: Valsalva ( Abdominal ) Leak Point Pressure: 200 ml with [id number] cm water pressure.   300 ml with [id number] water pressure. Detrusor Contraction Characteristics: low but Sustained contraction(s).     2)  Constipation:  --hydrate well  Controlling constipation may help bladder urgency/leakage and fiber may better control cholesterol and blood glucose.  Start daily fiber.  Take 1 tsp of fiber powder (psyllium or other sugar-free powder).  Mix in 8 oz of water.  Take x 3-5 days.  Then, increase fiber by 1 tsp every 3-5 days until stool is easy to pass, well-formed, less incomplete defecation.  Stop and continue at that dose.   Do not exceed 6 tsps/day.  May also use over the counter stool softener 1-2 x/day.  AVOID laxatives.    3)  Vaginal atrophy (dryness):  Use 0.5 gram of estrogen cream in vagina nightly x 2 weeks, then twice a week thereafter FOREVER.    --treating dryness can help urinary urgency/frequency    4)  Stage 2 cystocele/rectocele:  --discussed  --asymptomatic  --CTM for now    5)  RTC 4 months.     Approximately 60 min were spent in consult, 90 % in discussion.     Thank you for requesting consultation of your patient.  I look forward to participating in their care.    Salvador Valadez  Female Pelvic Medicine and Reconstructive Surgery  [facility] [place], [place]

## 2022-11-03 NOTE — TELEPHONE ENCOUNTER
----- Message from Jose Escalera sent at 11/3/2022 12:41 PM CDT -----   Name of Who is Calling:     What is the request in detail:  patient request call back in reference to reschedule appointment Please contact to further discuss and advise      Can the clinic reply by [facility]:     What Number to Call Back if not in [facility]:  [phone]

## 2022-11-04 ENCOUNTER — TELEPHONE (OUTPATIENT)
Dept: GASTROENTEROLOGY | Facility: CLINIC | Age: 77
End: 2022-11-04
Payer: MEDICARE

## 2022-11-04 NOTE — TELEPHONE ENCOUNTER
----- Message from Jose Escalera sent at 11/3/2022 12:43 PM CDT -----   Name of Who is Calling:     What is the request in detail:  patient request call back in reference to discuss results Please contact to further discuss and advise      Can the clinic reply by [facility]:     What Number to Call Back if not in [facility]:  [phone]

## 2022-11-05 LAB — BACTERIA UR CULT: NO GROWTH

## 2022-11-06 DIAGNOSIS — K76.9 LIVER LESION: Primary | ICD-10-CM

## 2022-11-06 DIAGNOSIS — K76.0 FATTY LIVER: ICD-10-CM

## 2022-11-06 DIAGNOSIS — R16.0 HEPATOMEGALY: ICD-10-CM

## 2022-11-06 NOTE — PROGRESS NOTES
Manpreet please schedule Shakira for 12 hours fasting blood work this week orders placed    Please refer patient to Dr. India Starr in hepatology for further evaluation of fatty liver enlarged liver and nonspecific liver lesions.  Referral placed.    Shakira your ultrasound shows slightly enlarged liver and fatty liver recommend gradual weight loss about 15 lb weight loss over the next 12-18 months should help out the liver.    Referral to hepatology Clinic for further evaluation referral placed.    Impression:     Hepatomegaly with hepatic steatosis.     3 hepatic hypoechoic lesions,  measuring up to 1.2 cm.  These are nonspecific in their sonographic appearance and it is difficult to be certain which of the hepatic lesions on CT are correlates.  If there is clinical concern, 1 option would be follow-up ultrasound.     Electronically signed by: Vilma Lind MD  Date:                                            10/31/2022

## 2022-11-09 ENCOUNTER — TELEPHONE (OUTPATIENT)
Dept: GASTROENTEROLOGY | Facility: CLINIC | Age: 77
End: 2022-11-09
Payer: MEDICARE

## 2022-11-09 NOTE — TELEPHONE ENCOUNTER
Contact and spoke with Dr. Black, please schedule Shakira for 12 hours fasting blood work this week orders placed     Please refer patient to Dr. India Starr in hepatology for further evaluation of fatty liver enlarged liver and nonspecific liver lesions.  Referral placed.     Shakira your ultrasound shows slightly enlarged liver and fatty liver recommend gradual weight loss about 15 lb weight loss over the next 12-18 months should help out the liver.     Referral to hepatology Clinic for further evaluation referral placed.      Patient verbalized understanding.     Dr. Black please advise patient wanted to know if you can explain the U/S results statement about the kidney.

## 2022-11-09 NOTE — TELEPHONE ENCOUNTER
----- Message from Yfn Black MD sent at 11/6/2022  9:22 AM CST -----  Manpreet please schedule Shakira for 12 hours fasting blood work this week orders placed    Please refer patient to Dr. India Starr in hepatology for further evaluation of fatty liver enlarged liver and nonspecific liver lesions.  Referral placed.    Shakira your ultrasound shows slightly enlarged liver and fatty liver recommend gradual weight loss about 15 lb weight loss over the next 12-18 months should help out the liver.    Referral to hepatology Clinic for further evaluation referral placed.    Impression:     Hepatomegaly with hepatic steatosis.     3 hepatic hypoechoic lesions,  measuring up to 1.2 cm.  These are nonspecific in their sonographic appearance and it is difficult to be certain which of the hepatic lesions on CT are correlates.  If there is clinical concern, 1 option would be follow-up ultrasound.     Electronically signed by: Vilma Lind MD  Date:                                            10/31/2022

## 2022-11-14 ENCOUNTER — LAB VISIT (OUTPATIENT)
Dept: LAB | Facility: HOSPITAL | Age: 77
End: 2022-11-14
Attending: INTERNAL MEDICINE
Payer: MEDICARE

## 2022-11-14 DIAGNOSIS — D50.9 IRON DEFICIENCY ANEMIA, UNSPECIFIED IRON DEFICIENCY ANEMIA TYPE: ICD-10-CM

## 2022-11-14 DIAGNOSIS — R16.0 HEPATOMEGALY: ICD-10-CM

## 2022-11-14 DIAGNOSIS — K76.0 FATTY LIVER: ICD-10-CM

## 2022-11-14 DIAGNOSIS — K76.9 LIVER LESION: ICD-10-CM

## 2022-11-14 LAB
ALBUMIN SERPL BCP-MCNC: 3.8 G/DL (ref 3.5–5.2)
ALP SERPL-CCNC: 50 U/L (ref 55–135)
ALT SERPL W/O P-5'-P-CCNC: 24 U/L (ref 10–44)
ANION GAP SERPL CALC-SCNC: 14 MMOL/L (ref 8–16)
AST SERPL-CCNC: 23 U/L (ref 10–40)
BILIRUB DIRECT SERPL-MCNC: 0.1 MG/DL (ref 0.1–0.3)
BILIRUB SERPL-MCNC: 0.5 MG/DL (ref 0.1–1)
BUN SERPL-MCNC: 19 MG/DL (ref 8–23)
CALCIUM SERPL-MCNC: 9.1 MG/DL (ref 8.7–10.5)
CHLORIDE SERPL-SCNC: 99 MMOL/L (ref 95–110)
CO2 SERPL-SCNC: 23 MMOL/L (ref 23–29)
CREAT SERPL-MCNC: 0.9 MG/DL (ref 0.5–1.4)
EST. GFR  (NO RACE VARIABLE): >60 ML/MIN/1.73 M^2
FERRITIN SERPL-MCNC: 51 NG/ML (ref 20–300)
GLUCOSE SERPL-MCNC: 111 MG/DL (ref 70–110)
HAV IGG SER QL IA: REACTIVE
HBV SURFACE AG SERPL QL IA: NORMAL
HCV AB SERPL QL IA: NORMAL
HGB BLD-MCNC: 12.2 G/DL (ref 12–16)
IRON SERPL-MCNC: 76 UG/DL (ref 30–160)
POTASSIUM SERPL-SCNC: 3.6 MMOL/L (ref 3.5–5.1)
PROT SERPL-MCNC: 7.4 G/DL (ref 6–8.4)
SATURATED IRON: 17 % (ref 20–50)
SODIUM SERPL-SCNC: 136 MMOL/L (ref 136–145)
TOTAL IRON BINDING CAPACITY: 456 UG/DL (ref 250–450)
TRANSFERRIN SERPL-MCNC: 308 MG/DL (ref 200–375)

## 2022-11-14 PROCEDURE — 87340 HEPATITIS B SURFACE AG IA: CPT | Performed by: INTERNAL MEDICINE

## 2022-11-14 PROCEDURE — 82728 ASSAY OF FERRITIN: CPT | Performed by: INTERNAL MEDICINE

## 2022-11-14 PROCEDURE — 86706 HEP B SURFACE ANTIBODY: CPT | Performed by: INTERNAL MEDICINE

## 2022-11-14 PROCEDURE — 86790 VIRUS ANTIBODY NOS: CPT | Performed by: INTERNAL MEDICINE

## 2022-11-14 PROCEDURE — 86803 HEPATITIS C AB TEST: CPT | Performed by: INTERNAL MEDICINE

## 2022-11-14 PROCEDURE — 80076 HEPATIC FUNCTION PANEL: CPT | Performed by: INTERNAL MEDICINE

## 2022-11-14 PROCEDURE — 84466 ASSAY OF TRANSFERRIN: CPT | Performed by: INTERNAL MEDICINE

## 2022-11-14 PROCEDURE — 80048 BASIC METABOLIC PNL TOTAL CA: CPT | Performed by: INTERNAL MEDICINE

## 2022-11-14 PROCEDURE — 85018 HEMOGLOBIN: CPT | Performed by: INTERNAL MEDICINE

## 2022-11-15 DIAGNOSIS — D50.9 IRON DEFICIENCY ANEMIA, UNSPECIFIED IRON DEFICIENCY ANEMIA TYPE: ICD-10-CM

## 2022-11-15 RX ORDER — FERROUS GLUCONATE 324(38)MG
324 TABLET ORAL
Qty: 90 TABLET | Refills: 1 | Status: SHIPPED | OUTPATIENT
Start: 2022-11-15 | End: 2023-04-01 | Stop reason: SDUPTHER

## 2022-11-15 NOTE — PROGRESS NOTES
Manpreet - please tell patient that they are iron deficient  but not anemic and recommend that they take ferrous gluconate one 324mg pill once daily for next 3 months.    Please order repeat fasting Hemoglobin, Iron/TIBC, and Ferritin in 12 weeks - Orders placed.

## 2022-11-17 ENCOUNTER — TELEPHONE (OUTPATIENT)
Dept: HEMATOLOGY/ONCOLOGY | Facility: CLINIC | Age: 77
End: 2022-11-17
Payer: MEDICARE

## 2022-11-17 ENCOUNTER — OFFICE VISIT (OUTPATIENT)
Dept: HEMATOLOGY/ONCOLOGY | Facility: CLINIC | Age: 77
End: 2022-11-17
Payer: MEDICARE

## 2022-11-17 ENCOUNTER — LAB VISIT (OUTPATIENT)
Dept: LAB | Facility: HOSPITAL | Age: 77
End: 2022-11-17
Payer: MEDICARE

## 2022-11-17 VITALS
RESPIRATION RATE: 17 BRPM | BODY MASS INDEX: 27.62 KG/M2 | SYSTOLIC BLOOD PRESSURE: 121 MMHG | HEART RATE: 85 BPM | DIASTOLIC BLOOD PRESSURE: 65 MMHG | HEIGHT: 63 IN | TEMPERATURE: 98 F | OXYGEN SATURATION: 96 % | WEIGHT: 155.88 LBS

## 2022-11-17 DIAGNOSIS — D64.9 NORMOCYTIC ANEMIA: ICD-10-CM

## 2022-11-17 DIAGNOSIS — D64.9 NORMOCYTIC ANEMIA: Primary | ICD-10-CM

## 2022-11-17 DIAGNOSIS — E61.1 IRON DEFICIENCY: ICD-10-CM

## 2022-11-17 LAB
ALBUMIN SERPL BCP-MCNC: 3.7 G/DL (ref 3.5–5.2)
ALP SERPL-CCNC: 54 U/L (ref 55–135)
ALT SERPL W/O P-5'-P-CCNC: 26 U/L (ref 10–44)
ANION GAP SERPL CALC-SCNC: 10 MMOL/L (ref 8–16)
AST SERPL-CCNC: 22 U/L (ref 10–40)
BASOPHILS # BLD AUTO: 0.04 K/UL (ref 0–0.2)
BASOPHILS NFR BLD: 0.4 % (ref 0–1.9)
BILIRUB SERPL-MCNC: 0.4 MG/DL (ref 0.1–1)
BUN SERPL-MCNC: 20 MG/DL (ref 8–23)
CALCIUM SERPL-MCNC: 8.9 MG/DL (ref 8.7–10.5)
CHLORIDE SERPL-SCNC: 100 MMOL/L (ref 95–110)
CO2 SERPL-SCNC: 28 MMOL/L (ref 23–29)
CREAT SERPL-MCNC: 0.9 MG/DL (ref 0.5–1.4)
DIFFERENTIAL METHOD: ABNORMAL
EOSINOPHIL # BLD AUTO: 0.2 K/UL (ref 0–0.5)
EOSINOPHIL NFR BLD: 2.4 % (ref 0–8)
ERYTHROCYTE [DISTWIDTH] IN BLOOD BY AUTOMATED COUNT: 13 % (ref 11.5–14.5)
EST. GFR  (NO RACE VARIABLE): >60 ML/MIN/1.73 M^2
GLUCOSE SERPL-MCNC: 106 MG/DL (ref 70–110)
HBV SURFACE AB SER QL IA: NEGATIVE
HBV SURFACE AB SERPL IA-ACNC: 6 MIU/ML
HCT VFR BLD AUTO: 34.9 % (ref 37–48.5)
HGB BLD-MCNC: 11.8 G/DL (ref 12–16)
IMM GRANULOCYTES # BLD AUTO: 0.03 K/UL (ref 0–0.04)
IMM GRANULOCYTES NFR BLD AUTO: 0.3 % (ref 0–0.5)
LYMPHOCYTES # BLD AUTO: 2.1 K/UL (ref 1–4.8)
LYMPHOCYTES NFR BLD: 23.5 % (ref 18–48)
MCH RBC QN AUTO: 29.4 PG (ref 27–31)
MCHC RBC AUTO-ENTMCNC: 33.8 G/DL (ref 32–36)
MCV RBC AUTO: 87 FL (ref 82–98)
MONOCYTES # BLD AUTO: 0.5 K/UL (ref 0.3–1)
MONOCYTES NFR BLD: 5.8 % (ref 4–15)
NEUTROPHILS # BLD AUTO: 6 K/UL (ref 1.8–7.7)
NEUTROPHILS NFR BLD: 67.6 % (ref 38–73)
NRBC BLD-RTO: 0 /100 WBC
PATH REV BLD -IMP: NORMAL
PATH REV BLD -IMP: NORMAL
PLATELET # BLD AUTO: 270 K/UL (ref 150–450)
PMV BLD AUTO: 10.7 FL (ref 9.2–12.9)
POTASSIUM SERPL-SCNC: 3.7 MMOL/L (ref 3.5–5.1)
PROT SERPL-MCNC: 7.3 G/DL (ref 6–8.4)
RBC # BLD AUTO: 4.02 M/UL (ref 4–5.4)
SODIUM SERPL-SCNC: 138 MMOL/L (ref 136–145)
WBC # BLD AUTO: 8.93 K/UL (ref 3.9–12.7)

## 2022-11-17 PROCEDURE — 85060 BLOOD SMEAR INTERPRETATION: CPT | Mod: ,,, | Performed by: PATHOLOGY

## 2022-11-17 PROCEDURE — 99215 PR OFFICE/OUTPT VISIT, EST, LEVL V, 40-54 MIN: ICD-10-PCS | Mod: S$GLB,,, | Performed by: INTERNAL MEDICINE

## 2022-11-17 PROCEDURE — 99999 PR PBB SHADOW E&M-EST. PATIENT-LVL III: CPT | Mod: PBBFAC,,, | Performed by: INTERNAL MEDICINE

## 2022-11-17 PROCEDURE — 1101F PT FALLS ASSESS-DOCD LE1/YR: CPT | Mod: CPTII,S$GLB,, | Performed by: INTERNAL MEDICINE

## 2022-11-17 PROCEDURE — 99999 PR PBB SHADOW E&M-EST. PATIENT-LVL III: ICD-10-PCS | Mod: PBBFAC,,, | Performed by: INTERNAL MEDICINE

## 2022-11-17 PROCEDURE — 3288F FALL RISK ASSESSMENT DOCD: CPT | Mod: CPTII,S$GLB,, | Performed by: INTERNAL MEDICINE

## 2022-11-17 PROCEDURE — 36415 COLL VENOUS BLD VENIPUNCTURE: CPT | Performed by: INTERNAL MEDICINE

## 2022-11-17 PROCEDURE — 85025 COMPLETE CBC W/AUTO DIFF WBC: CPT | Performed by: INTERNAL MEDICINE

## 2022-11-17 PROCEDURE — 1126F PR PAIN SEVERITY QUANTIFIED, NO PAIN PRESENT: ICD-10-PCS | Mod: CPTII,S$GLB,, | Performed by: INTERNAL MEDICINE

## 2022-11-17 PROCEDURE — 80053 COMPREHEN METABOLIC PANEL: CPT | Performed by: INTERNAL MEDICINE

## 2022-11-17 PROCEDURE — 99215 OFFICE O/P EST HI 40 MIN: CPT | Mod: S$GLB,,, | Performed by: INTERNAL MEDICINE

## 2022-11-17 PROCEDURE — 3074F SYST BP LT 130 MM HG: CPT | Mod: CPTII,S$GLB,, | Performed by: INTERNAL MEDICINE

## 2022-11-17 PROCEDURE — 1101F PR PT FALLS ASSESS DOC 0-1 FALLS W/OUT INJ PAST YR: ICD-10-PCS | Mod: CPTII,S$GLB,, | Performed by: INTERNAL MEDICINE

## 2022-11-17 PROCEDURE — 3078F DIAST BP <80 MM HG: CPT | Mod: CPTII,S$GLB,, | Performed by: INTERNAL MEDICINE

## 2022-11-17 PROCEDURE — 3288F PR FALLS RISK ASSESSMENT DOCUMENTED: ICD-10-PCS | Mod: CPTII,S$GLB,, | Performed by: INTERNAL MEDICINE

## 2022-11-17 PROCEDURE — 1159F PR MEDICATION LIST DOCUMENTED IN MEDICAL RECORD: ICD-10-PCS | Mod: CPTII,S$GLB,, | Performed by: INTERNAL MEDICINE

## 2022-11-17 PROCEDURE — 1159F MED LIST DOCD IN RCRD: CPT | Mod: CPTII,S$GLB,, | Performed by: INTERNAL MEDICINE

## 2022-11-17 PROCEDURE — 3074F PR MOST RECENT SYSTOLIC BLOOD PRESSURE < 130 MM HG: ICD-10-PCS | Mod: CPTII,S$GLB,, | Performed by: INTERNAL MEDICINE

## 2022-11-17 PROCEDURE — 3078F PR MOST RECENT DIASTOLIC BLOOD PRESSURE < 80 MM HG: ICD-10-PCS | Mod: CPTII,S$GLB,, | Performed by: INTERNAL MEDICINE

## 2022-11-17 PROCEDURE — 85060 PATHOLOGIST REVIEW: ICD-10-PCS | Mod: ,,, | Performed by: PATHOLOGY

## 2022-11-17 PROCEDURE — 1126F AMNT PAIN NOTED NONE PRSNT: CPT | Mod: CPTII,S$GLB,, | Performed by: INTERNAL MEDICINE

## 2022-11-17 NOTE — TELEPHONE ENCOUNTER
Returned call to patient , she is keeping appointment for today, denies fever or any other symptoms , jsut wanted to know.

## 2022-11-17 NOTE — TELEPHONE ENCOUNTER
"----- Message from Tatum Moyer sent at 11/17/2022  8:16 AM CST -----  Regarding: Sore throat  Consult/Advisory    Name Of Caller: self    Contact Preference?: [phone]    What is the nature of the call?: requesting a call back to advise if she should still come in today as she woke up with a sore throat           Additional Notes:  "Thank you for all that you do for our patients'"     "

## 2022-11-18 ENCOUNTER — TELEPHONE (OUTPATIENT)
Dept: GASTROENTEROLOGY | Facility: CLINIC | Age: 77
End: 2022-11-18
Payer: MEDICARE

## 2022-11-18 NOTE — TELEPHONE ENCOUNTER
----- Message from Yfn Black MD sent at 11/15/2022  4:59 PM CST -----  Manpreet - please tell patient that they are iron deficient  but not anemic and recommend that they take ferrous gluconate one 324mg pill once daily for next 3 months.    Please order repeat fasting Hemoglobin, Iron/TIBC, and Ferritin in 12 weeks - Orders placed.

## 2022-11-18 NOTE — TELEPHONE ENCOUNTER
Contact patient per Dr. Black, please tell patient that they are iron deficient  but not anemic and recommend that they take ferrous gluconate one 324mg pill once daily for next 3 months.     Please order repeat fasting Hemoglobin, Iron/TIBC, and Ferritin in 12 weeks - Orders placed.     Patient verbalized understanding.

## 2022-11-19 DIAGNOSIS — Z23 ENCOUNTER FOR VACCINATION: Primary | ICD-10-CM

## 2022-11-19 NOTE — PROGRESS NOTES
Manpreet - please tell patient she does not have immunity to viral hepatitis B and recommend hepatitis B vaccination series.    Hepatitis B vaccination series is a 2 part vaccine series - Day 1, and then again in 1-month from the first one.    Orders were  placed.

## 2022-11-23 PROBLEM — E61.1 IRON DEFICIENCY: Status: ACTIVE | Noted: 2022-11-23

## 2022-11-23 NOTE — PROGRESS NOTES
Hematology and Medical Oncology   Follow Up     11/17/2022      Reason For Referal: Iron Deficiency Anemia    History of Present Ilness:   Shakira Pearl (Shakira) is a pleasant 77 y.o.female who presents to clinic to discuss the possibility of iron deficiency anemia.    On chart review dating back to 2008, both iron and ferritin have been in the lower range of normal. There has also been a normocytic anemia in the 11-12 range during this time.     Colonoscopy and EGD were completed in 2020. Video capsule endoscopy was completed in July 2022. No obvious sources of bleeding were identified.    Interval History:  Doing well and continues to work with Dr. Black. Will start daily oral iron.     PAST MEDICAL HISTORY:   Past Medical History:   Diagnosis Date    Allergy sinus    Arthritis back    Cataract     Colon polyps     Degenerative disc disease back    Diverticulosis of colon     Dyspepsia     GERD (gastroesophageal reflux disease)     Heartburn     Hemorrhoids, internal     Hiatal hernia     Neuromuscular disorder     Vitamin D deficiency        PAST SURGICAL HISTORY:   Past Surgical History:   Procedure Laterality Date    APPENDECTOMY  age 19    CATARACT EXTRACTION W/  INTRAOCULAR LENS IMPLANT      CATARACT EXTRACTION W/  INTRAOCULAR LENS IMPLANT      CHOLECYSTECTOMY      age of 25    CHONDROPLASTY OF KNEE Left 10/03/2019    Procedure: CHONDROPLASTY, KNEE;  Surgeon: Kaylen Patiño MD;  Location: [facility];  Service: Orthopedics;  Laterality: Left;    COLONOSCOPY N/A 09/09/2020    Procedure: COLONOSCOPY;  Surgeon: Peter Cuevas MD;  Location: [address]);  Service: Endoscopy;  Laterality: N/A;  device assisted colonoscopy w/Dr Cuevas.  Difficult colon, multiple adhesions from abd surgeries, Hx adv adenomatous polyp/tubulovillous adenoma of cecum in April 2011.  Dr Black     per Dr HerronMackinaw for 4th floor - 60 minute slot (90 min w/egd added).  Start with peds col    ESOPHAGOGASTRODUODENOSCOPY N/A 09/09/2020     Procedure: EGD (ESOPHAGOGASTRODUODENOSCOPY);  Surgeon: Peter Cuevas MD;  [facility]  Service: Endoscopy;  Laterality: N/A;  per Dr Black-add EGD to colonoscopy order.  Pt requesting later appt.  4/13/20 - removed from 4/30/20, rescheduled 7/29/20 - pg   covid 9/6-met-urgent care--tb    HYSTERECTOMY  38    HYSTERECTOMY, VAGINAL, WITH UTEROSACRAL LIGAMENT VAULT SUSPENSION      INJECTION OF JOINT Bilateral 02/18/2019    Procedure: INJECTION, JOINT BILATERAL SI;  Surgeon: Mert Palumbo MD;  Location: [facility] [place] [facility];  Service: Pain Management;  Laterality: Bilateral;  BILATERAL SI JOINT INJECTION    INJECTION OF JOINT Right 08/20/2020    Procedure: INJECTION JOINT/ R HIP DIRECT REFERRAL * DR. PALUMBO ONLY PLEASE*;  Surgeon: Mert Palumbo MD;  Location: [facility] [place] [facility];  Service: Pain Management;  Laterality: Right;  NEED CONSENT    KNEE ARTHROSCOPY W/ MENISCECTOMY Left 10/03/2019    Procedure: ARTHROSCOPY, KNEE, WITH MENISCECTOMY;  Surgeon: Kaylen Patiño MD;  Location: [facility];  Service: Orthopedics;  Laterality: Left;    SYNOVECTOMY OF KNEE Left 10/03/2019    Procedure: SYNOVECTOMY, KNEE;  Surgeon: Kaylen Patiño MD;  Location: [facility];  Service: Orthopedics;  Laterality: Left;    YAG Laser Capsulotomy Bilateral     Dr. Aleman       PAST SOCIAL HISTORY:   reports that she has never smoked. She has never used smokeless tobacco. She reports that she does not drink alcohol and does not use drugs.    FAMILY HISTORY:  Family History   Problem Relation Age of Onset    Heart disease Mother 67        heart attack    Stroke Mother     Cancer Father 65        abdominal    Stomach cancer Father     No Known Problems Sister     No Known Problems Brother     No Known Problems Son     No Known Problems Maternal Grandmother     No Known Problems Maternal Grandfather     No Known Problems Paternal Grandmother     No Known Problems Paternal Grandfather     No Known Problems Maternal Aunt     No Known Problems  Maternal Uncle     No Known Problems Paternal Aunt     No Known Problems Paternal Uncle     Celiac disease Neg Hx     Colon cancer Neg Hx     Crohn's disease Neg Hx     Esophageal cancer Neg Hx     Inflammatory bowel disease Neg Hx     Liver cancer Neg Hx     Rectal cancer Neg Hx     Ulcerative colitis Neg Hx     Amblyopia Neg Hx     Blindness Neg Hx     Cataracts Neg Hx     Diabetes Neg Hx     Glaucoma Neg Hx     Hypertension Neg Hx     Macular degeneration Neg Hx     Retinal detachment Neg Hx     Strabismus Neg Hx     Thyroid disease Neg Hx     Anesthesia problems Neg Hx        CURRENT MEDICATIONS:   Current Outpatient Medications   Medication Sig    DULoxetine (CYMBALTA) 60 MG capsule Take 1 capsule (60 mg total) by mouth once daily.    ergocalciferol (ERGOCALCIFEROL) 50,000 unit Cap Take 1 capsule (50,000 Units total) by mouth every 7 days.    estradioL (ESTRACE) 0.01 % (0.1 mg/gram) vaginal cream 0.5 grams with applicator or dime-sized amount with finger in vagina nightly x 2 weeks, then twice a week thereafter    estradiol 0.05 mg/24 hr td ptsw (VIVELLE-DOT) 0.05 mg/24 hr     estradiol valerate (DELESTROGEN) 40 mg/mL injection Inject 0.5 mLs (20 mg total) into the muscle every 28 days. Inject into the muscle.    ezetimibe (ZETIA) 10 mg tablet TAKE ONE TABLET BY MOUTH EVERY DAY    ferrous gluconate (FERGON) 324 MG tablet Take 1 tablet (324 mg total) by mouth daily with breakfast.    icosapent ethyL (VASCEPA) 1 gram Cap Take 2 capsules (2 g total) by mouth 2 (two) times a day.    losartan-hydrochlorothiazide 50-12.5 mg (HYZAAR) 50-12.5 mg per tablet Take 1 tablet by mouth once daily.    mirabegron (MYRBETRIQ) 25 mg Tb24 ER tablet Take 1 tablet (25 mg total) by mouth once daily.    pantoprazole (PROTONIX) 40 MG tablet TAKE ONE TABLET BY MOUTH EVERY MORNING 45 MINUTES BEFORE BREAKFAST    potassium chloride SA (K-DUR,KLOR-CON) 10 MEQ tablet Take 1 tablet (10 mEq total) by mouth once daily.     No current  facility-administered medications for this visit.     ALLERGIES:   Review of patient's allergies indicates:   Allergen Reactions    Demerol [meperidine] Nausea And Vomiting     Other reaction(s): Nausea    Papaya      Illness vomiting    Sulfa (sulfonamide antibiotics) Rash    Sulfur Rash         Review of Systems:     Review of Systems   Constitutional:  Negative for appetite change, chills, diaphoresis, fatigue, fever and unexpected weight change.   HENT:   Negative for hearing loss, mouth sores, nosebleeds, sore throat, trouble swallowing and voice change.    Eyes:  Negative for eye problems and icterus.   Respiratory:  Negative for chest tightness, cough, hemoptysis, shortness of breath and wheezing.    Cardiovascular:  Negative for chest pain, leg swelling and palpitations.   Gastrointestinal:  Negative for abdominal distention, abdominal pain, blood in stool, diarrhea, nausea and vomiting.   Endocrine: Negative for hot flashes.   Genitourinary:  Negative for bladder incontinence, difficulty urinating, dysuria and hematuria.    Musculoskeletal:  Negative for arthralgias, back pain, flank pain, gait problem, myalgias, neck pain and neck stiffness.   Skin:  Negative for itching, rash and wound.   Neurological:  Negative for dizziness, extremity weakness, gait problem, headaches, numbness, seizures and speech difficulty.   Hematological:  Negative for adenopathy. Does not bruise/bleed easily.   Psychiatric/Behavioral:  Negative for confusion, depression and sleep disturbance. The patient is not nervous/anxious.         Physical Exam:     Vitals:    11/17/22 1106   BP: 121/65   Pulse: 85   Resp: 17   Temp: 98.2 °F (36.8 °C)     Physical Exam  Constitutional:       General: She is not in acute distress.     Appearance: She is well-developed. She is not diaphoretic.   HENT:      Head: Normocephalic and atraumatic.      Mouth/Throat:      Pharynx: No oropharyngeal exudate.   Eyes:      Conjunctiva/sclera: Conjunctivae  normal.      Pupils: Pupils are equal, round, and reactive to light.   Neck:      Thyroid: No thyromegaly.      Vascular: No JVD.      Trachea: No tracheal deviation.   Cardiovascular:      Rate and Rhythm: Normal rate and regular rhythm.      Heart sounds: Normal heart sounds. No murmur heard.    No friction rub.   Pulmonary:      Effort: Pulmonary effort is normal. No respiratory distress.      Breath sounds: Normal breath sounds. No stridor. No wheezing or rales.   Chest:      Chest wall: No tenderness.   Abdominal:      General: Bowel sounds are normal. There is no distension.      Palpations: Abdomen is soft.      Tenderness: There is no abdominal tenderness. There is no guarding or rebound.   Musculoskeletal:         General: No tenderness or deformity. Normal range of motion.      Cervical back: Normal range of motion and neck supple.   Skin:     General: Skin is warm and dry.      Capillary Refill: Capillary refill takes less than 2 seconds.      Coloration: Skin is not pale.      Findings: No erythema or rash.   Neurological:      Mental Status: She is alert and oriented to person, place, and time.      Cranial Nerves: No cranial nerve deficit.      Sensory: No sensory deficit.      Motor: No abnormal muscle tone.      Coordination: Coordination normal.      Deep Tendon Reflexes: Reflexes normal.   Psychiatric:         Behavior: Behavior normal.         Thought Content: Thought content normal.         Judgment: Judgment normal.       ECOG Performance Status: (foot note - ECOG PS provided by Eastern Cooperative Oncology Group) 0 - Asymptomatic    Karnofsky Performance Score:  100%- Normal, No Complaints, No Evidence of Disease    Labs:   Lab Results   Component Value Date    WBC 8.93 11/17/2022    HGB 11.8 (L) 11/17/2022    HCT 34.9 (L) 11/17/2022     11/17/2022    CHOL 148 05/06/2022    TRIG 275 (H) 05/06/2022    HDL 49 05/06/2022    ALT 26 11/17/2022    AST 22 11/17/2022     11/17/2022    K  3.7 11/17/2022     11/17/2022    CREATININE 0.9 11/17/2022    BUN 20 11/17/2022    CO2 28 11/17/2022    TSH 2.371 05/06/2022    INR 0.9 05/06/2020    HGBA1C 5.7 (H) 06/10/2022     Iron: 81  TIBC: 494  Ferritin: 33    Folate: 7.6  Vitamin B-12: 242  Epo: 7.6        Imaging: Previous imaging has been reviewed     Assessment and Plan:     Ms. Pearl is a pleasant 77 year old female with long standing normocytic anemia.    Anemia of the Elderly  --Mild persistent normocytic anemia  --Now evidence of mild iron deficiency, will start daily oral supplement  --Continue to follow with Dr. Black  --Plan to trend q 3 month labs to ensure they do not acutely worsen      30 minutes were spent face to face with the patient to discuss the disease, natural history, and possible causes. I have provided the patient with an opportunity to ask questions and have all questions answered to her satisfaction.       she will return to clinic in 3 months, but knows to call in the interim if symptoms change or should a problem arise.        Sarika Faulkner MD  Hematology and Medical Oncology  Bone Marrow Transplant  [facility]        BMT Chart Routing      Follow up with physician 3 months.   Follow up with STEPHANIE    Provider visit type    Infusion scheduling note    Injection scheduling note    Labs CMP, CBC, iron and TIBC and ferritin   Lab interval:     Imaging    Pharmacy appointment    Other referrals

## 2022-11-29 ENCOUNTER — TELEPHONE (OUTPATIENT)
Dept: PAIN MEDICINE | Facility: CLINIC | Age: 77
End: 2022-11-29
Payer: MEDICARE

## 2022-11-29 ENCOUNTER — PATIENT MESSAGE (OUTPATIENT)
Dept: PAIN MEDICINE | Facility: CLINIC | Age: 77
End: 2022-11-29
Payer: MEDICARE

## 2022-11-29 DIAGNOSIS — D50.9 IRON DEFICIENCY ANEMIA, UNSPECIFIED IRON DEFICIENCY ANEMIA TYPE: Primary | ICD-10-CM

## 2022-11-29 NOTE — TELEPHONE ENCOUNTER
----- Message from Deandra Sosa sent at 11/29/2022 10:21 AM CST -----  Regarding: Patient call back  Who called:CHELSY GONZALEZ [[id number]]    What is the request in detail: Patient is requesting a call back. She would like to r/s her 11/30 appt. No appts available.     Please advise.    Can the clinic reply by [facility]? No    Best call back number: [phone]    Additional Information: N/A

## 2022-11-30 ENCOUNTER — TELEPHONE (OUTPATIENT)
Dept: PAIN MEDICINE | Facility: CLINIC | Age: 77
End: 2022-11-30
Payer: MEDICARE

## 2022-11-30 ENCOUNTER — TELEPHONE (OUTPATIENT)
Dept: HEPATOLOGY | Facility: CLINIC | Age: 77
End: 2022-11-30
Payer: MEDICARE

## 2022-11-30 NOTE — TELEPHONE ENCOUNTER
----- Message from Magdaleno Mcgrath sent at 11/30/2022  9:24 AM CST -----      Name of Who is Calling: CHELSY GONZALEZ [[id number]]      What is the request in detail: Pt called to get an appt for a f/u pt doesn't want to see the NP.Please contact to further discuss and advise.          Can the clinic reply by [facility]: N      What Number to Call Back if not in [facility]: [phone]

## 2022-11-30 NOTE — TELEPHONE ENCOUNTER
Referral to hepatology for liver lesions. Called pt to schedule appt with any MD    Scheduling attempt # 1     Pt had already been scheduled for January with Dr. Starr, will keep appt

## 2022-12-20 ENCOUNTER — TELEPHONE (OUTPATIENT)
Dept: PAIN MEDICINE | Facility: CLINIC | Age: 77
End: 2022-12-20
Payer: MEDICARE

## 2022-12-20 NOTE — TELEPHONE ENCOUNTER
----- Message from Yasmin Barreto sent at 12/20/2022 11:07 AM CST -----  Regarding: pt appt  Name of Who is Calling:CHELSY GONZALEZ [[id number]]          What is the request in detail: Pt is trying to schedule appt w/ Eissa. She pulled her back and also wants to f/u for her neck. I was not able to schedule anything on my end and she only wants to see Eissa . Please c/b to assist           Can the clinic reply by [facility]:no          What Number to Call Back [facility] not [facility]:[phone]

## 2022-12-20 NOTE — TELEPHONE ENCOUNTER
----- Message from Yasmin Barreto sent at 12/20/2022 11:02 AM CST -----  Regarding: pt pain and rx  Name of Who is Calling:CHELSY GONZALEZ [[id number]]          What is the request in detail: Pt pulled her back. She is asking if she can get a strong muscle relaxer prescribed for her until she can get in to see you.  She asks if you could call her something in to:    [facility] [place] [address]   Phone:  [phone]  Fax:  [phone]            Can the clinic reply by [facility]:No           What Number to Call Back if not in [facility]:[phone]

## 2022-12-22 ENCOUNTER — TELEPHONE (OUTPATIENT)
Dept: PAIN MEDICINE | Facility: CLINIC | Age: 77
End: 2022-12-22
Payer: MEDICARE

## 2022-12-22 NOTE — TELEPHONE ENCOUNTER
----- Message from Brenda Pagan sent at 12/22/2022  9:12 AM CST -----  Regarding: pain and appointment  Name of Who is Calling: CHELSY GONZALEZ [[id number]]      What is the request in detail: Patient is requesting to see if a prescription for muscle relaxer can be called in for back pain and also she would like an appointment for next week       Can the clinic reply by [facility]: no      What Number to Call Back if not in [facility]: [phone]

## 2022-12-28 ENCOUNTER — TELEPHONE (OUTPATIENT)
Dept: GASTROENTEROLOGY | Facility: CLINIC | Age: 77
End: 2022-12-28
Payer: MEDICARE

## 2022-12-28 NOTE — TELEPHONE ENCOUNTER
Return call and spoke with patient. Inform patient to keep her follow up appointment with Dr. Black to discuss about the hepatitis vaccination.

## 2022-12-28 NOTE — TELEPHONE ENCOUNTER
----- Message from Robert García sent at 12/28/2022  9:39 AM CST -----  Contact: pt  Pt requesting call back RE: In regards to labs that was schedule on 2/1,please call and discuss         Confirmed contact below:  Contact Name:Shakira Pearl  Phone Number: [phone]

## 2023-01-04 ENCOUNTER — OFFICE VISIT (OUTPATIENT)
Dept: INTERNAL MEDICINE | Facility: CLINIC | Age: 78
End: 2023-01-04
Payer: MEDICARE

## 2023-01-04 VITALS
SYSTOLIC BLOOD PRESSURE: 148 MMHG | HEIGHT: 63 IN | BODY MASS INDEX: 28 KG/M2 | DIASTOLIC BLOOD PRESSURE: 84 MMHG | WEIGHT: 158.06 LBS | HEART RATE: 84 BPM | OXYGEN SATURATION: 98 % | RESPIRATION RATE: 18 BRPM | TEMPERATURE: 97 F

## 2023-01-04 DIAGNOSIS — R73.9 ELEVATED BLOOD SUGAR: Primary | ICD-10-CM

## 2023-01-04 DIAGNOSIS — M46.1 SACROILIITIS: Primary | ICD-10-CM

## 2023-01-04 DIAGNOSIS — D35.02 ADENOMA OF LEFT ADRENAL GLAND: ICD-10-CM

## 2023-01-04 DIAGNOSIS — I10 ESSENTIAL HYPERTENSION: ICD-10-CM

## 2023-01-04 DIAGNOSIS — E61.1 IRON DEFICIENCY: ICD-10-CM

## 2023-01-04 DIAGNOSIS — E78.5 ELEVATED LIPIDS: ICD-10-CM

## 2023-01-04 PROCEDURE — 99999 PR PBB SHADOW E&M-EST. PATIENT-LVL IV: ICD-10-PCS | Mod: PBBFAC,HCNC,, | Performed by: INTERNAL MEDICINE

## 2023-01-04 PROCEDURE — 1100F PTFALLS ASSESS-DOCD GE2>/YR: CPT | Mod: HCNC,CPTII,S$GLB, | Performed by: INTERNAL MEDICINE

## 2023-01-04 PROCEDURE — 3077F PR MOST RECENT SYSTOLIC BLOOD PRESSURE >= 140 MM HG: ICD-10-PCS | Mod: HCNC,CPTII,S$GLB, | Performed by: INTERNAL MEDICINE

## 2023-01-04 PROCEDURE — 3079F PR MOST RECENT DIASTOLIC BLOOD PRESSURE 80-89 MM HG: ICD-10-PCS | Mod: HCNC,CPTII,S$GLB, | Performed by: INTERNAL MEDICINE

## 2023-01-04 PROCEDURE — 1125F PR PAIN SEVERITY QUANTIFIED, PAIN PRESENT: ICD-10-PCS | Mod: HCNC,CPTII,S$GLB, | Performed by: INTERNAL MEDICINE

## 2023-01-04 PROCEDURE — 3288F PR FALLS RISK ASSESSMENT DOCUMENTED: ICD-10-PCS | Mod: HCNC,CPTII,S$GLB, | Performed by: INTERNAL MEDICINE

## 2023-01-04 PROCEDURE — 99999 PR PBB SHADOW E&M-EST. PATIENT-LVL IV: CPT | Mod: PBBFAC,HCNC,, | Performed by: INTERNAL MEDICINE

## 2023-01-04 PROCEDURE — 3079F DIAST BP 80-89 MM HG: CPT | Mod: HCNC,CPTII,S$GLB, | Performed by: INTERNAL MEDICINE

## 2023-01-04 PROCEDURE — 99214 PR OFFICE/OUTPT VISIT, EST, LEVL IV, 30-39 MIN: ICD-10-PCS | Mod: HCNC,S$GLB,, | Performed by: INTERNAL MEDICINE

## 2023-01-04 PROCEDURE — 1125F AMNT PAIN NOTED PAIN PRSNT: CPT | Mod: HCNC,CPTII,S$GLB, | Performed by: INTERNAL MEDICINE

## 2023-01-04 PROCEDURE — 3288F FALL RISK ASSESSMENT DOCD: CPT | Mod: HCNC,CPTII,S$GLB, | Performed by: INTERNAL MEDICINE

## 2023-01-04 PROCEDURE — 1100F PR PT FALLS ASSESS DOC 2+ FALLS/FALL W/INJURY/YR: ICD-10-PCS | Mod: HCNC,CPTII,S$GLB, | Performed by: INTERNAL MEDICINE

## 2023-01-04 PROCEDURE — 3077F SYST BP >= 140 MM HG: CPT | Mod: HCNC,CPTII,S$GLB, | Performed by: INTERNAL MEDICINE

## 2023-01-04 PROCEDURE — 99214 OFFICE O/P EST MOD 30 MIN: CPT | Mod: HCNC,S$GLB,, | Performed by: INTERNAL MEDICINE

## 2023-01-04 RX ORDER — EZETIMIBE 10 MG/1
10 TABLET ORAL DAILY
Qty: 90 TABLET | Refills: 3 | Status: SHIPPED | OUTPATIENT
Start: 2023-01-04 | End: 2023-04-01

## 2023-01-04 RX ORDER — METHOCARBAMOL 750 MG/1
750 TABLET, FILM COATED ORAL 3 TIMES DAILY PRN
Qty: 60 TABLET | Refills: 1 | Status: SHIPPED | OUTPATIENT
Start: 2023-01-04 | End: 2023-01-14

## 2023-01-04 NOTE — PROGRESS NOTES
Subjective:       Patient ID: Shakira Pearl is a 77 y.o. female.    Chief Complaint: Follow-up (6 Mos Follow up) and Neck Pain (/Pt says she had a prescription prescribed by you but not in chart.)    HPI  Pt with LAM, HLD, HTN, Sacroiliitis, L Adrenal adenoma is here for f/u.   Review of Systems   Constitutional:  Negative for activity change, appetite change, chills, diaphoresis, fatigue, fever and unexpected weight change.   HENT:  Negative for postnasal drip, rhinorrhea, sinus pressure/congestion, sneezing, sore throat, trouble swallowing and voice change.    Respiratory:  Negative for cough, shortness of breath and wheezing.    Cardiovascular:  Negative for chest pain, palpitations and leg swelling.   Gastrointestinal:  Negative for abdominal pain, blood in stool, constipation, diarrhea, nausea and vomiting.   Genitourinary:  Negative for dysuria.   Musculoskeletal:  Negative for arthralgias and myalgias.   Integumentary:  Negative for rash and wound.   Allergic/Immunologic: Negative for environmental allergies and food allergies.   Hematological:  Negative for adenopathy. Does not bruise/bleed easily.       Objective:      Physical Exam  Constitutional:       General: She is not in acute distress.     Appearance: Normal appearance. She is well-developed. She is not diaphoretic.   HENT:      Head: Normocephalic and atraumatic.      Right Ear: External ear normal.      Left Ear: External ear normal.      Nose: Nose normal.      Mouth/Throat:      Pharynx: No oropharyngeal exudate.   Eyes:      General: No scleral icterus.        Right eye: No discharge.         Left eye: No discharge.      Conjunctiva/sclera: Conjunctivae normal.      Pupils: Pupils are equal, round, and reactive to light.   Neck:      Vascular: No JVD.   Cardiovascular:      Rate and Rhythm: Normal rate and regular rhythm.      Pulses: Normal pulses.      Heart sounds: Normal heart sounds.   Pulmonary:      Effort: Pulmonary effort is normal. No  respiratory distress.      Breath sounds: Normal breath sounds. No wheezing, rhonchi or rales.   Abdominal:      General: Bowel sounds are normal. There is no distension.      Palpations: Abdomen is soft.      Tenderness: There is no abdominal tenderness. There is no guarding or rebound.   Musculoskeletal:      Cervical back: Neck supple.      Right lower leg: No edema.      Left lower leg: No edema.   Lymphadenopathy:      Cervical: No cervical adenopathy.   Skin:     General: Skin is warm and dry.      Coloration: Skin is not pale.      Findings: No rash.   Neurological:      General: No focal deficit present.      Mental Status: She is alert and oriented to person, place, and time.      Gait: Gait normal.   Psychiatric:         Behavior: Behavior normal.         Thought Content: Thought content normal.       Assessment:       Problem List Items Addressed This Visit          Cardiac/Vascular    Essential hypertension    Elevated lipids    Relevant Medications    ezetimibe (ZETIA) 10 mg tablet       Oncology    Iron deficiency    Adenoma of left adrenal gland       Orthopedic    Sacroiliitis - Primary       Plan:    LAM- followed by GI      HLD- on Zetia/Vascepa      HTN- stable on Hyzaar         Sacroiliitis- stable     Liver lesions- will see Hepatology      Adrenal adenoma- has seen Endo     F/u in 6 months for annual exam

## 2023-01-12 ENCOUNTER — TELEPHONE (OUTPATIENT)
Dept: HEPATOLOGY | Facility: CLINIC | Age: 78
End: 2023-01-12

## 2023-01-12 ENCOUNTER — OFFICE VISIT (OUTPATIENT)
Dept: HEPATOLOGY | Facility: CLINIC | Age: 78
End: 2023-01-12
Payer: MEDICARE

## 2023-01-12 VITALS
OXYGEN SATURATION: 97 % | BODY MASS INDEX: 26.93 KG/M2 | TEMPERATURE: 98 F | SYSTOLIC BLOOD PRESSURE: 141 MMHG | DIASTOLIC BLOOD PRESSURE: 65 MMHG | WEIGHT: 152 LBS | HEART RATE: 83 BPM

## 2023-01-12 DIAGNOSIS — K76.0 FATTY LIVER: Primary | ICD-10-CM

## 2023-01-12 DIAGNOSIS — K76.0 FATTY LIVER: ICD-10-CM

## 2023-01-12 DIAGNOSIS — R16.0 HEPATOMEGALY: ICD-10-CM

## 2023-01-12 DIAGNOSIS — K76.9 LIVER LESION: ICD-10-CM

## 2023-01-12 PROCEDURE — 99999 PR PBB SHADOW E&M-EST. PATIENT-LVL V: ICD-10-PCS | Mod: PBBFAC,,, | Performed by: INTERNAL MEDICINE

## 2023-01-12 PROCEDURE — 1101F PT FALLS ASSESS-DOCD LE1/YR: CPT | Mod: HCNC,CPTII,S$GLB, | Performed by: INTERNAL MEDICINE

## 2023-01-12 PROCEDURE — 3077F PR MOST RECENT SYSTOLIC BLOOD PRESSURE >= 140 MM HG: ICD-10-PCS | Mod: HCNC,CPTII,S$GLB, | Performed by: INTERNAL MEDICINE

## 2023-01-12 PROCEDURE — 1159F MED LIST DOCD IN RCRD: CPT | Mod: HCNC,CPTII,S$GLB, | Performed by: INTERNAL MEDICINE

## 2023-01-12 PROCEDURE — 3288F PR FALLS RISK ASSESSMENT DOCUMENTED: ICD-10-PCS | Mod: HCNC,CPTII,S$GLB, | Performed by: INTERNAL MEDICINE

## 2023-01-12 PROCEDURE — 1101F PR PT FALLS ASSESS DOC 0-1 FALLS W/OUT INJ PAST YR: ICD-10-PCS | Mod: HCNC,CPTII,S$GLB, | Performed by: INTERNAL MEDICINE

## 2023-01-12 PROCEDURE — 3078F PR MOST RECENT DIASTOLIC BLOOD PRESSURE < 80 MM HG: ICD-10-PCS | Mod: HCNC,CPTII,S$GLB, | Performed by: INTERNAL MEDICINE

## 2023-01-12 PROCEDURE — 1160F PR REVIEW ALL MEDS BY PRESCRIBER/CLIN PHARMACIST DOCUMENTED: ICD-10-PCS | Mod: HCNC,CPTII,S$GLB, | Performed by: INTERNAL MEDICINE

## 2023-01-12 PROCEDURE — 99204 PR OFFICE/OUTPT VISIT, NEW, LEVL IV, 45-59 MIN: ICD-10-PCS | Mod: HCNC,S$GLB,, | Performed by: INTERNAL MEDICINE

## 2023-01-12 PROCEDURE — 3078F DIAST BP <80 MM HG: CPT | Mod: HCNC,CPTII,S$GLB, | Performed by: INTERNAL MEDICINE

## 2023-01-12 PROCEDURE — 99204 OFFICE O/P NEW MOD 45 MIN: CPT | Mod: HCNC,S$GLB,, | Performed by: INTERNAL MEDICINE

## 2023-01-12 PROCEDURE — 1125F PR PAIN SEVERITY QUANTIFIED, PAIN PRESENT: ICD-10-PCS | Mod: HCNC,CPTII,S$GLB, | Performed by: INTERNAL MEDICINE

## 2023-01-12 PROCEDURE — 99999 PR PBB SHADOW E&M-EST. PATIENT-LVL V: CPT | Mod: PBBFAC,,, | Performed by: INTERNAL MEDICINE

## 2023-01-12 PROCEDURE — 1160F RVW MEDS BY RX/DR IN RCRD: CPT | Mod: HCNC,CPTII,S$GLB, | Performed by: INTERNAL MEDICINE

## 2023-01-12 PROCEDURE — 1159F PR MEDICATION LIST DOCUMENTED IN MEDICAL RECORD: ICD-10-PCS | Mod: HCNC,CPTII,S$GLB, | Performed by: INTERNAL MEDICINE

## 2023-01-12 PROCEDURE — 3288F FALL RISK ASSESSMENT DOCD: CPT | Mod: HCNC,CPTII,S$GLB, | Performed by: INTERNAL MEDICINE

## 2023-01-12 PROCEDURE — 3077F SYST BP >= 140 MM HG: CPT | Mod: HCNC,CPTII,S$GLB, | Performed by: INTERNAL MEDICINE

## 2023-01-12 PROCEDURE — 1125F AMNT PAIN NOTED PAIN PRSNT: CPT | Mod: HCNC,CPTII,S$GLB, | Performed by: INTERNAL MEDICINE

## 2023-01-12 NOTE — TELEPHONE ENCOUNTER
Spoke with Mrs Rosenberg and she was ok with taking a March 2023 appt follow up and is now scheduled----- Message from Pj Meek MA sent at 1/12/2023  2:35 PM CST -----  Please casandra pt an appt anytime after MRI/fibroscan  on 2/3. GLADYS DANG
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
## 2023-01-17 ENCOUNTER — APPOINTMENT (OUTPATIENT)
Dept: RADIOLOGY | Facility: CLINIC | Age: 78
End: 2023-01-17
Attending: NURSE PRACTITIONER
Payer: MEDICARE

## 2023-01-17 DIAGNOSIS — N39.46 MIXED URINARY INCONTINENCE DUE TO FEMALE GENITAL PROLAPSE: ICD-10-CM

## 2023-01-17 DIAGNOSIS — M85.89 OTHER SPECIFIED DISORDERS OF BONE DENSITY AND STRUCTURE, MULTIPLE SITES: ICD-10-CM

## 2023-01-17 DIAGNOSIS — R29.890 LOSS OF HEIGHT: ICD-10-CM

## 2023-01-17 DIAGNOSIS — N81.9 MIXED URINARY INCONTINENCE DUE TO FEMALE GENITAL PROLAPSE: ICD-10-CM

## 2023-01-17 PROCEDURE — 77080 DXA BONE DENSITY AXIAL: CPT | Mod: TC,HCNC,PO

## 2023-01-17 PROCEDURE — 77080 DXA BONE DENSITY AXIAL: CPT | Mod: 26,HCNC,, | Performed by: INTERNAL MEDICINE

## 2023-01-17 PROCEDURE — 77080 DEXA BONE DENSITY SPINE HIP: ICD-10-PCS | Mod: 26,HCNC,, | Performed by: INTERNAL MEDICINE

## 2023-01-17 RX ORDER — ERGOCALCIFEROL 1.25 MG/1
50000 CAPSULE ORAL
Qty: 36 CAPSULE | Refills: 2 | Status: SHIPPED | OUTPATIENT
Start: 2023-01-17 | End: 2023-05-12

## 2023-01-17 RX ORDER — DULOXETIN HYDROCHLORIDE 60 MG/1
60 CAPSULE, DELAYED RELEASE ORAL DAILY
Qty: 90 CAPSULE | Refills: 3 | Status: SHIPPED | OUTPATIENT
Start: 2023-01-17 | End: 2023-02-17

## 2023-01-17 NOTE — TELEPHONE ENCOUNTER
Lov 1/4/23  Lrf 10/25/21, 8/1/22    Pt does not remember the 3rd Rx and will call back to have it refilled.

## 2023-01-17 NOTE — TELEPHONE ENCOUNTER
No new care gaps identified.  [facility] Embedded Care Gaps. Reference number: [id number]. 1/17/2023   3:44:11 PM CST

## 2023-01-17 NOTE — TELEPHONE ENCOUNTER
----- Message from Verna Paul sent at 1/17/2023  3:03 PM CST -----  Regarding: refill  Contact: self /[phone]  Patient would like a refill on three medication and ask to be sent to [facility] Mail Delivery :  1.DULoxetine (CYMBALTA) 60 MG capsule  90days   2.vitamin d not sure of the name   90 days   she also have another one that she would like to have filled     Patient asking for a call.    Please advise  Thanks A Barneston!

## 2023-01-23 ENCOUNTER — PATIENT MESSAGE (OUTPATIENT)
Dept: ADMINISTRATIVE | Facility: HOSPITAL | Age: 78
End: 2023-01-23
Payer: MEDICARE

## 2023-01-23 ENCOUNTER — PATIENT OUTREACH (OUTPATIENT)
Dept: ADMINISTRATIVE | Facility: HOSPITAL | Age: 78
End: 2023-01-23
Payer: MEDICARE

## 2023-01-23 ENCOUNTER — TELEPHONE (OUTPATIENT)
Dept: INTERNAL MEDICINE | Facility: CLINIC | Age: 78
End: 2023-01-23
Payer: MEDICARE

## 2023-01-23 PROBLEM — Z00.00 ENCOUNTER FOR PREVENTIVE HEALTH EXAMINATION: Chronic | Status: RESOLVED | Noted: 2022-10-18 | Resolved: 2023-01-23

## 2023-01-23 NOTE — TELEPHONE ENCOUNTER
----- Message from Katelyn Gallo sent at 1/23/2023  4:11 PM CST -----  Contact: [phone] Patient  Pt is calling in regards to ergocalciferol (ERGOCALCIFEROL) 50,000 unit Cap. Pt stated it says D2 and that is not what she wants. Pt stated she wants D3. Please call and advise.

## 2023-01-24 ENCOUNTER — TELEPHONE (OUTPATIENT)
Dept: HEPATOLOGY | Facility: CLINIC | Age: 78
End: 2023-01-24
Payer: MEDICARE

## 2023-01-24 NOTE — TELEPHONE ENCOUNTER
Returned the call and added the creat to her labs she is scheduled for for 2/1/2023----- Message from Davion Dawn MA sent at 1/23/2023  5:07 PM CST -----  Regarding: FW: Schedule Lab Appt    ----- Message -----  From: Andres Celis  Sent: 1/23/2023   1:27 PM CST  To: [facility] Staff  Subject: Schedule Lab Appt                                Patient called in to inquire about a creatinine blood test ordered by provider. Offered to assist pt by scheduling blood test but patient a little erratic that something was ordered but not scheduled for her. Requested that nurse take care of it.                Contact: [phone]

## 2023-01-30 ENCOUNTER — PATIENT MESSAGE (OUTPATIENT)
Dept: INTERNAL MEDICINE | Facility: CLINIC | Age: 78
End: 2023-01-30
Payer: MEDICARE

## 2023-01-30 ENCOUNTER — TELEPHONE (OUTPATIENT)
Dept: INTERNAL MEDICINE | Facility: CLINIC | Age: 78
End: 2023-01-30
Payer: MEDICARE

## 2023-01-30 DIAGNOSIS — R93.7 ABNORMAL BONE DENSITY SCREENING: Primary | ICD-10-CM

## 2023-01-31 ENCOUNTER — TELEPHONE (OUTPATIENT)
Dept: HEPATOLOGY | Facility: CLINIC | Age: 78
End: 2023-01-31
Payer: MEDICARE

## 2023-01-31 NOTE — TELEPHONE ENCOUNTER
----- Message from Mayi Bazan RN sent at 1/30/2023  5:28 PM CST -----  Regarding: FW: Reschedule Existing Appointment    ----- Message -----  From: Noemi Hays  Sent: 1/30/2023   2:01 PM CST  To: [facility] Clinical Staff  Subject: Reschedule Existing Appointment                    Appt Date:  02/03/23    Type of appt :  MRI & Fibroscan    Physician:   Dr. Starr    Reason for rescheduling?   Need those appointments moved to later in the day.     Caller:   Shakira    Contact Preference:  [phone]

## 2023-02-01 ENCOUNTER — TELEPHONE (OUTPATIENT)
Dept: INTERNAL MEDICINE | Facility: CLINIC | Age: 78
End: 2023-02-01
Payer: MEDICARE

## 2023-02-01 ENCOUNTER — PATIENT MESSAGE (OUTPATIENT)
Dept: INTERNAL MEDICINE | Facility: CLINIC | Age: 78
End: 2023-02-01
Payer: MEDICARE

## 2023-02-01 NOTE — TELEPHONE ENCOUNTER
----- Message from Mak Nunez sent at 1/30/2023 12:32 PM CST -----  Good afternoon,     Pt wanted a called back regarding her medication. She would like to know why she was prescribed vitamin D 2 and not vitamin D 3.    Thanks

## 2023-02-02 ENCOUNTER — TELEPHONE (OUTPATIENT)
Dept: HEPATOLOGY | Facility: CLINIC | Age: 78
End: 2023-02-02
Payer: MEDICARE

## 2023-02-02 NOTE — TELEPHONE ENCOUNTER
Returning call to reschedule appts-due to house keeping schedule---- Message from Andres Celis sent at 2/2/2023 10:01 AM CST -----  Regarding: Follow Up  Patient called in stating she was expecting a call back from nurse/staff regarding her upcoming appts. Patient states she needs to reschedule one or more appts but, needs to speak with staff/nurse before making the change.                    Contact: [phone]

## 2023-02-03 ENCOUNTER — PROCEDURE VISIT (OUTPATIENT)
Dept: HEPATOLOGY | Facility: CLINIC | Age: 78
End: 2023-02-03
Payer: MEDICARE

## 2023-02-03 DIAGNOSIS — K76.0 FATTY LIVER: ICD-10-CM

## 2023-02-03 PROCEDURE — 91200 LIVER ELASTOGRAPHY: CPT | Mod: HCNC,S$GLB,, | Performed by: INTERNAL MEDICINE

## 2023-02-03 PROCEDURE — 91200 FIBROSCAN NEW ORLEANS: ICD-10-PCS | Mod: HCNC,S$GLB,, | Performed by: INTERNAL MEDICINE

## 2023-02-05 ENCOUNTER — PATIENT MESSAGE (OUTPATIENT)
Dept: HEPATOLOGY | Facility: CLINIC | Age: 78
End: 2023-02-05
Payer: MEDICARE

## 2023-02-06 ENCOUNTER — PATIENT MESSAGE (OUTPATIENT)
Dept: HEPATOLOGY | Facility: CLINIC | Age: 78
End: 2023-02-06
Payer: MEDICARE

## 2023-02-06 ENCOUNTER — TELEPHONE (OUTPATIENT)
Dept: HEPATOLOGY | Facility: CLINIC | Age: 78
End: 2023-02-06
Payer: MEDICARE

## 2023-02-06 DIAGNOSIS — K76.0 FATTY LIVER: Primary | ICD-10-CM

## 2023-02-06 NOTE — PROCEDURES
FibroScan [place]    Date/Time: 2/3/2023 1:15 PM  Performed by: India Starr MD  Authorized by: India Starr MD     Diagnosis:  NAFLD    Probe:  M    Universal Protocol: Patient's identity, procedure and site were verified, confirmatory pause was performed.  Discussed procedure including risks and potential complications.  Questions answered.  Patient verbalizes understanding and wishes to proceed with VCTE.     Procedure: After providing explanations of the procedure, patient was placed in the supine position with right arm in maximum abduction to allow optimal exposure of right lateral abdomen.  Patient was briefly assessed, Testing was performed in the mid-axillary location, 50Hz Shear Wave pulses were applied and the resulting Shear Wave and Propagation Speed detected with a 3.5 MHz ultrasonic signal, using the FibroScan probe, Skin to liver capsule distance and liver parenchyma were accessed during the entire examination with the FibroScan probe, Patient was instructed to breathe normally and to abstain from sudden movements during the procedure, allowing for random measurements of liver stiffness. At least 10 Shear Waves were produced, Individual measurements of each Shear Wave were calculated.  Patient tolerated the procedure well with no complications.  Meets discharge criteria as was dismissed.  Rates pain 0 out of 10.  Patient will follow up with ordering provider to review results.    Findings  Median liver stiffness score:  4.3  CAP Reading: dB/m:  371    IQR/med %:  14  Interpretation  Fibrosis interpretation is based on medial liver stiffness - Kilopascal (kPa).    Fibrosis Stage:  F 0-1  Steatosis interpretation is based on controlled attenuation parameter - (dB/m).    Steatosis Grade:  S3  Comments/Plan:  >67% steatosis

## 2023-02-07 DIAGNOSIS — Z00.00 ENCOUNTER FOR MEDICARE ANNUAL WELLNESS EXAM: ICD-10-CM

## 2023-02-08 ENCOUNTER — HOSPITAL ENCOUNTER (OUTPATIENT)
Dept: RADIOLOGY | Facility: HOSPITAL | Age: 78
Discharge: HOME | End: 2023-02-08
Attending: INTERNAL MEDICINE
Payer: MEDICARE

## 2023-02-08 ENCOUNTER — TELEPHONE (OUTPATIENT)
Dept: INTERNAL MEDICINE | Facility: CLINIC | Age: 78
End: 2023-02-08
Payer: MEDICARE

## 2023-02-08 DIAGNOSIS — R93.7 ABNORMAL BONE DENSITY SCREENING: ICD-10-CM

## 2023-02-08 PROCEDURE — 72110 XR LUMBAR SPINE COMPLETE 5 VIEW: ICD-10-PCS | Mod: 26,HCNC,, | Performed by: RADIOLOGY

## 2023-02-08 PROCEDURE — 72110 X-RAY EXAM L-2 SPINE 4/>VWS: CPT | Mod: TC,HCNC,PO

## 2023-02-08 PROCEDURE — 72110 X-RAY EXAM L-2 SPINE 4/>VWS: CPT | Mod: 26,HCNC,, | Performed by: RADIOLOGY

## 2023-02-08 NOTE — TELEPHONE ENCOUNTER
----- Message from Susan Nunez sent at 2/8/2023  3:41 PM CST -----  Contact: [phone]  Pt called to inquire about her x-ray results. Pt says she received her results and believes that the provider may want to order an MRI. She has a MRI scheduled for tomorrow at 3 pm and would like to have the order in ASAP so she can have them both done tomorrow so she does not have to pay a separate co -pay. Please Advise

## 2023-02-09 ENCOUNTER — TELEPHONE (OUTPATIENT)
Dept: GASTROENTEROLOGY | Facility: CLINIC | Age: 78
End: 2023-02-09
Payer: MEDICARE

## 2023-02-09 ENCOUNTER — HOSPITAL ENCOUNTER (OUTPATIENT)
Dept: RADIOLOGY | Facility: HOSPITAL | Age: 78
Discharge: HOME | End: 2023-02-09
Attending: INTERNAL MEDICINE
Payer: MEDICARE

## 2023-02-09 DIAGNOSIS — K76.9 LIVER LESION: ICD-10-CM

## 2023-02-09 PROCEDURE — 74183 MRI ABD W/O CNTR FLWD CNTR: CPT | Mod: TC

## 2023-02-09 PROCEDURE — 25500020 PHARM REV CODE 255: Performed by: INTERNAL MEDICINE

## 2023-02-09 PROCEDURE — A9585 GADOBUTROL INJECTION: HCPCS | Performed by: INTERNAL MEDICINE

## 2023-02-09 PROCEDURE — 74183 MRI ABDOMEN W WO CONTRAST: ICD-10-PCS | Mod: 26,,, | Performed by: STUDENT IN AN ORGANIZED HEALTH CARE EDUCATION/TRAINING PROGRAM

## 2023-02-09 PROCEDURE — 74183 MRI ABD W/O CNTR FLWD CNTR: CPT | Mod: 26,,, | Performed by: STUDENT IN AN ORGANIZED HEALTH CARE EDUCATION/TRAINING PROGRAM

## 2023-02-09 RX ORDER — GADOBUTROL 604.72 MG/ML
10 INJECTION INTRAVENOUS
Status: COMPLETED | OUTPATIENT
Start: 2023-02-09 | End: 2023-02-09

## 2023-02-09 RX ADMIN — GADOBUTROL 10 ML: 604.72 INJECTION INTRAVENOUS at 04:02

## 2023-02-09 NOTE — TELEPHONE ENCOUNTER
Return call and spoke with patient. Schedule patient lab work. Patient request for a office visit with Dr. Black. Patient request to be schedule on his next available.

## 2023-02-09 NOTE — TELEPHONE ENCOUNTER
----- Message from Jenna Pagan sent at 2/9/2023  8:48 AM CST -----  Regarding: Appt  Pt is Requesting a call back from Manpreet regarding a Appt , Please call to discuss further.      Pt@[phone]

## 2023-02-13 ENCOUNTER — TELEPHONE (OUTPATIENT)
Dept: HEMATOLOGY/ONCOLOGY | Facility: CLINIC | Age: 78
End: 2023-02-13
Payer: MEDICARE

## 2023-02-13 ENCOUNTER — TELEPHONE (OUTPATIENT)
Dept: INTERNAL MEDICINE | Facility: CLINIC | Age: 78
End: 2023-02-13
Payer: MEDICARE

## 2023-02-13 DIAGNOSIS — R29.6 FREQUENT FALLS: Primary | ICD-10-CM

## 2023-02-13 NOTE — TELEPHONE ENCOUNTER
----- Message from Ladonna Jimenez sent at 2/13/2023 12:37 PM CST -----  Contact: Self/[phone]  Pt said that she is calling in regards to needing to get a return call from the nurse pt stated that she fell out of the bed and she had a really hard fall she stated that this is the 4th time that this has happened and wants to know if there is someone that Dr Bello advise she see. Please advise

## 2023-02-13 NOTE — TELEPHONE ENCOUNTER
Pt stated she has had several falls lately. Has fallen out of bed. Pt would like to know who she should see for this problem

## 2023-02-15 ENCOUNTER — NURSE TRIAGE (OUTPATIENT)
Dept: ADMINISTRATIVE | Facility: CLINIC | Age: 78
End: 2023-02-15
Payer: MEDICARE

## 2023-02-15 NOTE — TELEPHONE ENCOUNTER
"OOC NT incoming call PES escalation -   Pt reports fell and hit head on Monday 1/13/23- head a vivid dream and fell out of her bed about 2 ft off the ground. Reports hit rt. Side of head but also has injuries to Lt. Knee, back and rib area.  "Large lump to head". Head injury protocol followed and pt advised to go to ED. Pt unsure if she will go to ED. Reports she will monitor and decide later and if no improvement by morning. NT instructed on importance of early treatment and need for ED evaluation. Encounter routed to PCP and   Dr.Ghazal Antonio.   Reason for Disposition   Dangerous injury (e.g., MVA, diving, trampoline, contact sports, fall > 10 feet or 3 meters) or severe blow from hard object (e.g., golf club or baseball bat)    Additional Information   Negative: [1] ACUTE NEURO SYMPTOM AND [2] present now  (DEFINITION: difficult to awaken OR confused thinking and talking OR slurred speech OR weakness of arms OR unsteady walking)   Negative: Knocked out (unconscious) > 1 minute   Negative: Seizure (convulsion) occurred  (Exception: prior history of seizures and now alert and without Acute Neuro Symptoms)   Negative: Penetrating head injury (e.g., knife, gun shot wound, metal object)   Negative: [1] Major bleeding (e.g., actively dripping or spurting) AND [2] can't be stopped   Negative: [1] Dangerous mechanism of injury (e.g., MVA, diving, trampoline, contact sports, fall > 10 feet or 3 meters) AND [2] NECK pain AND [3] began < 1 hour after injury   Negative: Sounds like a life-threatening emergency to the triager   Negative: [1] Diagnosed with concussion AND [2] within last 14 days   Negative: [1] Traumatic brain injury (mTBI; concussion) AND [2] more than 14 days since head injury   Negative: Can't remember what happened (amnesia)   Negative: Vomiting once or more   Negative: [1] Loss of vision or double vision AND [2] present now   Negative: Watery or blood-tinged fluid dripping from the NOSE or EARS now  " "(Exception: tears from crying or nosebleed from nasal trauma)   Negative: [1] One or two "black eyes" (bruising, purple color of eyelids) AND [2] onset within 24 hours of head injury   Negative: Large swelling or bruise > 2 inches (5 cm)   Negative: Skin is split open or gaping  (or length > 1/2 inch or 12 mm)   Negative: [1] Bleeding AND [2] won't stop after 10 minutes of direct pressure (using correct technique)   Negative: Sounds like a serious injury to the triager   Negative: [1] ACUTE NEURO SYMPTOM AND [2] now fine  (DEFINITION: difficult to awaken OR confused thinking and talking OR slurred speech OR weakness of arms OR unsteady walking)   Negative: [1] Knocked out (unconscious) < 1 minute AND [2] now fine   Negative: [1] SEVERE headache AND [2] not improved 2 hours after pain medicine/ice packs    Protocols used: Head Injury-A-    "

## 2023-02-16 ENCOUNTER — TELEPHONE (OUTPATIENT)
Dept: INTERNAL MEDICINE | Facility: CLINIC | Age: 78
End: 2023-02-16
Payer: MEDICARE

## 2023-02-16 NOTE — TELEPHONE ENCOUNTER
----- Message from Carlos A Wilson sent at 2/16/2023  2:29 PM CST -----  Contact: pt [phone]  Patient states please call patient would like to stop taking DULoxetine (CYMBALTA) 60 MG patient is falling and nightmare. Please call and advise.    Thank you and have a great day.

## 2023-02-17 RX ORDER — DULOXETIN HYDROCHLORIDE 30 MG/1
30 CAPSULE, DELAYED RELEASE ORAL DAILY
Qty: 14 CAPSULE | Refills: 0 | Status: SHIPPED | OUTPATIENT
Start: 2023-02-17 | End: 2023-02-28

## 2023-02-17 NOTE — TELEPHONE ENCOUNTER
Pt informed. She stated she would like to know if there is an alternative she can take for sleep aid that she can take when she stops the Cymbalta.

## 2023-02-27 DIAGNOSIS — E55.9 VITAMIN D INSUFFICIENCY: ICD-10-CM

## 2023-02-27 NOTE — TELEPHONE ENCOUNTER
----- Message from Dasha Lopez sent at 2/27/2023 11:15 AM CST -----  Contact: [phone] Patient  Pt states she will be taking the last of the DULoxetine (CYMBALTA) 30 MG capsule in a few days and needs to know what to do next. Pt states she been weaning off  the medication.  Please call and advise.

## 2023-02-27 NOTE — TELEPHONE ENCOUNTER
Pt stated she is experiencing nausea when she does not take the 30 mg Cymbalta. She stated she is no longer having nightmares and not having any falls. But she would like to continue Cymbalta at a lower dose (20 mg) for 30 days to see if this will help her with the side effects she is having without taking the 60 mg cymbalta. She stated she is having difficulty sleeping at the lower dose of Cymbalta and wants to know if she can take 20 mg and ZzzQuil together.

## 2023-02-28 RX ORDER — DULOXETIN HYDROCHLORIDE 30 MG/1
30 CAPSULE, DELAYED RELEASE ORAL DAILY
Qty: 14 CAPSULE | Refills: 0 | OUTPATIENT
Start: 2023-02-28 | End: 2023-03-14

## 2023-02-28 RX ORDER — ACETAMINOPHEN 500 MG
2000 TABLET ORAL DAILY
Qty: 90 CAPSULE | Refills: 3 | Status: SHIPPED | OUTPATIENT
Start: 2023-02-28 | End: 2023-05-23 | Stop reason: SDUPTHER

## 2023-02-28 RX ORDER — DULOXETIN HYDROCHLORIDE 20 MG/1
20 CAPSULE, DELAYED RELEASE ORAL DAILY
Qty: 30 CAPSULE | Refills: 1 | Status: SHIPPED | OUTPATIENT
Start: 2023-02-28 | End: 2023-04-06 | Stop reason: SDUPTHER

## 2023-02-28 NOTE — TELEPHONE ENCOUNTER
Care Due:                  Date            Visit Type   Department     Provider  --------------------------------------------------------------------------------                                EP -                              PRIMARY      MET INTERNAL  Last Visit: 01-      CARE ([facility])   MEDICINE       Angel Bello                              EP -                              PRIMARY      [facility] INTERNAL  Next Visit: 07-      CARE ([facility])   [facility]       Angel Bello                                                            Last  Test          Frequency    Reason                     Performed    Due Date  --------------------------------------------------------------------------------    Lipid Panel.  12 months..  ezetimibe................  05- 05-    [facility] Embedded Care Gaps. Reference number: [id number]. 2/28/2023   7:43:24 AM CST

## 2023-02-28 NOTE — TELEPHONE ENCOUNTER
Quick DC. Inappropriate Request    Refill Authorization Note   Shakira Pearl  is requesting a refill authorization.  Brief Assessment and Rationale for Refill:  Quick Discontinue  Medication Therapy Plan:  Dose adjustment 2/28/23    Medication Reconciliation Completed:  No      Comments:     Note composed:12:58 PM 02/28/2023

## 2023-03-06 ENCOUNTER — TELEPHONE (OUTPATIENT)
Dept: INTERNAL MEDICINE | Facility: CLINIC | Age: 78
End: 2023-03-06
Payer: MEDICARE

## 2023-03-06 NOTE — TELEPHONE ENCOUNTER
Ms. Pearl was called regarding her request to speak with the Zulema Tomlin NP, at which time she noted she had questions regarding her HRA as well as questions regarding her previous appt which she had with the NP regarding her mammogram.  A msg was sent to the NP informing her that the patient was informed that she was out of the office today (3/6/23) and she requested that the NP call her on 3/7/23 if possible to which I advised the patient that this msg would be given to the NP.      ----- Message from Carlos A Wilson sent at 3/6/2023 11:11 AM CST -----  Contact: pt [phone]  Patient would like a call back to discuss upcoming appointment.  Please call and advise.    Thank you and have a great day.      Doxycycline Pregnancy And Lactation Text: This medication is Pregnancy Category D and not consider safe during pregnancy. It is also excreted in breast milk but is considered safe for shorter treatment courses. Gabapentin Counseling: I discussed with the patient the risks of gabapentin including but not limited to dizziness, somnolence, fatigue and ataxia. Erivedge Pregnancy And Lactation Text: This medication is Pregnancy Category X and is absolutely contraindicated during pregnancy. It is unknown if it is excreted in breast milk. Infliximab Counseling:  I discussed with the patient the risks of infliximab including but not limited to myelosuppression, immunosuppression, autoimmune hepatitis, demyelinating diseases, lymphoma, and serious infections.  The patient understands that monitoring is required including a PPD at baseline and must alert us or the primary physician if symptoms of infection or other concerning signs are noted. Spironolactone Pregnancy And Lactation Text: This medication can cause feminization of the male fetus and should be avoided during pregnancy. The active metabolite is also found in breast milk. Griseofulvin Counseling:  I discussed with the patient the risks of griseofulvin including but not limited to photosensitivity, cytopenia, liver damage, nausea/vomiting and severe allergy.  The patient understands that this medication is best absorbed when taken with a fatty meal (e.g., ice cream or french fries). Oxybutynin Pregnancy And Lactation Text: This medication is Pregnancy Category B and is considered safe during pregnancy. It is unknown if it is excreted in breast milk. Ketoconazole Counseling:   Patient counseled regarding improving absorption with orange juice.  Adverse effects include but are not limited to breast enlargement, headache, diarrhea, nausea, upset stomach, liver function test abnormalities, taste disturbance, and stomach pain.  There is a rare possibility of liver failure that can occur when taking ketoconazole. The patient understands that monitoring of LFTs may be required, especially at baseline. The patient verbalized understanding of the proper use and possible adverse effects of ketoconazole.  All of the patient's questions and concerns were addressed. Thalidomide Counseling: I discussed with the patient the risks of thalidomide including but not limited to birth defects, anxiety, weakness, chest pain, dizziness, cough and severe allergy. Cellcept Pregnancy And Lactation Text: This medication is Pregnancy Category D and isn't considered safe during pregnancy. It is unknown if this medication is excreted in breast milk. Methotrexate Counseling:  Patient counseled regarding adverse effects of methotrexate including but not limited to nausea, vomiting, abnormalities in liver function tests. Patients may develop mouth sores, rash, diarrhea, and abnormalities in blood counts. The patient understands that monitoring is required including LFT's and blood counts.  There is a rare possibility of scarring of the liver and lung problems that can occur when taking methotrexate. Persistent nausea, loss of appetite, pale stools, dark urine, cough, and shortness of breath should be reported immediately. Patient advised to discontinue methotrexate treatment at least three months before attempting to become pregnant.  I discussed the need for folate supplements while taking methotrexate.  These supplements can decrease side effects during methotrexate treatment. The patient verbalized understanding of the proper use and possible adverse effects of methotrexate.  All of the patient's questions and concerns were addressed. Metronidazole Counseling:  I discussed with the patient the risks of metronidazole including but not limited to seizures, nausea/vomiting, a metallic taste in the mouth, nausea/vomiting and severe allergy. Tazorac Counseling:  Patient advised that medication is irritating and drying.  Patient may need to apply sparingly and wash off after an hour before eventually leaving it on overnight.  The patient verbalized understanding of the proper use and possible adverse effects of tazorac.  All of the patient's questions and concerns were addressed. Doxepin Pregnancy And Lactation Text: This medication is Pregnancy Category C and it isn't known if it is safe during pregnancy. It is also excreted in breast milk and breast feeding isn't recommended. Clindamycin Pregnancy And Lactation Text: This medication can be used in pregnancy if certain situations. Clindamycin is also present in breast milk. Spironolactone Counseling: Patient advised regarding risks of diarrhea, abdominal pain, hyperkalemia, birth defects (for female patients), liver toxicity and renal toxicity. The patient may need blood work to monitor liver and kidney function and potassium levels while on therapy. The patient verbalized understanding of the proper use and possible adverse effects of spironolactone.  All of the patient's questions and concerns were addressed. Enbrel Pregnancy And Lactation Text: This medication is Pregnancy Category B and is considered safe during pregnancy. It is unknown if this medication is excreted in breast milk. Birth Control Pills Pregnancy And Lactation Text: This medication should be avoided if pregnant and for the first 30 days post-partum. Minoxidil Pregnancy And Lactation Text: This medication has not been assigned a Pregnancy Risk Category but animal studies failed to show danger with the topical medication. It is unknown if the medication is excreted in breast milk. Rifampin Counseling: I discussed with the patient the risks of rifampin including but not limited to liver damage, kidney damage, red-orange body fluids, nausea/vomiting and severe allergy. Enbrel Counseling:  I discussed with the patient the risks of etanercept including but not limited to myelosuppression, immunosuppression, autoimmune hepatitis, demyelinating diseases, lymphoma, and infections.  The patient understands that monitoring is required including a PPD at baseline and must alert us or the primary physician if symptoms of infection or other concerning signs are noted. Benzoyl Peroxide Counseling: Patient counseled that medicine may cause skin irritation and bleach clothing.  In the event of skin irritation, the patient was advised to reduce the amount of the drug applied or use it less frequently.   The patient verbalized understanding of the proper use and possible adverse effects of benzoyl peroxide.  All of the patient's questions and concerns were addressed. Doxepin Counseling:  Patient advised that the medication is sedating and not to drive a car after taking this medication. Patient informed of potential adverse effects including but not limited to dry mouth, urinary retention, and blurry vision.  The patient verbalized understanding of the proper use and possible adverse effects of doxepin.  All of the patient's questions and concerns were addressed. Detail Level: Generalized Birth Control Pills Counseling: Birth Control Pill Counseling: I discussed with the patient the potential side effects of OCPs including but not limited to increased risk of stroke, heart attack, thrombophlebitis, deep venous thrombosis, hepatic adenomas, breast changes, GI upset, headaches, and depression.  The patient verbalized understanding of the proper use and possible adverse effects of OCPs. All of the patient's questions and concerns were addressed. Azithromycin Pregnancy And Lactation Text: This medication is considered safe during pregnancy and is also secreted in breast milk. Carac Pregnancy And Lactation Text: This medication is Pregnancy Category X and contraindicated in pregnancy and in women who may become pregnant. It is unknown if this medication is excreted in breast milk. Use Enhanced Medication Counseling?: No Simponi Pregnancy And Lactation Text: The risk during pregnancy and breastfeeding is uncertain with this medication. Cyclosporine Pregnancy And Lactation Text: This medication is Pregnancy Category C and it isn't know if it is safe during pregnancy. This medication is excreted in breast milk. Imiquimod Counseling:  I discussed with the patient the risks of imiquimod including but not limited to erythema, scaling, itching, weeping, crusting, and pain.  Patient understands that the inflammatory response to imiquimod is variable from person to person and was educated regarded proper titration schedule.  If flu-like symptoms develop, patient knows to discontinue the medication and contact us. Topical Clindamycin Counseling: Patient counseled that this medication may cause skin irritation or allergic reactions.  In the event of skin irritation, the patient was advised to reduce the amount of the drug applied or use it less frequently.   The patient verbalized understanding of the proper use and possible adverse effects of clindamycin.  All of the patient's questions and concerns were addressed. Itraconazole Counseling:  I discussed with the patient the risks of itraconazole including but not limited to liver damage, nausea/vomiting, neuropathy, and severe allergy.  The patient understands that this medication is best absorbed when taken with acidic beverages such as non-diet cola or ginger ale.  The patient understands that monitoring is required including baseline LFTs and repeat LFTs at intervals.  The patient understands that they are to contact us or the primary physician if concerning signs are noted. Griseofulvin Pregnancy And Lactation Text: This medication is Pregnancy Category X and is known to cause serious birth defects. It is unknown if this medication is excreted in breast milk but breast feeding should be avoided. Picato Counseling:  I discussed with the patient the risks of Picato including but not limited to erythema, scaling, itching, weeping, crusting, and pain. Picato Pregnancy And Lactation Text: This medication is Pregnancy Category C. It is unknown if this medication is excreted in breast milk. High Dose Vitamin A Counseling: Side effects reviewed, pt to contact office should one occur. Arava Counseling:  Patient counseled regarding adverse effects of Arava including but not limited to nausea, vomiting, abnormalities in liver function tests. Patients may develop mouth sores, rash, diarrhea, and abnormalities in blood counts. The patient understands that monitoring is required including LFTs and blood counts.  There is a rare possibility of scarring of the liver and lung problems that can occur when taking methotrexate. Persistent nausea, loss of appetite, pale stools, dark urine, cough, and shortness of breath should be reported immediately. Patient advised to discontinue Arava treatment and consult with a physician prior to attempting conception. The patient will have to undergo a treatment to eliminate Arava from the body prior to conception. Protopic Counseling: Patient may experience a mild burning sensation during topical application. Protopic is not approved in children less than 2 years of age. There have been case reports of hematologic and skin malignancies in patients using topical calcineurin inhibitors although causality is questionable. Erythromycin Counseling:  I discussed with the patient the risks of erythromycin including but not limited to GI upset, allergic reaction, drug rash, diarrhea, increase in liver enzymes, and yeast infections. Terbinafine Counseling: Patient counseling regarding adverse effects of terbinafine including but not limited to headache, diarrhea, rash, upset stomach, liver function test abnormalities, itching, taste/smell disturbance, nausea, abdominal pain, and flatulence.  There is a rare possibility of liver failure that can occur when taking terbinafine.  The patient understands that a baseline LFT and kidney function test may be required. The patient verbalized understanding of the proper use and possible adverse effects of terbinafine.  All of the patient's questions and concerns were addressed. Colchicine Pregnancy And Lactation Text: This medication is Pregnancy Category C and isn't considered safe during pregnancy. It is excreted in breast milk. Drysol Pregnancy And Lactation Text: This medication is considered safe during pregnancy and breast feeding. Solaraze Counseling:  I discussed with the patient the risks of Solaraze including but not limited to erythema, scaling, itching, weeping, crusting, and pain. Bactrim Pregnancy And Lactation Text: This medication is Pregnancy Category D and is known to cause fetal risk.  It is also excreted in breast milk. Bexarotene Pregnancy And Lactation Text: This medication is Pregnancy Category X and should not be given to women who are pregnant or may become pregnant. This medication should not be used if you are breast feeding. Otezla Pregnancy And Lactation Text: This medication is Pregnancy Category C and it isn't known if it is safe during pregnancy. It is unknown if it is excreted in breast milk. Zyclara Counseling:  I discussed with the patient the risks of imiquimod including but not limited to erythema, scaling, itching, weeping, crusting, and pain.  Patient understands that the inflammatory response to imiquimod is variable from person to person and was educated regarded proper titration schedule.  If flu-like symptoms develop, patient knows to discontinue the medication and contact us. Dupixent Counseling: I discussed with the patient the risks of dupilumab including but not limited to eye infection and irritation, cold sores, injection site reactions, worsening of asthma, allergic reactions and increased risk of parasitic infection.  Live vaccines should be avoided while taking dupilumab. Dupilumab will also interact with certain medications such as warfarin and cyclosporine. The patient understands that monitoring is required and they must alert us or the primary physician if symptoms of infection or other concerning signs are noted. Drysol Counseling:  I discussed with the patient the risks of drysol/aluminum chloride including but not limited to skin rash, itching, irritation, burning. Cimzia Counseling:  I discussed with the patient the risks of Cimzia including but not limited to immunosuppression, allergic reactions and infections.  The patient understands that monitoring is required including a PPD at baseline and must alert us or the primary physician if symptoms of infection or other concerning signs are noted. Protopic Pregnancy And Lactation Text: This medication is Pregnancy Category C. It is unknown if this medication is excreted in breast milk when applied topically. Ivermectin Pregnancy And Lactation Text: This medication is Pregnancy Category C and it isn't known if it is safe during pregnancy. It is also excreted in breast milk. Simponi Counseling:  I discussed with the patient the risks of golimumab including but not limited to myelosuppression, immunosuppression, autoimmune hepatitis, demyelinating diseases, lymphoma, and serious infections.  The patient understands that monitoring is required including a PPD at baseline and must alert us or the primary physician if symptoms of infection or other concerning signs are noted. Oxybutynin Counseling:  I discussed with the patient the risks of oxybutynin including but not limited to skin rash, drowsiness, dry mouth, difficulty urinating, and blurred vision. Xeljanz Counseling: I discussed with the patient the risks of Xeljanz therapy including increased risk of infection, liver issues, headache, diarrhea, or cold symptoms. Live vaccines should be avoided. They were instructed to call if they have any problems. Itraconazole Pregnancy And Lactation Text: This medication is Pregnancy Category C and it isn't know if it is safe during pregnancy. It is also excreted in breast milk. Glycopyrrolate Pregnancy And Lactation Text: This medication is Pregnancy Category B and is considered safe during pregnancy. It is unknown if it is excreted breast milk. Cosentyx Counseling:  I discussed with the patient the risks of Cosentyx including but not limited to worsening of Crohn's disease, immunosuppression, allergic reactions and infections.  The patient understands that monitoring is required including a PPD at baseline and must alert us or the primary physician if symptoms of infection or other concerning signs are noted. Quinolones Counseling:  I discussed with the patient the risks of fluoroquinolones including but not limited to GI upset, allergic reaction, drug rash, diarrhea, dizziness, photosensitivity, yeast infections, liver function test abnormalities, tendonitis/tendon rupture. Clindamycin Counseling: I counseled the patient regarding use of clindamycin as an antibiotic for prophylactic and/or therapeutic purposes. Clindamycin is active against numerous classes of bacteria, including skin bacteria. Side effects may include nausea, diarrhea, gastrointestinal upset, rash, hives, yeast infections, and in rare cases, colitis. Minocycline Pregnancy And Lactation Text: This medication is Pregnancy Category D and not consider safe during pregnancy. It is also excreted in breast milk. Minocycline Counseling: Patient advised regarding possible photosensitivity and discoloration of the teeth, skin, lips, tongue and gums.  Patient instructed to avoid sunlight, if possible.  When exposed to sunlight, patients should wear protective clothing, sunglasses, and sunscreen.  The patient was instructed to call the office immediately if the following severe adverse effects occur:  hearing changes, easy bruising/bleeding, severe headache, or vision changes.  The patient verbalized understanding of the proper use and possible adverse effects of minocycline.  All of the patient's questions and concerns were addressed. Dapsone Pregnancy And Lactation Text: This medication is Pregnancy Category C and is not considered safe during pregnancy or breast feeding. Tremfya Counseling: I discussed with the patient the risks of guselkumab including but not limited to immunosuppression, serious infections, worsening of inflammatory bowel disease and drug reactions.  The patient understands that monitoring is required including a PPD at baseline and must alert us or the primary physician if symptoms of infection or other concerning signs are noted. Metronidazole Pregnancy And Lactation Text: This medication is Pregnancy Category B and considered safe during pregnancy.  It is also excreted in breast milk. Topical Sulfur Applications Pregnancy And Lactation Text: This medication is Pregnancy Category C and has an unknown safety profile during pregnancy. It is unknown if this topical medication is excreted in breast milk. Taltz Counseling: I discussed with the patient the risks of ixekizumab including but not limited to immunosuppression, serious infections, worsening of inflammatory bowel disease and drug reactions.  The patient understands that monitoring is required including a PPD at baseline and must alert us or the primary physician if symptoms of infection or other concerning signs are noted. Cimetidine Counseling:  I discussed with the patient the risks of Cimetidine including but not limited to gynecomastia, headache, diarrhea, nausea, drowsiness, arrhythmias, pancreatitis, skin rashes, psychosis, bone marrow suppression and kidney toxicity. Eucrisa Counseling: Patient may experience a mild burning sensation during topical application. Eucrisa is not approved in children less than 2 years of age. Dapsone Counseling: I discussed with the patient the risks of dapsone including but not limited to hemolytic anemia, agranulocytosis, rashes, methemoglobinemia, kidney failure, peripheral neuropathy, headaches, GI upset, and liver toxicity.  Patients who start dapsone require monitoring including baseline LFTs and weekly CBCs for the first month, then every month thereafter.  The patient verbalized understanding of the proper use and possible adverse effects of dapsone.  All of the patient's questions and concerns were addressed. Isotretinoin Counseling: Patient should get monthly blood tests, not donate blood, not drive at night if vision affected, not share medication, and not undergo elective surgery for 6 months after tx completed. Side effects reviewed, pt to contact office should one occur. Nsaids Pregnancy And Lactation Text: These medications are considered safe up to 30 weeks gestation. It is excreted in breast milk. Cephalexin Counseling: I counseled the patient regarding use of cephalexin as an antibiotic for prophylactic and/or therapeutic purposes. Cephalexin (commonly prescribed under brand name Keflex) is a cephalosporin antibiotic which is active against numerous classes of bacteria, including most skin bacteria. Side effects may include nausea, diarrhea, gastrointestinal upset, rash, hives, yeast infections, and in rare cases, hepatitis, kidney disease, seizures, fever, confusion, neurologic symptoms, and others. Patients with severe allergies to penicillin medications are cautioned that there is about a 10% incidence of cross-reactivity with cephalosporins. When possible, patients with penicillin allergies should use alternatives to cephalosporins for antibiotic therapy. Isotretinoin Pregnancy And Lactation Text: This medication is Pregnancy Category X and is considered extremely dangerous during pregnancy. It is unknown if it is excreted in breast milk. Xelkaseyz Pregnancy And Lactation Text: This medication is Pregnancy Category D and is not considered safe during pregnancy.  The risk during breast feeding is also uncertain. Clofazimine Counseling:  I discussed with the patient the risks of clofazimine including but not limited to skin and eye pigmentation, liver damage, nausea/vomiting, gastrointestinal bleeding and allergy. Prednisone Counseling:  I discussed with the patient the risks of prolonged use of prednisone including but not limited to weight gain, insomnia, osteoporosis, mood changes, diabetes, susceptibility to infection, glaucoma and high blood pressure.  In cases where prednisone use is prolonged, patients should be monitored with blood pressure checks, serum glucose levels and an eye exam.  Additionally, the patient may need to be placed on GI prophylaxis, PCP prophylaxis, and calcium and vitamin D supplementation and/or a bisphosphonate.  The patient verbalized understanding of the proper use and the possible adverse effects of prednisone.  All of the patient's questions and concerns were addressed. Humira Counseling:  I discussed with the patient the risks of adalimumab including but not limited to myelosuppression, immunosuppression, autoimmune hepatitis, demyelinating diseases, lymphoma, and serious infections.  The patient understands that monitoring is required including a PPD at baseline and must alert us or the primary physician if symptoms of infection or other concerning signs are noted. Hydroxychloroquine Pregnancy And Lactation Text: This medication has been shown to cause fetal harm but it isn't assigned a Pregnancy Risk Category. There are small amounts excreted in breast milk. Otezla Counseling: The side effects of Otezla were discussed with the patient, including but not limited to worsening or new depression, weight loss, diarrhea, nausea, upper respiratory tract infection, and headache. Patient instructed to call the office should any adverse effect occur.  The patient verbalized understanding of the proper use and possible adverse effects of Otezla.  All the patient's questions and concerns were addressed. Azithromycin Counseling:  I discussed with the patient the risks of azithromycin including but not limited to GI upset, allergic reaction, drug rash, diarrhea, and yeast infections. Hydroxyzine Counseling: Patient advised that the medication is sedating and not to drive a car after taking this medication.  Patient informed of potential adverse effects including but not limited to dry mouth, urinary retention, and blurry vision.  The patient verbalized understanding of the proper use and possible adverse effects of hydroxyzine.  All of the patient's questions and concerns were addressed. Cellcept Counseling:  I discussed with the patient the risks of mycophenolate mofetil including but not limited to infection/immunosuppression, GI upset, hypokalemia, hypercholesterolemia, bone marrow suppression, lymphoproliferative disorders, malignancy, GI ulceration/bleed/perforation, colitis, interstitial lung disease, kidney failure, progressive multifocal leukoencephalopathy, and birth defects.  The patient understands that monitoring is required including a baseline creatinine and regular CBC testing. In addition, patient must alert us immediately if symptoms of infection or other concerning signs are noted. Tazorac Pregnancy And Lactation Text: This medication is not safe during pregnancy. It is unknown if this medication is excreted in breast milk. Doxycycline Counseling:  Patient counseled regarding possible photosensitivity and increased risk for sunburn.  Patient instructed to avoid sunlight, if possible.  When exposed to sunlight, patients should wear protective clothing, sunglasses, and sunscreen.  The patient was instructed to call the office immediately if the following severe adverse effects occur:  hearing changes, easy bruising/bleeding, severe headache, or vision changes.  The patient verbalized understanding of the proper use and possible adverse effects of doxycycline.  All of the patient's questions and concerns were addressed. Rifampin Pregnancy And Lactation Text: This medication is Pregnancy Category C and it isn't know if it is safe during pregnancy. It is also excreted in breast milk and should not be used if you are breast feeding. Sski Pregnancy And Lactation Text: This medication is Pregnancy Category D and isn't considered safe during pregnancy. It is excreted in breast milk. Valtrex Counseling: I discussed with the patient the risks of valacyclovir including but not limited to kidney damage, nausea, vomiting and severe allergy.  The patient understands that if the infection seems to be worsening or is not improving, they are to call. Odomzo Counseling- I discussed with the patient the risks of Odomzo including but not limited to nausea, vomiting, diarrhea, constipation, weight loss, changes in the sense of taste, decreased appetite, muscle spasms, and hair loss.  The patient verbalized understanding of the proper use and possible adverse effects of Odomzo.  All of the patient's questions and concerns were addressed. Cyclosporine Counseling:  I discussed with the patient the risks of cyclosporine including but not limited to hypertension, gingival hyperplasia,myelosuppression, immunosuppression, liver damage, kidney damage, neurotoxicity, lymphoma, and serious infections. The patient understands that monitoring is required including baseline blood pressure, CBC, CMP, lipid panel and uric acid, and then 1-2 times monthly CMP and blood pressure. Erivedge Counseling- I discussed with the patient the risks of Erivedge including but not limited to nausea, vomiting, diarrhea, constipation, weight loss, changes in the sense of taste, decreased appetite, muscle spasms, and hair loss.  The patient verbalized understanding of the proper use and possible adverse effects of Erivedge.  All of the patient's questions and concerns were addressed. Hydroxyzine Pregnancy And Lactation Text: This medication is not safe during pregnancy and should not be taken. It is also excreted in breast milk and breast feeding isn't recommended. Topical Sulfur Applications Counseling: Topical Sulfur Counseling: Patient counseled that this medication may cause skin irritation or allergic reactions.  In the event of skin irritation, the patient was advised to reduce the amount of the drug applied or use it less frequently.   The patient verbalized understanding of the proper use and possible adverse effects of topical sulfur application.  All of the patient's questions and concerns were addressed. Bactrim Counseling:  I discussed with the patient the risks of sulfa antibiotics including but not limited to GI upset, allergic reaction, drug rash, diarrhea, dizziness, photosensitivity, and yeast infections.  Rarely, more serious reactions can occur including but not limited to aplastic anemia, agranulocytosis, methemoglobinemia, blood dyscrasias, liver or kidney failure, lung infiltrates or desquamative/blistering drug rashes. Albendazole Counseling:  I discussed with the patient the risks of albendazole including but not limited to cytopenia, kidney damage, nausea/vomiting and severe allergy.  The patient understands that this medication is being used in an off-label manner. Glycopyrrolate Counseling:  I discussed with the patient the risks of glycopyrrolate including but not limited to skin rash, drowsiness, dry mouth, difficulty urinating, and blurred vision. Topical Retinoid counseling:  Patient advised to apply a pea-sized amount only at bedtime and wait 30 minutes after washing their face before applying.  If too drying, patient may add a non-comedogenic moisturizer. The patient verbalized understanding of the proper use and possible adverse effects of retinoids.  All of the patient's questions and concerns were addressed. Fluconazole Counseling:  Patient counseled regarding adverse effects of fluconazole including but not limited to headache, diarrhea, nausea, upset stomach, liver function test abnormalities, taste disturbance, and stomach pain.  There is a rare possibility of liver failure that can occur when taking fluconazole.  The patient understands that monitoring of LFTs and kidney function test may be required, especially at baseline. The patient verbalized understanding of the proper use and possible adverse effects of fluconazole.  All of the patient's questions and concerns were addressed. Ivermectin Counseling:  Patient instructed to take medication on an empty stomach with a full glass of water.  Patient informed of potential adverse effects including but not limited to nausea, diarrhea, dizziness, itching, and swelling of the extremities or lymph nodes.  The patient verbalized understanding of the proper use and possible adverse effects of ivermectin.  All of the patient's questions and concerns were addressed. Benzoyl Peroxide Pregnancy And Lactation Text: This medication is Pregnancy Category C. It is unknown if benzoyl peroxide is excreted in breast milk. High Dose Vitamin A Pregnancy And Lactation Text: High dose vitamin A therapy is contraindicated during pregnancy and breast feeding. Colchicine Counseling:  Patient counseled regarding adverse effects including but not limited to stomach upset (nausea, vomiting, stomach pain, or diarrhea).  Patient instructed to limit alcohol consumption while taking this medication.  Colchicine may reduce blood counts especially with prolonged use.  The patient understands that monitoring of kidney function and blood counts may be required, especially at baseline. The patient verbalized understanding of the proper use and possible adverse effects of colchicine.  All of the patient's questions and concerns were addressed. Terbinafine Pregnancy And Lactation Text: This medication is Pregnancy Category B and is considered safe during pregnancy. It is also excreted in breast milk and breast feeding isn't recommended. Bexarotene Counseling:  I discussed with the patient the risks of bexarotene including but not limited to hair loss, dry lips/skin/eyes, liver abnormalities, hyperlipidemia, pancreatitis, depression/suicidal ideation, photosensitivity, drug rash/allergic reactions, hypothyroidism, anemia, leukopenia, infection, cataracts, and teratogenicity.  Patient understands that they will need regular blood tests to check lipid profile, liver function tests, white blood cell count, thyroid function tests and pregnancy test if applicable. Valtrex Pregnancy And Lactation Text: this medication is Pregnancy Category B and is considered safe during pregnancy. This medication is not directly found in breast milk but it's metabolite acyclovir is present. Solaraze Pregnancy And Lactation Text: This medication is Pregnancy Category B and is considered safe. There is some data to suggest avoiding during the third trimester. It is unknown if this medication is excreted in breast milk. Ketoconazole Pregnancy And Lactation Text: This medication is Pregnancy Category C and it isn't know if it is safe during pregnancy. It is also excreted in breast milk and breast feeding isn't recommended. Xolair Pregnancy And Lactation Text: This medication is Pregnancy Category B and is considered safe during pregnancy. This medication is excreted in breast milk. 5-Fu Counseling: 5-Fluorouracil Counseling:  I discussed with the patient the risks of 5-fluorouracil including but not limited to erythema, scaling, itching, weeping, crusting, and pain. Cyclophosphamide Pregnancy And Lactation Text: This medication is Pregnancy Category D and it isn't considered safe during pregnancy. This medication is excreted in breast milk. Dupixent Pregnancy And Lactation Text: This medication likely crosses the placenta but the risk for the fetus is uncertain. This medication is excreted in breast milk. Xolair Counseling:  Patient informed of potential adverse effects including but not limited to fever, muscle aches, rash and allergic reactions.  The patient verbalized understanding of the proper use and possible adverse effects of Xolair.  All of the patient's questions and concerns were addressed. SSKI Counseling:  I discussed with the patient the risks of SSKI including but not limited to thyroid abnormalities, metallic taste, GI upset, fever, headache, acne, arthralgias, paraesthesias, lymphadenopathy, easy bleeding, arrhythmias, and allergic reaction. Siliq Counseling:  I discussed with the patient the risks of Siliq including but not limited to new or worsening depression, suicidal thoughts and behavior, immunosuppression, malignancy, posterior leukoencephalopathy syndrome, and serious infections.  The patient understands that monitoring is required including a PPD at baseline and must alert us or the primary physician if symptoms of infection or other concerning signs are noted. There is also a special program designed to monitor depression which is required with Siliq. Cephalexin Pregnancy And Lactation Text: This medication is Pregnancy Category B and considered safe during pregnancy.  It is also excreted in breast milk but can be used safely for shorter doses. Elidel Counseling: Patient may experience a mild burning sensation during topical application. Elidel is not approved in children less than 2 years of age. There have been case reports of hematologic and skin malignancies in patients using topical calcineurin inhibitors although causality is questionable. Acitretin Pregnancy And Lactation Text: This medication is Pregnancy Category X and should not be given to women who are pregnant or may become pregnant in the future. This medication is excreted in breast milk. Minoxidil Counseling: Minoxidil is a topical medication which can increase blood flow where it is applied. It is uncertain how this medication increases hair growth. Side effects are uncommon and include stinging and allergic reactions. Methotrexate Pregnancy And Lactation Text: This medication is Pregnancy Category X and is known to cause fetal harm. This medication is excreted in breast milk. Acitretin Counseling:  I discussed with the patient the risks of acitretin including but not limited to hair loss, dry lips/skin/eyes, liver damage, hyperlipidemia, depression/suicidal ideation, photosensitivity.  Serious rare side effects can include but are not limited to pancreatitis, pseudotumor cerebri, bony changes, clot formation/stroke/heart attack.  Patient understands that alcohol is contraindicated since it can result in liver toxicity and significantly prolong the elimination of the drug by many years. Hydroxychloroquine Counseling:  I discussed with the patient that a baseline ophthalmologic exam is needed at the start of therapy and every year thereafter while on therapy. A CBC may also be warranted for monitoring.  The side effects of this medication were discussed with the patient, including but not limited to agranulocytosis, aplastic anemia, seizures, rashes, retinopathy, and liver toxicity. Patient instructed to call the office should any adverse effect occur.  The patient verbalized understanding of the proper use and possible adverse effects of Plaquenil.  All the patient's questions and concerns were addressed. Stelara Counseling:  I discussed with the patient the risks of ustekinumab including but not limited to immunosuppression, malignancy, posterior leukoencephalopathy syndrome, and serious infections.  The patient understands that monitoring is required including a PPD at baseline and must alert us or the primary physician if symptoms of infection or other concerning signs are noted. Carac Counseling:  I discussed with the patient the risks of Carac including but not limited to erythema, scaling, itching, weeping, crusting, and pain. Rituxan Pregnancy And Lactation Text: This medication is Pregnancy Category C and it isn't know if it is safe during pregnancy. It is unknown if this medication is excreted in breast milk but similar antibodies are known to be excreted. Tetracycline Counseling: Patient counseled regarding possible photosensitivity and increased risk for sunburn.  Patient instructed to avoid sunlight, if possible.  When exposed to sunlight, patients should wear protective clothing, sunglasses, and sunscreen.  The patient was instructed to call the office immediately if the following severe adverse effects occur:  hearing changes, easy bruising/bleeding, severe headache, or vision changes.  The patient verbalized understanding of the proper use and possible adverse effects of tetracycline.  All of the patient's questions and concerns were addressed. Patient understands to avoid pregnancy while on therapy due to potential birth defects. Erythromycin Pregnancy And Lactation Text: This medication is Pregnancy Category B and is considered safe during pregnancy. It is also excreted in breast milk. Azathioprine Counseling:  I discussed with the patient the risks of azathioprine including but not limited to myelosuppression, immunosuppression, hepatotoxicity, lymphoma, and infections.  The patient understands that monitoring is required including baseline LFTs, Creatinine, possible TPMP genotyping and weekly CBCs for the first month and then every 2 weeks thereafter.  The patient verbalized understanding of the proper use and possible adverse effects of azathioprine.  All of the patient's questions and concerns were addressed. Rituxan Counseling:  I discussed with the patient the risks of Rituxan infusions. Side effects can include infusion reactions, severe drug rashes including mucocutaneous reactions, reactivation of latent hepatitis and other infections and rarely progressive multifocal leukoencephalopathy.  All of the patient's questions and concerns were addressed. Cimzia Pregnancy And Lactation Text: This medication crosses the placenta but can be considered safe in certain situations. Cimzia may be excreted in breast milk. Cyclophosphamide Counseling:  I discussed with the patient the risks of cyclophosphamide including but not limited to hair loss, hormonal abnormalities, decreased fertility, abdominal pain, diarrhea, nausea and vomiting, bone marrow suppression and infection. The patient understands that monitoring is required while taking this medication. Nsaids Counseling: NSAID Counseling: I discussed with the patient that NSAIDs should be taken with food. Prolonged use of NSAIDs can result in the development of stomach ulcers.  Patient advised to stop taking NSAIDs if abdominal pain occurs.  The patient verbalized understanding of the proper use and possible adverse effects of NSAIDs.  All of the patient's questions and concerns were addressed. Hydroquinone Counseling:  Patient advised that medication may result in skin irritation, lightening (hypopigmentation), dryness, and burning.  In the event of skin irritation, the patient was advised to reduce the amount of the drug applied or use it less frequently.  Rarely, spots that are treated with hydroquinone can become darker (pseudoochronosis).  Should this occur, patient instructed to stop medication and call the office. The patient verbalized understanding of the proper use and possible adverse effects of hydroquinone.  All of the patient's questions and concerns were addressed.

## 2023-03-07 ENCOUNTER — PATIENT MESSAGE (OUTPATIENT)
Dept: INTERNAL MEDICINE | Facility: CLINIC | Age: 78
End: 2023-03-07
Payer: MEDICARE

## 2023-03-07 ENCOUNTER — TELEPHONE (OUTPATIENT)
Dept: INTERNAL MEDICINE | Facility: CLINIC | Age: 78
End: 2023-03-07
Payer: MEDICARE

## 2023-03-07 NOTE — TELEPHONE ENCOUNTER
----- Message from Hilda Major MA sent at 3/6/2023 11:32 AM CST -----  Regarding: Shakira Pearl - upcoming appt  Contact: pt [phone]  I responded to a msg in the basket from Shakira Pearl, MR# [id number], to assist her with any questions I could but she wanted to speak with you about the upcoming appt, as well as questions regarding her appt she had with you previously.  I informed her that you were not in the office today and that you would be back in on 3/7/23.    ----- Message -----  From: Carlos A Wilson  Sent: 3/6/2023  11:17 AM CST  To: [facility] Staff    Patient would like a call back to discuss upcoming appointment.  Please call and advise.    Thank you and have a great day.

## 2023-03-09 ENCOUNTER — LAB VISIT (OUTPATIENT)
Dept: LAB | Facility: HOSPITAL | Age: 78
End: 2023-03-09
Attending: INTERNAL MEDICINE
Payer: MEDICARE

## 2023-03-09 DIAGNOSIS — D50.9 IRON DEFICIENCY ANEMIA, UNSPECIFIED IRON DEFICIENCY ANEMIA TYPE: ICD-10-CM

## 2023-03-09 LAB
HGB BLD-MCNC: 11.6 G/DL (ref 12–16)
IRON SERPL-MCNC: 81 UG/DL (ref 30–160)
SATURATED IRON: 18 % (ref 20–50)
TOTAL IRON BINDING CAPACITY: 438 UG/DL (ref 250–450)
TRANSFERRIN SERPL-MCNC: 296 MG/DL (ref 200–375)

## 2023-03-09 PROCEDURE — 36415 COLL VENOUS BLD VENIPUNCTURE: CPT | Mod: HCNC,PO | Performed by: INTERNAL MEDICINE

## 2023-03-09 PROCEDURE — 85018 HEMOGLOBIN: CPT | Mod: HCNC | Performed by: INTERNAL MEDICINE

## 2023-03-09 PROCEDURE — 82728 ASSAY OF FERRITIN: CPT | Mod: HCNC | Performed by: INTERNAL MEDICINE

## 2023-03-09 PROCEDURE — 84466 ASSAY OF TRANSFERRIN: CPT | Mod: HCNC | Performed by: INTERNAL MEDICINE

## 2023-03-10 ENCOUNTER — OFFICE VISIT (OUTPATIENT)
Dept: HEPATOLOGY | Facility: CLINIC | Age: 78
End: 2023-03-10
Payer: MEDICARE

## 2023-03-10 VITALS
OXYGEN SATURATION: 98 % | WEIGHT: 156 LBS | BODY MASS INDEX: 27.64 KG/M2 | SYSTOLIC BLOOD PRESSURE: 111 MMHG | TEMPERATURE: 98 F | HEART RATE: 85 BPM | HEIGHT: 63 IN | DIASTOLIC BLOOD PRESSURE: 64 MMHG

## 2023-03-10 DIAGNOSIS — K76.0 FATTY LIVER: ICD-10-CM

## 2023-03-10 DIAGNOSIS — K76.9 LIVER LESION: Primary | ICD-10-CM

## 2023-03-10 LAB — FERRITIN SERPL-MCNC: 54 NG/ML (ref 20–300)

## 2023-03-10 PROCEDURE — 3074F PR MOST RECENT SYSTOLIC BLOOD PRESSURE < 130 MM HG: ICD-10-PCS | Mod: HCNC,CPTII,S$GLB, | Performed by: INTERNAL MEDICINE

## 2023-03-10 PROCEDURE — 3288F PR FALLS RISK ASSESSMENT DOCUMENTED: ICD-10-PCS | Mod: HCNC,CPTII,S$GLB, | Performed by: INTERNAL MEDICINE

## 2023-03-10 PROCEDURE — 1126F AMNT PAIN NOTED NONE PRSNT: CPT | Mod: HCNC,CPTII,S$GLB, | Performed by: INTERNAL MEDICINE

## 2023-03-10 PROCEDURE — 99999 PR PBB SHADOW E&M-EST. PATIENT-LVL IV: ICD-10-PCS | Mod: PBBFAC,HCNC,, | Performed by: INTERNAL MEDICINE

## 2023-03-10 PROCEDURE — 3078F DIAST BP <80 MM HG: CPT | Mod: HCNC,CPTII,S$GLB, | Performed by: INTERNAL MEDICINE

## 2023-03-10 PROCEDURE — 99213 PR OFFICE/OUTPT VISIT, EST, LEVL III, 20-29 MIN: ICD-10-PCS | Mod: HCNC,S$GLB,, | Performed by: INTERNAL MEDICINE

## 2023-03-10 PROCEDURE — 1126F PR PAIN SEVERITY QUANTIFIED, NO PAIN PRESENT: ICD-10-PCS | Mod: HCNC,CPTII,S$GLB, | Performed by: INTERNAL MEDICINE

## 2023-03-10 PROCEDURE — 99213 OFFICE O/P EST LOW 20 MIN: CPT | Mod: HCNC,S$GLB,, | Performed by: INTERNAL MEDICINE

## 2023-03-10 PROCEDURE — 1101F PR PT FALLS ASSESS DOC 0-1 FALLS W/OUT INJ PAST YR: ICD-10-PCS | Mod: HCNC,CPTII,S$GLB, | Performed by: INTERNAL MEDICINE

## 2023-03-10 PROCEDURE — 1159F PR MEDICATION LIST DOCUMENTED IN MEDICAL RECORD: ICD-10-PCS | Mod: HCNC,CPTII,S$GLB, | Performed by: INTERNAL MEDICINE

## 2023-03-10 PROCEDURE — 3288F FALL RISK ASSESSMENT DOCD: CPT | Mod: HCNC,CPTII,S$GLB, | Performed by: INTERNAL MEDICINE

## 2023-03-10 PROCEDURE — 3078F PR MOST RECENT DIASTOLIC BLOOD PRESSURE < 80 MM HG: ICD-10-PCS | Mod: HCNC,CPTII,S$GLB, | Performed by: INTERNAL MEDICINE

## 2023-03-10 PROCEDURE — 3074F SYST BP LT 130 MM HG: CPT | Mod: HCNC,CPTII,S$GLB, | Performed by: INTERNAL MEDICINE

## 2023-03-10 PROCEDURE — 99999 PR PBB SHADOW E&M-EST. PATIENT-LVL IV: CPT | Mod: PBBFAC,HCNC,, | Performed by: INTERNAL MEDICINE

## 2023-03-10 PROCEDURE — 1160F RVW MEDS BY RX/DR IN RCRD: CPT | Mod: HCNC,CPTII,S$GLB, | Performed by: INTERNAL MEDICINE

## 2023-03-10 PROCEDURE — 1160F PR REVIEW ALL MEDS BY PRESCRIBER/CLIN PHARMACIST DOCUMENTED: ICD-10-PCS | Mod: HCNC,CPTII,S$GLB, | Performed by: INTERNAL MEDICINE

## 2023-03-10 PROCEDURE — 1159F MED LIST DOCD IN RCRD: CPT | Mod: HCNC,CPTII,S$GLB, | Performed by: INTERNAL MEDICINE

## 2023-03-10 PROCEDURE — 1101F PT FALLS ASSESS-DOCD LE1/YR: CPT | Mod: HCNC,CPTII,S$GLB, | Performed by: INTERNAL MEDICINE

## 2023-03-10 NOTE — PROGRESS NOTES
HEPATOLOGY FOLLOW UP    Referring Physician: Yfn Black MD  Current Corresponding Physician: Yfn Black MD, Angel Bello DO     Shakira Pearl is here for follow up of Abnormal Abdominal/Liver Imaging and Fatty Liver      HPI  Shakira Pearl is a 77 y.o. female with a hx of iron def anemia who presented 1/12/23 for evaluation of a fatty liver and liver lesions noted on imaging:     Abdo US 10/31/22: Liver: Enlarged measuring 18.2 cm. Diffuse increased hepatic echogenicity suggesting hepatic steatosis.  HRI: 1.9.  There are 3 hepatic hypoechoic lesions; 2 lesions on the right measuring 1.2 x 1 x 1.1 cm and 0.6 x 0.6 x 0.8 cm and 1 on the left measures 1.2 x 0.9 x 0.9 cm.  CT enterography abdo pelvis w contrast 8/5/22: Liver: Liver is enlarged in size measuring up to 19 cm in craniocaudal dimension.  There are multifocal hyperenhancing foci throughout the liver, some of which appear similar to 11/14/2017, however others of which appear new.  Stable left hepatic lobe bilobed cyst.     Colonoscopy 2020: no colon cancer  Mammogram: ordered     Labs 11/17/22: ALT 26, AST 22, ALKP 54, TBIL 0.4  GOLD+ (1:160), ASMA-, TTG IgA- with normal IgA level, HAV IgG+, HCV Ab-, HBsAg-, HBsAb-  Alcohol  BMI: 28     Chana RUQ pain, N/V or diarrhea.. The patient denies any symptoms of decompensated cirrhosis, including no ascites or edema, cognitive problems that would suggest hepatic encephalopathy, or GI bleeding from varices.     Interval History  Since Shakira Pearl's last visit:     Fibroscan 2/5/23: S3 steatosis (>67%), F0-1    MRI abdo w wo contrast 2/9/23: Hepatomegaly and hepatic steatosis. Motion artifact limits assessment of the hepatic parenchyma.  Few ill-defined foci of hepatic arterial enhancement favored to represent flash filling hemangiomas, similar to 11/14/2017.  Minimally complex left hepatic lobe cyst.    Outpatient Encounter Medications as of 3/10/2023   Medication Sig Dispense Refill     cholecalciferol, vitamin D3, (VITAMIN D3) 50 mcg (2,000 unit) Cap capsule Take 1 capsule (2,000 Units total) by mouth once daily. 90 capsule 3    DULoxetine (CYMBALTA) 20 MG capsule Take 1 capsule (20 mg total) by mouth once daily. 30 capsule 1    ergocalciferol (ERGOCALCIFEROL) 50,000 unit Cap Take 1 capsule (50,000 Units total) by mouth every 7 days. 36 capsule 2    estradioL (ESTRACE) 0.01 % (0.1 mg/gram) vaginal cream 0.5 grams with applicator or dime-sized amount with finger in vagina nightly x 2 weeks, then twice a week thereafter 42.5 g 11    estradiol 0.05 mg/24 hr td ptsw (VIVELLE-DOT) 0.05 mg/24 hr       estradiol valerate (DELESTROGEN) 40 mg/mL injection Inject 0.5 mLs (20 mg total) into the muscle every 28 days. Inject into the muscle. 5 mL 12    ezetimibe (ZETIA) 10 mg tablet Take 1 tablet (10 mg total) by mouth once daily. 90 tablet 3    ferrous gluconate (FERGON) 324 MG tablet Take 1 tablet (324 mg total) by mouth daily with breakfast. 90 tablet 1    icosapent ethyL (VASCEPA) 1 gram Cap Take 2 capsules (2 g total) by mouth 2 (two) times a day. 120 capsule 11    mirabegron (MYRBETRIQ) 25 mg Tb24 ER tablet Take 1 tablet (25 mg total) by mouth once daily. 30 tablet 11    pantoprazole (PROTONIX) 40 MG tablet TAKE ONE TABLET BY MOUTH EVERY MORNING 45 MINUTES BEFORE BREAKFAST 90 tablet 3    potassium chloride SA (K-DUR,KLOR-CON) 10 MEQ tablet Take 1 tablet (10 mEq total) by mouth once daily. 90 tablet 3    losartan-hydrochlorothiazide 50-12.5 mg (HYZAAR) 50-12.5 mg per tablet Take 1 tablet by mouth once daily. 90 tablet 3    [DISCONTINUED] DULoxetine (CYMBALTA) 30 MG capsule Take 1 capsule (30 mg total) by mouth once daily. for 14 days 14 capsule 0    [DISCONTINUED] DULoxetine (CYMBALTA) 60 MG capsule Take 1 capsule (60 mg total) by mouth once daily. 90 capsule 3     No facility-administered encounter medications on file as of 3/10/2023.     Review of patient's allergies indicates:   Allergen Reactions     Demerol [meperidine] Nausea And Vomiting     Other reaction(s): Nausea    Papaya      Illness vomiting    Sulfa (sulfonamide antibiotics) Rash    Sulfur Rash     Past Medical History:   Diagnosis Date    Allergy sinus    Arthritis back    Cataract     Colon polyps     Degenerative disc disease back    Diverticulosis of colon     Dyspepsia     Fatty liver 1/12/2023    GERD (gastroesophageal reflux disease)     Heartburn     Hemorrhoids, internal     Hiatal hernia     Neuromuscular disorder     Vitamin D deficiency        Review of Systems   Constitutional: Negative.    HENT: Negative.     Eyes: Negative.    Respiratory: Negative.     Cardiovascular: Negative.    Gastrointestinal: Negative.    Genitourinary: Negative.    Musculoskeletal: Negative.    Skin: Negative.    Neurological: Negative.    Psychiatric/Behavioral: Negative.     Vitals:    03/10/23 1519   BP: 111/64   Pulse: 85   Temp: 97.9 °F (36.6 °C)       Physical Exam  Vitals reviewed.   Constitutional:       Appearance: She is well-developed.   HENT:      Head: Normocephalic and atraumatic.   Eyes:      General: No scleral icterus.     Conjunctiva/sclera: Conjunctivae normal.      Pupils: Pupils are equal, round, and reactive to light.   Neck:      Thyroid: No thyromegaly.   Cardiovascular:      Rate and Rhythm: Normal rate and regular rhythm.      Heart sounds: Normal heart sounds.   Pulmonary:      Effort: Pulmonary effort is normal.      Breath sounds: Normal breath sounds. No rales.   Abdominal:      General: Bowel sounds are normal. There is no distension.      Palpations: Abdomen is soft. There is no mass.      Tenderness: There is no abdominal tenderness.   Musculoskeletal:         General: Normal range of motion.      Cervical back: Normal range of motion and neck supple.   Skin:     General: Skin is warm and dry.      Findings: No rash.   Neurological:      Mental Status: She is alert and oriented to person, place, and time.       Computed MELD-Na  score unavailable. Necessary lab results were not found in the last year.  Computed MELD score unavailable. Necessary lab results were not found in the last year.    Lab Results   Component Value Date    GLU 95 02/08/2023    BUN 24 (H) 02/08/2023    CREATININE 0.8 02/08/2023    CREATININE 0.8 02/08/2023    CALCIUM 9.5 02/08/2023     02/08/2023    K 4.1 02/08/2023    CL 98 02/08/2023    PROT 7.2 02/08/2023    CO2 27 02/08/2023    ANIONGAP 11 02/08/2023    WBC 8.93 11/17/2022    RBC 4.02 11/17/2022    HGB 11.6 (L) 03/09/2023    HCT 34.9 (L) 11/17/2022    MCV 87 11/17/2022    MCH 29.4 11/17/2022    MCHC 33.8 11/17/2022     Lab Results   Component Value Date    RDW 13.0 11/17/2022     11/17/2022    MPV 10.7 11/17/2022    GRAN 6.0 11/17/2022    GRAN 67.6 11/17/2022    LYMPH 2.1 11/17/2022    LYMPH 23.5 11/17/2022    MONO 0.5 11/17/2022    MONO 5.8 11/17/2022    EOSINOPHIL 2.4 11/17/2022    BASOPHIL 0.4 11/17/2022    EOS 0.2 11/17/2022    BASO 0.04 11/17/2022    APTT 24.6 05/06/2020    GOLD Pos, Titer to follow (A) 11/11/2008    CHOL 148 05/06/2022    TRIG 275 (H) 05/06/2022    HDL 49 05/06/2022    CHOLHDL 33.1 05/06/2022    TOTALCHOLEST 3.0 05/06/2022    ALBUMIN 3.7 02/08/2023    BILIDIR 0.1 11/14/2022    AST 20 02/08/2023    ALT 22 02/08/2023    ALKPHOS 56 02/08/2023    MG 1.6 04/04/2022    LABPROT 9.8 05/06/2020    INR 0.9 05/06/2020       Assessment and Plan:  Shakira Pearl is a 77 y.o. female with a fatty liver but no significant steatosis. MRI suggests hemangiomas. These are benign and even if they increase in size do not carry a malignant potential or a risk of rupture. Recommend target BMI 20-25, eat foods low in fat, avoid alcohol, keep cholesterol under control. Return one year with labs and yinka DAVALOS.

## 2023-03-10 NOTE — PATIENT INSTRUCTIONS
Fibroscan- no significant scar  MRI- hemangiomas  Return 1 year for f/u of fatty liver with albs and abdo US

## 2023-03-27 ENCOUNTER — TELEPHONE (OUTPATIENT)
Dept: INTERNAL MEDICINE | Facility: CLINIC | Age: 78
End: 2023-03-27
Payer: MEDICARE

## 2023-03-27 DIAGNOSIS — Z00.00 ENCOUNTER FOR ANNUAL WELLNESS VISIT (AWV) IN MEDICARE PATIENT: Primary | ICD-10-CM

## 2023-03-28 ENCOUNTER — OFFICE VISIT (OUTPATIENT)
Dept: INTERNAL MEDICINE | Facility: CLINIC | Age: 78
End: 2023-03-28
Payer: MEDICARE

## 2023-03-28 VITALS
OXYGEN SATURATION: 98 % | BODY MASS INDEX: 28.03 KG/M2 | WEIGHT: 158.19 LBS | DIASTOLIC BLOOD PRESSURE: 68 MMHG | HEIGHT: 63 IN | SYSTOLIC BLOOD PRESSURE: 119 MMHG | RESPIRATION RATE: 14 BRPM | HEART RATE: 85 BPM

## 2023-03-28 DIAGNOSIS — I70.0 AORTIC ATHEROSCLEROSIS: ICD-10-CM

## 2023-03-28 DIAGNOSIS — H91.93 DECREASED HEARING OF BOTH EARS: ICD-10-CM

## 2023-03-28 DIAGNOSIS — K76.0 FATTY LIVER: ICD-10-CM

## 2023-03-28 DIAGNOSIS — I10 ESSENTIAL HYPERTENSION: ICD-10-CM

## 2023-03-28 DIAGNOSIS — Z00.00 ENCOUNTER FOR ANNUAL WELLNESS VISIT (AWV) IN MEDICARE PATIENT: ICD-10-CM

## 2023-03-28 DIAGNOSIS — K21.9 GASTROESOPHAGEAL REFLUX DISEASE, UNSPECIFIED WHETHER ESOPHAGITIS PRESENT: ICD-10-CM

## 2023-03-28 DIAGNOSIS — E66.3 OVERWEIGHT (BMI 25.0-29.9): ICD-10-CM

## 2023-03-28 DIAGNOSIS — N39.46 URINARY INCONTINENCE, MIXED: ICD-10-CM

## 2023-03-28 DIAGNOSIS — E78.1 HYPERTRIGLYCERIDEMIA: ICD-10-CM

## 2023-03-28 DIAGNOSIS — N95.2 VAGINAL ATROPHY: ICD-10-CM

## 2023-03-28 DIAGNOSIS — M46.1 SACROILIITIS: ICD-10-CM

## 2023-03-28 DIAGNOSIS — Z00.00 ENCOUNTER FOR PREVENTIVE HEALTH EXAMINATION: Primary | ICD-10-CM

## 2023-03-28 DIAGNOSIS — M47.816 LUMBAR FACET ARTHROPATHY: ICD-10-CM

## 2023-03-28 DIAGNOSIS — R26.9 ABNORMALITY OF GAIT AND MOBILITY: ICD-10-CM

## 2023-03-28 DIAGNOSIS — D64.9 NORMOCYTIC ANEMIA: ICD-10-CM

## 2023-03-28 PROBLEM — E78.5 ELEVATED LIPIDS: Status: RESOLVED | Noted: 2017-03-08 | Resolved: 2023-03-28

## 2023-03-28 PROBLEM — E61.1 IRON DEFICIENCY: Status: RESOLVED | Noted: 2022-11-23 | Resolved: 2023-03-28

## 2023-03-28 PROCEDURE — 1100F PR PT FALLS ASSESS DOC 2+ FALLS/FALL W/INJURY/YR: ICD-10-PCS | Mod: HCNC,CPTII,S$GLB, | Performed by: INTERNAL MEDICINE

## 2023-03-28 PROCEDURE — 1126F PR PAIN SEVERITY QUANTIFIED, NO PAIN PRESENT: ICD-10-PCS | Mod: HCNC,CPTII,S$GLB, | Performed by: INTERNAL MEDICINE

## 2023-03-28 PROCEDURE — 3288F FALL RISK ASSESSMENT DOCD: CPT | Mod: HCNC,CPTII,S$GLB, | Performed by: INTERNAL MEDICINE

## 2023-03-28 PROCEDURE — 1170F FXNL STATUS ASSESSED: CPT | Mod: HCNC,CPTII,S$GLB, | Performed by: INTERNAL MEDICINE

## 2023-03-28 PROCEDURE — 1159F PR MEDICATION LIST DOCUMENTED IN MEDICAL RECORD: ICD-10-PCS | Mod: HCNC,CPTII,S$GLB, | Performed by: INTERNAL MEDICINE

## 2023-03-28 PROCEDURE — 3078F PR MOST RECENT DIASTOLIC BLOOD PRESSURE < 80 MM HG: ICD-10-PCS | Mod: HCNC,CPTII,S$GLB, | Performed by: INTERNAL MEDICINE

## 2023-03-28 PROCEDURE — G0439 PPPS, SUBSEQ VISIT: HCPCS | Mod: HCNC,S$GLB,, | Performed by: INTERNAL MEDICINE

## 2023-03-28 PROCEDURE — 1100F PTFALLS ASSESS-DOCD GE2>/YR: CPT | Mod: HCNC,CPTII,S$GLB, | Performed by: INTERNAL MEDICINE

## 2023-03-28 PROCEDURE — 99999 PR PBB SHADOW E&M-EST. PATIENT-LVL V: CPT | Mod: PBBFAC,HCNC,,

## 2023-03-28 PROCEDURE — 1159F MED LIST DOCD IN RCRD: CPT | Mod: HCNC,CPTII,S$GLB, | Performed by: INTERNAL MEDICINE

## 2023-03-28 PROCEDURE — G0439 PR MEDICARE ANNUAL WELLNESS SUBSEQUENT VISIT: ICD-10-PCS | Mod: HCNC,S$GLB,, | Performed by: INTERNAL MEDICINE

## 2023-03-28 PROCEDURE — 1160F PR REVIEW ALL MEDS BY PRESCRIBER/CLIN PHARMACIST DOCUMENTED: ICD-10-PCS | Mod: HCNC,CPTII,S$GLB, | Performed by: INTERNAL MEDICINE

## 2023-03-28 PROCEDURE — 99999 PR PBB SHADOW E&M-EST. PATIENT-LVL V: ICD-10-PCS | Mod: PBBFAC,HCNC,,

## 2023-03-28 PROCEDURE — 3074F PR MOST RECENT SYSTOLIC BLOOD PRESSURE < 130 MM HG: ICD-10-PCS | Mod: HCNC,CPTII,S$GLB, | Performed by: INTERNAL MEDICINE

## 2023-03-28 PROCEDURE — 1170F PR FUNCTIONAL STATUS ASSESSED: ICD-10-PCS | Mod: HCNC,CPTII,S$GLB, | Performed by: INTERNAL MEDICINE

## 2023-03-28 PROCEDURE — 3078F DIAST BP <80 MM HG: CPT | Mod: HCNC,CPTII,S$GLB, | Performed by: INTERNAL MEDICINE

## 2023-03-28 PROCEDURE — 1126F AMNT PAIN NOTED NONE PRSNT: CPT | Mod: HCNC,CPTII,S$GLB, | Performed by: INTERNAL MEDICINE

## 2023-03-28 PROCEDURE — 1160F RVW MEDS BY RX/DR IN RCRD: CPT | Mod: HCNC,CPTII,S$GLB, | Performed by: INTERNAL MEDICINE

## 2023-03-28 PROCEDURE — 3288F PR FALLS RISK ASSESSMENT DOCUMENTED: ICD-10-PCS | Mod: HCNC,CPTII,S$GLB, | Performed by: INTERNAL MEDICINE

## 2023-03-28 PROCEDURE — 3074F SYST BP LT 130 MM HG: CPT | Mod: HCNC,CPTII,S$GLB, | Performed by: INTERNAL MEDICINE

## 2023-03-28 NOTE — PATIENT INSTRUCTIONS
Counseling and Referral of Other Preventative  (Italic type indicates deductible and co-insurance are waived)    Patient Name: Shakira Pearl  Today's Date: 3/28/2023    Health Maintenance         Date Due Completion Date    Shingles Vaccine (2 of 2) 10/18/2023 (Originally 4/24/2020) 2/28/2020    Colonoscopy 09/09/2023 9/9/2020    Lipid Panel 05/06/2023 5/6/2022    DEXA Scan 01/17/2025 1/17/2023    TETANUS VACCINE    Abnormal whisper- follow in audiology    Follow up with PCP on history of falls    Gradual weight loss, mediterranean diet   09/23/2029 9/23/2019          No orders of the defined types were placed in this encounter.    The following information is provided to all patients.  This information is to help you find resources for any of the problems found today that may be affecting your health:                Living healthy guide: www.[facility].[place].gov      Understanding Diabetes: www.diabetes.org      Eating healthy: www.cdc.gov/healthyweight      [facility] home safety checklist: www.cdc.gov/steadi/patient.html      Agency on Aging: www.goea.[place].[facility]      Alcoholics anonymous (AA): www.aa.org      Physical Activity: www.pavithra.nih.gov/zw6wtyu      Tobacco use: www.quitwithusla.org

## 2023-03-28 NOTE — PROGRESS NOTES
"Shakira Pearl presented for a  Medicare AWV and comprehensive Health Risk Assessment today. The following components were reviewed and updated:    Medical history  Family History  Social history  Allergies and Current Medications  Health Risk Assessment  Health Maintenance  Care Team     ** See Completed Assessments for Annual Wellness Visit within the encounter summary.**       The following assessments were completed:  Living Situation  CAGE  Depression Screening  Timed Get Up and Go  Whisper Test-abnormal  Cognitive Function Screening  Nutrition Screening  ADL Screening  PAQ Screening      Vitals:    03/28/23 1345   BP: 119/68   BP Location: Right arm   Patient Position: Sitting   Pulse: 85   Resp: 14   SpO2: 98%   Weight: 71.7 kg (158 lb 2.9 oz)   Height: 5' 3" (1.6 m)     Body mass index is 28.02 kg/m².  Physical Exam  Constitutional:       Comments: Younger in appearance than age   HENT:      Right Ear: There is no impacted cerumen.      Left Ear: There is no impacted cerumen.   Eyes:      General: No scleral icterus.  Cardiovascular:      Rate and Rhythm: Normal rate and regular rhythm.   Pulmonary:      Effort: Pulmonary effort is normal.      Breath sounds: Normal breath sounds.   Abdominal:      Palpations: Abdomen is soft.   Musculoskeletal:         General: No swelling. Normal range of motion.   Skin:     General: Skin is warm and dry.   Neurological:      Mental Status: She is alert and oriented to person, place, and time.   Psychiatric:         Mood and Affect: Mood normal.         Behavior: Behavior normal.         Thought Content: Thought content normal.         Judgment: Judgment normal.          Medication review & Opioid screening perform during rooming section. No prescribed opioid medications noted.  Review for substance use disorder screening performed during rooming section. No substance abuse noted on screening.      Diagnoses and health risks identified today and associated " recommendations/orders:    1. Hypertriglyceridemia  Stable followed by PCP    2. Essential hypertension  Stable followed by PCP    3. Normocytic anemia  Stable followed by PCP    4. Urinary incontinence, mixed  Stable followed by urogyn    5. Gastroesophageal reflux disease, unspecified whether esophagitis present  Stable followed by PCP    6. Lumbar facet arthropathy  Stable followed by PCP    7. Sacroiliitis  Stable followed by PCP    8. Fatty liver  Stable followed by hepatology    9. Vaginal atrophy  Stable followed by urogyn    10. Encounter for annual wellness visit (AWV) in Medicare patient  Here for Health Risk Assessment/Annual Wellness Visit.  Health maintenance reviewed and updated. Follow up in one year.    - Ambulatory Referral/Consult to Enhanced Annual Wellness Visit (eAWV)    11. Overweight (BMI 25.0-29.9)  Chronic. Followed by PCP.   Centers for Disease Control and Prevention (CDC)  weight recommendations for current BMI & ideal BMI range discussed with patient.  Recommended  gradual weight loss,  mediterranean diet ,structured regular exercise every day.       12. Aortic atherosclerosis  Stable followed by PCP    13. Abnormality of gait and mobility  Stable followed by PCP    14. Encounter for preventive health examination  Here for Health Risk Assessment/Annual Wellness Visit.  Health maintenance reviewed and updated. Follow up in one year.        15. Decreased hearing of both ears  - Ambulatory referral/consult to Audiology; Future      Provided Shakira with a 5-10 year written screening schedule and personal prevention plan. Recommendations were developed using the USPSTF age appropriate recommendations. Education, counseling, and referrals were provided as needed. After Visit Summary printed and given to patient which includes a list of additional screenings\tests needed.  follow-up in 1 year HRA.    Zulema Tomlin NP I offered to discuss advanced care planning, including how to pick a  person who would make decisions for you if you were unable to make them for yourself, called a health care power of , and what kind of decisions you might make such as use of life sustaining treatments such as ventilators and tube feeding when faced with a life limiting illness recorded on a living will that they will need to know. (How you want to be cared for as you near the end of your natural life)     X Patient is interested in learning more about how to make advanced directives.  I provided them paperwork and offered to discuss this with them.

## 2023-04-01 DIAGNOSIS — D50.9 IRON DEFICIENCY ANEMIA, UNSPECIFIED IRON DEFICIENCY ANEMIA TYPE: ICD-10-CM

## 2023-04-01 RX ORDER — FERROUS GLUCONATE 324(38)MG
324 TABLET ORAL
Qty: 90 TABLET | Refills: 1 | Status: SHIPPED | OUTPATIENT
Start: 2023-04-01 | End: 2023-08-01 | Stop reason: SDUPTHER

## 2023-04-01 NOTE — PROGRESS NOTES
Manpreet - please tell patient that they are iron deficient and anemic and recommend that they take ferrous gluconate one 324mg pill once daily for next 3 months.    Please order repeat fasting Hemoglobin, Iron/TIBC, and Ferritin in 12  weeks - Orders placed.

## 2023-04-04 ENCOUNTER — TELEPHONE (OUTPATIENT)
Dept: GASTROENTEROLOGY | Facility: CLINIC | Age: 78
End: 2023-04-04
Payer: MEDICARE

## 2023-04-04 NOTE — TELEPHONE ENCOUNTER
Contact patient per Dr. Black, please tell patient that they are iron deficient and anemic and recommend that they take ferrous gluconate one 324mg pill once daily for next 3 months.     Please order repeat fasting Hemoglobin, Iron/TIBC, and Ferritin in 12  weeks - Orders placed.     Patient verbalized understanding. Patient request for a appointment with Dr. Black to discuss about her recent liver(hepatology) appointment and to schedule a colonoscopy. Patient was inform of Dr. Black next available appointment patient decline in virtual would like for provider to give her a call.

## 2023-04-04 NOTE — TELEPHONE ENCOUNTER
----- Message from Yfn Black MD sent at 4/1/2023  3:50 PM CDT -----  Manpreet - please tell patient that they are iron deficient and anemic and recommend that they take ferrous gluconate one 324mg pill once daily for next 3 months.    Please order repeat fasting Hemoglobin, Iron/TIBC, and Ferritin in 12  weeks - Orders placed.

## 2023-04-06 DIAGNOSIS — K21.9 GASTROESOPHAGEAL REFLUX DISEASE: ICD-10-CM

## 2023-04-06 RX ORDER — DULOXETIN HYDROCHLORIDE 20 MG/1
20 CAPSULE, DELAYED RELEASE ORAL DAILY
Qty: 30 CAPSULE | Refills: 1 | Status: CANCELLED | OUTPATIENT
Start: 2023-04-06 | End: 2024-04-05

## 2023-04-06 RX ORDER — PANTOPRAZOLE SODIUM 40 MG/1
TABLET, DELAYED RELEASE ORAL
Qty: 90 TABLET | Refills: 3 | Status: CANCELLED | OUTPATIENT
Start: 2023-04-06

## 2023-04-06 NOTE — TELEPHONE ENCOUNTER
No new care gaps identified.  [facility] Embedded Care Gaps. Reference number: [id number]. 4/06/2023   2:52:41 PM CDT

## 2023-04-06 NOTE — TELEPHONE ENCOUNTER
No new care gaps identified.  [facility] Embedded Care Gaps. Reference number: [id number]. 4/06/2023   2:53:50 PM CDT

## 2023-04-06 NOTE — TELEPHONE ENCOUNTER
----- Message from Carlso Pagan sent at 4/6/2023  2:14 PM CDT -----  Contact: polly (pharm) [phone]  Requesting an RX refill or new RX.  Is this a refill or new RX: refill  RX name and strength (copy/paste from chart):  DULoxetine (CYMBALTA) 20 MG capsule  Is this a 30 day or 90 day RX:   Pharmacy name and phone # (copy/paste from chart):    [facility] - [facility] [address]  [address] [id number]  Phone: [phone] Fax: [phone]      The doctors have asked that we provide their patients with the following 2 reminders -- prescription refills can take up to 72 hours, and a friendly reminder that in the future you can use your [facility] account to request refills: yes    Requesting an RX refill or new RX.  Is this a refill or new RX: refill  RX name and strength (copy/paste from chart):  pantoprazole (PROTONIX) 40 MG tablet  Is this a 30 day or 90 day RX:   Pharmacy name and phone # (copy/paste from chart):    [facility] - [facility] [address]  [address] [id number]  Phone: [phone] Fax: [phone]      The doctors have asked that we provide their patients with the following 2 reminders -- prescription refills can take up to 72 hours, and a friendly reminder that in the future you can use your [facility] account to request refills: yes    Please call and advise

## 2023-04-10 RX ORDER — PANTOPRAZOLE SODIUM 40 MG/1
TABLET, DELAYED RELEASE ORAL
Qty: 90 TABLET | Refills: 3 | Status: SHIPPED | OUTPATIENT
Start: 2023-04-10 | End: 2023-06-05 | Stop reason: SDUPTHER

## 2023-04-10 RX ORDER — DULOXETIN HYDROCHLORIDE 20 MG/1
20 CAPSULE, DELAYED RELEASE ORAL DAILY
Qty: 90 CAPSULE | Refills: 3 | Status: SHIPPED | OUTPATIENT
Start: 2023-04-10 | End: 2023-06-05 | Stop reason: SDUPTHER

## 2023-04-18 ENCOUNTER — TELEPHONE (OUTPATIENT)
Dept: NEUROLOGY | Facility: CLINIC | Age: 78
End: 2023-04-18
Payer: MEDICARE

## 2023-04-18 NOTE — TELEPHONE ENCOUNTER
----- Message from Magdaleno Mcgrath sent at 4/18/2023  4:27 PM CDT -----      Name of Who is Calling: CHELSY GONZALEZ [[id number]]      What is the request in detail: Pt called to speak with the office regarding appt tomorrow.Please contact to further discuss and advise.          Can the clinic reply by [facility]: Y      What Number to Call Back if not in [facility]: [phone]

## 2023-04-28 ENCOUNTER — TELEPHONE (OUTPATIENT)
Dept: ENDOCRINOLOGY | Facility: CLINIC | Age: 78
End: 2023-04-28
Payer: MEDICARE

## 2023-04-28 NOTE — TELEPHONE ENCOUNTER
Staff contacted patient for scheduling, patient canceled appointment due to unavailability to make it to appointment.

## 2023-05-11 NOTE — TELEPHONE ENCOUNTER
Don't know why getting another request from same pharmacy.  Last filled 90 x 3  2/28/23 to letty veliz

## 2023-05-11 NOTE — TELEPHONE ENCOUNTER
Refill Routing Note   Refill Routing Note   Medication(s) are not appropriate for processing by [facility] for the following reason(s):      Medication outside of protocol    [facility] action(s):  Route None identified        Medication reconciliation completed: No     Appointments  past 12m or future 3m with PCP    Date Provider   Last Visit   1/4/2023 Angel Bello,    Next Visit   7/6/2023 Angel Bello,    ED visits in past 90 days: 0        Note composed:5:26 PM 05/11/2023

## 2023-05-12 RX ORDER — ERGOCALCIFEROL 1.25 MG/1
CAPSULE ORAL
Qty: 12 CAPSULE | Refills: 3 | Status: SHIPPED | OUTPATIENT
Start: 2023-05-12

## 2023-05-23 DIAGNOSIS — E55.9 VITAMIN D INSUFFICIENCY: ICD-10-CM

## 2023-05-23 RX ORDER — ACETAMINOPHEN 500 MG
2000 TABLET ORAL DAILY
Qty: 90 CAPSULE | Refills: 3 | Status: SHIPPED | OUTPATIENT
Start: 2023-05-23 | End: 2023-06-05 | Stop reason: SDUPTHER

## 2023-05-24 ENCOUNTER — OFFICE VISIT (OUTPATIENT)
Dept: OTOLARYNGOLOGY | Facility: CLINIC | Age: 78
End: 2023-05-24
Payer: MEDICARE

## 2023-05-24 ENCOUNTER — HOSPITAL ENCOUNTER (EMERGENCY)
Facility: HOSPITAL | Age: 78
Discharge: HOME | End: 2023-05-24
Attending: EMERGENCY MEDICINE
Payer: MEDICARE

## 2023-05-24 ENCOUNTER — CLINICAL SUPPORT (OUTPATIENT)
Dept: AUDIOLOGY | Facility: CLINIC | Age: 78
End: 2023-05-24
Payer: MEDICARE

## 2023-05-24 VITALS
OXYGEN SATURATION: 97 % | HEART RATE: 81 BPM | SYSTOLIC BLOOD PRESSURE: 196 MMHG | WEIGHT: 158 LBS | RESPIRATION RATE: 18 BRPM | TEMPERATURE: 98 F | BODY MASS INDEX: 27.99 KG/M2 | DIASTOLIC BLOOD PRESSURE: 80 MMHG
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
## 2023-05-24 DIAGNOSIS — M47.812 OSTEOARTHRITIS OF CERVICAL SPINE, UNSPECIFIED SPINAL OSTEOARTHRITIS COMPLICATION STATUS: ICD-10-CM

## 2023-05-24 DIAGNOSIS — H91.13 PRESBYCUSIS OF BOTH EARS: Primary | ICD-10-CM

## 2023-05-24 DIAGNOSIS — H90.3 SENSORINEURAL HEARING LOSS, BILATERAL: Primary | ICD-10-CM

## 2023-05-24 DIAGNOSIS — S09.90XA INJURY OF HEAD, INITIAL ENCOUNTER: ICD-10-CM

## 2023-05-24 DIAGNOSIS — S92.355A CLOSED NONDISPLACED FRACTURE OF FIFTH METATARSAL BONE OF LEFT FOOT, INITIAL ENCOUNTER: ICD-10-CM

## 2023-05-24 DIAGNOSIS — S69.92XA INJURY OF LEFT HAND, INITIAL ENCOUNTER: ICD-10-CM

## 2023-05-24 DIAGNOSIS — S19.9XXA INJURY OF NECK, INITIAL ENCOUNTER: ICD-10-CM

## 2023-05-24 PROCEDURE — 92557 COMPREHENSIVE HEARING TEST: CPT | Mod: S$GLB,,, | Performed by: AUDIOLOGIST

## 2023-05-24 PROCEDURE — 1159F PR MEDICATION LIST DOCUMENTED IN MEDICAL RECORD: ICD-10-PCS | Mod: CPTII,S$GLB,, | Performed by: OTOLARYNGOLOGY

## 2023-05-24 PROCEDURE — 92557 PR COMPREHENSIVE HEARING TEST: ICD-10-PCS | Mod: S$GLB,,, | Performed by: AUDIOLOGIST

## 2023-05-24 PROCEDURE — 1101F PR PT FALLS ASSESS DOC 0-1 FALLS W/OUT INJ PAST YR: ICD-10-PCS | Mod: CPTII,S$GLB,, | Performed by: OTOLARYNGOLOGY

## 2023-05-24 PROCEDURE — 1159F MED LIST DOCD IN RCRD: CPT | Mod: CPTII,S$GLB,, | Performed by: OTOLARYNGOLOGY

## 2023-05-24 PROCEDURE — 99284 EMERGENCY DEPT VISIT MOD MDM: CPT | Mod: ,,, | Performed by: PHYSICIAN ASSISTANT

## 2023-05-24 PROCEDURE — 3288F FALL RISK ASSESSMENT DOCD: CPT | Mod: CPTII,S$GLB,, | Performed by: OTOLARYNGOLOGY

## 2023-05-24 PROCEDURE — 3288F PR FALLS RISK ASSESSMENT DOCUMENTED: ICD-10-PCS | Mod: CPTII,S$GLB,, | Performed by: OTOLARYNGOLOGY

## 2023-05-24 PROCEDURE — 99214 OFFICE O/P EST MOD 30 MIN: CPT | Mod: S$GLB,,, | Performed by: OTOLARYNGOLOGY

## 2023-05-24 PROCEDURE — 92567 TYMPANOMETRY: CPT | Mod: S$GLB,,, | Performed by: AUDIOLOGIST

## 2023-05-24 PROCEDURE — 99284 EMERGENCY DEPT VISIT MOD MDM: CPT | Mod: 25

## 2023-05-24 PROCEDURE — 92567 PR TYMPA2METRY: ICD-10-PCS | Mod: S$GLB,,, | Performed by: AUDIOLOGIST

## 2023-05-24 PROCEDURE — 25000003 PHARM REV CODE 250: Performed by: PHYSICIAN ASSISTANT

## 2023-05-24 PROCEDURE — 1101F PT FALLS ASSESS-DOCD LE1/YR: CPT | Mod: CPTII,S$GLB,, | Performed by: OTOLARYNGOLOGY

## 2023-05-24 PROCEDURE — 99284 PR EMERGENCY DEPT VISIT,LEVEL IV: ICD-10-PCS | Mod: ,,, | Performed by: PHYSICIAN ASSISTANT

## 2023-05-24 PROCEDURE — 99999 PR PBB SHADOW E&M-EST. PATIENT-LVL II: ICD-10-PCS | Mod: PBBFAC,,, | Performed by: OTOLARYNGOLOGY

## 2023-05-24 PROCEDURE — 99999 PR PBB SHADOW E&M-EST. PATIENT-LVL II: CPT | Mod: PBBFAC,,, | Performed by: OTOLARYNGOLOGY

## 2023-05-24 PROCEDURE — 99214 PR OFFICE/OUTPT VISIT, EST, LEVL IV, 30-39 MIN: ICD-10-PCS | Mod: S$GLB,,, | Performed by: OTOLARYNGOLOGY

## 2023-05-24 RX ORDER — LOSARTAN POTASSIUM AND HYDROCHLOROTHIAZIDE 12.5; 5 MG/1; MG/1
1 TABLET ORAL
Status: DISCONTINUED | OUTPATIENT
Start: 2023-05-24 | End: 2023-05-24

## 2023-05-24 RX ORDER — NAPROXEN 500 MG/1
500 TABLET ORAL 2 TIMES DAILY WITH MEALS
Qty: 20 TABLET | Refills: 0 | Status: SHIPPED | OUTPATIENT
Start: 2023-05-24 | End: 2023-05-24 | Stop reason: SDUPTHER

## 2023-05-24 RX ORDER — LOSARTAN POTASSIUM 50 MG/1
50 TABLET ORAL
Status: COMPLETED | OUTPATIENT
Start: 2023-05-24 | End: 2023-05-24

## 2023-05-24 RX ORDER — METHOCARBAMOL 500 MG/1
500 TABLET, FILM COATED ORAL
Status: COMPLETED | OUTPATIENT
Start: 2023-05-24 | End: 2023-05-24

## 2023-05-24 RX ORDER — BACLOFEN 10 MG/1
10 TABLET ORAL 3 TIMES DAILY PRN
Qty: 15 TABLET | Refills: 0 | Status: SHIPPED | OUTPATIENT
Start: 2023-05-24 | End: 2023-08-10 | Stop reason: SDUPTHER

## 2023-05-24 RX ORDER — HYDROCHLOROTHIAZIDE 12.5 MG/1
12.5 TABLET ORAL
Status: COMPLETED | OUTPATIENT
Start: 2023-05-24 | End: 2023-05-24

## 2023-05-24 RX ORDER — KETOROLAC TROMETHAMINE 10 MG/1
10 TABLET, FILM COATED ORAL
Status: COMPLETED | OUTPATIENT
Start: 2023-05-24 | End: 2023-05-24

## 2023-05-24 RX ORDER — ACETAMINOPHEN 500 MG
1000 TABLET ORAL
Status: COMPLETED | OUTPATIENT
Start: 2023-05-24 | End: 2023-05-24

## 2023-05-24 RX ORDER — LIDOCAINE 50 MG/G
1 PATCH TOPICAL
Status: DISCONTINUED | OUTPATIENT
Start: 2023-05-24 | End: 2023-05-25 | Stop reason: HOSPADM

## 2023-05-24 RX ORDER — NAPROXEN 500 MG/1
500 TABLET ORAL 2 TIMES DAILY WITH MEALS
Qty: 20 TABLET | Refills: 0 | Status: SHIPPED | OUTPATIENT
Start: 2023-05-24 | End: 2023-06-04

## 2023-05-24 RX ORDER — BACLOFEN 10 MG/1
10 TABLET ORAL 3 TIMES DAILY PRN
Qty: 15 TABLET | Refills: 0 | Status: SHIPPED | OUTPATIENT
Start: 2023-05-24 | End: 2023-05-24 | Stop reason: SDUPTHER

## 2023-05-24 RX ADMIN — ACETAMINOPHEN 1000 MG: 500 TABLET ORAL at 08:05

## 2023-05-24 RX ADMIN — LIDOCAINE 1 PATCH: 50 PATCH TOPICAL at 08:05

## 2023-05-24 RX ADMIN — KETOROLAC TROMETHAMINE 10 MG: 10 TABLET, FILM COATED ORAL at 08:05

## 2023-05-24 RX ADMIN — METHOCARBAMOL 500 MG: 500 TABLET ORAL at 08:05

## 2023-05-24 RX ADMIN — HYDROCHLOROTHIAZIDE 12.5 MG: 12.5 TABLET ORAL at 08:05

## 2023-05-24 RX ADMIN — LOSARTAN POTASSIUM 50 MG: 50 TABLET, FILM COATED ORAL at 08:05

## 2023-05-24 NOTE — PROGRESS NOTES
Audiologic Evaluation 5/24/2023:       Shakira Pearl, a 77 y.o. female, was seen today in the clinic for an audiologic evaluation.  Mrs. Pearl reported a gradual decrease in hearing. She does not feel her hearing has significantly worsened since her last audiogram. Mrs. Pearl denied vertigo, otalgia, and tinnitus.     Tympanometry revealed Type A tympanogram in the right ear and Type A tympanogram in the left ear. Audiogram results revealed mild to moderate sensorineural hearing loss in the right ear and mild to moderate sensorineural hearing loss in the left ear.  Speech reception thresholds were noted at 35 dB in the right ear and 35 dB in the left ear.  Speech discrimination scores were 84% in the right ear and 84% in the left ear.    Recommendations:  Otologic evaluation  Hearing aid consultation  Annual audiogram  Hearing protection when in noise

## 2023-05-24 NOTE — ED PROVIDER NOTES
Encounter Date: 5/24/2023       History     Chief Complaint   Patient presents with    Head Injury     Patient was leaving her hearing test and walked into glass door, fell backwards and hit right side of head; denies LOC. States she was very dizzy getting up; denies blood thinner use. C/o right hand, right elbow, right foot pain     77-year-old female with past medical history of DJD and neuromuscular disorder presents to ED with head injury.  She was leaving ENT clinic today when she accidentally ran into a glass door.  This caused her to fall backwards striking the right temporal area of her head her right wrist and right foot.  She did not lose consciousness.  She currently has a headache. She denies visual changes, photosensitivity, dizziness, nausea, vomiting, paresthesias, weakness.    The history is provided by the patient.   Review of patient's allergies indicates:   Allergen Reactions    Demerol [meperidine] Nausea And Vomiting     Other reaction(s): Nausea    Papaya      Illness vomiting    Sulfa (sulfonamide antibiotics) Rash    Sulfur Rash     Past Medical History:   Diagnosis Date    Allergy sinus    Arthritis back    Cataract     Colon polyps     Degenerative disc disease back    Diverticulosis of colon     Dyspepsia     Fatty liver 1/12/2023    GERD (gastroesophageal reflux disease)     Heartburn     Hemorrhoids, internal     Hiatal hernia     Neuromuscular disorder     Vitamin D deficiency      Past Surgical History:   Procedure Laterality Date    APPENDECTOMY  age 19    CATARACT EXTRACTION W/  INTRAOCULAR LENS IMPLANT      CATARACT EXTRACTION W/  INTRAOCULAR LENS IMPLANT      CHOLECYSTECTOMY      age of 25    CHONDROPLASTY OF KNEE Left 10/03/2019    Procedure: CHONDROPLASTY, KNEE;  Surgeon: Kaylen Patiño MD;  Location: [facility];  Service: Orthopedics;  Laterality: Left;    COLONOSCOPY N/A 09/09/2020    Procedure: COLONOSCOPY;  Surgeon: Peter Cuevas MD;  Location: [facility]  (4TH FLR);  Service: Endoscopy;  Laterality: N/A;  device assisted colonoscopy w/Dr Cuevas.  Difficult colon, multiple adhesions from abd surgeries, Hx adv adenomatous polyp/tubulovillous adenoma of cecum in April 2011.  Dr Black     per Dr Cuevas-Okay for 4th floor - 60 minute slot (90 min w/egd added).  Start with peds col    ESOPHAGOGASTRODUODENOSCOPY N/A 09/09/2020    Procedure: EGD (ESOPHAGOGASTRODUODENOSCOPY);  Surgeon: Peter Cuevas MD;  Location: [facility] (4TH FLR);  Service: Endoscopy;  Laterality: N/A;  per Dr Black-add EGD to colonoscopy order.  Pt requesting later appt.  4/13/20 - removed from 4/30/20, rescheduled 7/29/20 - pg   covid 9/6-met-urgent care--tb    HYSTERECTOMY  38    HYSTERECTOMY, VAGINAL, WITH UTEROSACRAL LIGAMENT VAULT SUSPENSION      INJECTION OF JOINT Bilateral 02/18/2019    Procedure: INJECTION, JOINT BILATERAL SI;  Surgeon: Mert Palumbo MD;  Location: [facility];  Service: Pain Management;  Laterality: Bilateral;  BILATERAL SI JOINT INJECTION    INJECTION OF JOINT Right 08/20/2020    Procedure: INJECTION JOINT/ R HIP DIRECT REFERRAL * DR. PALUMBO ONLY PLEASE*;  Surgeon: Mert Palumbo MD;  Location: [facility];  Service: Pain Management;  Laterality: Right;  NEED CONSENT    KNEE ARTHROSCOPY W/ MENISCECTOMY Left 10/03/2019    Procedure: ARTHROSCOPY, KNEE, WITH MENISCECTOMY;  Surgeon: Kaylen Patiño MD;  Location: [facility] OR;  Service: Orthopedics;  Laterality: Left;    SYNOVECTOMY OF KNEE Left 10/03/2019    Procedure: SYNOVECTOMY, KNEE;  Surgeon: Kaylen Patiño MD;  Location: [facility] OR;  Service: Orthopedics;  Laterality: Left;    YAG Laser Capsulotomy Bilateral     Dr. Aleman     Family History   Problem Relation Age of Onset    Heart disease Mother 67        heart attack    Stroke Mother     Cancer Father 65        abdominal    Stomach cancer Father     No Known Problems Sister     No Known Problems Brother     No Known Problems Son     No Known Problems Maternal  Grandmother     No Known Problems Maternal Grandfather     No Known Problems Paternal Grandmother     No Known Problems Paternal Grandfather     No Known Problems Maternal Aunt     No Known Problems Maternal Uncle     No Known Problems Paternal Aunt     No Known Problems Paternal Uncle     Celiac disease Neg Hx     Colon cancer Neg Hx     Crohn's disease Neg Hx     Esophageal cancer Neg Hx     Inflammatory bowel disease Neg Hx     Liver cancer Neg Hx     Rectal cancer Neg Hx     Ulcerative colitis Neg Hx     Amblyopia Neg Hx     Blindness Neg Hx     Cataracts Neg Hx     Diabetes Neg Hx     Glaucoma Neg Hx     Hypertension Neg Hx     Macular degeneration Neg Hx     Retinal detachment Neg Hx     Strabismus Neg Hx     Thyroid disease Neg Hx     Anesthesia problems Neg Hx      Social History     Tobacco Use    Smoking status: Never    Smokeless tobacco: Never   Substance Use Topics    Alcohol use: No    Drug use: No     Review of Systems   Constitutional:  Negative for fever.   HENT:  Negative for sore throat.    Eyes:  Negative for photophobia and visual disturbance.   Respiratory:  Negative for shortness of breath.    Cardiovascular:  Negative for chest pain.   Gastrointestinal:  Negative for nausea.   Genitourinary:  Negative for dysuria.   Musculoskeletal:  Positive for arthralgias and myalgias. Negative for back pain.   Skin:  Negative for rash.   Neurological:  Positive for headaches. Negative for syncope, weakness and numbness.   Hematological:  Does not bruise/bleed easily.     Physical Exam     Initial Vitals [05/24/23 1611]   BP Pulse Resp Temp SpO2   (!) 192/82 84 20 98.1 °F (36.7 °C) 98 %      MAP       --         Physical Exam    Nursing note and vitals reviewed.  Constitutional: She appears well-developed and well-nourished.   HENT:   Head: Normocephalic. Head is with contusion (Small contusion over right temporal area). Head is without raccoon's eyes.   Eyes: Conjunctivae and  EOM are normal. Pupils are equal, round, and reactive to light.   Neck: Neck supple.   Normal range of motion.  Cardiovascular:  Normal rate and regular rhythm.           Pulmonary/Chest: Breath sounds normal. No respiratory distress.   Musculoskeletal:      Right elbow: No tenderness.      Right forearm: No tenderness.      Right wrist: Tenderness present. No swelling, effusion or snuff box tenderness. Normal range of motion. Normal pulse.      Right hand: Tenderness present. No swelling. Normal range of motion.      Cervical back: Normal range of motion and neck supple. Tenderness present.      Right foot: Normal range of motion. Tenderness (Base of 5th toe/ lateral aspect of foot) present. No swelling.      Comments: No T-spine, L-spine tenderness     Neurological: She is alert and oriented to person, place, and time.   Psychiatric: She has a normal mood and affect. Her behavior is normal. Judgment and thought content normal.       ED Course   Procedures  Labs Reviewed - No data to display       Imaging Results              CT Head Without Contrast (Final result)  Result time 05/24/23 20:02:57      Final result by Hernando Mclaughlin MD (05/24/23 20:02:57)                   Impression:      No acute intracranial abnormality.  No cranial fracture.  No traumatic malalignment or fracture of the spine.    Findings compatible with generalized cerebral volume loss and chronic microvascular ischemic changes.    Moderate to severe degenerative changes of the cervical spine as detailed above.    Electronically signed by resident: Norah Delatorre  Date:    05/24/2023  Time:    19:34    Electronically signed by: Hernando Mclaughlin MD  Date:    05/24/2023  Time:    20:02               Narrative:    EXAMINATION:  CT HEAD WITHOUT CONTRAST; CT CERVICAL SPINE WITHOUT CONTRAST    CLINICAL HISTORY:  Head trauma, minor (Age >= 65y);; Neck trauma (Age >= 65y);    TECHNIQUE:  Low dose axial CT images obtained throughout the head as well as the  cervical spine without intravenous contrast. Sagittal and coronal reconstructions were performed.    COMPARISON:  CT head 04/06/2015, cervical spine radiograph 09/28/2022    FINDINGS:  Head:    Intracranial compartment:    Prominence of the ventricles and sulci compatible with mild generalized cerebral volume loss.  No hydrocephalus. No extra-axial blood or fluid collections.    There is mild confluent hypoattenuation in the supratentorial white matter, nonspecific and may reflect chronic microvascular ischemic changes.  No evidence of acute major vascular distribution infarction, intraparenchymal hemorrhage, or edema.  No mass effect or midline shift.    Skull/extracranial contents (limited evaluation): No fracture. Mastoid air cells and paranasal sinuses are essentially clear.    Cervical spine:    The predental space is maintained.    The vertebral bodies are normal in height without evidence of acute fracture.  No osseous destructive lesions.  Reversal of the cervical lordosis centered at C3-C4 with grade 1 anterolisthesis of C3 on C4.  Minimal anterolisthesis of C2 on C3.    Multilevel intervertebral disc height loss most pronounced C4-C5, C5-C6, and C6-C7.  Multilevel degenerative changes of the cervical spine involving posterior disc osteophyte complexes, uncovertebral spurring, and facet arthropathy.  These findings contribute to moderate to severe neural foraminal narrowing at the right C5-C6 level.  Additional varying degrees of neural foraminal narrowing with at least moderate neural foraminal narrowing at the bilateral C3-C4, bilateral C4-C5, left C5-C6, and left C6-C7 levels.  Degenerative findings also contribute to moderate to severe spinal canal stenosis at C4-C5 and C5-C6.  There is at least moderate spinal canal stenosis at C3-C4 and C5-C6.    Limited evaluation of the intraspinal contents demonstrates no hematoma or mass.    Paraspinal soft tissues exhibit no acute abnormalities.  Calcification  noted along the nuchal ligament.                                       CT Cervical Spine Without Contrast (Final result)  Result time 05/24/23 20:02:57      Final result by Hernando Mclaughlin MD (05/24/23 20:02:57)                   Impression:      No acute intracranial abnormality.  No cranial fracture.  No traumatic malalignment or fracture of the spine.    Findings compatible with generalized cerebral volume loss and chronic microvascular ischemic changes.    Moderate to severe degenerative changes of the cervical spine as detailed above.    Electronically signed by resident: Norah Delatorre  Date:    05/24/2023  Time:    19:34    Electronically signed by: Hernando Mclaughlin MD  Date:    05/24/2023  Time:    20:02               Narrative:    EXAMINATION:  CT HEAD WITHOUT CONTRAST; CT CERVICAL SPINE WITHOUT CONTRAST    CLINICAL HISTORY:  Head trauma, minor (Age >= 65y);; Neck trauma (Age >= 65y);    TECHNIQUE:  Low dose axial CT images obtained throughout the head as well as the cervical spine without intravenous contrast. Sagittal and coronal reconstructions were performed.    COMPARISON:  CT head 04/06/2015, cervical spine radiograph 09/28/2022    FINDINGS:  Head:    Intracranial compartment:    Prominence of the ventricles and sulci compatible with mild generalized cerebral volume loss.  No hydrocephalus. No extra-axial blood or fluid collections.    There is mild confluent hypoattenuation in the supratentorial white matter, nonspecific and may reflect chronic microvascular ischemic changes.  No evidence of acute major vascular distribution infarction, intraparenchymal hemorrhage, or edema.  No mass effect or midline shift.    Skull/extracranial contents (limited evaluation): No fracture. Mastoid air cells and paranasal sinuses are essentially clear.    Cervical spine:    The predental space is maintained.    The vertebral bodies are normal in height without evidence of acute fracture.  No osseous destructive lesions.   Reversal of the cervical lordosis centered at C3-C4 with grade 1 anterolisthesis of C3 on C4.  Minimal anterolisthesis of C2 on C3.    Multilevel intervertebral disc height loss most pronounced C4-C5, C5-C6, and C6-C7.  Multilevel degenerative changes of the cervical spine involving posterior disc osteophyte complexes, uncovertebral spurring, and facet arthropathy.  These findings contribute to moderate to severe neural foraminal narrowing at the right C5-C6 level.  Additional varying degrees of neural foraminal narrowing with at least moderate neural foraminal narrowing at the bilateral C3-C4, bilateral C4-C5, left C5-C6, and left C6-C7 levels.  Degenerative findings also contribute to moderate to severe spinal canal stenosis at C4-C5 and C5-C6.  There is at least moderate spinal canal stenosis at C3-C4 and C5-C6.    Limited evaluation of the intraspinal contents demonstrates no hematoma or mass.    Paraspinal soft tissues exhibit no acute abnormalities.  Calcification noted along the nuchal ligament.                                       X-Ray Hand 3 view Right (Final result)  Result time 05/24/23 18:24:19      Final result by Shakeel Sue MD (05/24/23 18:24:19)                   Impression:      1. Cortical irregularity involving the proximal aspect of the 5th metacarpal.  Findings may reflect subacute injury as there is no significant overlying edema however correlation with any focal tenderness is advised.      Electronically signed by: Shakeel Sue MD  Date:    05/24/2023  Time:    18:24               Narrative:    EXAMINATION:  XR HAND COMPLETE 3 VIEW RIGHT    CLINICAL HISTORY:  hand injury;    TECHNIQUE:  PA, lateral, and oblique views of the right hand were performed.    COMPARISON:  04/03/2017    FINDINGS:  Three views right hand.    There is osteopenia.  There are degenerative changes of the hand.  There is irregularity involving the proximal aspect of the 5th metacarpal noting no significant  overlying edema.  No radiopaque foreign body.  There is mild edema overlying the metacarpophalangeal region on the lateral view.                                       X-Ray Foot Complete Right (Final result)  Result time 05/24/23 18:26:29      Final result by Shakeel Sue MD (05/24/23 18:26:29)                   Impression:      1. Fifth metatarsal fracture as above.      Electronically signed by: Shakeel Sue MD  Date:    05/24/2023  Time:    18:26               Narrative:    EXAMINATION:  XR FOOT COMPLETE 3 VIEW RIGHT    CLINICAL HISTORY:  . Unspecified injury of unspecified foot, initial encounter    TECHNIQUE:  AP, lateral, and oblique views of the right foot were performed.    COMPARISON:  None    FINDINGS:  Three views right foot.    There is osteopenia.  There is remote injury involving the proximal phalanx of the 3rd toe.  There is obliquely oriented fracture involving the mid to distal aspect of the 5th metatarsal.  No dislocation.  No radiopaque foreign body allowing for artifact from overlying garment.  Edema overlies the distal dorsal aspect of the foot.                                       Medications   acetaminophen tablet 1,000 mg (1,000 mg Oral Given 5/24/23 2028)   ketorolac tablet 10 mg (10 mg Oral Given 5/24/23 2028)   losartan tablet 50 mg (50 mg Oral Given 5/24/23 2056)   hydroCHLOROthiazide tablet 12.5 mg (12.5 mg Oral Given 5/24/23 2056)   methocarbamoL tablet 500 mg (500 mg Oral Given 5/24/23 2056)     Medical Decision Making:   Initial Assessment:   77-year-old female with past medical history of DJD and neuromuscular disorder presents to ED with head injury.  She also complains of right hand and wrist pain and right foot pain.  She was oriented x4.  She was neurovascularly intact.  Hypertensive though appears in pain.  Offered pain medication the patient declined at this. I will initiate workup and reassess  Differential Diagnosis:   Contusion, subdural/epidural hematoma,  concussion, wrist sprain, wrist fracture, hand fracture, contusion,   Independently Interpreted Test(s):   I have ordered and independently interpreted X-rays - see prior notes.  Clinical Tests:   Lab Tests: Ordered and Reviewed  ED Management:  - Workup significant for obliquely oriented 5th metatarsal without dislocation. Discussed needed for splinting and non-weight bearing. She is not comfortable with this as she lives on the second floor and is not great with crutches from past experiences . She would like removal boot for stability. Believe this is appropriate at this time.   - Cortical irregularity involving the proximal aspect of the R 5th metacarpal. No focal tenderness in this area. Suspect subacute injury   - CT head and neck negative for hemorrhage, fracture, dislocation. Degenerative changes noted in spine, this was discussed with patient   - Referral placed with orthopedics for follow up on acute fracture   - Discussed at home pain control. ED precautions given to return for new or worsening symptoms     Other:   I discussed test(s) with the performing physician.          Attending Attestation:     Physician Attestation Statement for NP/PA:       Other NP/PA Attestation Additions:      Medical Decision Making: I did not see this patient, however I was immediately available throughout the patient's care starting at 5p when my shift started and discussed the case with Amber Olmos. The patient, per Amber's report, had a fall (mechanical) and struck her right side of her head and injured her right hand and right foot during the fall. Images were obtained of these areas to exclude traumatic injury. The head and c spine ct were without acute fracture or malalignment and no evidence of bleed was seen or contusion. The hand had a ?able finding over the fifth metacarpal, however the patient was not tender in this area (it was a correlate with exam type of image). The foot film showed a fifth metatarsal  fracture. It was mid-distal and oblique. The patient was hesitant to pursue a splint at this time due to her living situation and was asking to opt for a boot. I asked Amber to discuss this with orthopaedics and assure that this was proper / sufficient management for this fracture given its location/appearance and the clinical context. She reported back to me that the discussion was that boot was ok. She was going to place the boot and have the patient have close ortho follow up.   John Ware MD                        Clinical Impression:   Final diagnoses:  [W19.XXXA] Fall (Primary)  [S09.90XA] Injury of head, initial encounter  [S19.9XXA] Injury of neck, initial encounter  [S92.355A] Closed nondisplaced fracture of fifth metatarsal bone of left foot, initial encounter  [S69.92XA] Injury of left hand, initial encounter  [M47.812] Osteoarthritis of cervical spine, unspecified spinal osteoarthritis complication status        ED Disposition Condition    Discharge Stable          ED Prescriptions       Medication Sig Dispense Start Date End Date Auth. Provider    naproxen (NAPROSYN) 500 MG tablet  (Status: Discontinued) Take 1 tablet (500 mg total) by mouth 2 (two) times daily with meals. for 10 days 20 tablet 5/24/2023 5/24/2023 Amber Olmos PA-C    baclofen (LIORESAL) 10 MG tablet  (Status: Discontinued) Take 1 tablet (10 mg total) by mouth 3 (three) times daily as needed (neck/back pain). 15 tablet 5/24/2023 5/24/2023 Amber Olmos PA-C    baclofen (LIORESAL) 10 MG tablet Take 1 tablet (10 mg total) by mouth 3 (three) times daily as needed (neck/back pain). 15 tablet 5/24/2023 5/30/2023 Amber Olmos PA-C    naproxen (NAPROSYN) 500 MG tablet Take 1 tablet (500 mg total) by mouth 2 (two) times daily with meals. for 10 days 20 tablet 5/24/2023 6/4/2023 Amber Olmos PA-C          Follow-up Information       Follow up With Specialties Details Why Contact Info Additional Information    Will [facility]  Emergency Dept Emergency Medicine  If symptoms worsen [address]  [facility] [address] [phone]     [facility] - Orthopedics 5th Fl Orthopedics In 1 week  [address], [address]th Floor  [facility] [address] [phone] [facility] - Main Building, 5th Floor Please park in [facility] and take [facility] elevator             Amber Olmos PA-C  05/25/23 1849       Lilli Ware MD  05/29/23 1919

## 2023-05-24 NOTE — PROGRESS NOTES
Subjective     Patient ID: Shakira Pearl is a 77 y.o. female.    Chief Complaint: Hearing Loss    HPI: Hx of prog HL.    Trouble in noise.    Pres for yr.    ?? Fam hx.    Last test 2021.    Past Medical History: Patient has a past medical history of Allergy (sinus), Arthritis (back), Cataract, Colon polyps, Degenerative disc disease (back), Diverticulosis of colon, Dyspepsia, Fatty liver (1/12/2023), GERD (gastroesophageal reflux disease), Heartburn, Hemorrhoids, internal, Hiatal hernia, Neuromuscular disorder, and Vitamin D deficiency.    Past Surgical History: Patient has a past surgical history that includes Appendectomy (age 19); Hysterectomy (38); Cholecystectomy; Injection of joint (Bilateral, 02/18/2019); Knee arthroscopy w/ meniscectomy (Left, 10/03/2019); Chondroplasty of knee (Left, 10/03/2019); Synovectomy of knee (Left, 10/03/2019); Injection of joint (Right, 08/20/2020); Esophagogastroduodenoscopy (N/A, 09/09/2020); Colonoscopy (N/A, 09/09/2020); Cataract extraction w/  intraocular lens implant; Cataract extraction w/  intraocular lens implant; YAG Laser Capsulotomy (Bilateral); and hysterectomy, vaginal, with uterosacral ligament vault suspension.    Social History: Patient reports that she has never smoked. She has never used smokeless tobacco. She reports that she does not drink alcohol and does not use drugs.    Family History: family history includes Cancer (age of onset: 65) in her father; Heart disease (age of onset: 67) in her mother; No Known Problems in her brother, maternal aunt, maternal grandfather, maternal grandmother, maternal uncle, paternal aunt, paternal grandfather, paternal grandmother, paternal uncle, sister, and son; Stomach cancer in her father; Stroke in her mother.    Medications:   Current Outpatient Medications   Medication Sig    cholecalciferol, vitamin D3, (VITAMIN D3) 50 mcg (2,000 unit) Cap capsule Take 1 capsule (2,000 Units total) by mouth once daily.    DULoxetine  (CYMBALTA) 20 MG capsule Take 1 capsule (20 mg total) by mouth once daily.    ergocalciferol (VITAMIN D2) 50,000 unit Cap TAKE ONE CAPSULE BY MOUTH EVERY 7 DAYS    estradioL (ESTRACE) 0.01 % (0.1 mg/gram) vaginal cream 0.5 grams with applicator or dime-sized amount with finger in vagina nightly x 2 weeks, then twice a week thereafter    estradiol 0.05 mg/24 hr td ptsw (VIVELLE-DOT) 0.05 mg/24 hr     estradiol valerate (DELESTROGEN) 40 mg/mL injection Inject 0.5 mLs (20 mg total) into the muscle every 28 days. Inject into the muscle.    ferrous gluconate (FERGON) 324 MG tablet Take 1 tablet (324 mg total) by mouth daily with breakfast.    icosapent ethyL (VASCEPA) 1 gram Cap Take 2 capsules (2 g total) by mouth 2 (two) times a day.    losartan-hydrochlorothiazide 50-12.5 mg (HYZAAR) 50-12.5 mg per tablet TAKE ONE TABLET BY MOUTH EVERY DAY    mirabegron (MYRBETRIQ) 25 mg Tb24 ER tablet Take 1 tablet (25 mg total) by mouth once daily.    pantoprazole (PROTONIX) 40 MG tablet TAKE ONE TABLET BY MOUTH EVERY MORNING 45 MINUTES BEFORE BREAKFAST    potassium chloride SA (K-DUR,KLOR-CON) 10 MEQ tablet Take 1 tablet (10 mEq total) by mouth once daily.     No current facility-administered medications for this visit.       Allergies: Patient is allergic to demerol [meperidine], papaya, sulfa (sulfonamide antibiotics), and sulfur.    Review of Systems   Constitutional:  Negative for appetite change, chills, fatigue and fever.   HENT:  Positive for hearing loss. Negative for nasal congestion, ear discharge, facial swelling, postnasal drip, rhinorrhea, sore throat, tinnitus and trouble swallowing.    Eyes:  Negative for photophobia, pain, discharge, redness, itching and visual disturbance.   Respiratory:  Negative for apnea, cough, choking, chest tightness, shortness of breath, wheezing and stridor.    Cardiovascular:  Negative for chest pain and palpitations.   Gastrointestinal:  Negative for abdominal pain, constipation,  diarrhea, nausea and vomiting.   Musculoskeletal:  Negative for arthralgias, gait problem, joint swelling, myalgias, neck pain and neck stiffness.   Integumentary:  Negative for color change, pallor, rash and wound.   Neurological:  Negative for dizziness, tremors, seizures, syncope, facial asymmetry, speech difficulty, weakness, light-headedness, numbness and headaches.   Hematological:  Negative for adenopathy. Does not bruise/bleed easily.   Psychiatric/Behavioral:  Negative for agitation, behavioral problems, confusion, decreased concentration, dysphoric mood, hallucinations, sleep disturbance and suicidal ideas. The patient is not nervous/anxious and is not hyperactive.         Objective     Physical Exam  Vitals and nursing note reviewed.   Constitutional:       General: She is not in acute distress.     Appearance: Normal appearance. She is well-developed. She is not ill-appearing, toxic-appearing or diaphoretic.   HENT:      Head: Normocephalic and atraumatic. Not macrocephalic and not microcephalic. No raccoon eyes, Oconnor's sign, abrasion, contusion, right periorbital erythema, left periorbital erythema or laceration. Hair is normal.      Right Ear: Ear canal normal. Decreased hearing noted. No laceration, drainage, swelling or tenderness. No middle ear effusion. No foreign body. No mastoid tenderness. No hemotympanum. Tympanic membrane is not injected, scarred, perforated, erythematous, retracted or bulging. Tympanic membrane has normal mobility.      Left Ear: Ear canal normal. Decreased hearing noted. No laceration, drainage, swelling or tenderness.  No middle ear effusion. No foreign body. No mastoid tenderness. No hemotympanum. Tympanic membrane is not injected, scarred, perforated, erythematous, retracted or bulging. Tympanic membrane has normal mobility.      Nose: No nasal deformity, septal deviation, laceration, mucosal edema or rhinorrhea.      Right Sinus: No maxillary sinus tenderness.       Left Sinus: No maxillary sinus tenderness.   Eyes:      General: Lids are normal.      Conjunctiva/sclera: Conjunctivae normal.      Pupils: Pupils are equal, round, and reactive to light.   Neck:      Thyroid: No thyroid mass or thyromegaly.      Vascular: No JVD.      Trachea: No tracheal tenderness or tracheal deviation.   Cardiovascular:      Rate and Rhythm: Normal rate and regular rhythm.   Pulmonary:      Effort: Pulmonary effort is normal. No tachypnea, bradypnea, accessory muscle usage or respiratory distress.      Breath sounds: No stridor.   Abdominal:      Palpations: Abdomen is soft.   Musculoskeletal:         General: Normal range of motion.      Cervical back: Normal range of motion and neck supple. No edema, erythema or rigidity. No muscular tenderness. Normal range of motion.   Lymphadenopathy:      Head:      Right side of head: No submental, submandibular, tonsillar, preauricular or posterior auricular adenopathy.      Left side of head: No submental, submandibular, tonsillar, preauricular or posterior auricular adenopathy.      Cervical: No cervical adenopathy.      Right cervical: No superficial, deep or posterior cervical adenopathy.     Left cervical: No superficial, deep or posterior cervical adenopathy.   Skin:     General: Skin is warm and dry.      Coloration: Skin is not pale.      Findings: No abrasion, bruising, burn, ecchymosis, erythema, laceration, lesion or rash.   Neurological:      Mental Status: She is alert and oriented to person, place, and time.      Cranial Nerves: No cranial nerve deficit.      Sensory: No sensory deficit.      Motor: No tremor, atrophy, abnormal muscle tone or seizure activity.   Psychiatric:         Speech: She is communicative. Speech is not rapid and pressured or slurred.         Behavior: Behavior normal. Behavior is cooperative.         Thought Content: Thought content normal.         Judgment: Judgment normal.            Assessment and Plan      Problem List Items Addressed This Visit    None  Visit Diagnoses       Presbycusis of both ears    -  Primary            Patient to see Audiology for hearing aid evaluation.    F/U prn.

## 2023-05-25 NOTE — DISCHARGE INSTRUCTIONS
Naproxen prescribed for pain today.  Take as directed and needed.  Make sure to take with food  You were prescribed baclofen which is a muscle relaxer for neck and back pain.  Cautioned as it may cause sedation.  Do not operate heavy machinery or drink alcohol while taking this medication  Rest, ice, elevation is recommended for your acute foot fracture and hand injury  You may apply heating pad to affected area of neck for pain relief 2-3 times daily  Please follow-up with orthopedic surgery for this injury  It is recommended that you remain nonweightbearing until evaluated by orthopedics.  You were provided with crutches today

## 2023-05-25 NOTE — ED NOTES
"Walking boot applied. Educated pt on use of crutches. She said, "I will take the crutches but I don't think I will be using them."   "

## 2023-05-30 ENCOUNTER — TELEPHONE (OUTPATIENT)
Dept: ORTHOPEDICS | Facility: CLINIC | Age: 78
End: 2023-05-30
Payer: MEDICARE

## 2023-05-30 NOTE — TELEPHONE ENCOUNTER
I spoke with pt,confirmed appoinment on 6/20/23. Pt,has no further questions.        ----- Message from Carmen Inman sent at 5/30/2023  9:57 AM CDT -----  Regarding: Appt Access  Contact: [phone]  Pt has appt sched for 06/21/23 @745am.  Pt is requesting to come in around noon that day.  Offered pt next avail 06/26/23, pt declined.  Please call.

## 2023-06-05 ENCOUNTER — TELEPHONE (OUTPATIENT)
Dept: INTERNAL MEDICINE | Facility: CLINIC | Age: 78
End: 2023-06-05
Payer: MEDICARE

## 2023-06-05 ENCOUNTER — OFFICE VISIT (OUTPATIENT)
Dept: INTERNAL MEDICINE | Facility: CLINIC | Age: 78
End: 2023-06-05
Payer: MEDICARE

## 2023-06-05 VITALS
HEART RATE: 96 BPM | BODY MASS INDEX: 27.39 KG/M2 | SYSTOLIC BLOOD PRESSURE: 122 MMHG | DIASTOLIC BLOOD PRESSURE: 78 MMHG | RESPIRATION RATE: 18 BRPM | OXYGEN SATURATION: 98 % | HEIGHT: 63 IN | WEIGHT: 154.56 LBS | TEMPERATURE: 98 F

## 2023-06-05 DIAGNOSIS — N39.46 URINARY INCONTINENCE, MIXED: ICD-10-CM

## 2023-06-05 DIAGNOSIS — E78.1 HYPERTRIGLYCERIDEMIA: ICD-10-CM

## 2023-06-05 DIAGNOSIS — E55.9 VITAMIN D INSUFFICIENCY: ICD-10-CM

## 2023-06-05 DIAGNOSIS — D50.9 IRON DEFICIENCY ANEMIA, UNSPECIFIED IRON DEFICIENCY ANEMIA TYPE: Primary | ICD-10-CM

## 2023-06-05 DIAGNOSIS — R29.6 FREQUENT FALLS: ICD-10-CM

## 2023-06-05 DIAGNOSIS — K76.89 LIVER CYST: ICD-10-CM

## 2023-06-05 DIAGNOSIS — D35.02 ADENOMA OF LEFT ADRENAL GLAND: ICD-10-CM

## 2023-06-05 DIAGNOSIS — I10 ESSENTIAL HYPERTENSION: ICD-10-CM

## 2023-06-05 DIAGNOSIS — E55.9 VITAMIN D INSUFFICIENCY: Primary | ICD-10-CM

## 2023-06-05 DIAGNOSIS — M47.812 OSTEOARTHRITIS OF CERVICAL SPINE, UNSPECIFIED SPINAL OSTEOARTHRITIS COMPLICATION STATUS: ICD-10-CM

## 2023-06-05 DIAGNOSIS — S92.354A CLOSED NONDISPLACED FRACTURE OF FIFTH METATARSAL BONE OF RIGHT FOOT, INITIAL ENCOUNTER: ICD-10-CM

## 2023-06-05 DIAGNOSIS — K76.0 FATTY LIVER: ICD-10-CM

## 2023-06-05 DIAGNOSIS — M43.06 SPONDYLOLYSIS OF LUMBAR REGION: ICD-10-CM

## 2023-06-05 DIAGNOSIS — K21.9 GASTROESOPHAGEAL REFLUX DISEASE: ICD-10-CM

## 2023-06-05 PROBLEM — D64.9 NORMOCYTIC ANEMIA: Status: RESOLVED | Noted: 2022-08-17 | Resolved: 2023-06-05

## 2023-06-05 PROCEDURE — 1159F PR MEDICATION LIST DOCUMENTED IN MEDICAL RECORD: ICD-10-PCS | Mod: CPTII,S$GLB,, | Performed by: INTERNAL MEDICINE

## 2023-06-05 PROCEDURE — 1101F PR PT FALLS ASSESS DOC 0-1 FALLS W/OUT INJ PAST YR: ICD-10-PCS | Mod: CPTII,S$GLB,, | Performed by: INTERNAL MEDICINE

## 2023-06-05 PROCEDURE — 3288F PR FALLS RISK ASSESSMENT DOCUMENTED: ICD-10-PCS | Mod: CPTII,S$GLB,, | Performed by: INTERNAL MEDICINE

## 2023-06-05 PROCEDURE — 3078F PR MOST RECENT DIASTOLIC BLOOD PRESSURE < 80 MM HG: ICD-10-PCS | Mod: CPTII,S$GLB,, | Performed by: INTERNAL MEDICINE

## 2023-06-05 PROCEDURE — 99999 PR PBB SHADOW E&M-EST. PATIENT-LVL IV: CPT | Mod: PBBFAC,,, | Performed by: INTERNAL MEDICINE

## 2023-06-05 PROCEDURE — 99214 OFFICE O/P EST MOD 30 MIN: CPT | Mod: S$GLB,,, | Performed by: INTERNAL MEDICINE

## 2023-06-05 PROCEDURE — 99214 PR OFFICE/OUTPT VISIT, EST, LEVL IV, 30-39 MIN: ICD-10-PCS | Mod: S$GLB,,, | Performed by: INTERNAL MEDICINE

## 2023-06-05 PROCEDURE — 1126F AMNT PAIN NOTED NONE PRSNT: CPT | Mod: CPTII,S$GLB,, | Performed by: INTERNAL MEDICINE

## 2023-06-05 PROCEDURE — 3074F SYST BP LT 130 MM HG: CPT | Mod: CPTII,S$GLB,, | Performed by: INTERNAL MEDICINE

## 2023-06-05 PROCEDURE — 3074F PR MOST RECENT SYSTOLIC BLOOD PRESSURE < 130 MM HG: ICD-10-PCS | Mod: CPTII,S$GLB,, | Performed by: INTERNAL MEDICINE

## 2023-06-05 PROCEDURE — 3078F DIAST BP <80 MM HG: CPT | Mod: CPTII,S$GLB,, | Performed by: INTERNAL MEDICINE

## 2023-06-05 PROCEDURE — 1126F PR PAIN SEVERITY QUANTIFIED, NO PAIN PRESENT: ICD-10-PCS | Mod: CPTII,S$GLB,, | Performed by: INTERNAL MEDICINE

## 2023-06-05 PROCEDURE — 3288F FALL RISK ASSESSMENT DOCD: CPT | Mod: CPTII,S$GLB,, | Performed by: INTERNAL MEDICINE

## 2023-06-05 PROCEDURE — 1159F MED LIST DOCD IN RCRD: CPT | Mod: CPTII,S$GLB,, | Performed by: INTERNAL MEDICINE

## 2023-06-05 PROCEDURE — 1101F PT FALLS ASSESS-DOCD LE1/YR: CPT | Mod: CPTII,S$GLB,, | Performed by: INTERNAL MEDICINE

## 2023-06-05 PROCEDURE — 99999 PR PBB SHADOW E&M-EST. PATIENT-LVL IV: ICD-10-PCS | Mod: PBBFAC,,, | Performed by: INTERNAL MEDICINE

## 2023-06-05 RX ORDER — PANTOPRAZOLE SODIUM 40 MG/1
TABLET, DELAYED RELEASE ORAL
Qty: 90 TABLET | Refills: 3 | Status: SHIPPED | OUTPATIENT
Start: 2023-06-05

## 2023-06-05 RX ORDER — DULOXETIN HYDROCHLORIDE 20 MG/1
20 CAPSULE, DELAYED RELEASE ORAL DAILY
Qty: 90 CAPSULE | Refills: 3 | Status: SHIPPED | OUTPATIENT
Start: 2023-06-05 | End: 2024-02-14

## 2023-06-05 RX ORDER — ACETAMINOPHEN 500 MG
2000 TABLET ORAL DAILY
Qty: 90 CAPSULE | Refills: 3 | Status: SHIPPED | OUTPATIENT
Start: 2023-06-05 | End: 2024-06-04

## 2023-06-05 RX ORDER — POTASSIUM CHLORIDE 750 MG/1
10 TABLET, EXTENDED RELEASE ORAL DAILY
Qty: 90 TABLET | Refills: 3 | Status: SHIPPED | OUTPATIENT
Start: 2023-06-05

## 2023-06-05 RX ORDER — LOSARTAN POTASSIUM AND HYDROCHLOROTHIAZIDE 12.5; 5 MG/1; MG/1
1 TABLET ORAL DAILY
Qty: 90 TABLET | Refills: 3 | Status: SHIPPED | OUTPATIENT
Start: 2023-06-05

## 2023-06-05 NOTE — PROGRESS NOTES
"Subjective     Patient ID: Shakira Pearl is a 77 y.o. female.    Chief Complaint: Foot Pain    HPI  Pt with LAM, HLD, HTN, Sacroiliitis, DJD Lumbar spine, Fatty liver/hepatic cyst, L Adrenal adenoma, frequent falls is here for f/u from ED visit on 5/24/23 for a fall. Per records, " She was leaving ENT clinic today when she accidentally ran into a glass door.  This caused her to fall backwards striking the right temporal area of her head her right wrist and right foot.  She did not lose consciousness.  She currently has a headache. She denies visual changes, photosensitivity, dizziness, nausea, vomiting, paresthesias, weakness." X-rays were significant for right 5th metatarsal fx. By now she is doing better. Is able to ambulate without assistance.       Review of Systems   Constitutional:  Negative for activity change, appetite change, chills, diaphoresis, fatigue, fever and unexpected weight change.   HENT:  Negative for postnasal drip, rhinorrhea, sinus pressure/congestion, sneezing, sore throat, trouble swallowing and voice change.    Respiratory:  Negative for cough, shortness of breath and wheezing.    Cardiovascular:  Negative for chest pain, palpitations and leg swelling.   Gastrointestinal:  Negative for abdominal pain, blood in stool, constipation, diarrhea, nausea and vomiting.   Genitourinary:  Negative for dysuria.   Musculoskeletal:  Positive for arthralgias, back pain and neck pain. Negative for myalgias.   Integumentary:  Negative for rash and wound.   Allergic/Immunologic: Negative for environmental allergies and food allergies.   Hematological:  Negative for adenopathy. Does not bruise/bleed easily.        Objective     Physical Exam  Constitutional:       General: She is not in acute distress.     Appearance: Normal appearance. She is well-developed. She is not diaphoretic.   HENT:      Head: Normocephalic and atraumatic.      Right Ear: External ear normal.      Left Ear: External ear normal.      " Nose: Nose normal.      Mouth/Throat:      Pharynx: No oropharyngeal exudate.   Eyes:      General: No scleral icterus.        Right eye: No discharge.         Left eye: No discharge.      Conjunctiva/sclera: Conjunctivae normal.      Pupils: Pupils are equal, round, and reactive to light.   Neck:      Vascular: No JVD.   Cardiovascular:      Rate and Rhythm: Normal rate and regular rhythm.      Pulses: Normal pulses.      Heart sounds: Normal heart sounds.   Pulmonary:      Effort: Pulmonary effort is normal. No respiratory distress.      Breath sounds: Normal breath sounds. No wheezing, rhonchi or rales.   Abdominal:      General: Bowel sounds are normal. There is no distension.      Palpations: Abdomen is soft.      Tenderness: There is no abdominal tenderness. There is no guarding or rebound.   Musculoskeletal:      Cervical back: Neck supple.      Right lower leg: No edema.      Left lower leg: No edema.   Lymphadenopathy:      Cervical: No cervical adenopathy.   Skin:     General: Skin is warm and dry.      Coloration: Skin is not pale.      Findings: No rash.   Neurological:      General: No focal deficit present.      Mental Status: She is alert and oriented to person, place, and time.      Gait: Gait normal.   Psychiatric:         Behavior: Behavior normal.         Thought Content: Thought content normal.          Assessment and Plan     1. Iron deficiency anemia, unspecified iron deficiency anemia type    2. Essential hypertension  -     losartan-hydrochlorothiazide 50-12.5 mg (HYZAAR) 50-12.5 mg per tablet; Take 1 tablet by mouth once daily.  Dispense: 90 tablet; Refill: 3    3. Hypertriglyceridemia    4. Adenoma of left adrenal gland    5. Spondylolysis of lumbar region  -     Ambulatory referral/consult to Pain Clinic; Future; Expected date: 06/12/2023    6. Fatty liver    7. Liver cyst    8. Vitamin D insufficiency  -     cholecalciferol, vitamin D3, (VITAMIN D3) 50 mcg (2,000 unit) Cap capsule; Take 1  capsule (2,000 Units total) by mouth once daily.  Dispense: 90 capsule; Refill: 3    9. Urinary incontinence, mixed  -     mirabegron (MYRBETRIQ) 25 mg Tb24 ER tablet; Take 1 tablet (25 mg total) by mouth once daily.  Dispense: 90 tablet; Refill: 3    10. Gastroesophageal reflux disease  -     pantoprazole (PROTONIX) 40 MG tablet; TAKE ONE TABLET BY MOUTH EVERY MORNING 45 MINUTES BEFORE BREAKFAST  Dispense: 90 tablet; Refill: 3    11. Closed nondisplaced fracture of fifth metatarsal bone of right foot, initial encounter    12. Frequent falls    13. Osteoarthritis of cervical spine, unspecified spinal osteoarthritis complication status  -     Ambulatory referral/consult to Pain Clinic; Future; Expected date: 06/12/2023    Other orders  -     DULoxetine (CYMBALTA) 20 MG capsule; Take 1 capsule (20 mg total) by mouth once daily.  Dispense: 90 capsule; Refill: 3  -     potassium chloride SA (K-DUR,KLOR-CON M) 10 MEQ tablet; Take 1 tablet (10 mEq total) by mouth once daily.  Dispense: 90 tablet; Refill: 3     Right 5th metatarsal fracture- clinically improving, will f/u with Ortho     LAM- followed by GI      HLD- on Zetia/Vascepa      HTN- stable on Hyzaar         Sacroiliitis/DJD Cervical/Lumbar spine- seeing Pain management       Fatty liver/left lobe cyst- stable      Adrenal adenoma- has seen Endo     Frequent falls- pt will f/u with Neuro

## 2023-06-19 ENCOUNTER — OFFICE VISIT (OUTPATIENT)
Dept: OPHTHALMOLOGY | Facility: CLINIC | Age: 78
End: 2023-06-19
Payer: MEDICARE

## 2023-06-19 ENCOUNTER — TELEPHONE (OUTPATIENT)
Dept: OPHTHALMOLOGY | Facility: CLINIC | Age: 78
End: 2023-06-19
Payer: MEDICARE

## 2023-06-19 DIAGNOSIS — I10 ESSENTIAL HYPERTENSION: ICD-10-CM

## 2023-06-19 DIAGNOSIS — H02.403 PTOSIS OF BOTH EYELIDS: ICD-10-CM

## 2023-06-19 DIAGNOSIS — H52.7 REFRACTIVE ERROR: ICD-10-CM

## 2023-06-19 DIAGNOSIS — H04.123 DRY EYE SYNDROME OF BOTH EYES: Primary | ICD-10-CM

## 2023-06-19 DIAGNOSIS — Z96.1 PSEUDOPHAKIA: ICD-10-CM

## 2023-06-19 PROCEDURE — 1126F PR PAIN SEVERITY QUANTIFIED, NO PAIN PRESENT: ICD-10-PCS | Mod: CPTII,S$GLB,, | Performed by: OPHTHALMOLOGY

## 2023-06-19 PROCEDURE — 1160F RVW MEDS BY RX/DR IN RCRD: CPT | Mod: CPTII,S$GLB,, | Performed by: OPHTHALMOLOGY

## 2023-06-19 PROCEDURE — 1159F PR MEDICATION LIST DOCUMENTED IN MEDICAL RECORD: ICD-10-PCS | Mod: CPTII,S$GLB,, | Performed by: OPHTHALMOLOGY

## 2023-06-19 PROCEDURE — 3288F PR FALLS RISK ASSESSMENT DOCUMENTED: ICD-10-PCS | Mod: CPTII,S$GLB,, | Performed by: OPHTHALMOLOGY

## 2023-06-19 PROCEDURE — 1126F AMNT PAIN NOTED NONE PRSNT: CPT | Mod: CPTII,S$GLB,, | Performed by: OPHTHALMOLOGY

## 2023-06-19 PROCEDURE — 92014 COMPRE OPH EXAM EST PT 1/>: CPT | Mod: S$GLB,,, | Performed by: OPHTHALMOLOGY

## 2023-06-19 PROCEDURE — 1101F PR PT FALLS ASSESS DOC 0-1 FALLS W/OUT INJ PAST YR: ICD-10-PCS | Mod: CPTII,S$GLB,, | Performed by: OPHTHALMOLOGY

## 2023-06-19 PROCEDURE — 1159F MED LIST DOCD IN RCRD: CPT | Mod: CPTII,S$GLB,, | Performed by: OPHTHALMOLOGY

## 2023-06-19 PROCEDURE — 99999 PR PBB SHADOW E&M-EST. PATIENT-LVL III: CPT | Mod: PBBFAC,,, | Performed by: OPHTHALMOLOGY

## 2023-06-19 PROCEDURE — 99999 PR PBB SHADOW E&M-EST. PATIENT-LVL III: ICD-10-PCS | Mod: PBBFAC,,, | Performed by: OPHTHALMOLOGY

## 2023-06-19 PROCEDURE — 92014 PR EYE EXAM, EST PATIENT,COMPREHESV: ICD-10-PCS | Mod: S$GLB,,, | Performed by: OPHTHALMOLOGY

## 2023-06-19 PROCEDURE — 3288F FALL RISK ASSESSMENT DOCD: CPT | Mod: CPTII,S$GLB,, | Performed by: OPHTHALMOLOGY

## 2023-06-19 PROCEDURE — 1160F PR REVIEW ALL MEDS BY PRESCRIBER/CLIN PHARMACIST DOCUMENTED: ICD-10-PCS | Mod: CPTII,S$GLB,, | Performed by: OPHTHALMOLOGY

## 2023-06-19 PROCEDURE — 1101F PT FALLS ASSESS-DOCD LE1/YR: CPT | Mod: CPTII,S$GLB,, | Performed by: OPHTHALMOLOGY

## 2023-06-19 NOTE — PROGRESS NOTES
Subjective:       Patient ID: Shakira Pearl is a 77 y.o. female.    Chief Complaint: Concerns About Ocular Health    HPI    DSL- 8/16/2021     78 y/o female present to clinic for routine eye exam. H/o of ptosis. Pt   present to clinic for concerns of blurry vision. She states vision I   getting worst, she sold he car due to not being able to drive at night   anymore. She has dry eyes, treating with dry eyes. No floaters or flashes   of lights reported.     Eyemeds  Systane OU PRN   Last edited by Mallorie Armenta on 6/19/2023  1:04 PM.             Assessment:       1. Dry eye syndrome of both eyes    2. Ptosis of both eyelids    3. Essential hypertension    4. Refractive error    5. Pseudophakia        Plan:       MAYELIN OU-Needs more AT's.  Ptosis OD>OS-Pt wants sx consult.  HTN-No retinopathy OU.  RE-Pt wants MRx.        AT's.  Refer to Dr Mann for ptosis eval.  Control HTN.  Give separate MRx's for distance & reading.  RTC 1 yr.

## 2023-06-19 NOTE — TELEPHONE ENCOUNTER
----- Message from Mallorie Armenta sent at 6/19/2023  2:12 PM CDT -----  Please schedule pt for Ptosis eval per Dr. Aleman.

## 2023-06-22 ENCOUNTER — TELEPHONE (OUTPATIENT)
Dept: UROGYNECOLOGY | Facility: CLINIC | Age: 78
End: 2023-06-22
Payer: MEDICARE

## 2023-06-22 NOTE — TELEPHONE ENCOUNTER
----- Message from Trudi Garcia sent at 6/22/2023  9:45 AM CDT -----  Name of Who is Calling: CHELSY GONZALEZ [[id number]]              What is the request in detail: Patient requesting a call back to discuss rescheduling appt. No dates available.              Can the clinic reply by [facility]: No              What Number to Call Back if not in [facility]: [phone]

## 2023-06-23 RX ORDER — ICOSAPENT ETHYL 1000 MG/1
2 CAPSULE ORAL 2 TIMES DAILY
Qty: 120 CAPSULE | Refills: 11 | Status: SHIPPED | OUTPATIENT
Start: 2023-06-23

## 2023-06-29 ENCOUNTER — PATIENT MESSAGE (OUTPATIENT)
Dept: ENDOSCOPY | Facility: HOSPITAL | Age: 78
End: 2023-06-29
Payer: MEDICARE

## 2023-06-29 ENCOUNTER — TELEPHONE (OUTPATIENT)
Dept: ENDOSCOPY | Facility: HOSPITAL | Age: 78
End: 2023-06-29
Payer: MEDICARE

## 2023-06-29 DIAGNOSIS — Z86.010 HISTORY OF COLON POLYPS: Primary | ICD-10-CM

## 2023-06-29 RX ORDER — SODIUM, POTASSIUM,MAG SULFATES 17.5-3.13G
1 SOLUTION, RECONSTITUTED, ORAL ORAL DAILY
Qty: 1 KIT | Refills: 0 | Status: SHIPPED | OUTPATIENT
Start: 2023-06-29 | End: 2023-07-01

## 2023-06-29 NOTE — TELEPHONE ENCOUNTER
"Spoke to pt to schedule procedure(s) Colonoscopy       Physician to perform procedure(s) Dr. ROBERT Cuevas  Date of Procedure (s) 2023  Arrival Time 10:00 AM  Time of Procedure(s) 11:00 AM   Location of Procedure(s) [facility]  Type of Rx Prep sent to patient: Suprep  Instructions provided to patient via [facility]    Patient was informed on the following information and verbalized understanding. Screening questionnaire reviewed with patient and complete. If procedure requires anesthesia, a responsible adult needs to be present to accompany the patient home, patient cannot drive after receiving anesthesia. Appointment details are tentative, especially check-in time. Patient will receive a prep-op call 4 days prior to confirm check-in time for procedure. If applicable the patient should contact their pharmacy to verify Rx for procedure prep is ready for pick-up. Patient was advised to call the scheduling department at [phone] if pharmacy states no Rx is available. Patient was advised to call the endoscopy scheduling department if any questions or concerns arise.       Endoscopy Scheduling Department   From: Peter Cuevas MD   Sent: 2023   5:19 PM CDT   To: [facility]   Subject: Colonoscopy                                       Procedure: Colonoscopy     Diagnosis: Surveillance colonoscopy - Hx of colon polyps     Procedure Timin weeks     *If within 4 weeks selected, please sara as high priority*     *If greater than 12 weeks, please select "5-12 weeks" and delay sending until 2 months prior to requested date*     Provider: Myself     [place] [facility]     Additional Scheduling Information: prior difficult colonoscopy; 60-min slot; use regular pediatric colonoscope (PCF) and have slim pediatric colonoscope available in case needed.     Prep Specifications:Standard prep     Have you attached a patient to this message: yes        "

## 2023-07-03 PROBLEM — Z00.00 ENCOUNTER FOR ANNUAL WELLNESS VISIT (AWV) IN MEDICARE PATIENT: Chronic | Status: RESOLVED | Noted: 2023-03-28 | Resolved: 2023-07-03

## 2023-07-03 PROBLEM — Z00.00 ENCOUNTER FOR PREVENTIVE HEALTH EXAMINATION: Chronic | Status: RESOLVED | Noted: 2022-10-18 | Resolved: 2023-07-03

## 2023-07-05 ENCOUNTER — LAB VISIT (OUTPATIENT)
Dept: LAB | Facility: HOSPITAL | Age: 78
End: 2023-07-05
Attending: INTERNAL MEDICINE
Payer: MEDICARE

## 2023-07-05 DIAGNOSIS — E55.9 VITAMIN D INSUFFICIENCY: ICD-10-CM

## 2023-07-05 LAB — 25(OH)D3+25(OH)D2 SERPL-MCNC: 32 NG/ML (ref 30–96)

## 2023-07-05 PROCEDURE — 82306 VITAMIN D 25 HYDROXY: CPT | Performed by: INTERNAL MEDICINE

## 2023-07-05 PROCEDURE — 36415 COLL VENOUS BLD VENIPUNCTURE: CPT | Mod: PO | Performed by: INTERNAL MEDICINE

## 2023-07-06 ENCOUNTER — OFFICE VISIT (OUTPATIENT)
Dept: INTERNAL MEDICINE | Facility: CLINIC | Age: 78
End: 2023-07-06
Payer: MEDICARE

## 2023-07-06 VITALS
HEIGHT: 64 IN | RESPIRATION RATE: 16 BRPM | DIASTOLIC BLOOD PRESSURE: 72 MMHG | WEIGHT: 158.19 LBS | BODY MASS INDEX: 27.01 KG/M2 | HEART RATE: 96 BPM | SYSTOLIC BLOOD PRESSURE: 110 MMHG | TEMPERATURE: 98 F

## 2023-07-06 DIAGNOSIS — E78.1 HYPERTRIGLYCERIDEMIA: ICD-10-CM

## 2023-07-06 DIAGNOSIS — K76.0 FATTY LIVER: ICD-10-CM

## 2023-07-06 DIAGNOSIS — D50.9 IRON DEFICIENCY ANEMIA, UNSPECIFIED IRON DEFICIENCY ANEMIA TYPE: ICD-10-CM

## 2023-07-06 DIAGNOSIS — Z00.00 ANNUAL PHYSICAL EXAM: ICD-10-CM

## 2023-07-06 DIAGNOSIS — K76.89 LIVER CYST: ICD-10-CM

## 2023-07-06 DIAGNOSIS — I70.0 AORTIC ATHEROSCLEROSIS: ICD-10-CM

## 2023-07-06 DIAGNOSIS — M47.816 LUMBAR FACET ARTHROPATHY: ICD-10-CM

## 2023-07-06 DIAGNOSIS — D35.02 ADENOMA OF LEFT ADRENAL GLAND: ICD-10-CM

## 2023-07-06 DIAGNOSIS — M54.12 CERVICAL RADICULOPATHY: ICD-10-CM

## 2023-07-06 DIAGNOSIS — I10 ESSENTIAL HYPERTENSION: Primary | ICD-10-CM

## 2023-07-06 PROCEDURE — 1101F PR PT FALLS ASSESS DOC 0-1 FALLS W/OUT INJ PAST YR: ICD-10-PCS | Mod: CPTII,S$GLB,, | Performed by: INTERNAL MEDICINE

## 2023-07-06 PROCEDURE — 3074F PR MOST RECENT SYSTOLIC BLOOD PRESSURE < 130 MM HG: ICD-10-PCS | Mod: CPTII,S$GLB,, | Performed by: INTERNAL MEDICINE

## 2023-07-06 PROCEDURE — 3288F PR FALLS RISK ASSESSMENT DOCUMENTED: ICD-10-PCS | Mod: CPTII,S$GLB,, | Performed by: INTERNAL MEDICINE

## 2023-07-06 PROCEDURE — 99397 PR PREVENTIVE VISIT,EST,65 & OVER: ICD-10-PCS | Mod: S$GLB,,, | Performed by: INTERNAL MEDICINE

## 2023-07-06 PROCEDURE — 3074F SYST BP LT 130 MM HG: CPT | Mod: CPTII,S$GLB,, | Performed by: INTERNAL MEDICINE

## 2023-07-06 PROCEDURE — 1159F PR MEDICATION LIST DOCUMENTED IN MEDICAL RECORD: ICD-10-PCS | Mod: CPTII,S$GLB,, | Performed by: INTERNAL MEDICINE

## 2023-07-06 PROCEDURE — 1159F MED LIST DOCD IN RCRD: CPT | Mod: CPTII,S$GLB,, | Performed by: INTERNAL MEDICINE

## 2023-07-06 PROCEDURE — 1126F PR PAIN SEVERITY QUANTIFIED, NO PAIN PRESENT: ICD-10-PCS | Mod: CPTII,S$GLB,, | Performed by: INTERNAL MEDICINE

## 2023-07-06 PROCEDURE — 99397 PER PM REEVAL EST PAT 65+ YR: CPT | Mod: S$GLB,,, | Performed by: INTERNAL MEDICINE

## 2023-07-06 PROCEDURE — 99999 PR PBB SHADOW E&M-EST. PATIENT-LVL III: CPT | Mod: PBBFAC,,, | Performed by: INTERNAL MEDICINE

## 2023-07-06 PROCEDURE — G0009 PNEUMOCOCCAL CONJUGATE VACCINE 20-VALENT: ICD-10-PCS | Mod: S$GLB,,, | Performed by: INTERNAL MEDICINE

## 2023-07-06 PROCEDURE — 99999 PR PBB SHADOW E&M-EST. PATIENT-LVL III: ICD-10-PCS | Mod: PBBFAC,,, | Performed by: INTERNAL MEDICINE

## 2023-07-06 PROCEDURE — 1101F PT FALLS ASSESS-DOCD LE1/YR: CPT | Mod: CPTII,S$GLB,, | Performed by: INTERNAL MEDICINE

## 2023-07-06 PROCEDURE — 90677 PNEUMOCOCCAL CONJUGATE VACCINE 20-VALENT: ICD-10-PCS | Mod: S$GLB,,, | Performed by: INTERNAL MEDICINE

## 2023-07-06 PROCEDURE — 1126F AMNT PAIN NOTED NONE PRSNT: CPT | Mod: CPTII,S$GLB,, | Performed by: INTERNAL MEDICINE

## 2023-07-06 PROCEDURE — 3288F FALL RISK ASSESSMENT DOCD: CPT | Mod: CPTII,S$GLB,, | Performed by: INTERNAL MEDICINE

## 2023-07-06 PROCEDURE — 3078F DIAST BP <80 MM HG: CPT | Mod: CPTII,S$GLB,, | Performed by: INTERNAL MEDICINE

## 2023-07-06 PROCEDURE — 3078F PR MOST RECENT DIASTOLIC BLOOD PRESSURE < 80 MM HG: ICD-10-PCS | Mod: CPTII,S$GLB,, | Performed by: INTERNAL MEDICINE

## 2023-07-06 PROCEDURE — G0009 ADMIN PNEUMOCOCCAL VACCINE: HCPCS | Mod: S$GLB,,, | Performed by: INTERNAL MEDICINE

## 2023-07-06 PROCEDURE — 90677 PCV20 VACCINE IM: CPT | Mod: S$GLB,,, | Performed by: INTERNAL MEDICINE

## 2023-07-06 NOTE — PROGRESS NOTES
Subjective     Patient ID: Shakira Pearl is a 78 y.o. female.    Chief Complaint: No chief complaint on file.    HPI  78 y.o. Female here for annual exam.      Vaccines: Influenza (2021); Tetanus (2019); Pneumovax (current); Shingrix (will get)  Eye exam: 2019  Mammogram: 3/21  Gyn exam: declined   Colonoscopy: 9/20  DEXA: 1/23(NL)     Exercise: walks  Diet: regular     Past Medical History:  sinus: Allergy  back: Arthritis  back: Degenerative disc disease  No date: Neuromuscular disorder  No date: LAM  No date: Liver cyst  No date: GERD  No date: Adrenal adenoma  No date: Vitamin D deficiency  Past Surgical History:  age 19: APPENDECTOMY  No date: CHOLECYSTECTOMY      Comment:  age of 25  10/3/2019: CHONDROPLASTY OF KNEE; Left      Comment:  Procedure: CHONDROPLASTY, KNEE;  Surgeon: Kaylen Patiño MD;  Location: [facility] OR;  Service: Orthopedics;                 Laterality: Left;  38: HYSTERECTOMY  2/18/2019: INJECTION OF JOINT; Bilateral      Comment:  Procedure: INJECTION, JOINT BILATERAL SI;  Surgeon:                Mert Coates MD;  Location: [facility] PAIN MGT;  Service: Pain               Management;  Laterality: Bilateral;  BILATERAL SI JOINT                INJECTION  10/3/2019: KNEE ARTHROSCOPY W/ MENISCECTOMY; Left      Comment:  Procedure: ARTHROSCOPY, KNEE, WITH MENISCECTOMY;                 Surgeon: Kaylen Patiño MD;  Location: [facility] OR;  Service:                Orthopedics;  Laterality: Left;  10/3/2019: SYNOVECTOMY OF KNEE; Left      Comment:  Procedure: SYNOVECTOMY, KNEE;  Surgeon: Kaylen Patiño MD;                Location: [facility];  Service: Orthopedics;  Laterality:                Left;  Social History    Socioeconomic History      Marital status:       Spouse name: Not on file      Number of children: 1      Years of education: Not on file      Highest education level: Not on file    Occupational History      Occupation: retired    Social Needs      Financial resource strain: Not on file       Food insecurity:        Worry: Not on file        Inability: Not on file      Transportation needs:        Medical: Not on file        Non-medical: Not on file    Tobacco Use      Smoking status: Never Smoker      Smokeless tobacco: Never Used    Substance and Sexual Activity      Alcohol use: No      Drug use: No      Sexual activity: Not Currently    Lifestyle      Physical activity:        Days per week: Not on file        Minutes per session: Not on file      Stress: Not on file    Relationships      Social connections:        Talks on phone: Not on file        Gets together: Not on file        Attends [employer] service: Not on file        Active member of club or organization: Not on file        Attends meetings of clubs or organizations: Not on file        Relationship status: Not on file    Other Topics      Concerns:        Not on file    Social History Narrative      Not on file     Review of patient's allergies indicates:   -- Demerol [meperidine] -- Nausea And Vomiting    --  Other reaction(s): Nausea   -- Papaya     --  Illness vomiting   -- Sulfa (sulfonamide antibiotics) -- Rash   -- Sulfur -- Rash  Review of Systems   Constitutional:  Negative for activity change, appetite change, chills, diaphoresis, fatigue, fever and unexpected weight change.   HENT:  Negative for nasal congestion, mouth sores, postnasal drip, rhinorrhea, sinus pressure/congestion, sneezing, sore throat, trouble swallowing and voice change.    Eyes:  Negative for pain, discharge and visual disturbance.   Respiratory:  Negative for cough, shortness of breath and wheezing.    Cardiovascular:  Negative for chest pain, palpitations and leg swelling.   Gastrointestinal:  Negative for abdominal pain, blood in stool, constipation, diarrhea, nausea and vomiting.   Endocrine: Negative for cold intolerance and heat intolerance.   Genitourinary:  Negative for difficulty urinating, dysuria, frequency, hematuria and urgency.   Musculoskeletal:   Positive for arthralgias, back pain and neck pain. Negative for myalgias.   Integumentary:  Negative for rash and wound.   Allergic/Immunologic: Negative for environmental allergies and food allergies.   Neurological:  Negative for dizziness, tremors, seizures, syncope, weakness, light-headedness and headaches.   Hematological:  Negative for adenopathy. Does not bruise/bleed easily.   Psychiatric/Behavioral:  Negative for confusion and sleep disturbance. The patient is not nervous/anxious.         Objective     Physical Exam  Vitals and nursing note reviewed.   Constitutional:       General: She is not in acute distress.     Appearance: Normal appearance. She is well-developed. She is not diaphoretic.   HENT:      Head: Normocephalic and atraumatic.      Right Ear: External ear normal.      Left Ear: External ear normal.      Nose: Nose normal.      Mouth/Throat:      Pharynx: No oropharyngeal exudate.   Eyes:      General: No scleral icterus.        Right eye: No discharge.         Left eye: No discharge.      Conjunctiva/sclera: Conjunctivae normal.      Pupils: Pupils are equal, round, and reactive to light.   Neck:      Thyroid: No thyromegaly.      Vascular: No JVD.   Cardiovascular:      Rate and Rhythm: Normal rate and regular rhythm.      Pulses: Normal pulses.      Heart sounds: Normal heart sounds. No murmur heard.  Pulmonary:      Effort: Pulmonary effort is normal. No respiratory distress.      Breath sounds: Normal breath sounds. No wheezing, rhonchi or rales.   Chest:      Chest wall: No tenderness.   Abdominal:      General: Bowel sounds are normal. There is no distension.      Palpations: Abdomen is soft.      Tenderness: There is no abdominal tenderness. There is no guarding or rebound.   Musculoskeletal:      Cervical back: Neck supple.      Right lower leg: No edema.      Left lower leg: No edema.   Lymphadenopathy:      Cervical: No cervical adenopathy.   Skin:     General: Skin is warm and dry.       Coloration: Skin is not pale.      Findings: No rash.   Neurological:      General: No focal deficit present.      Mental Status: She is alert and oriented to person, place, and time.      Gait: Gait normal.   Psychiatric:         Behavior: Behavior normal.         Thought Content: Thought content normal.         Judgment: Judgment normal.          Assessment and Plan     1. Essential hypertension  -     CBC Auto Differential; Future; Expected date: 07/06/2023  -     Lipid Panel; Future; Expected date: 07/06/2023  -     Urinalysis; Future  -     TSH; Future; Expected date: 07/06/2023  -     Comprehensive Metabolic Panel; Future; Expected date: 07/06/2023    2. Hypertriglyceridemia  -     CBC Auto Differential; Future; Expected date: 07/06/2023  -     Lipid Panel; Future; Expected date: 07/06/2023  -     Urinalysis; Future  -     TSH; Future; Expected date: 07/06/2023  -     Comprehensive Metabolic Panel; Future; Expected date: 07/06/2023    3. Aortic atherosclerosis  -     CBC Auto Differential; Future; Expected date: 07/06/2023  -     Lipid Panel; Future; Expected date: 07/06/2023  -     Urinalysis; Future  -     TSH; Future; Expected date: 07/06/2023  -     Comprehensive Metabolic Panel; Future; Expected date: 07/06/2023    4. Adenoma of left adrenal gland  -     CBC Auto Differential; Future; Expected date: 07/06/2023  -     Lipid Panel; Future; Expected date: 07/06/2023  -     Urinalysis; Future  -     TSH; Future; Expected date: 07/06/2023  -     Comprehensive Metabolic Panel; Future; Expected date: 07/06/2023    5. Iron deficiency anemia, unspecified iron deficiency anemia type  -     CBC Auto Differential; Future; Expected date: 07/06/2023  -     Lipid Panel; Future; Expected date: 07/06/2023  -     Urinalysis; Future  -     TSH; Future; Expected date: 07/06/2023  -     Comprehensive Metabolic Panel; Future; Expected date: 07/06/2023    6. Liver cyst  -     CBC Auto Differential; Future; Expected date:  07/06/2023  -     Lipid Panel; Future; Expected date: 07/06/2023  -     Urinalysis; Future  -     TSH; Future; Expected date: 07/06/2023  -     Comprehensive Metabolic Panel; Future; Expected date: 07/06/2023    7. Fatty liver  -     CBC Auto Differential; Future; Expected date: 07/06/2023  -     Lipid Panel; Future; Expected date: 07/06/2023  -     Urinalysis; Future  -     TSH; Future; Expected date: 07/06/2023  -     Comprehensive Metabolic Panel; Future; Expected date: 07/06/2023    8. Lumbar facet arthropathy  -     CBC Auto Differential; Future; Expected date: 07/06/2023  -     Lipid Panel; Future; Expected date: 07/06/2023  -     Urinalysis; Future  -     TSH; Future; Expected date: 07/06/2023  -     Comprehensive Metabolic Panel; Future; Expected date: 07/06/2023    9. Cervical radiculopathy  -     CBC Auto Differential; Future; Expected date: 07/06/2023  -     Lipid Panel; Future; Expected date: 07/06/2023  -     Urinalysis; Future  -     TSH; Future; Expected date: 07/06/2023  -     Comprehensive Metabolic Panel; Future; Expected date: 07/06/2023    10. Annual physical exam  -     (In Office Administered) Pneumococcal Conjugate Vaccine (20 Valent) (IM)      Blood work ordered      LAM- followed by GI   -was on Iron(stopped last week)      HLD- on Zetia/Vascepa      HTN- stable on Hyzaar         Sacroiliitis/DJD Cervical/Lumbar spine- seeing Pain management       Fatty liver/left lobe cyst- stable      Adrenal adenoma- has seen Endo      Frequent falls- pt will f/u with Neuro     Hx of Right 5th metatarsal fracture     F/u in 6 months

## 2023-07-17 ENCOUNTER — TELEPHONE (OUTPATIENT)
Dept: INTERNAL MEDICINE | Facility: CLINIC | Age: 78
End: 2023-07-17
Payer: MEDICARE

## 2023-07-17 ENCOUNTER — TELEPHONE (OUTPATIENT)
Dept: PAIN MEDICINE | Facility: CLINIC | Age: 78
End: 2023-07-17
Payer: MEDICARE

## 2023-07-17 DIAGNOSIS — E61.1 IRON DEFICIENCY: ICD-10-CM

## 2023-07-17 DIAGNOSIS — I10 ESSENTIAL HYPERTENSION: Primary | ICD-10-CM

## 2023-07-17 DIAGNOSIS — R73.9 ELEVATED BLOOD SUGAR: ICD-10-CM

## 2023-07-17 DIAGNOSIS — E78.1 HYPERTRIGLYCERIDEMIA: ICD-10-CM

## 2023-07-17 DIAGNOSIS — E55.9 VITAMIN D INSUFFICIENCY: ICD-10-CM

## 2023-07-17 DIAGNOSIS — I70.0 AORTIC ATHEROSCLEROSIS: ICD-10-CM

## 2023-07-17 NOTE — TELEPHONE ENCOUNTER
"----- Message from Oliva Harvey sent at 7/17/2023 11:03 AM CDT -----  Regarding: Appointment Access                Name of Who is Calling:  Shakira Pearl    Who Left The Message:  Shakira Pearl    Message Is For:    Gabriela Hernandez MA      What is the request in detail:    Patient called requesting a call; patient is upset because Dr. Mert Coates MD doesn't have a sooner appointment.  Patient states, "she" ONLY wants to see her doctor."  I did attempt to schedule per request but was unsuccessful.   Please give a call back and further advise.   Thank you      Reply by [facility]: NO    Preferred Call Back :  [phone] (P)                                           "

## 2023-07-17 NOTE — TELEPHONE ENCOUNTER
Staff spoke to patient and offered her an appointment with an NP on a day that Dr. Coates is in clinic but she declined and asked to be put on the wait list for Dr. Coates.

## 2023-07-19 ENCOUNTER — OFFICE VISIT (OUTPATIENT)
Dept: PAIN MEDICINE | Facility: CLINIC | Age: 78
End: 2023-07-19
Attending: ANESTHESIOLOGY
Payer: MEDICARE

## 2023-07-19 ENCOUNTER — LAB VISIT (OUTPATIENT)
Dept: LAB | Facility: OTHER | Age: 78
End: 2023-07-19
Attending: INTERNAL MEDICINE
Payer: MEDICARE

## 2023-07-19 ENCOUNTER — OFFICE VISIT (OUTPATIENT)
Dept: NEUROLOGY | Facility: CLINIC | Age: 78
End: 2023-07-19
Payer: MEDICARE

## 2023-07-19 ENCOUNTER — TELEPHONE (OUTPATIENT)
Dept: INTERNAL MEDICINE | Facility: CLINIC | Age: 78
End: 2023-07-19
Payer: MEDICARE

## 2023-07-19 VITALS
SYSTOLIC BLOOD PRESSURE: 112 MMHG | HEART RATE: 88 BPM | RESPIRATION RATE: 19 BRPM | TEMPERATURE: 98 F | HEIGHT: 63 IN | DIASTOLIC BLOOD PRESSURE: 75 MMHG | WEIGHT: 150.81 LBS | BODY MASS INDEX: 26.72 KG/M2

## 2023-07-19 DIAGNOSIS — M54.12 CERVICAL RADICULOPATHY: ICD-10-CM

## 2023-07-19 DIAGNOSIS — R29.6 FREQUENT FALLS: ICD-10-CM

## 2023-07-19 DIAGNOSIS — D35.02 ADENOMA OF LEFT ADRENAL GLAND: ICD-10-CM

## 2023-07-19 DIAGNOSIS — K76.89 LIVER CYST: ICD-10-CM

## 2023-07-19 DIAGNOSIS — I10 ESSENTIAL HYPERTENSION: ICD-10-CM

## 2023-07-19 DIAGNOSIS — M47.22 OSTEOARTHRITIS OF SPINE WITH RADICULOPATHY, CERVICAL REGION: Primary | ICD-10-CM

## 2023-07-19 DIAGNOSIS — K76.0 FATTY LIVER: ICD-10-CM

## 2023-07-19 DIAGNOSIS — S19.9XXA TRAUMATIC INJURY OF NECK: ICD-10-CM

## 2023-07-19 DIAGNOSIS — G47.52 DREAM ENACTMENT BEHAVIOR: ICD-10-CM

## 2023-07-19 DIAGNOSIS — D50.9 IRON DEFICIENCY ANEMIA, UNSPECIFIED IRON DEFICIENCY ANEMIA TYPE: ICD-10-CM

## 2023-07-19 DIAGNOSIS — I70.0 AORTIC ATHEROSCLEROSIS: ICD-10-CM

## 2023-07-19 DIAGNOSIS — F51.5 NIGHTMARES: Primary | ICD-10-CM

## 2023-07-19 DIAGNOSIS — E78.1 HYPERTRIGLYCERIDEMIA: ICD-10-CM

## 2023-07-19 DIAGNOSIS — M47.812 OSTEOARTHRITIS OF CERVICAL SPINE, UNSPECIFIED SPINAL OSTEOARTHRITIS COMPLICATION STATUS: ICD-10-CM

## 2023-07-19 DIAGNOSIS — M47.816 LUMBAR FACET ARTHROPATHY: ICD-10-CM

## 2023-07-19 DIAGNOSIS — M43.06 SPONDYLOLYSIS OF LUMBAR REGION: ICD-10-CM

## 2023-07-19 LAB
ALBUMIN SERPL BCP-MCNC: 4 G/DL (ref 3.5–5.2)
ALP SERPL-CCNC: 50 U/L (ref 55–135)
ALT SERPL W/O P-5'-P-CCNC: 18 U/L (ref 10–44)
ANION GAP SERPL CALC-SCNC: 11 MMOL/L (ref 8–16)
AST SERPL-CCNC: 19 U/L (ref 10–40)
BASOPHILS # BLD AUTO: 0.05 K/UL (ref 0–0.2)
BASOPHILS NFR BLD: 0.6 % (ref 0–1.9)
BILIRUB SERPL-MCNC: 0.4 MG/DL (ref 0.1–1)
BUN SERPL-MCNC: 24 MG/DL (ref 8–23)
CALCIUM SERPL-MCNC: 9.4 MG/DL (ref 8.7–10.5)
CHLORIDE SERPL-SCNC: 101 MMOL/L (ref 95–110)
CHOLEST SERPL-MCNC: 185 MG/DL (ref 120–199)
CHOLEST/HDLC SERPL: 3.7 {RATIO} (ref 2–5)
CO2 SERPL-SCNC: 26 MMOL/L (ref 23–29)
CREAT SERPL-MCNC: 0.9 MG/DL (ref 0.5–1.4)
DIFFERENTIAL METHOD: ABNORMAL
EOSINOPHIL # BLD AUTO: 0.2 K/UL (ref 0–0.5)
EOSINOPHIL NFR BLD: 2.7 % (ref 0–8)
ERYTHROCYTE [DISTWIDTH] IN BLOOD BY AUTOMATED COUNT: 12.9 % (ref 11.5–14.5)
EST. GFR  (NO RACE VARIABLE): >60 ML/MIN/1.73 M^2
GLUCOSE SERPL-MCNC: 102 MG/DL (ref 70–110)
HCT VFR BLD AUTO: 37.3 % (ref 37–48.5)
HDLC SERPL-MCNC: 50 MG/DL (ref 40–75)
HDLC SERPL: 27 % (ref 20–50)
HGB BLD-MCNC: 12.4 G/DL (ref 12–16)
IMM GRANULOCYTES # BLD AUTO: 0.05 K/UL (ref 0–0.04)
IMM GRANULOCYTES NFR BLD AUTO: 0.6 % (ref 0–0.5)
LDLC SERPL CALC-MCNC: 82.4 MG/DL (ref 63–159)
LYMPHOCYTES # BLD AUTO: 2.6 K/UL (ref 1–4.8)
LYMPHOCYTES NFR BLD: 30.2 % (ref 18–48)
MCH RBC QN AUTO: 29.1 PG (ref 27–31)
MCHC RBC AUTO-ENTMCNC: 33.2 G/DL (ref 32–36)
MCV RBC AUTO: 88 FL (ref 82–98)
MONOCYTES # BLD AUTO: 0.5 K/UL (ref 0.3–1)
MONOCYTES NFR BLD: 5.4 % (ref 4–15)
NEUTROPHILS # BLD AUTO: 5.2 K/UL (ref 1.8–7.7)
NEUTROPHILS NFR BLD: 60.5 % (ref 38–73)
NONHDLC SERPL-MCNC: 135 MG/DL
NRBC BLD-RTO: 0 /100 WBC
PLATELET # BLD AUTO: 273 K/UL (ref 150–450)
PMV BLD AUTO: 10 FL (ref 9.2–12.9)
POTASSIUM SERPL-SCNC: 3.5 MMOL/L (ref 3.5–5.1)
PROT SERPL-MCNC: 7.7 G/DL (ref 6–8.4)
RBC # BLD AUTO: 4.26 M/UL (ref 4–5.4)
SODIUM SERPL-SCNC: 138 MMOL/L (ref 136–145)
TRIGL SERPL-MCNC: 263 MG/DL (ref 30–150)
TSH SERPL DL<=0.005 MIU/L-ACNC: 1.45 UIU/ML (ref 0.4–4)
WBC # BLD AUTO: 8.57 K/UL (ref 3.9–12.7)

## 2023-07-19 PROCEDURE — 99214 OFFICE O/P EST MOD 30 MIN: CPT | Mod: HCNC,GC,S$GLB, | Performed by: ANESTHESIOLOGY

## 2023-07-19 PROCEDURE — 1160F RVW MEDS BY RX/DR IN RCRD: CPT | Mod: HCNC,CPTII,S$GLB, | Performed by: ANESTHESIOLOGY

## 2023-07-19 PROCEDURE — 1159F PR MEDICATION LIST DOCUMENTED IN MEDICAL RECORD: ICD-10-PCS | Mod: HCNC,CPTII,S$GLB, | Performed by: ANESTHESIOLOGY

## 2023-07-19 PROCEDURE — 80053 COMPREHEN METABOLIC PANEL: CPT | Mod: HCNC | Performed by: INTERNAL MEDICINE

## 2023-07-19 PROCEDURE — 1160F PR REVIEW ALL MEDS BY PRESCRIBER/CLIN PHARMACIST DOCUMENTED: ICD-10-PCS | Mod: HCNC,CPTII,S$GLB, | Performed by: ANESTHESIOLOGY

## 2023-07-19 PROCEDURE — 3288F FALL RISK ASSESSMENT DOCD: CPT | Mod: HCNC,CPTII,S$GLB, | Performed by: ANESTHESIOLOGY

## 2023-07-19 PROCEDURE — 1125F PR PAIN SEVERITY QUANTIFIED, PAIN PRESENT: ICD-10-PCS | Mod: HCNC,CPTII,S$GLB, | Performed by: ANESTHESIOLOGY

## 2023-07-19 PROCEDURE — 99204 OFFICE O/P NEW MOD 45 MIN: CPT | Mod: HCNC,S$GLB,, | Performed by: STUDENT IN AN ORGANIZED HEALTH CARE EDUCATION/TRAINING PROGRAM

## 2023-07-19 PROCEDURE — 3078F PR MOST RECENT DIASTOLIC BLOOD PRESSURE < 80 MM HG: ICD-10-PCS | Mod: HCNC,CPTII,S$GLB, | Performed by: ANESTHESIOLOGY

## 2023-07-19 PROCEDURE — 3078F DIAST BP <80 MM HG: CPT | Mod: HCNC,CPTII,S$GLB, | Performed by: ANESTHESIOLOGY

## 2023-07-19 PROCEDURE — 1159F MED LIST DOCD IN RCRD: CPT | Mod: HCNC,CPTII,S$GLB, | Performed by: ANESTHESIOLOGY

## 2023-07-19 PROCEDURE — 1101F PT FALLS ASSESS-DOCD LE1/YR: CPT | Mod: HCNC,CPTII,S$GLB, | Performed by: ANESTHESIOLOGY

## 2023-07-19 PROCEDURE — 1125F AMNT PAIN NOTED PAIN PRSNT: CPT | Mod: HCNC,CPTII,S$GLB, | Performed by: ANESTHESIOLOGY

## 2023-07-19 PROCEDURE — 3074F SYST BP LT 130 MM HG: CPT | Mod: HCNC,CPTII,S$GLB, | Performed by: ANESTHESIOLOGY

## 2023-07-19 PROCEDURE — 1101F PR PT FALLS ASSESS DOC 0-1 FALLS W/OUT INJ PAST YR: ICD-10-PCS | Mod: HCNC,CPTII,S$GLB, | Performed by: ANESTHESIOLOGY

## 2023-07-19 PROCEDURE — 99999 PR PBB SHADOW E&M-EST. PATIENT-LVL V: ICD-10-PCS | Mod: PBBFAC,HCNC,, | Performed by: ANESTHESIOLOGY

## 2023-07-19 PROCEDURE — 80061 LIPID PANEL: CPT | Mod: HCNC | Performed by: INTERNAL MEDICINE

## 2023-07-19 PROCEDURE — 99999 PR PBB SHADOW E&M-EST. PATIENT-LVL V: CPT | Mod: PBBFAC,HCNC,, | Performed by: ANESTHESIOLOGY

## 2023-07-19 PROCEDURE — 36415 COLL VENOUS BLD VENIPUNCTURE: CPT | Mod: HCNC | Performed by: INTERNAL MEDICINE

## 2023-07-19 PROCEDURE — 99999 PR PBB SHADOW E&M-EST. PATIENT-LVL I: ICD-10-PCS | Mod: PBBFAC,HCNC,, | Performed by: STUDENT IN AN ORGANIZED HEALTH CARE EDUCATION/TRAINING PROGRAM

## 2023-07-19 PROCEDURE — 99999 PR PBB SHADOW E&M-EST. PATIENT-LVL I: CPT | Mod: PBBFAC,HCNC,, | Performed by: STUDENT IN AN ORGANIZED HEALTH CARE EDUCATION/TRAINING PROGRAM

## 2023-07-19 PROCEDURE — 84443 ASSAY THYROID STIM HORMONE: CPT | Mod: HCNC | Performed by: INTERNAL MEDICINE

## 2023-07-19 PROCEDURE — 99214 PR OFFICE/OUTPT VISIT, EST, LEVL IV, 30-39 MIN: ICD-10-PCS | Mod: HCNC,GC,S$GLB, | Performed by: ANESTHESIOLOGY

## 2023-07-19 PROCEDURE — 85025 COMPLETE CBC W/AUTO DIFF WBC: CPT | Mod: HCNC | Performed by: INTERNAL MEDICINE

## 2023-07-19 PROCEDURE — 99204 PR OFFICE/OUTPT VISIT, NEW, LEVL IV, 45-59 MIN: ICD-10-PCS | Mod: HCNC,S$GLB,, | Performed by: STUDENT IN AN ORGANIZED HEALTH CARE EDUCATION/TRAINING PROGRAM

## 2023-07-19 PROCEDURE — 3074F PR MOST RECENT SYSTOLIC BLOOD PRESSURE < 130 MM HG: ICD-10-PCS | Mod: HCNC,CPTII,S$GLB, | Performed by: ANESTHESIOLOGY

## 2023-07-19 PROCEDURE — 3288F PR FALLS RISK ASSESSMENT DOCUMENTED: ICD-10-PCS | Mod: HCNC,CPTII,S$GLB, | Performed by: ANESTHESIOLOGY

## 2023-07-19 NOTE — PROGRESS NOTES
Neurology Clinic Note      Date: 7/19/23  Patient Name: Shakira Pearl   MRN: [id number]   PCP: Angel Bello  Referring Provider: Angel Bello, *    Assessment and Plan:   Shakira Pearl is a 78 y.o. female presenting for evaluation of abnormal dreams which she attributes to Cymbalta. There are case reports of Cymbalta causing this side effect although unusual that this occurred after 3 yrs of being on the drug. In any case, her symptoms seemed to have resolved after a reduction in the dose.    Encouraged to get in touch with me with her symptoms recur.    Problem List Items Addressed This Visit          Other    Frequent falls    Nightmares - Primary    Dream enactment behavior         Subjective:          HPI:   Ms. Shakira Pearl is a 78 y.o. female with a history of cervical radiculopathy, HTN, hypertriglyceridemia, sacroiliitis presenting for evaluation of 'frequent falls'    Around a year ago, she started having nightmares almost every night and would act out her dreams. On 4-5 occasions, she has fallen out of bed while enacting her dreams. No incontinence, tongue bite. She had been on Cymbalta for 3 yrs prior to this and attributes her symptoms to this drug. Around 3 months ago, the dose of Cymblata was reduced from 60mg to 20mg and since then, she is no longer having any nightmares or abnormal dreams.     Of note, she was diagnosed with seizures in childhood. 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 Semiology, treatment and clinical course unknown. She was on Dilantin and taken off it in her early 20s. She believes her last seizure was sometime in her 20s. She is from [facility] and was undergoing all her care there.        PAST MEDICAL HISTORY:  Past Medical History:   Diagnosis Date    Adrenal adenoma     Allergy sinus    Arthritis back    Cataract     Colon polyps     Degenerative disc disease back    Diverticulosis of colon     Dyspepsia     Fatty liver 01/12/2023    GERD (gastroesophageal reflux  disease)     Heartburn     Hemorrhoids, internal     Hiatal hernia     LAM (iron deficiency anemia)     Liver cyst     Neuromuscular disorder     Vitamin D deficiency        PAST SURGICAL HISTORY:  Past Surgical History:   Procedure Laterality Date    APPENDECTOMY  age 19    CATARACT EXTRACTION W/  INTRAOCULAR LENS IMPLANT      CATARACT EXTRACTION W/  INTRAOCULAR LENS IMPLANT      CHOLECYSTECTOMY      age of 25    CHONDROPLASTY OF KNEE Left 10/03/2019    Procedure: CHONDROPLASTY, KNEE;  Surgeon: Kaylen Patiño MD;  Location: [facility] OR;  Service: Orthopedics;  Laterality: Left;    COLONOSCOPY N/A 09/09/2020    Procedure: COLONOSCOPY;  Surgeon: Peter Cuevas MD;  Location: [facility] ([address] [place] [address]);  Service: Endoscopy;  Laterality: N/A;  device assisted colonoscopy w/Dr Cuevas.  Difficult colon, multiple adhesions from abd surgeries, Hx adv adenomatous polyp/tubulovillous adenoma of cecum in April 2011.  Dr Black     per Dr Cuevas-Okay for 4th floor - 60 minute slot (90 min w/egd added).  Start with peds col    ESOPHAGOGASTRODUODENOSCOPY N/A 09/09/2020    Procedure: EGD (ESOPHAGOGASTRODUODENOSCOPY);  Surgeon: Peter Cuevas MD;  [facility]  Service: Endoscopy;  Laterality: N/A;  per Dr Black-add EGD to colonoscopy order.  Pt requesting later appt.  4/13/20 - removed from 4/30/20, rescheduled 7/29/20 - pg   covid 9/6-met-urgent care--tb    HYSTERECTOMY  38    HYSTERECTOMY, VAGINAL, WITH UTEROSACRAL LIGAMENT VAULT SUSPENSION      INJECTION OF JOINT Bilateral 02/18/2019    Procedure: INJECTION, JOINT BILATERAL SI;  Surgeon: Mert Palumbo MD;  Location: [facility] PAIN MGT;  Service: Pain Management;  Laterality: Bilateral;  BILATERAL SI JOINT INJECTION    INJECTION OF JOINT Right 08/20/2020    Procedure: INJECTION JOINT/ R HIP DIRECT REFERRAL * DR. PALUMBO ONLY PLEASE*;  Surgeon: Mert Palumbo MD;  Location: [facility] PAIN MGT;  Service: Pain Management;  Laterality: Right;  NEED CONSENT    KNEE ARTHROSCOPY W/  MENISCECTOMY Left 10/03/2019    Procedure: ARTHROSCOPY, KNEE, WITH MENISCECTOMY;  Surgeon: Kaylen Patiño MD;  Location: [facility] OR;  Service: Orthopedics;  Laterality: Left;    SYNOVECTOMY OF KNEE Left 10/03/2019    Procedure: SYNOVECTOMY, KNEE;  Surgeon: Kaylen Patiño MD;  Location: [facility] OR;  Service: Orthopedics;  Laterality: Left;    YAG Laser Capsulotomy Bilateral     Dr. Aleman       CURRENT MEDS:  Current Outpatient Medications   Medication Sig Dispense Refill    baclofen (LIORESAL) 10 MG tablet Take 1 tablet (10 mg total) by mouth 3 (three) times daily as needed (neck/back pain). 15 tablet 0    cholecalciferol, vitamin D3, (VITAMIN D3) 50 mcg (2,000 unit) Cap capsule Take 1 capsule (2,000 Units total) by mouth once daily. 90 capsule 3    DULoxetine (CYMBALTA) 20 MG capsule Take 1 capsule (20 mg total) by mouth once daily. 90 capsule 3    ergocalciferol (VITAMIN D2) 50,000 unit Cap TAKE ONE CAPSULE BY MOUTH EVERY 7 DAYS 12 capsule 3    estradioL (ESTRACE) 0.01 % (0.1 mg/gram) vaginal cream 0.5 grams with applicator or dime-sized amount with finger in vagina nightly x 2 weeks, then twice a week thereafter 42.5 g 11    estradiol 0.05 mg/24 hr td ptsw (VIVELLE-DOT) 0.05 mg/24 hr       estradiol valerate (DELESTROGEN) 40 mg/mL injection Inject 0.5 mLs (20 mg total) into the muscle every 28 days. Inject into the muscle. 5 mL 12    ferrous gluconate (FERGON) 324 MG tablet Take 1 tablet (324 mg total) by mouth daily with breakfast. 90 tablet 1    icosapent ethyL (VASCEPA) 1 gram Cap Take 2 capsules (2 g total) by mouth 2 (two) times a day. 120 capsule 11    losartan-hydrochlorothiazide 50-12.5 mg (HYZAAR) 50-12.5 mg per tablet Take 1 tablet by mouth once daily. 90 tablet 3    mirabegron (MYRBETRIQ) 25 mg Tb24 ER tablet Take 1 tablet (25 mg total) by mouth once daily. 90 tablet 3    pantoprazole (PROTONIX) 40 MG tablet TAKE ONE TABLET BY MOUTH EVERY MORNING 45 MINUTES BEFORE BREAKFAST 90 tablet 3    potassium chloride SA  (K-DUR,KLOR-CON M) 10 MEQ tablet Take 1 tablet (10 mEq total) by mouth once daily. 90 tablet 3     No current facility-administered medications for this visit.       ALLERGIES:  Review of patient's allergies indicates:   Allergen Reactions    Demerol [meperidine] Nausea And Vomiting     Other reaction(s): Nausea    Papaya      Illness vomiting    Sulfa (sulfonamide antibiotics) Rash    Sulfur Rash       FAMILY HISTORY:  Family History   Problem Relation Age of Onset    Heart disease Mother 67        heart attack    Stroke Mother     Cancer Father 65        abdominal    Stomach cancer Father     No Known Problems Sister     No Known Problems Brother     No Known Problems Son     No Known Problems Maternal Grandmother     No Known Problems Maternal Grandfather     No Known Problems Paternal Grandmother     No Known Problems Paternal Grandfather     No Known Problems Maternal Aunt     No Known Problems Maternal Uncle     No Known Problems Paternal Aunt     No Known Problems Paternal Uncle     Celiac disease Neg Hx     Colon cancer Neg Hx     Crohn's disease Neg Hx     Esophageal cancer Neg Hx     Inflammatory bowel disease Neg Hx     Liver cancer Neg Hx     Rectal cancer Neg Hx     Ulcerative colitis Neg Hx     Amblyopia Neg Hx     Blindness Neg Hx     Cataracts Neg Hx     Diabetes Neg Hx     Glaucoma Neg Hx     Hypertension Neg Hx     Macular degeneration Neg Hx     Retinal detachment Neg Hx     Strabismus Neg Hx     Thyroid disease Neg Hx     Anesthesia problems Neg Hx        SOCIAL HISTORY:  Social History     Tobacco Use    Smoking status: Never    Smokeless tobacco: Never   Substance Use Topics    Alcohol use: No    Drug use: No       Review of Systems:  12 system review of systems is negative except for the symptoms mentioned in HPI.      Objective:   There were no vitals filed for this visit.  General: NAD, well nourished   Eyes: no tearing, discharge, no erythema   Neck: Supple, full range of  motion  Cardiovascular: Warm and well perfused  Lungs: Normal work of breathing  Skin: No rash, lesions, or breakdown on exposed skin  Psychiatry: Mood and affect are appropriate       NEUROLOGICAL EXAMINATION:     MENTAL STATUS   Oriented to person, place, and time.   Follows 2 step commands.   Speech: speech is normal   Level of consciousness: alert    CRANIAL NERVES     CN II   Visual fields full to confrontation.     CN III, IV, VI   Extraocular motions are normal.   Nystagmus: none   Ophthalmoparesis: none    CN V   Facial sensation intact.     CN VII   Facial expression full, symmetric.     CN XI   CN XI normal.     CN XII   CN XII normal.     MOTOR EXAM   Right arm pronator drift: absent  Left arm pronator drift: absent       5/5 in bilateral upper and lower extremities      REFLEXES     Reflexes   Right brachioradialis: 2+  Left brachioradialis: 2+  Right biceps: 2+  Left biceps: 2+  Right patellar: 2+  Left patellar: 2+  Right plantar: normal  Left plantar: normal    SENSORY EXAM   Light touch normal.     GAIT AND COORDINATION     Gait  Gait: normal     Coordination   Finger to nose coordination: normal      Images:    Other Studies:          Mp Montilla MD  Department of Neurology  [facility]

## 2023-07-19 NOTE — TELEPHONE ENCOUNTER
----- Message from Ani Kinney sent at 7/19/2023  9:29 AM CDT -----  Contact: [phone]  Pt states she is having an issue(they have been rude to her) with scheduling an appt with Pain Management. She would like the office to reach out to the office in regards to scheduling an appt with Pain Management. Per pt, please give her a call.               Thank you
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
## 2023-07-20 PROBLEM — F51.5 NIGHTMARES: Status: ACTIVE | Noted: 2023-07-20

## 2023-07-20 PROBLEM — G47.52 DREAM ENACTMENT BEHAVIOR: Status: ACTIVE | Noted: 2023-07-20

## 2023-08-01 ENCOUNTER — CLINICAL SUPPORT (OUTPATIENT)
Dept: REHABILITATION | Facility: HOSPITAL | Age: 78
End: 2023-08-01
Payer: MEDICARE

## 2023-08-01 DIAGNOSIS — M53.82 WEAKNESS OF NECK: ICD-10-CM

## 2023-08-01 DIAGNOSIS — M47.22 OSTEOARTHRITIS OF SPINE WITH RADICULOPATHY, CERVICAL REGION: ICD-10-CM

## 2023-08-01 DIAGNOSIS — M47.812 OSTEOARTHRITIS OF CERVICAL SPINE, UNSPECIFIED SPINAL OSTEOARTHRITIS COMPLICATION STATUS: ICD-10-CM

## 2023-08-01 DIAGNOSIS — D50.9 IRON DEFICIENCY ANEMIA, UNSPECIFIED IRON DEFICIENCY ANEMIA TYPE: ICD-10-CM

## 2023-08-01 DIAGNOSIS — R29.898 DECREASED RANGE OF MOTION OF NECK: ICD-10-CM

## 2023-08-01 PROCEDURE — 97530 THERAPEUTIC ACTIVITIES: CPT

## 2023-08-01 PROCEDURE — 97750 PHYSICAL PERFORMANCE TEST: CPT | Mod: 59,32

## 2023-08-01 PROCEDURE — 97110 THERAPEUTIC EXERCISES: CPT

## 2023-08-01 PROCEDURE — 97112 NEUROMUSCULAR REEDUCATION: CPT

## 2023-08-01 RX ORDER — FERROUS GLUCONATE 324(38)MG
324 TABLET ORAL
Qty: 90 TABLET | Refills: 1 | Status: SHIPPED | OUTPATIENT
Start: 2023-08-01 | End: 2024-01-28

## 2023-08-02 NOTE — PLAN OF CARE
[facility]  Physical Therapy Cervical Evaluation      Name: Shakira Pearl  Clinic Number: [id number]    Therapy Diagnosis:   Encounter Diagnoses   Name Primary?    Osteoarthritis of cervical spine, unspecified spinal osteoarthritis complication status     Osteoarthritis of spine with radiculopathy, cervical region     Decreased range of motion of neck     Weakness of neck      Physician: Mert Coates MD    Physician Orders: PT Eval and Treat   Medical Diagnosis from Referral:   M47.812 (ICD-10-CM) - Osteoarthritis of cervical spine, unspecified spinal osteoarthritis complication status   M47.22 (ICD-10-CM) - Osteoarthritis of spine with radiculopathy, cervical region   Evaluation Date: 8/1/2023  Authorization Period Expiration: 7/18/2024  Plan of Care Expiration: 11/1/2023  Reassessment Due: 9/1/2023  Visit # / Visits authorized: 1/1 (pending)    Time In: 1:15 PM  Time Out: 2:40 PM  Total Billable Time: 85 minutes  INSURANCE and OUTCOMES: Value Based Insurance with FOTO Outcomes 1/3    Precautions: standard, Hx of R foot fracture     Pattern of pain determined: 1 JESSICA    Subjective   Date of onset: 3 months ago     History of current condition: Shakira reports in May 2023 while walking out of a doctor's office she walked into a glass door and fell. She states she hit her head into the glass and has been experiencing increased neck pain since. She also reports within the last year to year & 1/2 she was experiencing nightmares which caused her to have several falls (about 4-5 falls) out of bed. She thinks nightmares were a side effect of a medication she was taking. She attended physical therapy and responded well. She describes neck pain as dull, achy and throbbing. She denies any radicular symptoms. She reports at times the pain radiates to her R shoulder blade. Aggravating factors include looking down, looking up, and lifting. She is scheduled to have a MRI on her neck on  08/04/2023.       Medical History:   Past Medical History:   Diagnosis Date    Adrenal adenoma     Allergy sinus    Arthritis back    Cataract     Colon polyps     Degenerative disc disease back    Diverticulosis of colon     Dyspepsia     Fatty liver 01/12/2023    GERD (gastroesophageal reflux disease)     Heartburn     Hemorrhoids, internal     Hiatal hernia     LAM (iron deficiency anemia)     Liver cyst     Neuromuscular disorder     Vitamin D deficiency      Surgical History:   Shakira Pearl  has a past surgical history that includes Appendectomy (age 19); Hysterectomy (38); Cholecystectomy; Injection of joint (Bilateral, 02/18/2019); Knee arthroscopy w/ meniscectomy (Left, 10/03/2019); Chondroplasty of knee (Left, 10/03/2019); Synovectomy of knee (Left, 10/03/2019); Injection of joint (Right, 08/20/2020); Esophagogastroduodenoscopy (N/A, 09/09/2020); Colonoscopy (N/A, 09/09/2020); Cataract extraction w/  intraocular lens implant; Cataract extraction w/  intraocular lens implant; YAG Laser Capsulotomy (Bilateral); and hysterectomy, vaginal, with uterosacral ligament vault suspension.    Medications:   Shakira has a current medication list which includes the following prescription(s): baclofen, cholecalciferol (vitamin d3), duloxetine, vitamin d2, estradiol, estradiol 0.05 mg/24 hr td ptsw, estradiol valerate, ferrous gluconate, icosapent ethyl, losartan-hydrochlorothiazide 50-12.5 mg, mirabegron, pantoprazole, and potassium chloride sa.    Allergies:   Review of patient's allergies indicates:   Allergen Reactions    Demerol [meperidine] Nausea And Vomiting     Other reaction(s): Nausea    Papaya      Illness vomiting    Sulfa (sulfonamide antibiotics) Rash    Sulfur Rash      Imaging: CT cervical spine 5/24/2023  The vertebral bodies are normal in height without evidence of acute fracture.  No osseous destructive lesions.  Reversal of the cervical lordosis centered at C3-C4 with grade 1 anterolisthesis of C3 on  C4.  Minimal anterolisthesis of C2 on C3.     Multilevel intervertebral disc height loss most pronounced C4-C5, C5-C6, and C6-C7.  Multilevel degenerative changes of the cervical spine involving posterior disc osteophyte complexes, uncovertebral spurring, and facet arthropathy.  These findings contribute to moderate to severe neural foraminal narrowing at the right C5-C6 level.  Additional varying degrees of neural foraminal narrowing with at least moderate neural foraminal narrowing at the bilateral C3-C4, bilateral C4-C5, left C5-C6, and left C6-C7 levels.  Degenerative findings also contribute to moderate to severe spinal canal stenosis at C4-C5 and C5-C6.  There is at least moderate spinal canal stenosis at C3-C4 and C5-C6.    Prior Therapy: yes  Prior Treatment: PT   Social History: 2 story home, lives alone   Occupation: retired   Leisure: would like to get back to going to gym       Prior Level of Function: IND  Current Level of Function: difficulty with prolonged sitting, lifting  DME owned/used:   Gym Membership: no     Pain:  Current 6/10, worst 6/10, best 0/10   Location: neck bilateral, R scapula   Description: Aching, Dull, and Throbbing  Aggravating Factors: looking down, looking up, lifting   Easing Factors: pain medication and heating pad  Disturbed Sleep: yes    Pattern of pain questions:  1.  Where is your pain the worst? Neck bilateral  2.  Is your pain constant or intermittent? Intermittent   3.  Does bending forward make your typical pain worse? yes  4.  Since the start of your neck pain, has there been a change in your bowel or bladder? Yes, taking medication for incontinence   5.  What can't you do now that you use to be able to do? Lifting, working out, driving     Pts goals: decrease pain, being more active, return to exercising     Red Flag Screening:   Cough/Sneeze Strain: (--)  Bladder/bowel: (+)  Falls: (+)  Night pain: (+)  Unexplained weight loss: (--)  General health:  good    Objective    Postural examination/scapula alignment: Rounded shoulder and Slouched posture  Joint integrity:   Skin integrity:WNL   Edema: None  Sitting: slouched  Standing: slouched  Correction of posture: better with lumbar roll    Range of Motion - MOVEMENT LOSS    ROM Loss   Flexion within functional limits   Extension moderate loss   Side bending Right major loss, pain   Side bending Left major loss   Rotation Right moderate loss, pain   Rotation Left moderate loss, pulling    Protraction within functional limits   Retraction  minimal loss     Upper Extremity Strength  (R) UE  (L) UE    Shoulder flexion: 5/5 Shoulder flexion: 5/5   Shoulder Abduction: 5/5 Shoulder abduction: 5/5   Elbow flexion: 5/5 Elbow flexion: 5/5   Elbow extension: 5/5 Elbow extension: 5/5   Wrist flexion: 5/5 Wrist flexion: 5/5   Wrist extension: 5/5 Wrist extension: 5/5    NT : NT     NEUROLOGICAL SCREEN:    Sensory Deficits: Intact B UE    Special Tests:   Test Name  Testing Result   Compression NT   Distraction NT   Neural Tension Test NT   Saddle Sensation (--)     Reflexes:    Left Right   Biceps  2+ 2+   Brachioradialis  NT NT   Clonus (--) (--)     REPEATED TEST MOVEMENTS:   Repeated Protraction in Sitting no effect   Repeated Flexion in Sitting end range pain  no worse  no better   Repeated Retraction in Sitting  worse   Repeated Retraction Extension in Sitting worse   Repeated Protraction in lying no effect   Repeated Flexion in lying no worse  no better   Repeated Retraction in lying worse   Repeated Retraction Extension in lying worse     Baseline Isometric Testing on Med X equipment: Testing administered by PT    Date of testin2023  ROM 39-96 deg   Max Peak Torque 143    Min Peak Torque 58    Flex/Ext Ratio 2.5:1   % below normative data 50%     GAIT:  Assistive Device used: none  Level of Assistance: independent  Patient displays the following gait deviations:       Outcomes:  FOTO Initial: 54%  limitation   Visit 5 score:  Goal: 40%    Treatment   Treatment Time In: 2:00 PM  Treatment Time Out: 2:40  Total Treatment time separate from Evaluation: 40 minutes    Shakira received therapeutic exercises to develop/improve posture, cervical ROM, strength, and muscular endurance for 10 minutes including the following exercises:       Written Home Exercises Provided: yes.    HEP AS FOLLOWS:  Upper trap stretch  Levator scap stretch  Scap retraction   SOC    Exercises were reviewed and Shakira was able to demonstrate prior to the end of the session. Shakira demonstrated good  understanding of the education provided.     See EMR under Patient Instructions for exercises provided 8/1/2023.    Shakira received the following manual therapy techniques:  were applied to the  for 00 minutes.     Shakira received neuromuscular education to engage spinal musculature correctly for motor control and engagement of musculature for 15 minutes including the MedX exercise component and practice and standard testing. MedX dynamic exercise and baseline isometric test performed with instructions to guide the patient safely through the testing procedure. Patient instructed to perform isometric test correctly and safely while building to an optimal force with a pain-free effort. Patient also instructed that she should feel support/pressure from MedX restraints but no pain/discomfort. Patient demonstrated appropriate understanding of information.     [facility] 8/1/2023   Visit Number 1   VAS Pain Rating 6   Treadmill Time (in min.) -   Speed -   Retraction in Sitting -   Lumbar Stretches - Slouch Overcorrection -   Extension in Lying -   Extension in Standing -   Flexion in Lying -   Flexion in Sitting -   Manual Therapy -   Cervical Extension Seat Pad 1   Seat Adjustment 480   Top Dead Center 66   Counterweight 1   Cervical Flexion 96   Cervical Extension 39   Cervical Peak Torque 143   Lumbar Extension Seat Pad -   Femur Restraint  -   Top Dead Center -   Counterweight -   Lumbar Flexion -   Lumbar Extension -   Lumbar Peak Torque -   Min Torque -   Test Percent Below Normative Data -   Test Percent Gain in Strength from Initial  -   Lumbar Weight -   Repetitions -   Rating of Perceived Exertion -   Ice - Z Lie (in min.) 10         Therapeutic Education/Activity provided for 10 minutes:   - Patient was given an [facility] Visit 1 handout which discusses the following:  - what to expect in therapy  - an overview of the program, including health coaching and wellness  - importance of spinal hygiene, proper posture, lifting mechanics, sleep quality, and nutrition/hydration   - Patrick roll trialed, recommended, and purchase information was provided.  - Patient received a handout regarding anticipated muscular soreness following the isometric test and strategies for management were reviewed with patient including stretching, using ice and scheduled rest.   - Patient received verbal education on the following:   - [facility] program,   - purpose of the isometric test,   - safe progression of neck strengthening, wellness approach, and systemic strengthening.   - safe usage of MedX machine and testing protocols.    Shakira received cold pack for 10 minutes to neck.    Assessment   Shakira is a 78 y.o. female referred to [facility] with a medical diagnosis of M47.812 (ICD-10-CM) - Osteoarthritis of cervical spine, unspecified spinal osteoarthritis complication status, M47.22 (ICD-10-CM) - Osteoarthritis of spine with radiculopathy, cervical region. Upon physical assessment, pt demonstrates slouched posture in sitting, and trunk and cervical mobility deficits. Per cervical MedX testing, pt was 50% below average in strength when compared to those of  same age/gender. All of the above noted supports potential cervical classification as a pattern 1 JESSICA with recurrent/or consistent symptoms, thus pt is a good candidate for the Healthy  Back Program. Pt would benefit from cervical and trunk mobility training, stability training,  improved cardiovascular and muscular endurance, neuromuscular re-education for posture, coordination, and muscular recruitment and education on positional offloading techniques to decrease the intensity and frequency of flare-ups.      Pain Pattern: 1 JESSICA       Pt prognosis is Good.   Pt will benefit from skilled outpatient Physical Therapy to address the deficits stated above and in the chart below, to provide pt/family education, and to maximize pt's level of independence.     Plan of care discussed with patient: Yes  Pt's spiritual, cultural and educational needs considered and patient is agreeable to the plan of care and goals as stated below:     Anticipated Barriers for therapy: scheduling     PT Evaluation Completed? Yes    Medical necessity is demonstrated by the following problem list:      History  Co-morbidities and personal factors that may impact the plan of care [] LOW: no personal factors / co-morbidities  [x] MODERATE: 1-2 personal factors / co-morbidities  [] HIGH: 3+ personal factors / co-morbidities    Moderate / High Support Documentation:   Co-morbidities affecting plan of care: arthritis, anemia    Personal Factors:   age     Examination  Body Structures and Functions, activity limitations and participation restrictions that may impact the plan of care [] LOW: addressing 1-2 elements  [x] MODERATE: 3+ elements  [] HIGH: 4+ elements (please support below)    Moderate / High Support Documentation: posture, neck ROM, sitting, lifting     Clinical Presentation [x] LOW: stable  [] MODERATE: Evolving  [] HIGH: Unstable     Decision Making/ Complexity Score: low       GOALS: Pt is in agreement with the following goals.    Short term goals: 6 weeks or 10 visits   - Pt will demonstratte increased cervical ROM as measured by med ex by 6 degrees from initial test which results in improved  ROM of neck for ease  with ADLs and driving. Appropriate and Ongoing  - Pt will demonstrate independence with reducing or controlling symptoms with ther ex, movement, or position independently, able to reduce pain 1-2 points on pain scale using strategies taught in therapy.  Appropriate and Ongoing  - Pt will demonstrate increased MedX average isometric strength value by 20% when compared to the initial testing resulting in improved ability to perform bending, lifting, and carrying activities safely and confidently. Appropriate and Ongoing    Long term goals: 10 weeks or 20 visits  - Pt will demonstratte increased cervical ROM as measured by med ex by 9 degrees from initial test which results in functional ROM of neck for ease with ADLs and driving.  Appropriate and Ongoing  - Pt will demonstrate increased MedX average isometric strength value  by 40% from initial test to improve ability to lift and carry, and sustain good posture while performing ADL's. Appropriate and Ongoing  - Pt will demonstrate reduced pain and improved functional outcomes as reported on the FOTO by reaching an intake score of >/= 40% functional ability in order to demonstrate subjective improvement in patient's condition. . Appropriate and Ongoing  - Pt will demonstrate independence with reducing or controlling symptoms with ther ex, movement, or position independently, able to reduce pain 2-4 points on pain scale using strategies taught in therapy. Appropriate and Ongoing  - Pt will demonstrate independence with the HEP at discharge. Appropriate and Ongoing  - Pt will return to working out by time of discharge. (patient goal)  Appropriate and Ongoing    Plan     Outpatient physical therapy 2x week for 10 weeks or 20 visits to include the following:   - Patient education  - Therapeutic exercise  - Manual therapy  - Performance testing   - Neuromuscular Re-education  - Therapeutic activity   - Modalities    Pt may be seen by PTA as part of the rehabilitation team.      Therapist: Courtney Pagan, PT  8/2/2023

## 2023-08-03 ENCOUNTER — TELEPHONE (OUTPATIENT)
Dept: INTERNAL MEDICINE | Facility: CLINIC | Age: 78
End: 2023-08-03
Payer: MEDICARE

## 2023-08-03 NOTE — TELEPHONE ENCOUNTER
----- Message from Carlos Pagan sent at 8/3/2023  8:07 AM CDT -----  Contact: self [phone]  Pt requesting a call in regards to breast/rib pain.    Please call and advise

## 2023-08-03 NOTE — TELEPHONE ENCOUNTER
Spoke to pt, pt states she is having pain behind rt breast/rib cage area. Pt states she think she pulled a muscle but is not sure.    Pt is scheduled to have an MRI and x-ray that will be done tomorrow,   Pt declined appt opening today and is scheduled for august 10th.

## 2023-08-04 ENCOUNTER — HOSPITAL ENCOUNTER (OUTPATIENT)
Dept: RADIOLOGY | Facility: HOSPITAL | Age: 78
Discharge: HOME | End: 2023-08-04
Attending: ANESTHESIOLOGY
Payer: MEDICARE

## 2023-08-04 DIAGNOSIS — M47.812 OSTEOARTHRITIS OF CERVICAL SPINE, UNSPECIFIED SPINAL OSTEOARTHRITIS COMPLICATION STATUS: ICD-10-CM

## 2023-08-04 DIAGNOSIS — M43.06 SPONDYLOLYSIS OF LUMBAR REGION: ICD-10-CM

## 2023-08-04 DIAGNOSIS — S19.9XXA TRAUMATIC INJURY OF NECK: ICD-10-CM

## 2023-08-04 DIAGNOSIS — M47.22 OSTEOARTHRITIS OF SPINE WITH RADICULOPATHY, CERVICAL REGION: ICD-10-CM

## 2023-08-04 PROCEDURE — 72141 MRI NECK SPINE W/O DYE: CPT | Mod: TC

## 2023-08-04 PROCEDURE — 72052 XR CERVICAL SPINE 5 VIEW WITH FLEX AND EXT: ICD-10-PCS | Mod: 26,,, | Performed by: RADIOLOGY

## 2023-08-04 PROCEDURE — 72052 X-RAY EXAM NECK SPINE 6/>VWS: CPT | Mod: TC

## 2023-08-04 PROCEDURE — 72141 MRI NECK SPINE W/O DYE: CPT | Mod: 26,,, | Performed by: RADIOLOGY

## 2023-08-04 PROCEDURE — 72141 MRI CERVICAL SPINE WITHOUT CONTRAST: ICD-10-PCS | Mod: 26,,, | Performed by: RADIOLOGY

## 2023-08-04 PROCEDURE — 72052 X-RAY EXAM NECK SPINE 6/>VWS: CPT | Mod: 26,,, | Performed by: RADIOLOGY

## 2023-08-08 ENCOUNTER — TELEPHONE (OUTPATIENT)
Dept: PAIN MEDICINE | Facility: CLINIC | Age: 78
End: 2023-08-08
Payer: MEDICARE

## 2023-08-08 NOTE — TELEPHONE ENCOUNTER
----- Message from Kristie Saul sent at 8/8/2023  3:27 PM CDT -----  Regarding: results  Name of Who is Calling: CHELSY GONZALEZ [[id number]]        What is the request in detail: pt needs a call back for results for mri and xray,  please advise.         Can the clinic reply by [facility]: no           [facility]: [phone]

## 2023-08-08 NOTE — TELEPHONE ENCOUNTER
Staff contacted patient to inform her that her message will be sent to the provider for further review.

## 2023-08-09 ENCOUNTER — TELEPHONE (OUTPATIENT)
Dept: PAIN MEDICINE | Facility: CLINIC | Age: 78
End: 2023-08-09
Payer: MEDICARE

## 2023-08-09 ENCOUNTER — TELEPHONE (OUTPATIENT)
Dept: SPINE | Facility: CLINIC | Age: 78
End: 2023-08-09
Payer: MEDICARE

## 2023-08-09 DIAGNOSIS — M47.22 OSTEOARTHRITIS OF SPINE WITH RADICULOPATHY, CERVICAL REGION: ICD-10-CM

## 2023-08-09 DIAGNOSIS — M48.02 CERVICAL SPINAL STENOSIS: Primary | ICD-10-CM

## 2023-08-09 NOTE — TELEPHONE ENCOUNTER
----- Message from Mert Coates MD sent at 8/9/2023 11:40 AM CDT -----  Dr. Simpson  ----- Message -----  From: Chely Garland MA  Sent: 8/9/2023  11:27 AM CDT  To: Mert Coates MD    Hey- I spoke with the pt. She wants to if you have any recommendations on a specific  neuro spine surgeon and does she need a referral order placed. Please advise so that I can get her scheduled whenever you have a minute.   ----- Message -----  From: Mert Coates MD  Sent: 8/9/2023   8:33 AM CDT  To: Chely Garland MA    There is a lot of arthritis pinching nerves. The next step is to see spine surgeon for consultation and opinion on what to do next  ----- Message -----  From: Chely Garland MA  Sent: 8/8/2023   3:53 PM CDT  To: MD Kg Bell Kayla P Eissa Hazem Staff  Caller: Unspecified (Today,  3:27 PM)  Name of Who is Calling: CHELSY GONZALEZ [[id number]]         What is the request in detail: pt needs a call back for results for mri and xray,  please advise.         Can the clinic reply by [facility]: no           [facility]: [phone]

## 2023-08-09 NOTE — TELEPHONE ENCOUNTER
----- Message from Mert Coates MD sent at 8/9/2023  8:32 AM CDT -----  There is a lot of arthritis pinching nerves. The next step is to see spine surgeon for consultation and opinion on what to do next  ----- Message -----  From: Chely Garland MA  Sent: 8/8/2023   3:53 PM CDT  To: MD Kg Bell Kayla P Eissa Hazem Staff  Caller: Unspecified (Today,  3:27 PM)  Name of Who is Calling: CHELSY GONZALEZ [[id number]]         What is the request in detail: pt needs a call back for results for mri and xray,  please advise.         Can the clinic reply by [facility]: no           What Number to Call Back if not in [facility]: [phone]

## 2023-08-09 NOTE — TELEPHONE ENCOUNTER
Staff verbalized to patient per Dr. Coates that she has a lot of arthritis and pinched nerves and her next steps would be to follow up with a spine surgeon. Patient wants a recommendation on a surgeon. Staff verbalized to the patient that her message  would be sent to  for recommendations. Staff will follow up when notified by the provider.

## 2023-08-10 ENCOUNTER — OFFICE VISIT (OUTPATIENT)
Dept: INTERNAL MEDICINE | Facility: CLINIC | Age: 78
End: 2023-08-10
Payer: MEDICARE

## 2023-08-10 ENCOUNTER — TELEPHONE (OUTPATIENT)
Dept: SPINE | Facility: CLINIC | Age: 78
End: 2023-08-10
Payer: MEDICARE

## 2023-08-10 ENCOUNTER — LAB VISIT (OUTPATIENT)
Dept: LAB | Facility: HOSPITAL | Age: 78
End: 2023-08-10
Attending: INTERNAL MEDICINE
Payer: MEDICARE

## 2023-08-10 VITALS
DIASTOLIC BLOOD PRESSURE: 88 MMHG | HEART RATE: 103 BPM | RESPIRATION RATE: 17 BRPM | WEIGHT: 154.75 LBS | SYSTOLIC BLOOD PRESSURE: 148 MMHG | OXYGEN SATURATION: 97 % | HEIGHT: 63 IN | TEMPERATURE: 97 F | BODY MASS INDEX: 27.42 KG/M2

## 2023-08-10 DIAGNOSIS — E61.1 IRON DEFICIENCY: ICD-10-CM

## 2023-08-10 DIAGNOSIS — E55.9 VITAMIN D INSUFFICIENCY: ICD-10-CM

## 2023-08-10 DIAGNOSIS — I70.0 AORTIC ATHEROSCLEROSIS: ICD-10-CM

## 2023-08-10 DIAGNOSIS — K76.0 FATTY LIVER: ICD-10-CM

## 2023-08-10 DIAGNOSIS — K76.89 LIVER CYST: ICD-10-CM

## 2023-08-10 DIAGNOSIS — D35.02 ADENOMA OF LEFT ADRENAL GLAND: ICD-10-CM

## 2023-08-10 DIAGNOSIS — E78.1 HYPERTRIGLYCERIDEMIA: ICD-10-CM

## 2023-08-10 DIAGNOSIS — R29.6 FREQUENT FALLS: ICD-10-CM

## 2023-08-10 DIAGNOSIS — M46.1 SACROILIITIS: ICD-10-CM

## 2023-08-10 DIAGNOSIS — I10 ESSENTIAL HYPERTENSION: ICD-10-CM

## 2023-08-10 DIAGNOSIS — D50.9 IRON DEFICIENCY ANEMIA, UNSPECIFIED IRON DEFICIENCY ANEMIA TYPE: ICD-10-CM

## 2023-08-10 DIAGNOSIS — R73.9 ELEVATED BLOOD SUGAR: ICD-10-CM

## 2023-08-10 DIAGNOSIS — S23.41XA SPRAIN OF COSTAL CARTILAGE, INITIAL ENCOUNTER: Primary | ICD-10-CM

## 2023-08-10 LAB
FERRITIN SERPL-MCNC: 57 NG/ML (ref 20–300)
IRON SERPL-MCNC: 118 UG/DL (ref 30–160)
SATURATED IRON: 25 % (ref 20–50)
TOTAL IRON BINDING CAPACITY: 472 UG/DL (ref 250–450)
TRANSFERRIN SERPL-MCNC: 319 MG/DL (ref 200–375)

## 2023-08-10 PROCEDURE — 99999 PR PBB SHADOW E&M-EST. PATIENT-LVL IV: ICD-10-PCS | Mod: PBBFAC,HCNC,, | Performed by: INTERNAL MEDICINE

## 2023-08-10 PROCEDURE — 82728 ASSAY OF FERRITIN: CPT | Mod: HCNC | Performed by: INTERNAL MEDICINE

## 2023-08-10 PROCEDURE — 3077F PR MOST RECENT SYSTOLIC BLOOD PRESSURE >= 140 MM HG: ICD-10-PCS | Mod: HCNC,CPTII,S$GLB, | Performed by: INTERNAL MEDICINE

## 2023-08-10 PROCEDURE — 3288F PR FALLS RISK ASSESSMENT DOCUMENTED: ICD-10-PCS | Mod: HCNC,CPTII,S$GLB, | Performed by: INTERNAL MEDICINE

## 2023-08-10 PROCEDURE — 1125F AMNT PAIN NOTED PAIN PRSNT: CPT | Mod: HCNC,CPTII,S$GLB, | Performed by: INTERNAL MEDICINE

## 2023-08-10 PROCEDURE — 3077F SYST BP >= 140 MM HG: CPT | Mod: HCNC,CPTII,S$GLB, | Performed by: INTERNAL MEDICINE

## 2023-08-10 PROCEDURE — 84466 ASSAY OF TRANSFERRIN: CPT | Mod: HCNC | Performed by: INTERNAL MEDICINE

## 2023-08-10 PROCEDURE — 36415 COLL VENOUS BLD VENIPUNCTURE: CPT | Mod: HCNC,PO | Performed by: INTERNAL MEDICINE

## 2023-08-10 PROCEDURE — 3288F FALL RISK ASSESSMENT DOCD: CPT | Mod: HCNC,CPTII,S$GLB, | Performed by: INTERNAL MEDICINE

## 2023-08-10 PROCEDURE — 1100F PR PT FALLS ASSESS DOC 2+ FALLS/FALL W/INJURY/YR: ICD-10-PCS | Mod: HCNC,CPTII,S$GLB, | Performed by: INTERNAL MEDICINE

## 2023-08-10 PROCEDURE — 1125F PR PAIN SEVERITY QUANTIFIED, PAIN PRESENT: ICD-10-PCS | Mod: HCNC,CPTII,S$GLB, | Performed by: INTERNAL MEDICINE

## 2023-08-10 PROCEDURE — 99214 OFFICE O/P EST MOD 30 MIN: CPT | Mod: HCNC,S$GLB,, | Performed by: INTERNAL MEDICINE

## 2023-08-10 PROCEDURE — 3079F DIAST BP 80-89 MM HG: CPT | Mod: HCNC,CPTII,S$GLB, | Performed by: INTERNAL MEDICINE

## 2023-08-10 PROCEDURE — 1100F PTFALLS ASSESS-DOCD GE2>/YR: CPT | Mod: HCNC,CPTII,S$GLB, | Performed by: INTERNAL MEDICINE

## 2023-08-10 PROCEDURE — 3079F PR MOST RECENT DIASTOLIC BLOOD PRESSURE 80-89 MM HG: ICD-10-PCS | Mod: HCNC,CPTII,S$GLB, | Performed by: INTERNAL MEDICINE

## 2023-08-10 PROCEDURE — 99999 PR PBB SHADOW E&M-EST. PATIENT-LVL IV: CPT | Mod: PBBFAC,HCNC,, | Performed by: INTERNAL MEDICINE

## 2023-08-10 PROCEDURE — 99214 PR OFFICE/OUTPT VISIT, EST, LEVL IV, 30-39 MIN: ICD-10-PCS | Mod: HCNC,S$GLB,, | Performed by: INTERNAL MEDICINE

## 2023-08-10 RX ORDER — DICLOFENAC SODIUM 75 MG/1
75 TABLET, DELAYED RELEASE ORAL 2 TIMES DAILY PRN
Qty: 60 TABLET | Refills: 1 | Status: SHIPPED | OUTPATIENT
Start: 2023-08-10

## 2023-08-10 RX ORDER — BACLOFEN 10 MG/1
10 TABLET ORAL 3 TIMES DAILY PRN
Qty: 60 TABLET | Refills: 1 | Status: SHIPPED | OUTPATIENT
Start: 2023-08-10 | End: 2024-02-14

## 2023-08-10 NOTE — TELEPHONE ENCOUNTER
----- Message from April DAYANA Rod sent at 8/10/2023  9:45 AM CDT -----  Good morning,  I spoke with her last week and she is already scheduled to see us on August 25th.    Thank you,    April  ----- Message -----  From: Lamont Simpson MD  Sent: 8/10/2023   9:41 AM CDT  To: Mert Coates MD; Chely Garland MA; #    Thanks, we will get her in.    ----- Message -----  From: Mert Coates MD  Sent: 8/9/2023  11:40 AM CDT  To: Lamont Simpson MD; Chely Garland MA; #    Dr. Simpson  ----- Message -----  From: Chely Garland MA  Sent: 8/9/2023  11:27 AM CDT  To: Mert Coates MD    Hey- I spoke with the pt. She wants to if you have any recommendations on a specific  neuro spine surgeon and does she need a referral order placed. Please advise so that I can get her scheduled whenever you have a minute.   ----- Message -----  From: Mert Coates MD  Sent: 8/9/2023   8:33 AM CDT  To: Chely Garland MA    There is a lot of arthritis pinching nerves. The next step is to see spine surgeon for consultation and opinion on what to do next  ----- Message -----  From: Chely Garland MA  Sent: 8/8/2023   3:53 PM CDT  To: MD Kg Bell Kayla P Eissa Hazem Staff  Caller: Unspecified (Today,  3:27 PM)  Name of Who is Calling: CHELSY GONZALEZ [[id number]]         What is the request in detail: pt needs a call back for results for mri and xray,  please advise.         Can the clinic reply by [facility]: no           [facility]: [phone]

## 2023-08-10 NOTE — TELEPHONE ENCOUNTER
This message has been handled. Staff spoke with the patient, she confirmed that she has been scheduled with Dr. Simpson for a nuuro spine consult.

## 2023-08-10 NOTE — TELEPHONE ENCOUNTER
----- Message from Lamont Simpson MD sent at 8/10/2023  9:40 AM CDT -----  Thanks, we will get her in.    ----- Message -----  From: Mert Coates MD  Sent: 8/9/2023  11:40 AM CDT  To: Lamont Simpson MD; Chely Garland MA; #    Dr. Simpson  ----- Message -----  From: Chely Garland MA  Sent: 8/9/2023  11:27 AM CDT  To: Mert Coates MD    Hey- I spoke with the pt. She wants to if you have any recommendations on a specific  neuro spine surgeon and does she need a referral order placed. Please advise so that I can get her scheduled whenever you have a minute.   ----- Message -----  From: Mert Coates MD  Sent: 8/9/2023   8:33 AM CDT  To: Chely Garland MA    There is a lot of arthritis pinching nerves. The next step is to see spine surgeon for consultation and opinion on what to do next  ----- Message -----  From: Chely Garland MA  Sent: 8/8/2023   3:53 PM CDT  To: MD Kg Bell Kayla P Eissa Hazem Staff  Caller: Unspecified (Today,  3:27 PM)  Name of Who is Calling: CHELSY GONZALEZ [[id number]]         What is the request in detail: pt needs a call back for results for mri and xray,  please advise.         Can the clinic reply by [facility]: no           [facility]: [phone]

## 2023-08-10 NOTE — PROGRESS NOTES
Subjective     Patient ID: Shakira Pearl is a 78 y.o. female.    Chief Complaint: Breast Pain (Pt states breast pain underneath Rt. Breast. Pt states pain comes and goes. Started about 10 days ago. Pt took Aleve for pain.)    HPI  Pt with LAM, HLD, HTN, Sacroiliitis, L Adrenal adenoma is here for evaluation of slowly resolving right sided lower anterior rib and breast pain. It is described as sharp/stabbing in nature without any radiation. It seemed to start the day after doing PT. No direct trauma tot he area. Some relief with NSAIDs.  Review of Systems   Constitutional:  Negative for activity change, appetite change, chills, diaphoresis, fatigue, fever and unexpected weight change.   HENT:  Negative for postnasal drip, rhinorrhea, sinus pressure/congestion, sneezing, sore throat, trouble swallowing and voice change.    Respiratory:  Negative for cough, shortness of breath and wheezing.    Cardiovascular:  Negative for chest pain, palpitations and leg swelling.   Gastrointestinal:  Negative for abdominal pain, blood in stool, constipation, diarrhea, nausea and vomiting.   Genitourinary:  Negative for dysuria.   Musculoskeletal:  Negative for arthralgias and myalgias.   Integumentary:  Negative for rash and wound.   Allergic/Immunologic: Negative for environmental allergies and food allergies.   Hematological:  Negative for adenopathy. Does not bruise/bleed easily.          Objective     Physical Exam  Constitutional:       General: She is not in acute distress.     Appearance: Normal appearance. She is well-developed. She is not diaphoretic.   HENT:      Head: Normocephalic and atraumatic.      Right Ear: External ear normal.      Left Ear: External ear normal.      Nose: Nose normal.      Mouth/Throat:      Pharynx: No oropharyngeal exudate.   Eyes:      General: No scleral icterus.        Right eye: No discharge.         Left eye: No discharge.      Conjunctiva/sclera: Conjunctivae normal.      Pupils: Pupils are  equal, round, and reactive to light.   Neck:      Vascular: No JVD.   Cardiovascular:      Rate and Rhythm: Normal rate and regular rhythm.      Pulses: Normal pulses.      Heart sounds: Normal heart sounds.   Pulmonary:      Effort: Pulmonary effort is normal. No respiratory distress.      Breath sounds: Normal breath sounds. No wheezing, rhonchi or rales.   Chest:       Abdominal:      General: Bowel sounds are normal. There is no distension.      Palpations: Abdomen is soft.      Tenderness: There is no abdominal tenderness. There is no guarding or rebound.   Musculoskeletal:      Cervical back: Neck supple.      Right lower leg: No edema.      Left lower leg: No edema.   Lymphadenopathy:      Cervical: No cervical adenopathy.   Skin:     General: Skin is warm and dry.      Coloration: Skin is not pale.      Findings: No rash.   Neurological:      General: No focal deficit present.      Mental Status: She is alert and oriented to person, place, and time.      Gait: Gait normal.   Psychiatric:         Behavior: Behavior normal.         Thought Content: Thought content normal.            Assessment and Plan     1. Sprain of costal cartilage, initial encounter    2. Essential hypertension    3. Hypertriglyceridemia    4. Adenoma of left adrenal gland    5. Iron deficiency anemia, unspecified iron deficiency anemia type    6. Fatty liver    7. Sacroiliitis    8. Frequent falls    9. Liver cyst    Other orders  -     diclofenac (VOLTAREN) 75 MG EC tablet; Take 1 tablet (75 mg total) by mouth 2 (two) times daily as needed (pain).  Dispense: 60 tablet; Refill: 1  -     baclofen (LIORESAL) 10 MG tablet; Take 1 tablet (10 mg total) by mouth 3 (three) times daily as needed (neck/back spasms).  Dispense: 60 tablet; Refill: 1         Rib sprain- stable, continue NSAIDs PRN     LAM- followed by GI   -previously on Iron      HLD- on Zetia/Vascepa      HTN- stable on Hyzaar         Sacroiliitis/DJD Cervical/Lumbar spine- seeing  Pain management       Fatty liver/left lobe cyst- stable      Adrenal adenoma- has seen Endo      Frequent falls- pt will f/u with Neuro      Hx of Right 5th metatarsal fracture

## 2023-08-25 ENCOUNTER — OFFICE VISIT (OUTPATIENT)
Dept: ORTHOPEDICS | Facility: CLINIC | Age: 78
End: 2023-08-25
Payer: MEDICARE

## 2023-08-25 ENCOUNTER — HOSPITAL ENCOUNTER (OUTPATIENT)
Dept: RADIOLOGY | Facility: HOSPITAL | Age: 78
Discharge: HOME | End: 2023-08-25
Payer: MEDICARE

## 2023-08-25 VITALS — BODY MASS INDEX: 27.42 KG/M2 | WEIGHT: 154.75 LBS | HEIGHT: 63 IN

## 2023-08-25 DIAGNOSIS — S92.355A CLOSED NONDISPLACED FRACTURE OF FIFTH METATARSAL BONE OF LEFT FOOT, INITIAL ENCOUNTER: ICD-10-CM

## 2023-08-25 DIAGNOSIS — M47.22 OSTEOARTHRITIS OF SPINE WITH RADICULOPATHY, CERVICAL REGION: ICD-10-CM

## 2023-08-25 DIAGNOSIS — M48.02 CERVICAL SPINAL STENOSIS: ICD-10-CM

## 2023-08-25 PROCEDURE — 1160F PR REVIEW ALL MEDS BY PRESCRIBER/CLIN PHARMACIST DOCUMENTED: ICD-10-PCS | Mod: HCNC,CPTII,S$GLB, | Performed by: ORTHOPAEDIC SURGERY

## 2023-08-25 PROCEDURE — 3288F FALL RISK ASSESSMENT DOCD: CPT | Mod: HCNC,CPTII,S$GLB, | Performed by: ORTHOPAEDIC SURGERY

## 2023-08-25 PROCEDURE — 99999 PR PBB SHADOW E&M-EST. PATIENT-LVL I: ICD-10-PCS | Mod: PBBFAC,HCNC,, | Performed by: ORTHOPAEDIC SURGERY

## 2023-08-25 PROCEDURE — 3288F PR FALLS RISK ASSESSMENT DOCUMENTED: ICD-10-PCS | Mod: HCNC,CPTII,S$GLB, | Performed by: ORTHOPAEDIC SURGERY

## 2023-08-25 PROCEDURE — 1160F RVW MEDS BY RX/DR IN RCRD: CPT | Mod: HCNC,CPTII,S$GLB, | Performed by: ORTHOPAEDIC SURGERY

## 2023-08-25 PROCEDURE — 1159F MED LIST DOCD IN RCRD: CPT | Mod: HCNC,CPTII,S$GLB, | Performed by: ORTHOPAEDIC SURGERY

## 2023-08-25 PROCEDURE — 1125F PR PAIN SEVERITY QUANTIFIED, PAIN PRESENT: ICD-10-PCS | Mod: HCNC,CPTII,S$GLB, | Performed by: ORTHOPAEDIC SURGERY

## 2023-08-25 PROCEDURE — 99214 PR OFFICE/OUTPT VISIT, EST, LEVL IV, 30-39 MIN: ICD-10-PCS | Mod: HCNC,S$GLB,, | Performed by: ORTHOPAEDIC SURGERY

## 2023-08-25 PROCEDURE — 73630 X-RAY EXAM OF FOOT: CPT | Mod: TC,HCNC,RT

## 2023-08-25 PROCEDURE — 99213 OFFICE O/P EST LOW 20 MIN: CPT | Mod: HCNC,S$GLB,, | Performed by: ORTHOPAEDIC SURGERY

## 2023-08-25 PROCEDURE — 73630 X-RAY EXAM OF FOOT: CPT | Mod: 26,HCNC,RT, | Performed by: RADIOLOGY

## 2023-08-25 PROCEDURE — 1159F PR MEDICATION LIST DOCUMENTED IN MEDICAL RECORD: ICD-10-PCS | Mod: HCNC,CPTII,S$GLB, | Performed by: ORTHOPAEDIC SURGERY

## 2023-08-25 PROCEDURE — 99999 PR PBB SHADOW E&M-EST. PATIENT-LVL I: CPT | Mod: PBBFAC,HCNC,, | Performed by: ORTHOPAEDIC SURGERY

## 2023-08-25 PROCEDURE — 99999 PR PBB SHADOW E&M-EST. PATIENT-LVL III: ICD-10-PCS | Mod: PBBFAC,HCNC,, | Performed by: ORTHOPAEDIC SURGERY

## 2023-08-25 PROCEDURE — 99999 PR PBB SHADOW E&M-EST. PATIENT-LVL III: CPT | Mod: PBBFAC,HCNC,, | Performed by: ORTHOPAEDIC SURGERY

## 2023-08-25 PROCEDURE — 1101F PT FALLS ASSESS-DOCD LE1/YR: CPT | Mod: HCNC,CPTII,S$GLB, | Performed by: ORTHOPAEDIC SURGERY

## 2023-08-25 PROCEDURE — 99214 OFFICE O/P EST MOD 30 MIN: CPT | Mod: HCNC,S$GLB,, | Performed by: ORTHOPAEDIC SURGERY

## 2023-08-25 PROCEDURE — 1101F PR PT FALLS ASSESS DOC 0-1 FALLS W/OUT INJ PAST YR: ICD-10-PCS | Mod: HCNC,CPTII,S$GLB, | Performed by: ORTHOPAEDIC SURGERY

## 2023-08-25 PROCEDURE — 73630 XR FOOT COMPLETE 3 VIEW RIGHT: ICD-10-PCS | Mod: 26,HCNC,RT, | Performed by: RADIOLOGY

## 2023-08-25 PROCEDURE — 99213 PR OFFICE/OUTPT VISIT, EST, LEVL III, 20-29 MIN: ICD-10-PCS | Mod: HCNC,S$GLB,, | Performed by: ORTHOPAEDIC SURGERY

## 2023-08-25 PROCEDURE — 1125F AMNT PAIN NOTED PAIN PRSNT: CPT | Mod: HCNC,CPTII,S$GLB, | Performed by: ORTHOPAEDIC SURGERY
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
## 2023-08-31 ENCOUNTER — DOCUMENTATION ONLY (OUTPATIENT)
Dept: REHABILITATION | Facility: HOSPITAL | Age: 78
End: 2023-08-31
Payer: MEDICARE

## 2023-08-31 NOTE — PROGRESS NOTES
Spoke to patient via phone call regarding [facility] appointments and she states she would like to hold therapy until October due to dealing with other medical issues. She currently has a foot fracture that is healing and is scheduled to have a colonoscopy in September. She plans to return to [facility] in October to continue her plan of care.

## 2023-09-13 ENCOUNTER — TELEPHONE (OUTPATIENT)
Dept: ENDOSCOPY | Facility: HOSPITAL | Age: 78
End: 2023-09-13
Payer: MEDICARE

## 2023-09-14 NOTE — TELEPHONE ENCOUNTER
----- Message from Trudi Null sent at 9/13/2023 11:22 AM CDT -----  Regarding: apt  Contact: [phone]  Pt calling regarding instructions for procedure on 9/20/23 please call to discuss Further

## 2023-09-15 DIAGNOSIS — Z12.11 SPECIAL SCREENING FOR MALIGNANT NEOPLASMS, COLON: Primary | ICD-10-CM

## 2023-09-15 RX ORDER — POLYETHYLENE GLYCOL 3350, SODIUM SULFATE ANHYDROUS, SODIUM BICARBONATE, SODIUM CHLORIDE, POTASSIUM CHLORIDE 236; 22.74; 6.74; 5.86; 2.97 G/4L; G/4L; G/4L; G/4L; G/4L
POWDER, FOR SOLUTION ORAL DAILY
Qty: 4000 ML | Refills: 0 | Status: ON HOLD | OUTPATIENT
Start: 2023-09-15 | End: 2023-09-20 | Stop reason: HOSPADM

## 2023-09-18 ENCOUNTER — TELEPHONE (OUTPATIENT)
Dept: ENDOSCOPY | Facility: HOSPITAL | Age: 78
End: 2023-09-18
Payer: MEDICARE

## 2023-09-18 NOTE — TELEPHONE ENCOUNTER
Spoke to patient to schedule procedure(s) Colonoscopy       Physician to perform procedure(s) Dr. ROBERT Cuevas  Date of Procedure (s) 9/20/2023/  Arrival Time 10:00 am   Time of Procedure(s) 11:00 am    Location of Procedure(s) [facility]th Floor  Type of Rx Prep sent to patient: N/A  Instructions provided to patient via [facility]    Patient was informed on the following information and verbalized understanding. Screening questionnaire reviewed with patient and complete. If procedure requires anesthesia, a responsible adult needs to be present to accompany the patient home, patient cannot drive after receiving anesthesia. Appointment details are tentative, especially check-in time. Patient will receive a prep-op call 4 days prior to confirm check-in time for procedure. If applicable the patient should contact their pharmacy to verify Rx for procedure prep is ready for pick-up. Patient was advised to call the scheduling department at [phone] if pharmacy states no Rx is available. Patient was advised to call the endoscopy scheduling department if any questions or concerns arise.      [facility] Scheduling Department

## 2023-09-19 ENCOUNTER — TELEPHONE (OUTPATIENT)
Dept: ENDOSCOPY | Facility: HOSPITAL | Age: 78
End: 2023-09-19
Payer: MEDICARE

## 2023-09-20 ENCOUNTER — ANESTHESIA (OUTPATIENT)
Dept: ENDOSCOPY | Facility: HOSPITAL | Age: 78
End: 2023-09-20
Payer: MEDICARE

## 2023-09-20 ENCOUNTER — ANESTHESIA EVENT (OUTPATIENT)
Dept: ENDOSCOPY | Facility: HOSPITAL | Age: 78
End: 2023-09-20
Payer: MEDICARE

## 2023-09-20 ENCOUNTER — HOSPITAL ENCOUNTER (OUTPATIENT)
Facility: HOSPITAL | Age: 78
Discharge: HOME | End: 2023-09-20
Attending: INTERNAL MEDICINE | Admitting: INTERNAL MEDICINE
Payer: MEDICARE

## 2023-09-20 VITALS
RESPIRATION RATE: 18 BRPM | TEMPERATURE: 98 F | OXYGEN SATURATION: 96 % | HEART RATE: 67 BPM | HEIGHT: 63 IN | DIASTOLIC BLOOD PRESSURE: 88 MMHG | BODY MASS INDEX: 26.58 KG/M2 | SYSTOLIC BLOOD PRESSURE: 187 MMHG | WEIGHT: 150 LBS

## 2023-09-20 DIAGNOSIS — Z86.010 HISTORY OF ADENOMATOUS POLYP OF COLON: Primary | ICD-10-CM

## 2023-09-20 PROCEDURE — 63600175 PHARM REV CODE 636 W HCPCS: Mod: HCNC | Performed by: NURSE ANESTHETIST, CERTIFIED REGISTERED

## 2023-09-20 PROCEDURE — 27201089 HC SNARE, DISP (ANY): Mod: HCNC | Performed by: INTERNAL MEDICINE

## 2023-09-20 PROCEDURE — 88305 TISSUE EXAM BY PATHOLOGIST: CPT | Mod: HCNC | Performed by: PATHOLOGY

## 2023-09-20 PROCEDURE — 25000003 PHARM REV CODE 250: Mod: HCNC | Performed by: NURSE ANESTHETIST, CERTIFIED REGISTERED

## 2023-09-20 PROCEDURE — E9220 PRA ENDO ANESTHESIA: HCPCS | Mod: PT,HCNC,, | Performed by: NURSE ANESTHETIST, CERTIFIED REGISTERED

## 2023-09-20 PROCEDURE — 45385 PR COLONOSCOPY,REMV LESN,SNARE: ICD-10-PCS | Mod: 22,PT,HCNC, | Performed by: INTERNAL MEDICINE

## 2023-09-20 PROCEDURE — 88305 TISSUE EXAM BY PATHOLOGIST: ICD-10-PCS | Mod: 26,HCNC,, | Performed by: PATHOLOGY

## 2023-09-20 PROCEDURE — 88305 TISSUE EXAM BY PATHOLOGIST: CPT | Mod: 26,HCNC,, | Performed by: PATHOLOGY

## 2023-09-20 PROCEDURE — 45385 COLONOSCOPY W/LESION REMOVAL: CPT | Mod: PT,HCNC | Performed by: INTERNAL MEDICINE

## 2023-09-20 PROCEDURE — 37000009 HC ANESTHESIA EA ADD 15 MINS: Mod: HCNC | Performed by: INTERNAL MEDICINE

## 2023-09-20 PROCEDURE — E9220 PRA ENDO ANESTHESIA: ICD-10-PCS | Mod: PT,HCNC,, | Performed by: NURSE ANESTHETIST, CERTIFIED REGISTERED

## 2023-09-20 PROCEDURE — 45385 COLONOSCOPY W/LESION REMOVAL: CPT | Mod: 22,PT,HCNC, | Performed by: INTERNAL MEDICINE

## 2023-09-20 PROCEDURE — 37000008 HC ANESTHESIA 1ST 15 MINUTES: Mod: HCNC | Performed by: INTERNAL MEDICINE

## 2023-09-20 RX ORDER — SODIUM CHLORIDE 9 MG/ML
INJECTION, SOLUTION INTRAVENOUS CONTINUOUS
Status: DISCONTINUED | OUTPATIENT
Start: 2023-09-20 | End: 2023-09-20 | Stop reason: HOSPADM

## 2023-09-20 RX ORDER — PROPOFOL 10 MG/ML
VIAL (ML) INTRAVENOUS
Status: DISCONTINUED | OUTPATIENT
Start: 2023-09-20 | End: 2023-09-20

## 2023-09-20 RX ORDER — LIDOCAINE HYDROCHLORIDE 20 MG/ML
INJECTION INTRAVENOUS
Status: DISCONTINUED | OUTPATIENT
Start: 2023-09-20 | End: 2023-09-20

## 2023-09-20 RX ORDER — LABETALOL HYDROCHLORIDE 5 MG/ML
INJECTION, SOLUTION INTRAVENOUS
Status: DISCONTINUED | OUTPATIENT
Start: 2023-09-20 | End: 2023-09-20

## 2023-09-20 RX ADMIN — PROPOFOL 60 MG: 10 INJECTION, EMULSION INTRAVENOUS at 11:09

## 2023-09-20 RX ADMIN — LIDOCAINE HYDROCHLORIDE 50 MG: 20 INJECTION INTRAVENOUS at 11:09

## 2023-09-20 RX ADMIN — SODIUM CHLORIDE: 0.9 INJECTION, SOLUTION INTRAVENOUS at 11:09

## 2023-09-20 RX ADMIN — PROPOFOL 50 MG: 10 INJECTION, EMULSION INTRAVENOUS at 12:09

## 2023-09-20 RX ADMIN — PROPOFOL 30 MG: 10 INJECTION, EMULSION INTRAVENOUS at 12:09

## 2023-09-20 RX ADMIN — LABETALOL HYDROCHLORIDE 5 MG: 5 INJECTION, SOLUTION INTRAVENOUS at 12:09

## 2023-09-20 NOTE — TRANSFER OF CARE
"Anesthesia Transfer of Care Note    Patient: Shakira Pearl    Procedure(s) Performed: Procedure(s) (LRB):  COLONOSCOPY (N/A)    Patient location: PACU    Anesthesia Type: general    Transport from OR: Transported from OR on room air with adequate spontaneous ventilation    Post pain: adequate analgesia    Post assessment: no apparent anesthetic complications    Post vital signs: stable    Level of consciousness: awake    Nausea/Vomiting: no nausea/vomiting    Complications: none    Transfer of care protocol was followed      Last vitals:   Visit Vitals  BP (!) 166/80 (BP Location: Left arm, Patient Position: Lying)   Pulse 67   Temp 36.6 °C (97.9 °F) (Temporal)   Resp 17   Ht 5' 3" (1.6 m)   Wt 68 kg (150 lb)   SpO2 99%   Breastfeeding No   BMI 26.57 kg/m²     "

## 2023-09-20 NOTE — H&P
Short Stay Endoscopy History and Physical    PCP - Angel Bello, DO    Procedure - Colonoscopy  ASA - per anesthesia  Mallampati - per anesthesia  History of Anesthesia problems - no  Family history Anesthesia problems -  no   Plan of anesthesia - General    HPI:  This is a 78 y.o. female here for evaluation of : surveillance colonoscopy.    ROS:  Constitutional: No fevers, chills  CV: No chest pain  Pulm: No shortness of breath  GI: see HPI  Derm: No rash    Medical History:  has a past medical history of Adrenal adenoma, Allergy (sinus), Arthritis (back), Cataract, Colon polyps, Degenerative disc disease (back), Diverticulosis of colon, Dyspepsia, Fatty liver (01/12/2023), GERD (gastroesophageal reflux disease), Heartburn, Hemorrhoids, internal, Hiatal hernia, LAM (iron deficiency anemia), Liver cyst, Neuromuscular disorder, and Vitamin D deficiency.    Surgical History:  has a past surgical history that includes Appendectomy (age 19); Hysterectomy (38); Cholecystectomy; Injection of joint (Bilateral, 02/18/2019); Knee arthroscopy w/ meniscectomy (Left, 10/03/2019); Chondroplasty of knee (Left, 10/03/2019); Synovectomy of knee (Left, 10/03/2019); Injection of joint (Right, 08/20/2020); Esophagogastroduodenoscopy (N/A, 09/09/2020); Colonoscopy (N/A, 09/09/2020); Cataract extraction w/  intraocular lens implant; Cataract extraction w/  intraocular lens implant; YAG Laser Capsulotomy (Bilateral); and hysterectomy, vaginal, with uterosacral ligament vault suspension.    Family History: family history includes Cancer (age of onset: 65) in her father; Heart disease (age of onset: 67) in her mother; No Known Problems in her brother, maternal aunt, maternal grandfather, maternal grandmother, maternal uncle, paternal aunt, paternal grandfather, paternal grandmother, paternal uncle, sister, and son; Stomach cancer in her father; Stroke in her mother.. Otherwise no colon cancer, inflammatory bowel disease, or GI  malignancies.    Social History:  reports that she has never smoked. She has never used smokeless tobacco. She reports that she does not drink alcohol and does not use drugs.    Review of patient's allergies indicates:   Allergen Reactions    Demerol [meperidine] Nausea And Vomiting     Other reaction(s): Nausea    Papaya      Illness vomiting    Sulfa (sulfonamide antibiotics) Rash    Sulfur Rash       Medications:   Medications Prior to Admission   Medication Sig Dispense Refill Last Dose    baclofen (LIORESAL) 10 MG tablet Take 1 tablet (10 mg total) by mouth 3 (three) times daily as needed (neck/back spasms). 60 tablet 1 9/19/2023    cholecalciferol, vitamin D3, (VITAMIN D3) 50 mcg (2,000 unit) Cap capsule Take 1 capsule (2,000 Units total) by mouth once daily. 90 capsule 3 9/19/2023    DULoxetine (CYMBALTA) 20 MG capsule Take 1 capsule (20 mg total) by mouth once daily. 90 capsule 3 9/19/2023    ergocalciferol (VITAMIN D2) 50,000 unit Cap TAKE ONE CAPSULE BY MOUTH EVERY 7 DAYS 12 capsule 3 9/19/2023    ferrous gluconate (FERGON) 324 MG tablet Take 1 tablet (324 mg total) by mouth daily with breakfast. 90 tablet 1 9/19/2023    icosapent ethyL (VASCEPA) 1 gram Cap Take 2 capsules (2 g total) by mouth 2 (two) times a day. 120 capsule 11 9/19/2023    losartan-hydrochlorothiazide 50-12.5 mg (HYZAAR) 50-12.5 mg per tablet Take 1 tablet by mouth once daily. 90 tablet 3 9/19/2023    mirabegron (MYRBETRIQ) 25 mg Tb24 ER tablet Take 1 tablet (25 mg total) by mouth once daily. 90 tablet 3 9/20/2023    pantoprazole (PROTONIX) 40 MG tablet TAKE ONE TABLET BY MOUTH EVERY MORNING 45 MINUTES BEFORE BREAKFAST 90 tablet 3 9/19/2023    potassium chloride SA (K-DUR,KLOR-CON M) 10 MEQ tablet Take 1 tablet (10 mEq total) by mouth once daily. 90 tablet 3 9/19/2023    diclofenac (VOLTAREN) 75 MG EC tablet Take 1 tablet (75 mg total) by mouth 2 (two) times daily as needed (pain). 60 tablet 1     estradioL (ESTRACE) 0.01 % (0.1 mg/gram)  vaginal cream 0.5 grams with applicator or dime-sized amount with finger in vagina nightly x 2 weeks, then twice a week thereafter 42.5 g 11     estradiol 0.05 mg/24 hr td ptsw (VIVELLE-DOT) 0.05 mg/24 hr        estradiol valerate (DELESTROGEN) 40 mg/mL injection Inject 0.5 mLs (20 mg total) into the muscle every 28 days. Inject into the muscle. 5 mL 12     fluocinonide (LIDEX) 0.05 % external solution Apply to the affected area on scalp twice a day 60 mL 0     polyethylene glycol (GAVILYTE-G) 236-22.74-6.74 -5.86 gram suspension Use as directed day before video capsule procedure, starting at 6pm 4000 mL 0          Physical Exam:    Vital Signs:   Vitals:    09/20/23 1031   BP: 129/76   Pulse: 77   Resp: 18   Temp: 97.9 °F (36.6 °C)       General Appearance: Well appearing in no acute distress  Eyes:    No scleral icterus  ENT: lips and tongue normal  Lungs: no use of accessory muscles  Heart:  normal rate, regular rhythm  Abdomen: Soft, non tender, non distended   Extremities: no edema  Skin: No rash      Labs:  Lab Results   Component Value Date    WBC 8.57 07/19/2023    HGB 12.4 07/19/2023    HCT 37.3 07/19/2023     07/19/2023    CHOL 185 07/19/2023    TRIG 263 (H) 07/19/2023    HDL 50 07/19/2023    ALT 18 07/19/2023    AST 19 07/19/2023     07/19/2023    K 3.5 07/19/2023     07/19/2023    CREATININE 0.9 07/19/2023    BUN 24 (H) 07/19/2023    CO2 26 07/19/2023    TSH 1.453 07/19/2023    INR 0.9 05/06/2020    HGBA1C 5.7 (H) 06/10/2022       I have explained the risks and benefits of endoscopy procedures to the patient including but not limited to bleeding, perforation, infection, and death.  The patient was asked if they understand and allowed to ask any further questions to their satisfaction.    Farnaz Gomez MD

## 2023-09-20 NOTE — ANESTHESIA PREPROCEDURE EVALUATION
09/20/2023  Shakira Pearl is a 78 y.o., female.  Past Medical History:   Diagnosis Date    Adrenal adenoma     Allergy sinus    Arthritis back    Cataract     Colon polyps     Degenerative disc disease back    Diverticulosis of colon     Dyspepsia     Fatty liver 01/12/2023    GERD (gastroesophageal reflux disease)     Heartburn     Hemorrhoids, internal     Hiatal hernia     LAM (iron deficiency anemia)     Liver cyst     Neuromuscular disorder     Vitamin D deficiency      Past Surgical History:   Procedure Laterality Date    APPENDECTOMY  age 19    CATARACT EXTRACTION W/  INTRAOCULAR LENS IMPLANT      CATARACT EXTRACTION W/  INTRAOCULAR LENS IMPLANT      CHOLECYSTECTOMY      age of 25    CHONDROPLASTY OF KNEE Left 10/03/2019    Procedure: CHONDROPLASTY, KNEE;  Surgeon: Kaylen Patiño MD;  Location: [facility] OR;  Service: Orthopedics;  Laterality: Left;    COLONOSCOPY N/A 09/09/2020    Procedure: COLONOSCOPY;  Surgeon: Peter Cuevas MD;  Location: [facility] ([address] [place] [address]);  Service: Endoscopy;  Laterality: N/A;  device assisted colonoscopy w/Dr Cuevas.  Difficult colon, multiple adhesions from abd surgeries, Hx adv adenomatous polyp/tubulovillous adenoma of cecum in April 2011.  Dr Black     per Dr Cuevas-[facility] for 4th floor - 60 minute slot (90 min w/egd added).  Start with peds col    ESOPHAGOGASTRODUODENOSCOPY N/A 09/09/2020    Procedure: EGD (ESOPHAGOGASTRODUODENOSCOPY);  Surgeon: Peter Cuevas MD;  Location: [address]);  Service: Endoscopy;  Laterality: N/A;  per Dr Black-add EGD to colonoscopy order.  Pt requesting later appt.  4/13/20 - removed from 4/30/20, rescheduled 7/29/20 - pg   covid 9/6-met-urgent care--tb    HYSTERECTOMY  38    HYSTERECTOMY, VAGINAL, WITH UTEROSACRAL LIGAMENT VAULT SUSPENSION      INJECTION OF JOINT Bilateral 02/18/2019    Procedure: INJECTION,  JOINT BILATERAL SI;  Surgeon: Mert Palumbo MD;  Location: [facility] PAIN MGT;  Service: Pain Management;  Laterality: Bilateral;  BILATERAL SI JOINT INJECTION    INJECTION OF JOINT Right 08/20/2020    Procedure: INJECTION JOINT/ R HIP DIRECT REFERRAL * DR. PALUMBO ONLY PLEASE*;  Surgeon: Mert Palumbo MD;  Location: [facility]IN [facility]GT;  Service: Pain Management;  Laterality: Right;  NEED CONSENT    KNEE ARTHROSCOPY W/ MENISCECTOMY Left 10/03/2019    Procedure: ARTHROSCOPY, KNEE, WITH MENISCECTOMY;  Surgeon: Kaylen Patiño MD;  Location: [facility] OR;  Service: Orthopedics;  Laterality: Left;    SYNOVECTOMY OF KNEE Left 10/03/2019    Procedure: SYNOVECTOMY, KNEE;  Surgeon: Kaylen Patiño MD;  Location: [facility] OR;  Service: Orthopedics;  Laterality: Left;    YAG Laser Capsulotomy Bilateral     Dr. Aleman         Pre-op Assessment    I have reviewed the Patient Summary Reports.     I have reviewed the Nursing Notes. I have reviewed the NPO Status.   I have reviewed the Medications.     Review of Systems  Cardiovascular:   Hypertension    Hepatic/GI:   Hiatal Hernia, GERD Liver Disease,    Musculoskeletal:   Arthritis     Neurological:   Neuromuscular Disease,    Psych:   Psychiatric History          Physical Exam  General: Well nourished    Airway:  Mallampati: II / II  Mouth Opening: Normal  TM Distance: Normal  Tongue: Normal  Neck ROM: Normal ROM    Dental:  Intact    Chest/Lungs:  Clear to auscultation, Normal Respiratory Rate    Heart:  Rate: Normal  Rhythm: Regular Rhythm        Anesthesia Plan  Type of Anesthesia, risks & benefits discussed:    Anesthesia Type: Gen Natural Airway  Intra-op Monitoring Plan: Standard ASA Monitors  Post Op Pain Control Plan: multimodal analgesia  Induction:  IV  Informed Consent: Informed consent signed with the Patient and all parties understand the risks and agree with anesthesia plan.  All questions answered.   ASA Score: 2  Day of Surgery Review of History & Physical: H&P Update referred to the  surgeon/provider.    Ready For Surgery From Anesthesia Perspective.     .   .  .easy      Past Medical History:   Diagnosis Date    Adrenal adenoma     Allergy sinus    Arthritis back    Cataract     Colon polyps     Degenerative disc disease back    Diverticulosis of colon     Dyspepsia     Fatty liver 01/12/2023    GERD (gastroesophageal reflux disease)     Heartburn     Hemorrhoids, internal     Hiatal hernia     LAM (iron deficiency anemia)     Liver cyst     Neuromuscular disorder     Vitamin D deficiency

## 2023-09-20 NOTE — PROVATION PATIENT INSTRUCTIONS
Discharge Summary/Instructions after an Endoscopic Procedure  Patient Name: Shakira Pearl  Patient MRN: [id number]  Patient YOB: 1945 Wednesday, September 20, 2023  Peter Cuevas MD  Dear patient,  As a result of recent federal legislation (The Federal Cures Act), you may   receive lab or pathology results from your procedure in your [facility]   account before your physician is able to contact you. Your physician or   their representative will relay the results to you with their   recommendations at their soonest availability.  Thank you,  RESTRICTIONS:  During your procedure today, you received medications for sedation.  These   medications may affect your judgment, balance and coordination.  Therefore,   for 24 hours, you have the following restrictions:   - DO NOT drive a car, operate machinery, make legal/financial decisions,   sign important papers or drink alcohol.    ACTIVITY:  Today: no heavy lifting, straining or running due to procedural   sedation/anesthesia.  The following day: return to full activity including work.  DIET:  Eat and drink normally unless instructed otherwise.     TREATMENT FOR COMMON SIDE EFFECTS:  - Mild abdominal pain, nausea, belching, bloating or excessive gas:  rest,   eat lightly and use a heating pad.  - Sore Throat: treat with throat lozenges and/or gargle with warm salt   water.  - Because air was used during the procedure, expelling large amounts of air   from your rectum or belching is normal.  - If a bowel prep was taken, you may not have a bowel movement for 1-3 days.    This is normal.  SYMPTOMS TO WATCH FOR AND REPORT TO YOUR PHYSICIAN:  1. Abdominal pain or bloating, other than gas cramps.  2. Chest pain.  3. Back pain.  4. Signs of infection such as: chills or fever occurring within 24 hours   after the procedure.  5. Rectal bleeding, which would show as bright red, maroon, or black stools.   (A tablespoon of blood from the rectum is not serious, especially  if   hemorrhoids are present.)  6. Vomiting.  7. Weakness or dizziness.  GO DIRECTLY TO THE NEAREST EMERGENCY ROOM IF YOU HAVE ANY OF THE FOLLOWING:      Difficulty breathing              Chills and/or fever over 101 F   Persistent vomiting and/or vomiting blood   Severe abdominal pain   Severe chest pain   Black, tarry stools   Bleeding- more than one tablespoon   Any other symptom or condition that you feel may need urgent attention  Your doctor recommends these additional instructions:  If any biopsies were taken, your doctors clinic will contact you in 1 to 2   weeks with any results.  - Discharge patient to home.   - Patient has a contact number available for emergencies.  The signs and   symptoms of potential delayed complications were discussed with the   patient.  Return to normal activities tomorrow.  Written discharge   instructions were provided to the patient.   - Resume previous diet.   - Continue present medications.   - Await pathology results.   - Repeat colonoscopy in 3 years for surveillance of multiple polyps.   Recommend GI clinic visit prior to scheduling.  For questions, problems or results please call your physician - Peter Cuevas MD at Work:  [phone].  [facility] [place], EMERGENCY ROOM PHONE NUMBER: [phone]  IF A COMPLICATION OR EMERGENCY SITUATION ARISES AND YOU ARE UNABLE TO REACH   YOUR PHYSICIAN - GO DIRECTLY TO THE EMERGENCY ROOM.  Peter Cuevas MD  9/20/2023 1:03:08 PM  This report has been verified and signed electronically.  Dear patient,  As a result of recent federal legislation (The Federal Cures Act), you may   receive lab or pathology results from your procedure in your [facility]   account before your physician is able to contact you. Your physician or   their representative will relay the results to you with their   recommendations at their soonest availability.  Thank you,  PROVATION

## 2023-09-20 NOTE — ANESTHESIA POSTPROCEDURE EVALUATION
Anesthesia Post Evaluation    Patient: Shakira Pearl    Procedure(s) Performed: Procedure(s) (LRB):  COLONOSCOPY (N/A)    Final Anesthesia Type: general      Patient location during evaluation: PACU  Patient participation: Yes- Able to Participate  Level of consciousness: awake and alert  Post-procedure vital signs: reviewed and stable  Pain management: adequate  Airway patency: patent    PONV status at discharge: No PONV  Anesthetic complications: no      Cardiovascular status: blood pressure returned to baseline  Respiratory status: unassisted  Hydration status: euvolemic  Follow-up not needed.          Vitals Value Taken Time   /88 09/20/23 1339   Temp  09/20/23 1351   Pulse 67 09/20/23 1339   Resp 18 09/20/23 1339   SpO2 96 % 09/20/23 1339         No case tracking events are documented in the log.      Pain/Martha Score: Martha Score: 10 (9/20/2023  1:39 PM)

## 2023-09-26 LAB
FINAL PATHOLOGIC DIAGNOSIS: NORMAL
Lab: NORMAL

## 2023-09-28 DIAGNOSIS — I10 ESSENTIAL HYPERTENSION: ICD-10-CM

## 2023-09-28 RX ORDER — DULOXETIN HYDROCHLORIDE 20 MG/1
CAPSULE, DELAYED RELEASE ORAL
Qty: 90 CAPSULE | Refills: 0 | OUTPATIENT
Start: 2023-09-28

## 2023-09-28 RX ORDER — VIT C/E/ZN/COPPR/LUTEIN/ZEAXAN 250MG-90MG
CAPSULE ORAL
Qty: 90 CAPSULE | Refills: 0 | OUTPATIENT
Start: 2023-09-28

## 2023-09-28 RX ORDER — LOSARTAN POTASSIUM AND HYDROCHLOROTHIAZIDE 12.5; 5 MG/1; MG/1
TABLET ORAL
Qty: 90 TABLET | Refills: 0 | OUTPATIENT
Start: 2023-09-28

## 2023-09-28 NOTE — TELEPHONE ENCOUNTER
No care due was identified.  [facility] Embedded Care Due Messages. Reference number: [id number].   9/28/2023 6:37:13 PM CDT

## 2023-09-28 NOTE — TELEPHONE ENCOUNTER
Refill Decision Note   Shakira Pearl  is requesting a refill authorization.  Brief Assessment and Rationale for Refill:  Quick Discontinue     Medication Therapy Plan:    Pharmacy is requesting new scripts for the following medications without required information, (sig/ frequency/qty/etc)          Comments: Pharmacies have been requesting medications for patients without required information, (sig, frequency, qty, etc.). In addition, requests are sent for medication(s) pt. are currently not taking, and medications patients have never taken.    We have spoken to the pharmacies about these request types and advised their teams previously that we are unable to assess these New Script requests and require all details for these requests. This is a known issue and has been reported.     Note composed:6:42 PM 09/28/2023

## 2023-10-10 RX ORDER — OXYBUTYNIN CHLORIDE 10 MG/1
10 TABLET, EXTENDED RELEASE ORAL DAILY
Qty: 90 TABLET | Refills: 1 | Status: SHIPPED | OUTPATIENT
Start: 2023-10-10

## 2023-10-12 ENCOUNTER — TELEPHONE (OUTPATIENT)
Dept: UROGYNECOLOGY | Facility: CLINIC | Age: 78
End: 2023-10-12
Payer: MEDICARE

## 2023-10-12 NOTE — TELEPHONE ENCOUNTER
Patient reports she called the pharmacy and she is in the doughnut hole- discussed Oxybutynin and potential side effects. Patient is concerned about risk of cognitive decline and will pay the copay for the rest of the year.    ----- Message from Jac Aguila sent at 10/12/2023 10:29 AM CDT -----  Patient called returning a phone call she received. Please give patient a call back.

## 2023-10-12 NOTE — TELEPHONE ENCOUNTER
Patient reports rx is up to $115 for her monthly prescription. Reviewed last rx- last rx is for 90 days. Patient will call the pharmacy and call us back if it is still too expensive for 90 days.  ----- Message from Salvador Valadez MD sent at 10/11/2023  5:13 PM CDT -----  Regarding: RE: New prescritpion  So, do you think Mirabegron will be covered/affordable? If so, I can send Rx. Would prefer that vs oxybutynin. If she has to try oxybutynin 1st, she needs to take x 1 month, then let us know if helping and if side effects.  So, can she message us that about 1 month after taking? Thanks!  ----- Message -----  From: Katelyn Martinez, RN  Sent: 10/10/2023   3:28 PM CDT  To: Salvador Valadez MD  Subject: FW: New prescritpion                             Patient is most likely in the doughnut hole/coverage gap- I'm unable to verify that. Either way, Myrbetriq is preferred for her insurance so I would not be able to request a tiering exception or get Gemtesa covered for less than $115.   ----- Message -----  From: Prachi Nuñez MA  Sent: 10/10/2023   3:03 PM CDT  To: Laurel Alvarenga NP; Salvador Valadez MD; #  Subject: New prescritpion                                 Pt states that myrbetriq has gone up on price abd would like to ty something different.  It use be $47 and now its 115    Prachi   ----- Message -----  From: Magdaleno Mcgrath  Sent: 10/10/2023   2:19 PM CDT  To: Rory [facility] Staff        Name of Who is Calling: CHELSY GONZALEZ [[id number]]      What is the request in detail: Pt called to speak with the office.Please contact to further discuss and advise.        Can the clinic reply by [facility]: Y      What Number to [facility]: [phone]

## 2023-12-07 ENCOUNTER — PATIENT OUTREACH (OUTPATIENT)
Dept: ADMINISTRATIVE | Facility: HOSPITAL | Age: 78
End: 2023-12-07
Payer: MEDICARE

## 2023-12-07 ENCOUNTER — PATIENT MESSAGE (OUTPATIENT)
Dept: ADMINISTRATIVE | Facility: HOSPITAL | Age: 78
End: 2023-12-07
Payer: MEDICARE

## 2023-12-07 NOTE — PROGRESS NOTES
Chart review done.  updated. Immunizations reviewed & updated. Care Everywhere updated. Chart reviewed for [employer] report.

## 2024-02-05 ENCOUNTER — TELEPHONE (OUTPATIENT)
Dept: GASTROENTEROLOGY | Facility: CLINIC | Age: 79
End: 2024-02-05
Payer: MEDICARE

## 2024-02-05 NOTE — TELEPHONE ENCOUNTER
----- Message from Vera Bey sent at 2/5/2024  1:09 PM CST -----  Regarding: Appointment  Contact: [phone]  Calling to schedule an appointment per follow up as soon as possible. Please call patient to schedule today.

## 2024-02-08 ENCOUNTER — LAB VISIT (OUTPATIENT)
Dept: LAB | Facility: HOSPITAL | Age: 79
End: 2024-02-08
Attending: INTERNAL MEDICINE
Payer: MEDICARE

## 2024-02-08 DIAGNOSIS — E78.1 HYPERTRIGLYCERIDEMIA: ICD-10-CM

## 2024-02-08 DIAGNOSIS — R73.9 ELEVATED BLOOD SUGAR: ICD-10-CM

## 2024-02-08 DIAGNOSIS — E55.9 VITAMIN D INSUFFICIENCY: ICD-10-CM

## 2024-02-08 DIAGNOSIS — E61.1 IRON DEFICIENCY: ICD-10-CM

## 2024-02-08 DIAGNOSIS — I70.0 AORTIC ATHEROSCLEROSIS: ICD-10-CM

## 2024-02-08 DIAGNOSIS — I10 ESSENTIAL HYPERTENSION: ICD-10-CM

## 2024-02-08 LAB
BASOPHILS # BLD AUTO: 0.03 K/UL (ref 0–0.2)
BASOPHILS NFR BLD: 0.5 % (ref 0–1.9)
DIFFERENTIAL METHOD BLD: ABNORMAL
EOSINOPHIL # BLD AUTO: 0.1 K/UL (ref 0–0.5)
EOSINOPHIL NFR BLD: 2.2 % (ref 0–8)
ERYTHROCYTE [DISTWIDTH] IN BLOOD BY AUTOMATED COUNT: 13.1 % (ref 11.5–14.5)
ESTIMATED AVG GLUCOSE: 114 MG/DL (ref 68–131)
HBA1C MFR BLD: 5.6 % (ref 4–5.6)
HCT VFR BLD AUTO: 36 % (ref 37–48.5)
HGB BLD-MCNC: 12 G/DL (ref 12–16)
IMM GRANULOCYTES # BLD AUTO: 0.01 K/UL (ref 0–0.04)
IMM GRANULOCYTES NFR BLD AUTO: 0.2 % (ref 0–0.5)
LYMPHOCYTES # BLD AUTO: 2.2 K/UL (ref 1–4.8)
LYMPHOCYTES NFR BLD: 33.7 % (ref 18–48)
MCH RBC QN AUTO: 28.8 PG (ref 27–31)
MCHC RBC AUTO-ENTMCNC: 33.3 G/DL (ref 32–36)
MCV RBC AUTO: 87 FL (ref 82–98)
MONOCYTES # BLD AUTO: 0.4 K/UL (ref 0.3–1)
MONOCYTES NFR BLD: 5.6 % (ref 4–15)
NEUTROPHILS # BLD AUTO: 3.7 K/UL (ref 1.8–7.7)
NEUTROPHILS NFR BLD: 57.8 % (ref 38–73)
NRBC BLD-RTO: 0 /100 WBC
PLATELET # BLD AUTO: 253 K/UL (ref 150–450)
PMV BLD AUTO: 10.6 FL (ref 9.2–12.9)
RBC # BLD AUTO: 4.16 M/UL (ref 4–5.4)
WBC # BLD AUTO: 6.44 K/UL (ref 3.9–12.7)

## 2024-02-08 PROCEDURE — 36415 COLL VENOUS BLD VENIPUNCTURE: CPT | Mod: HCNC,PO | Performed by: INTERNAL MEDICINE

## 2024-02-08 PROCEDURE — 80053 COMPREHEN METABOLIC PANEL: CPT | Mod: HCNC | Performed by: INTERNAL MEDICINE

## 2024-02-08 PROCEDURE — 83036 HEMOGLOBIN GLYCOSYLATED A1C: CPT | Mod: HCNC | Performed by: INTERNAL MEDICINE

## 2024-02-08 PROCEDURE — 85025 COMPLETE CBC W/AUTO DIFF WBC: CPT | Mod: HCNC | Performed by: INTERNAL MEDICINE

## 2024-02-09 LAB
ALBUMIN SERPL BCP-MCNC: 3.6 G/DL (ref 3.5–5.2)
ALP SERPL-CCNC: 45 U/L (ref 55–135)
ALT SERPL W/O P-5'-P-CCNC: 16 U/L (ref 10–44)
ANION GAP SERPL CALC-SCNC: 9 MMOL/L (ref 8–16)
AST SERPL-CCNC: 19 U/L (ref 10–40)
BILIRUB SERPL-MCNC: 0.5 MG/DL (ref 0.1–1)
BUN SERPL-MCNC: 23 MG/DL (ref 8–23)
CALCIUM SERPL-MCNC: 9.4 MG/DL (ref 8.7–10.5)
CHLORIDE SERPL-SCNC: 102 MMOL/L (ref 95–110)
CO2 SERPL-SCNC: 26 MMOL/L (ref 23–29)
CREAT SERPL-MCNC: 0.8 MG/DL (ref 0.5–1.4)
EST. GFR  (NO RACE VARIABLE): >60 ML/MIN/1.73 M^2
GLUCOSE SERPL-MCNC: 99 MG/DL (ref 70–110)
POTASSIUM SERPL-SCNC: 3.6 MMOL/L (ref 3.5–5.1)
PROT SERPL-MCNC: 7.1 G/DL (ref 6–8.4)
SODIUM SERPL-SCNC: 137 MMOL/L (ref 136–145)

## 2024-02-14 ENCOUNTER — OFFICE VISIT (OUTPATIENT)
Dept: INTERNAL MEDICINE | Facility: CLINIC | Age: 79
End: 2024-02-14
Payer: MEDICARE

## 2024-02-14 VITALS
HEIGHT: 63 IN | OXYGEN SATURATION: 97 % | BODY MASS INDEX: 26.17 KG/M2 | DIASTOLIC BLOOD PRESSURE: 70 MMHG | WEIGHT: 147.69 LBS | HEART RATE: 88 BPM | SYSTOLIC BLOOD PRESSURE: 135 MMHG | TEMPERATURE: 97 F

## 2024-02-14 DIAGNOSIS — R42 DIZZINESS AND GIDDINESS: ICD-10-CM

## 2024-02-14 DIAGNOSIS — R41.3 OTHER AMNESIA: ICD-10-CM

## 2024-02-14 DIAGNOSIS — I70.0 AORTIC ATHEROSCLEROSIS: ICD-10-CM

## 2024-02-14 DIAGNOSIS — M47.816 LUMBAR FACET ARTHROPATHY: ICD-10-CM

## 2024-02-14 DIAGNOSIS — D50.9 IRON DEFICIENCY ANEMIA, UNSPECIFIED IRON DEFICIENCY ANEMIA TYPE: ICD-10-CM

## 2024-02-14 DIAGNOSIS — K76.0 FATTY LIVER: ICD-10-CM

## 2024-02-14 DIAGNOSIS — S06.0X9A CONCUSSION WITH LOSS OF CONSCIOUSNESS, INITIAL ENCOUNTER: ICD-10-CM

## 2024-02-14 DIAGNOSIS — K76.9 LIVER LESION: ICD-10-CM

## 2024-02-14 DIAGNOSIS — I10 ESSENTIAL HYPERTENSION: Primary | ICD-10-CM

## 2024-02-14 DIAGNOSIS — E78.1 HYPERTRIGLYCERIDEMIA: ICD-10-CM

## 2024-02-14 PROCEDURE — 99214 OFFICE O/P EST MOD 30 MIN: CPT | Mod: HCNC,S$GLB,, | Performed by: INTERNAL MEDICINE

## 2024-02-14 PROCEDURE — 3078F DIAST BP <80 MM HG: CPT | Mod: HCNC,CPTII,S$GLB, | Performed by: INTERNAL MEDICINE

## 2024-02-14 PROCEDURE — 3288F FALL RISK ASSESSMENT DOCD: CPT | Mod: HCNC,CPTII,S$GLB, | Performed by: INTERNAL MEDICINE

## 2024-02-14 PROCEDURE — 3075F SYST BP GE 130 - 139MM HG: CPT | Mod: HCNC,CPTII,S$GLB, | Performed by: INTERNAL MEDICINE

## 2024-02-14 PROCEDURE — 1100F PTFALLS ASSESS-DOCD GE2>/YR: CPT | Mod: HCNC,CPTII,S$GLB, | Performed by: INTERNAL MEDICINE

## 2024-02-14 PROCEDURE — 1125F AMNT PAIN NOTED PAIN PRSNT: CPT | Mod: HCNC,CPTII,S$GLB, | Performed by: INTERNAL MEDICINE

## 2024-02-14 PROCEDURE — 99999 PR PBB SHADOW E&M-EST. PATIENT-LVL IV: CPT | Mod: PBBFAC,HCNC,, | Performed by: INTERNAL MEDICINE

## 2024-02-14 PROCEDURE — 1159F MED LIST DOCD IN RCRD: CPT | Mod: HCNC,CPTII,S$GLB, | Performed by: INTERNAL MEDICINE

## 2024-02-14 NOTE — PROGRESS NOTES
Subjective     Patient ID: Shakira Pearl is a 78 y.o. female.    Chief Complaint: Follow-up    HPI  Pt with LAM, HLD, HTN, Sacroiliitis, L Adrenal adenoma is here for 6 month f/u. She has had multiple falls out of her bead over the last year or so with suspected concussions. A/s of memory loss and dizziness. No hx of CVA/TIA.   Review of Systems   Constitutional:  Negative for activity change, appetite change, chills, diaphoresis, fatigue, fever and unexpected weight change.   HENT:  Negative for nasal congestion, mouth sores, postnasal drip, rhinorrhea, sinus pressure/congestion, sneezing, sore throat, trouble swallowing and voice change.    Eyes:  Negative for pain, discharge and visual disturbance.   Respiratory:  Negative for cough, shortness of breath and wheezing.    Cardiovascular:  Negative for chest pain, palpitations and leg swelling.   Gastrointestinal:  Negative for abdominal pain, blood in stool, constipation, diarrhea, nausea and vomiting.   Endocrine: Negative for cold intolerance and heat intolerance.   Genitourinary:  Negative for difficulty urinating, dysuria, frequency, hematuria and urgency.   Musculoskeletal:  Positive for arthralgias. Negative for myalgias.   Integumentary:  Negative for rash and wound.   Allergic/Immunologic: Negative for environmental allergies and food allergies.   Neurological:  Positive for dizziness and memory loss. Negative for tremors, seizures, syncope, weakness, light-headedness and headaches.   Hematological:  Negative for adenopathy. Does not bruise/bleed easily.   Psychiatric/Behavioral:  Negative for confusion and sleep disturbance. The patient is not nervous/anxious.           Objective     Physical Exam  Vitals and nursing note reviewed.   Constitutional:       General: She is not in acute distress.     Appearance: Normal appearance. She is well-developed. She is not diaphoretic.   HENT:      Head: Normocephalic and atraumatic.      Right Ear: External ear  normal.      Left Ear: External ear normal.      Nose: Nose normal.      Mouth/Throat:      Pharynx: No oropharyngeal exudate.   Eyes:      General: No scleral icterus.        Right eye: No discharge.         Left eye: No discharge.      Conjunctiva/sclera: Conjunctivae normal.      Pupils: Pupils are equal, round, and reactive to light.   Neck:      Thyroid: No thyromegaly.      Vascular: No JVD.   Cardiovascular:      Rate and Rhythm: Normal rate and regular rhythm.      Pulses: Normal pulses.      Heart sounds: Normal heart sounds. No murmur heard.  Pulmonary:      Effort: Pulmonary effort is normal. No respiratory distress.      Breath sounds: Normal breath sounds. No wheezing, rhonchi or rales.   Chest:      Chest wall: No tenderness.   Abdominal:      General: Bowel sounds are normal. There is no distension.      Palpations: Abdomen is soft.      Tenderness: There is no abdominal tenderness. There is no guarding or rebound.   Musculoskeletal:      Cervical back: Neck supple.      Right lower leg: No edema.      Left lower leg: No edema.   Lymphadenopathy:      Cervical: No cervical adenopathy.   Skin:     General: Skin is warm and dry.      Coloration: Skin is not pale.      Findings: No rash.   Neurological:      General: No focal deficit present.      Mental Status: She is alert and oriented to person, place, and time. Mental status is at baseline.      Cranial Nerves: No cranial nerve deficit.      Sensory: No sensory deficit.      Motor: No weakness.      Coordination: Coordination normal.      Gait: Gait normal.      Deep Tendon Reflexes: Reflexes normal.   Psychiatric:         Behavior: Behavior normal.         Thought Content: Thought content normal.         Judgment: Judgment normal.            Assessment and Plan     1. Essential hypertension  -     Hypertension Digital Medicine (HDMP) Enrollment Order    2. Hypertriglyceridemia    3. Aortic atherosclerosis    4. Iron deficiency anemia, unspecified  iron deficiency anemia type    5. Liver lesion    6. Fatty liver    7. Lumbar facet arthropathy  -     CANE FOR HOME USE    8. Dizziness and giddiness  -     MRI Brain Without Contrast; Future; Expected date: 02/14/2024  -     CANE FOR HOME USE    9. Other amnesia  -     MRI Brain Without Contrast; Future; Expected date: 02/14/2024    10. Concussion with loss of consciousness, initial encounter  -     MRI Brain Without Contrast; Future; Expected date: 02/14/2024        LAM- followed by GI   -previously on Iron      HLD- on Zetia/Vascepa      HTN- stable on Hyzaar         Sacroiliitis/DJD Cervical/Lumbar spine- seeing Pain management       Fatty liver/left lobe cyst- stable      Adrenal adenoma- has seen Endo      Hx of Right 5th metatarsal fracture     F/u in 6 months for annual exam

## 2024-02-20 DIAGNOSIS — D50.9 IRON DEFICIENCY ANEMIA, UNSPECIFIED IRON DEFICIENCY ANEMIA TYPE: Primary | ICD-10-CM

## 2024-03-04 NOTE — TELEPHONE ENCOUNTER
[facility]        NAME: Fabian Alvarenga is a 16 y.o. male  : 2007    MRN: [id number]  DATE: 2024  TIME: 3:36 PM    Assessment and Plan   Sports physical [Z02.5]  1. Sports physical              Patient Instructions       Follow up with PCP in 3-5 days.  Proceed to  ER if symptoms worsen.    If tests have been performed at [facility], our office will contact you with results if changes need to be made to the care plan discussed with you at the visit.  You [facility] full [facility].    Chief Complaint     Chief Complaint   Patient presents with    Physical Exam     C/o sports physical, offers no other complaints this visit.         History of Present Illness       Patient is here for sports physical.    Denies any ROS or PMH.  Pt denies hx of murmer (history listed in Epic).   Parents deny and cardiac disease for patient or family history of with early demise  Pt denies any RDA or ETOH use.       While in office pt text mother and she told him he had a murmer as a baby.          Review of Systems   Review of Systems   Constitutional: Negative.    HENT: Negative.     Eyes: Negative.    Respiratory: Negative.     Cardiovascular: Negative.    Gastrointestinal: Negative.    Endocrine: Negative.    Genitourinary: Negative.    Musculoskeletal: Negative.    Skin: Negative.    Allergic/Immunologic: Negative.    Neurological: Negative.    Hematological: Negative.    Psychiatric/Behavioral: Negative.           Current Medications     No current outpatient medications on file.    Current Allergies     Allergies as of 2024    (No Known Allergies)            The following portions of the patient's history were reviewed and updated as appropriate: allergies, current medications, past family history, past medical history, past social history, past surgical history and problem list.     Past Medical History:   Diagnosis Date    Heart murmur        History reviewed. No  ----- Message from Cary Ibrahim sent at 7/17/2023 10:05 AM CDT -----  ..Type:  Sooner Appointment Request    Patient is requesting a sooner appointment.  Patient declined first available appointment listed as well as another facility and provider .  Patient will not accept being placed on the waitlist and is requesting a message be sent to doctor.    Name of Caller: Self     When is the first available appointment? None on schedule    Symptoms: Pain in neck     Would the patient rather a call back or a response via [facility]? Call Back     Best Call Back Number:  [phone] (home)       Additional Information:        "pertinent surgical history.    History reviewed. No pertinent family history.      Medications have been verified.        Objective   /64 (BP Location: Right arm, Patient Position: Sitting)   Pulse 88   Temp 98.8 °F (37.1 °C) (Temporal)   Resp 18   Ht 5' 10\" (1.778 m)   Wt 71.7 kg (158 lb)   SpO2 98%   BMI 22.67 kg/m²   No LMP for male patient.       Physical Exam     Physical Exam  Constitutional:       General: He is not in acute distress.     Appearance: Normal appearance. He is normal weight. He is not ill-appearing, toxic-appearing or diaphoretic.   HENT:      Head: Normocephalic and atraumatic.      Right Ear: Tympanic membrane, ear canal and external ear normal. There is no impacted cerumen.      Left Ear: Tympanic membrane, ear canal and external ear normal. There is no impacted cerumen.      Nose: Nose normal. No congestion or rhinorrhea.      Mouth/Throat:      Mouth: Mucous membranes are moist.      Pharynx: Oropharynx is clear. No oropharyngeal exudate or posterior oropharyngeal erythema.   Eyes:      General:         Right eye: No discharge.         Left eye: No discharge.      Extraocular Movements: Extraocular movements intact.      Conjunctiva/sclera: Conjunctivae normal.      Pupils: Pupils are equal, round, and reactive to light.   Cardiovascular:      Rate and Rhythm: Normal rate and regular rhythm.      Pulses: Normal pulses.      Heart sounds: Normal heart sounds. No murmur heard.     No friction rub. No gallop.   Pulmonary:      Effort: Pulmonary effort is normal. No respiratory distress.      Breath sounds: Normal breath sounds. No stridor. No wheezing, rhonchi or rales.   Chest:      Chest wall: No tenderness.   Abdominal:      General: Abdomen is flat. Bowel sounds are normal. There is no distension.      Palpations: Abdomen is soft. There is no mass.      Tenderness: There is no abdominal tenderness. There is no guarding or rebound.      Hernia: No hernia is present. "   Musculoskeletal:         General: No swelling, tenderness, deformity or signs of injury. Normal range of motion.      Cervical back: Normal range of motion and neck supple. No rigidity or tenderness.   Lymphadenopathy:      Cervical: No cervical adenopathy.   Skin:     General: Skin is warm and dry.      Capillary Refill: Capillary refill takes less than 2 seconds.   Neurological:      General: No focal deficit present.      Mental Status: He is alert and oriented to person, place, and time.   Psychiatric:         Mood and Affect: Mood normal.         Behavior: Behavior normal.         Thought Content: Thought content normal.         Judgment: Judgment normal.

## 2024-03-07 ENCOUNTER — TELEPHONE (OUTPATIENT)
Dept: INTERNAL MEDICINE | Facility: CLINIC | Age: 79
End: 2024-03-07
Payer: MEDICARE

## 2024-03-07 ENCOUNTER — TELEPHONE (OUTPATIENT)
Dept: CARDIOLOGY | Facility: CLINIC | Age: 79
End: 2024-03-07
Payer: MEDICARE

## 2024-03-07 ENCOUNTER — TELEPHONE (OUTPATIENT)
Dept: HEMATOLOGY/ONCOLOGY | Facility: CLINIC | Age: 79
End: 2024-03-07
Payer: MEDICARE

## 2024-03-07 DIAGNOSIS — L65.9 ALOPECIA: Primary | ICD-10-CM

## 2024-03-07 DIAGNOSIS — R42 EPISODIC LIGHTHEADEDNESS: ICD-10-CM

## 2024-03-07 DIAGNOSIS — I10 ESSENTIAL HYPERTENSION: Primary | ICD-10-CM

## 2024-03-07 DIAGNOSIS — R06.02 SOB (SHORTNESS OF BREATH): ICD-10-CM

## 2024-03-07 NOTE — TELEPHONE ENCOUNTER
----- Message from Shawna Cooper sent at 3/7/2024  4:09 PM CST -----  Contact: Pt [phone]  Pt called and stated that she will see Dr Bello on 3/19/24 for Hair Loss and wanted to know if she can have blood work done before that appointment.     Please call and advise

## 2024-03-07 NOTE — TELEPHONE ENCOUNTER
----- Message from Katelyn Gallo sent at 3/7/2024  8:12 AM CST -----  Contact: [phone] Patient  Pt is calling in regards to rescheduling her appointment that she canceled. Pt would like to see the doctor ASAP. Please call and advise. Thank you

## 2024-03-07 NOTE — TELEPHONE ENCOUNTER
----- Message from Clary Vivas sent at 3/7/2024  4:50 PM CST -----  Regarding: Shortness of breath  Patient been having shortness of breath. Please call back @ [phone]. Thank you Clary

## 2024-03-07 NOTE — TELEPHONE ENCOUNTER
Pt states that she has had very occ short bouts of SOB but last night, laying down, it lasted longer. She is fine now but would like a f/u appt has she has not seen her cardiologist in 2 years. She was scheduled on 3/25 + EKG w. Ms Parra and agreed to date/time of appointment(s). Reminder mailed to pt

## 2024-03-12 ENCOUNTER — PATIENT MESSAGE (OUTPATIENT)
Dept: GASTROENTEROLOGY | Facility: CLINIC | Age: 79
End: 2024-03-12
Payer: MEDICARE

## 2024-03-13 ENCOUNTER — PATIENT MESSAGE (OUTPATIENT)
Dept: INTERNAL MEDICINE | Facility: CLINIC | Age: 79
End: 2024-03-13
Payer: MEDICARE

## 2024-03-13 DIAGNOSIS — R20.0 NUMBNESS: ICD-10-CM

## 2024-03-13 DIAGNOSIS — R53.83 FATIGUE, UNSPECIFIED TYPE: Primary | ICD-10-CM

## 2024-03-13 DIAGNOSIS — E55.9 VITAMIN D DEFICIENCY: ICD-10-CM

## 2024-03-13 DIAGNOSIS — L65.9 ALOPECIA: ICD-10-CM

## 2024-03-20 ENCOUNTER — LAB VISIT (OUTPATIENT)
Dept: LAB | Facility: HOSPITAL | Age: 79
End: 2024-03-20
Attending: INTERNAL MEDICINE
Payer: MEDICARE

## 2024-03-20 DIAGNOSIS — R20.0 NUMBNESS: ICD-10-CM

## 2024-03-20 DIAGNOSIS — E55.9 VITAMIN D DEFICIENCY: ICD-10-CM

## 2024-03-20 DIAGNOSIS — L65.9 ALOPECIA: ICD-10-CM

## 2024-03-20 DIAGNOSIS — D50.9 IRON DEFICIENCY ANEMIA, UNSPECIFIED IRON DEFICIENCY ANEMIA TYPE: ICD-10-CM

## 2024-03-20 DIAGNOSIS — R53.83 FATIGUE, UNSPECIFIED TYPE: ICD-10-CM

## 2024-03-20 LAB
25(OH)D3+25(OH)D2 SERPL-MCNC: 36 NG/ML (ref 30–96)
ALBUMIN SERPL BCP-MCNC: 3.6 G/DL (ref 3.5–5.2)
ALP SERPL-CCNC: 52 U/L (ref 55–135)
ALT SERPL W/O P-5'-P-CCNC: 14 U/L (ref 10–44)
ANION GAP SERPL CALC-SCNC: 11 MMOL/L (ref 8–16)
AST SERPL-CCNC: 16 U/L (ref 10–40)
BASOPHILS # BLD AUTO: 0.02 K/UL (ref 0–0.2)
BASOPHILS NFR BLD: 0.2 % (ref 0–1.9)
BILIRUB SERPL-MCNC: 0.4 MG/DL (ref 0.1–1)
BUN SERPL-MCNC: 20 MG/DL (ref 8–23)
CALCIUM SERPL-MCNC: 9.6 MG/DL (ref 8.7–10.5)
CHLORIDE SERPL-SCNC: 103 MMOL/L (ref 95–110)
CO2 SERPL-SCNC: 23 MMOL/L (ref 23–29)
CREAT SERPL-MCNC: 0.8 MG/DL (ref 0.5–1.4)
DHEA-S SERPL-MCNC: 84.9 UG/DL
DIFFERENTIAL METHOD BLD: NORMAL
EOSINOPHIL # BLD AUTO: 0.1 K/UL (ref 0–0.5)
EOSINOPHIL NFR BLD: 1.6 % (ref 0–8)
ERYTHROCYTE [DISTWIDTH] IN BLOOD BY AUTOMATED COUNT: 13.2 % (ref 11.5–14.5)
ERYTHROCYTE [SEDIMENTATION RATE] IN BLOOD BY PHOTOMETRIC METHOD: 35 MM/HR (ref 0–36)
EST. GFR  (NO RACE VARIABLE): >60 ML/MIN/1.73 M^2
FERRITIN SERPL-MCNC: 66 NG/ML (ref 20–300)
FSH SERPL-ACNC: 0.18 MIU/ML
GLUCOSE SERPL-MCNC: 104 MG/DL (ref 70–110)
HCT VFR BLD AUTO: 37.3 % (ref 37–48.5)
HGB BLD-MCNC: 12.1 G/DL (ref 12–16)
HGB BLD-MCNC: 12.1 G/DL (ref 12–16)
IMM GRANULOCYTES # BLD AUTO: 0.03 K/UL (ref 0–0.04)
IMM GRANULOCYTES NFR BLD AUTO: 0.4 % (ref 0–0.5)
IRON SERPL-MCNC: 91 UG/DL (ref 30–160)
LH SERPL-ACNC: 0.4 MIU/ML
LYMPHOCYTES # BLD AUTO: 1.8 K/UL (ref 1–4.8)
LYMPHOCYTES NFR BLD: 21.3 % (ref 18–48)
MAGNESIUM SERPL-MCNC: 1.7 MG/DL (ref 1.6–2.6)
MCH RBC QN AUTO: 29.1 PG (ref 27–31)
MCHC RBC AUTO-ENTMCNC: 32.4 G/DL (ref 32–36)
MCV RBC AUTO: 90 FL (ref 82–98)
MONOCYTES # BLD AUTO: 0.4 K/UL (ref 0.3–1)
MONOCYTES NFR BLD: 4.3 % (ref 4–15)
NEUTROPHILS # BLD AUTO: 6 K/UL (ref 1.8–7.7)
NEUTROPHILS NFR BLD: 72.2 % (ref 38–73)
NRBC BLD-RTO: 0 /100 WBC
PLATELET # BLD AUTO: 262 K/UL (ref 150–450)
PMV BLD AUTO: 11 FL (ref 9.2–12.9)
POTASSIUM SERPL-SCNC: 3.7 MMOL/L (ref 3.5–5.1)
PROT SERPL-MCNC: 7.3 G/DL (ref 6–8.4)
RBC # BLD AUTO: 4.16 M/UL (ref 4–5.4)
SATURATED IRON: 20 % (ref 20–50)
SODIUM SERPL-SCNC: 137 MMOL/L (ref 136–145)
T4 FREE SERPL-MCNC: 0.87 NG/DL (ref 0.71–1.51)
TESTOST SERPL-MCNC: 19 NG/DL (ref 5–73)
TOTAL IRON BINDING CAPACITY: 448 UG/DL (ref 250–450)
TRANSFERRIN SERPL-MCNC: 303 MG/DL (ref 200–375)
WBC # BLD AUTO: 8.3 K/UL (ref 3.9–12.7)

## 2024-03-20 PROCEDURE — 82627 DEHYDROEPIANDROSTERONE: CPT | Mod: HCNC | Performed by: INTERNAL MEDICINE

## 2024-03-20 PROCEDURE — 83540 ASSAY OF IRON: CPT | Mod: HCNC | Performed by: INTERNAL MEDICINE

## 2024-03-20 PROCEDURE — 83001 ASSAY OF GONADOTROPIN (FSH): CPT | Mod: HCNC | Performed by: INTERNAL MEDICINE

## 2024-03-20 PROCEDURE — 83735 ASSAY OF MAGNESIUM: CPT | Mod: HCNC | Performed by: INTERNAL MEDICINE

## 2024-03-20 PROCEDURE — 82306 VITAMIN D 25 HYDROXY: CPT | Mod: HCNC | Performed by: INTERNAL MEDICINE

## 2024-03-20 PROCEDURE — 80053 COMPREHEN METABOLIC PANEL: CPT | Mod: HCNC | Performed by: INTERNAL MEDICINE

## 2024-03-20 PROCEDURE — 86038 ANTINUCLEAR ANTIBODIES: CPT | Mod: HCNC | Performed by: INTERNAL MEDICINE

## 2024-03-20 PROCEDURE — 86235 NUCLEAR ANTIGEN ANTIBODY: CPT | Mod: 59,HCNC | Performed by: INTERNAL MEDICINE

## 2024-03-20 PROCEDURE — 82671 ASSAY OF ESTROGENS: CPT | Mod: HCNC | Performed by: INTERNAL MEDICINE

## 2024-03-20 PROCEDURE — 86592 SYPHILIS TEST NON-TREP QUAL: CPT | Mod: HCNC | Performed by: INTERNAL MEDICINE

## 2024-03-20 PROCEDURE — 84403 ASSAY OF TOTAL TESTOSTERONE: CPT | Mod: HCNC | Performed by: INTERNAL MEDICINE

## 2024-03-20 PROCEDURE — 83921 ORGANIC ACID SINGLE QUANT: CPT | Mod: HCNC | Performed by: INTERNAL MEDICINE

## 2024-03-20 PROCEDURE — 84439 ASSAY OF FREE THYROXINE: CPT | Mod: HCNC | Performed by: INTERNAL MEDICINE

## 2024-03-20 PROCEDURE — 84591 ASSAY OF NOS VITAMIN: CPT | Mod: 59,HCNC | Performed by: INTERNAL MEDICINE

## 2024-03-20 PROCEDURE — 86039 ANTINUCLEAR ANTIBODIES (ANA): CPT | Mod: HCNC | Performed by: INTERNAL MEDICINE

## 2024-03-20 PROCEDURE — 84402 ASSAY OF FREE TESTOSTERONE: CPT | Mod: HCNC | Performed by: INTERNAL MEDICINE

## 2024-03-20 PROCEDURE — 85025 COMPLETE CBC W/AUTO DIFF WBC: CPT | Mod: HCNC | Performed by: INTERNAL MEDICINE

## 2024-03-20 PROCEDURE — 83002 ASSAY OF GONADOTROPIN (LH): CPT | Mod: HCNC | Performed by: INTERNAL MEDICINE

## 2024-03-20 PROCEDURE — 84630 ASSAY OF ZINC: CPT | Mod: HCNC | Performed by: INTERNAL MEDICINE

## 2024-03-20 PROCEDURE — 85652 RBC SED RATE AUTOMATED: CPT | Mod: HCNC | Performed by: INTERNAL MEDICINE

## 2024-03-20 PROCEDURE — 82607 VITAMIN B-12: CPT | Mod: HCNC | Performed by: INTERNAL MEDICINE

## 2024-03-20 PROCEDURE — 82728 ASSAY OF FERRITIN: CPT | Mod: HCNC | Performed by: INTERNAL MEDICINE

## 2024-03-21 ENCOUNTER — OFFICE VISIT (OUTPATIENT)
Dept: INTERNAL MEDICINE | Facility: CLINIC | Age: 79
End: 2024-03-21
Payer: MEDICARE

## 2024-03-21 VITALS
BODY MASS INDEX: 25.8 KG/M2 | OXYGEN SATURATION: 98 % | HEART RATE: 78 BPM | HEIGHT: 64 IN | DIASTOLIC BLOOD PRESSURE: 80 MMHG | SYSTOLIC BLOOD PRESSURE: 138 MMHG | WEIGHT: 151.13 LBS

## 2024-03-21 DIAGNOSIS — E78.1 HYPERTRIGLYCERIDEMIA: ICD-10-CM

## 2024-03-21 DIAGNOSIS — I10 ESSENTIAL HYPERTENSION: ICD-10-CM

## 2024-03-21 DIAGNOSIS — L65.9 HAIR LOSS DISORDER: Primary | ICD-10-CM

## 2024-03-21 DIAGNOSIS — M54.12 CERVICAL RADICULOPATHY: ICD-10-CM

## 2024-03-21 DIAGNOSIS — D50.9 IRON DEFICIENCY ANEMIA, UNSPECIFIED IRON DEFICIENCY ANEMIA TYPE: ICD-10-CM

## 2024-03-21 DIAGNOSIS — M46.1 SACROILIITIS: ICD-10-CM

## 2024-03-21 DIAGNOSIS — D35.02 ADENOMA OF LEFT ADRENAL GLAND: ICD-10-CM

## 2024-03-21 DIAGNOSIS — M47.816 LUMBAR FACET ARTHROPATHY: ICD-10-CM

## 2024-03-21 LAB
ANA PATTERN 2: NORMAL
ANA SER QL IF: POSITIVE
ANA TITER 2: NORMAL
ANA TITR SER IF: NORMAL {TITER}
RPR SER QL: NORMAL
VIT B12 SERPL-MCNC: 223 NG/L (ref 180–914)

## 2024-03-21 PROCEDURE — 3079F DIAST BP 80-89 MM HG: CPT | Mod: HCNC,CPTII,S$GLB, | Performed by: INTERNAL MEDICINE

## 2024-03-21 PROCEDURE — 1126F AMNT PAIN NOTED NONE PRSNT: CPT | Mod: HCNC,CPTII,S$GLB, | Performed by: INTERNAL MEDICINE

## 2024-03-21 PROCEDURE — 1159F MED LIST DOCD IN RCRD: CPT | Mod: HCNC,CPTII,S$GLB, | Performed by: INTERNAL MEDICINE

## 2024-03-21 PROCEDURE — 99999 PR PBB SHADOW E&M-EST. PATIENT-LVL IV: CPT | Mod: PBBFAC,HCNC,, | Performed by: INTERNAL MEDICINE

## 2024-03-21 PROCEDURE — 3075F SYST BP GE 130 - 139MM HG: CPT | Mod: HCNC,CPTII,S$GLB, | Performed by: INTERNAL MEDICINE

## 2024-03-21 PROCEDURE — 1160F RVW MEDS BY RX/DR IN RCRD: CPT | Mod: HCNC,CPTII,S$GLB, | Performed by: INTERNAL MEDICINE

## 2024-03-21 PROCEDURE — 99214 OFFICE O/P EST MOD 30 MIN: CPT | Mod: HCNC,S$GLB,, | Performed by: INTERNAL MEDICINE

## 2024-03-21 PROCEDURE — 3288F FALL RISK ASSESSMENT DOCD: CPT | Mod: HCNC,CPTII,S$GLB, | Performed by: INTERNAL MEDICINE

## 2024-03-21 PROCEDURE — 1101F PT FALLS ASSESS-DOCD LE1/YR: CPT | Mod: HCNC,CPTII,S$GLB, | Performed by: INTERNAL MEDICINE

## 2024-03-21 RX ORDER — MINOXIDIL 2.5 MG/1
2.5 TABLET ORAL DAILY
Qty: 90 TABLET | Refills: 3 | Status: SHIPPED | OUTPATIENT
Start: 2024-03-21

## 2024-03-21 NOTE — PROGRESS NOTES
Subjective     Patient ID: Shakira Pearl is a 78 y.o. female.    Chief Complaint: Hair/Scalp Problem    HPI  Pt with LAM, HLD, HTN, Sacroiliitis, L Adrenal adenoma is here for evaluation of worsening hair loss x 6 months. She denies any preceding illness, scalp rashes/pain, new medications.  Does have some scalp itching. She did stop her Cymbalta and baclofen to see if it would help. Previously on testosterone which did cause hair loss so she stopped it also.  Review of Systems   Constitutional:  Negative for activity change, appetite change, chills, diaphoresis, fatigue, fever and unexpected weight change.   HENT:  Negative for postnasal drip, rhinorrhea, sinus pressure/congestion, sneezing, sore throat, trouble swallowing and voice change.    Respiratory:  Negative for cough, shortness of breath and wheezing.    Cardiovascular:  Negative for chest pain, palpitations and leg swelling.   Gastrointestinal:  Negative for abdominal pain, blood in stool, constipation, diarrhea, nausea and vomiting.   Genitourinary:  Negative for dysuria.   Musculoskeletal:  Positive for arthralgias, back pain and neck pain. Negative for myalgias.   Integumentary:  Negative for rash and wound.   Allergic/Immunologic: Negative for environmental allergies and food allergies.   Hematological:  Negative for adenopathy. Does not bruise/bleed easily.          Objective     Physical Exam  Constitutional:       General: She is not in acute distress.     Appearance: Normal appearance. She is well-developed. She is not diaphoretic.   HENT:      Head: Normocephalic and atraumatic.      Right Ear: External ear normal.      Left Ear: External ear normal.      Nose: Nose normal.      Mouth/Throat:      Pharynx: No oropharyngeal exudate.   Eyes:      General: No scleral icterus.        Right eye: No discharge.         Left eye: No discharge.      Conjunctiva/sclera: Conjunctivae normal.      Pupils: Pupils are equal, round, and reactive to light.    Neck:      Vascular: No JVD.   Cardiovascular:      Rate and Rhythm: Normal rate and regular rhythm.      Pulses: Normal pulses.      Heart sounds: Normal heart sounds.   Pulmonary:      Effort: Pulmonary effort is normal. No respiratory distress.      Breath sounds: Normal breath sounds. No wheezing, rhonchi or rales.   Abdominal:      General: Bowel sounds are normal. There is no distension.      Palpations: Abdomen is soft.      Tenderness: There is no abdominal tenderness. There is no guarding or rebound.   Musculoskeletal:      Cervical back: Neck supple.      Right lower leg: No edema.      Left lower leg: No edema.   Lymphadenopathy:      Cervical: No cervical adenopathy.   Skin:     General: Skin is warm and dry.      Coloration: Skin is not pale.      Findings: No rash.   Neurological:      General: No focal deficit present.      Mental Status: She is alert and oriented to person, place, and time.      Gait: Gait normal.   Psychiatric:         Behavior: Behavior normal.         Thought Content: Thought content normal.            Assessment and Plan     1. Hair loss disorder  -     Ambulatory referral/consult to Dermatology; Future; Expected date: 03/28/2024  -     minoxidiL (LONITEN) 2.5 MG tablet; Take 1 tablet (2.5 mg total) by mouth once daily.  Dispense: 90 tablet; Refill: 3    2. Essential hypertension    3. Hypertriglyceridemia    4. Iron deficiency anemia, unspecified iron deficiency anemia type    5. Adenoma of left adrenal gland    6. Lumbar facet arthropathy    7. Sacroiliitis    8. Cervical radiculopathy       Alopecia- trial of Minoxidil 2.5 mg qd and start OTC DHS/Head and shoulder clinical strength shampoo 2/2 scalp irritation    -referral to Derm     LAM- followed by GI   -previously on Iron      HLD- on Zetia/Vascepa      HTN- stable on Hyzaar         Sacroiliitis/DJD Cervical/Lumbar spine- seeing Pain management       Fatty liver/left lobe cyst- stable      Adrenal adenoma- has seen  Endo      Hx of Right 5th metatarsal fracture

## 2024-03-22 DIAGNOSIS — R76.8 ANA POSITIVE: Primary | ICD-10-CM

## 2024-03-22 LAB
ANA PATTERN 1: NORMAL
ANTI SM ANTIBODY: 0.07 RATIO (ref 0–0.99)
ANTI SM/RNP ANTIBODY: 0.07 RATIO (ref 0–0.99)
ANTI-SM INTERPRETATION: NEGATIVE
ANTI-SM/RNP INTERPRETATION: NEGATIVE
ANTI-SSA ANTIBODY: 0.05 RATIO (ref 0–0.99)
ANTI-SSA INTERPRETATION: NEGATIVE
ANTI-SSB ANTIBODY: 0.06 RATIO (ref 0–0.99)
ANTI-SSB INTERPRETATION: NEGATIVE
DSDNA AB SER-ACNC: NORMAL [IU]/ML

## 2024-03-22 NOTE — PROGRESS NOTES
Please refer Shakira to Rheumatology for evaluation of her Elevated GOLD 1:1,280 to rule out lupus or other autoimmune diseases.  Referral placed

## 2024-03-25 ENCOUNTER — TELEPHONE (OUTPATIENT)
Dept: GASTROENTEROLOGY | Facility: CLINIC | Age: 79
End: 2024-03-25
Payer: MEDICARE

## 2024-03-25 ENCOUNTER — PATIENT MESSAGE (OUTPATIENT)
Dept: INTERNAL MEDICINE | Facility: CLINIC | Age: 79
End: 2024-03-25
Payer: MEDICARE

## 2024-03-25 LAB
ESTRADIOL SERPL-MCNC: 712 PG/ML
ESTRONE SERPL-MCNC: 1850 PG/ML
TESTOST FREE SERPL-MCNC: <0.4 PG/ML
ZINC SERPL-MCNC: 74 UG/DL (ref 60–130)

## 2024-03-25 NOTE — TELEPHONE ENCOUNTER
Dr Black is referring to Rheumatology. No appointments are being available.  She is seeing  Dr Black in late April. Is it possible to get her in before 4/22.

## 2024-03-25 NOTE — TELEPHONE ENCOUNTER
----- Message from Prachi Maxwell sent at 3/25/2024 12:28 PM CDT -----  Pt calling in  regards to  scheduling appt with Rheum , please call       Confirmed patient's contact info below:  Contact Name: Shakira Dae  Phone Number: [phone]

## 2024-03-25 NOTE — TELEPHONE ENCOUNTER
----- Message from Leslie Law sent at 3/25/2024 12:44 PM CDT -----  Regarding: missed call  Contact: [phone]  Shakria Pearl calling regarding Patient Advice (message) for # pt is returning call from Jyoti

## 2024-03-27 LAB — BIOTIN SERPL-SCNC: 1648.8 PG/ML (ref 221–3004)

## 2024-04-01 ENCOUNTER — LAB VISIT (OUTPATIENT)
Dept: LAB | Facility: HOSPITAL | Age: 79
End: 2024-04-01
Attending: INTERNAL MEDICINE
Payer: MEDICARE

## 2024-04-01 ENCOUNTER — OFFICE VISIT (OUTPATIENT)
Dept: RHEUMATOLOGY | Facility: CLINIC | Age: 79
End: 2024-04-01
Payer: MEDICARE

## 2024-04-01 VITALS
WEIGHT: 147.69 LBS | HEIGHT: 63 IN | SYSTOLIC BLOOD PRESSURE: 144 MMHG | DIASTOLIC BLOOD PRESSURE: 75 MMHG | BODY MASS INDEX: 26.17 KG/M2 | HEART RATE: 75 BPM

## 2024-04-01 DIAGNOSIS — R53.1 WEAKNESS: ICD-10-CM

## 2024-04-01 DIAGNOSIS — R76.8 ANA POSITIVE: ICD-10-CM

## 2024-04-01 DIAGNOSIS — R76.8 POSITIVE ANA (ANTINUCLEAR ANTIBODY): Primary | ICD-10-CM

## 2024-04-01 DIAGNOSIS — R76.8 POSITIVE ANA (ANTINUCLEAR ANTIBODY): ICD-10-CM

## 2024-04-01 DIAGNOSIS — R53.83 FATIGUE, UNSPECIFIED TYPE: ICD-10-CM

## 2024-04-01 LAB
C3 SERPL-MCNC: 162 MG/DL (ref 50–180)
C4 SERPL-MCNC: 41 MG/DL (ref 11–44)
CK SERPL-CCNC: 134 U/L (ref 20–180)
CRP SERPL-MCNC: 6.1 MG/L (ref 0–8.2)
DAT IGG-SP REAG RBC-IMP: NORMAL

## 2024-04-01 PROCEDURE — 86160 COMPLEMENT ANTIGEN: CPT | Mod: 59,HCNC | Performed by: INTERNAL MEDICINE

## 2024-04-01 PROCEDURE — 1160F RVW MEDS BY RX/DR IN RCRD: CPT | Mod: HCNC,CPTII,S$GLB, | Performed by: INTERNAL MEDICINE

## 2024-04-01 PROCEDURE — 99204 OFFICE O/P NEW MOD 45 MIN: CPT | Mod: HCNC,S$GLB,, | Performed by: INTERNAL MEDICINE

## 2024-04-01 PROCEDURE — 3288F FALL RISK ASSESSMENT DOCD: CPT | Mod: HCNC,CPTII,S$GLB, | Performed by: INTERNAL MEDICINE

## 2024-04-01 PROCEDURE — 86880 COOMBS TEST DIRECT: CPT | Mod: HCNC | Performed by: INTERNAL MEDICINE

## 2024-04-01 PROCEDURE — 86146 BETA-2 GLYCOPROTEIN ANTIBODY: CPT | Mod: HCNC | Performed by: INTERNAL MEDICINE

## 2024-04-01 PROCEDURE — 82550 ASSAY OF CK (CPK): CPT | Mod: HCNC | Performed by: INTERNAL MEDICINE

## 2024-04-01 PROCEDURE — 3078F DIAST BP <80 MM HG: CPT | Mod: HCNC,CPTII,S$GLB, | Performed by: INTERNAL MEDICINE

## 2024-04-01 PROCEDURE — 86225 DNA ANTIBODY NATIVE: CPT | Mod: HCNC | Performed by: INTERNAL MEDICINE

## 2024-04-01 PROCEDURE — 85613 RUSSELL VIPER VENOM DILUTED: CPT | Mod: HCNC | Performed by: INTERNAL MEDICINE

## 2024-04-01 PROCEDURE — 3077F SYST BP >= 140 MM HG: CPT | Mod: HCNC,CPTII,S$GLB, | Performed by: INTERNAL MEDICINE

## 2024-04-01 PROCEDURE — 99999 PR PBB SHADOW E&M-EST. PATIENT-LVL III: CPT | Mod: PBBFAC,HCNC,, | Performed by: INTERNAL MEDICINE

## 2024-04-01 PROCEDURE — 1100F PTFALLS ASSESS-DOCD GE2>/YR: CPT | Mod: HCNC,CPTII,S$GLB, | Performed by: INTERNAL MEDICINE

## 2024-04-01 PROCEDURE — 83516 IMMUNOASSAY NONANTIBODY: CPT | Mod: HCNC | Performed by: INTERNAL MEDICINE

## 2024-04-01 PROCEDURE — 86140 C-REACTIVE PROTEIN: CPT | Mod: HCNC | Performed by: INTERNAL MEDICINE

## 2024-04-01 PROCEDURE — 86147 CARDIOLIPIN ANTIBODY EA IG: CPT | Mod: HCNC | Performed by: INTERNAL MEDICINE

## 2024-04-01 PROCEDURE — 1159F MED LIST DOCD IN RCRD: CPT | Mod: HCNC,CPTII,S$GLB, | Performed by: INTERNAL MEDICINE

## 2024-04-01 PROCEDURE — 86160 COMPLEMENT ANTIGEN: CPT | Mod: HCNC | Performed by: INTERNAL MEDICINE

## 2024-04-01 PROCEDURE — 85730 THROMBOPLASTIN TIME PARTIAL: CPT | Mod: HCNC | Performed by: INTERNAL MEDICINE

## 2024-04-01 ASSESSMENT — ROUTINE ASSESSMENT OF PATIENT INDEX DATA (RAPID3)
TOTAL RAPID3 SCORE: 5.11
PSYCHOLOGICAL DISTRESS SCORE: 1.1
PAIN SCORE: 7
FATIGUE SCORE: 4.5
MDHAQ FUNCTION SCORE: 1
PATIENT GLOBAL ASSESSMENT SCORE: 5
AM STIFFNESS SCORE: 1, YES

## 2024-04-01 NOTE — Clinical Note
Patient has a positive GOLD with a negative profile.  I do not see evidence of a rheumatologic condition on exam and history.  Will assess with additional labs.  Patient notes recurrent fall.  She declined my order of PT for fall prevention.  She will discuss with you.

## 2024-04-01 NOTE — PROGRESS NOTES
"Subjective:       Patient ID: Shakira Pearl is a 78 y.o. female.    Chief Complaint: Positive GOLD    HPI:  Shakira Pearl is a 78 y.o. female HTN, urinary incontinence, spine arthritis, iron deficiency anemia, recurrent falls  labs done by Dr. Sam primary doctor showed positive GOLD. She has noticed hair loss.    Recurrent falls.  Last fall December 2023 and as a result she stopped taking Cymbalta.   Cymbalta helped her pain but it caused her to have falls.  10-15 minutes of morning stiffness.   Pain in neck, across to shoulders and down spine.     Review of Systems   Constitutional:  Positive for fatigue.   HENT: Negative.     Eyes: Negative.    Respiratory: Negative.     Cardiovascular: Negative.    Gastrointestinal: Negative.    Endocrine: Negative.    Genitourinary: Negative.    Musculoskeletal:  Positive for back pain.   Allergic/Immunologic: Negative.    Neurological: Negative.              Lupus Review of Systems  Alopecia: yes  Photosensitivity: no   Raynaud's: foot turns blue in the morning  Oral or nasal ulcers: no  Rashes: no  No pleurisy or pericarditis.  No seizures, psychosis, or stroke.  No venous or arterial clots.  Pregnancy hx (if applicable): no miscarriages;  1 full term delivery no issues      Objective:   BP (!) 144/75   Pulse 75   Ht 5' 3" (1.6 m)   Wt 67 kg (147 lb 11.3 oz)   BMI 26.17 kg/m²      Physical Exam   Constitutional: She is oriented to person, place, and time. normal appearance.   HENT:   Mouth/Throat: Mucous membranes are moist.   Neck:   Decreased ROM in all directions in neck   Cardiovascular: Normal rate, regular rhythm and normal heart sounds.   Pulmonary/Chest: Effort normal and breath sounds normal.   Abdominal: Soft. Bowel sounds are normal.   Musculoskeletal:      Cervical back: Neck supple.      Comments: 28 joint count: 0 swollen and 0 tender   Neurological: She is alert and oriented to person, place, and time.   Skin: Skin is warm.   Psychiatric: Her behavior " is normal. Mood normal.            LABS    Component      Latest Ref [facility] 3/20/2024   WBC      3.90 - 12.70 K/uL 8.30    RBC      4.00 - 5.40 M/uL 4.16    Hemoglobin      12.0 - 16.0 g/dL 12.1    Hemoglobin      12.0 - 16.0 g/dL 12.1    Hematocrit      37.0 - 48.5 % 37.3    MCV      82 - 98 fL 90    MCH      27.0 - 31.0 pg 29.1    MCHC      32.0 - 36.0 g/dL 32.4    RDW      11.5 - 14.5 % 13.2    Platelet Count      150 - 450 K/uL 262    MPV      9.2 - 12.9 fL 11.0    Immature Granulocytes      0.0 - 0.5 % 0.4    Gran # (ANC)      1.8 - 7.7 K/uL 6.0    Immature Grans (Abs)      0.00 - 0.04 K/uL 0.03    Lymph #      1.0 - 4.8 K/uL 1.8    Mono #      0.3 - 1.0 K/uL 0.4    Eos #      0.0 - 0.5 K/uL 0.1    Baso #      0.00 - 0.20 K/uL 0.02    nRBC      0 /100 WBC 0    Gran %      38.0 - 73.0 % 72.2    Lymph %      18.0 - 48.0 % 21.3    Mono %      4.0 - 15.0 % 4.3    Eos %      0.0 - 8.0 % 1.6    Basophil %      0.0 - 1.9 % 0.2    Differential Method Automated    Sodium      136 - 145 mmol/L 137    Potassium      3.5 - 5.1 mmol/L 3.7    Chloride      95 - 110 mmol/L 103    CO2      23 - 29 mmol/L 23    Glucose      70 - 110 mg/dL 104    BUN      8 - 23 mg/dL 20    Creatinine      0.5 - 1.4 mg/dL 0.8    Calcium      8.7 - 10.5 mg/dL 9.6    PROTEIN TOTAL      6.0 - 8.4 g/dL 7.3    Albumin      3.5 - 5.2 g/dL 3.6    BILIRUBIN TOTAL      0.1 - 1.0 mg/dL 0.4    ALP      55 - 135 U/L 52 (L)    AST      10 - 40 U/L 16    ALT      10 - 44 U/L 14    eGFR      >60 mL/min/1.73 m^2 >60.0    Anion Gap      8 - 16 mmol/L 11    Iron      30 - 160 ug/dL 91    Iron      30 - 160 ug/dL 91    Transferrin      200 - 375 mg/dL 303    Transferrin      200 - 375 mg/dL 303    TIBC      250 - 450 ug/dL 448    TIBC      250 - 450 ug/dL 448    Saturated Iron      20 - 50 % 20    Saturated Iron      20 - 50 % 20    Estrone      pg/mL 1850    Estradiol      pg/mL 712    Ferritin      20.0 - 300.0 ng/mL 66    Ferritin      20.0 - 300.0 ng/mL 66     Free T4      0.71 - 1.51 ng/dL 0.87    Vitamin B7, H (Biotin)      221.0 - 3004.0 pg/mL 1648.8    Vitamin D      30 - 96 ng/mL 36    Vitamin B12      180 - 914 ng/L 223    Zinc, Serum-ALT      60 - 130 ug/dL 74    Magnesium       1.6 - 2.6 mg/dL 1.7    DHEA-SO4      Not Established ug/dL 84.9    RPR      Non-reactive  Non-reactive    FSH      See Text mIU/mL 0.18    Luteinizing Hormone      See Text mIU/mL 0.4    Sed Rate      0 - 36 mm/Hr 35    GOLD Screen      Negative <1:80  Positive !    Testosterone, Total      5 - 73 ng/dL 19    Testosterone, Free      pg/mL <0.4    TSH      0.400 - 4.000 uIU/mL 2.184    Prolactin      5.2 - 26.5 ng/mL 18.1       Legend:  (L) Low  ! Abnormal  Assessment:       Positive GOLD.  High titre.  Negative profile  Alopecia  Spine arthritis  Recurrent falls   Plan:       No evidence of a rheumatologic condition at this time.  Will evaluate with additional labs.  Patient to follow with dermatology as ordered by primary doctor.  Patient declined PT for gait training but will discuss with primary.

## 2024-04-02 LAB — DSDNA AB SER-ACNC: NORMAL [IU]/ML

## 2024-04-04 LAB
B2 GLYCOPROT1 IGA SER QL: 3.8 U/ML
B2 GLYCOPROT1 IGG SER QL: 1.4 U/ML
B2 GLYCOPROT1 IGM SER QL: <2.4 U/ML
CARDIOLIPIN IGG SER IA-ACNC: <9.4 GPL (ref 0–14.99)
CARDIOLIPIN IGM SER IA-ACNC: <9.4 MPL (ref 0–12.49)
CONFIRM DRVVT STA-STACLOT: NORMAL S
DRVVT SCREEN TO CONFIRM RATIO: NORMAL {RATIO}
HEPARIN NT PPP QL: NORMAL
HISTONE IGG SER IA-ACNC: 0.3 UNITS (ref 0–0.9)
LA 3 SCREEN W REFLEX-IMP: NORMAL
LMW HEPARIN IND PLT AB SER QL: NORMAL
MIXING DRVVT/NORMAL: NORMAL %
NEUTRALIZED DRVVT SCREEN RATIO: NORMAL
PROTHROMBIN TIME: 12.8 S (ref 12–15.5)
SCREEN APTT/NORMAL: 0.87
SCREEN APTT/NORMAL: NORMAL
SCREEN DRVVT/NORMAL: 0.94 %
THROMBIN TIME: NORMAL S

## 2024-04-19 ENCOUNTER — TELEPHONE (OUTPATIENT)
Dept: GASTROENTEROLOGY | Facility: CLINIC | Age: 79
End: 2024-04-19
Payer: MEDICARE

## 2024-04-19 NOTE — TELEPHONE ENCOUNTER
----- Message from Grisel Adams sent at 4/19/2024 10:56 AM CDT -----  Contact: [phone]  PATIENTCALL     Pt is calling to speak with Jyoti in provider office states she have some questions about her appt She is asking for a return call please call.

## 2024-04-22 ENCOUNTER — OFFICE VISIT (OUTPATIENT)
Dept: GASTROENTEROLOGY | Facility: CLINIC | Age: 79
End: 2024-04-22
Payer: MEDICARE

## 2024-04-22 ENCOUNTER — TELEPHONE (OUTPATIENT)
Dept: ENDOSCOPY | Facility: HOSPITAL | Age: 79
End: 2024-04-22
Payer: MEDICARE

## 2024-04-22 VITALS
HEIGHT: 63 IN | HEART RATE: 75 BPM | SYSTOLIC BLOOD PRESSURE: 144 MMHG | WEIGHT: 151.69 LBS | BODY MASS INDEX: 26.88 KG/M2 | DIASTOLIC BLOOD PRESSURE: 75 MMHG

## 2024-04-22 DIAGNOSIS — K44.9 HIATAL HERNIA: Primary | ICD-10-CM

## 2024-04-22 DIAGNOSIS — Z79.899 ENCOUNTER FOR MONITORING LONG-TERM PROTON PUMP INHIBITOR THERAPY: ICD-10-CM

## 2024-04-22 DIAGNOSIS — Z51.81 ENCOUNTER FOR MONITORING LONG-TERM PROTON PUMP INHIBITOR THERAPY: ICD-10-CM

## 2024-04-22 LAB — METHYLMALONATE SERPL-SCNC: 0.18 NMOL/ML

## 2024-04-22 PROCEDURE — 3077F SYST BP >= 140 MM HG: CPT | Mod: HCNC,CPTII,S$GLB, | Performed by: INTERNAL MEDICINE

## 2024-04-22 PROCEDURE — 3288F FALL RISK ASSESSMENT DOCD: CPT | Mod: HCNC,CPTII,S$GLB, | Performed by: INTERNAL MEDICINE

## 2024-04-22 PROCEDURE — 1160F RVW MEDS BY RX/DR IN RCRD: CPT | Mod: HCNC,CPTII,S$GLB, | Performed by: INTERNAL MEDICINE

## 2024-04-22 PROCEDURE — 99214 OFFICE O/P EST MOD 30 MIN: CPT | Mod: HCNC,S$GLB,, | Performed by: INTERNAL MEDICINE

## 2024-04-22 PROCEDURE — 1126F AMNT PAIN NOTED NONE PRSNT: CPT | Mod: HCNC,CPTII,S$GLB, | Performed by: INTERNAL MEDICINE

## 2024-04-22 PROCEDURE — 1101F PT FALLS ASSESS-DOCD LE1/YR: CPT | Mod: HCNC,CPTII,S$GLB, | Performed by: INTERNAL MEDICINE

## 2024-04-22 PROCEDURE — 3078F DIAST BP <80 MM HG: CPT | Mod: HCNC,CPTII,S$GLB, | Performed by: INTERNAL MEDICINE

## 2024-04-22 PROCEDURE — 1159F MED LIST DOCD IN RCRD: CPT | Mod: HCNC,CPTII,S$GLB, | Performed by: INTERNAL MEDICINE

## 2024-04-22 PROCEDURE — 99999 PR PBB SHADOW E&M-EST. PATIENT-LVL IV: CPT | Mod: PBBFAC,HCNC,, | Performed by: INTERNAL MEDICINE

## 2024-04-22 NOTE — PROGRESS NOTES
"GENERAL GI PATIENT INTAKE:    COVID symptoms in the last 7 days (runny nose, sore throat, congestion, cough, fever): No  PCP: Angel Bello  If not PCP-  number given to establish [phone]: Yes    ALLERGIES REVIEWED:  yes    CHIEF COMPLAINT:    Chief Complaint   Patient presents with    Advice Only    EGD       VITAL SIGNS:  BP (!) 144/75   Pulse 75   Ht 5' 3" (1.6 m)   Wt 68.8 kg (151 lb 10.8 oz)   BMI 26.87 kg/m²      Change in medical, surgical, family or social history: Yes      REVIEWED MEDICATION LIST RECONCILED INCLUDING ABOVE MEDS:  Yes     "

## 2024-04-22 NOTE — Clinical Note
"Procedure: EGD  Diagnosis: GERD  Procedure Timin-12 weeks  *If within 4 weeks selected, please sara as high priority*  *If greater than 12 weeks, please select "5-12 weeks" and delay sending until 3 months prior to requested date*  Provider: Myself  Location: [address]  Additional Scheduling Information: No scheduling concerns  Prep Specifications:Standard prep  Is the patient taking a GLP-1 Agonist:no  Have you attached a patient to this message: yes "

## 2024-04-22 NOTE — TELEPHONE ENCOUNTER
"[facility] come [facility] July informed patient  has no leslie at this time I will reach out later this week.      Yfn Black MD  P [facility]  Procedure: EGD    Diagnosis: GERD    Procedure Timin-12 weeks    *If within 4 weeks selected, please sara as high priority*    *If greater than 12 weeks, please select "5-12 weeks" and delay sending until 3 months prior to requested date*    Provider: Myself    Location: [address]    Additional Scheduling Information: No scheduling concerns    Prep Specifications:Standard prep    Is the patient taking a GLP-1 Agonist:no    Have you attached a patient to this message: yes  "

## 2024-04-22 NOTE — PROGRESS NOTES
[facility] Consultation Note    Reason for Consult:  The primary encounter diagnosis was Hiatal hernia. A diagnosis of Encounter for monitoring long-term proton pump inhibitor therapy was also pertinent to this visit.    PCP:   Angel Bello       Referring MD:  No referring provider defined for this encounter.    Initial History of Present Illness (HPI):  This is a 78 y.o. female here for evaluation of GERD symptoms not well controlled at night on her PPI colonoscopy is up-to-date history of adenomas colon polyps patient would like further evaluation with EGD she does have history of a hiatal hernia.  Patient with occasional dysphagia no odynophagia no early satiety no nausea vomiting no fever no chills no shortness of breath no chest pain no GI bleeding no blood in her stool no family history of esophageal or stomach cancer.    Abdominal pain - no  Reflux - as above  Dysphagia - no   Bowel habits - normal  GI bleeding - none  NSAID usage - none    Interval HPI 04/22/2024:  The patient's last visit with me was on 6/24/2022.      ROS:  Constitutional: No fevers, chills, No weight loss  ENT:  As above heartburn no dysphagia no odynophagia no hoarseness  CV: No chest pain, no palpitation  Pulm: No cough, No shortness of breath, no wheezing  Ophtho: No vision changes  GI: see HPI  Derm: No rash, no itching  Heme: No lymphadenopathy, No easy bruising  MSK: No significant arthritis  : No dysuria, No hematuria  Endo: No hot or cold intolerance  Neuro: No syncope, No seizure, no strokes  Psych: No uncontrolled anxiety, No uncontrolled depression    Medical History:  has a past medical history of Adrenal adenoma, Allergy (sinus), Arthritis (back), Cataract, Colon polyps, Degenerative disc disease (back), Diverticulosis of colon, Dyspepsia, Fatty liver (01/12/2023), GERD (gastroesophageal reflux disease), Heartburn, Hemorrhoids, internal, Hiatal hernia, LAM (iron deficiency anemia), Liver  cyst, Neuromuscular disorder, and Vitamin D deficiency.    Surgical History:  has a past surgical history that includes Appendectomy (age 19); Hysterectomy (38); Cholecystectomy; Injection of joint (Bilateral, 02/18/2019); Knee arthroscopy w/ meniscectomy (Left, 10/03/2019); Chondroplasty of knee (Left, 10/03/2019); Synovectomy of knee (Left, 10/03/2019); Injection of joint (Right, 08/20/2020); Esophagogastroduodenoscopy (N/A, 09/09/2020); Colonoscopy (N/A, 09/09/2020); Cataract extraction w/  intraocular lens implant; Cataract extraction w/  intraocular lens implant; YAG Laser Capsulotomy (Bilateral); hysterectomy, vaginal, with uterosacral ligament vault suspension; and Colonoscopy (N/A, 9/20/2023).    Family History: family history includes Cancer (age of onset: 65) in her father; Heart disease (age of onset: 67) in her mother; No Known Problems in her brother, maternal aunt, maternal grandfather, maternal grandmother, maternal uncle, paternal aunt, paternal grandfather, paternal grandmother, paternal uncle, sister, and son; Stomach cancer in her father; Stroke in her mother..     Social History:  reports that she has never smoked. She has never used smokeless tobacco. She reports that she does not drink alcohol and does not use drugs.    Review of patient's allergies indicates:   Allergen Reactions    Demerol [meperidine] Nausea And Vomiting     Other reaction(s): Nausea    Papaya      Illness vomiting    Sulfa (sulfonamide antibiotics) Rash    Sulfur Rash       Medication List with Changes/Refills   Current Medications    CHOLECALCIFEROL, VITAMIN D3, (VITAMIN D3) 50 MCG (2,000 UNIT) CAP CAPSULE    Take 1 capsule (2,000 Units total) by mouth once daily.    COVID [id number],12UP,,ANDU,,PF, (SPIKEVAX 5489-7041,12Y UP,,PF,) 50 MCG/0.5 ML INJECTION    Inject into the muscle.    DICLOFENAC (VOLTAREN) 75 MG EC TABLET    Take 1 tablet (75 mg total) by mouth 2 (two) times daily as needed (pain).    ERGOCALCIFEROL (VITAMIN  "D2) 50,000 UNIT CAP    TAKE ONE CAPSULE BY MOUTH EVERY 7 DAYS    ESTRADIOL (ESTRACE) 0.01 % (0.1 MG/GRAM) VAGINAL CREAM    0.5 grams with applicator or dime-sized amount with finger in vagina nightly x 2 weeks, then twice a week thereafter    ESTRADIOL 0.05 MG/24 HR TD PTSW (VIVELLE-DOT) 0.05 MG/24 HR        ESTRADIOL VALERATE (DELESTROGEN) 40 MG/ML INJECTION    Inject 0.5 mLs (20 mg total) into the muscle every 28 days. Inject into the muscle.    FLUOCINONIDE (LIDEX) 0.05 % EXTERNAL SOLUTION    Apply to the affected area on scalp twice a day    ICOSAPENT ETHYL (VASCEPA) 1 GRAM CAP    Take 2 capsules (2 g total) by mouth 2 (two) times a day.    LOSARTAN-HYDROCHLOROTHIAZIDE 50-12.5 MG (HYZAAR) 50-12.5 MG PER TABLET    Take 1 tablet by mouth once daily.    MINOXIDIL (LONITEN) 2.5 MG TABLET    Take 1 tablet (2.5 mg total) by mouth once daily.    MIRABEGRON (MYRBETRIQ) 25 MG TB24 ER TABLET    Take 1 tablet (25 mg total) by mouth once daily.    PANTOPRAZOLE (PROTONIX) 40 MG TABLET    TAKE ONE TABLET BY MOUTH EVERY MORNING 45 MINUTES BEFORE BREAKFAST    POTASSIUM CHLORIDE SA (K-DUR,KLOR-CON M) 10 MEQ TABLET    Take 1 tablet (10 mEq total) by mouth once daily.         Objective Findings:    Vital Signs:  BP (!) 144/75   Pulse 75   Ht 5' 3" (1.6 m)   Wt 68.8 kg (151 lb 10.8 oz)   BMI 26.87 kg/m²   Body mass index is 26.87 kg/m².    Physical Exam:  General Appearance: Well appearing in no acute distress  Eyes:    No scleral icterus  ENT:  No lesions or masses   Lungs: CTA bilaterally, no wheezes, no rhonchi, no rales  Heart:  S1, S2 normal, no murmurs heard  Abdomen:  Non distended, soft, no guarding, no rebound, no tenderness, no appreciated ascites, no bruits, no hepatosplenomegaly,  No CVA tenderness, no appreciated hernias, no Monroe sign, no McBurney point tenderness  Musculoskeletal:  No major joint deformities  Skin: No petechiae or rash on exposed skin areas  Neurologic:  Alert and oriented x4  Psychiatric:  " Normal speech mentation and affect    Labs:  Lab Results   Component Value Date    WBC 8.30 03/20/2024    HGB 12.1 03/20/2024    HGB 12.1 03/20/2024    HCT 37.3 03/20/2024     03/20/2024    CHOL 185 07/19/2023    TRIG 263 (H) 07/19/2023    HDL 50 07/19/2023    ALT 14 03/20/2024    AST 16 03/20/2024     03/20/2024    K 3.7 03/20/2024     03/20/2024    CREATININE 0.8 03/20/2024    BUN 20 03/20/2024    CO2 23 03/20/2024    TSH 2.184 03/20/2024    INR 0.9 05/06/2020    HGBA1C 5.6 02/08/2024         Medical Decision Making:  Lab work reviewed  Prior EGD colonoscopy images and path personally reviewed by myself  EGD talk given  PPI talk given  PPI lab work up-to-date lab work looks good      Assessment:  1. Hiatal hernia    2. Encounter for monitoring long-term proton pump inhibitor therapy         Recommendations:  Referral to endoscopy schedulers for EGD for GERD evaluation  History of colon adenomas polyps in her surveillance colonoscopy program with Dr. Cuevas  Return to GI clinic 3-4 months for follow-up okay for telemedicine video visit    No follow-ups on file.      Order summary:         Thank you so much for allowing me to participate in the care of Shakira JOE Pearl    Yfn Black MD    DISCLAIMER: This note was prepared with [employer] voice recognition transcription software. Garbled syntax, mangled or inadvertent pronouns, and other bizarre constructions may be attributed to that software system. While efforts were made to correct any mistakes made by this voice recognition program, some errors and/or omissions may remain in the note that were missed when the note was originally created.

## 2024-04-22 NOTE — PATIENT INSTRUCTIONS
"Procedure: EGD    Diagnosis: GERD    Procedure Timin-12 weeks    *If within 4 weeks selected, please sara as high priority*    *If greater than 12 weeks, please select "5-12 weeks" and delay sending until 3 months prior to requested date*    Provider: Myself    Location: [address]    Additional Scheduling Information: No scheduling concerns    Prep Specifications:Standard prep    Is the patient taking a GLP-1 Agonist:no    Have you attached a patient to this message: yes   "

## 2024-04-24 ENCOUNTER — TELEPHONE (OUTPATIENT)
Dept: ENDOSCOPY | Facility: HOSPITAL | Age: 79
End: 2024-04-24
Payer: MEDICARE

## 2024-04-24 VITALS — WEIGHT: 151.69 LBS | HEIGHT: 63 IN | BODY MASS INDEX: 26.88 KG/M2

## 2024-04-24 DIAGNOSIS — K21.9 GASTROESOPHAGEAL REFLUX DISEASE, UNSPECIFIED WHETHER ESOPHAGITIS PRESENT: Primary | ICD-10-CM

## 2024-04-24 NOTE — TELEPHONE ENCOUNTER
"----- Message from Nicole Mcrae sent at 2024  8:21 AM CDT -----    ----- Message -----  From: Yfn Black MD  Sent: 2024   4:06 PM CDT  To: [facility]    Procedure: EGD    Diagnosis: GERD    Procedure Timin-12 weeks    #If within 4 weeks selected, please sara as high priority#    #If greater than 12 weeks, please select "5-12 weeks" and delay sending until 3 months prior to requested date#    Provider: Myself    Location: [address]    Additional Scheduling Information: No scheduling concerns    Prep Specifications:Standard prep    Is the patient taking a GLP-1 Agonist:no    Have you attached a patient to this message: yes  "

## 2024-04-24 NOTE — TELEPHONE ENCOUNTER
Spoke to Shakira to schedule procedure(s) Upper Endoscopy (EGD)       Physician to perform procedure(s) Dr. DANNIE Black  Date of Procedure (s) 6/18/24  Arrival Time 9:30 AM  Time of Procedure(s) 10:30 AM   Location of Procedure(s) [facility] [address]  Type of Rx Prep sent to patient: Other  Instructions provided to patient via [facility]    Patient was informed on the following information and verbalized understanding. Screening questionnaire reviewed with patient and complete. If procedure requires anesthesia, a responsible adult needs to be present to accompany the patient home, patient cannot drive after receiving anesthesia. Appointment details are tentative, especially check-in time. Patient will receive a prep-op call 7 days prior to confirm check-in time for procedure. If applicable the patient should contact their pharmacy to verify Rx for procedure prep is ready for pick-up. Patient was advised to call the scheduling department at [phone] if pharmacy states no Rx is available. Patient was advised to call the endoscopy scheduling department if any questions or concerns arise.       Endoscopy Scheduling Department

## 2024-04-30 ENCOUNTER — PATIENT MESSAGE (OUTPATIENT)
Dept: GASTROENTEROLOGY | Facility: CLINIC | Age: 79
End: 2024-04-30
Payer: MEDICARE

## 2024-04-30 ENCOUNTER — PATIENT MESSAGE (OUTPATIENT)
Dept: HEPATOLOGY | Facility: CLINIC | Age: 79
End: 2024-04-30
Payer: MEDICARE

## 2024-04-30 ENCOUNTER — TELEPHONE (OUTPATIENT)
Dept: HEPATOLOGY | Facility: CLINIC | Age: 79
End: 2024-04-30
Payer: MEDICARE

## 2024-04-30 DIAGNOSIS — K73.8 OTHER CHRONIC HEPATITIS, NOT ELSEWHERE CLASSIFIED: ICD-10-CM

## 2024-04-30 DIAGNOSIS — R79.89 ELEVATED LIVER FUNCTION TESTS: Primary | ICD-10-CM

## 2024-04-30 NOTE — TELEPHONE ENCOUNTER
Spoke with pt and scheduled f/u appt on 7/18/24    The pt was last seen in clinic on 3/10/23, would you like to have any test done prior to the pt's visit?

## 2024-05-04 DIAGNOSIS — R79.89 ELEVATED LIVER FUNCTION TESTS: Primary | ICD-10-CM

## 2024-05-04 DIAGNOSIS — K73.9 CHRONIC HEPATITIS, UNSPECIFIED: ICD-10-CM

## 2024-05-06 ENCOUNTER — TELEPHONE (OUTPATIENT)
Dept: HEPATOLOGY | Facility: CLINIC | Age: 79
End: 2024-05-06
Payer: MEDICARE

## 2024-05-21 ENCOUNTER — TELEPHONE (OUTPATIENT)
Dept: HEPATOLOGY | Facility: CLINIC | Age: 79
End: 2024-05-21
Payer: MEDICARE

## 2024-05-21 NOTE — TELEPHONE ENCOUNTER
The pt would like to know why you are ordering an U/S instead of an MRI. She stated that she will have an MRI of her brain done sometime in August and would like to know if she can have orders placed to have an MRI of the stomach done instead of the ultra sound. That way she can have both Mri's done at the same time in August and would only have to pay one co-payment.      Please advise,

## 2024-05-22 ENCOUNTER — TELEPHONE (OUTPATIENT)
Dept: HEPATOLOGY | Facility: CLINIC | Age: 79
End: 2024-05-22
Payer: MEDICARE

## 2024-05-22 NOTE — TELEPHONE ENCOUNTER
The pt canceled the U/S because the price increased from last year. She stated that she spoke with her insurance company and they advised her to have it done at an outside facility for a less price.  Once she figure out where she's going to have it done, she stated that she will call back with the information on where to send the orders.

## 2024-05-22 NOTE — TELEPHONE ENCOUNTER
----- Message from Laurence Rivera sent at 5/22/2024 12:47 PM CDT -----  Regarding: Speak to Judy again  Contact: PT  [phone]  The patient called requesting to speak to Judy. She has a couple more questions. Please call at your convenience     No further information provided    Patient can be contacted @# [phone]

## 2024-05-22 NOTE — TELEPHONE ENCOUNTER
Spoke with pt, she stated that she received pt portal msg from Dr. Starr and is okay with having the u/s done. The pt was given the contact # to billing and central pricing dept.

## 2024-05-22 NOTE — TELEPHONE ENCOUNTER
----- Message from Kirill Moreno MA sent at 5/22/2024  3:03 PM CDT -----  Regarding: FW: Return Call  Contact: Shakira    ----- Message -----  From: Britni Ashby  Sent: 5/22/2024   2:24 PM CDT  To: [facility]atology Scheduling  Subject: Return Call                                      Consult/Advisory     Name Of Caller:Shakira Pearl          Contact Preference:[phone] (home)        Nature of call:Pt returning call to Judy', in regards to US Appt. Requesting a call back.

## 2024-06-03 ENCOUNTER — TELEPHONE (OUTPATIENT)
Dept: PAIN MEDICINE | Facility: CLINIC | Age: 79
End: 2024-06-03
Payer: MEDICARE

## 2024-06-03 NOTE — TELEPHONE ENCOUNTER
----- Message from Ladarius De La Garza sent at 6/3/2024  8:27 AM CDT -----  Name of Who is Calling:CHELSY GONZALEZ [[id number]]                   What is the request in detail: PT is calling for Sushila she said she got a message from you to call you back but she couldn't reply on the portal please call her back                   Can the clinic reply by [facility]: No                   What Number to Call Back if not in [facility]:[phone]

## 2024-06-03 NOTE — TELEPHONE ENCOUNTER
Staff spoke with patient. Patient states she wants an appointment to see Dr. Coates about her hip pain. Staff scheduled her an appointment on June 11 with Dr. Coates. Patient is in agreement to the above and verbalized understanding.

## 2024-06-05 DIAGNOSIS — N39.46 URINARY INCONTINENCE, MIXED: ICD-10-CM

## 2024-06-05 RX ORDER — MIRABEGRON 25 MG/1
25 TABLET, FILM COATED, EXTENDED RELEASE ORAL DAILY
Qty: 90 TABLET | Refills: 3 | Status: SHIPPED | OUTPATIENT
Start: 2024-06-05

## 2024-06-05 NOTE — TELEPHONE ENCOUNTER
No care due was identified.  [facility] Embedded Care Due Messages. Reference number: [id number].   6/05/2024 1:17:35 PM CDT

## 2024-06-05 NOTE — TELEPHONE ENCOUNTER
Refill Routing Note   Medication(s) are not appropriate for processing by [facility] for the following reason(s):        Required vitals abnormal: BP (!) 144/75    [facility] action(s):  Defer      Medication Therapy Plan:         Appointments  past 12m or future 3m with PCP    Date Provider   Last Visit   3/21/2024 Angel Bello, DO   Next Visit   8/15/2024 Angel Bello, DO   ED visits in past 90 days: 0        Note composed:2:51 PM 06/05/2024

## 2024-06-06 ENCOUNTER — TELEPHONE (OUTPATIENT)
Dept: PAIN MEDICINE | Facility: CLINIC | Age: 79
End: 2024-06-06
Payer: MEDICARE

## 2024-06-06 NOTE — TELEPHONE ENCOUNTER
----- Message from Dickson Cole sent at 6/6/2024 11:32 AM CDT -----  Regarding: Appointment  Name of Who is Calling:  Patient          What is the request in detail:  Patient would like to reschedule her 06/11/2024 appointment, Patient stated she would like to have truman appointment after 12 noon there was no appointment at that time. Patient wants to see Dr. Coates only            Can the clinic reply by [facility]: No            What Number to Call Back if not in [facility]: [phone]

## 2024-06-06 NOTE — TELEPHONE ENCOUNTER
Staff spoke with patient. Patient states the appointment she had no longer worked and she needed to reschedule. Staff rescheduled her for 7/10 w Dr. Coates. Patient is in agreement to the above and verbalized understanding.

## 2024-06-11 ENCOUNTER — TELEPHONE (OUTPATIENT)
Dept: HEPATOLOGY | Facility: CLINIC | Age: 79
End: 2024-06-11
Payer: MEDICARE

## 2024-06-11 ENCOUNTER — TELEPHONE (OUTPATIENT)
Dept: GASTROENTEROLOGY | Facility: CLINIC | Age: 79
End: 2024-06-11
Payer: MEDICARE

## 2024-06-11 NOTE — TELEPHONE ENCOUNTER
----- Message from Laurence Rivera sent at 6/11/2024 12:40 PM CDT -----  Regarding: fax order to [facility]  Contact: PT  [phone]  PT called requesting to fax US order to [facility]  -  FAX. [phone]    Please notify PT when has been sent so she call call and get scheduled     Patient can be contacted @# [phone]

## 2024-06-11 NOTE — TELEPHONE ENCOUNTER
----- Message from Trudi Null sent at 6/11/2024 12:22 PM CDT -----  Regarding: apt  Contact: [phone]  Pt calling in to reschedule apt on 6/18/24 please call to discuss  Further

## 2024-06-12 ENCOUNTER — TELEPHONE (OUTPATIENT)
Dept: DERMATOLOGY | Facility: CLINIC | Age: 79
End: 2024-06-12
Payer: MEDICARE

## 2024-06-12 ENCOUNTER — TELEPHONE (OUTPATIENT)
Dept: ENDOSCOPY | Facility: HOSPITAL | Age: 79
End: 2024-06-12
Payer: MEDICARE

## 2024-06-12 NOTE — TELEPHONE ENCOUNTER
Spoke with pt. Pt states she was able to get sched with another provider.    ----- Message from Indiana Andres LPN sent at 6/11/2024  1:10 PM CDT -----  Contact: [phone]    ----- Message -----  From: Negro Blandon  Sent: 6/11/2024  12:18 PM CDT  To: [facility] Staff    Shakira Pearl calling regarding Appointment Access  (message) Pt asking for a call back to reschedule her visit she had to cancel that was schedule for 06/12 I try to reschedule no appt available to reschedule to

## 2024-06-12 NOTE — TELEPHONE ENCOUNTER
Received below message.  Spoke with pt and EGD (currently scheduled on 6/18/24) rescheduled to 8/28/24.    Spoke to pt to reschedule procedure(s) Upper Endoscopy (EGD)       Physician to perform procedure(s) Dr. DANNIE Black  Date of Procedure (s) 8/28/24  Arrival Time 12:45 PM  Time of Procedure(s) 1:45 PM   Location of Procedure(s) [address]  Type of Rx Prep sent to patient: N/A  Instructions provided to patient via [facility]    Patient was informed on the following information and verbalized understanding. Screening questionnaire reviewed with patient and complete. If procedure requires anesthesia, a responsible adult needs to be present to accompany the patient home, patient cannot drive after receiving anesthesia. Appointment details are tentative, especially check-in time. Patient will receive a prep-op call 7 days prior to confirm check-in time for procedure. If applicable the patient should contact their pharmacy to verify Rx for procedure prep is ready for pick-up. Patient was advised to call the scheduling department at [phone] if pharmacy states no Rx is available. Patient was advised to call the endoscopy scheduling department if any questions or concerns arise.       Endoscopy Scheduling Department             Nicole Mcrae routed conversation to You9 hours ago (8:18 AM)     Jyoti Jacobson MA routed conversation to [facility] Endoscopy SchedulersYesterday (12:26 PM)     Jyoti Jacobson, MAYesterday (12:25 PM)     RT  ----- Message from Trudi Null sent at 6/11/2024 12:22 PM CDT -----  Regarding: apt  Contact: [phone]  Pt calling in to reschedule apt on 6/18/24 please call to discuss  Further

## 2024-06-18 ENCOUNTER — TELEPHONE (OUTPATIENT)
Dept: HEPATOLOGY | Facility: CLINIC | Age: 79
End: 2024-06-18
Payer: MEDICARE

## 2024-06-18 NOTE — TELEPHONE ENCOUNTER
Spoke with pt and faxed Abdominal ultrasound complete order to [facility] at [phone].

## 2024-06-18 NOTE — TELEPHONE ENCOUNTER
----- Message from Carmen Inman sent at 6/18/2024 10:45 AM CDT -----  Regarding: Call Back  Contact: [phone]  CONSULT/ADVISORY    Name of Caller:  CHELSY GONZALEZ [[id number]]    Contact Preference:   [phone]    Nature of Call:  Pt is requesting a call back from Judy GARCIA to see why her U/S of the abdomen was never scheduled at [facility].  The order is in Epic to be scheduled externally. Pt states the copay is much cheaper.

## 2024-06-21 ENCOUNTER — PATIENT MESSAGE (OUTPATIENT)
Dept: INTERNAL MEDICINE | Facility: CLINIC | Age: 79
End: 2024-06-21
Payer: MEDICARE

## 2024-07-09 ENCOUNTER — TELEPHONE (OUTPATIENT)
Dept: PAIN MEDICINE | Facility: CLINIC | Age: 79
End: 2024-07-09
Payer: MEDICARE

## 2024-07-09 NOTE — TELEPHONE ENCOUNTER
Patient was calling in regards to her appointment that she accidentally scheduled, she was able to schedule on the portal.

## 2024-07-09 NOTE — TELEPHONE ENCOUNTER
----- Message from Romi Paul sent at 7/9/2024  2:39 PM CDT -----  Regarding: ceferino cancellation  Type:  Patient Returning Call      Name of who is calling:Patient        What is request in detail:patient is requesting a call back from Solo in regards to canceled appt on 07/10        Can clinic reply by [facility]:no        What number to call back if not in [facility]:[phone]

## 2024-07-15 ENCOUNTER — OFFICE VISIT (OUTPATIENT)
Dept: OPHTHALMOLOGY | Facility: CLINIC | Age: 79
End: 2024-07-15
Payer: COMMERCIAL

## 2024-07-15 DIAGNOSIS — H02.403 PTOSIS OF BOTH EYELIDS: ICD-10-CM

## 2024-07-15 DIAGNOSIS — Z96.1 PSEUDOPHAKIA: ICD-10-CM

## 2024-07-15 DIAGNOSIS — H04.123 DRY EYE SYNDROME OF BOTH EYES: Primary | ICD-10-CM

## 2024-07-15 DIAGNOSIS — I10 ESSENTIAL HYPERTENSION: ICD-10-CM

## 2024-07-15 DIAGNOSIS — H52.7 REFRACTIVE ERROR: ICD-10-CM

## 2024-07-15 PROCEDURE — 99999 PR PBB SHADOW E&M-EST. PATIENT-LVL III: CPT | Mod: PBBFAC,,, | Performed by: OPHTHALMOLOGY

## 2024-07-15 PROCEDURE — 92014 COMPRE OPH EXAM EST PT 1/>: CPT | Mod: S$GLB,,, | Performed by: OPHTHALMOLOGY

## 2024-07-15 NOTE — PROGRESS NOTES
Subjective:       Patient ID: Shakira Pearl is a 79 y.o. female.    Chief Complaint: Hypertensive Eye Exam (Patient Shakira Pearl is an 79 year old female.)    HPI     Hypertensive Eye Exam     Additional comments: Patient Shakira Pearl is an 79 year old female.           Comments    Pt here for annual dry eye and HTN eye exam. Pt states that she has   noticed changes in VA OU, would like new MRX (SV distance and SV reading).   Pt states dry eyes but denies any eye pain, flashes, or floaters in VA OU.   Pt states that she suffered several falls with injury due to a medication,   medication discontinued in December 2023.    DLS: 06/19/2023 with Dr. Aleman    Meds: AT's PRN OU, would like to discuss RX for dry eyes    POHx:  1. Dry eye syndrome of both eyes   2. Ptosis of both eyelids   3. Essential hypertension   4. Refractive error   5. Pseudophakia               Last edited by Hallie Celis on 7/15/2024 11:21 AM.             Assessment:       1. Dry eye syndrome of both eyes    2. Ptosis of both eyelids    3. Essential hypertension    4. Refractive error    5. Pseudophakia        Plan:       MAYELIN OU-Needs more AT's.  Ptosis OD>OS-Pt is waiting to see Dr Mann.  HTN-No retinopathy OU.  RE-Pt wants MRx.      AT's.  Control HTN.    Give separate MRx for distance & reading.  RTC me in 1 yr.

## 2024-07-17 ENCOUNTER — TELEPHONE (OUTPATIENT)
Dept: HEPATOLOGY | Facility: CLINIC | Age: 79
End: 2024-07-17
Payer: MEDICARE

## 2024-07-17 ENCOUNTER — TELEPHONE (OUTPATIENT)
Dept: CARDIOLOGY | Facility: CLINIC | Age: 79
End: 2024-07-17
Payer: MEDICARE

## 2024-07-17 ENCOUNTER — TELEPHONE (OUTPATIENT)
Dept: INTERNAL MEDICINE | Facility: CLINIC | Age: 79
End: 2024-07-17
Payer: MEDICARE

## 2024-07-17 DIAGNOSIS — R79.9 ABNORMAL FINDING OF BLOOD CHEMISTRY, UNSPECIFIED: ICD-10-CM

## 2024-07-17 DIAGNOSIS — D50.9 IRON DEFICIENCY ANEMIA, UNSPECIFIED IRON DEFICIENCY ANEMIA TYPE: ICD-10-CM

## 2024-07-17 DIAGNOSIS — E55.9 VITAMIN D DEFICIENCY: ICD-10-CM

## 2024-07-17 DIAGNOSIS — E78.1 HYPERTRIGLYCERIDEMIA: ICD-10-CM

## 2024-07-17 DIAGNOSIS — I10 ESSENTIAL HYPERTENSION: Primary | ICD-10-CM

## 2024-07-17 NOTE — TELEPHONE ENCOUNTER
Patient called.  She is on the cards onc schedule.  Patient notified the appt is incorrect and will place patient on the recall list for Dr. Chand in Nov.

## 2024-07-17 NOTE — TELEPHONE ENCOUNTER
"----- Message from Walt Pinedo sent at 7/17/2024  9:16 AM CDT -----  Contact: Pt  [phone]  type: Lab    Caller is requesting to schedule their Lab appointment prior to an appointment.    Order is not listed in EPIC.  Please enter order and contact patient to schedule.    Preferred Date and Time of Labs:10/03/24 10am    Date of Appointment:10/10/24    Where would they like the lab performed?[facility]    Would the patient rather a call back or a response via [email or web address]? Callback    Best Call Back Number:    Additional Information:Pt would like to get sooner ceferino for her Annual if possible she had to reschedile    "Do not send messages requesting lab orders prior to Physical appt on these providers: Reji Oliva Giambrone,  Sotero Mark and Williams."  "

## 2024-07-17 NOTE — TELEPHONE ENCOUNTER
----- Message from Walt Pinedo sent at 7/17/2024  9:19 AM CDT -----  Contact: Pt  [phone]  Pt called in regards to getting her ceferino rescheduled please advise

## 2024-07-18 DIAGNOSIS — I10 ESSENTIAL HYPERTENSION: ICD-10-CM

## 2024-07-18 RX ORDER — LOSARTAN POTASSIUM AND HYDROCHLOROTHIAZIDE 12.5; 5 MG/1; MG/1
1 TABLET ORAL DAILY
Qty: 90 TABLET | Refills: 3 | Status: SHIPPED | OUTPATIENT
Start: 2024-07-18

## 2024-07-18 NOTE — TELEPHONE ENCOUNTER
No care due was identified.  [facility] Embedded Care Due Messages. Reference number: [id number].   7/18/2024 2:03:46 PM CDT

## 2024-08-06 ENCOUNTER — TELEPHONE (OUTPATIENT)
Dept: PAIN MEDICINE | Facility: CLINIC | Age: 79
End: 2024-08-06
Payer: MEDICARE

## 2024-08-21 ENCOUNTER — TELEPHONE (OUTPATIENT)
Dept: ENDOSCOPY | Facility: HOSPITAL | Age: 79
End: 2024-08-21
Payer: MEDICARE

## 2024-08-21 NOTE — TELEPHONE ENCOUNTER
Contacted Pt for Endoscopy precall to confirm scheduled procedure(s) Upper Endoscopy (EGD) on 8/28/24. Spoke with patient. Patient states she currently not having symptoms and would like to cancel procedure. Patient states she will call back if she needs to reschedule. Patient removed from schedule.

## 2024-09-12 NOTE — TELEPHONE ENCOUNTER
Per  Toya , she spoke with ms brown and she decided not to have the procedure on 4-15 with Dr Duran   well-groomed/no distress

## 2024-09-25 DIAGNOSIS — Z00.00 ENCOUNTER FOR MEDICARE ANNUAL WELLNESS EXAM: ICD-10-CM

## 2024-10-01 ENCOUNTER — PATIENT MESSAGE (OUTPATIENT)
Dept: INTERNAL MEDICINE | Facility: CLINIC | Age: 79
End: 2024-10-01
Payer: MEDICARE

## 2024-10-01 ENCOUNTER — LAB VISIT (OUTPATIENT)
Dept: LAB | Facility: HOSPITAL | Age: 79
End: 2024-10-01
Attending: INTERNAL MEDICINE
Payer: MEDICARE

## 2024-10-01 DIAGNOSIS — E78.1 HYPERTRIGLYCERIDEMIA: ICD-10-CM

## 2024-10-01 DIAGNOSIS — I10 ESSENTIAL HYPERTENSION: ICD-10-CM

## 2024-10-01 DIAGNOSIS — D50.9 IRON DEFICIENCY ANEMIA, UNSPECIFIED IRON DEFICIENCY ANEMIA TYPE: ICD-10-CM

## 2024-10-01 DIAGNOSIS — E55.9 VITAMIN D DEFICIENCY: ICD-10-CM

## 2024-10-01 DIAGNOSIS — R79.9 ABNORMAL FINDING OF BLOOD CHEMISTRY, UNSPECIFIED: ICD-10-CM

## 2024-10-01 LAB
25(OH)D3+25(OH)D2 SERPL-MCNC: 42 NG/ML (ref 30–96)
ALBUMIN SERPL BCP-MCNC: 3.6 G/DL (ref 3.5–5.2)
ALP SERPL-CCNC: 44 U/L (ref 55–135)
ALT SERPL W/O P-5'-P-CCNC: 20 U/L (ref 10–44)
ANION GAP SERPL CALC-SCNC: 11 MMOL/L (ref 8–16)
AST SERPL-CCNC: 18 U/L (ref 10–40)
BASOPHILS # BLD AUTO: 0.04 K/UL (ref 0–0.2)
BASOPHILS NFR BLD: 0.5 % (ref 0–1.9)
BILIRUB SERPL-MCNC: 0.4 MG/DL (ref 0.1–1)
BUN SERPL-MCNC: 21 MG/DL (ref 8–23)
CALCIUM SERPL-MCNC: 8.8 MG/DL (ref 8.7–10.5)
CHLORIDE SERPL-SCNC: 102 MMOL/L (ref 95–110)
CHOLEST SERPL-MCNC: 195 MG/DL (ref 120–199)
CHOLEST/HDLC SERPL: 4.1 {RATIO} (ref 2–5)
CO2 SERPL-SCNC: 25 MMOL/L (ref 23–29)
CREAT SERPL-MCNC: 0.9 MG/DL (ref 0.5–1.4)
DIFFERENTIAL METHOD BLD: ABNORMAL
EOSINOPHIL # BLD AUTO: 0.2 K/UL (ref 0–0.5)
EOSINOPHIL NFR BLD: 2 % (ref 0–8)
ERYTHROCYTE [DISTWIDTH] IN BLOOD BY AUTOMATED COUNT: 13.3 % (ref 11.5–14.5)
EST. GFR  (NO RACE VARIABLE): >60 ML/MIN/1.73 M^2
ESTIMATED AVG GLUCOSE: 108 MG/DL (ref 68–131)
FERRITIN SERPL-MCNC: 72 NG/ML (ref 20–300)
GLUCOSE SERPL-MCNC: 95 MG/DL (ref 70–110)
HBA1C MFR BLD: 5.4 % (ref 4–5.6)
HCT VFR BLD AUTO: 35.1 % (ref 37–48.5)
HDLC SERPL-MCNC: 47 MG/DL (ref 40–75)
HDLC SERPL: 24.1 % (ref 20–50)
HGB BLD-MCNC: 11.1 G/DL (ref 12–16)
IMM GRANULOCYTES # BLD AUTO: 0.05 K/UL (ref 0–0.04)
IMM GRANULOCYTES NFR BLD AUTO: 0.6 % (ref 0–0.5)
IRON SERPL-MCNC: 109 UG/DL (ref 30–160)
LDLC SERPL CALC-MCNC: ABNORMAL MG/DL (ref 63–159)
LYMPHOCYTES # BLD AUTO: 2 K/UL (ref 1–4.8)
LYMPHOCYTES NFR BLD: 23.5 % (ref 18–48)
MCH RBC QN AUTO: 28.4 PG (ref 27–31)
MCHC RBC AUTO-ENTMCNC: 31.6 G/DL (ref 32–36)
MCV RBC AUTO: 90 FL (ref 82–98)
MONOCYTES # BLD AUTO: 0.5 K/UL (ref 0.3–1)
MONOCYTES NFR BLD: 5.5 % (ref 4–15)
NEUTROPHILS # BLD AUTO: 5.9 K/UL (ref 1.8–7.7)
NEUTROPHILS NFR BLD: 67.9 % (ref 38–73)
NONHDLC SERPL-MCNC: 148 MG/DL
NRBC BLD-RTO: 0 /100 WBC
PLATELET # BLD AUTO: 259 K/UL (ref 150–450)
PMV BLD AUTO: 10.8 FL (ref 9.2–12.9)
POTASSIUM SERPL-SCNC: 3.6 MMOL/L (ref 3.5–5.1)
PROT SERPL-MCNC: 7 G/DL (ref 6–8.4)
RBC # BLD AUTO: 3.91 M/UL (ref 4–5.4)
RETICS/RBC NFR AUTO: 1.7 % (ref 0.5–2.5)
SATURATED IRON: 28 % (ref 20–50)
SODIUM SERPL-SCNC: 138 MMOL/L (ref 136–145)
TOTAL IRON BINDING CAPACITY: 391 UG/DL (ref 250–450)
TRANSFERRIN SERPL-MCNC: 264 MG/DL (ref 200–375)
TRIGL SERPL-MCNC: 471 MG/DL (ref 30–150)
TSH SERPL DL<=0.005 MIU/L-ACNC: 3.41 UIU/ML (ref 0.4–4)
WBC # BLD AUTO: 8.69 K/UL (ref 3.9–12.7)

## 2024-10-01 PROCEDURE — 80061 LIPID PANEL: CPT | Mod: HCNC | Performed by: INTERNAL MEDICINE

## 2024-10-01 PROCEDURE — 80053 COMPREHEN METABOLIC PANEL: CPT | Mod: HCNC | Performed by: INTERNAL MEDICINE

## 2024-10-01 PROCEDURE — 83540 ASSAY OF IRON: CPT | Mod: HCNC | Performed by: INTERNAL MEDICINE

## 2024-10-01 PROCEDURE — 85045 AUTOMATED RETICULOCYTE COUNT: CPT | Mod: HCNC | Performed by: INTERNAL MEDICINE

## 2024-10-01 PROCEDURE — 85025 COMPLETE CBC W/AUTO DIFF WBC: CPT | Mod: HCNC | Performed by: INTERNAL MEDICINE

## 2024-10-01 PROCEDURE — 84443 ASSAY THYROID STIM HORMONE: CPT | Mod: HCNC | Performed by: INTERNAL MEDICINE

## 2024-10-01 PROCEDURE — 83036 HEMOGLOBIN GLYCOSYLATED A1C: CPT | Mod: HCNC | Performed by: INTERNAL MEDICINE

## 2024-10-01 PROCEDURE — 82728 ASSAY OF FERRITIN: CPT | Mod: HCNC | Performed by: INTERNAL MEDICINE

## 2024-10-01 PROCEDURE — 82306 VITAMIN D 25 HYDROXY: CPT | Mod: HCNC | Performed by: INTERNAL MEDICINE

## 2024-10-07 ENCOUNTER — TELEPHONE (OUTPATIENT)
Dept: GASTROENTEROLOGY | Facility: CLINIC | Age: 79
End: 2024-10-07
Payer: MEDICARE

## 2024-10-07 DIAGNOSIS — K21.9 GASTROESOPHAGEAL REFLUX DISEASE: ICD-10-CM

## 2024-10-07 RX ORDER — PANTOPRAZOLE SODIUM 40 MG/1
TABLET, DELAYED RELEASE ORAL
Qty: 90 TABLET | Refills: 3 | Status: SHIPPED | OUTPATIENT
Start: 2024-10-07

## 2024-10-07 NOTE — TELEPHONE ENCOUNTER
She would like to delay the EGD until the next time her colonoscopy is due.which would be 2026.    She needs a refill of pantoprazole.

## 2024-10-07 NOTE — TELEPHONE ENCOUNTER
----- Message from Tatum sent at 10/7/2024  2:21 PM CDT -----  Regarding: Pt called states she wld like to speak with someone regarding a Upper Endoscopy  Contact: [phone]  Name of Who is Calling:CHELSY GONZALEZ [[id number]]        What is the request in detail:Pt called states she wld like to speak with someone regarding a Upper Endoscopy. Please advise         Can the clinic reply by [facility]:No        What Number to Call Back [facility]: Telephone Information:  Mobile          [phone]

## 2024-10-10 ENCOUNTER — OFFICE VISIT (OUTPATIENT)
Dept: INTERNAL MEDICINE | Facility: CLINIC | Age: 79
End: 2024-10-10
Payer: MEDICARE

## 2024-10-10 ENCOUNTER — TELEPHONE (OUTPATIENT)
Dept: INTERNAL MEDICINE | Facility: CLINIC | Age: 79
End: 2024-10-10
Payer: MEDICARE

## 2024-10-10 VITALS
HEIGHT: 63 IN | HEART RATE: 84 BPM | SYSTOLIC BLOOD PRESSURE: 102 MMHG | OXYGEN SATURATION: 99 % | WEIGHT: 149.38 LBS | DIASTOLIC BLOOD PRESSURE: 60 MMHG | BODY MASS INDEX: 26.47 KG/M2

## 2024-10-10 DIAGNOSIS — I10 ESSENTIAL HYPERTENSION: ICD-10-CM

## 2024-10-10 DIAGNOSIS — M54.12 CERVICAL RADICULOPATHY: ICD-10-CM

## 2024-10-10 DIAGNOSIS — R53.81 PHYSICAL DECONDITIONING: ICD-10-CM

## 2024-10-10 DIAGNOSIS — D35.02 ADENOMA OF LEFT ADRENAL GLAND: ICD-10-CM

## 2024-10-10 DIAGNOSIS — K76.89 LIVER CYST: ICD-10-CM

## 2024-10-10 DIAGNOSIS — E78.1 HYPERTRIGLYCERIDEMIA: ICD-10-CM

## 2024-10-10 DIAGNOSIS — K76.0 FATTY LIVER: ICD-10-CM

## 2024-10-10 DIAGNOSIS — L65.9 HAIR LOSS DISORDER: ICD-10-CM

## 2024-10-10 DIAGNOSIS — D50.9 IRON DEFICIENCY ANEMIA, UNSPECIFIED IRON DEFICIENCY ANEMIA TYPE: ICD-10-CM

## 2024-10-10 DIAGNOSIS — I10 ESSENTIAL HYPERTENSION: Primary | ICD-10-CM

## 2024-10-10 DIAGNOSIS — M46.1 SACROILIITIS: ICD-10-CM

## 2024-10-10 DIAGNOSIS — R06.02 SHORTNESS OF BREATH: ICD-10-CM

## 2024-10-10 DIAGNOSIS — G31.84 MCI (MILD COGNITIVE IMPAIRMENT): ICD-10-CM

## 2024-10-10 DIAGNOSIS — I70.0 AORTIC ATHEROSCLEROSIS: ICD-10-CM

## 2024-10-10 DIAGNOSIS — R79.9 ABNORMAL FINDING OF BLOOD CHEMISTRY, UNSPECIFIED: ICD-10-CM

## 2024-10-10 DIAGNOSIS — M43.06 SPONDYLOLYSIS OF LUMBAR REGION: ICD-10-CM

## 2024-10-10 DIAGNOSIS — L65.9 ALOPECIA: ICD-10-CM

## 2024-10-10 DIAGNOSIS — Z00.00 ANNUAL PHYSICAL EXAM: Primary | ICD-10-CM

## 2024-10-10 DIAGNOSIS — R29.898 WEAKNESS OF BOTH LOWER EXTREMITIES: ICD-10-CM

## 2024-10-10 PROCEDURE — 3288F FALL RISK ASSESSMENT DOCD: CPT | Mod: HCNC,CPTII,S$GLB, | Performed by: INTERNAL MEDICINE

## 2024-10-10 PROCEDURE — 99397 PER PM REEVAL EST PAT 65+ YR: CPT | Mod: HCNC,S$GLB,, | Performed by: INTERNAL MEDICINE

## 2024-10-10 PROCEDURE — 99999 PR PBB SHADOW E&M-EST. PATIENT-LVL V: CPT | Mod: PBBFAC,HCNC,, | Performed by: INTERNAL MEDICINE

## 2024-10-10 PROCEDURE — 1126F AMNT PAIN NOTED NONE PRSNT: CPT | Mod: HCNC,CPTII,S$GLB, | Performed by: INTERNAL MEDICINE

## 2024-10-10 PROCEDURE — 3078F DIAST BP <80 MM HG: CPT | Mod: HCNC,CPTII,S$GLB, | Performed by: INTERNAL MEDICINE

## 2024-10-10 PROCEDURE — 3074F SYST BP LT 130 MM HG: CPT | Mod: HCNC,CPTII,S$GLB, | Performed by: INTERNAL MEDICINE

## 2024-10-10 PROCEDURE — 1101F PT FALLS ASSESS-DOCD LE1/YR: CPT | Mod: HCNC,CPTII,S$GLB, | Performed by: INTERNAL MEDICINE

## 2024-10-10 RX ORDER — EZETIMIBE 10 MG/1
10 TABLET ORAL DAILY
Qty: 90 TABLET | Refills: 3 | Status: SHIPPED | OUTPATIENT
Start: 2024-10-10 | End: 2024-11-07

## 2024-10-10 RX ORDER — ICOSAPENT ETHYL 1 G/1
2 CAPSULE ORAL 2 TIMES DAILY
Qty: 360 CAPSULE | Refills: 1 | Status: SHIPPED | OUTPATIENT
Start: 2024-10-10

## 2024-10-10 RX ORDER — MINOXIDIL 2.5 MG/1
2.5 TABLET ORAL DAILY
Qty: 90 TABLET | Refills: 3 | Status: SHIPPED | OUTPATIENT
Start: 2024-10-10 | End: 2024-11-22

## 2024-10-10 NOTE — PROGRESS NOTES
Subjective     Patient ID: Shakira Pearl is a 79 y.o. female.    Chief Complaint: Annual Exam    HPI  79 y.o. Female here for annual exam.      Vaccines: Influenza (2021); Tetanus (2019); Pneumovax (current); Shingrix (will get)  Eye exam: 2019  Mammogram: 3/21  Gyn exam: declined   Colonoscopy: 9/20  DEXA: 1/23(NL)     Exercise: walks  Diet: regular     Past Medical History:  sinus: Allergy  back: Arthritis  back: Degenerative disc disease  No date: Neuromuscular disorder  No date: LAM  No date: Liver cyst  No date: GERD  No date: Adrenal adenoma  No date: Vitamin D deficiency  Past Surgical History:  age 19: APPENDECTOMY  No date: CHOLECYSTECTOMY      Comment:  age of 25  10/3/2019: CHONDROPLASTY OF KNEE; Left      Comment:  Procedure: CHONDROPLASTY, KNEE;  Surgeon: Kaylen Patiño MD;  Location: [facility] OR;  Service: Orthopedics;                 Laterality: Left;  38: HYSTERECTOMY  2/18/2019: INJECTION OF JOINT; Bilateral      Comment:  Procedure: INJECTION, JOINT BILATERAL SI;  Surgeon:                Mert Coates MD;  Location: [facility];  Service: Pain               Management;  Laterality: Bilateral;  BILATERAL SI JOINT                INJECTION  10/3/2019: KNEE ARTHROSCOPY W/ MENISCECTOMY; Left      Comment:  Procedure: ARTHROSCOPY, KNEE, WITH MENISCECTOMY;                 Surgeon: Kaylen Patiño MD;  Location: [facility] OR;  Service:                Orthopedics;  Laterality: Left;  10/3/2019: SYNOVECTOMY OF KNEE; Left      Comment:  Procedure: SYNOVECTOMY, KNEE;  Surgeon: Kaylen Patiño MD;                Location: [facility];  Service: Orthopedics;  Laterality:                Left;  Social History    Socioeconomic History      Marital status:       Spouse name: Not on file      Number of children: 1      Years of education: Not on file      Highest education level: Not on file    Occupational History      Occupation: retired    Social Needs      Financial resource strain: Not on file      Food  insecurity:        Worry: Not on file        Inability: Not on file      Transportation needs:        Medical: Not on file        Non-medical: Not on file    Tobacco Use      Smoking status: Never Smoker      Smokeless tobacco: Never Used    Substance and Sexual Activity      Alcohol use: No      Drug use: No      Sexual activity: Not Currently    Lifestyle      Physical activity:        Days per week: Not on file        Minutes per session: Not on file      Stress: Not on file    Relationships      Social connections:        Talks on phone: Not on file        Gets together: Not on file        Attends [employer] service: Not on file        Active member of club or organization: Not on file        Attends meetings of clubs or organizations: Not on file        Relationship status: Not on file    Other Topics      Concerns:        Not on file    Social History Narrative      Not on file     Review of patient's allergies indicates:   -- Demerol [meperidine] -- Nausea And Vomiting    --  Other reaction(s): Nausea   -- Papaya     --  Illness vomiting   -- Sulfa (sulfonamide antibiotics) -- Rash   -- Sulfur -- Rash  Review of Systems   Constitutional:  Negative for activity change, appetite change, chills, diaphoresis, fatigue, fever and unexpected weight change.   HENT:  Negative for nasal congestion, mouth sores, postnasal drip, rhinorrhea, sinus pressure/congestion, sneezing, sore throat, trouble swallowing and voice change.    Eyes:  Negative for pain, discharge and visual disturbance.   Respiratory:  Negative for cough, shortness of breath and wheezing.    Cardiovascular:  Negative for chest pain, palpitations and leg swelling.   Gastrointestinal:  Negative for abdominal pain, blood in stool, constipation, diarrhea, nausea and vomiting.   Endocrine: Negative for cold intolerance and heat intolerance.   Genitourinary:  Negative for difficulty urinating, dysuria, frequency, hematuria and urgency.   Musculoskeletal:   Positive for arthralgias and back pain. Negative for myalgias.   Integumentary:  Negative for rash and wound.   Allergic/Immunologic: Negative for environmental allergies and food allergies.   Neurological:  Negative for dizziness, tremors, seizures, syncope, weakness, light-headedness and headaches.   Hematological:  Negative for adenopathy. Does not bruise/bleed easily.   Psychiatric/Behavioral:  Negative for confusion and sleep disturbance. The patient is not nervous/anxious.           Objective     Physical Exam  Vitals and nursing note reviewed.   Constitutional:       General: She is not in acute distress.     Appearance: Normal appearance. She is well-developed. She is not diaphoretic.   HENT:      Head: Normocephalic and atraumatic.      Right Ear: External ear normal.      Left Ear: External ear normal.      Nose: Nose normal.      Mouth/Throat:      Pharynx: No oropharyngeal exudate.   Eyes:      General: No scleral icterus.        Right eye: No discharge.         Left eye: No discharge.      Conjunctiva/sclera: Conjunctivae normal.      Pupils: Pupils are equal, round, and reactive to light.   Neck:      Thyroid: No thyromegaly.      Vascular: No JVD.   Cardiovascular:      Rate and Rhythm: Normal rate and regular rhythm.      Pulses: Normal pulses.      Heart sounds: Normal heart sounds. No murmur heard.  Pulmonary:      Effort: Pulmonary effort is normal. No respiratory distress.      Breath sounds: Normal breath sounds. No wheezing, rhonchi or rales.   Chest:      Chest wall: No tenderness.   Abdominal:      General: Bowel sounds are normal. There is no distension.      Palpations: Abdomen is soft.      Tenderness: There is no abdominal tenderness. There is no guarding or rebound.   Musculoskeletal:      Cervical back: Neck supple.      Right lower leg: No edema.      Left lower leg: No edema.   Lymphadenopathy:      Cervical: No cervical adenopathy.   Skin:     General: Skin is warm and dry.       Coloration: Skin is not pale.      Findings: No rash.   Neurological:      General: No focal deficit present.      Mental Status: She is alert and oriented to person, place, and time.      Gait: Gait normal.   Psychiatric:         Behavior: Behavior normal.         Thought Content: Thought content normal.         Judgment: Judgment normal.            Assessment and Plan     1. Annual physical exam    2. Essential hypertension    3. Hypertriglyceridemia  -     icosapent ethyL (VASCEPA) 1 gram Cap; Take 2 capsules (2 g total) by mouth 2 (two) times a day.  Dispense: 360 capsule; Refill: 1  -     ezetimibe (ZETIA) 10 mg tablet; Take 1 tablet (10 mg total) by mouth once daily.  Dispense: 90 tablet; Refill: 3  -     Lipid Panel; Future; Expected date: 01/10/2025  -     AST (SGOT); Future; Expected date: 01/10/2025  -     ALT (SGPT); Future; Expected date: 01/10/2025    4. Aortic atherosclerosis    5. Adenoma of left adrenal gland    6. Iron deficiency anemia, unspecified iron deficiency anemia type    7. Fatty liver    8. Liver cyst    9. Sacroiliitis    10. Spondylolysis of lumbar region    11. Cervical radiculopathy    12. Alopecia    13. Hair loss disorder  -     minoxidiL (LONITEN) 2.5 MG tablet; Take 1 tablet (2.5 mg total) by mouth once daily.  Dispense: 90 tablet; Refill: 3    14. MCI (mild cognitive impairment)  -     Ambulatory referral/consult to Neurology; Future; Expected date: 10/17/2024    15. Shortness of breath  -     Ambulatory referral/consult to Cardiology; Future; Expected date: 10/17/2024    16. Physical deconditioning  -     Ambulatory referral/consult to Physical/Occupational Therapy; Future; Expected date: 10/17/2024    17. Weakness of both lower extremities  -     Ambulatory referral/consult to Physical/Occupational Therapy; Future; Expected date: 10/17/2024         Blood work reviewed with pt     Alopecia- restart Minoxidil 2.5 mg qd    -pt has seen Derm      LAM- followed by GI   -previously on  Iron      HLD- restart Zetia/Vascepa   -repeat lipids in 3 months      HTN- stable on Hyzaar         Sacroiliitis/DJD Cervical/Lumbar spine- seeing Pain management       Fatty liver/left lobe cyst- stable      Adrenal adenoma- has seen Endo     Aortic atherosclerosis- stable      Hx of Right 5th metatarsal fracture     F/u in 6 months

## 2024-10-16 ENCOUNTER — TELEPHONE (OUTPATIENT)
Dept: HEPATOLOGY | Facility: CLINIC | Age: 79
End: 2024-10-16

## 2024-10-16 NOTE — TELEPHONE ENCOUNTER
----- Message from [employer] sent at 10/16/2024 11:24 AM CDT -----  Regarding: Consult/Advisory  Contact: :Shakira Pearl     Consult/Advisory     Name Of Caller:Shakira Pearl         Contact Preference:[phone] (home)       Nature of call:Patient is calling to get US results. Requesting a call back

## 2024-10-17 ENCOUNTER — TELEPHONE (OUTPATIENT)
Dept: CARDIOLOGY | Facility: CLINIC | Age: 79
End: 2024-10-17
Payer: MEDICARE

## 2024-10-17 NOTE — TELEPHONE ENCOUNTER
Spoke with the pt and she was informed I did not receive any results from [facility]. She was given our fax number and will contact [facility] to request they fax over her test results.

## 2024-10-17 NOTE — TELEPHONE ENCOUNTER
----- Message from [employer] sent at 10/17/2024  2:24 PM CDT -----  Regarding: Consult/Advisory  Contact: Shakira Pearl     Consult/Advisory     Name Of Caller:Shakira Pearl         Contact Preference:[phone] (home)       Nature of call:Patient is calling to speak to Judy about putting results on her portal. Requesting a call back

## 2024-10-18 ENCOUNTER — TELEPHONE (OUTPATIENT)
Dept: HEPATOLOGY | Facility: CLINIC | Age: 79
End: 2024-10-18
Payer: MEDICARE

## 2024-10-21 ENCOUNTER — PATIENT MESSAGE (OUTPATIENT)
Dept: HEPATOLOGY | Facility: CLINIC | Age: 79
End: 2024-10-21
Payer: MEDICARE

## 2024-10-21 ENCOUNTER — TELEPHONE (OUTPATIENT)
Dept: HEPATOLOGY | Facility: CLINIC | Age: 79
End: 2024-10-21
Payer: MEDICARE

## 2024-10-21 NOTE — TELEPHONE ENCOUNTER
"----- Message from Shadi sent at 10/21/2024 11:59 AM CDT -----  Consult/Advisory    Name Of Caller: Self    Contact Preference?: [phone]     What is the nature of the call?: Calling to speak w/ Judy about US results     Additional Notes:  "Thank you for all that you do for our patients"  "

## 2024-10-21 NOTE — TELEPHONE ENCOUNTER
-reviewed abdo US report from [facility]: one small 1.1 cm lesion. No other lesions seen. Will have hep RN reach out to pt      The pt would like to know her U/S results. I scanned them to the pt's chart on Friday under media.      Please advise,

## 2024-10-21 NOTE — TELEPHONE ENCOUNTER
----- Message from Dr India Starr ------  -reviewed abdo US report from [facility]: one small 1.1 cm lesion. No other lesions seen. Will have hep RN reach out to pt       Call placed to the patient. Message relayed from Dr Starr. Voiced understanding.  Pt encouraged to keep upcoming appt.  Last seen March 2023.

## 2024-10-23 NOTE — PLAN OF CARE
"SW met with patient in [facility]. SW explained to patient Home Health versus PCA services. Patient declined HH services. SW explained to patient that her insurance does not pay for PCA services but she get either apply for a medicaid waiver or pay for the services out of pocket . Patient complained of not having anyone to "make my bed". Patient had to be redirected to listen to understand her options clearly. Patient agreed to looking into the self pay option. SW gave patient sitter lists resources.     DRAKE Zarate, MSW-SW  Medical Social Worker/  ER Department     " [FreeTextEntry1] : 34 yo s/p repeat  and bilateral salpingectomy on 10/7/24  Denies fever/chills, nausea/emesis, dyspnea, or dizziness.  Tolerating regular diet and ambulating without difficulty. Normal bowel movements.  Denies severe abdominal pain or pelvic pain Reports light bleeding.  Path report reviewed and bilateral fallopian tubes confirmed  Patient denies sign/symptoms of depression or feeling overwhelmed [place] scale 1

## 2024-10-29 ENCOUNTER — TELEPHONE (OUTPATIENT)
Dept: OPHTHALMOLOGY | Facility: CLINIC | Age: 79
End: 2024-10-29
Payer: MEDICARE

## 2024-10-31 ENCOUNTER — OFFICE VISIT (OUTPATIENT)
Dept: INTERNAL MEDICINE | Facility: CLINIC | Age: 79
End: 2024-10-31
Payer: MEDICARE

## 2024-10-31 VITALS
WEIGHT: 149.5 LBS | SYSTOLIC BLOOD PRESSURE: 134 MMHG | TEMPERATURE: 99 F | HEART RATE: 67 BPM | HEIGHT: 63 IN | DIASTOLIC BLOOD PRESSURE: 76 MMHG | BODY MASS INDEX: 26.49 KG/M2 | RESPIRATION RATE: 16 BRPM | OXYGEN SATURATION: 99 %

## 2024-10-31 DIAGNOSIS — M79.18 MYALGIA, LOWER LEG: ICD-10-CM

## 2024-10-31 DIAGNOSIS — E78.1 HYPERTRIGLYCERIDEMIA: ICD-10-CM

## 2024-10-31 DIAGNOSIS — N32.81 OVERACTIVE BLADDER: ICD-10-CM

## 2024-10-31 DIAGNOSIS — I10 ESSENTIAL HYPERTENSION: ICD-10-CM

## 2024-10-31 DIAGNOSIS — T50.905A MEDICATION REACTION, INITIAL ENCOUNTER: Primary | ICD-10-CM

## 2024-10-31 DIAGNOSIS — R53.83 FATIGUE, UNSPECIFIED TYPE: ICD-10-CM

## 2024-10-31 PROCEDURE — 1159F MED LIST DOCD IN RCRD: CPT | Mod: CPTII,S$GLB,, | Performed by: NURSE PRACTITIONER

## 2024-10-31 PROCEDURE — 3078F DIAST BP <80 MM HG: CPT | Mod: CPTII,S$GLB,, | Performed by: NURSE PRACTITIONER

## 2024-10-31 PROCEDURE — 1125F AMNT PAIN NOTED PAIN PRSNT: CPT | Mod: CPTII,S$GLB,, | Performed by: NURSE PRACTITIONER

## 2024-10-31 PROCEDURE — 3075F SYST BP GE 130 - 139MM HG: CPT | Mod: CPTII,S$GLB,, | Performed by: NURSE PRACTITIONER

## 2024-10-31 PROCEDURE — 3288F FALL RISK ASSESSMENT DOCD: CPT | Mod: CPTII,S$GLB,, | Performed by: NURSE PRACTITIONER

## 2024-10-31 PROCEDURE — 1160F RVW MEDS BY RX/DR IN RCRD: CPT | Mod: CPTII,S$GLB,, | Performed by: NURSE PRACTITIONER

## 2024-10-31 PROCEDURE — 99999 PR PBB SHADOW E&M-EST. PATIENT-LVL IV: CPT | Mod: PBBFAC,,, | Performed by: NURSE PRACTITIONER

## 2024-10-31 PROCEDURE — 99214 OFFICE O/P EST MOD 30 MIN: CPT | Mod: S$GLB,,, | Performed by: NURSE PRACTITIONER

## 2024-10-31 PROCEDURE — 1101F PT FALLS ASSESS-DOCD LE1/YR: CPT | Mod: CPTII,S$GLB,, | Performed by: NURSE PRACTITIONER

## 2024-10-31 RX ORDER — ERGOCALCIFEROL 1.25 MG/1
50000 CAPSULE ORAL
Qty: 12 CAPSULE | Refills: 3 | Status: SHIPPED | OUTPATIENT
Start: 2024-10-31

## 2024-10-31 NOTE — PROGRESS NOTES
Subjective:       Patient ID: Shakira Pearl is a 79 y.o. female.    Chief Complaint: Allergic Reaction, Leg Pain (Both legs), redness, and Facial Swelling    History of Present Illness    CHIEF COMPLAINT:  Patient presents today for follow-up on medication side effects.    MEDICATION SIDE EFFECTS:  She reports starting three new medications recently: Vascepa (icosapent ethyl), Ezetimibe (Zetia), and Minoxidil. Within a few days, she experienced swelling, particularly around her eyes, and a rash. She also complains of significant fatigue and tiredness, stating she does not want to get out of bed. Loratadine taken for allergy symptoms exacerbated her fatigue. She denies any prior issues with Vascepa. The symptoms were worse yesterday but have slightly improved today. She suspects Ezetimibe may be causing the side effects.    CURRENT MEDICATIONS:  Long-term medications include losartan for hypertension and Myrbetriq for bladder management. Recently added medications are Vascepa, Ezetimibe, and Minoxidil.    HYPERLIPIDEMIA:  She reports progressively increasing cholesterol levels, particularly elevated triglycerides currently in the 400 range. She was previously prescribed atorvastatin by a cardiologist when her cholesterol was in the 300 range, but discontinued it for reasons she cannot recall. Vascepa was recently prescribed for management of her high cholesterol. She attributes the increase to limited physical activity due to spinal issues rather than dietary factors.    DIET AND LIFESTYLE:  Her diet is primarily plant-based, including plant milk and butter. She typically fasts overnight, eating her first meal around 10-11 AM (serving as both breakfast and lunch) and her next meal after 6 PM. She has stopped consuming fresh orange juice due to its sugar content. She cooks for herself, buys healthy foods, but admits to enjoying carbohydrates. She denies consuming fried or processed foods. Physical activity is limited  due to spine issues, affecting her ability to walk for long periods. She estimates her weight to be around 140 lbs, which she considers within a healthy range.      ROS:  Constitutional: +fatigue  Integumentary: +rash         Objective:      Physical Exam  Vitals reviewed.   Constitutional:       Appearance: Normal appearance.   HENT:      Head: Normocephalic and atraumatic.   Eyes:      Conjunctiva/sclera: Conjunctivae normal.      Pupils: Pupils are equal, round, and reactive to light.   Cardiovascular:      Rate and Rhythm: Normal rate and regular rhythm.   Pulmonary:      Effort: Pulmonary effort is normal.      Breath sounds: Normal breath sounds.   Abdominal:      General: Bowel sounds are normal.      Palpations: Abdomen is soft.   Musculoskeletal:         General: Normal range of motion.      Cervical back: Normal range of motion and neck supple.   Skin:     General: Skin is warm.   Neurological:      General: No focal deficit present.   Psychiatric:         Mood and Affect: Mood normal.         Assessment:       1. Medication reaction, initial encounter    2. Hypertriglyceridemia    3. Fatigue, unspecified type    4. Myalgia, lower leg    5. Essential hypertension    6. Overactive bladder      Plan:       Assessment & Plan    IMPRESSION:  Suspected ezetimibe as likely cause of patient's adverse reaction symptoms (swelling, rash, fatigue)  Assessed patient's reaction as not severe enough to warrant immediate intervention  Recommend discontinuing all new medications (Vascepa, ezetimibe, minoxidil) for 1 week to allow symptoms to resolve  Advised gradual reintroduction of medications one at a time to identify offending agent  Considered alternative cholesterol management options if ezetimibe proves intolerable    ALLERGIES:  - Explained antihistamine dosing: loratadine is typically daily, unlike Benadryl which can be taken every 4-6 hours.  - Continued loratadine 10 mg daily, preferably at  night.    HYPERLIPIDEMIA:  - Discussed relationship between lack of exercise and elevated cholesterol levels.  - Patient to gradually reintroduce medications one at a time after 1 week of discontinuation.  - Discontinued Vascepa, ezetimibe, and minoxidil for 1 week.  - After 1 week: Restart Vascepa (4 pills daily) if no symptoms occur.  - If Vascepa tolerated, wait 1 week, then start minoxidil.  - If minoxidil tolerated, wait 1 week, then start ezetimibe.  - Message through the portal or call the office if cholesterol medication causes symptoms after reintroduction.  - Follow up to discuss alternative cholesterol management if ezetimibe is not tolerated.    MEDICATIONS/SUPPLEMENTS:  - Continued current medications: losartan, Myrbetriq.    FOLLOW UP:  - Contact the office if swelling symptoms worsen or do not improve.       This note was generated with the assistance of ambient listening technology. Verbal consent was obtained by the patient and accompanying visitor(s) for the recording of patient appointment to facilitate this note. I attest to having reviewed and edited the generated note for accuracy, though some syntax or spelling errors may persist. Please contact the author of this note for any clarification.

## 2024-11-03 PROBLEM — N32.81 OVERACTIVE BLADDER: Status: ACTIVE | Noted: 2024-11-03

## 2024-11-03 PROBLEM — T50.905A MEDICATION REACTION, INITIAL ENCOUNTER: Status: ACTIVE | Noted: 2024-11-03

## 2024-11-03 PROBLEM — M79.18 MYALGIA, LOWER LEG: Status: ACTIVE | Noted: 2024-11-03

## 2024-11-06 ENCOUNTER — TELEPHONE (OUTPATIENT)
Dept: CARDIOLOGY | Facility: CLINIC | Age: 79
End: 2024-11-06
Payer: MEDICARE

## 2024-11-06 DIAGNOSIS — I10 ESSENTIAL HYPERTENSION: Primary | ICD-10-CM
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
## 2024-11-07 ENCOUNTER — OFFICE VISIT (OUTPATIENT)
Dept: CARDIOLOGY | Facility: CLINIC | Age: 79
End: 2024-11-07
Payer: MEDICARE

## 2024-11-07 VITALS
SYSTOLIC BLOOD PRESSURE: 114 MMHG | OXYGEN SATURATION: 98 % | BODY MASS INDEX: 26.4 KG/M2 | WEIGHT: 149 LBS | HEART RATE: 67 BPM | DIASTOLIC BLOOD PRESSURE: 66 MMHG | HEIGHT: 63 IN

## 2024-11-07 DIAGNOSIS — I10 ESSENTIAL HYPERTENSION: Primary | ICD-10-CM

## 2024-11-07 DIAGNOSIS — R06.09 DOE (DYSPNEA ON EXERTION): ICD-10-CM

## 2024-11-07 DIAGNOSIS — E78.5 ELEVATED LIPIDS: ICD-10-CM

## 2024-11-07 PROCEDURE — 1101F PT FALLS ASSESS-DOCD LE1/YR: CPT | Mod: HCNC,CPTII,S$GLB, | Performed by: INTERNAL MEDICINE

## 2024-11-07 PROCEDURE — 3074F SYST BP LT 130 MM HG: CPT | Mod: HCNC,CPTII,S$GLB, | Performed by: INTERNAL MEDICINE

## 2024-11-07 PROCEDURE — 1126F AMNT PAIN NOTED NONE PRSNT: CPT | Mod: HCNC,CPTII,S$GLB, | Performed by: INTERNAL MEDICINE

## 2024-11-07 PROCEDURE — 1160F RVW MEDS BY RX/DR IN RCRD: CPT | Mod: HCNC,CPTII,S$GLB, | Performed by: INTERNAL MEDICINE

## 2024-11-07 PROCEDURE — 3078F DIAST BP <80 MM HG: CPT | Mod: HCNC,CPTII,S$GLB, | Performed by: INTERNAL MEDICINE

## 2024-11-07 PROCEDURE — 1159F MED LIST DOCD IN RCRD: CPT | Mod: HCNC,CPTII,S$GLB, | Performed by: INTERNAL MEDICINE

## 2024-11-07 PROCEDURE — 99214 OFFICE O/P EST MOD 30 MIN: CPT | Mod: HCNC,S$GLB,, | Performed by: INTERNAL MEDICINE

## 2024-11-07 PROCEDURE — 3288F FALL RISK ASSESSMENT DOCD: CPT | Mod: HCNC,CPTII,S$GLB, | Performed by: INTERNAL MEDICINE

## 2024-11-07 PROCEDURE — 99999 PR PBB SHADOW E&M-EST. PATIENT-LVL IV: CPT | Mod: PBBFAC,HCNC,, | Performed by: INTERNAL MEDICINE

## 2024-11-07 RX ORDER — FENOFIBRATE 54 MG/1
54 TABLET ORAL DAILY
Qty: 90 TABLET | Refills: 3 | Status: SHIPPED | OUTPATIENT
Start: 2024-11-07 | End: 2025-11-07

## 2024-11-13 ENCOUNTER — CLINICAL SUPPORT (OUTPATIENT)
Dept: REHABILITATION | Facility: HOSPITAL | Age: 79
End: 2024-11-13
Attending: INTERNAL MEDICINE
Payer: MEDICARE

## 2024-11-13 DIAGNOSIS — R53.81 PHYSICAL DECONDITIONING: ICD-10-CM

## 2024-11-13 DIAGNOSIS — M25.651 IMPAIRED RANGE OF MOTION OF BOTH HIPS: Primary | ICD-10-CM

## 2024-11-13 DIAGNOSIS — Z74.09 IMPAIRED MOBILITY AND ADLS: ICD-10-CM

## 2024-11-13 DIAGNOSIS — M25.652 IMPAIRED RANGE OF MOTION OF BOTH HIPS: Primary | ICD-10-CM

## 2024-11-13 DIAGNOSIS — Z78.9 IMPAIRED MOBILITY AND ADLS: ICD-10-CM

## 2024-11-13 DIAGNOSIS — R29.898 WEAKNESS OF BOTH HIPS: ICD-10-CM

## 2024-11-13 DIAGNOSIS — R26.2 IMPAIRED AMBULATION: ICD-10-CM

## 2024-11-13 DIAGNOSIS — R29.898 WEAKNESS OF BOTH LOWER EXTREMITIES: ICD-10-CM

## 2024-11-13 PROCEDURE — 97161 PT EVAL LOW COMPLEX 20 MIN: CPT | Mod: HCNC,PO | Performed by: PHYSICAL THERAPIST
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
## 2024-11-15 PROBLEM — Z74.09 IMPAIRED MOBILITY AND ADLS: Status: ACTIVE | Noted: 2024-11-15

## 2024-11-15 PROBLEM — Z78.9 IMPAIRED MOBILITY AND ADLS: Status: ACTIVE | Noted: 2024-11-15
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
## 2024-11-16 ENCOUNTER — NURSE TRIAGE (OUTPATIENT)
Dept: ADMINISTRATIVE | Facility: CLINIC | Age: 79
End: 2024-11-16
Payer: MEDICARE

## 2024-11-16 ENCOUNTER — HOSPITAL ENCOUNTER (EMERGENCY)
Facility: HOSPITAL | Age: 79
Discharge: HOME | End: 2024-11-16
Attending: EMERGENCY MEDICINE
Payer: MEDICARE

## 2024-11-16 VITALS
TEMPERATURE: 98 F | HEIGHT: 63 IN | SYSTOLIC BLOOD PRESSURE: 168 MMHG | BODY MASS INDEX: 26.58 KG/M2 | OXYGEN SATURATION: 100 % | HEART RATE: 83 BPM | RESPIRATION RATE: 19 BRPM | DIASTOLIC BLOOD PRESSURE: 74 MMHG | WEIGHT: 150 LBS

## 2024-11-16 DIAGNOSIS — R45.89 ANXIETY ABOUT HEALTH: ICD-10-CM

## 2024-11-16 DIAGNOSIS — R42 LIGHTHEADEDNESS: Primary | ICD-10-CM

## 2024-11-16 LAB
ALBUMIN SERPL BCP-MCNC: 3.5 G/DL (ref 3.5–5.2)
ALP SERPL-CCNC: 41 U/L (ref 40–150)
ALT SERPL W/O P-5'-P-CCNC: 12 U/L (ref 10–44)
ANION GAP SERPL CALC-SCNC: 9 MMOL/L (ref 8–16)
AST SERPL-CCNC: 15 U/L (ref 10–40)
BASOPHILS # BLD AUTO: 0.03 K/UL (ref 0–0.2)
BASOPHILS NFR BLD: 0.3 % (ref 0–1.9)
BILIRUB SERPL-MCNC: 0.2 MG/DL (ref 0.1–1)
BILIRUB UR QL STRIP: NEGATIVE
BNP SERPL-MCNC: 42 PG/ML (ref 0–99)
BUN SERPL-MCNC: 25 MG/DL (ref 8–23)
CALCIUM SERPL-MCNC: 9.6 MG/DL (ref 8.7–10.5)
CHLORIDE SERPL-SCNC: 102 MMOL/L (ref 95–110)
CLARITY UR REFRACT.AUTO: CLEAR
CO2 SERPL-SCNC: 24 MMOL/L (ref 23–29)
COLOR UR AUTO: COLORLESS
CREAT SERPL-MCNC: 0.9 MG/DL (ref 0.5–1.4)
DIFFERENTIAL METHOD BLD: ABNORMAL
EOSINOPHIL # BLD AUTO: 0.1 K/UL (ref 0–0.5)
EOSINOPHIL NFR BLD: 1.2 % (ref 0–8)
ERYTHROCYTE [DISTWIDTH] IN BLOOD BY AUTOMATED COUNT: 12.3 % (ref 11.5–14.5)
EST. GFR  (NO RACE VARIABLE): >60 ML/MIN/1.73 M^2
GLUCOSE SERPL-MCNC: 131 MG/DL (ref 70–110)
GLUCOSE UR QL STRIP: NEGATIVE
HCT VFR BLD AUTO: 31.4 % (ref 37–48.5)
HCV AB SERPL QL IA: NORMAL
HGB BLD-MCNC: 10.5 G/DL (ref 12–16)
HGB UR QL STRIP: NEGATIVE
HIV 1+2 AB+HIV1 P24 AG SERPL QL IA: NORMAL
IMM GRANULOCYTES # BLD AUTO: 0.05 K/UL (ref 0–0.04)
IMM GRANULOCYTES NFR BLD AUTO: 0.5 % (ref 0–0.5)
KETONES UR QL STRIP: NEGATIVE
LEUKOCYTE ESTERASE UR QL STRIP: NEGATIVE
LYMPHOCYTES # BLD AUTO: 1.9 K/UL (ref 1–4.8)
LYMPHOCYTES NFR BLD: 19 % (ref 18–48)
MAGNESIUM SERPL-MCNC: 1.7 MG/DL (ref 1.6–2.6)
MCH RBC QN AUTO: 29.5 PG (ref 27–31)
MCHC RBC AUTO-ENTMCNC: 33.4 G/DL (ref 32–36)
MCV RBC AUTO: 88 FL (ref 82–98)
MONOCYTES # BLD AUTO: 0.6 K/UL (ref 0.3–1)
MONOCYTES NFR BLD: 5.5 % (ref 4–15)
NEUTROPHILS # BLD AUTO: 7.5 K/UL (ref 1.8–7.7)
NEUTROPHILS NFR BLD: 73.5 % (ref 38–73)
NITRITE UR QL STRIP: NEGATIVE
NRBC BLD-RTO: 0 /100 WBC
OHS QRS DURATION: 80 MS
OHS QTC CALCULATION: 458 MS
PH UR STRIP: 6 [PH] (ref 5–8)
PLATELET # BLD AUTO: 212 K/UL (ref 150–450)
PMV BLD AUTO: 10.3 FL (ref 9.2–12.9)
POTASSIUM SERPL-SCNC: 3.2 MMOL/L (ref 3.5–5.1)
PROT SERPL-MCNC: 6.7 G/DL (ref 6–8.4)
PROT UR QL STRIP: NEGATIVE
RBC # BLD AUTO: 3.56 M/UL (ref 4–5.4)
SODIUM SERPL-SCNC: 135 MMOL/L (ref 136–145)
SP GR UR STRIP: 1.01 (ref 1–1.03)
TROPONIN I SERPL DL<=0.01 NG/ML-MCNC: <0.006 NG/ML (ref 0–0.03)
URN SPEC COLLECT METH UR: ABNORMAL
WBC # BLD AUTO: 10.19 K/UL (ref 3.9–12.7)

## 2024-11-16 PROCEDURE — 25000003 PHARM REV CODE 250: Mod: HCNC

## 2024-11-16 PROCEDURE — 81003 URINALYSIS AUTO W/O SCOPE: CPT | Mod: HCNC

## 2024-11-16 PROCEDURE — 83880 ASSAY OF NATRIURETIC PEPTIDE: CPT | Mod: HCNC | Performed by: EMERGENCY MEDICINE

## 2024-11-16 PROCEDURE — 83735 ASSAY OF MAGNESIUM: CPT | Mod: HCNC | Performed by: EMERGENCY MEDICINE

## 2024-11-16 PROCEDURE — 85025 COMPLETE CBC W/AUTO DIFF WBC: CPT | Mod: HCNC | Performed by: EMERGENCY MEDICINE

## 2024-11-16 PROCEDURE — 25000003 PHARM REV CODE 250: Mod: HCNC | Performed by: EMERGENCY MEDICINE

## 2024-11-16 PROCEDURE — 63600175 PHARM REV CODE 636 W HCPCS: Mod: HCNC | Performed by: EMERGENCY MEDICINE

## 2024-11-16 PROCEDURE — 96361 HYDRATE IV INFUSION ADD-ON: CPT | Mod: HCNC

## 2024-11-16 PROCEDURE — 93005 ELECTROCARDIOGRAM TRACING: CPT | Mod: HCNC

## 2024-11-16 PROCEDURE — 93010 ELECTROCARDIOGRAM REPORT: CPT | Mod: HCNC,,, | Performed by: INTERNAL MEDICINE

## 2024-11-16 PROCEDURE — 86803 HEPATITIS C AB TEST: CPT | Mod: HCNC | Performed by: PHYSICIAN ASSISTANT

## 2024-11-16 PROCEDURE — 96374 THER/PROPH/DIAG INJ IV PUSH: CPT | Mod: HCNC

## 2024-11-16 PROCEDURE — 80053 COMPREHEN METABOLIC PANEL: CPT | Mod: HCNC | Performed by: EMERGENCY MEDICINE

## 2024-11-16 PROCEDURE — 99285 EMERGENCY DEPT VISIT HI MDM: CPT | Mod: 25,HCNC

## 2024-11-16 PROCEDURE — 87389 HIV-1 AG W/HIV-1&-2 AB AG IA: CPT | Mod: HCNC | Performed by: PHYSICIAN ASSISTANT

## 2024-11-16 PROCEDURE — 84484 ASSAY OF TROPONIN QUANT: CPT | Mod: HCNC | Performed by: EMERGENCY MEDICINE

## 2024-11-16 RX ORDER — HYDROXYZINE HYDROCHLORIDE 25 MG/1
25 TABLET, FILM COATED ORAL 3 TIMES DAILY PRN
Qty: 21 TABLET | Refills: 0 | Status: SHIPPED | OUTPATIENT
Start: 2024-11-16 | End: 2024-11-23

## 2024-11-16 RX ORDER — HYDROXYZINE PAMOATE 25 MG/1
25 CAPSULE ORAL
Status: COMPLETED | OUTPATIENT
Start: 2024-11-16 | End: 2024-11-16

## 2024-11-16 RX ORDER — ONDANSETRON 4 MG/1
4 TABLET, ORALLY DISINTEGRATING ORAL EVERY 8 HOURS PRN
Qty: 12 TABLET | Refills: 0 | Status: SHIPPED | OUTPATIENT
Start: 2024-11-16 | End: 2024-11-20

## 2024-11-16 RX ORDER — METOCLOPRAMIDE HYDROCHLORIDE 5 MG/ML
10 INJECTION INTRAMUSCULAR; INTRAVENOUS
Status: COMPLETED | OUTPATIENT
Start: 2024-11-16 | End: 2024-11-16

## 2024-11-16 RX ADMIN — HYDROXYZINE PAMOATE 25 MG: 25 CAPSULE ORAL at 07:11

## 2024-11-16 RX ADMIN — METOCLOPRAMIDE 10 MG: 5 INJECTION, SOLUTION INTRAMUSCULAR; INTRAVENOUS at 06:11

## 2024-11-16 RX ADMIN — SODIUM CHLORIDE 500 ML: 9 INJECTION, SOLUTION INTRAVENOUS at 03:11

## 2024-11-16 RX ADMIN — POTASSIUM BICARBONATE 40 MEQ: 391 TABLET, EFFERVESCENT ORAL at 04:11

## 2024-11-16 RX ADMIN — SODIUM CHLORIDE 500 ML: 9 INJECTION, SOLUTION INTRAVENOUS at 06:11

## 2024-11-16 NOTE — TELEPHONE ENCOUNTER
Friday woke up and could not stand up and walk. Was lightheaded and unsteady. Thought it was related to medication. Eyes were tearing and swollen. Called MD but did not get a response. After a while was able to walk again. Went out and walked 2 blocks. Returned home and cooked. Woke up an hour ago and could not get out of bed because unsteadiness was worse than yesterday and stomach was queezy. Feeling weak and nose running. Advised to ED now.  Reason for Disposition   SEVERE dizziness (e.g., unable to stand, requires support to walk, feels like passing out now)    Additional Information   Negative: SEVERE difficulty breathing (e.g., struggling for each breath, speaks in single words)   Negative: [1] Difficulty breathing or swallowing AND [2] started suddenly after medicine, an allergic food or bee sting   Negative: Shock suspected (e.g., cold/pale/clammy skin, too weak to stand, low BP, rapid pulse)   Negative: Difficult to awaken or acting confused (e.g., disoriented, slurred speech)   Negative: [1] Weakness (i.e., paralysis, loss of muscle strength) of the face, arm or leg on one side of the body AND [2] sudden onset AND [3] present now   Negative: [1] Numbness (i.e., loss of sensation) of the face, arm or leg on one side of the body AND [2] sudden onset AND [3] present now   Negative: [1] Loss of speech or garbled speech AND [2] sudden onset AND [3] present now   Negative: Overdose (accidental or intentional) of medications   Negative: [1] Fainted > 15 minutes ago AND [2] still feels too weak or dizzy to stand   Negative: Heart beating < 50 beats per minute OR > 140 beats per minute   Negative: Sounds like a life-threatening emergency to the triager   Negative: Difficulty breathing    Protocols used: Dizziness - [id number]

## 2024-11-16 NOTE — ED TRIAGE NOTES
Shakira Pearl, a 79 y.o. female presents to the ED w/ complaint of lightheaded, blurred vision, and weakness since Friday. C/o HTN and right eye pain w/ a HA. +nausea received 4mg of zofran with EMS     Triage note:  Chief Complaint   Patient presents with    Multiple Complaints     Patient reports that Friday she had an episode of light headedness, patient reports that this episode resolved. States that tonight that she got up to feed her cats and that she began lightheadedness again.     Review of patient's allergies indicates:   Allergen Reactions    Demerol [meperidine] Nausea And Vomiting     Other reaction(s): Nausea    Papaya      Illness vomiting    Zetia [ezetimibe] Hives    Sulfa (sulfonamide antibiotics) Rash    Sulfur Rash     Past Medical History:   Diagnosis Date    Adrenal adenoma     Allergy sinus    Arthritis back    Cataract     Colon polyps     Degenerative disc disease back    Diverticulosis of colon     Dyspepsia     Fatty liver 01/12/2023    GERD (gastroesophageal reflux disease)     Heartburn     Hemorrhoids, internal     Hiatal hernia     Hyperlipidemia     LAM (iron deficiency anemia)     Liver cyst     Neuromuscular disorder     Vitamin D deficiency

## 2024-11-16 NOTE — DISCHARGE INSTRUCTIONS
Diagnosis:   1. Lightheadedness    2. Anxiety about health        Tests you had showed:   Labs Reviewed   CBC W/ AUTO DIFFERENTIAL - Abnormal       Result Value    WBC 10.19      RBC 3.56 (*)     Hemoglobin 10.5 (*)     Hematocrit 31.4 (*)     MCV 88      MCH 29.5      MCHC 33.4      RDW 12.3      Platelets 212      MPV 10.3      Immature Granulocytes 0.5      Gran # (ANC) 7.5      Immature Grans (Abs) 0.05 (*)     Lymph # 1.9      Mono # 0.6      Eos # 0.1      Baso # 0.03      nRBC 0      Gran % 73.5 (*)     Lymph % 19.0      Mono % 5.5      Eosinophil % 1.2      Basophil % 0.3      Differential Method Automated      Narrative:     Release to patient->Immediate   COMPREHENSIVE METABOLIC PANEL - Abnormal    Sodium 135 (*)     Potassium 3.2 (*)     Chloride 102      CO2 24      Glucose 131 (*)     BUN 25 (*)     Creatinine 0.9      Calcium 9.6      Total Protein 6.7      Albumin 3.5      Total Bilirubin 0.2      Alkaline Phosphatase 41      AST 15      ALT 12      eGFR >60.0      Anion Gap 9      Narrative:     Release to patient->Immediate  Add on Trop per Dr. Cordero @ 03:31 am to order # [id number]   URINALYSIS, REFLEX TO URINE CULTURE - Abnormal    Specimen UA Urine, Clean Catch      Color, UA Colorless (*)     Appearance, UA Clear      pH, UA 6.0      Specific Gravity, UA 1.010      Protein, UA Negative      Glucose, UA Negative      Ketones, UA Negative      Bilirubin (UA) Negative      Occult Blood UA Negative      Nitrite, UA Negative      Leukocytes, UA Negative      Narrative:     Specimen Source->Urine   HEPATITIS C ANTIBODY    Hepatitis C Ab Non-reactive      Narrative:     add on BNP per Sloan Cordero MD/order#[id number] on  11/16/2024 @   04:17.    Release to patient->Immediate  Add on Trop per Dr. Cordero @ 03:31 am to order # [id number]   HIV 1 / 2 ANTIBODY    HIV 1/2 Ag/Ab Non-reactive      Narrative:     add on BNP per Sloan Cordero MD/order#[id number] on  11/16/2024 @   04:17.    Release to  patient->Immediate  Add on Trop per Dr. Cordero @ 03:31 am to order # [id number]   MAGNESIUM    Magnesium 1.7      Narrative:     Release to patient->Immediate  Add on Trop per Dr. Cordero @ 03:31 am to order # [id number]   B-TYPE NATRIURETIC PEPTIDE   TROPONIN I   TROPONIN I    Troponin I <0.006      Narrative:     Release to patient->Immediate  Add on Trop per Dr. Cordero @ 03:31 am to order # [id number]   B-TYPE NATRIURETIC PEPTIDE    BNP 42      Narrative:     add on BNP per Sloan Cordero MD/order#[id number] on  11/16/2024 @   04:17.    Release to patient->Immediate  Add on Trop per Dr. Cordero @ 03:31 am to order # [id number]      X-Ray Chest AP Portable   Final Result      Mild pulmonary vascular engorgement, possibly representing mild CHF.  Correlate clinically.         Electronically signed by: Cuauhtemoc Marcial   Date:    11/16/2024   Time:    06:55          Treatments you received were:   Medications   hydrOXYzine pamoate capsule 25 mg (has no administration in time range)   sodium chloride 0.9% bolus 500 mL 500 mL (0 mLs Intravenous Stopped 11/16/24 0413)   potassium bicarbonate disintegrating tablet 40 mEq (40 mEq Oral Given 11/16/24 0425)   sodium chloride 0.9% bolus 500 mL 500 mL (0 mLs Intravenous Stopped 11/16/24 0718)   metoclopramide injection 10 mg (10 mg Intravenous Given 11/16/24 0618)       Home Care Instructions:  - Medications: Continue taking your home medications as prescribed  - Take Zofran, 1 tablet by mouth, up to three times a day as needed for nausea  - Take hydroxyzine, 1 tablet by mouth, up to three times a day as needed for anxiety    Follow-Up Plan:  - Follow-up with: Primary care doctor within 3-5 days  - Additional testing and/or evaluation will be directed by your primary doctor    Return to the Emergency Department for symptoms including but not limited to: worsening symptoms, severe back pain, shortness of breath or chest pain, vomiting with inability to hold down fluids, blood in  vomit or poop, fevers greater than 100.4°F, passing out/fainting/unconsciousness, or other concerning symptoms.     If you do not have a primary care doctor, you may contact the one listed on your discharge paperwork or you may also call the [facility] Appointment Desk at [phone] to schedule an appointment and establish care with one. It is important to your health that you have a primary care doctor.    Please take all medications as directed. All medications may potentially have side-effects and it is impossible to predict which medications may give you side-effects or what side-effects (if any) they will give you. If you feel that you are having a negative effect or side-effect of any medication you should immediately stop taking them and seek medical attention. If you feel that you are having a life-threatening reaction call 911.

## 2024-11-16 NOTE — ED PROVIDER NOTES
Encounter Date: 11/16/2024       History     Chief Complaint   Patient presents with    Multiple Complaints     Patient reports that Friday she had an episode of light headedness, patient reports that this episode resolved. States that tonight that she got up to feed her cats and that she began lightheadedness again.     79 year old female with PMH hypertension, hyperlipidemia presents for evaluation of lightheadedness.  Patient reports she had an episode of lightheadedness 2 days ago that resolved.  Patient reports her CT woke her up from sleep in the middle of the night tonight, and patient felt lightheaded and unsteady.  Patient was able to get out of bed and walk to another room with difficulty.  Patient called EMS and was transported to ED for further evaluation.  On arrival in emergency department, patient denies chest pain, shortness of breath, fevers/chills, dysuria, abdominal pain, nausea/vomiting.  She endorses headache and blurred vision, but denies any diplopia or visual disturbances such as flashes or floaters.    The history is provided by the patient and medical records. No  was used.     Review of patient's allergies indicates:   Allergen Reactions    Demerol [meperidine] Nausea And Vomiting     Other reaction(s): Nausea    Papaya      Illness vomiting    Zetia [ezetimibe] Hives    Sulfa (sulfonamide antibiotics) Rash    Sulfur Rash     Past Medical History:   Diagnosis Date    Adrenal adenoma     Allergy sinus    Arthritis back    Cataract     Colon polyps     Degenerative disc disease back    Diverticulosis of colon     Dyspepsia     Fatty liver 01/12/2023    GERD (gastroesophageal reflux disease)     Heartburn     Hemorrhoids, internal     Hiatal hernia     Hyperlipidemia     LAM (iron deficiency anemia)     Liver cyst     Neuromuscular disorder     Vitamin D deficiency      Past Surgical History:   Procedure Laterality Date    APPENDECTOMY  age 19    CATARACT EXTRACTION W/   INTRAOCULAR LENS IMPLANT      CATARACT EXTRACTION W/  INTRAOCULAR LENS IMPLANT      CHOLECYSTECTOMY      age of 25    CHONDROPLASTY OF KNEE Left 10/03/2019    Procedure: CHONDROPLASTY, KNEE;  Surgeon: Kaylen Patiño MD;  Location: [facility] OR;  Service: Orthopedics;  Laterality: Left;    COLONOSCOPY N/A 09/09/2020    Procedure: COLONOSCOPY;  Surgeon: Peter Cuevas MD;  Location: [facility] (4TH FLR);  Service: Endoscopy;  Laterality: N/A;  device assisted colonoscopy w/Dr Cuevas.  Difficult colon, multiple adhesions from abd surgeries, Hx adv adenomatous polyp/tubulovillous adenoma of cecum in April 2011.  Dr Black     per Dr Cuevas-Okay for 4th floor - 60 minute slot (90 min w/egd added).  Start with peds col    COLONOSCOPY N/A 9/20/2023    Procedure: COLONOSCOPY;  Surgeon: Peter Cuevas MD;  Location: [facility] (4TH FLR);  Service: Endoscopy;  Laterality: N/A;    ESOPHAGOGASTRODUODENOSCOPY N/A 09/09/2020    Procedure: EGD (ESOPHAGOGASTRODUODENOSCOPY);  Surgeon: Peter Cuevas MD;  [facility]  Service: Endoscopy;  Laterality: N/A;  per Dr Black-add EGD to colonoscopy order.  Pt requesting later appt.  4/13/20 - removed from 4/30/20, rescheduled 7/29/20 - pg   covid 9/6-met-urgent care--tb    HYSTERECTOMY  38    HYSTERECTOMY, VAGINAL, WITH UTEROSACRAL LIGAMENT VAULT SUSPENSION      INJECTION OF JOINT Bilateral 02/18/2019    Procedure: INJECTION, JOINT BILATERAL SI;  Surgeon: Mert Palumbo MD;  Location: [facility] PAIN MGT;  Service: Pain Management;  Laterality: Bilateral;  BILATERAL SI JOINT INJECTION    INJECTION OF JOINT Right 08/20/2020    Procedure: INJECTION JOINT/ R HIP DIRECT REFERRAL * DR. PALUMBO ONLY PLEASE*;  Surgeon: Mert Palumbo MD;  Location: [facility] PAIN MGT;  Service: Pain Management;  Laterality: Right;  NEED CONSENT    KNEE ARTHROSCOPY W/ MENISCECTOMY Left 10/03/2019    Procedure: ARTHROSCOPY, KNEE, WITH MENISCECTOMY;  Surgeon: Kaylen Patiño MD;  Location: [facility] OR;  Service: Orthopedics;   Laterality: Left;    SYNOVECTOMY OF KNEE Left 10/03/2019    Procedure: SYNOVECTOMY, KNEE;  Surgeon: Kaylen Patiño MD;  Location: [facility];  Service: Orthopedics;  Laterality: Left;    YAG Laser Capsulotomy Bilateral     Dr. Aleman     Family History   Problem Relation Name Age of Onset    Heart disease Mother  67        heart attack    Stroke Mother      Cancer Father  65        abdominal    Stomach cancer Father      No Known Problems Sister      No Known Problems Brother      No Known Problems Son      No Known Problems Maternal Grandmother      No Known Problems Maternal Grandfather      No Known Problems Paternal Grandmother      No Known Problems Paternal Grandfather      No Known Problems Maternal Aunt      No Known Problems Maternal Uncle      No Known Problems Paternal Aunt      No Known Problems Paternal Uncle      Celiac disease Neg Hx      Colon cancer Neg Hx      Crohn's disease Neg Hx      Esophageal cancer Neg Hx      Inflammatory bowel disease Neg Hx      Liver cancer Neg Hx      Rectal cancer Neg Hx      Ulcerative colitis Neg Hx      Amblyopia Neg Hx      Blindness Neg Hx      Cataracts Neg Hx      Diabetes Neg Hx      Glaucoma Neg Hx      Hypertension Neg Hx      Macular degeneration Neg Hx      Retinal detachment Neg Hx      Strabismus Neg Hx      Thyroid disease Neg Hx      Anesthesia problems Neg Hx       Social History     Tobacco Use    Smoking status: Never    Smokeless tobacco: Never   Substance Use Topics    Alcohol use: No    Drug use: No         Physical Exam     Initial Vitals [11/16/24 0238]   BP Pulse Resp Temp SpO2   (!) 202/93 72 17 97.7 °F (36.5 °C) 98 %      MAP       --         Physical Exam    Nursing note and vitals reviewed.  Constitutional: She appears well-developed and well-nourished.   Eyes: Conjunctivae and EOM are normal. Pupils are equal, round, and reactive to light.   Cardiovascular:  Normal rate, regular rhythm and normal heart sounds.           Pulmonary/Chest:  Breath sounds normal. No respiratory distress.   Abdominal: Abdomen is soft. There is no abdominal tenderness.     Neurological: She is alert and oriented to person, place, and time. She has normal strength. No cranial nerve deficit or sensory deficit. Coordination normal.         ED Course   Procedures  Labs Reviewed   CBC W/ AUTO DIFFERENTIAL - Abnormal       Result Value    WBC 10.19      RBC 3.56 (*)     Hemoglobin 10.5 (*)     Hematocrit 31.4 (*)     MCV 88      MCH 29.5      MCHC 33.4      RDW 12.3      Platelets 212      MPV 10.3      Immature Granulocytes 0.5      Gran # (ANC) 7.5      Immature Grans (Abs) 0.05 (*)     Lymph # 1.9      Mono # 0.6      Eos # 0.1      Baso # 0.03      nRBC 0      Gran % 73.5 (*)     Lymph % 19.0      Mono % 5.5      Eosinophil % 1.2      Basophil % 0.3      Differential Method Automated      Narrative:     Release to patient->Immediate   COMPREHENSIVE METABOLIC PANEL - Abnormal    Sodium 135 (*)     Potassium 3.2 (*)     Chloride 102      CO2 24      Glucose 131 (*)     BUN 25 (*)     Creatinine 0.9      Calcium 9.6      Total Protein 6.7      Albumin 3.5      Total Bilirubin 0.2      Alkaline Phosphatase 41      AST 15      ALT 12      eGFR >60.0      Anion Gap 9      Narrative:     Release to patient->Immediate  Add on Trop per Dr. Cordero @ 03:31 am to order # [id number]   URINALYSIS, REFLEX TO URINE CULTURE - Abnormal    Specimen UA Urine, Clean Catch      Color, UA Colorless (*)     Appearance, UA Clear      pH, UA 6.0      Specific Gravity, UA 1.010      Protein, UA Negative      Glucose, UA Negative      Ketones, UA Negative      Bilirubin (UA) Negative      Occult Blood UA Negative      Nitrite, UA Negative      Leukocytes, UA Negative      Narrative:     Specimen Source->Urine   HEPATITIS C ANTIBODY    Hepatitis C Ab Non-reactive      Narrative:     add on BNP per Sloan Cordero MD/order#[id number] on  11/16/2024 @   04:17.    Release to patient->Immediate  Add on  Trop per Dr. Cordero @ 03:31 am to order # [id number]   HIV 1 / 2 ANTIBODY    HIV 1/2 Ag/Ab Non-reactive      Narrative:     add on BNP per Sloan Cordero MD/order#[id number] on  11/16/2024 @   04:17.    Release to patient->Immediate  Add on Trop per Dr. Cordero @ 03:31 am to order # [id number]   MAGNESIUM    Magnesium 1.7      Narrative:     Release to patient->Immediate  Add on Trop per Dr. Cordero @ 03:31 am to order # [id number]   B-TYPE NATRIURETIC PEPTIDE   TROPONIN I   TROPONIN I    Troponin I <0.006      Narrative:     Release to patient->Immediate  Add on Trop per Dr. Cordero @ 03:31 am to order # [id number]   B-TYPE NATRIURETIC PEPTIDE    BNP 42      Narrative:     add on BNP per Sloan Cordero MD/order#[id number] on  11/16/2024 @   04:17.    Release to patient->Immediate  Add on Trop per Dr. Cordero @ 03:31 am to order # [id number]     EKG Readings: (Independently Interpreted)   Initial Reading: No STEMI. Previous EKG: Compared with most recent EKG Previous EKG Date: 02/11/2021. Rhythm: Normal Sinus Rhythm. Heart Rate: 73. Axis: Normal.       Imaging Results              X-Ray Chest AP Portable (Final result)  Result time 11/16/24 06:55:19      Final result by Cuauhtemoc Marcial MD (11/16/24 06:55:19)                   Impression:      Mild pulmonary vascular engorgement, possibly representing mild CHF.  Correlate clinically.      Electronically signed by: Cuauhtemoc Marcial  Date:    11/16/2024  Time:    06:55               Narrative:    EXAMINATION:  XR CHEST AP PORTABLE    CLINICAL HISTORY:  lightheadedness;    TECHNIQUE:  Single frontal view of the chest was performed.    COMPARISON:  PA and lateral chest x-ray 09/23/2019    FINDINGS:  Midline thoracic trachea.    Atherosclerotic calcification in the aortic arch.    Borderline-enlarged cardiopericardial silhouette.    No engorged central pulmonary vasculature.  No consolidation, discrete pneumothorax, or blunting of either lateral costophrenic angle.    Mild  dextrocurvature and scattered degenerative changes in the suboptimally visualized spine.  No acute radiographic abnormality in the visualized upper abdomen.    External leads project over the torso.  A metallic necklace projects over the base of the neck.                                       Medications   sodium chloride 0.9% bolus 500 mL 500 mL (0 mLs Intravenous Stopped 11/16/24 0413)   potassium bicarbonate disintegrating tablet 40 mEq (40 mEq Oral Given 11/16/24 0425)   sodium chloride 0.9% bolus 500 mL 500 mL (0 mLs Intravenous Stopped 11/16/24 0718)   metoclopramide injection 10 mg (10 mg Intravenous Given 11/16/24 0618)   hydrOXYzine pamoate capsule 25 mg (25 mg Oral Given 11/16/24 0759)     Medical Decision Making  79 year old female with PMH hypertension, hyperlipidemia presents for evaluation of lightheadedness.     Differential diagnosis includes, but is not limited to, dehydration, anxiety, ACS, CHF, UTI, pneumonia, anemia, dysrhythmia.    In emergency department, patient hemodynamically stable, no acute distress.  EKG showing normal sinus rhythm without ischemia or infarction.  Troponin and BNP within normal limits, lowering concerns for ACS or CHF.  No evidence of UTI on urinalysis.  No evidence of pneumonia on chest x-ray.  CBC showing anemia, but not significantly lower than previous testing.  Patient was noted to be mildly hypokalemic, so potassium replacement was ordered.  Patient without visual field or neurologic deficit on examination.  Headache treated with Reglan and IV fluids.  Patient also expressing a feeling of anxiety, so Atarax given.  On re-evaluation, patient is able to ambulate without assistance or worsening of symptoms.  Extensive discussion with patient regarding workup, symptoms, and indications for emergent control of blood pressure.  Educated patient on signs of hypertensive emergency and advised patient to begin keeping a blood pressure log that she can take 2 appointments  with her primary care.  Patient states that she has had some anxiety and intermittent nausea over the past several days without any vomiting, so given short courses for Atarax and Zofran.  While patient expressed anxiety over her health, she was emphatic that she did not want to be admitted to the hospital.  No acute findings on workup that would warrant admission.  Stable for discharge.  Return precautions given.  Answered all questions.  Patient verbalizes understanding and agreement with plan.    Amount and/or Complexity of Data Reviewed  Labs: ordered. Decision-making details documented in ED Course.  Radiology: ordered and independent interpretation performed. Decision-making details documented in ED Course.  ECG/medicine tests: ordered and independent interpretation performed. Decision-making details documented in ED Course.    Risk  Prescription drug management.  Risk Details: Patient received Atarax and Reglan while in the emergency department.  Patient discharged with prescriptions for Atarax and Zofran.               ED Course as of 11/16/24 0845   Sat Nov 16, 2024   0525 CBC auto differential(!)  Anemia, most recent 11.1  No leukocytosis []   0525 Comprehensive metabolic panel(!)  Mild hypokalemia, potassium ordered  Mild elevation in BUN []   0525 Troponin I  Within normal limits, lowering concern for ACS []   0525 Magnesium  Within normal limits []   0525 BNP  Within normal limits, lowering concern for CHF []   0525 X-Ray Chest AP Portable  Independent interpretation of this chest x-ray: no effusion, consolidation, or pneumothorax []   0614 Urinalysis, Reflex to Urine Culture Urine, Clean Catch(!)  Not concerning for infection []      ED Course User Index  [BH] Josr Crandall MD                             Clinical Impression:  Final diagnoses:  [R42] Lightheadedness (Primary)  [R45.89] Anxiety about health          ED Disposition Condition    Discharge Stable          ED Prescriptions        Medication Sig Dispense Start Date End Date Auth. Provider    ondansetron (ZOFRAN-ODT) 4 MG TbDL Take 1 tablet (4 mg total) by mouth every 8 (eight) hours as needed (nausea). 12 tablet 11/16/2024 11/20/2024 Josr Crandall MD    hydrOXYzine HCL (ATARAX) 25 MG tablet Take 1 tablet (25 mg total) by mouth 3 (three) times daily as needed for Anxiety. 21 tablet 11/16/2024 11/23/2024 Josr Crandall MD          Follow-up Information       Follow up With Specialties Details Why Contact Info    Angel Bello, DO Internal Medicine   [address] [facility] [address]  [phone]               Josr Crandall MD  Resident  11/16/24 2472

## 2024-11-18 ENCOUNTER — TELEPHONE (OUTPATIENT)
Dept: INTERNAL MEDICINE | Facility: CLINIC | Age: 79
End: 2024-11-18
Payer: MEDICARE

## 2024-11-18 ENCOUNTER — OFFICE VISIT (OUTPATIENT)
Dept: NEUROLOGY | Facility: CLINIC | Age: 79
End: 2024-11-18
Payer: MEDICARE

## 2024-11-18 VITALS
HEART RATE: 82 BPM | HEIGHT: 64 IN | BODY MASS INDEX: 25.63 KG/M2 | SYSTOLIC BLOOD PRESSURE: 143 MMHG | WEIGHT: 150.13 LBS | DIASTOLIC BLOOD PRESSURE: 83 MMHG

## 2024-11-18 DIAGNOSIS — G95.9 MYELOPATHY: ICD-10-CM

## 2024-11-18 DIAGNOSIS — R42 DIZZINESS AND GIDDINESS: ICD-10-CM

## 2024-11-18 DIAGNOSIS — G45.9 TRANSIENT CEREBRAL ISCHEMIA, UNSPECIFIED TYPE: ICD-10-CM

## 2024-11-18 DIAGNOSIS — G31.84 MCI (MILD COGNITIVE IMPAIRMENT): ICD-10-CM

## 2024-11-18 DIAGNOSIS — R41.3 OTHER AMNESIA: Primary | ICD-10-CM

## 2024-11-18 PROCEDURE — 99999 PR PBB SHADOW E&M-EST. PATIENT-LVL III: CPT | Mod: PBBFAC,HCNC,, | Performed by: PSYCHIATRY & NEUROLOGY

## 2024-11-18 PROCEDURE — 99215 OFFICE O/P EST HI 40 MIN: CPT | Mod: HCNC,S$GLB,, | Performed by: PSYCHIATRY & NEUROLOGY

## 2024-11-18 PROCEDURE — 3288F FALL RISK ASSESSMENT DOCD: CPT | Mod: HCNC,CPTII,S$GLB, | Performed by: PSYCHIATRY & NEUROLOGY

## 2024-11-18 PROCEDURE — 3077F SYST BP >= 140 MM HG: CPT | Mod: HCNC,CPTII,S$GLB, | Performed by: PSYCHIATRY & NEUROLOGY

## 2024-11-18 PROCEDURE — 1101F PT FALLS ASSESS-DOCD LE1/YR: CPT | Mod: HCNC,CPTII,S$GLB, | Performed by: PSYCHIATRY & NEUROLOGY

## 2024-11-18 PROCEDURE — 3079F DIAST BP 80-89 MM HG: CPT | Mod: HCNC,CPTII,S$GLB, | Performed by: PSYCHIATRY & NEUROLOGY

## 2024-11-18 RX ORDER — PNV NO.95/FERROUS FUM/FOLIC AC 28MG-0.8MG
100 TABLET ORAL
Status: SHIPPED | OUTPATIENT
Start: 2024-11-18

## 2024-11-18 NOTE — TELEPHONE ENCOUNTER
----- Message from Sarika sent at 11/18/2024  8:35 AM CST -----  Contact: Self [phone]  Would like to receive medical advice.    Would they like a call back or a response via [facility]:  call back    Additional information:  Pt is requesting an ER follow up with provider this week to no avail.
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
## 2024-11-20 ENCOUNTER — CLINICAL SUPPORT (OUTPATIENT)
Dept: REHABILITATION | Facility: HOSPITAL | Age: 79
End: 2024-11-20
Payer: MEDICARE

## 2024-11-20 ENCOUNTER — TELEPHONE (OUTPATIENT)
Dept: NEUROLOGY | Facility: CLINIC | Age: 79
End: 2024-11-20
Payer: MEDICARE

## 2024-11-20 DIAGNOSIS — M25.652 IMPAIRED RANGE OF MOTION OF BOTH HIPS: Primary | ICD-10-CM

## 2024-11-20 DIAGNOSIS — M25.651 IMPAIRED RANGE OF MOTION OF BOTH HIPS: Primary | ICD-10-CM

## 2024-11-20 DIAGNOSIS — Z78.9 IMPAIRED MOBILITY AND ADLS: ICD-10-CM

## 2024-11-20 DIAGNOSIS — R29.898 WEAKNESS OF BOTH HIPS: ICD-10-CM

## 2024-11-20 DIAGNOSIS — Z74.09 IMPAIRED MOBILITY AND ADLS: ICD-10-CM

## 2024-11-20 DIAGNOSIS — R26.2 IMPAIRED AMBULATION: ICD-10-CM

## 2024-11-20 PROCEDURE — 97112 NEUROMUSCULAR REEDUCATION: CPT | Mod: PO | Performed by: PHYSICAL THERAPIST

## 2024-11-20 NOTE — TELEPHONE ENCOUNTER
Spoke with patient in regards to the B-12 prescription. Patient states she's not sure if the prescription was put in yet .

## 2024-11-20 NOTE — PROGRESS NOTES
[facility]   Physical Therapy Treatment Note     Name: Shakira Pearl  Clinic Number: [id number]    Therapy Diagnosis:   Encounter Diagnoses   Name Primary?    Impaired range of motion of both hips Yes    Weakness of both hips     Impaired ambulation     Impaired mobility and ADLs      Physician: Angel Bello, *    Visit Date: 11/20/2024       Physician Orders: PT Eval and Treat gait and balance   Medical Diagnosis from Referral:   Physical deconditioning [R53.81], Weakness of both lower extremities [R29.898]   Evaluation Date: 11/13/2024  Authorization Period Expiration: 10/10/2025  Plan of Care Expiration: 2/13/2025  Progress Note Due: 12/13/2024  Visit # / Visits authorized: 1/ 1,   1 / 20      BALANCE  #1/3 11/13/2024-1 48%   #2/3       #3/3              PTA Visit #: -/5     Precautions: Standard    Time In: 1540  Time Out: 1645  Total Billable Time: 65 minutes      SUBJECTIVE     Pt reports: there is some discomfort in the neck . The legs feel fine at this time. No episodes of dizziness at the moment. Some dizziness this AM.   Since last visit she experienced balance challenges two days after her last session. She awoke one AM and tried to get up but could not. Did fine as the day progressed. During the night she was awaken and experienced profound dizziness. She was eventually transferred to the ER via ambulance because she was found to have severely elevated BP. Testing was performed in the ER . She was referred to a neurologist.   She was not issued a home exercise program at [facility]  Response to previous treatment: IE performed   Functional change: ongoing    Pain: 0/10  Location: bilateral lower legs and upper legs       OBJECTIVE     BP pre   140/84 OR 73  BP post 139/85 OR 80    TREATMENT        Shakira received the treatments listed below:     .BOLD INDICATES ACTIVITIES PERFORMED / DISCUSSED     Leg press   Recumbent bike  Upright bike   UE ergometer  Treadmill    Elliptical          THERAPEUTIC EXERCISES to develop  strength, endurance, ROM, and flexibility for - minutes including   SUPINE  -  -    SIDELYING  -  -    PRONE  -  -    STANDING.  -    SITTING  -     NEUROMUSCULAR RE-EDUCATION activities to improve: Balance, Proprioception, motor control and stability  for - minutes. The following activities were included:  SUPINE  -SLR x20  -knee to chest x20  -bridge x20     SIDELYING  -hip abduction x15  NA   -hydrants x15    PRONE  -leg lifts x20  NA  -leg swings x15    STANDING.  -leg swings x15 abduction / extension   -BALANCE  SL stand   Tandem stand   Wide base airex eyes closed   Narrow base airex  eyes open     SITTING  -LAQ x30   -SAQ x30     THERAPEUTIC ACTIVITIES to improve functional performance for -  minutes, including:  -     MANUAL THERAPY TECHNIQUES  were applied to - for - minutes.       MODALITY      PATIENT EDUCATION AND HOME EXERCISES     Home Exercises Provided and Patient Education Provided     Education provided:   --Findings of evaluation and examination, and affect of these on plan for treatment  -Prognosis and expectations  -Home exercise program and expectations of therapy     Written Home Exercises Provided: no  ASSESSMENT     The patient completed the routine noted. She did not exhibit any dizziness or vertiginous episodes. BP remained relatively stable. Progress  as tolerated.     Shakira Is progressing towards her goals.   Pt prognosis is Good.     Pt will continue to benefit from skilled outpatient physical therapy to address the deficits listed in the problem list box on initial evaluation, provide pt/family education and to maximize pt's level of independence in the home and community environment.     Pt's spiritual, cultural and educational needs considered and pt agreeable to plan of care and goals.     Anticipated barriers to physical therapy: none    Goals:   Ankle / Lower extremity  Short Term Goals: 4 weeks   1. Pts pain decreased 20 -  40% for improved functional mobility and quality of life ONGOING  2. Pts PROM in right knee extension increased by 5  degrees to enhance AROM and functional mobility  ONGOING  3. Pts strength in the gluteal musculature and right quadriceps increased by 1/2 grade to facilitate improved active mobility and functional stability ONGOING  4. Pt to exhibit correct return demonstration of exercises for self-management and independence with HEP ONGOING     Long Term Goals : 8 weeks   1.  Pts pain level decreased to 75% or greater for with standing  / walking for restoring functional mobility and ADL. ONGOING  2. Pts AROM in knee extension and hip abduction and extension increased by 10 degrees to restore functional mobility and ADL  ONGOING  3. Pts strength in the gluteal musculature and right quadriceps increased by one grade to facilitate improved active mobility and functional stability  ONGOING  4. Pt will be independent with HEP for self-management. ONGOING   5. Pt to exhibit dynamic stability on the RLE/ LLE for stable functional mobility and gait. ONGOING   6. Pt to demonstrate symmetrical weight bearing w/o pain to improve gait pattern  ONGOING     PLAN     Plan of Care Certification: 11/13/2024 to 2/13/2025.   Outpatient Physical Therapy 2 times weekly for 10 weeks to include the following interventions: Manual Therapy, Neuromuscular Re-ed, Patient Education, Therapeutic Activities, and Therapeutic Exercise.     Simone Lackey, PT

## 2024-11-21 ENCOUNTER — TELEPHONE (OUTPATIENT)
Dept: HEPATOLOGY | Facility: CLINIC | Age: 79
End: 2024-11-21
Payer: MEDICARE

## 2024-11-21 NOTE — TELEPHONE ENCOUNTER
Spoke with pt and canceled appt per the pt's request. Offered to reschedule appt and the pt stated that she will call back to schedule once she feels better.

## 2024-11-22 ENCOUNTER — OFFICE VISIT (OUTPATIENT)
Dept: INTERNAL MEDICINE | Facility: CLINIC | Age: 79
End: 2024-11-22
Payer: MEDICARE

## 2024-11-22 VITALS
HEIGHT: 63 IN | WEIGHT: 145.31 LBS | RESPIRATION RATE: 18 BRPM | OXYGEN SATURATION: 99 % | HEART RATE: 82 BPM | BODY MASS INDEX: 25.75 KG/M2 | DIASTOLIC BLOOD PRESSURE: 76 MMHG | SYSTOLIC BLOOD PRESSURE: 134 MMHG | TEMPERATURE: 98 F

## 2024-11-22 DIAGNOSIS — E53.8 LOW SERUM VITAMIN B12: Primary | ICD-10-CM

## 2024-11-22 DIAGNOSIS — K76.0 FATTY LIVER: ICD-10-CM

## 2024-11-22 DIAGNOSIS — K21.9 GASTROESOPHAGEAL REFLUX DISEASE, UNSPECIFIED WHETHER ESOPHAGITIS PRESENT: ICD-10-CM

## 2024-11-22 DIAGNOSIS — E78.1 HYPERTRIGLYCERIDEMIA: ICD-10-CM

## 2024-11-22 DIAGNOSIS — I10 ESSENTIAL HYPERTENSION: ICD-10-CM

## 2024-11-22 DIAGNOSIS — I70.0 AORTIC ATHEROSCLEROSIS: ICD-10-CM

## 2024-11-22 PROCEDURE — 1159F MED LIST DOCD IN RCRD: CPT | Mod: CPTII,S$GLB,, | Performed by: INTERNAL MEDICINE

## 2024-11-22 PROCEDURE — 3288F FALL RISK ASSESSMENT DOCD: CPT | Mod: CPTII,S$GLB,, | Performed by: INTERNAL MEDICINE

## 2024-11-22 PROCEDURE — 3075F SYST BP GE 130 - 139MM HG: CPT | Mod: CPTII,S$GLB,, | Performed by: INTERNAL MEDICINE

## 2024-11-22 PROCEDURE — 99214 OFFICE O/P EST MOD 30 MIN: CPT | Mod: S$GLB,,, | Performed by: INTERNAL MEDICINE

## 2024-11-22 PROCEDURE — 1101F PT FALLS ASSESS-DOCD LE1/YR: CPT | Mod: CPTII,S$GLB,, | Performed by: INTERNAL MEDICINE

## 2024-11-22 PROCEDURE — 1125F AMNT PAIN NOTED PAIN PRSNT: CPT | Mod: CPTII,S$GLB,, | Performed by: INTERNAL MEDICINE

## 2024-11-22 PROCEDURE — 3078F DIAST BP <80 MM HG: CPT | Mod: CPTII,S$GLB,, | Performed by: INTERNAL MEDICINE

## 2024-11-22 PROCEDURE — 99999 PR PBB SHADOW E&M-EST. PATIENT-LVL IV: CPT | Mod: PBBFAC,,, | Performed by: INTERNAL MEDICINE

## 2024-11-22 PROCEDURE — G2211 COMPLEX E/M VISIT ADD ON: HCPCS | Mod: S$GLB,,, | Performed by: INTERNAL MEDICINE

## 2024-11-22 PROCEDURE — 1160F RVW MEDS BY RX/DR IN RCRD: CPT | Mod: CPTII,S$GLB,, | Performed by: INTERNAL MEDICINE

## 2024-11-25 ENCOUNTER — CLINICAL SUPPORT (OUTPATIENT)
Dept: REHABILITATION | Facility: HOSPITAL | Age: 79
End: 2024-11-25
Payer: MEDICARE

## 2024-11-25 DIAGNOSIS — R26.2 IMPAIRED AMBULATION: ICD-10-CM

## 2024-11-25 DIAGNOSIS — M25.651 IMPAIRED RANGE OF MOTION OF BOTH HIPS: Primary | ICD-10-CM

## 2024-11-25 DIAGNOSIS — Z74.09 IMPAIRED MOBILITY AND ADLS: ICD-10-CM

## 2024-11-25 DIAGNOSIS — Z78.9 IMPAIRED MOBILITY AND ADLS: ICD-10-CM

## 2024-11-25 DIAGNOSIS — M25.652 IMPAIRED RANGE OF MOTION OF BOTH HIPS: Primary | ICD-10-CM

## 2024-11-25 DIAGNOSIS — R29.898 WEAKNESS OF BOTH HIPS: ICD-10-CM

## 2024-11-25 PROCEDURE — 97112 NEUROMUSCULAR REEDUCATION: CPT | Mod: PO | Performed by: PHYSICAL THERAPIST

## 2024-11-29 ENCOUNTER — HOSPITAL ENCOUNTER (OUTPATIENT)
Dept: RADIOLOGY | Facility: HOSPITAL | Age: 79
Discharge: HOME | End: 2024-11-29
Attending: PSYCHIATRY & NEUROLOGY
Payer: MEDICARE

## 2024-11-29 DIAGNOSIS — G95.9 MYELOPATHY: ICD-10-CM

## 2024-11-29 DIAGNOSIS — R41.3 OTHER AMNESIA: ICD-10-CM

## 2024-11-29 DIAGNOSIS — G45.9 TRANSIENT CEREBRAL ISCHEMIA, UNSPECIFIED TYPE: ICD-10-CM

## 2024-11-29 PROCEDURE — 70551 MRI BRAIN STEM W/O DYE: CPT | Mod: 26,,, | Performed by: RADIOLOGY

## 2024-11-29 PROCEDURE — 72141 MRI NECK SPINE W/O DYE: CPT | Mod: 26,,, | Performed by: INTERNAL MEDICINE

## 2024-11-29 PROCEDURE — 72141 MRI NECK SPINE W/O DYE: CPT | Mod: TC

## 2024-11-29 PROCEDURE — 70496 CT ANGIOGRAPHY HEAD: CPT | Mod: 26,,, | Performed by: RADIOLOGY

## 2024-11-29 PROCEDURE — 25500020 PHARM REV CODE 255: Performed by: PSYCHIATRY & NEUROLOGY

## 2024-11-29 PROCEDURE — 70551 MRI BRAIN STEM W/O DYE: CPT | Mod: TC

## 2024-11-29 PROCEDURE — 70498 CT ANGIOGRAPHY NECK: CPT | Mod: 26,,, | Performed by: RADIOLOGY

## 2024-11-29 PROCEDURE — 70496 CT ANGIOGRAPHY HEAD: CPT | Mod: TC

## 2024-11-29 RX ADMIN — IOHEXOL 75 ML: 350 INJECTION, SOLUTION INTRAVENOUS at 08:11

## 2024-11-30 ENCOUNTER — PATIENT MESSAGE (OUTPATIENT)
Dept: INTERNAL MEDICINE | Facility: CLINIC | Age: 79
End: 2024-11-30
Payer: MEDICARE

## 2024-11-30 ENCOUNTER — PATIENT MESSAGE (OUTPATIENT)
Dept: NEUROLOGY | Facility: CLINIC | Age: 79
End: 2024-11-30
Payer: MEDICARE

## 2024-11-30 NOTE — TELEPHONE ENCOUNTER
Spoke to patient, she said you told her to contact you when she had her imaging done. She would like you to review her results. CTA and the Mri. Please advise what to tell her. She did not contact the ordering physician for the cta

## 2024-12-02 ENCOUNTER — CLINICAL SUPPORT (OUTPATIENT)
Dept: REHABILITATION | Facility: HOSPITAL | Age: 79
End: 2024-12-02
Payer: MEDICARE

## 2024-12-02 ENCOUNTER — TELEPHONE (OUTPATIENT)
Dept: HEPATOLOGY | Facility: CLINIC | Age: 79
End: 2024-12-02
Payer: MEDICARE

## 2024-12-02 ENCOUNTER — PATIENT MESSAGE (OUTPATIENT)
Dept: NEUROLOGY | Facility: CLINIC | Age: 79
End: 2024-12-02
Payer: MEDICARE

## 2024-12-02 DIAGNOSIS — R29.898 WEAKNESS OF BOTH HIPS: ICD-10-CM

## 2024-12-02 DIAGNOSIS — M25.651 IMPAIRED RANGE OF MOTION OF BOTH HIPS: Primary | ICD-10-CM

## 2024-12-02 DIAGNOSIS — Z78.9 IMPAIRED MOBILITY AND ADLS: ICD-10-CM

## 2024-12-02 DIAGNOSIS — M25.652 IMPAIRED RANGE OF MOTION OF BOTH HIPS: Primary | ICD-10-CM

## 2024-12-02 DIAGNOSIS — Z74.09 IMPAIRED MOBILITY AND ADLS: ICD-10-CM

## 2024-12-02 DIAGNOSIS — R26.2 IMPAIRED AMBULATION: ICD-10-CM

## 2024-12-02 PROCEDURE — 97112 NEUROMUSCULAR REEDUCATION: CPT | Mod: PO | Performed by: PHYSICAL THERAPIST

## 2024-12-02 NOTE — PROGRESS NOTES
[facility]   Physical Therapy Treatment Note     Name: Shakira Pearl  Clinic Number: [id number]    Therapy Diagnosis:   Encounter Diagnoses   Name Primary?    Impaired range of motion of both hips Yes    Weakness of both hips     Impaired ambulation     Impaired mobility and ADLs      Physician: Angel Bello, *    Visit Date: 12/2/2024       Physician Orders: PT Eval and Treat gait and balance   Medical Diagnosis from Referral:   Physical deconditioning [R53.81], Weakness of both lower extremities [R29.898]   Evaluation Date: 11/13/2024  Authorization Period Expiration: 10/10/2025  Plan of Care Expiration: 2/13/2025  Progress Note Due: 12/13/2024  Visit # / Visits authorized: 1/ 1,   2 / 20      BALANCE  #1/3 11/13/2024-1 48%   #2/3       #3/3              PTA Visit #: -/5     Precautions: Standard    Time In: 1505  Time Out: 1615  Total Billable Time: 70 minutes      SUBJECTIVE     Pt reports: the neck is feeling fine.   She was not issued a home exercise program at   Response to previous treatment: did not experienced any difficulty after thelast session.       Functional change: ongoing    Pain: 0/10  Location: bilateral lower legs and upper legs       OBJECTIVE     BP pre   124/76 [place] 81  BP post   TREATMENT        Shakira received the treatments listed below:     .BOLD INDICATES ACTIVITIES PERFORMED / DISCUSSED     Leg press   Recumbent bike  Upright bike   UE ergometer  Treadmill   Elliptical          THERAPEUTIC EXERCISES to develop  strength, endurance, ROM, and flexibility for - minutes including   SUPINE  -  -    SIDELYING  -  -    PRONE  -  -    STANDING.  -    SITTING  -     NEUROMUSCULAR RE-EDUCATION activities to improve: Balance, Proprioception, motor control and stability  for 65 minutes. The following activities were included:  SUPINE  -SLR x20  3#   -knee to chest x20 3#  -bridge x20  oval green band   -bridge x20   w/clams oval green band      SIDELYING  -hip abduction x15  NA   -hydrants x15 3#    PRONE  -leg lifts x20  NA  -leg swings x15 3# oval green band     STANDING.  -leg swings x15 abduction / extension   -heel raises x30  -table squats x20     -BALANCE  SL stand   Tandem stand   Wide base airex eyes closed   Narrow base airex  eyes open     SITTING  -LAQ x30   -SAQ x30     THERAPEUTIC ACTIVITIES to improve functional performance for -  minutes, including:  -     MANUAL THERAPY TECHNIQUES  were applied to - for - minutes.       MODALITY      PATIENT EDUCATION AND HOME EXERCISES     Home Exercises Provided and Patient Education Provided     Education provided: Review of MRI of the neck and also the lumbar spine from 2019. Explained possible contribution of the neck to her LE weakness and possible balance challenges   --Findings of evaluation and examination, and affect of these on plan for treatment  -Prognosis and expectations  -Home exercise program and expectations of therapy     Written Home Exercises Provided: no  ASSESSMENT     Shakira performed all activities w/o limitation. Significant time spent with review and explanation of her MRI findings. She has some difficulty trying to understand how the condition of her neck could contribute to her balance issues. She has no pain whatsoever in th e neck or low back. Completed the activities without difficulty. Utilized oval band and ankle weights for additional resistance and motor feedback.     Shakira Is progressing towards her goals.   Pt prognosis is Good.     Pt will continue to benefit from skilled outpatient physical therapy to address the deficits listed in the problem list box on initial evaluation, provide pt/family education and to maximize pt's level of independence in the home and community environment.     Pt's spiritual, cultural and educational needs considered and pt agreeable to plan of care and goals.     Anticipated barriers to physical therapy: none    Goals:   Ankle / Lower  extremity  Short Term Goals: 4 weeks   1. Pts pain decreased 20 - 40% for improved functional mobility and quality of life ONGOING  2. Pts PROM in right knee extension increased by 5  degrees to enhance AROM and functional mobility  ONGOING  3. Pts strength in the gluteal musculature and right quadriceps increased by 1/2 grade to facilitate improved active mobility and functional stability ONGOING  4. Pt to exhibit correct return demonstration of exercises for self-management and independence with HEP ONGOING     Long Term Goals : 8 weeks   1.  Pts pain level decreased to 75% or greater for with standing  / walking for restoring functional mobility and ADL. ONGOING  2. Pts AROM in knee extension and hip abduction and extension increased by 10 degrees to restore functional mobility and ADL  ONGOING  3. Pts strength in the gluteal musculature and right quadriceps increased by one grade to facilitate improved active mobility and functional stability  ONGOING  4. Pt will be independent with HEP for self-management. ONGOING   5. Pt to exhibit dynamic stability on the RLE/ LLE for stable functional mobility and gait. ONGOING   6. Pt to demonstrate symmetrical weight bearing w/o pain to improve gait pattern  ONGOING     PLAN     Plan of Care Certification: 11/13/2024 to 2/13/2025.   Outpatient Physical Therapy 2 times weekly for 10 weeks to include the following interventions: Manual Therapy, Neuromuscular Re-ed, Patient Education, Therapeutic Activities, and Therapeutic Exercise.     Simone Lackey, PT, DPT

## 2024-12-02 NOTE — TELEPHONE ENCOUNTER
----- Message from Moni sent at 12/2/2024 10:10 AM CST -----  Regarding: Appointments        Name Of Caller:   Shakira      Contact Preference:   [phone]       Nature of call:   Calling to reschedule their appt on Thursday 12/05. She states it's not canceled and would like a call back.

## 2024-12-03 ENCOUNTER — TELEPHONE (OUTPATIENT)
Dept: INTERNAL MEDICINE | Facility: CLINIC | Age: 79
End: 2024-12-03
Payer: MEDICARE

## 2024-12-03 NOTE — TELEPHONE ENCOUNTER
----- Message from Valery sent at 12/2/2024 11:08 AM CST -----  Contact: [phone]  Caller is requesting an earlier appointment then we can schedule.  Caller is requesting a message be sent to the provider.    When is the next available appointment with their provider:  Feb 2025    Reason for the appointment:  Discuss test results    Patient preference of timeframe to be scheduled:  soon    Would the patient like a call back, or a response through their [facility] portal?:   call    Comments: need PT orders to be sent in for neck, they said the order only says back and that they cannot work on her neck

## 2024-12-06 ENCOUNTER — OFFICE VISIT (OUTPATIENT)
Dept: INTERNAL MEDICINE | Facility: CLINIC | Age: 79
End: 2024-12-06
Payer: MEDICARE

## 2024-12-06 VITALS
WEIGHT: 148.25 LBS | DIASTOLIC BLOOD PRESSURE: 70 MMHG | TEMPERATURE: 98 F | BODY MASS INDEX: 26.27 KG/M2 | HEIGHT: 63 IN | HEART RATE: 74 BPM | SYSTOLIC BLOOD PRESSURE: 116 MMHG | OXYGEN SATURATION: 99 % | RESPIRATION RATE: 16 BRPM

## 2024-12-06 DIAGNOSIS — R26.9 NEUROLOGIC GAIT DYSFUNCTION: ICD-10-CM

## 2024-12-06 DIAGNOSIS — M54.12 CERVICAL RADICULOPATHY: ICD-10-CM

## 2024-12-06 DIAGNOSIS — I10 ESSENTIAL HYPERTENSION: ICD-10-CM

## 2024-12-06 DIAGNOSIS — R91.8 MASS OF LEFT LUNG: ICD-10-CM

## 2024-12-06 DIAGNOSIS — R29.898 BILATERAL LEG WEAKNESS: ICD-10-CM

## 2024-12-06 DIAGNOSIS — R22.2 LOCALIZED SWELLING, MASS AND LUMP, TRUNK: ICD-10-CM

## 2024-12-06 DIAGNOSIS — E78.1 HYPERTRIGLYCERIDEMIA: ICD-10-CM

## 2024-12-06 DIAGNOSIS — M79.89 PARASPINAL SOFT TISSUE MASS: ICD-10-CM

## 2024-12-06 DIAGNOSIS — I70.0 AORTIC ATHEROSCLEROSIS: ICD-10-CM

## 2024-12-06 DIAGNOSIS — M47.816 LUMBAR FACET ARTHROPATHY: ICD-10-CM

## 2024-12-06 DIAGNOSIS — M48.07 SPINAL STENOSIS, LUMBOSACRAL REGION: ICD-10-CM

## 2024-12-06 DIAGNOSIS — M47.12 OSTEOARTHRITIS OF CERVICAL SPINE WITH MYELOPATHY: Primary | ICD-10-CM

## 2024-12-06 PROCEDURE — 99999 PR PBB SHADOW E&M-EST. PATIENT-LVL V: CPT | Mod: PBBFAC,HCNC,, | Performed by: INTERNAL MEDICINE

## 2024-12-06 NOTE — PROGRESS NOTES
Patient ID: Shakira Pearl is a 79 y.o. female.    Chief Complaint: Follow-up    History of Present Illness    CHIEF COMPLAINT:  Shakira presents today with difficulty walking and neck pain.    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 She tends to hang her neck down due to pain when keeping it upright. Imaging shows bulges in the neck and loss of white matter in some parts of the spinal canal. Her physical therapist has indicated that her neck is too severely affected for physical therapy to be effective. She denies being in a lot of pain and takes Diclofenac approximately once every three weeks when absolutely necessary.    GAIT AND BALANCE:  She reports difficulty walking with equilibrium problems, particularly in the morning, though less severe than before. She describes barely being able to walk and having trouble keeping balance, often walking sideways. She experiences cramps in her legs, particularly in the calves, which improve with walking. These cramps may be due to inactivity. She expresses concern about falling and potential injuries, particularly in relation to exercise activities.    MEDICAL HISTORY:  She has a history of multiple cysts in her body, including one in each adrenal gland and one near the aortic valve, identified in 2012. 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 She has vascular issues, noted to be common for her age group.    MEDICATIONS:  She takes Diclofenac once every three weeks for pain as needed. She was prescribed vitamin D 50,000 IU once a week but questions the necessity, reporting that her vitamin D levels were not low on previous testing. She has taken one dose of the prescribed vitamin D so far.    LIFESTYLE:  She stays active by climbing stairs multiple times daily, reporting approximately 10 round trips daily, each consisting of 14 steps. She  consumes a diet rich in plant foods and includes fruitcake in her regular intake.         Physical Exam    General: No acute distress. Well-developed. Well-nourished.  Eyes: EOMI. Sclerae anicteric.  HENT: Normocephalic. Atraumatic. Nares patent. Moist oral mucosa.  Ears: Bilateral TMs clear. Bilateral EACs clear.  Cardiovascular: Regular rate. Regular rhythm. No murmurs. No rubs. No gallops. Normal S1, S2.  Respiratory: Normal respiratory effort. Clear to auscultation bilaterally. No rales. No rhonchi. No wheezing.  Abdomen: Soft. Non-tender. Non-distended. Normoactive bowel sounds.  Musculoskeletal: No  obvious deformity.  Extremities: No lower extremity edema.  Neurological: Alert & oriented x3. No slurred speech. Abnormal gait.  Psychiatric: Normal mood. Normal affect. Good insight. Good judgment.  Skin: Warm. Dry. No rash.         Assessment & Plan    IMPRESSION:  - Considering neurosurgical evaluation for neck and back issues, given severity of symptoms and limited benefit from physical therapy  - Suspect possible spinal stenosis contributing to gait abnormalities and balance issues  - Ordered MRI of lumbar spine to further evaluate back condition prior to neurosurgical consultation  - Ordered CT chest WO Contrast to further characterize previously identified bronchogenic cyst and rule out malignancy  - Assessed vitamin D levels, found to be in normal range (42 ng/mL in October)  - Evaluated need for continued elevated-dose vitamin D supplementation given current normal levels    SPINAL STENOSIS AND RADICULOPATHY:  - Shakira to avoid prolonged standing to minimize back and neck pain.  - MRI of lumbar spine ordered.  - Referred to Dr. Medeiros (neurosurgeon) for evaluation of neck, back and leg issues.    UNSTEADINESS AND DIZZINESS:  - Shakira to continue leg exercises at home to maintain muscle strength.    OTHER DISORDERS OF LUNG:  - CT chest WO Contrast ordered.    PURE HYPERGLYCERIDEMIA:  - Explained the  relationship between triglycerides and cholesterol panel, emphasizing the importance of managing triglycerides despite low LDL.  - Continued fibrate medication: Maintain current dosage of 2 tablets twice daily for triglyceride management.    VASCULAR ISSUES:  - Discussed age-related vascular issues and their potential impact on overall health.    VITAMIN D DEFICIENCY:  - Decreased vitamin D supplementation: Transition from 50,000 IU weekly to 2,000 IU daily.    FOLLOW-UP:  - Follow up after completing ordered imaging studies and neurosurgical consultation.  - Contact the office if needing medication refills or additional referrals.              This note was generated with the assistance of ambient listening technology. Verbal consent was obtained by the patient and accompanying visitor(s) for the recording of patient appointment to facilitate this note. I attest to having reviewed and edited the generated note for accuracy, though some syntax or spelling errors may persist. Please contact the author of this note for any clarification.

## 2024-12-09 ENCOUNTER — HOSPITAL ENCOUNTER (OUTPATIENT)
Dept: CARDIOLOGY | Facility: HOSPITAL | Age: 79
Discharge: HOME | End: 2024-12-09
Attending: INTERNAL MEDICINE
Payer: MEDICARE

## 2024-12-09 ENCOUNTER — TELEPHONE (OUTPATIENT)
Dept: NEUROSURGERY | Facility: CLINIC | Age: 79
End: 2024-12-09
Payer: MEDICARE

## 2024-12-09 VITALS
DIASTOLIC BLOOD PRESSURE: 77 MMHG | HEIGHT: 63 IN | WEIGHT: 149 LBS | HEART RATE: 88 BPM | BODY MASS INDEX: 26.4 KG/M2 | SYSTOLIC BLOOD PRESSURE: 140 MMHG

## 2024-12-09 DIAGNOSIS — M47.816 LUMBAR FACET ARTHROPATHY: Primary | ICD-10-CM

## 2024-12-09 DIAGNOSIS — R06.09 DOE (DYSPNEA ON EXERTION): ICD-10-CM

## 2024-12-09 LAB
ASCENDING AORTA: 3.24 CM
AV AREA BY CONTINUOUS VTI: 2.1 CM2
AV INDEX (PROSTH): 0.68
AV LVOT MEAN GRADIENT: 2 MMHG
AV LVOT PEAK GRADIENT: 3 MMHG
AV MEAN GRADIENT: 2.8 MMHG
AV PEAK GRADIENT: 4.8 MMHG
AV VALVE AREA BY VELOCITY RATIO: 2.6 CM²
AV VALVE AREA: 2.1 CM²
AV VELOCITY RATIO: 0.82
BSA FOR ECHO PROCEDURE: 1.73 M2
CV ECHO LV RWT: 0.38 CM
CV STRESS BASE HR: 88 BPM
DIASTOLIC BLOOD PRESSURE: 77 MMHG
DOP CALC AO PEAK VEL: 1.1 M/S
DOP CALC AO VTI: 26.5 CM
DOP CALC LVOT AREA: 3.1 CM2
DOP CALC LVOT DIAMETER: 2 CM
DOP CALC LVOT PEAK VEL: 0.9 M/S
DOP CALC LVOT STROKE VOLUME: 56.5 CM3
DOP CALCLVOT PEAK VEL VTI: 18 CM
E WAVE DECELERATION TIME: 145.96 MSEC
E/A RATIO: 0.57
E/E' RATIO: 7.33 M/S
ECHO EF ESTIMATED: 62 %
ECHO LV POSTERIOR WALL: 0.8 CM (ref 0.6–1.1)
EJECTION FRACTION: 58 %
FRACTIONAL SHORTENING: 33.3 % (ref 28–44)
INTERVENTRICULAR SEPTUM: 0.8 CM (ref 0.6–1.1)
IVRT: 102.76 MSEC
LA MAJOR: 5.28 CM
LA MINOR: 5.48 CM
LA WIDTH: 3.39 CM
LEFT ATRIUM SIZE: 4.02 CM
LEFT ATRIUM VOLUME INDEX MOD: 26.4 ML/M2
LEFT ATRIUM VOLUME INDEX: 36.4 ML/M2
LEFT ATRIUM VOLUME MOD: 45.17 ML
LEFT ATRIUM VOLUME: 62.3 CM3
LEFT INTERNAL DIMENSION IN SYSTOLE: 2.8 CM (ref 2.1–4)
LEFT VENTRICLE DIASTOLIC VOLUME INDEX: 45.87 ML/M2
LEFT VENTRICLE DIASTOLIC VOLUME: 78.43 ML
LEFT VENTRICLE MASS INDEX: 59.2 G/M2
LEFT VENTRICLE SYSTOLIC VOLUME INDEX: 17.4 ML/M2
LEFT VENTRICLE SYSTOLIC VOLUME: 29.79 ML
LEFT VENTRICULAR INTERNAL DIMENSION IN DIASTOLE: 4.2 CM (ref 3.5–6)
LEFT VENTRICULAR MASS: 101.3 G
LV LATERAL E/E' RATIO: 5.5 M/S
LV SEPTAL E/E' RATIO: 11 M/S
MV A" WAVE DURATION": 11.7 MSEC
MV PEAK A VEL: 0.97 M/S
MV PEAK E VEL: 0.55 M/S
MV STENOSIS PRESSURE HALF TIME: 42.33 MS
MV VALVE AREA P 1/2 METHOD: 5.2 CM2
OHS CV CPX 1 MINUTE RECOVERY HEART RATE: 127 BPM
OHS CV CPX 85 PERCENT MAX PREDICTED HEART RATE MALE: 118
OHS CV CPX MAX PREDICTED HEART RATE: 139
OHS CV CPX PATIENT IS FEMALE: 1
OHS CV CPX PATIENT IS MALE: 0
OHS CV CPX PEAK DIASTOLIC BLOOD PRESSURE: 44 MMHG
OHS CV CPX PEAK HEAR RATE: 126 BPM
OHS CV CPX PEAK RATE PRESSURE PRODUCT: NORMAL
OHS CV CPX PEAK SYSTOLIC BLOOD PRESSURE: 192 MMHG
OHS CV CPX PERCENT MAX PREDICTED HEART RATE ACHIEVED: 94
OHS CV CPX RATE PRESSURE PRODUCT PRESENTING: NORMAL
OHS CV INITIAL DOSE: 10 MCG/KG/MIN
OHS CV PEAK DOSE: 20 MCG/KG/MIN
OHS CV RV/LV RATIO: 0.48 CM
PISA TR MAX VEL: 2.81 M/S
POST STRESS EJECTION FRACTION: 70 %
PULM VEIN A" WAVE DURATION": 117.03 MS
PULM VEIN S/D RATIO: 1.58
PULMONIC VEIN PEAK A VELOCITY: 0.4 M/S
PV PEAK D VEL: 0.31 M/S
PV PEAK S VEL: 0.49 M/S
RA MAJOR: 4.21 CM
RA PRESSURE ESTIMATED: 3 MMHG
RA WIDTH: 2.19 CM
RIGHT ATRIAL AREA: 6.1 CM2
RIGHT ATRIAL AREA: 9 CM2
RIGHT VENTRICLE DIASTOLIC BASEL DIMENSION: 2 CM
RV TB RVSP: 6 MMHG
SINUS: 2.84 CM
STJ: 2.91 CM
STRESS ECHO POST EXERCISE DUR MIN: 6 MINUTES
STRESS ECHO POST EXERCISE DUR SEC: 31 SECONDS
SYSTOLIC BLOOD PRESSURE: 140 MMHG
TDI LATERAL: 0.1 M/S
TDI SEPTAL: 0.05 M/S
TDI: 0.08 M/S
TR MAX PG: 32 MMHG
TRICUSPID ANNULAR PLANE SYSTOLIC EXCURSION: 2.05 CM
TV PEAK GRADIENT: 32 MMHG
TV REST PULMONARY ARTERY PRESSURE: 35 MMHG
Z-SCORE OF LEFT VENTRICULAR DIMENSION IN END DIASTOLE: -1.23
Z-SCORE OF LEFT VENTRICULAR DIMENSION IN END SYSTOLE: -0.39

## 2024-12-09 PROCEDURE — 93320 DOPPLER ECHO COMPLETE: CPT | Mod: 26,HCNC,, | Performed by: INTERNAL MEDICINE

## 2024-12-09 PROCEDURE — 93325 DOPPLER ECHO COLOR FLOW MAPG: CPT | Mod: HCNC,PO

## 2024-12-09 PROCEDURE — 93325 DOPPLER ECHO COLOR FLOW MAPG: CPT | Mod: 26,HCNC,, | Performed by: INTERNAL MEDICINE

## 2024-12-09 PROCEDURE — 93351 STRESS TTE COMPLETE: CPT | Mod: 26,HCNC,, | Performed by: INTERNAL MEDICINE

## 2024-12-09 NOTE — TELEPHONE ENCOUNTER
Called and confirmed imaging and appointment for patient on:    Future Appointments   Date Time Provider Department Center   12/9/2024  1:00 PM ECHO, [place] [facility] [place]   12/9/2024  2:15 PM [facility] XR1 300 LB LIMIT [facility] XRAY [place]   12/10/2024  3:00 PM Enzo Lackey, PT, DPT [facility]   12/12/2024  2:30 PM Enzo Lackey, PT, DPT [facility]   12/17/2024  3:00 PM Enzo Lackey, PT, DPT [facility]   12/19/2024  9:00 AM LAB, [place] [facility] LAB [place]   12/19/2024  2:30 PM Enzo Lackey, PT, DPT [facility]   12/19/2024  3:00 PM Kelly Murphy, NP [facility] IM [place]   12/23/2024  2:00 PM Enzo Lackey, PT, DPT [facility]   12/26/2024  2:00 PM Gracie Perry, PTA [facility]   12/31/2024  2:00 PM Gracie Perry, PTA [facility]   1/8/2025  1:30 PM Mert Coates MD [facility]   1/9/2025 10:00 AM LAB, [place] [facility] LAB [place]   1/14/2025  2:00 PM Marcus Medeiros MD [facility] NEUROS8 [facility]   4/10/2025  2:00 PM Angel Bello DO [facility] IM [place]

## 2024-12-09 NOTE — TELEPHONE ENCOUNTER
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  There is nothing available.  Pls call pt with an appt.  Thanks.

## 2024-12-10 ENCOUNTER — PATIENT MESSAGE (OUTPATIENT)
Dept: CARDIOLOGY | Facility: CLINIC | Age: 79
End: 2024-12-10
Payer: MEDICARE

## 2024-12-12 ENCOUNTER — TELEPHONE (OUTPATIENT)
Dept: NEUROSURGERY | Facility: CLINIC | Age: 79
End: 2024-12-12
Payer: MEDICARE

## 2024-12-12 NOTE — TELEPHONE ENCOUNTER
Patient states that she would like to cancel her XR d/t it costing $150 and would rather pay to have the MRI lumbar done instead. Advised patient to get MRI scheduled and call back once done to inform. Patient verbalized understanding.

## 2024-12-12 NOTE — TELEPHONE ENCOUNTER
----- Message from Rishabh sent at 12/11/2024  3:32 PM CST -----  Contact: [phone]    Name of Caller:PT   Call back or response via [facility]: Call   Call back # [phone]  Additional Info: pt would like to talk to you about MRI.

## 2024-12-12 NOTE — TELEPHONE ENCOUNTER
Called and spoke to pt who has advised that she had to cancel and r/s her missed XR to day of appt w/ Dr. Medeiros. Assured pt that that would be fine. Pt asked if she would need MRI lumbar prior to her appt w/ Dr. Medeiros. Advised that it was up to pt if she would like to get MRI done before her visit as Dr. Medeiros could evaluate and order any further imaging if needed. Pt stated that she may get MRI scheduled prior to her visit and cancel XR. Verbalized understanding and informed pt to call back to discuss if needed.

## 2024-12-12 NOTE — TELEPHONE ENCOUNTER
----- Message from Maia sent at 12/12/2024  1:46 PM CST -----  Type: General Call Back     Name of Caller:pt  Reason pt is trying to get info on why she needs an MRI for her back and not an Xray   Would the patient rather a call back or a response via [facility]? Call   Best Call Back Number:[phone]   Additional Information:

## 2024-12-16 ENCOUNTER — TELEPHONE (OUTPATIENT)
Dept: INTERNAL MEDICINE | Facility: CLINIC | Age: 79
End: 2024-12-16
Payer: MEDICARE

## 2024-12-16 NOTE — PROGRESS NOTES
Patient ID: Shakira Pearl is a 79 y.o. female.    Chief Complaint: Hospital Follow Up    History of Present Illness    CHIEF COMPLAINT:  Shakira presents today for follow-up of multiple medical conditions and recent episodes of lightheadedness.    RECENT LIGHTHEADEDNESS AND VERTIGO:  She reports an episode of lightheadedness and unsteadiness two days ago. She was awakened by her cat at night and felt lightheaded and unsteady when attempting to get out of bed. She had difficulty walking, requiring support from walls. EMS was called and she was transported to the ER, where she was found to have elevated blood pressure, mild anemia, and mild hypokalemia. Her ECG was reported as normal. She also describes a recent episode of severe vertigo, experiencing intense spinning sensations, feeling as if her mattress was trying to turn her over. The vertigo was so severe that she was unable to walk short distances without holding onto walls for support. She denies experiencing simple dizziness, emphasizing the rotational nature of her symptoms.    NEUROLOGICAL EVALUATION:  She recently saw a neurologist who diagnosed her with myelopathy. The neurologist performed various tests and ruled out dementia and Parkinson's disease. Multiple imaging studies were ordered, including two MRIs(cervical spine and brain) and an CTA Head and neck to evaluate the brain and cervical spine, focusing on soft tissues and arteries. The neurologist recommended B12 supplementation based on test results, though she has not yet received the supplement. She expresses interest in reviewing her emergency room test results, particularly regarding her B12 levels.    CARDIOVASCULAR CONCERNS:  She reports experiencing shortness of breath with exertion. She denies any known congestive heart failure but expresses concern about a recent chest XR that suggested possible mild congestive heart failure. She is awaiting results of a scheduled stress echocardiogram for  "further evaluation of her cardiovascular status.    MEDICATIONS AND SIDE EFFECTS:  She reports starting minoxidil, Zetia and Vascepa as prescribed at her last visit. She experienced significant side effects, including severe dizziness described as "swinging heads" and difficulty getting out of bed. Following medical advice, she stopped all medications and restarted them one at a time. Upon restarting Zetia, she continued to experience swaying. Subsequently, her cardiologist switched her from Zetia to Fenofibrate. She is currently taking Metformin and Fenofibrate daily. She reports taking minoxidil every other day on her own initiative and asks if this is acceptable. She denies experiencing swelling or fluid retention since stopping minoxidil. She notes improvement in symptoms with the current medication regimen.    EDEMA:  She reports experiencing swelling and fluid retention, particularly noting swollen feet, face, and fingers. She also mentions recent weight loss of 5 lbs. She indicates that the swelling has improved.    CURRENT MEDICATIONS:  Her current medications include Metformin, Fenofibrate, potassium supplement, and B-complex vitamin. She has been taking Metformin and Fenofibrate for the past week. She is unsure of the exact B12 content in her B-complex vitamin. She expresses concern about potential side effects of the potassium supplement but acknowledges the need to continue taking it due to her medication regimen that lowers potassium levels.    DIET:  She reports dietary changes, including increased salmon consumption, to help raise her potassium levels.         Physical Exam    Vitals: Blood pressure: 134/70.  General: No acute distress. Well-developed. Well-nourished.  Eyes: EOMI. Sclerae anicteric.  HENT: Normocephalic. Atraumatic. Nares patent. Moist oral mucosa.  Ears: Bilateral TMs clear. Bilateral EACs clear.  Cardiovascular: Regular rate. Regular rhythm. No murmurs. No rubs. No gallops. Normal " "S1, S2.  Respiratory: Normal respiratory effort. Clear to auscultation bilaterally. No rales. No rhonchi. No wheezing.  Abdomen: Soft. Non-tender. Non-distended. Normoactive bowel sounds.  Musculoskeletal: No  obvious deformity.  Extremities: No lower extremity edema.  Neurological: Alert & oriented x3. No slurred speech. Normal gait.  Psychiatric: Normal mood. Normal affect. Good insight. Good judgment.  Skin: Warm. Dry. No rash.         Assessment & Plan    Assessed recent episode of severe lightheadedness and unsteadiness, considering possible TIA, small stroke, or cervical myelopathy based on neurologist's assessment  Reviewed ER findings including elevated blood pressure, mild anemia, and mild hypokalemia  Evaluated potential medication side effects from recently started minoxidil, Vascepa, and Zetia  Noted possible mild congestive heart failure based on chest XR findings, awaiting further cardiac evaluation  Assessed B12 levels, found to be slightly low    HYPERTENSION:  - Explained that elevated blood pressure in ER is common due to stress and anxiety.  - Shakira to obtain a home blood pressure monitor ([employer] brand recommended) for regular monitoring.    HYPOKALEMIA:  - Discussed importance of maintaining adequate potassium levels to prevent cardiac arrhythmias.  - Shakira to resume taking prescribed potassium supplement.  - Recommend continuing dietary changes to increase potassium intake, including eating salmon.  - Restarted potassium supplement, to be taken daily.    HEART CONDITIONS:  - Explained that chest XR findings of "possibly mild CHF" are not definitive and require further investigation.  - Stress echocardiogram ordered to evaluate heart function and rule out heart failure.    VITAMIN B12 DEFICIENCY:  - Started B12 supplement 1,000 mcg daily.  - Administered B12 injection in office.    HYPERLIPIDEMIA:  - Continued fenofibrate.    HAIR LOSS:  - Continued minoxidil.      FOLLOW-UP:  - Contact the " office if experiencing worsening symptoms or new concerns.              This note was generated with the assistance of ambient listening technology. Verbal consent was obtained by the patient and accompanying visitor(s) for the recording of patient appointment to facilitate this note. I attest to having reviewed and edited the generated note for accuracy, though some syntax or spelling errors may persist. Please contact the author of this note for any clarification.

## 2024-12-16 NOTE — TELEPHONE ENCOUNTER
----- Message from Shannon sent at 12/13/2024  2:41 PM CST -----  Regarding: RE: Ace  Pt contacted on 12/6 and 12/13. LVM to schedule  ----- Message -----  From: Sonali Francis MA  Sent: 12/13/2024   2:34 PM CST  To: [facility] Referral Coordinators  Subject: Ace                                      Please call pt to schedule MRI and CT  Thank You,  GLADYS Lyon  
Never smoker

## 2024-12-16 NOTE — TELEPHONE ENCOUNTER
----- Message from Shannon sent at 12/13/2024  2:40 PM CST -----  Regarding: RE: Ace  PT was contacted on 12/6 and 12/13 to schedule MRI and CT. Left voice message  ----- Message -----  From: Sonali Francis MA  Sent: 12/13/2024   2:34 PM CST  To: [facility] Referral Coordinators  Subject: Ace                                      Please call pt to schedule MRI and CT  Thank You,  GLADYS Lyon

## 2024-12-16 NOTE — TELEPHONE ENCOUNTER
----- Message from Shannon sent at 12/16/2024 10:09 AM CST -----  Regarding: RE: Keatonsandra  Pt scheduled CT, but she declined scheduling MRI. She states she has an xray scheduled for Dr Medeiros and will schedule MRI if Dr Medeiros thinks she needs it  ----- Message -----  From: Sonali Francis MA  Sent: 12/13/2024   2:34 PM CST  To: [facility] Referral Coordinators  Subject: Jonhnysarmad                                      Please call pt to schedule MRI and CT  Thank You,  GLADYS Lyon

## 2025-01-09 ENCOUNTER — HOSPITAL ENCOUNTER (OUTPATIENT)
Dept: RADIOLOGY | Facility: HOSPITAL | Age: 80
Discharge: HOME | End: 2025-01-09
Attending: NEUROLOGICAL SURGERY
Payer: MEDICARE

## 2025-01-09 ENCOUNTER — HOSPITAL ENCOUNTER (OUTPATIENT)
Dept: RADIOLOGY | Facility: HOSPITAL | Age: 80
Discharge: HOME | End: 2025-01-09
Attending: INTERNAL MEDICINE
Payer: MEDICARE

## 2025-01-09 DIAGNOSIS — R91.8 MASS OF LEFT LUNG: ICD-10-CM

## 2025-01-09 DIAGNOSIS — R22.2 LOCALIZED SWELLING, MASS AND LUMP, TRUNK: ICD-10-CM

## 2025-01-09 DIAGNOSIS — M47.816 LUMBAR FACET ARTHROPATHY: ICD-10-CM

## 2025-01-09 DIAGNOSIS — M79.89 PARASPINAL SOFT TISSUE MASS: ICD-10-CM

## 2025-01-09 PROCEDURE — 71250 CT THORAX DX C-: CPT | Mod: TC,HCNC

## 2025-01-09 PROCEDURE — 72100 X-RAY EXAM L-S SPINE 2/3 VWS: CPT | Mod: 26,HCNC,, | Performed by: RADIOLOGY

## 2025-01-09 PROCEDURE — 72100 X-RAY EXAM L-S SPINE 2/3 VWS: CPT | Mod: TC,HCNC

## 2025-01-11 ENCOUNTER — PATIENT MESSAGE (OUTPATIENT)
Dept: INTERNAL MEDICINE | Facility: CLINIC | Age: 80
End: 2025-01-11
Payer: MEDICARE

## 2025-01-13 ENCOUNTER — PATIENT MESSAGE (OUTPATIENT)
Facility: CLINIC | Age: 80
End: 2025-01-13
Payer: MEDICARE

## 2025-01-13 ENCOUNTER — TELEPHONE (OUTPATIENT)
Dept: INTERNAL MEDICINE | Facility: CLINIC | Age: 80
End: 2025-01-13
Payer: MEDICARE

## 2025-01-13 NOTE — TELEPHONE ENCOUNTER
----- Message from Sapna sent at 1/10/2025  4:06 PM CST -----  Contact: [phone]  2TESTRESULTS    Type: Test Results    What test was performed? CT chest    Who ordered the test? Dr Bello     When and where were the test performed? 01/09 at [facility]     Would you like a call back and or thru [facility]:call back     Comments:  She states the results say she has a mass in her lung please advise

## 2025-01-13 NOTE — TELEPHONE ENCOUNTER
Called and spoke to pt. Explained that Dr Bello had not yet reviewed her results. Once reviewed, he will reach out with results. Pt verbalized understanding.

## 2025-01-13 NOTE — TELEPHONE ENCOUNTER
Called pt to let her know these results have not been reviewed yet, but that we will call her as soon as we have them to let her know, pt voiced understanding

## 2025-01-13 NOTE — TELEPHONE ENCOUNTER
Pt is concerned and would like more information on her Chest CT test results , because Pt states that her test results was input in the wrong place , because she said it states that she has a mass on lungs , Pt would like a call back with more information. Please Advise.

## 2025-01-13 NOTE — TELEPHONE ENCOUNTER
----- Message from Jorge sent at 1/13/2025  9:26 AM CST -----  Regarding: Results  Contact: Pt [phone]  2TESTRESULTS    Type: Test Results    What test was performed? CT Scan    Who ordered the test? Dr. Bello    When and where were the test performed? 1/9 @ [facility], Radiology    Would you like a call back and or thru [facility]: Call    Comments: Patient having issues with these results; she said that she keeps getting told her results are not ready OR that her results are concerning. Please call patient back to clarify the results of this CT Scan.

## 2025-01-14 ENCOUNTER — TELEPHONE (OUTPATIENT)
Facility: CLINIC | Age: 80
End: 2025-01-14
Payer: MEDICARE

## 2025-01-14 ENCOUNTER — OFFICE VISIT (OUTPATIENT)
Dept: NEUROSURGERY | Facility: CLINIC | Age: 80
End: 2025-01-14
Payer: MEDICARE

## 2025-01-14 ENCOUNTER — PATIENT MESSAGE (OUTPATIENT)
Dept: NEUROSURGERY | Facility: CLINIC | Age: 80
End: 2025-01-14

## 2025-01-14 DIAGNOSIS — M54.16 LUMBAR RADICULOPATHY, CHRONIC: ICD-10-CM

## 2025-01-14 DIAGNOSIS — M48.07 SPINAL STENOSIS, LUMBOSACRAL REGION: ICD-10-CM

## 2025-01-14 DIAGNOSIS — R26.9 NEUROLOGIC GAIT DYSFUNCTION: ICD-10-CM

## 2025-01-14 DIAGNOSIS — G95.9 CERVICAL MYELOPATHY: Primary | ICD-10-CM

## 2025-01-14 PROCEDURE — 1125F AMNT PAIN NOTED PAIN PRSNT: CPT | Mod: HCNC,CPTII,S$GLB, | Performed by: NEUROLOGICAL SURGERY

## 2025-01-14 PROCEDURE — 3288F FALL RISK ASSESSMENT DOCD: CPT | Mod: HCNC,CPTII,S$GLB, | Performed by: NEUROLOGICAL SURGERY

## 2025-01-14 PROCEDURE — 99204 OFFICE O/P NEW MOD 45 MIN: CPT | Mod: HCNC,S$GLB,, | Performed by: NEUROLOGICAL SURGERY

## 2025-01-14 PROCEDURE — 99999 PR PBB SHADOW E&M-EST. PATIENT-LVL III: CPT | Mod: PBBFAC,HCNC,, | Performed by: NEUROLOGICAL SURGERY

## 2025-01-14 PROCEDURE — 1101F PT FALLS ASSESS-DOCD LE1/YR: CPT | Mod: HCNC,CPTII,S$GLB, | Performed by: NEUROLOGICAL SURGERY

## 2025-01-14 PROCEDURE — 1159F MED LIST DOCD IN RCRD: CPT | Mod: HCNC,CPTII,S$GLB, | Performed by: NEUROLOGICAL SURGERY

## 2025-01-14 NOTE — TELEPHONE ENCOUNTER
----- Message from Prachi sent at 1/13/2025 11:30 AM CST -----  Pt calling to discuss  she test results from 1-9 , vertical issue , please call     Confirmed patient's contact info below:  Contact Name: Shakira Dae  Phone Number: [phone]
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
## 2025-01-15 ENCOUNTER — PATIENT MESSAGE (OUTPATIENT)
Dept: INTERNAL MEDICINE | Facility: CLINIC | Age: 80
End: 2025-01-15
Payer: MEDICARE

## 2025-01-15 DIAGNOSIS — K76.89 LIVER CYST: Primary | ICD-10-CM

## 2025-01-15 NOTE — TELEPHONE ENCOUNTER
"Provider commented on 1/9 CT result today, not seen by pt yet; called pt and informed her of the results    She would still like to know more about the result as it relates to the result mentioning "Hepatic hypodensity measuring 12 mm, Differential diagnosis include neurenteric cyst and mesothelial cyst and bronchogenic cyst is less favored"    Would like to know if the MRI that that neurologist has ordered is what's needed for further testing, Pt would like a call from provider to help her understand the results and next steps    Pt states that she's unsure if that provider reviewed her CT results because when she asked during office visit she was told to contact PCP. Pt expressed that she was unhappy with the neurosurgery visit  "

## 2025-01-17 ENCOUNTER — LAB VISIT (OUTPATIENT)
Dept: LAB | Facility: HOSPITAL | Age: 80
End: 2025-01-17
Attending: INTERNAL MEDICINE
Payer: MEDICARE

## 2025-01-17 DIAGNOSIS — E78.5 ELEVATED LIPIDS: ICD-10-CM

## 2025-01-17 DIAGNOSIS — E83.51 HYPOCALCEMIA: ICD-10-CM

## 2025-01-17 DIAGNOSIS — E78.1 HYPERTRIGLYCERIDEMIA: ICD-10-CM

## 2025-01-17 DIAGNOSIS — D50.9 IRON DEFICIENCY ANEMIA, UNSPECIFIED IRON DEFICIENCY ANEMIA TYPE: ICD-10-CM

## 2025-01-17 LAB
ALBUMIN SERPL BCP-MCNC: 3.9 G/DL (ref 3.5–5.2)
ALBUMIN SERPL BCP-MCNC: 3.9 G/DL (ref 3.5–5.2)
ALP SERPL-CCNC: 35 U/L (ref 40–150)
ALP SERPL-CCNC: 35 U/L (ref 40–150)
ALT SERPL W/O P-5'-P-CCNC: 21 U/L (ref 10–44)
ANION GAP SERPL CALC-SCNC: 10 MMOL/L (ref 8–16)
AST SERPL-CCNC: 21 U/L (ref 10–40)
BASOPHILS # BLD AUTO: 0.04 K/UL (ref 0–0.2)
BASOPHILS NFR BLD: 0.4 % (ref 0–1.9)
BILIRUB DIRECT SERPL-MCNC: 0.2 MG/DL (ref 0.1–0.3)
BILIRUB SERPL-MCNC: 0.4 MG/DL (ref 0.1–1)
BILIRUB SERPL-MCNC: 0.4 MG/DL (ref 0.1–1)
BUN SERPL-MCNC: 21 MG/DL (ref 8–23)
CALCIUM SERPL-MCNC: 9 MG/DL (ref 8.7–10.5)
CHLORIDE SERPL-SCNC: 106 MMOL/L (ref 95–110)
CHOLEST SERPL-MCNC: 177 MG/DL (ref 120–199)
CHOLEST/HDLC SERPL: 3.6 {RATIO} (ref 2–5)
CO2 SERPL-SCNC: 22 MMOL/L (ref 23–29)
CREAT SERPL-MCNC: 1 MG/DL (ref 0.5–1.4)
DIFFERENTIAL METHOD BLD: ABNORMAL
EOSINOPHIL # BLD AUTO: 0.2 K/UL (ref 0–0.5)
EOSINOPHIL NFR BLD: 2.5 % (ref 0–8)
ERYTHROCYTE [DISTWIDTH] IN BLOOD BY AUTOMATED COUNT: 12.5 % (ref 11.5–14.5)
EST. GFR  (NO RACE VARIABLE): 56.6 ML/MIN/1.73 M^2
ESTIMATED AVG GLUCOSE: 111 MG/DL (ref 68–131)
GLUCOSE SERPL-MCNC: 97 MG/DL (ref 70–110)
HBA1C MFR BLD: 5.5 % (ref 4–5.6)
HCT VFR BLD AUTO: 35.6 % (ref 37–48.5)
HDLC SERPL-MCNC: 49 MG/DL (ref 40–75)
HDLC SERPL: 27.7 % (ref 20–50)
HGB BLD-MCNC: 11.5 G/DL (ref 12–16)
IMM GRANULOCYTES # BLD AUTO: 0.03 K/UL (ref 0–0.04)
IMM GRANULOCYTES NFR BLD AUTO: 0.3 % (ref 0–0.5)
LDLC SERPL CALC-MCNC: 92.6 MG/DL (ref 63–159)
LYMPHOCYTES # BLD AUTO: 2.6 K/UL (ref 1–4.8)
LYMPHOCYTES NFR BLD: 28.6 % (ref 18–48)
MCH RBC QN AUTO: 28.9 PG (ref 27–31)
MCHC RBC AUTO-ENTMCNC: 32.3 G/DL (ref 32–36)
MCV RBC AUTO: 89 FL (ref 82–98)
MONOCYTES # BLD AUTO: 0.5 K/UL (ref 0.3–1)
MONOCYTES NFR BLD: 5.4 % (ref 4–15)
NEUTROPHILS # BLD AUTO: 5.7 K/UL (ref 1.8–7.7)
NEUTROPHILS NFR BLD: 62.8 % (ref 38–73)
NONHDLC SERPL-MCNC: 128 MG/DL
NRBC BLD-RTO: 0 /100 WBC
PLATELET # BLD AUTO: 282 K/UL (ref 150–450)
PMV BLD AUTO: 11.2 FL (ref 9.2–12.9)
POTASSIUM SERPL-SCNC: 4.4 MMOL/L (ref 3.5–5.1)
PROT SERPL-MCNC: 7.4 G/DL (ref 6–8.4)
PROT SERPL-MCNC: 7.4 G/DL (ref 6–8.4)
RBC # BLD AUTO: 3.98 M/UL (ref 4–5.4)
SODIUM SERPL-SCNC: 138 MMOL/L (ref 136–145)
TRIGL SERPL-MCNC: 177 MG/DL (ref 30–150)
TSH SERPL DL<=0.005 MIU/L-ACNC: 1.94 UIU/ML (ref 0.4–4)
WBC # BLD AUTO: 9.15 K/UL (ref 3.9–12.7)

## 2025-01-17 PROCEDURE — 83036 HEMOGLOBIN GLYCOSYLATED A1C: CPT | Mod: GA,HCNC | Performed by: INTERNAL MEDICINE

## 2025-01-17 PROCEDURE — 80061 LIPID PANEL: CPT | Mod: HCNC | Performed by: INTERNAL MEDICINE

## 2025-01-17 PROCEDURE — 36415 COLL VENOUS BLD VENIPUNCTURE: CPT | Mod: HCNC,PO | Performed by: INTERNAL MEDICINE

## 2025-01-17 PROCEDURE — 80076 HEPATIC FUNCTION PANEL: CPT | Mod: HCNC | Performed by: INTERNAL MEDICINE

## 2025-01-17 PROCEDURE — 84443 ASSAY THYROID STIM HORMONE: CPT | Mod: HCNC | Performed by: INTERNAL MEDICINE

## 2025-01-17 PROCEDURE — 80053 COMPREHEN METABOLIC PANEL: CPT | Mod: HCNC | Performed by: INTERNAL MEDICINE

## 2025-01-17 PROCEDURE — 85025 COMPLETE CBC W/AUTO DIFF WBC: CPT | Mod: HCNC | Performed by: INTERNAL MEDICINE

## 2025-01-21 ENCOUNTER — PATIENT MESSAGE (OUTPATIENT)
Dept: CARDIOLOGY | Facility: CLINIC | Age: 80
End: 2025-01-21
Payer: MEDICARE

## 2025-01-30 NOTE — TELEPHONE ENCOUNTER
Get the MRI's done as ordered by neurosurgery  Will order a ultrasound of her liver for eval of suspected hepatic cyst seen on CT scan.

## 2025-01-31 ENCOUNTER — PATIENT MESSAGE (OUTPATIENT)
Dept: TRANSPLANT | Facility: CLINIC | Age: 80
End: 2025-01-31
Payer: MEDICARE

## 2025-01-31 ENCOUNTER — PATIENT MESSAGE (OUTPATIENT)
Dept: INTERNAL MEDICINE | Facility: CLINIC | Age: 80
End: 2025-01-31
Payer: MEDICARE

## 2025-01-31 NOTE — TELEPHONE ENCOUNTER
Called Pt.  Stated she recently had US of abdomen done and Dr. India Park reviewed it.     Will give provider's office a call to get report.

## 2025-02-06 ENCOUNTER — TELEPHONE (OUTPATIENT)
Dept: INTERNAL MEDICINE | Facility: CLINIC | Age: 80
End: 2025-02-06
Payer: MEDICARE

## 2025-02-06 DIAGNOSIS — K76.9 LIVER DISEASE, UNSPECIFIED: Primary | ICD-10-CM

## 2025-02-06 DIAGNOSIS — R16.0 LIVER MASS: ICD-10-CM

## 2025-02-06 NOTE — TELEPHONE ENCOUNTER
Faxed Report has been received by Dr. Starr office.  Awaiting for PCP to review and relay further instruction.

## 2025-02-06 NOTE — TELEPHONE ENCOUNTER
Called and Message sent to pt.  Will send message to radiology dept to assist with scheduling Abd MRI.

## 2025-02-06 NOTE — TELEPHONE ENCOUNTER
Abd US from [facility] done on 10/24 reviewed. They recommend an MRI of the liver for further eval.  Liver lesion has not enlarged when compared to recent Chest CT(1/25) but this is not the test of choice for evaluation of a liver lesion. Can d/c US and recommend MRI of the abdomen to confirm this is a cyst.

## 2025-02-07 ENCOUNTER — TELEPHONE (OUTPATIENT)
Dept: NEUROSURGERY | Facility: CLINIC | Age: 80
End: 2025-02-07
Payer: MEDICARE

## 2025-02-07 NOTE — TELEPHONE ENCOUNTER
Called and spoke to pt confirming r/s her appt to:    Future Appointments   Date Time Provider Department Center   2/19/2025  2:45 PM Mert Coates MD [facility]   2/22/2025  1:15 PM [facility]   2/22/2025  1:45 PM [facility]   2/22/2025  2:15 PM [facility]   2/27/2025  2:00 PM Kelly Murphy, NP [facility] [place]   3/12/2025 12:15 PM Marcus Medeiros MD [facility]   4/10/2025  2:00 PM Angel Bello,  [facility] [place]       ----- Message from Shannon sent at 2/6/2025  4:59 PM CST -----  Pt's PCP ordered an Abdominal MRI. Pt preferred to do all 3 MRI's on the same date. Pt's new MRI appt date is 2/22/25. She would like to reschedule her appt with Dr Medeiros to reflect a date after 2/22/25.      Thanks   Shannon

## 2025-02-22 ENCOUNTER — HOSPITAL ENCOUNTER (OUTPATIENT)
Dept: RADIOLOGY | Facility: HOSPITAL | Age: 80
Discharge: HOME | End: 2025-02-22
Attending: NEUROLOGICAL SURGERY
Payer: MEDICARE

## 2025-02-22 ENCOUNTER — HOSPITAL ENCOUNTER (OUTPATIENT)
Dept: RADIOLOGY | Facility: HOSPITAL | Age: 80
Discharge: HOME | End: 2025-02-22
Attending: INTERNAL MEDICINE
Payer: MEDICARE

## 2025-02-22 DIAGNOSIS — R16.0 LIVER MASS: ICD-10-CM

## 2025-02-22 DIAGNOSIS — K76.9 LIVER DISEASE, UNSPECIFIED: ICD-10-CM

## 2025-02-22 DIAGNOSIS — G95.9 CERVICAL MYELOPATHY: ICD-10-CM

## 2025-02-22 DIAGNOSIS — R26.9 NEUROLOGIC GAIT DYSFUNCTION: ICD-10-CM

## 2025-02-22 DIAGNOSIS — M54.16 LUMBAR RADICULOPATHY, CHRONIC: ICD-10-CM

## 2025-02-22 DIAGNOSIS — M48.07 SPINAL STENOSIS, LUMBOSACRAL REGION: ICD-10-CM

## 2025-02-22 PROCEDURE — 72158 MRI LUMBAR SPINE W/O & W/DYE: CPT | Mod: 26,,, | Performed by: RADIOLOGY

## 2025-02-22 PROCEDURE — A9585 GADOBUTROL INJECTION: HCPCS | Performed by: INTERNAL MEDICINE

## 2025-02-22 PROCEDURE — 72157 MRI CHEST SPINE W/O & W/DYE: CPT | Mod: 26,,, | Performed by: RADIOLOGY

## 2025-02-22 PROCEDURE — 72158 MRI LUMBAR SPINE W/O & W/DYE: CPT | Mod: TC

## 2025-02-22 PROCEDURE — 25500020 PHARM REV CODE 255: Performed by: INTERNAL MEDICINE

## 2025-02-22 PROCEDURE — 72157 MRI CHEST SPINE W/O & W/DYE: CPT | Mod: TC

## 2025-02-22 PROCEDURE — 74183 MRI ABD W/O CNTR FLWD CNTR: CPT | Mod: TC

## 2025-02-22 PROCEDURE — 74183 MRI ABD W/O CNTR FLWD CNTR: CPT | Mod: 26,,, | Performed by: RADIOLOGY

## 2025-02-22 RX ORDER — GADOBUTROL 604.72 MG/ML
10 INJECTION INTRAVENOUS
Status: COMPLETED | OUTPATIENT
Start: 2025-02-22 | End: 2025-02-22

## 2025-02-22 RX ADMIN — GADOBUTROL 10 ML: 604.72 INJECTION INTRAVENOUS at 02:02

## 2025-02-24 ENCOUNTER — RESULTS FOLLOW-UP (OUTPATIENT)
Dept: INTERNAL MEDICINE | Facility: CLINIC | Age: 80
End: 2025-02-24

## 2025-03-11 ENCOUNTER — TELEPHONE (OUTPATIENT)
Dept: NEUROSURGERY | Facility: CLINIC | Age: 80
End: 2025-03-11
Payer: MEDICARE

## 2025-03-11 NOTE — TELEPHONE ENCOUNTER
"Called and spoke to pt who states that she is confused d/t seeing an appt alert on her phone for an appt w/ "neurological" on 03/18. Explained that I only see an appt for her tmrw w/ Dr. Medeiros and one on 03/18 w/ a different provider. Pt v/u.    Future Appointments   Date Time Provider Department Center   3/12/2025 12:15 PM Marcus Medeiros MD [facility]   3/18/2025  2:30 PM Kelly Murphy, NP [facility] IM [place]   4/10/2025  2:00 PM Angel Bello,  [facility] [place]       ----- Message from Prachi sent at 3/11/2025 10:00 AM CDT -----  Pt calling in regards to seeing an ceferino on 3-18 , I only see 3-12 , she says its in her portal , please call Confirmed patient's contact info below:Contact Name: Shakira Sharron Number: [phone]  "

## 2025-03-12 ENCOUNTER — OFFICE VISIT (OUTPATIENT)
Dept: NEUROSURGERY | Facility: CLINIC | Age: 80
End: 2025-03-12
Payer: MEDICARE

## 2025-03-12 ENCOUNTER — PATIENT MESSAGE (OUTPATIENT)
Dept: NEUROSURGERY | Facility: CLINIC | Age: 80
End: 2025-03-12
Payer: MEDICARE

## 2025-03-12 DIAGNOSIS — Q76.9 CONGENITAL MALFORMATION OF BONY THORAX, UNSPECIFIED: ICD-10-CM

## 2025-03-12 DIAGNOSIS — G96.198: Primary | ICD-10-CM

## 2025-03-12 PROCEDURE — 1125F AMNT PAIN NOTED PAIN PRSNT: CPT | Mod: HCNC,CPTII,S$GLB, | Performed by: NEUROLOGICAL SURGERY

## 2025-03-12 PROCEDURE — 1159F MED LIST DOCD IN RCRD: CPT | Mod: HCNC,CPTII,S$GLB, | Performed by: NEUROLOGICAL SURGERY

## 2025-03-12 PROCEDURE — 3288F FALL RISK ASSESSMENT DOCD: CPT | Mod: HCNC,CPTII,S$GLB, | Performed by: NEUROLOGICAL SURGERY

## 2025-03-12 PROCEDURE — 1101F PT FALLS ASSESS-DOCD LE1/YR: CPT | Mod: HCNC,CPTII,S$GLB, | Performed by: NEUROLOGICAL SURGERY

## 2025-03-12 PROCEDURE — 99999 PR PBB SHADOW E&M-EST. PATIENT-LVL III: CPT | Mod: PBBFAC,HCNC,, | Performed by: NEUROLOGICAL SURGERY

## 2025-03-12 PROCEDURE — 99214 OFFICE O/P EST MOD 30 MIN: CPT | Mod: HCNC,S$GLB,, | Performed by: NEUROLOGICAL SURGERY
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
## 2025-03-13 ENCOUNTER — PATIENT MESSAGE (OUTPATIENT)
Dept: INTERNAL MEDICINE | Facility: CLINIC | Age: 80
End: 2025-03-13
Payer: MEDICARE

## 2025-03-13 DIAGNOSIS — R26.9 NEUROLOGIC GAIT DYSFUNCTION: ICD-10-CM

## 2025-03-13 DIAGNOSIS — M54.16 LUMBAR RADICULOPATHY, CHRONIC: ICD-10-CM

## 2025-03-13 DIAGNOSIS — M48.07 SPINAL STENOSIS, LUMBOSACRAL REGION: Primary | ICD-10-CM

## 2025-03-20 ENCOUNTER — CLINICAL SUPPORT (OUTPATIENT)
Dept: REHABILITATION | Facility: HOSPITAL | Age: 80
End: 2025-03-20
Attending: NEUROLOGICAL SURGERY
Payer: MEDICARE

## 2025-03-20 VITALS — HEART RATE: 85 BPM | DIASTOLIC BLOOD PRESSURE: 79 MMHG | SYSTOLIC BLOOD PRESSURE: 117 MMHG

## 2025-03-20 DIAGNOSIS — Z74.09 IMPAIRED FUNCTIONAL MOBILITY, BALANCE, GAIT, AND ENDURANCE: Primary | ICD-10-CM

## 2025-03-20 DIAGNOSIS — R26.9 NEUROLOGIC GAIT DYSFUNCTION: ICD-10-CM

## 2025-03-20 DIAGNOSIS — M48.07 SPINAL STENOSIS, LUMBOSACRAL REGION: ICD-10-CM

## 2025-03-20 PROCEDURE — 97161 PT EVAL LOW COMPLEX 20 MIN: CPT | Mod: HCNC,PO

## 2025-03-20 NOTE — PROGRESS NOTES
Outpatient Rehab    Physical Therapy Evaluation (only)    Patient Name: Shakira Pearl  MRN: [id number]  YOB: 1945  Encounter Date: 3/20/2025    Therapy Diagnosis:   Encounter Diagnoses   Name Primary?    Spinal stenosis, lumbosacral region     Neurologic gait dysfunction     Impaired functional mobility, balance, gait, and endurance Yes     Physician: Marcus Medeiros MD    Physician Orders: Eval and Treat  Medical Diagnosis: Spinal stenosis, lumbosacral region  Neurologic gait dysfunction    Visit # / Visits Authorized:  1 / 1  Date of Evaluation: 3/20/2025  Insurance Authorization Period: 3/13/2025 to 3/13/2026  Plan of Care Certification:  3/20/2025 to 5/30/2025     PT/PTA:     Number of PTA visits since last PT visit:   Time In: 1429   Time Out: 1517  Total Time: 48   Total Billable Time: 48    Intake Outcome Measure for FOTO Survey    Therapist reviewed FOTO scores for Shakira Pearl on 3/20/2025.   FOTO report - see Media section or FOTO account episode details.     Intake Score: 51%    Precautions     Standard      Subjective   History of Present Illness  Shakira is a 79 y.o. female who reports to physical therapy with a chief concern of worsening balance and decreased activity tolerance.     The patient reports a medical diagnosis of M48.07 (ICD-10-CM) - Spinal stenosis, lumbosacral region; R26.9 (ICD-10-CM) - Neurologic gait dysfunction.    Diagnostic tests related to this condition: MRI studies.   MRI Studies Details: MRI's performed for Brain, Cervical, Thoracic, and Lumbar spine. See Imaging tab in Epic Chart Review.    History of Present Condition/Illness: She states that she has 2 cysts on her spine (one in her T-spine and another in her L-spine). Also reports a lot of arthritis throughout her spine as well. She's unsure which of these issues are causing her balance issues but when she's walking she feels like her body wants to bend over. She has noticed that she's also been losing her voice  (started about 6 months ago but has gotten worse over the last month). She also reports 2-3 near falls in the last 3 months but no true falls recently (had one really bad fall in December 2023 when she fell out of bed and hit her head due to night terrors but she's since stopped taking the medication that was causing her symptoms). She denies any numbness in her legs or arms but does feel weaker overall because she hasn't been exercising much over the last 3 years (used to walk on the treadmill and go to the gym with a ). Does her own cooking but has someone come in 2 times a week to do the cleaning and other chores. Lives alone in a condo with 14 stairs inside her home with no issues here (1 handrail). Retired, no hobbies besides caring for her cat.     Activities of Daily Living  Social history was obtained from Patient.    General Prior Level of Function Comments: Independent  General Current Level of Function Comments: Worsening balance, increased weakness, and requires assistance for transportation and household chores           Pain  No Pain Reported: Yes                 Review of Systems  Patient reports: Osteoarthritis and Stomach History        Treatment History  Treatments  Previously Received Treatments: Yes  Previous Treatments: Physical therapy    Living Arrangements  Living Situation  Housing: Home independently  Living Arrangements: Alone  Support Systems: Friends/neighbors    Lives in a 2 story condo with no difficulty navigating 14 stairs multiple times a day.      Employment  Patient does not report that: Does the patient's condition impact their ability to work?  Employment Status: Retired          Past Medical History/Physical Systems Review:   Shakira Pearl  has a past medical history of Adrenal adenoma, Allergy, Arthritis, Cataract, Colon polyps, Degenerative disc disease, Diverticulosis of colon, Dyspepsia, Fatty liver, GERD (gastroesophageal reflux disease), Heartburn,  Hemorrhoids, internal, Hiatal hernia, Hyperlipidemia, LAM (iron deficiency anemia), Liver cyst, Neuromuscular disorder, and Vitamin D deficiency.    Shakira Pearl  has a past surgical history that includes Appendectomy (age 19); Hysterectomy (38); Cholecystectomy; Injection of joint (Bilateral, 02/18/2019); Knee arthroscopy w/ meniscectomy (Left, 10/03/2019); Chondroplasty of knee (Left, 10/03/2019); Synovectomy of knee (Left, 10/03/2019); Injection of joint (Right, 08/20/2020); Esophagogastroduodenoscopy (N/A, 09/09/2020); Colonoscopy (N/A, 09/09/2020); Cataract extraction w/  intraocular lens implant; Cataract extraction w/  intraocular lens implant; YAG Laser Capsulotomy (Bilateral); hysterectomy, vaginal, with uterosacral ligament vault suspension; and Colonoscopy (N/A, 9/20/2023).    Shakira has a current medication list which includes the following prescription(s): spikevax 5088-4031(12y up)(pf), diclofenac, ergocalciferol, estradiol, estradiol 0.05 mg/24 hr td ptsw, estradiol valerate, fenofibrate, fluocinonide, icosapent ethyl, ketoconazole, losartan-hydrochlorothiazide 50-12.5 mg, mirabegron, OPW TEST CLAIM - DO NOT FILL, pantoprazole, potassium chloride sa, and [DISCONTINUED] oxybutynin, and the following Facility-Administered Medications: cyanocobalamin.    Review of patient's allergies indicates:   Allergen Reactions    Demerol [meperidine] Nausea And Vomiting     Other reaction(s): Nausea    Papaya      Illness vomiting    Zetia [ezetimibe] Hives    Sulfa (sulfonamide antibiotics) Rash    Sulfur Rash        Objective   Vital Signs  /79   Pulse 85   BP Location: Left arm  BP Position: Sitting  BP Cuff Size: Adult               Hip Strength - Planes of Motion   Right Strength Right Pain Left Strength Left  Pain   Flexion (L2) 4   4+     Extension 4+   4+     ABduction 4+   4+     ADduction 4+   4+     Internal Rotation           External Rotation               Knee Strength   Right Strength Right  Pain Left Strength Left  Pain   Flexion (S2) 4+   4+     Prone Flexion           Extension (L3) 4+   4+            Ankle/Foot Strength - Planes of Motion   Right Strength Right Pain Left Strength Left  Pain   Dorsiflexion (L4) 4   4+     Plantar Flexion (S1) 4   4+     Inversion           Eversion           Great Toe Flexion           Great Toe Extension (L5)           Lesser Toes Flexion           Lesser Toes Extension                     Sit to Stand Testing  The patient completed 5 sit to stand transfers in 10.76 sec.   The patient completed 14 repetitions of a sit to stand transfer in 30 seconds.           Ambulation Details  Ambulation distance was 430.7 meters. 6 Minute Walk Test: 1413 feet with no AD    Gait Analysis  Base of Support: Normal            Hip Observations During Gait  Right: Hip Trendelenburg  Knee Observations During Gait  Right: Knee Extensor Thrust  Gait Analysis Details  Decreased eccentric control of R knee flexion and ankle PF in loading response      Functional Gait Assessment:   1. Gait on level surface =  3 (5.28 s)   (3) Normal: less than 5.5 sec, no A.D., no imbalance, normal gait pattern, deviates< 6in   (2) Mild impairment: 7-5.6 sec, uses A.D., mild gait deviations, or deviates 6-10 in   (1) Moderate impairment: > 7 sec, slow speed, imbalance, deviates 10-15 in.   (0) Severe impairment: needs assist, deviates >15 in, reach/touch wall  2. Change in Gait Speed = 3   (3) Normal: smooth change w/o loss of balance or gait deviation, deviates < 6 in, significant difference between speeds   (2) Mild impairment: changes speed, but demonstrates mild gait deviations, deviates 6-10 in, OR no deviations but unable to significantly speed, OR uses A.D.   (1) Moderate impairment: minor changes to speed, OR changes speed w/ significant deviations, deviates 10-15 in, OR  Changes speed , but loses balance & recovers   (0) Severe impairment: cannot change speed, deviates >15 in, or loses balance &  needs assist  3. Gait with horizontal head turns  = 2   (3) Normal: no change in gait, deviates <6 in   (2) Mild impairment: slight change in speed, deviates 6-10 in, OR uses A.D.   (1) Moderate impairment: moderate change in speed, deviates 10-15 in   (0) Severe impairment: severe disruption of gait, deviates >15in  4. Gait with vertical head turns = 3   (3) Normal: no change in gait, deviates <6 in   (2) Mild impairment: slight change in speed, deviates 6-10 in OR uses A.D.   (1) Moderate impairment: moderate change in speed, deviates 10-15 in   (0) Severe impairment: severe disruption of gait, deviates >15 in  5. Gait with pivot turns = 3   (3) Normal: performs safely in 3 sec, no LOB   (2) Mild impairment: performs in >3 sec & no LOB, OR turns safely & requires several steps to regain LOB   (1) Moderate impairment: turns slow, OR requires several small steps for balance following turn & stop   (0) Severe impairment: cannot turn safely, needs assist  6. Step over obstacle = 2   (3) Normal: steps over 2 stacked boxes w/o change in speed or LOB   (2) Mild impairment: able to step over 1 box w/o change in speed or LOB   (1) Moderate impairment: steps over 1 box but must slow down, may require VC   (0) Severe impairment: cannot perform w/o assist  7. Gait with Narrow ANTONIO = 3   (3) Normal: 10 steps no staggering   (2) Mild impairment: 7-9 steps   (1) Moderate impairment: 4-7 steps   (0) Severe impairment: < 4 steps or cannot perform w/o assist  8. Gait with eyes closed = 1   (3) Normal: < 7 sec, no A.D., no LOB, normal gait pattern, deviates <6 in   (2) Mild impairment: 7.1-9 sec, mild gait deviations, deviates 6-10 in   (1) Moderate impairment: > 9 sec, abnormal pattern, LOB, deviates 10-15 in   (0) Severe impairment: cannot perform w/o assist, LOB, deviates >15in  9. Ambulating Backwards = 1   (3) Normal: no A.D., no LOB, normal gait pattern, deviates <6in   (2) Mild impairment: uses A.D., slower speed, mild gait  "deviations, deviates 6-10 in   (1) Moderate impairment: slow speed, abnormal gait pattern, LOB, deviates 10-15 in   (0) Severe impairment: severe gait deviations or LOB, deviates >15in  10. Steps = 3   (3) Normal: alternating feet, no rail   (2) Mild Impairment: alternating feet, uses rail   (1) Moderate impairment: step-to, uses rail   (0) Severe impairment: cannot perform safely    Score 24/30     Score:   <22/30 fall risk   <20/30 fall risk in older adults   <18/30 fall risk in Parkinsons       NADIYA SENSORY ORGANIZATION PERFORMANCE (SOP) TEST:  (N=normal, S = sway, F= Fall; hold each position for 30")  Condition 1: (firm surface/feet together/eyes open) P  Condition 2: (firm surface/feet together/eyes closed) P - min sway  Condition 3: (firm surface/feet in tandem/eyes open) P - bilaterally  Condition 4: (firm surface/feet in tandem/eyes closed) F - 9 sec  Condition 5: (soft surface/feet together/eyes open) P  Condition 6: (soft surface/feet together/eyes closed) P - mod sway      Time Entry(in minutes):  PT Evaluation (Low) Time Entry: 48    Assessment & Plan   Assessment  Shakira presents with a condition of Low complexity.   Presentation of Symptoms: Changing  Will Comorbidities Impact Care: Yes  Arthritis and degenerative spinal changes     Functional Limitations: Activity tolerance, Carrying objects, Decreased ambulation distance/endurance, Driving, Functional mobility, Gait limitations, Gross motor coordination, Maintaining balance, Painful locomotion/ambulation, Proprioception  Impairments: Abnormal muscle firing, Abnormal gait, Activity intolerance, Impaired balance, Impaired physical strength  Personal Factors Affecting Prognosis: Fear/anxiety, Pain    Patient Goal for Therapy (PT): to improve her balance and tolerance for community mobility  Prognosis: Good  Assessment Details: Shakira presents to OP PT with mild deficits in her right LE strength compared to her left, abnormal gait, decreased standing " and ambulatory endurance, and increased hoarseness and difficulty speaking. Her scores on the 5 time Sit to Stand and Functional Gait Assessment are above their respective cut-off scores and show that she is at a decreased risk of falling. However, her Functional Gait Assessment score and performance on the NADIYA do indicate mild impairments in her balance reactions. Her score on the 6 Minute Walk Test is above her age-related norms but as the test continued, she showed declines in the quality of her gait and reported increased pain in her back. She is appropriate for skilled physical therapy services to maximize her tolerance and independence with community mobility. She may also benefit from a referral to Neurology to further investigate her vocal changes and rule in/out more serious pathology.     Plan  From a physical therapy perspective, the patient would benefit from: Skilled Rehab Services    Planned therapy interventions include: Therapeutic exercise, Therapeutic activities, Neuromuscular re-education, Manual therapy, Gait training, and Orthotic management and training.    Planned modalities to include: Electrical stimulation - attended and Biofeedback.        Visit Frequency: 1 times Per Week for 8 Weeks.       This plan was discussed with Patient.   Discussion participants: Agreed Upon Plan of Care             Patient's spiritual, cultural, and educational needs considered and patient agreeable to plan of care and goals.     Education  Education was done with Patient. The patient's learning style includes Listening. The patient Verbalizes understanding.         - Role of PT in improving gait, balance, endurance, and tolerance; - Establishment of PT POC and goals        Goals:   Active       Goals cont'd       Pt will improve 6 Minute Walk Test distance to 1420 feet with improved gait mechanics on right LE.       Start:  03/22/25    Expected End:  05/30/25               Short term goals = Long term goals        Pt will be independent with an individualized home exercise program.        Start:  03/22/25    Expected End:  05/30/25            Pt will improve right hip flexion strength by 1/3 MMT score.       Start:  03/22/25    Expected End:  05/30/25            Pt will improve right ankle DF/PF strength by 1/3 MMT score.       Start:  03/22/25    Expected End:  05/30/25            Pt will improve her score on  Functional Gait Assessment to >/= 26/30       Start:  03/22/25    Expected End:  05/30/25            Pt will improve her score on NADIYA condition 4 to >/= 15 sec.       Start:  03/22/25    Expected End:  05/30/25                Ciarra Martinez, PT

## 2025-03-21 ENCOUNTER — PATIENT MESSAGE (OUTPATIENT)
Dept: NEUROSURGERY | Facility: CLINIC | Age: 80
End: 2025-03-21
Payer: MEDICARE

## 2025-03-22 PROBLEM — Z74.09 IMPAIRED FUNCTIONAL MOBILITY, BALANCE, GAIT, AND ENDURANCE: Status: ACTIVE | Noted: 2025-03-22

## 2025-03-26 ENCOUNTER — OUTPATIENT CASE MANAGEMENT (OUTPATIENT)
Dept: ADMINISTRATIVE | Facility: OTHER | Age: 80
End: 2025-03-26
Payer: MEDICARE

## 2025-03-26 ENCOUNTER — TELEPHONE (OUTPATIENT)
Dept: OPHTHALMOLOGY | Facility: CLINIC | Age: 80
End: 2025-03-26
Payer: MEDICARE

## 2025-03-26 NOTE — TELEPHONE ENCOUNTER
----- Message from Ann sent at 3/26/2025 12:41 PM CDT -----  Contact: pt @ [phone]  Shakira Pearl calling regarding Appointment Access  (message) for #pt is calling to get yearly appt at the [facility] location pt says she is having bad vision problems, asking for call back

## 2025-03-27 ENCOUNTER — CLINICAL SUPPORT (OUTPATIENT)
Dept: REHABILITATION | Facility: HOSPITAL | Age: 80
End: 2025-03-27
Attending: NEUROLOGICAL SURGERY
Payer: MEDICARE

## 2025-03-27 DIAGNOSIS — Z74.09 IMPAIRED FUNCTIONAL MOBILITY, BALANCE, GAIT, AND ENDURANCE: Primary | ICD-10-CM

## 2025-03-27 PROCEDURE — 97110 THERAPEUTIC EXERCISES: CPT | Mod: HCNC,PO

## 2025-03-27 PROCEDURE — 97530 THERAPEUTIC ACTIVITIES: CPT | Mod: HCNC,PO

## 2025-03-27 NOTE — PROGRESS NOTES
Outpatient Care Management   - Patient Assessment    Patient: Shakira Pearl  MRN:  [id number]  Date of Service:  3/27/2025  Completed by:  Lali Hammonds LCSW  Referral Date: 03/22/2025    Reason for Visit   Patient presents with    Social Work Assessment     3/27/25    OPCM Enrollment Call     3/27/25       Brief Summary:  received a referral from outpatient provider, (DARIEN Lafleur) for the following Low/Mod SW psychosocial needs patient needs assistance with transportation options.  Pt elected to participate in the OPCM program. Social work assessment and SDOH questionnaire completed. Pt reported she lives alone in a two story home. Pt utilizes no DME and is independent with ADLs. She has a son and daughter in law that work but occasionally help. The patient also requests assistance with getting programs that help with getting things done at home, and with getting an appt at [facility] to see Dr Rajput. Care plan was created in collaboration with patient/caregiver input.   INTERVENTIONS:  Sent message to PCP requesting referral for Dr Rajput. Provided folder to Pt for the [facility] senior centers, the [facility]  program. Pt was already approved for the [facility] paratransit but said due to the wait times to get the ride and the communal drop off, she is unsure she wants to do it. Bus comes to clinic, requested she call them directly to discuss this.    Future Appointments   Date Time Provider Department Center   4/8/2025 11:00 AM [facility], [facility] [place]   4/10/2025  2:00 PM Angel Bello, DO [facility] [place]   4/22/2025  2:30 PM Dung Jacinto, PT [facility]   4/29/2025  2:30 PM Dung Jacinto, PT [facility]   5/6/2025  2:30 PM Dung Jacinto, PT [facility]   5/13/2025  2:30 PM Dung Jacinto, PT [facility]   5/20/2025  2:30 PM Ciarra Martinez, PT [facility]   5/27/2025  2:30 PM Ciarra Maritnez, PT [facility]   6/3/2025  2:30 PM Ciarra Martinez, PT [facility]   6/10/2025  2:30 PM Ciarra Martinez, PT [facility]   7/21/2025  2:00 PM Hank Aleman MD [facility] Cyndi Hammonds, [facility]  Neuro Therapy   [facility]  [phone]

## 2025-03-27 NOTE — PROGRESS NOTES
Outpatient Rehab    Physical Therapy Visit    Patient Name: Shakira Pearl  MRN: [id number]  YOB: 1945  Encounter Date: 3/27/2025    Therapy Diagnosis:   Encounter Diagnosis   Name Primary?    Impaired functional mobility, balance, gait, and endurance Yes     Physician: Marcus Medeiros MD    Physician Orders: Eval and Treat  Medical Diagnosis: Spinal stenosis, lumbosacral region  Neurologic gait dysfunction    Visit # / Visits Authorized:  1 / 10  Insurance Authorization Period: 3/20/2025 to 6/10/2025  Date of Evaluation: 3/20/2025  Plan of Care Certification:  3/20/2025 to 5/30/2025     PT/PTA:     Number of PTA visits since last PT visit:   Time In:     Time Out:    Total Time:     Total Billable Time:      FOTO:  Intake Score:  %  Survey Score 1:  %  Survey Score 2:  %         Subjective   She is feeling okay this afternoon. Denies any complaints at the start of her session..         Objective            Treatment:  Therapeutic Exercise  TE 1: x 8 mins, [employer] recumbent stepper, level 1.0, B UE/LE for endurance and neural priming  Therapeutic Activity  TA 1: Time included here to review HEP (see pt instructions)    Time Entry(in minutes):       Assessment & Plan   Assessment:         Patient will continue to benefit from skilled outpatient physical therapy to address the deficits listed in the problem list box on initial evaluation, provide pt/family education and to maximize pt's level of independence in the home and community environment.     Patient's spiritual, cultural, and educational needs considered and patient agreeable to plan of care and goals.           Plan:      Goals:   Active       Goals cont'd       Pt will improve 6 Minute Walk Test distance to 1420 feet with improved gait mechanics on right LE.       Start:  03/22/25    Expected End:  05/30/25               Short term goals = Long term goals       Pt will be independent with an individualized home exercise program.        Start:   03/22/25    Expected End:  05/30/25            Pt will improve right hip flexion strength by 1/3 MMT score.       Start:  03/22/25    Expected End:  05/30/25            Pt will improve right ankle DF/PF strength by 1/3 MMT score.       Start:  03/22/25    Expected End:  05/30/25            Pt will improve her score on  Functional Gait Assessment to >/= 26/30       Start:  03/22/25    Expected End:  05/30/25            Pt will improve her score on NADIYA condition 4 to >/= 15 sec.       Start:  03/22/25    Expected End:  05/30/25                iCarra Martinez, PT

## 2025-04-01 ENCOUNTER — PATIENT MESSAGE (OUTPATIENT)
Dept: INTERNAL MEDICINE | Facility: CLINIC | Age: 80
End: 2025-04-01
Payer: MEDICARE

## 2025-04-01 ENCOUNTER — PATIENT MESSAGE (OUTPATIENT)
Dept: OTOLARYNGOLOGY | Facility: CLINIC | Age: 80
End: 2025-04-01
Payer: MEDICARE

## 2025-04-01 DIAGNOSIS — R05.9 COUGH, UNSPECIFIED TYPE: ICD-10-CM

## 2025-04-01 DIAGNOSIS — R49.0 HOARSENESS: Primary | ICD-10-CM

## 2025-04-01 NOTE — TELEPHONE ENCOUNTER
Pt seeking a referral to ENT, reports hoarseness and coughing night    Pended in encounter for review    LOV with Angel Bello DO , 12/6/2024

## 2025-04-10 ENCOUNTER — OFFICE VISIT (OUTPATIENT)
Dept: INTERNAL MEDICINE | Facility: CLINIC | Age: 80
End: 2025-04-10
Payer: MEDICARE

## 2025-04-10 VITALS
TEMPERATURE: 98 F | RESPIRATION RATE: 16 BRPM | HEART RATE: 88 BPM | DIASTOLIC BLOOD PRESSURE: 68 MMHG | OXYGEN SATURATION: 98 % | HEIGHT: 63 IN | BODY MASS INDEX: 26.32 KG/M2 | SYSTOLIC BLOOD PRESSURE: 106 MMHG | WEIGHT: 148.56 LBS

## 2025-04-10 DIAGNOSIS — K76.89 LIVER CYST: ICD-10-CM

## 2025-04-10 DIAGNOSIS — M48.062 SPINAL STENOSIS OF LUMBAR REGION WITH NEUROGENIC CLAUDICATION: ICD-10-CM

## 2025-04-10 DIAGNOSIS — I70.0 AORTIC ATHEROSCLEROSIS: ICD-10-CM

## 2025-04-10 DIAGNOSIS — J98.59 MEDIASTINAL MASS: ICD-10-CM

## 2025-04-10 DIAGNOSIS — K76.0 FATTY LIVER: ICD-10-CM

## 2025-04-10 DIAGNOSIS — M46.1 SACROILIITIS: ICD-10-CM

## 2025-04-10 DIAGNOSIS — Z74.09 IMPAIRED FUNCTIONAL MOBILITY, BALANCE, GAIT, AND ENDURANCE: ICD-10-CM

## 2025-04-10 DIAGNOSIS — R73.03 PREDIABETES: ICD-10-CM

## 2025-04-10 DIAGNOSIS — K21.9 GASTROESOPHAGEAL REFLUX DISEASE, UNSPECIFIED WHETHER ESOPHAGITIS PRESENT: ICD-10-CM

## 2025-04-10 DIAGNOSIS — E78.1 HYPERTRIGLYCERIDEMIA: ICD-10-CM

## 2025-04-10 DIAGNOSIS — R41.3 MEMORY LOSS OR IMPAIRMENT: ICD-10-CM

## 2025-04-10 DIAGNOSIS — N95.1 MENOPAUSAL SYMPTOMS: ICD-10-CM

## 2025-04-10 DIAGNOSIS — I10 ESSENTIAL HYPERTENSION: Primary | ICD-10-CM

## 2025-04-10 DIAGNOSIS — D35.02 ADENOMA OF LEFT ADRENAL GLAND: ICD-10-CM

## 2025-04-10 PROBLEM — M71.38 CYST OF THORACIC FACET JOINT: Status: ACTIVE | Noted: 2025-04-10

## 2025-04-10 PROCEDURE — 99999 PR PBB SHADOW E&M-EST. PATIENT-LVL IV: CPT | Mod: PBBFAC,HCNC,, | Performed by: INTERNAL MEDICINE

## 2025-04-10 NOTE — PROGRESS NOTES
Patient ID: Shakira Pearl is a 79 y.o. female.    Chief Complaint: Follow-up    History of Present Illness    CHIEF COMPLAINT:  Shakira presents today for follow up    GERD AND ENT SYMPTOMS:  She reports hoarseness, earache, and nocturnal choking episodes, particularly after late meals, which have required hospital visits.    THORACIC PAIN:  She reports constant pain behind her heart. She has a history of thoracic cyst, previously evaluated by neurosurgeon Dr. Medeiros who recommended watchful waiting and referred her to neuro physical therapy.    HORMONE REPLACEMENT THERAPY:  She has a history of hysterectomy with bilateral oophorectomy performed in her 40s. She currently receives Estradiol injections, having switched from oral hormone replacement due to blood clot risk concerns. She desires to continue hormone replacement therapy but is seeking a new provider.    MEDICATIONS:  She takes Vitamin D 5000 units monthly instead of prescribed weekly frequency. Previous vitamin D level was 42.         Physical Exam    General: No acute distress. Well-developed. Well-nourished.  Eyes: EOMI. Sclerae anicteric.  HENT: Normocephalic. Atraumatic. Nares patent. Moist oral mucosa.  Ears: Bilateral TMs clear. Bilateral EACs clear.  Cardiovascular: Regular rate. Regular rhythm. No murmurs. No rubs. No gallops. Normal S1, S2.  Respiratory: Normal respiratory effort. Clear to auscultation bilaterally. No rales. No rhonchi. No wheezing.  Abdomen: Soft. Non-tender. Non-distended. Normoactive bowel sounds.  Musculoskeletal: No  obvious deformity. Tenderness to palpation of lumbar spine. Tenderness to palpation of thoracic spine.  Extremities: No lower extremity edema.  Neurological: Alert & oriented x3. No slurred speech. Normal gait.  Psychiatric: Normal mood. Normal affect. Good insight. Good judgment.  Skin: Warm. Dry. No rash.         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  - Discussed mediastinal mass/cyst noted on imaging; recommended continued monitoring with follow-up MRI Chest in 8 months.  - Considered reported back pain and hoarseness; do not believe mediastinal mass is likely cause.  - Vitamin D levels mildly low.  - Reviewed hormone replacement therapy; currently on estradiol injections.  - A1C WNL at 5.6%.  - Cholesterol levels stable.    GASTROESOPHAGEAL REFLUX DISEASE:  - Noted the patient's history of gastroesophageal reflux disease.  - Shakira reports choking at night, especially when eating late.  - Continued Pantoprazole 40 mg daily for gastroesophageal reflux management.    DYSPHONIA:  - Noted patient's report of hoarseness.  - Acknowledged patient's concern about hoarseness and its potential relation to esophageal issues.  - Referred the patient to ENT specialist, specifically Dr. Sherman, for evaluation of hoarseness.    OTALGIA:  - Noted patient's report of earache.  - Referred the patient to ENT specialist, specifically Dr. Sherman, for evaluation of earache.    THORACOLUMBAR SPONDYLOPATHY AND DORSALGIA:  - Noted patient's report of constant pain behind the heart.  - Physical exam revealed tenderness to palpation of lumbar and thoracic spine.  - Acknowledged the presence of arthritis in the patient's back.  - Explained that cysts are usually benign compared to masses or tumors.  - Discussed risks associated with surgical intervention for mediastinal mass, including  need for extensive chest surgery.  - Referred patient to neurosurgeon Dr. Medeiros for monitoring of the cyst behind the heart.  - Scheduled follow-up MRI for September.  - Referred patient to neuro physical therapist.    VITAMIN D DEFICIENCY:  - Noted patient's low vitamin D level, with previous level at 42.  - Continued Vitamin D supplementation, decreased to 2,000 IU daily.    ACQUIRED ABSENCE OF OVARIES AND UTERUS:  - Noted patient's history of bilateral oophorectomy.  - Noted patient's history of hysterectomy in her 40s.    HORMONE REPLACEMENT THERAPY:  - Noted patient is currently on hormone replacement therapy via injection.  - Evaluated estradiol levels, which are within range.  - Referred patient to endocrinology for hormone replacement therapy management.    FOLLOW-UP:  - Ordered labs to be completed prior to next visit in 6 months.  - Follow up in 6 months for annual exam.           This note was generated with the assistance of ambient listening technology. Verbal consent was obtained by the patient and accompanying visitor(s) for the recording of patient appointment to facilitate this note. I attest to having reviewed and edited the generated note for accuracy, though some syntax or spelling errors may persist. Please contact the author of this note for any clarification.

## 2025-04-10 NOTE — PROGRESS NOTES
Subjective     Patient ID: Shakira Pearl is a 79 y.o. female.    Chief Complaint: Follow-up    HPI  Review of Systems       Objective     Physical Exam       Assessment and Plan     {There are no diagnoses linked to this encounter. (Refresh or delete this SmartLink)}    ***         No follow-ups on file.

## 2025-04-21 NOTE — PROGRESS NOTES
"  Outpatient Rehab    Physical Therapy Visit    Patient Name: Shakira Pearl  MRN: [id number]  YOB: 1945  Encounter Date: 4/22/2025    Therapy Diagnosis:   Encounter Diagnosis   Name Primary?    Impaired functional mobility, balance, gait, and endurance Yes     Physician: Marcus Medeiros MD    Physician Orders: Eval and Treat  Medical Diagnosis: Spinal stenosis, lumbosacral region  Neurologic gait dysfunction    Visit # / Visits Authorized:  2 / 10  Insurance Authorization Period: 3/20/2025 to 6/10/2025  Date of Evaluation: 3/20/25  Plan of Care Certification: 3/20/25 to 5/30/25     Time In: 1434   Time Out: 1520  Total Time: 46   Total Billable Time: 46 minutes           Subjective   Pt reports her lower back is hurting today. She also mentions she hasn't done her exercises much due to having 2 sick cats..  Pain reported as 6/10. lower back    Objective            Treatment:       Shakira received therapeutic exercises to develop strength, endurance, ROM, flexibility, posture, and core stabilization for 18 minutes including:     X 8 minutes on SCI-FIT, level 1, for B UE and LE muscular and CV endurance    Supine:  1 x 10 B LE abdominal bracing with alternate hip flexion~ review of HEP  2 x 10 B bridges with abdominal bracing      Patient participated in neuromuscular re-education activities to improve: Balance, Coordination, Kinesthetic, Sense, Proprioception, and Posture for 20 minutes. The following activities were included:         At ballet bar:    1 x 10 B LE step ups/downs on foam fitter, no UE support, CGA  1 x 10 alternate single limb step overs on foam fitter, no UE support, CGA    1 x 10 B LE split stance orange cone taps, front foot on foam fitter and rear foot on ground, no UE support, CGA    On wooden rocker board:  2 x 30" working board in A/P directions, no UE support, CGA~ one episode of grasping bar  2 x 30" working board in M/L directions, no UE support, CGA    On soft side of small " "Bosu:  2 x 30" static standing, no UE support, CGA/SBA    On flat side of small Bosu:  1 x 30" static standing, no UE support, SBA  1 x 30" static standing, no UE support, eyes closed, CGA to slight min A        Patient participated in dynamic functional therapeutic activities to improve functional performance for 8 minutes. Including:     X 6 minutes walking on treadmill at 1.0 miles per hour~ PT emphasizes improved step/stride lengths and heel> toe pattern. Pt repeatedly requesting to increase speed.    Includes time needed to ambulate from lobby to gym, move supine<> sit on mat and move from one treatment station to another.      Time Entry(in minutes):  Neuromuscular Re-Education Time Entry: 20  Therapeutic Activity Time Entry: 8  Therapeutic Exercise Time Entry: 18    Assessment & Plan   Assessment: Shakira tolerated today's session well, depsite her complaints of lower back pain. She has not been able to work much on her home exercises, mainly due to having 2 sick cats. PT reviewed supine marching with bracing and educated pt in proper performance of bridges with bracing. We began balance training at the ballet bar and pt performed well with all activities attempted. She requested to ambulate on the treadmill for a short time, so PT reserved the last 6 minutes of the session for this. PT placed speed at 1.0 miles per hour, though pt frequently requested to increase speed. PT indicated he was more focused on pt achieving a more fluent gait pattern than with her speed. Shakira remains appropriate for continued therapy at 1 x week to address current deficits.  Evaluation/Treatment Tolerance: Patient tolerated treatment well    Patient will continue to benefit from skilled outpatient physical therapy to address the deficits listed in the problem list box on initial evaluation, provide pt/family education and to maximize pt's level of independence in the home and community environment.     Patient's spiritual, " cultural, and educational needs considered and patient agreeable to plan of care and goals.           Plan: Continue to progress endurance, balance, and functional strengthening tasks as indicated.    Goals:   Active       Goals cont'd       Pt will improve 6 Minute Walk Test distance to 1420 feet with improved gait mechanics on right LE.       Start:  03/22/25    Expected End:  05/30/25               Short term goals = Long term goals       Pt will be independent with an individualized home exercise program.        Start:  03/22/25    Expected End:  05/30/25            Pt will improve right hip flexion strength by 1/3 MMT score.       Start:  03/22/25    Expected End:  05/30/25            Pt will improve right ankle DF/PF strength by 1/3 MMT score.       Start:  03/22/25    Expected End:  05/30/25            Pt will improve her score on th Functional Gait Assessment to >/= 26/30       Start:  03/22/25    Expected End:  05/30/25            Pt will improve her score on NADIYA condition 4 to >/= 15 sec.       Start:  03/22/25    Expected End:  05/30/25                Dung Jacinto, PT

## 2025-04-22 ENCOUNTER — CLINICAL SUPPORT (OUTPATIENT)
Dept: REHABILITATION | Facility: HOSPITAL | Age: 80
End: 2025-04-22
Payer: MEDICARE

## 2025-04-22 ENCOUNTER — OUTPATIENT CASE MANAGEMENT (OUTPATIENT)
Dept: ADMINISTRATIVE | Facility: OTHER | Age: 80
End: 2025-04-22
Payer: MEDICARE

## 2025-04-22 DIAGNOSIS — Z74.09 IMPAIRED FUNCTIONAL MOBILITY, BALANCE, GAIT, AND ENDURANCE: Primary | ICD-10-CM

## 2025-04-22 PROCEDURE — 97110 THERAPEUTIC EXERCISES: CPT | Mod: HCNC,PO

## 2025-04-22 PROCEDURE — 97112 NEUROMUSCULAR REEDUCATION: CPT | Mod: HCNC,PO

## 2025-04-22 PROCEDURE — 97530 THERAPEUTIC ACTIVITIES: CPT | Mod: HCNC,PO

## 2025-04-22 NOTE — PROGRESS NOTES
Outpatient Care Management   - Care Plan Follow Up    Patient: Shakira Pearl  MRN:  [id number]  Date of Service:  4/22/2025  Completed by:  Lali Hammonds LCSW  Referral Date: 03/22/2025    Reason for Visit   Patient presents with    OPCM SW Follow Up Call    Case Closure    Goals Complete     4/22/25       Brief Summary:  met with Pt regarding follow up on the [facility] and [facility]. Pt reported she called and they cannot guarantee  times so she has opted to continue with her current mode of transport. She has not gone to the [facility] yet but did get an appt with ENT, just not Dr Rajput. No other resources needed.    Future Appointments   Date Time Provider Department Center   4/29/2025  2:30 PM Dung Jacinto, PT [facility]   5/6/2025  2:30 PM Dung Jacinto, PT [facility]   5/13/2025  2:30 PM Dung Jacinto, PT [facility]   5/20/2025  2:30 PM Ciarra Martinez, PT [facility]   5/27/2025  2:30 PM Ciarra Martinez, PT [facility]   6/3/2025  2:30 PM Dung Jacinto, PT [facility]   6/4/2025  1:00 PM Rosalia Lerner MD [facility] ENT [facility]   6/10/2025  2:30 PM Dung Jacinto, PT [facility]   7/21/2025  2:00 PM Hank Aleman MD [facility] OPHTHAL [place]   8/28/2025  1:30 PM Ivone Matute MD [facility] ENDODIA [facility]   9/9/2025  1:00 PM [facility] [id number] [facility]   9/9/2025  2:00 PM Marcus Medeiros MD [facility] NEUROS8 [facility]   10/9/2025 11:00 AM LAB, [facility] [place]   10/16/2025  2:00 PM Angel Bello DO [facility] FIFI [place], [facility]  Neuro Therapy   [facility]  [phone]

## 2025-04-28 DIAGNOSIS — Z00.00 ENCOUNTER FOR MEDICARE ANNUAL WELLNESS EXAM: ICD-10-CM

## 2025-04-28 NOTE — PROGRESS NOTES
Outpatient Rehab    Physical Therapy Progress Note    Patient Name: Shakira Pearl  MRN: [id number]  YOB: 1945  Encounter Date: 4/29/2025    Therapy Diagnosis:   Encounter Diagnosis   Name Primary?    Impaired functional mobility, balance, gait, and endurance Yes     Physician: Marcus Medeiros MD    Physician Orders: Eval and Treat  Medical Diagnosis: Spinal stenosis, lumbosacral region  Neurologic gait dysfunction    Visit # / Visits Authorized:  3 / 10  Insurance Authorization Period: 3/20/2025 to 6/10/2025  Date of Evaluation: 3/20/25  Plan of Care Certification: 3/20/25 to 5/30/25        Time In: 1525   Time Out: 1613  Total Time: 48   Total Billable Time: 48 minutes    FOTO:  Intake Score: 51%  Survey Score 1: 55%  Survey Score 2:  %         Subjective   She reports she is currently being monitored by Dr. Medeiros due to cysts near her spine..  Pain reported as 6/10. thoracolumbar spine    Objective             Lower Extremity Strength~ tested with pt sitting  Right LE eval 4/29/25 Left LE eval 4/29/25   Hip flexion:  4/5 4/5 Hip flexion: 4+/5 4+/5   Knee extension: 4+/5 5/5 Knee extension: 4+/5 5/5   Knee flexion: 4+/5 5/5 Knee flexion: 4+/5 5/5   Ankle dorsiflexion:  4/5 5/5 Ankle dorsiflexion: 4+/5 5/5   Ankle plantarflexion:  4/5 5/5 Ankle plantarflexion: 4+/5 5/5   Hip abduction: 4+/5 5/5 Hip abduction: 4+/5 5/5   Hip adduction: 4+/5 5/5 Hip adduction 4+/5 5/5   Hip extension: 4+/5 4+/5 Hip extension: 4+/5 4+/5          Evaluation 4/29/25   Single Limb Stance R LE NT  (<10 sec = HIGH FALL RISK) 16 seconds  (<10 sec = HIGH FALL RISK)   Single Limb Stance L LE NT  (<10 sec = HIGH FALL RISK) 29 seconds  (<10 sec = HIGH FALL RISK)   30 second Chair Rise 14 completed  14 completed   5 times sit to stand 10.76 seconds 7.53 seconds   TUG NT NT   Self selected walking speed NT 5.97 seconds   Fast walking speed NT 4.20 seconds   6 minute walk test 1413 feet(430.7 meters) 1388 feet(423.22 meters); SpO2 85 %  and  immediately after testing     30 second chair rise below average score:   Age  Men  Women    80-84  <13  <12    A below average score indicates a risk for fall.    5x sit to stand normative information:   >12 sec= fall risk for general elderly  >16 sec= fall risk for Parkinson's disease  >10 sec= balance/vestibular dysfunction (<61 y/o)  >14.2 sec= balance/vestibular dysfunction (>61 y/o)  >12 sec= fall risk for CVA        Functional Gait Assessment:   1. Gait on level surface =  2 (5.97 s)              (3) Normal: less than 5.5 sec, no A.D., no imbalance, normal gait pattern, deviates< 6in              (2) Mild impairment: 7-5.6 sec, uses A.D., mild gait deviations, or deviates 6-10 in              (1) Moderate impairment: > 7 sec, slow speed, imbalance, deviates 10-15 in.              (0) Severe impairment: needs assist, deviates >15 in, reach/touch wall  2. Change in Gait Speed = 3              (3) Normal: smooth change w/o loss of balance or gait deviation, deviates < 6 in, significant difference between speeds              (2) Mild impairment: changes speed, but demonstrates mild gait deviations, deviates 6-10 in, OR no deviations but unable to significantly speed, OR uses A.D.              (1) Moderate impairment: minor changes to speed, OR changes speed w/ significant deviations, deviates 10-15 in, OR  Changes speed , but loses balance & recovers              (0) Severe impairment: cannot change speed, deviates >15 in, or loses balance & needs assist  3. Gait with horizontal head turns  = 2              (3) Normal: no change in gait, deviates <6 in              (2) Mild impairment: slight change in speed, deviates 6-10 in, OR uses A.D.              (1) Moderate impairment: moderate change in speed, deviates 10-15 in              (0) Severe impairment: severe disruption of gait, deviates >15in  4. Gait with vertical head turns = 3              (3) Normal: no change in gait, deviates <6 in               (2) Mild impairment: slight change in speed, deviates 6-10 in OR uses A.D.              (1) Moderate impairment: moderate change in speed, deviates 10-15 in              (0) Severe impairment: severe disruption of gait, deviates >15 in  5. Gait with pivot turns = 3              (3) Normal: performs safely in 3 sec, no LOB              (2) Mild impairment: performs in >3 sec & no LOB, OR turns safely & requires several steps to regain LOB              (1) Moderate impairment: turns slow, OR requires several small steps for balance following turn & stop              (0) Severe impairment: cannot turn safely, needs assist  6. Step over obstacle = 2              (3) Normal: steps over 2 stacked boxes w/o change in speed or LOB              (2) Mild impairment: able to step over 1 box w/o change in speed or LOB              (1) Moderate impairment: steps over 1 box but must slow down, may require VC              (0) Severe impairment: cannot perform w/o assist  7. Gait with Narrow ANTONIO = 3              (3) Normal: 10 steps no staggering              (2) Mild impairment: 7-9 steps              (1) Moderate impairment: 4-7 steps              (0) Severe impairment: < 4 steps or cannot perform w/o assist  8. Gait with eyes closed = 1              (3) Normal: < 7 sec, no A.D., no LOB, normal gait pattern, deviates <6 in              (2) Mild impairment: 7.1-9 sec, mild gait deviations, deviates 6-10 in              (1) Moderate impairment: > 9 sec, abnormal pattern, LOB, deviates 10-15 in              (0) Severe impairment: cannot perform w/o assist, LOB, deviates >15in  9. Ambulating Backwards = 1              (3) Normal: no A.D., no LOB, normal gait pattern, deviates <6in              (2) Mild impairment: uses A.D., slower speed, mild gait deviations, deviates 6-10 in              (1) Moderate impairment: slow speed, abnormal gait pattern, LOB, deviates 10-15 in              (0) Severe impairment: severe gait deviations or  "LOB, deviates >15in  10. Steps = 3              (3) Normal: alternating feet, no rail              (2) Mild Impairment: alternating feet, uses rail              (1) Moderate impairment: step-to, uses rail              (0) Severe impairment: cannot perform safely     Score 24/30 at time of evaluation; 23/30 on 4/29/25     Score:   <22/30 fall risk   <20/30 fall risk in older adults   <18/30 fall risk in Parkinsons           Eval  [facility] SENSORY ORGANIZATION PERFORMANCE (SOP) TEST:  (N=normal, S = sway, F= Fall; hold each position for 30")  Condition 1: (firm surface/feet together/eyes open) P  Condition 2: (firm surface/feet together/eyes closed) P - min sway  Condition 3: (firm surface/feet in tandem/eyes open) P - bilaterally  Condition 4: (firm surface/feet in tandem/eyes closed) F - 9 sec  Condition 5: (soft surface/feet together/eyes open) P  Condition 6: (soft surface/feet together/eyes closed) P - mod sway         4/29/25  [facility] SENSORY ORGANIZATION PERFORMANCE (SOP) TEST:  (N=normal, S = sway, F= Fall; hold each position for 30")  Condition 1: (firm surface/feet together/eyes open) P  Condition 2: (firm surface/feet together/eyes closed) P   Condition 3: (firm surface/feet in tandem/eyes open) P ~ bilateral  Condition 4: (firm surface/feet in tandem/eyes closed) F - 14 sec  Condition 5: (soft surface/feet together/eyes open) P  Condition 6: (soft surface/feet together/eyes closed) P - min sway        Treatment:         Shakira received therapeutic exercises to develop strength, endurance, ROM, flexibility, posture, and core stabilization for 13 minutes including:      Includes time to perform LE MMT and FOTO survey        Patient participated in neuromuscular re-education activities to improve: Balance, Coordination, Kinesthetic, Sense, Proprioception, and Posture for 20 minutes. The following activities were included:      Includes time for objective testing           Patient participated in dynamic functional " therapeutic activities to improve functional performance for 15 minutes. Including:      Includes time for objective testing    Time Entry(in minutes):  Neuromuscular Re-Education Time Entry: 20  Therapeutic Activity Time Entry: 15  Therapeutic Exercise Time Entry: 13    Assessment & Plan   Assessment: Shakira tolerated today's session well, depsite her complaints of back pain. PT performed a monthly reassessment today in order to gauge pt's progress. She demonstrated improved B LE MMT scores for many of the muscle groups tested( see above chart). Her 5 x sit<> stand score improved by 3 seconds and her FOTO score improved by 4 points. She also performed better on conditions # 4 and 6 of the NADIYA. Unfortunately, she did not perform as well on the FGA or the 6 minute walk test. Despite the mixed results, pt does seem to feel that she is benefiting from the therapy visits.  Shakira remains appropriate for continued therapy at 1 x week to address current deficits.  Evaluation/Treatment Tolerance: Patient tolerated treatment well    Patient will continue to benefit from skilled outpatient physical therapy to address the deficits listed in the problem list box on initial evaluation, provide pt/family education and to maximize pt's level of independence in the home and community environment.     Patient's spiritual, cultural, and educational needs considered and patient agreeable to plan of care and goals.           Plan: Continue to progress endurance, balance, and functional strengthening tasks as indicated.    Goals:   Active       Goals cont'd       Pt will improve 6 Minute Walk Test distance to 1420 feet with improved gait mechanics on right LE. (Not Progressing)       Start:  03/22/25    Expected End:  05/30/25               Short term goals = Long term goals       Pt will be independent with an individualized home exercise program.  (Ongoing)       Start:  03/22/25    Expected End:  05/30/25            Pt will improve  right hip flexion strength by 1/3 MMT score. (Not Progressing)       Start:  03/22/25    Expected End:  05/30/25            Pt will improve right ankle DF/PF strength by 1/3 MMT score. (Met)       Start:  03/22/25    Expected End:  05/30/25    Resolved:  04/29/25         Pt will improve her score on  Functional Gait Assessment to >/= 26/30 (Not Progressing)       Start:  03/22/25    Expected End:  05/30/25            Pt will improve her score on NADIYA condition 4 to >/= 15 sec. (Progressing)       Start:  03/22/25    Expected End:  05/30/25                Dung Jacinto, PT

## 2025-04-29 ENCOUNTER — CLINICAL SUPPORT (OUTPATIENT)
Dept: REHABILITATION | Facility: HOSPITAL | Age: 80
End: 2025-04-29
Payer: MEDICARE

## 2025-04-29 DIAGNOSIS — Z74.09 IMPAIRED FUNCTIONAL MOBILITY, BALANCE, GAIT, AND ENDURANCE: Primary | ICD-10-CM

## 2025-04-29 PROCEDURE — 97530 THERAPEUTIC ACTIVITIES: CPT | Mod: HCNC,PO

## 2025-04-29 PROCEDURE — 97112 NEUROMUSCULAR REEDUCATION: CPT | Mod: HCNC,PO

## 2025-04-29 PROCEDURE — 97110 THERAPEUTIC EXERCISES: CPT | Mod: HCNC,PO

## 2025-05-05 NOTE — PROGRESS NOTES
"  Outpatient Rehab    Physical Therapy Visit    Patient Name: Shakira Pearl  MRN: [id number]  YOB: 1945  Encounter Date: 5/6/2025    Therapy Diagnosis:   Encounter Diagnosis   Name Primary?    Impaired functional mobility, balance, gait, and endurance Yes       Physician: Marcus Medeiros MD    Physician Orders: Eval and Treat  Medical Diagnosis: Spinal stenosis, lumbosacral region  Neurologic gait dysfunction    Visit # / Visits Authorized:  4 / 10  Insurance Authorization Period: 3/20/2025 to 6/10/2025  Date of Evaluation: 3/20/25  Plan of Care Certification: 3/20/25 to 5/30/25     Time In: 1432   Time Out: 1515  Total Time: 43   Total Billable Time: 43 minutes           Subjective   " I almost didn't come because I thought it might be raining.".  Pain reported as 6/10. lower back and hips    Objective            Treatment:       Shakira received therapeutic exercises to develop strength, endurance, ROM, flexibility, posture, and core stabilization for 12 minutes including:       X 8 minutes on SCI-FIT, level 1, for R UE and LE muscular and CV endurance( L handle broken)    SpO2 96  % and HR 88    Includes time to ambulate to gym and to obtain vitals    Patient participated in neuromuscular re-education activities to improve: Balance, Coordination, Kinesthetic, Sense, Proprioception, and Posture for 31 minutes. The following activities were included:         Inside parallel bars:    1 x 10 B LE step ups/downs on foam fitter, no UE support, CGA      1 x 10 B LE split stance orange cone taps, front foot on foam fitter and rear foot on ground, no UE support, CGA  1 x 10 B LE split stance double orange cone taps, front foot on foam fitter and rear foot on ground, no UE support, CGA    On wooden rocker board:  2 x 30" working board in A/P directions, no UE support, CGA  2 x 30" working board in M/L directions, no UE support, CGA    On soft side of large Bosu:  2 x 30" static standing, no UE support, CGA    On " "flat side of large Bosu:  2 x 30" static standing, no UE support, SBA  X 1 minute of tossing soft yellow ball back and forth with PT tech, CGA    Open gym:    X 4 laps reciprocal stepping over 5 orange hurdles, CGA  X 4 laps sideward stepping over 5 orange hurdles, CGA    X 2 minutes of rolling basketball back and forth with PT tech, CGA    X 4 laps tandem walking on 2 foam balance beams, no UE support, CGA to slight min A~ cues for slower speed          Patient participated in dynamic functional therapeutic activities to improve functional performance for 0 minutes. Including:         Time Entry(in minutes):  Neuromuscular Re-Education Time Entry: 31  Therapeutic Exercise Time Entry: 12    Assessment & Plan   Assessment: Shakira tolerated today's session well and was progressed with the difficulty of various balance challenges. Pt performed double cone taps from a split stance position with use of foam fitter, prolonging her time in B SLS. She also performed static and dynamic balance activities with use of large Bosu. Forward reciprocal walking over hurdles and tandem gait on foam balance beams were appropriately challenging as well. During many of the balance activities, pt required cues to slow her movements.  Shakira remains appropriate for continued therapy at 1 x week to address current deficits.  Evaluation/Treatment Tolerance: Patient tolerated treatment well    Patient will continue to benefit from skilled outpatient physical therapy to address the deficits listed in the problem list box on initial evaluation, provide pt/family education and to maximize pt's level of independence in the home and community environment.     Patient's spiritual, cultural, and educational needs considered and patient agreeable to plan of care and goals.           Plan: Continue to progress endurance, balance, and functional strengthening tasks as indicated.    Goals:   Active       Goals cont'd       Pt will improve 6 Minute " Walk Test distance to 1420 feet with improved gait mechanics on right LE. (Not Progressing)       Start:  03/22/25    Expected End:  05/30/25               Short term goals = Long term goals       Pt will be independent with an individualized home exercise program.  (Ongoing)       Start:  03/22/25    Expected End:  05/30/25            Pt will improve right hip flexion strength by 1/3 MMT score. (Not Progressing)       Start:  03/22/25    Expected End:  05/30/25            Pt will improve right ankle DF/PF strength by 1/3 MMT score. (Met)       Start:  03/22/25    Expected End:  05/30/25    Resolved:  04/29/25         Pt will improve her score on th Functional Gait Assessment to >/= 26/30 (Not Progressing)       Start:  03/22/25    Expected End:  05/30/25            Pt will improve her score on NADIYA condition 4 to >/= 15 sec. (Progressing)       Start:  03/22/25    Expected End:  05/30/25                  Dung Jacinto, PT

## 2025-05-06 ENCOUNTER — CLINICAL SUPPORT (OUTPATIENT)
Dept: REHABILITATION | Facility: HOSPITAL | Age: 80
End: 2025-05-06
Payer: MEDICARE

## 2025-05-06 DIAGNOSIS — Z74.09 IMPAIRED FUNCTIONAL MOBILITY, BALANCE, GAIT, AND ENDURANCE: Primary | ICD-10-CM

## 2025-05-06 PROCEDURE — 97112 NEUROMUSCULAR REEDUCATION: CPT | Mod: HCNC,PO

## 2025-05-06 PROCEDURE — 97110 THERAPEUTIC EXERCISES: CPT | Mod: HCNC,PO

## 2025-05-26 NOTE — PROGRESS NOTES
Outpatient Rehab    Physical Therapy Progress Note : Updated Plan of Care    Patient Name: Shakira Pearl  MRN: [id number]  YOB: 1945  Encounter Date: 5/27/2025    Therapy Diagnosis:   Encounter Diagnosis   Name Primary?    Impaired functional mobility, balance, gait, and endurance Yes     Physician: Marcus Medeiros MD    Physician Orders: Eval and Treat  Medical Diagnosis: Spinal stenosis, lumbosacral region  Neurologic gait dysfunction    Visit # / Visits Authorized:  5 / 10  Insurance Authorization Period: 3/20/2025 to 6/10/2025  Date of Evaluation: 3/20/25  Plan of Care Certification: 3/20/25 to 6/27/25     PT/PTA:     Number of PTA visits since last PT visit:   Time In: 1430   Time Out: 1514  Total Time (in minutes): 44   Total Billable Time (in minutes): 44    FOTO:  Intake Score:  %  Survey Score 2:  %  Survey Score 3:  %    Precautions:       Subjective   She is feeling okay this afternoon but reports pain in her L thumb. Reports having a fall on her back last week (which is why she missed her appointment) and she caught herself with her hands. Presents with scrapes on B hands and swelling in L thumb stating that she didn't seek medical attention after her fall..  Pain reported as 7/10. lower back    Objective              Evaluation 4/29/25 5/27/25   Single Limb Stance R LE NT  (<10 sec = HIGH FALL RISK) 16 seconds  (<10 sec = HIGH FALL RISK) 30 seconds   Single Limb Stance L LE NT  (<10 sec = HIGH FALL RISK) 29 seconds  (<10 sec = HIGH FALL RISK) 30 seconds   30 second Chair Rise 14 completed  14 completed 14.5 completed   5 times sit to stand 10.76 seconds 7.53 seconds 10.55 sec   TUG NT NT NT   Self selected walking speed NT 1.0 m/sec (6m/5.97 sec) 1.14 m/sec (6m/5.25 sec)   Fast walking speed NT 4.20 seconds 4.20 seconds   6 minute walk test 1413 feet(430.7 meters) 1388 feet(423.22 meters); SpO2 85 % and  immediately after testing 1273 feet with 1 standing rest break and reports of  increased back pain      30 second chair rise below average score:   Age                 Men                 Women     80-84               <13                  <12     A below average score indicates a risk for fall.     5x sit to stand normative information:   >12 sec= fall risk for general elderly  >16 sec= fall risk for Parkinson's disease  >10 sec= balance/vestibular dysfunction (<59 y/o)  >14.2 sec= balance/vestibular dysfunction (>59 y/o)  >12 sec= fall risk for CVA           Functional Gait Assessment:   1. Gait on level surface =  3 (5.25 s)   (3) Normal: less than 5.5 sec, no A.D., no imbalance, normal gait pattern, deviates< 6in   (2) Mild impairment: 7-5.6 sec, uses A.D., mild gait deviations, or deviates 6-10 in   (1) Moderate impairment: > 7 sec, slow speed, imbalance, deviates 10-15 in.   (0) Severe impairment: needs assist, deviates >15 in, reach/touch wall  2. Change in Gait Speed = 3 (4.20)   (3) Normal: smooth change w/o loss of balance or gait deviation, deviates < 6 in, significant difference between speeds   (2) Mild impairment: changes speed, but demonstrates mild gait deviations, deviates 6-10 in, OR no deviations but unable to significantly speed, OR uses A.D.   (1) Moderate impairment: minor changes to speed, OR changes speed w/ significant deviations, deviates 10-15 in, OR  Changes speed , but loses balance & recovers   (0) Severe impairment: cannot change speed, deviates >15 in, or loses balance & needs assist  3. Gait with horizontal head turns  = 3   (3) Normal: no change in gait, deviates <6 in   (2) Mild impairment: slight change in speed, deviates 6-10 in, OR uses A.D.   (1) Moderate impairment: moderate change in speed, deviates 10-15 in   (0) Severe impairment: severe disruption of gait, deviates >15in  4. Gait with vertical head turns = 3   (3) Normal: no change in gait, deviates <6 in   (2) Mild impairment: slight change in speed, deviates 6-10 in OR uses A.D.   (1) Moderate  impairment: moderate change in speed, deviates 10-15 in   (0) Severe impairment: severe disruption of gait, deviates >15 in  5. Gait with pivot turns = 3   (3) Normal: performs safely in 3 sec, no LOB   (2) Mild impairment: performs in >3 sec & no LOB, OR turns safely & requires several steps to regain LOB   (1) Moderate impairment: turns slow, OR requires several small steps for balance following turn & stop   (0) Severe impairment: cannot turn safely, needs assist  6. Step over obstacle = 3   (3) Normal: steps over 2 stacked boxes w/o change in speed or LOB   (2) Mild impairment: able to step over 1 box w/o change in speed or LOB   (1) Moderate impairment: steps over 1 box but must slow down, may require VC   (0) Severe impairment: cannot perform w/o assist  7. Gait with Narrow ANTONIO = 3   (3) Normal: 10 steps no staggering   (2) Mild impairment: 7-9 steps   (1) Moderate impairment: 4-7 steps   (0) Severe impairment: < 4 steps or cannot perform w/o assist  8. Gait with eyes closed = 0   (3) Normal: < 7 sec, no A.D., no LOB, normal gait pattern, deviates <6 in   (2) Mild impairment: 7.1-9 sec, mild gait deviations, deviates 6-10 in   (1) Moderate impairment: > 9 sec, abnormal pattern, LOB, deviates 10-15 in   (0) Severe impairment: cannot perform w/o assist, LOB, deviates >15in  9. Ambulating Backwards = 1   (3) Normal: no A.D., no LOB, normal gait pattern, deviates <6in   (2) Mild impairment: uses A.D., slower speed, mild gait deviations, deviates 6-10 in   (1) Moderate impairment: slow speed, abnormal gait pattern, LOB, deviates 10-15 in   (0) Severe impairment: severe gait deviations or LOB, deviates >15in  10. Steps = 3   (3) Normal: alternating feet, no rail   (2) Mild Impairment: alternating feet, uses rail   (1) Moderate impairment: step-to, uses rail   (0) Severe impairment: cannot perform safely    5/27/25: Score 25/30   Score 24/30 at time of evaluation; 23/30 on 4/29/25     Score:   <22/30 fall risk  "  <20/30 fall risk in older adults   <18/30 fall risk in Parkinsons             Eval  NADIYA SENSORY ORGANIZATION PERFORMANCE (SOP) TEST:  (N=normal, S = sway, F= Fall; hold each position for 30")  Condition 1: (firm surface/feet together/eyes open) P  Condition 2: (firm surface/feet together/eyes closed) P - min sway  Condition 3: (firm surface/feet in tandem/eyes open) P - bilaterally  Condition 4: (firm surface/feet in tandem/eyes closed) F - 9 sec  Condition 5: (soft surface/feet together/eyes open) P  Condition 6: (soft surface/feet together/eyes closed) P - mod sway      4/29/25  NADIYA SENSORY ORGANIZATION PERFORMANCE (SOP) TEST:  (N=normal, S = sway, F= Fall; hold each position for 30")  Condition 1: (firm surface/feet together/eyes open) P  Condition 2: (firm surface/feet together/eyes closed) P   Condition 3: (firm surface/feet in tandem/eyes open) P ~ bilateral  Condition 4: (firm surface/feet in tandem/eyes closed) F - 14 sec  Condition 5: (soft surface/feet together/eyes open) P  Condition 6: (soft surface/feet together/eyes closed) P - min sway    NADIYA SENSORY ORGANIZATION PERFORMANCE (SOP) TEST:  (N=normal, S = sway, F= Fall; hold each position for 30")  Condition 1: (firm surface/feet together/eyes open) P  Condition 2: (firm surface/feet together/eyes closed) P  Condition 3: (firm surface/feet in tandem/eyes open) P - bilaterally  Condition 4: (firm surface/feet in tandem/eyes closed) F - 3 seconds  Condition 5: (soft surface/feet together/eyes open) P  Condition 6: (soft surface/feet together/eyes closed) P      Treatment:  Therapeutic Activity  TA 1: Time included here for objective testing performed above.    Time Entry(in minutes):  Therapeutic Activity Time Entry: 44    Assessment & Plan   Assessment  Shakira                 Assessment Details: Shakira Phamt tolerated today's session well with a focus on reassessment of progress towards goals. She showed significant improvements in her right LE SLS " time, 10 Meter Walk Test, and Functional Gait Assessment scores indicting improvements in her static/dynamic balance and gait speed. Her score on the 6 Minute Walk Test declined on today due to pt reports of back pain impacting her activity tolerance. Overall, she has made good progress with therapy and is starting to show plateaus in her scores on functional tests. She is agreeable with PT plan to extend POC x4 weeks to focus on updating her home exercise program and working on fall prevention and fall recovery strategies.    Plan  From a physical therapy perspective, the patient would benefit from: Skilled Rehab Services                              Plan details: Extend PT POC from 5/30/2025 to 6/27/2025          The patient will continue to benefit from skilled outpatient physical therapy in order to address the deficits listed in the problem list on the initial evaluation, provide patient and family education, and maximize the patients level of independence in the home and community environments.     The patient's spiritual, cultural, and educational needs were considered, and the patient is agreeable to the plan of care and goals.           Goals:   Active       Goals cont'd       Pt will improve 6 Minute Walk Test distance to 1420 feet with improved gait mechanics on right LE. (Ongoing)       Start:  03/22/25    Expected End:  05/30/25               Short term goals = Long term goals       Pt will be independent with an individualized home exercise program.  (Met)       Start:  03/22/25    Expected End:  05/30/25    Resolved:  05/29/25         Pt will improve right hip flexion strength by 1/3 MMT score. (Progressing)       Start:  03/22/25    Expected End:  05/30/25            Pt will improve right ankle DF/PF strength by 1/3 MMT score. (Met)       Start:  03/22/25    Expected End:  05/30/25    Resolved:  04/29/25         Pt will improve her score on th Functional Gait Assessment to >/= 26/30 (Progressing)        Start:  03/22/25    Expected End:  05/30/25            Pt will improve her score on NADIYA condition 4 to >/= 15 sec. (Ongoing)       Start:  03/22/25    Expected End:  05/30/25                Ciarra Martinez, PT

## 2025-05-27 ENCOUNTER — CLINICAL SUPPORT (OUTPATIENT)
Dept: REHABILITATION | Facility: HOSPITAL | Age: 80
End: 2025-05-27
Payer: MEDICARE

## 2025-05-27 DIAGNOSIS — Z74.09 IMPAIRED FUNCTIONAL MOBILITY, BALANCE, GAIT, AND ENDURANCE: Primary | ICD-10-CM

## 2025-05-27 PROCEDURE — 97530 THERAPEUTIC ACTIVITIES: CPT | Mod: HCNC,PO

## 2025-06-10 ENCOUNTER — CLINICAL SUPPORT (OUTPATIENT)
Dept: REHABILITATION | Facility: HOSPITAL | Age: 80
End: 2025-06-10
Payer: MEDICARE

## 2025-06-10 DIAGNOSIS — Z74.09 IMPAIRED FUNCTIONAL MOBILITY, BALANCE, GAIT, AND ENDURANCE: Primary | ICD-10-CM

## 2025-06-10 PROCEDURE — 97530 THERAPEUTIC ACTIVITIES: CPT | Mod: HCNC,PO

## 2025-06-10 PROCEDURE — 97110 THERAPEUTIC EXERCISES: CPT | Mod: HCNC,PO

## 2025-06-10 PROCEDURE — 97112 NEUROMUSCULAR REEDUCATION: CPT | Mod: HCNC,PO

## 2025-06-10 NOTE — PROGRESS NOTES
"  Outpatient Rehab    Physical Therapy Visit    Patient Name: Shakira Pearl  MRN: [id number]  YOB: 1945  Encounter Date: 6/10/2025    Therapy Diagnosis:   Encounter Diagnosis   Name Primary?    Impaired functional mobility, balance, gait, and endurance Yes     Physician: Marcus Medeiros MD    Physician Orders: Eval and Treat  Medical Diagnosis: Spinal stenosis, lumbosacral region  Neurologic gait dysfunction  Surgical Diagnosis: Not applicable for this Episode   Surgical Date: Not applicable for this Episode    Visit # / Visits Authorized:  6 / 10  Insurance Authorization Period: 3/20/2025 to 6/30/2025  Date of Evaluation: 3/20/2025  Plan of Care Certification: 3/22/2025 to 6/27/2025      PT/PTA:     Number of PTA visits since last PT visit: 0  Time In: 1430   Time Out: 1515  Total Time (in minutes): 45   Total Billable Time (in minutes): 45    Precautions:     Standard      Subjective   She feels a "stitch" pain in her hip. It has been hurting since yesterday morning and feels like something is trying to catch..  Pain reported as 6/10. R anterior thigh near inguinal crease    Objective            Treatment:  Therapeutic Exercise  TE 1: x 8 mins, SciFit LVL 2 for endurance and neural priming.  TE 2: 2 x 30 secs supine R hip flexor stretch for anterior hip pain  Balance/Neuromuscular Re-Education  NMR 1: 6 laps tandem walking on 2 foam balance beams CGA <> geetha c/ gait belt and hand on shoulder;  NMR 2: 1 x 2min ea LE SLS basket ball rolling to tech CGA; verbal cues for catching ball then rolling instead of kicking ball for increase SLS time.  NMR 3: 1 x 10 ea LE step ups on foam fitter c/ toe taps to orange cone CGA;  Therapeutic Activity  TA 1: 6 laps forward walking over yellow hurdles CGA c/ gait belt verbal cues for slowing steps.  TA 2: 6 laps lateral stepping over yellow hurdles CGA <> geetha;      Time Entry(in minutes):  Neuromuscular Re-Education Time Entry: 18  Therapeutic Activity Time Entry: " 15  Therapeutic Exercise Time Entry: 12    Assessment & Plan   Assessment: Pt tolerated treatment well today. We progressed the amount of laps completed for tandem walking on foam, forward, and lateral stepping over hurdles. Pt had increased difficulty with lateral stepping over yellow chica and tandem on foam compared to forward stepping over yellow chica. Pt initially presented today c/ pain in R anterior thigh region near inguinal ligament; after hip flexor stretch and steps over hurdles pt pain had resolved. Pt will continue to benefit from skilled physical therapy services to improve / maintain balance and functional strength.  Evaluation/Treatment Tolerance: Patient tolerated treatment well    The patient will continue to benefit from skilled outpatient physical therapy in order to address the deficits listed in the problem list on the initial evaluation, provide patient and family education, and maximize the patients level of independence in the home and community environments.     The patient's spiritual, cultural, and educational needs were considered, and the patient is agreeable to the plan of care and goals.           Plan: Continue to progress endurace, balance, and functional strength,    Goals:   Active       Goals cont'd       Pt will improve 6 Minute Walk Test distance to 1420 feet with improved gait mechanics on right LE. (Progressing)       Start:  03/22/25    Expected End:  05/30/25               Short term goals = Long term goals       Pt will be independent with an individualized home exercise program.  (Met)       Start:  03/22/25    Expected End:  05/30/25    Resolved:  05/29/25         Pt will improve right hip flexion strength by 1/3 MMT score. (Progressing)       Start:  03/22/25    Expected End:  05/30/25            Pt will improve right ankle DF/PF strength by 1/3 MMT score. (Met)       Start:  03/22/25    Expected End:  05/30/25    Resolved:  04/29/25         Pt will improve her  score on th Functional Gait Assessment to >/= 26/30 (Progressing)       Start:  03/22/25    Expected End:  05/30/25            Pt will improve her score on NADIYA condition 4 to >/= 15 sec. (Progressing)       Start:  03/22/25    Expected End:  05/30/25                Cristian Martin, SPT    I certify that I was present in the room directing the student in service delivery and guiding them using my skilled judgment. As the co-signing therapist, I have reviewed the student's documentation and am responsible for the treatment, assessment and plan.    Dung Jacnito, PT

## 2025-06-20 DIAGNOSIS — N39.46 URINARY INCONTINENCE, MIXED: ICD-10-CM

## 2025-06-20 NOTE — TELEPHONE ENCOUNTER
No care due was identified.  [facility] Embedded Care Due Messages. Reference number: [id number].   6/20/2025 1:27:04 PM CDT

## 2025-06-21 NOTE — TELEPHONE ENCOUNTER
Refill Routing Note   Medication(s) are not appropriate for processing by [facility] for the following reason(s):        Drug-disease interaction: mirabegron and Fatty liver; Liver cyst     [facility] action(s):  Defer             Appointments  past 12m or future 3m with PCP    Date Provider   Last Visit   4/10/2025 Angel Bello, DO   Next Visit   10/16/2025 Angel Bello, DO   ED visits in past 90 days: 0        Note composed:9:17 PM 06/20/2025

## 2025-06-23 RX ORDER — MIRABEGRON 25 MG/1
25 TABLET, FILM COATED, EXTENDED RELEASE ORAL DAILY
Qty: 90 TABLET | Refills: 3 | Status: SHIPPED | OUTPATIENT
Start: 2025-06-23

## 2025-06-24 ENCOUNTER — TELEPHONE (OUTPATIENT)
Dept: PHARMACY | Facility: CLINIC | Age: 80
End: 2025-06-24
Payer: MEDICARE

## 2025-06-25 NOTE — TELEPHONE ENCOUNTER
[facility]/Population Health Chart Review & Patient Outreach Details For Medication Adherence Project    Reason for Outreach Encounter: 3rd Party payor non-compliance report ([employer], [employer], [employer], etc)  2.  Patient Outreach Method: Reviewed patient chart   3.   Medication in question:    Hypertension Medications              losartan-hydrochlorothiazide 50-12.5 mg (HYZAAR) 50-12.5 mg per tablet Take 1 tablet by mouth once daily.                 LF 90 ds 5/9/25    4.  Reviewed and or Updates Made To: Patient Chart  5. Outreach Outcomes and/or actions taken: Patient filled medication and is on track to be adherent  Additional Notes:

## 2025-07-14 ENCOUNTER — TELEPHONE (OUTPATIENT)
Dept: OPHTHALMOLOGY | Facility: CLINIC | Age: 80
End: 2025-07-14
Payer: MEDICARE

## 2025-07-21 ENCOUNTER — OFFICE VISIT (OUTPATIENT)
Dept: OPHTHALMOLOGY | Facility: CLINIC | Age: 80
End: 2025-07-21
Payer: MEDICARE

## 2025-07-21 DIAGNOSIS — H04.123 DRY EYE SYNDROME OF BOTH EYES: Primary | ICD-10-CM

## 2025-07-21 DIAGNOSIS — I10 ESSENTIAL HYPERTENSION: ICD-10-CM

## 2025-07-21 DIAGNOSIS — H02.403 PTOSIS OF BOTH EYELIDS: ICD-10-CM

## 2025-07-21 DIAGNOSIS — Z96.1 PSEUDOPHAKIA: ICD-10-CM

## 2025-07-21 DIAGNOSIS — H52.7 REFRACTIVE ERROR: ICD-10-CM

## 2025-07-21 PROCEDURE — 1101F PT FALLS ASSESS-DOCD LE1/YR: CPT | Mod: CPTII,HCNC,S$GLB, | Performed by: OPHTHALMOLOGY

## 2025-07-21 PROCEDURE — 1159F MED LIST DOCD IN RCRD: CPT | Mod: CPTII,HCNC,S$GLB, | Performed by: OPHTHALMOLOGY

## 2025-07-21 PROCEDURE — 99999 PR PBB SHADOW E&M-EST. PATIENT-LVL III: CPT | Mod: PBBFAC,HCNC,, | Performed by: OPHTHALMOLOGY

## 2025-07-21 PROCEDURE — 3288F FALL RISK ASSESSMENT DOCD: CPT | Mod: CPTII,HCNC,S$GLB, | Performed by: OPHTHALMOLOGY

## 2025-07-21 PROCEDURE — 1160F RVW MEDS BY RX/DR IN RCRD: CPT | Mod: CPTII,HCNC,S$GLB, | Performed by: OPHTHALMOLOGY

## 2025-07-21 PROCEDURE — 1126F AMNT PAIN NOTED NONE PRSNT: CPT | Mod: CPTII,HCNC,S$GLB, | Performed by: OPHTHALMOLOGY

## 2025-07-21 PROCEDURE — 92014 COMPRE OPH EXAM EST PT 1/>: CPT | Mod: HCNC,S$GLB,, | Performed by: OPHTHALMOLOGY

## 2025-07-21 NOTE — PROGRESS NOTES
Subjective:       Patient ID: Shakira Pearl is a 80 y.o. female.    Chief Complaint: No chief complaint on file.    HPI    81 y/o female present for  annual dry eye and HTN eye exam.  Pt state getting very dizzy and losing focus and balance. Pt also state a   lot of tearing OU. Denies flashes and floaters       Last edited by Tressa Dickinson on 7/21/2025  2:14 PM.             Assessment:       1. Dry eye syndrome of both eyes    2. Ptosis of both eyelids    3. Essential hypertension    4. Refractive error    5. Pseudophakia        Plan:       MAYELIN OU-Needs more AT's.  Ptosis OU-Pt is scheduled to se Dr Mann.  HTN-No retinopathy OU.  RE-Pt wants separate MRx's for distance & reading.        AT's.  Control HTN.  Give MRx 's for distance & reading.  RTC 1 yr.

## 2025-08-01 DIAGNOSIS — I10 ESSENTIAL HYPERTENSION: ICD-10-CM

## 2025-08-01 RX ORDER — LOSARTAN POTASSIUM AND HYDROCHLOROTHIAZIDE 12.5; 5 MG/1; MG/1
1 TABLET ORAL DAILY
Qty: 90 TABLET | Refills: 3 | Status: SHIPPED | OUTPATIENT
Start: 2025-08-01

## 2025-08-01 NOTE — TELEPHONE ENCOUNTER
No care due was identified.  [facility] Embedded Care Due Messages. Reference number: [id number].   8/01/2025 9:12:02 AM CDT

## 2025-08-21 ENCOUNTER — PATIENT MESSAGE (OUTPATIENT)
Dept: NEUROSURGERY | Facility: CLINIC | Age: 80
End: 2025-08-21
Payer: MEDICARE

## 2025-08-21 ENCOUNTER — TELEPHONE (OUTPATIENT)
Dept: NEUROSURGERY | Facility: CLINIC | Age: 80
End: 2025-08-21
Payer: MEDICARE

## 2025-08-28 ENCOUNTER — TELEPHONE (OUTPATIENT)
Dept: ENDOCRINOLOGY | Facility: CLINIC | Age: 80
End: 2025-08-28
Payer: MEDICARE

## (undated) DEVICE — Device

## (undated) DEVICE — DRESSING ABD OPEN GRANUFOAM

## (undated) DEVICE — PAD CAST SPECIALIST STRL 4

## (undated) DEVICE — SLEEVE PROTECTIVE 6X9 NON STER

## (undated) DEVICE — GOWN SMART IMP BREATHABLE XXLG

## (undated) DEVICE — SOL IRR NACL .9% 3000ML

## (undated) DEVICE — UNDERGLOVES BIOGEL PI SIZE 7.5

## (undated) DEVICE — ADHESIVE MASTISOL VIAL 48/BX

## (undated) DEVICE — GLOVE ORTHO PF SZ 8.5

## (undated) DEVICE — SYR 10CC LUER LOCK

## (undated) DEVICE — NDL 25G X 5/8 REG BEVEL

## (undated) DEVICE — PAD COLD THERAPY KNEE WRAP ON

## (undated) DEVICE — TUBE SET INFLOW/OUTFLOW

## (undated) DEVICE — GOWN B1 X-LG X-LONG

## (undated) DEVICE — SPONGE GAUZE 16PLY 4X4

## (undated) DEVICE — BLADE 4.2MM PREBENT ULTRACUT

## (undated) DEVICE — SUT MCRYL PLUS 4-0 PS2 27IN

## (undated) DEVICE — PAD ABD 8X10 STERILE

## (undated) DEVICE — SEE MEDLINE ITEM 157117

## (undated) DEVICE — GLOVE SURGEON SYN PF SZ 9

## (undated) DEVICE — GAUZE SPONGE 4X4 12PLY

## (undated) DEVICE — GLOVE BIOGEL SKINSENSE PI 7.0

## (undated) DEVICE — APPLICATOR CHLORAPREP ORN 26ML

## (undated) DEVICE — PAD ELECTRODE STER 1.5X3

## (undated) DEVICE — DRESSING XEROFORM FOIL PK 1X8

## (undated) DEVICE — DRESSING LEUKOPLAST FLEX 1X3IN

## (undated) DEVICE — CLOSURE SKIN STERI STRIP 1/2X4

## (undated) DEVICE — SOL 9P NACL IRR PIC IL

## (undated) DEVICE — COVER MAYO STAND REINFRCD 30

## (undated) DEVICE — SEE MEDLINE ITEM 157169